# Patient Record
Sex: MALE | Race: BLACK OR AFRICAN AMERICAN | NOT HISPANIC OR LATINO | Employment: OTHER | URBAN - METROPOLITAN AREA
[De-identification: names, ages, dates, MRNs, and addresses within clinical notes are randomized per-mention and may not be internally consistent; named-entity substitution may affect disease eponyms.]

---

## 2017-01-06 ENCOUNTER — OFFICE VISIT (OUTPATIENT)
Dept: FAMILY MEDICINE | Facility: CLINIC | Age: 77
End: 2017-01-06
Payer: MEDICARE

## 2017-01-06 VITALS
OXYGEN SATURATION: 97 % | WEIGHT: 215.63 LBS | HEIGHT: 72 IN | BODY MASS INDEX: 29.21 KG/M2 | HEART RATE: 46 BPM | SYSTOLIC BLOOD PRESSURE: 112 MMHG | DIASTOLIC BLOOD PRESSURE: 64 MMHG

## 2017-01-06 DIAGNOSIS — I10 ESSENTIAL HYPERTENSION: Primary | ICD-10-CM

## 2017-01-06 DIAGNOSIS — E11.22 TYPE 2 DIABETES MELLITUS WITH STAGE 3 CHRONIC KIDNEY DISEASE, WITHOUT LONG-TERM CURRENT USE OF INSULIN: ICD-10-CM

## 2017-01-06 DIAGNOSIS — N18.30 TYPE 2 DIABETES MELLITUS WITH STAGE 3 CHRONIC KIDNEY DISEASE, WITHOUT LONG-TERM CURRENT USE OF INSULIN: ICD-10-CM

## 2017-01-06 DIAGNOSIS — J02.9 PHARYNGITIS, UNSPECIFIED ETIOLOGY: ICD-10-CM

## 2017-01-06 PROCEDURE — 1125F AMNT PAIN NOTED PAIN PRSNT: CPT | Mod: S$GLB,,, | Performed by: FAMILY MEDICINE

## 2017-01-06 PROCEDURE — 1160F RVW MEDS BY RX/DR IN RCRD: CPT | Mod: S$GLB,,, | Performed by: FAMILY MEDICINE

## 2017-01-06 PROCEDURE — 1159F MED LIST DOCD IN RCRD: CPT | Mod: S$GLB,,, | Performed by: FAMILY MEDICINE

## 2017-01-06 PROCEDURE — 3078F DIAST BP <80 MM HG: CPT | Mod: S$GLB,,, | Performed by: FAMILY MEDICINE

## 2017-01-06 PROCEDURE — 1157F ADVNC CARE PLAN IN RCRD: CPT | Mod: S$GLB,,, | Performed by: FAMILY MEDICINE

## 2017-01-06 PROCEDURE — 99214 OFFICE O/P EST MOD 30 MIN: CPT | Mod: 25,S$GLB,, | Performed by: FAMILY MEDICINE

## 2017-01-06 PROCEDURE — 3074F SYST BP LT 130 MM HG: CPT | Mod: S$GLB,,, | Performed by: FAMILY MEDICINE

## 2017-01-06 PROCEDURE — 99999 PR PBB SHADOW E&M-EST. PATIENT-LVL IV: CPT | Mod: PBBFAC,,, | Performed by: FAMILY MEDICINE

## 2017-01-06 PROCEDURE — 96372 THER/PROPH/DIAG INJ SC/IM: CPT | Mod: S$GLB,,, | Performed by: FAMILY MEDICINE

## 2017-01-06 RX ORDER — TRIAMCINOLONE ACETONIDE 40 MG/ML
40 INJECTION, SUSPENSION INTRA-ARTICULAR; INTRAMUSCULAR
Status: COMPLETED | OUTPATIENT
Start: 2017-01-06 | End: 2017-01-06

## 2017-01-06 RX ORDER — AZITHROMYCIN 250 MG/1
TABLET, FILM COATED ORAL
Qty: 6 TABLET | Refills: 0 | Status: SHIPPED | OUTPATIENT
Start: 2017-01-06 | End: 2017-01-11

## 2017-01-06 RX ADMIN — TRIAMCINOLONE ACETONIDE 40 MG: 40 INJECTION, SUSPENSION INTRA-ARTICULAR; INTRAMUSCULAR at 11:01

## 2017-01-06 NOTE — PATIENT INSTRUCTIONS
Bandaid applied, tolerated well, pt instructed to wait 15 minutes.  Chronic Kidney Disease (CKD)    The role of the kidneys is to remove waste products and excess water from the blood.  When the kidneys do not function normally and waste products begin to build up in the blood, this is called chronic kidney disease (CKD). CKD is defined as the presence of kidney damage or a decrease in kidney function lasting 3 months or more. CKD allows excess water, waste, and toxic substances to build up in the body. This can eventually become life-threatening, requiring dialysis or a kidney transplant to stay alive. This most severe form is called end stage renal disease or ESRD.  Diabetes is the leading causes of chronic renal failure. Other causes include high blood pressure, hardening of the arteries (atherosclerosis), lupus, inflammation of the blood vessels (vasculitis), prior viral and bacterial infections, and others. Certain over-the-counter pain medicines can cause renal failure when taken often over a long period of time. These include aspirin, ibuprofen, and related anti-inflammatory medicines called NSAIDs (nonsteroidal anti-inflammatory drugs).  Home care  The following guidelines will help you care for yourself at home:  1. If you have diabetes, talk to your doctor about the quality of your blood sugar control and any adjustments needed to your diet, lifestyle, or medicines.  2. If you have high blood pressure:  ¨ Take prescribed medicine to lower your blood pressure to the recommended goal of less than 130/80.  ¨ Take up a regular exercise program that you enjoy. Check with your doctor to be sure your planned exercise program is right for you.  ¨ Reduce your salt (sodium) intake. Your doctor can tell you how much salt per day is safe for you.  3. If you are overweight, talk to your doctor about a weight loss plan.  4. If you smoke, you must quit. Smoking worsens kidney disease. Talk to your doctor about ways to  help you quit.  For more information, visit the following links:  ¨ www.smokefree.gov/sites/default/files/pdf/clearing-the-air-accessible.pdf  ¨ www.smokefree.gov  ¨ www.cancer.org/healthy/stayawayfromtobacco/guidetoquittingsmoking/  5. Most patients with chronic renal failure need to follow a special diet.  Be sure you understand yours. In general, you will need to restrict protein, salt, potassium, and phosphorus. You also need to limit fluid intake. A calcium supplement may be prescribed to protect your bones from osteoporosis.  6. CKD is a risk factor for heart disease. Talk to your doctor about any other risk factors you might have and what you can do to lessen them.  7. Talk to your doctor about any medicines you are taking to determine if they need to be reduced or stopped.  8. Avoid the following over-the-counter medicines, or consult your doctor before using:  ¨ Aspirin and nonsteroidal anti-inflammatory drugs (NSAIDs) such as ibuprofen or naproxen (short-term use of acetaminophen for fever or pain is okay)  ¨ Laxatives and antacids containing magnesium or aluminum  ¨ Fleet or phospho soda enemas containing phosphorus  ¨ Certain stomach acid-blocking medicine such as cimetidine or ranitidine   ¨ Decongestants containing pseudoephedrine   ¨ Herbal supplements  Follow-up care  Follow up with your doctor or as advised by our staff. Contact one of the following for more information:  · American Association of Kidney Patients (180) 356-6395 www.aakp.org  · National Kidney Foundation (195) 189-3915 www.kidney.org  · American Kidney Fund (782) 486-9274 www.kidneyfund.org  · National Kidney Disease Education Program (191) 4SYCIZT www.nkdep.nih.gov  [NOTE: If an X-ray, EKG (cardiogram), or other diagnostic test was taken, another specialist will review it. You will be notified of any new findings that may affect your care.]  When to seek medical care  Get prompt medical attention or contact your doctor if any of the  following occur:  · Nausea or vomiting  · Fever greater than 100.4°F (38°C)  · Severe weakness, dizziness, fainting, drowsiness, or confusion  · Chest pain or shortness of breath  · Unexpected weight gain or swelling in the legs, ankles, or around the eyes  · Heart beating fast, slow, or irregularly  · Decrease or absent urine output  © 8793-7967 [employer]. [address] [place] [address], [place] [address]. All rights reserved. This information is not intended as a substitute for professional medical care. Always follow your healthcare professional's instructions.

## 2017-01-06 NOTE — PROGRESS NOTES
Subjective:       Patient ID: Pedro Ponce is a 76 y.o. male.    Chief Complaint: Sore Throat and Cough    HPI Comments: 76 years old male who came to the clinic with sore throat for the last week.  No fevers or chills.  Patient was using over-the-counter medicine with no significant improvement.  Blood pressure today is stable.  No chest pain palpitations orthopnea or PND.  Patient with no recent A1c.  No polyuria polydipsia or polyphagia.  Patient with good compliance with medical regimen.    Sore Throat    Associated symptoms include coughing.   Cough   Associated symptoms include a sore throat. Pertinent negatives include no chest pain, chills or fever.     Review of Systems   Constitutional: Negative.  Negative for chills and fever.   HENT: Positive for sore throat.    Eyes: Negative.    Respiratory: Positive for cough.    Cardiovascular: Negative.  Negative for chest pain, palpitations and leg swelling.   Gastrointestinal: Negative.    Endocrine: Negative for cold intolerance, heat intolerance, polydipsia, polyphagia and polyuria.   Genitourinary: Negative.    Musculoskeletal: Negative.    Skin: Negative.    Neurological: Negative.    Psychiatric/Behavioral: Negative.        Objective:      Physical Exam   Constitutional: He is oriented to person, place, and time. He appears well-developed and well-nourished. No distress.   HENT:   Head: Normocephalic and atraumatic.   Right Ear: External ear normal.   Left Ear: External ear normal.   Nose: Nose normal.   Mouth/Throat: Posterior oropharyngeal erythema present. No oropharyngeal exudate.   Eyes: Conjunctivae and EOM are normal. Pupils are equal, round, and reactive to light. Right eye exhibits no discharge. Left eye exhibits no discharge. No scleral icterus.   Neck: Normal range of motion. Neck supple. No JVD present. No tracheal deviation present. No thyromegaly present.   Cardiovascular: Normal rate, regular rhythm, normal heart sounds and intact distal  pulses.  Exam reveals no gallop and no friction rub.    No murmur heard.  Pulmonary/Chest: Effort normal and breath sounds normal. No stridor. No respiratory distress. He has no wheezes. He has no rales. He exhibits no tenderness.   Abdominal: Soft. Bowel sounds are normal. He exhibits no distension and no mass. There is no tenderness. There is no rebound and no guarding.   Musculoskeletal: Normal range of motion. He exhibits no edema or tenderness.   Lymphadenopathy:     He has no cervical adenopathy.   Neurological: He is alert and oriented to person, place, and time. He has normal reflexes. He displays normal reflexes. No cranial nerve deficit. He exhibits normal muscle tone. Coordination normal.   Skin: Skin is warm and dry. No rash noted. He is not diaphoretic. No erythema. No pallor.   Psychiatric: He has a normal mood and affect. His behavior is normal. Judgment and thought content normal.   Nursing note and vitals reviewed.      Assessment:       1. Essential hypertension    2. Type 2 diabetes mellitus with stage 3 chronic kidney disease, without long-term current use of insulin    3. Pharyngitis, unspecified etiology        Plan:         Pedro was seen today for sore throat and cough.    Diagnoses and all orders for this visit:    Essential hypertension  -     Comprehensive metabolic panel; Future    Type 2 diabetes mellitus with stage 3 chronic kidney disease, without long-term current use of insulin  -     Comprehensive metabolic panel; Future    Pharyngitis, unspecified etiology  -     azithromycin (Z-ERAN) 250 MG tablet; Take 2 tablets by mouth on day 1; Take 1 tablet by mouth on days 2-5  -     triamcinolone acetonide injection 40 mg; Inject 1 mL (40 mg total) into the muscle one time.    Continue monitoring blood pressure at home, low sodium diet.  Continue monitoring blood sugar at home,ADA diet.

## 2017-01-06 NOTE — MR AVS SNAPSHOT
[facility]   [place]  [place] [address]  Phone: [phone]  Fax: [phone]                  Pedro Ponce   2017 11:20 AM   Office Visit    Description:  Male : 1940   Provider:  John Ricci MD   Department:  [facility]           Reason for Visit     Sore Throat     Cough           Diagnoses this Visit        Comments    Essential hypertension    -  Primary     Type 2 diabetes mellitus with stage 3 chronic kidney disease, without long-term current use of insulin         Pharyngitis, unspecified etiology                To Do List           Future Appointments        Provider Department Dept Phone    1/10/2017 7:00 AM [facility], [facility] [phone]    2017 1:00 PM THOM Newell MD [facility] - Ophthalmology [phone]      Goals (5 Years of Data)     None       These Medications        Disp Refills Start End    azithromycin (Z-ERAN) 250 MG tablet 6 tablet 0 2017    Take 2 tablets by mouth on day 1; Take 1 tablet by mouth on days 2-5    Pharmacy: [facility] #[id number] - [place], [place] - [address] [facility] Ph #: [phone]         [facility]     [facility] -  Assistance  Registered nurses in the [facility] provide clinical advisement, health education, appointment booking, and other advisory services.  Call for this free service at [phone].             Medications           Message regarding Medications     Verify the changes and/or additions to your medication regime listed below are the same as discussed with your clinician today.  If any of these changes or additions are incorrect, please notify your healthcare provider.        START taking these NEW medications        Refills    azithromycin (Z-ERAN) 250 MG tablet 0    Sig: Take 2 tablets by mouth on day 1; Take 1 tablet by mouth on days 2-5    Class: Normal      These medications were  administered today        Dose Freq    triamcinolone acetonide injection 40 mg 40 mg Clinic/HOD 1 time    Sig: Inject 1 mL (40 mg total) into the muscle one time.    Class: Normal    Route: Intramuscular           Verify that the below list of medications is an accurate representation of the medications you are currently taking.  If none reported, the list may be blank. If incorrect, please contact your healthcare provider. Carry this list with you in case of emergency.           Current Medications     albuterol 90 mcg/actuation inhaler Inhale 2 puffs into the lungs every 6 (six) hours as needed for Wheezing.    amlodipine-atorvastatin (CADUET) 10-10 mg per tablet TAKE 1 TABLET BY MOUTH EVERY DAY    aspirin 81 mg Tab Take 81 mg by mouth. 1 Tablet Oral Every evening    b complex vitamins tablet Take 1 tablet by mouth once daily.      carbamide peroxide (DEBROX) 6.5 % otic solution Place 6.5 % in ear(s). 5-10 Drops Otic Twice a day .  Drops should remain in ear >15 minutesDo not use for more than 4 days.    docusate sodium 100 mg capsule Take 100 mg by mouth. 1 Tablet Oral Twice a day .   take as needed for hard stools    dutasteride (AVODART) 0.5 mg capsule TAKE 1 CAPSULE (0.5 MG TOTAL) BY MOUTH ONCE DAILY.    fish oil-omega-3 fatty acids 300-1,000 mg capsule Take 1 g by mouth once daily.      hydrochlorothiazide (HYDRODIURIL) 25 MG tablet TAKE 1 TABLET (25 MG TOTAL) BY MOUTH ONCE DAILY.    irbesartan (AVAPRO) 300 MG tablet TAKE 1 TABLET EVERY DAY    latanoprost 0.005 % ophthalmic solution Place 1 drop into both eyes once daily.    metoprolol tartrate (LOPRESSOR) 25 MG tablet TAKE 2 TABLETS BY MOUTH 2 TIMES DAILY    metoprolol tartrate (LOPRESSOR) 25 MG tablet TAKE 2 TABLETS BY MOUTH TWICE A DAY    multivitamin-minerals-lutein (CENTRUM SILVER) Tab Take by mouth. 1 Tablet Oral Every day    oxybutynin (DITROPAN) 5 MG Tab TAKE 1 TABLET TWICE A DAY    tamsulosin (FLOMAX) 0.4 mg Cp24     vitamin D 185 MG Tab Take 185 mg  by mouth once daily.      azithromycin (Z-ERAN) 250 MG tablet Take 2 tablets by mouth on day 1; Take 1 tablet by mouth on days 2-5           Clinical Reference Information           Vital Signs - Last Recorded  Most recent update: 1/6/2017 11:27 AM by Marlin Pelayo MA    BP Pulse Ht Wt SpO2 BMI    112/64 (BP Location: Right arm, Patient Position: Sitting, BP Method: Manual) (!) 46 6' (1.829 m) 97.8 kg (215 lb 9.8 oz) 97% 29.24 kg/m2      Blood Pressure          Most Recent Value    BP  112/64      Allergies as of 1/6/2017     Pcn [Penicillins]      Immunizations Administered on Date of Encounter - 1/6/2017     None      Orders Placed During Today's Visit     Future Labs/Procedures Expected by Expires    Comprehensive metabolic panel  1/6/2017 4/6/2017

## 2017-01-10 ENCOUNTER — LAB VISIT (OUTPATIENT)
Dept: LAB | Facility: HOSPITAL | Age: 77
End: 2017-01-10
Attending: FAMILY MEDICINE
Payer: MEDICARE

## 2017-01-10 DIAGNOSIS — N18.30 TYPE 2 DIABETES MELLITUS WITH STAGE 3 CHRONIC KIDNEY DISEASE, WITHOUT LONG-TERM CURRENT USE OF INSULIN: ICD-10-CM

## 2017-01-10 DIAGNOSIS — E11.9 TYPE 2 DIABETES MELLITUS WITHOUT COMPLICATION: ICD-10-CM

## 2017-01-10 DIAGNOSIS — I10 ESSENTIAL HYPERTENSION: ICD-10-CM

## 2017-01-10 DIAGNOSIS — E11.22 TYPE 2 DIABETES MELLITUS WITH STAGE 3 CHRONIC KIDNEY DISEASE, WITHOUT LONG-TERM CURRENT USE OF INSULIN: ICD-10-CM

## 2017-01-10 LAB
ALBUMIN SERPL BCP-MCNC: 4 G/DL
ALP SERPL-CCNC: 65 U/L
ALT SERPL W/O P-5'-P-CCNC: 35 U/L
ANION GAP SERPL CALC-SCNC: 6 MMOL/L
AST SERPL-CCNC: 27 U/L
BILIRUB SERPL-MCNC: 1.6 MG/DL
BUN SERPL-MCNC: 23 MG/DL
CALCIUM SERPL-MCNC: 10.6 MG/DL
CHLORIDE SERPL-SCNC: 102 MMOL/L
CHOLEST/HDLC SERPL: 3.4 {RATIO}
CO2 SERPL-SCNC: 29 MMOL/L
CREAT SERPL-MCNC: 1.3 MG/DL
EST. GFR  (AFRICAN AMERICAN): >60 ML/MIN/1.73 M^2
EST. GFR  (NON AFRICAN AMERICAN): 52.6 ML/MIN/1.73 M^2
GLUCOSE SERPL-MCNC: 97 MG/DL
HDL/CHOLESTEROL RATIO: 29.7 %
HDLC SERPL-MCNC: 148 MG/DL
HDLC SERPL-MCNC: 44 MG/DL
LDLC SERPL CALC-MCNC: 87.8 MG/DL
NONHDLC SERPL-MCNC: 104 MG/DL
POTASSIUM SERPL-SCNC: 3.7 MMOL/L
PROT SERPL-MCNC: 7.4 G/DL
SODIUM SERPL-SCNC: 137 MMOL/L
TRIGL SERPL-MCNC: 81 MG/DL

## 2017-01-10 PROCEDURE — 83036 HEMOGLOBIN GLYCOSYLATED A1C: CPT

## 2017-01-10 PROCEDURE — 80053 COMPREHEN METABOLIC PANEL: CPT

## 2017-01-10 PROCEDURE — 80061 LIPID PANEL: CPT

## 2017-01-10 PROCEDURE — 36415 COLL VENOUS BLD VENIPUNCTURE: CPT | Mod: PO

## 2017-01-11 LAB
ESTIMATED AVG GLUCOSE: 131 MG/DL
HBA1C MFR BLD HPLC: 6.2 %

## 2017-01-30 RX ORDER — AMLODIPINE BESYLATE AND ATORVASTATIN CALCIUM 10; 10 MG/1; MG/1
TABLET, FILM COATED ORAL
Qty: 90 TABLET | Refills: 0 | Status: SHIPPED | OUTPATIENT
Start: 2017-01-30 | End: 2017-07-25 | Stop reason: SDUPTHER

## 2017-01-31 RX ORDER — METOPROLOL TARTRATE 25 MG/1
TABLET, FILM COATED ORAL
Qty: 180 TABLET | Refills: 1 | Status: SHIPPED | OUTPATIENT
Start: 2017-01-31 | End: 2017-05-04

## 2017-02-07 ENCOUNTER — OFFICE VISIT (OUTPATIENT)
Dept: OPHTHALMOLOGY | Facility: CLINIC | Age: 77
End: 2017-02-07
Payer: MEDICARE

## 2017-02-07 DIAGNOSIS — H35.3232 EXUDATIVE AGE-RELATED MACULAR DEGENERATION OF BOTH EYES WITH INACTIVE CHOROIDAL NEOVASCULARIZATION: Primary | ICD-10-CM

## 2017-02-07 DIAGNOSIS — H40.1132 PRIMARY OPEN ANGLE GLAUCOMA OF BOTH EYES, MODERATE STAGE: ICD-10-CM

## 2017-02-07 PROCEDURE — 99999 PR PBB SHADOW E&M-EST. PATIENT-LVL II: CPT | Mod: PBBFAC,,, | Performed by: OPHTHALMOLOGY

## 2017-02-07 PROCEDURE — 92014 COMPRE OPH EXAM EST PT 1/>: CPT | Mod: S$GLB,,, | Performed by: OPHTHALMOLOGY

## 2017-02-07 PROCEDURE — 92134 CPTRZ OPH DX IMG PST SGM RTA: CPT | Mod: S$GLB,,, | Performed by: OPHTHALMOLOGY

## 2017-02-07 PROCEDURE — 92226 PR SPECIAL EYE EXAM, SUBSEQUENT: CPT | Mod: 50,S$GLB,, | Performed by: OPHTHALMOLOGY

## 2017-02-07 NOTE — MR AVS SNAPSHOT
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      [facility]     [facility] -  Assistance  Registered nurses in the [facility] provide clinical advisement, health education, appointment booking, and other advisory services.  Call for this free service at [phone].             Medications           Message regarding Medications     Verify the changes and/or additions to your medication regime listed below are the same as discussed with your clinician today.  If any of these changes or additions are incorrect, please notify your healthcare provider.             Verify that the below list of medications is an accurate representation of the medications you are currently taking.  If none reported, the list may be blank. If incorrect, please contact your healthcare provider. Carry this list with you in case of emergency.           Current Medications     albuterol 90 mcg/actuation inhaler Inhale 2 puffs into the lungs every 6 (six) hours as needed for Wheezing.    amlodipine-atorvastatin (CADUET) 10-10 mg per tablet TAKE 1 TABLET BY MOUTH EVERY DAY    aspirin 81 mg Tab Take 81 mg by mouth. 1 Tablet Oral Every evening    b complex vitamins tablet Take 1 tablet by mouth once daily.      carbamide peroxide (DEBROX) 6.5 %  otic solution Place 6.5 % in ear(s). 5-10 Drops Otic Twice a day .  Drops should remain in ear >15 minutesDo not use for more than 4 days.    docusate sodium 100 mg capsule Take 100 mg by mouth. 1 Tablet Oral Twice a day .   take as needed for hard stools    dutasteride (AVODART) 0.5 mg capsule TAKE 1 CAPSULE (0.5 MG TOTAL) BY MOUTH ONCE DAILY.    fish oil-omega-3 fatty acids 300-1,000 mg capsule Take 1 g by mouth once daily.      FLUZONE HIGH-DOSE 2016-17, PF, 180 mcg/0.5 mL Syrg     hydrochlorothiazide (HYDRODIURIL) 25 MG tablet TAKE 1 TABLET (25 MG TOTAL) BY MOUTH ONCE DAILY.    irbesartan (AVAPRO) 300 MG tablet TAKE 1 TABLET EVERY DAY    latanoprost 0.005 % ophthalmic solution Place 1 drop into both eyes once daily.    metoprolol tartrate (LOPRESSOR) 25 MG tablet TAKE 2 TABLETS BY MOUTH 2 TIMES DAILY    metoprolol tartrate (LOPRESSOR) 25 MG tablet TAKE 2 TABLETS BY MOUTH TWICE A DAY    multivitamin-minerals-lutein (CENTRUM SILVER) Tab Take by mouth. 1 Tablet Oral Every day    oxybutynin (DITROPAN) 5 MG Tab TAKE 1 TABLET TWICE A DAY    tamsulosin (FLOMAX) 0.4 mg Cp24     vitamin D 185 MG Tab Take 185 mg by mouth once daily.             Clinical Reference Information           Allergies as of 2/7/2017     Pcn [Penicillins]      Immunizations Administered on Date of Encounter - 2/7/2017     None      Orders Placed During Today's Visit      Normal Orders This Visit    Posterior Segment OCT Retina-Both eyes       Language Assistance Services     ATTENTION: Language assistance services are available, free of charge. Please call [phone].      ATENCIÓN: Si yuryla yanydanilo, tiene a holliday disposición servicios gratuitos de asistencia lingüística. Llame al [phone].     KENNY Ý: N?u b?n nói Ti?ng Vi?t, có các d?ch v? h? tr? ngôn ng? mi?n phí dành cho b?n. G?i s? [phone].         [facility] complies with applicable Federal civil rights laws and does not discriminate on the basis of race, color,  national origin, age, disability, or sex.

## 2017-02-07 NOTE — PROGRESS NOTES
OCT - Flat PED OD, NO SRF - stable   OS - stable small flat inferior PED, no SRF     Previous ICG - no significant Pooling or leakage OU  RPE defects OS>OD    A/P    1. IPCV OU vs wet amd   With subfoveal PED OD  Avastin resumed 1/14  S/p Avastin OD #8    OS - PED inferior to fovea stable    Stable will cont to Observe OU    2. Floaters OU    3. POAG - Good IOP, continue management as per Dr. Hidalgo  - Needs f/u appt    4. NS   - OK for CE when ready, pt would like to have it done with Dr. Hidalgo       12 months OCT

## 2017-02-25 ENCOUNTER — OFFICE VISIT (OUTPATIENT)
Dept: FAMILY MEDICINE | Facility: CLINIC | Age: 77
End: 2017-02-25
Payer: MEDICARE

## 2017-02-25 VITALS
SYSTOLIC BLOOD PRESSURE: 136 MMHG | DIASTOLIC BLOOD PRESSURE: 72 MMHG | BODY MASS INDEX: 26.76 KG/M2 | OXYGEN SATURATION: 98 % | WEIGHT: 197.56 LBS | HEIGHT: 72 IN | HEART RATE: 54 BPM

## 2017-02-25 DIAGNOSIS — R20.0 LEFT ARM NUMBNESS: Primary | ICD-10-CM

## 2017-02-25 PROCEDURE — 3075F SYST BP GE 130 - 139MM HG: CPT | Mod: S$GLB,,, | Performed by: FAMILY MEDICINE

## 2017-02-25 PROCEDURE — 1160F RVW MEDS BY RX/DR IN RCRD: CPT | Mod: S$GLB,,, | Performed by: FAMILY MEDICINE

## 2017-02-25 PROCEDURE — 1159F MED LIST DOCD IN RCRD: CPT | Mod: S$GLB,,, | Performed by: FAMILY MEDICINE

## 2017-02-25 PROCEDURE — 1157F ADVNC CARE PLAN IN RCRD: CPT | Mod: S$GLB,,, | Performed by: FAMILY MEDICINE

## 2017-02-25 PROCEDURE — 99999 PR PBB SHADOW E&M-EST. PATIENT-LVL IV: CPT | Mod: PBBFAC,,, | Performed by: FAMILY MEDICINE

## 2017-02-25 PROCEDURE — 3078F DIAST BP <80 MM HG: CPT | Mod: S$GLB,,, | Performed by: FAMILY MEDICINE

## 2017-02-25 PROCEDURE — 99214 OFFICE O/P EST MOD 30 MIN: CPT | Mod: S$GLB,,, | Performed by: FAMILY MEDICINE

## 2017-02-25 PROCEDURE — 1126F AMNT PAIN NOTED NONE PRSNT: CPT | Mod: S$GLB,,, | Performed by: FAMILY MEDICINE

## 2017-02-25 NOTE — PATIENT INSTRUCTIONS
Carpal Tunnel Syndrome    Carpal tunnel syndrome is a painful condition of the wrist and arm. It is caused by pressure on the median nerve.  The median nerve is one of the nerves that give feeling and movement to the hand. It passes through a tunnel in the wrist called the carpal tunnel. This tunnel is made up of bones and ligaments. Narrowing of this tunnel or swelling of the tissues inside the tunnel puts pressure on the median nerve. This causes numbness, pins and needles, or electric shooting pains in your hand and forearm. Often the pain is worse at night and may wake you when you are asleep.  Carpal tunnel syndrome may occur during pregnancy and with use of birth control pills. It is more common in workers who must often bend their wrists. It is also common in people who work with power tools that cause strong vibrations.  Home care  · Rest the painful wrist. Avoid repeated bending of the wrist back and forth. This puts pressure on the median nerve. Avoid using power tools with strong vibrations.  · If you were given a splint, wear it at night while you sleep. You may also wear it during the day for comfort.  · Move your fingers and wrists often to avoid stiffness.  · Elevate your arms on pillows when you lie down.  · Try using the unaffected hand more.  · Try not to hold your wrists in a bent, downward position.  · Sometimes changes in the work place may ease symptoms. If you type most of the day, it may help to change the position of your keyboard or add a wrist support. Your wrist should be in a neutral position and not bent back when typing.  · You may use over-the-counter pain medicine to treat pain and inflammation, unless another medicine was prescribed. Anti-inflammatory pain medicines, such as ibuprofen or naproxen may be more effective than acetaminophen, which treats pain, but not inflammation. If you have chronic liver or kidney disease or ever had a stomach ulcer or GI bleeding, talk with your  doctor before using these medicines.  · Opioid pain medicine will only give temporary relief and does not treat the problem. If pain continues, you may need a shot of a steroid drug into your wrist.  · If the above methods fail, you may need surgery. This will open the carpal tunnel and release the pressure on the trapped nerve.  Follow-up care  Follow up with your healthcare provider, or as advised, if the pain doesnt begin to improve within the next week.  If X-rays were taken, you will be notified of any new findings that may affect your care.  When to seek medical advice  Call your healthcare provider right away if any of these occur:  · Pain not improving with the above treatment  · Fingers or hand become cold, blue, numb, or tingly  · Your whole arm becomes swollen or weak  Date Last Reviewed: 11/23/2015  © 1884-9668 [employer]. [address] [place] [address], [place] [address]. All rights reserved. This information is not intended as a substitute for professional medical care. Always follow your healthcare professional's instructions.

## 2017-02-25 NOTE — MR AVS SNAPSHOT
[facility]   [place]  [place] [address]  Phone: [phone]  Fax: [phone]                  Pedro Ponce   2017 10:40 AM   Office Visit    Description:  Male : 1940   Provider:  Zay Phelps MD   Department:  [facility]           Reason for Visit     Numbness     Extremity Weakness                To Do List           Future Appointments        Provider Department Dept Phone    3/10/2017 11:00 AM John Ricci MD [facility] [phone]      Goals (5 Years of Data)     None      [facility]     [facility] -  Assistance  Registered nurses in the [facility] provide clinical advisement, health education, appointment booking, and other advisory services.  Call for this free service at [phone].             Medications           Message regarding Medications     Verify the changes and/or additions to your medication regime listed below are the same as discussed with your clinician today.  If any of these changes or additions are incorrect, please notify your healthcare provider.             Verify that the below list of medications is an accurate representation of the medications you are currently taking.  If none reported, the list may be blank. If incorrect, please contact your healthcare provider. Carry this list with you in case of emergency.           Current Medications     albuterol 90 mcg/actuation inhaler Inhale 2 puffs into the lungs every 6 (six) hours as needed for Wheezing.    amlodipine-atorvastatin (CADUET) 10-10 mg per tablet TAKE 1 TABLET BY MOUTH EVERY DAY    aspirin 81 mg Tab Take 81 mg by mouth. 1 Tablet Oral Every evening    b complex vitamins tablet Take 1 tablet by mouth once daily.      carbamide peroxide (DEBROX) 6.5 % otic solution Place 6.5 % in ear(s). 5-10 Drops Otic Twice a day .  Drops should remain in ear >15 minutesDo not use for more than  4 days.    docusate sodium 100 mg capsule Take 100 mg by mouth. 1 Tablet Oral Twice a day .   take as needed for hard stools    dutasteride (AVODART) 0.5 mg capsule TAKE 1 CAPSULE (0.5 MG TOTAL) BY MOUTH ONCE DAILY.    fish oil-omega-3 fatty acids 300-1,000 mg capsule Take 1 g by mouth once daily.      FLUZONE HIGH-DOSE 2016-17, PF, 180 mcg/0.5 mL Syrg     hydrochlorothiazide (HYDRODIURIL) 25 MG tablet TAKE 1 TABLET (25 MG TOTAL) BY MOUTH ONCE DAILY.    irbesartan (AVAPRO) 300 MG tablet TAKE 1 TABLET EVERY DAY    latanoprost 0.005 % ophthalmic solution Place 1 drop into both eyes once daily.    metoprolol tartrate (LOPRESSOR) 25 MG tablet TAKE 2 TABLETS BY MOUTH 2 TIMES DAILY    metoprolol tartrate (LOPRESSOR) 25 MG tablet TAKE 2 TABLETS BY MOUTH TWICE A DAY    multivitamin-minerals-lutein (CENTRUM SILVER) Tab Take by mouth. 1 Tablet Oral Every day    oxybutynin (DITROPAN) 5 MG Tab TAKE 1 TABLET TWICE A DAY    tamsulosin (FLOMAX) 0.4 mg Cp24     vitamin D 185 MG Tab Take 185 mg by mouth once daily.             Clinical Reference Information           Your Vitals Were     BP                   136/72 (BP Location: Right arm, Patient Position: Sitting, BP Method: Manual)           Blood Pressure          Most Recent Value    BP  136/72      Allergies as of 2/25/2017     Pcn [Penicillins]      Immunizations Administered on Date of Encounter - 2/25/2017     None      Instructions      Carpal Tunnel Syndrome    Carpal tunnel syndrome is a painful condition of the wrist and arm. It is caused by pressure on the median nerve.  The median nerve is one of the nerves that give feeling and movement to the hand. It passes through a tunnel in the wrist called the carpal tunnel. This tunnel is made up of bones and ligaments. Narrowing of this tunnel or swelling of the tissues inside the tunnel puts pressure on the median nerve. This causes numbness, pins and needles, or electric shooting pains in your hand and forearm. Often the  pain is worse at night and may wake you when you are asleep.  Carpal tunnel syndrome may occur during pregnancy and with use of birth control pills. It is more common in workers who must often bend their wrists. It is also common in people who work with power tools that cause strong vibrations.  Home care  · Rest the painful wrist. Avoid repeated bending of the wrist back and forth. This puts pressure on the median nerve. Avoid using power tools with strong vibrations.  · If you were given a splint, wear it at night while you sleep. You may also wear it during the day for comfort.  · Move your fingers and wrists often to avoid stiffness.  · Elevate your arms on pillows when you lie down.  · Try using the unaffected hand more.  · Try not to hold your wrists in a bent, downward position.  · Sometimes changes in the work place may ease symptoms. If you type most of the day, it may help to change the position of your keyboard or add a wrist support. Your wrist should be in a neutral position and not bent back when typing.  · You may use over-the-counter pain medicine to treat pain and inflammation, unless another medicine was prescribed. Anti-inflammatory pain medicines, such as ibuprofen or naproxen may be more effective than acetaminophen, which treats pain, but not inflammation. If you have chronic liver or kidney disease or ever had a stomach ulcer or GI bleeding, talk with your doctor before using these medicines.  · Opioid pain medicine will only give temporary relief and does not treat the problem. If pain continues, you may need a shot of a steroid drug into your wrist.  · If the above methods fail, you may need surgery. This will open the carpal tunnel and release the pressure on the trapped nerve.  Follow-up care  Follow up with your healthcare provider, or as advised, if the pain doesnt begin to improve within the next week.  If X-rays were taken, you will be notified of any new findings that may affect your  care.  When to seek medical advice  Call your healthcare provider right away if any of these occur:  · Pain not improving with the above treatment  · Fingers or hand become cold, blue, numb, or tingly  · Your whole arm becomes swollen or weak  Date Last Reviewed: 11/23/2015 © 2000-2016 [employer]. [address] [place] [address], [place] [address]. All rights reserved. This information is not intended as a substitute for professional medical care. Always follow your healthcare professional's instructions.             Language Assistance Services     ATTENTION: Language assistance services are available, free of charge. Please call [phone].      ATENCIÓN: Si habla español, tiene a holliday disposición servicios gratuitos de asistencia lingüística. Llame al [phone].     CHÚ Ý: N?u b?n nói Ti?ng Vi?t, có các d?ch v? h? tr? ngôn ng? mi?n phí dành cho b?n. G?i s? [phone].         [facility] complies with applicable Federal civil rights laws and does not discriminate on the basis of race, color, national origin, age, disability, or sex.

## 2017-02-25 NOTE — PROGRESS NOTES
(Portions of this note were dictated using voice recognition software and may contain dictation related errors in spelling/grammar/syntax not found on text review)    CC:   Chief Complaint   Patient presents with    Numbness     left arm and hand     Extremity Weakness       HPI: 77 y.o. male Pt of John Ricci MD [facility].    2-3 days ago he woke up with numbness in his left palm and fingers along with numbness and tingling along the course of his left arm.  No pain in the neck.  No history of this happening before.  No right-sided symptoms.  No slurred speech or other neurological problems.  No visual problems.  No chest pain or shortness of breath.  He does golf a lot.  Otherwise however denies any significant overuse of his hands.  No neck or head trauma recently.  Has not tried any therapies for this.  Medical history reviewed as below.  He is not sure what makes it better or worse.  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     Social History Main Topics    Smoking status: Never Smoker    Smokeless tobacco: Never Used    Alcohol use No    Drug use: Not on file    Sexual activity: Not on file     Other Topics Concern    Not on file     Social History Narrative     Lab Results   Component Value Date    WBC 4.67 04/14/2016    HGB 16.2 04/14/2016    HCT 47.7 04/14/2016     04/14/2016    CHOL 148 01/10/2017    TRIG 81 01/10/2017    HDL 44 01/10/2017    ALT 35 01/10/2017    AST 27 01/10/2017     01/10/2017    K 3.7 01/10/2017     01/10/2017    CREATININE 1.3 01/10/2017    BUN 23 01/10/2017    CO2 29 01/10/2017    TSH 3.783 04/14/2016    PSA 0.77 08/17/2015    INR 1.1 06/02/2014    GLUF 99 08/30/2010    HGBA1C 6.2 01/10/2017    LDLCALC 87.8 01/10/2017    GLU 97 01/10/2017         ROS:  GENERAL: No fever, chills, fatigability or weight loss.  SKIN: No rashes, no itching.  HEAD: No headaches.  EYES: No visual changes  EARS: No ear pain or changes in hearing.  NOSE: No congestion or rhinorrhea.  MOUTH & THROAT: No hoarseness, change in voice, or sore throat.  NODES: Denies swollen glands.  CHEST: Denies VALERA, cyanosis, wheezing, cough and sputum production.  CARDIOVASCULAR: Denies chest pain, PND, orthopnea.  ABDOMEN: No nausea, vomiting, or changes in bowel function.  URINARY: No flank pain, dysuria or hematuria.  PERIPHERAL VASCULAR: No claudication or cyanosis.  MUSCULOSKELETAL: No joint stiffness or swelling. Denies back pain.  NEUROLOGIC: Above    Vital signs reviewed  PE:   APPEARANCE: Well nourished, well developed, in no acute distress.    HEAD: Normocephalic, atraumatic.  EYES: .   Conjunctivae noninjected.  NECK: Supple with no cervical lymphadenopathy.  No axillary lymphadenopathy    CHEST: Good inspiratory effort. Lungs clear to auscultation with no wheezes or crackles.  CARDIOVASCULAR: Normal S1, S2. No rubs, murmurs, or gallops.  ABDOMEN: Bowel sounds normal. Not distended. Soft. No tenderness or  masses. No organomegaly.  EXTREMITIES: No edema, cyanosis, or clubbing.  MSK/NEURO: 2+ biceps reflex is bilaterally.  1+ brachioradialis reflexes bilaterally.  Normal  strength.  Normal interosseous strength.  Normal biceps and deltoid strength bilaterally.  Normal shoulder neck range of motion.  Negative Spurling sign.  Negative Tinel's and Phalen signs of the left hand.  Normal monofilament testing to median and ulnar nerve regions without significant discrepancy    IMPRESSION  1. Left arm numbness            PLAN  Discussed the 2 possibilities likely being relating to either cervical radiculopathy versus carpal tunnel syndrome with referred proximal arm symptoms.  There is no neck related symptoms.  Patient is otherwise neurologically intact.  No other cardiovascular effects.  I do not suspect symptomology of systemic disease such as heart disease or stroke at this time.  Upon further discussion patient states that he is concerned that he is having a little bit of chest discomfort although he did have answered to the negative as far as chest pain or shortness of breath symptoms throughout the appointment.  I evaluated his medical chart and realized that he has had for negative stress tests over the past 3-4 years.  On further discussion, patient states that he is very anxious to see symptoms could be part of heart disease.  His brother had a heart attack.  He was reassured that with his recent negative cardiac testing, risk of heart disease  causing his presentation causing this presentation is fairly low.  I suspect more of a neuritis etiology as discussed above.  He is advised on wrist bracing for the next 2 weeks in the event of carpal tunnel syndrome.  Can take Motrin or Aleve temporarily for 2-3 days to help with inflammation.  He does have some borderline depressed kidney function and such I would recommend against taking NSAIDs for prolonged period of time.  If his symptoms do get worse or do not seem  to improve with conservative measures above, I would recommend follow-up with his PCP in the next couple weeks as he may need MRI evaluation of the cervical spine and nerve conduction studies of the left upper extremity to further evaluate for his problem.

## 2017-03-10 ENCOUNTER — OFFICE VISIT (OUTPATIENT)
Dept: FAMILY MEDICINE | Facility: CLINIC | Age: 77
End: 2017-03-10
Payer: MEDICARE

## 2017-03-10 VITALS
BODY MASS INDEX: 28.09 KG/M2 | SYSTOLIC BLOOD PRESSURE: 120 MMHG | DIASTOLIC BLOOD PRESSURE: 68 MMHG | HEIGHT: 71 IN | HEART RATE: 64 BPM | WEIGHT: 200.63 LBS

## 2017-03-10 DIAGNOSIS — G56.02 CARPAL TUNNEL SYNDROME ON LEFT: ICD-10-CM

## 2017-03-10 DIAGNOSIS — Z23 NEED FOR VACCINATION AGAINST STREPTOCOCCUS PNEUMONIAE: ICD-10-CM

## 2017-03-10 DIAGNOSIS — I10 ESSENTIAL HYPERTENSION: Primary | ICD-10-CM

## 2017-03-10 PROCEDURE — 3078F DIAST BP <80 MM HG: CPT | Mod: S$GLB,,, | Performed by: FAMILY MEDICINE

## 2017-03-10 PROCEDURE — 1159F MED LIST DOCD IN RCRD: CPT | Mod: S$GLB,,, | Performed by: FAMILY MEDICINE

## 2017-03-10 PROCEDURE — 90732 PPSV23 VACC 2 YRS+ SUBQ/IM: CPT | Mod: S$GLB,,, | Performed by: FAMILY MEDICINE

## 2017-03-10 PROCEDURE — 1157F ADVNC CARE PLAN IN RCRD: CPT | Mod: S$GLB,,, | Performed by: FAMILY MEDICINE

## 2017-03-10 PROCEDURE — 1126F AMNT PAIN NOTED NONE PRSNT: CPT | Mod: S$GLB,,, | Performed by: FAMILY MEDICINE

## 2017-03-10 PROCEDURE — G0009 ADMIN PNEUMOCOCCAL VACCINE: HCPCS | Mod: S$GLB,,, | Performed by: FAMILY MEDICINE

## 2017-03-10 PROCEDURE — 99214 OFFICE O/P EST MOD 30 MIN: CPT | Mod: 25,S$GLB,, | Performed by: FAMILY MEDICINE

## 2017-03-10 PROCEDURE — 3074F SYST BP LT 130 MM HG: CPT | Mod: S$GLB,,, | Performed by: FAMILY MEDICINE

## 2017-03-10 PROCEDURE — 1160F RVW MEDS BY RX/DR IN RCRD: CPT | Mod: S$GLB,,, | Performed by: FAMILY MEDICINE

## 2017-03-10 PROCEDURE — 99999 PR PBB SHADOW E&M-EST. PATIENT-LVL III: CPT | Mod: PBBFAC,,, | Performed by: FAMILY MEDICINE

## 2017-03-10 NOTE — MR AVS SNAPSHOT
[facility]   [place]  [place] [address]  Phone: [phone]  Fax: [phone]                  Pedro Ponce   3/10/2017 11:00 AM   Office Visit    Description:  Male : 1940   Provider:  John Ricci MD   Department:  [facility]           Reason for Visit     Numbness           Diagnoses this Visit        Comments    Essential hypertension    -  Primary     Carpal tunnel syndrome on left         Need for vaccination against Streptococcus pneumoniae                To Do List           Future Appointments        Provider Department Dept Phone    2017 8:00 AM John Ricci MD [facility] [phone]      Goals (5 Years of Data)     None      [facility]     [facility] Care [facility] -  Assistance  Registered nurses in the [facility] provide clinical advisement, health education, appointment booking, and other advisory services.  Call for this free service at [phone].             Medications           Message regarding Medications     Verify the changes and/or additions to your medication regime listed below are the same as discussed with your clinician today.  If any of these changes or additions are incorrect, please notify your healthcare provider.        STOP taking these medications     FLUZONE HIGH-DOSE -, PF, 180 mcg/0.5 mL Syrg            Verify that the below list of medications is an accurate representation of the medications you are currently taking.  If none reported, the list may be blank. If incorrect, please contact your healthcare provider. Carry this list with you in case of emergency.           Current Medications     albuterol 90 mcg/actuation inhaler Inhale 2 puffs into the lungs every 6 (six) hours as needed for Wheezing.    amlodipine-atorvastatin (CADUET) 10-10 mg per tablet TAKE 1 TABLET BY MOUTH EVERY DAY    aspirin 81 mg Tab Take 81 mg by mouth. 1 Tablet  "Oral Every evening    b complex vitamins tablet Take 1 tablet by mouth once daily.      carbamide peroxide (DEBROX) 6.5 % otic solution Place 6.5 % in ear(s). 5-10 Drops Otic Twice a day .  Drops should remain in ear >15 minutesDo not use for more than 4 days.    docusate sodium 100 mg capsule Take 100 mg by mouth. 1 Tablet Oral Twice a day .   take as needed for hard stools    dutasteride (AVODART) 0.5 mg capsule TAKE 1 CAPSULE (0.5 MG TOTAL) BY MOUTH ONCE DAILY.    fish oil-omega-3 fatty acids 300-1,000 mg capsule Take 1 g by mouth once daily.      hydrochlorothiazide (HYDRODIURIL) 25 MG tablet TAKE 1 TABLET (25 MG TOTAL) BY MOUTH ONCE DAILY.    irbesartan (AVAPRO) 300 MG tablet TAKE 1 TABLET EVERY DAY    latanoprost 0.005 % ophthalmic solution Place 1 drop into both eyes once daily.    metoprolol tartrate (LOPRESSOR) 25 MG tablet TAKE 2 TABLETS BY MOUTH 2 TIMES DAILY    metoprolol tartrate (LOPRESSOR) 25 MG tablet TAKE 2 TABLETS BY MOUTH TWICE A DAY    multivitamin-minerals-lutein (CENTRUM SILVER) Tab Take by mouth. 1 Tablet Oral Every day    oxybutynin (DITROPAN) 5 MG Tab TAKE 1 TABLET TWICE A DAY    tamsulosin (FLOMAX) 0.4 mg Cp24     vitamin D 185 MG Tab Take 185 mg by mouth once daily.             Clinical Reference Information           Your Vitals Were     BP Pulse Height Weight BMI    120/68 (BP Location: Left arm, Patient Position: Sitting, BP Method: Manual) 64 5' 11" (1.803 m) 91 kg (200 lb 9.9 oz) 27.98 kg/m2      Blood Pressure          Most Recent Value    BP  120/68      Allergies as of 3/10/2017     Pcn [Penicillins]      Immunizations Administered on Date of Encounter - 3/10/2017     Name Date Dose VIS Date Route    Pneumococcal Polysaccharide - 23 Valent  Incomplete 0.5 mL 4/24/2015 Intramuscular      Orders Placed During Today's Visit      Normal Orders This Visit    Pneumococcal Polysaccharide Vaccine (23 Valent) (SQ/IM)       Language Assistance Services     ATTENTION: Language assistance " services are available, free of charge. Please call [phone].      ATENCIÓN: Si habla bree, tiene a holliday disposición servicios gratuitos de asistencia lingüística. Llame al [phone].     CHÚ Ý: N?u b?n nói Ti?ng Vi?t, có các d?ch v? h? tr? ngôn ng? mi?n phí dành cho b?n. G?i s? [phone].         [facility] complies with applicable Federal civil rights laws and does not discriminate on the basis of race, color, national origin, age, disability, or sex.

## 2017-03-10 NOTE — PROGRESS NOTES
Subjective:       Patient ID: Pedro Ponce is a 77 y.o. male.    Chief Complaint: Numbness    HPI Comments: 77 years old male who came to the clinic with left wrist numbness for the last couple of weeks.  Patient with significant improvement with wearing support.  Blood pressure today is stable.  No chest pain palpitations orthopnea or PND.    Review of Systems   Constitutional: Negative.    HENT: Negative.    Eyes: Negative.    Respiratory: Negative.    Cardiovascular: Negative.  Negative for chest pain, palpitations and leg swelling.   Gastrointestinal: Negative.    Genitourinary: Negative.    Musculoskeletal: Negative.    Skin: Negative.    Neurological: Positive for numbness.   Psychiatric/Behavioral: Negative.        Objective:      Physical Exam   Constitutional: He is oriented to person, place, and time. He appears well-developed and well-nourished. No distress.   HENT:   Head: Normocephalic and atraumatic.   Right Ear: External ear normal.   Left Ear: External ear normal.   Nose: Nose normal.   Mouth/Throat: Oropharynx is clear and moist. No oropharyngeal exudate.   Eyes: Conjunctivae and EOM are normal. Pupils are equal, round, and reactive to light. Right eye exhibits no discharge. Left eye exhibits no discharge. No scleral icterus.   Neck: Normal range of motion. Neck supple. No JVD present. No tracheal deviation present. No thyromegaly present.   Cardiovascular: Normal rate, regular rhythm, normal heart sounds and intact distal pulses.  Exam reveals no gallop and no friction rub.    No murmur heard.  Pulmonary/Chest: Effort normal and breath sounds normal. No stridor. No respiratory distress. He has no wheezes. He has no rales. He exhibits no tenderness.   Abdominal: Soft. Bowel sounds are normal. He exhibits no distension and no mass. There is no tenderness. There is no rebound and no guarding.   Musculoskeletal: Normal range of motion. He exhibits no edema or tenderness.   Lymphadenopathy:     He  has no cervical adenopathy.   Neurological: He is alert and oriented to person, place, and time. He has normal reflexes. He displays normal reflexes. No cranial nerve deficit. He exhibits normal muscle tone. Coordination normal.   Skin: Skin is warm and dry. No rash noted. He is not diaphoretic. No erythema. No pallor.   Psychiatric: He has a normal mood and affect. His behavior is normal. Judgment and thought content normal.   Nursing note and vitals reviewed.      Assessment:       1. Essential hypertension    2. Carpal tunnel syndrome on left    3. Need for vaccination against Streptococcus pneumoniae        Plan:         Pedro was seen today for numbness.    Diagnoses and all orders for this visit:    Essential hypertension    Carpal tunnel syndrome on left    Need for vaccination against Streptococcus pneumoniae  -     Pneumococcal Polysaccharide Vaccine (23 Valent) (SQ/IM)

## 2017-04-17 DIAGNOSIS — H40.1192 PRIMARY OPEN-ANGLE GLAUCOMA, MODERATE STAGE: ICD-10-CM

## 2017-04-17 RX ORDER — LATANOPROST 50 UG/ML
SOLUTION/ DROPS OPHTHALMIC
Qty: 7.5 ML | Refills: 3 | Status: ON HOLD | OUTPATIENT
Start: 2017-04-17 | End: 2018-04-09 | Stop reason: HOSPADM

## 2017-05-04 ENCOUNTER — LAB VISIT (OUTPATIENT)
Dept: LAB | Facility: HOSPITAL | Age: 77
End: 2017-05-04
Attending: FAMILY MEDICINE
Payer: MEDICARE

## 2017-05-04 ENCOUNTER — OFFICE VISIT (OUTPATIENT)
Dept: FAMILY MEDICINE | Facility: CLINIC | Age: 77
End: 2017-05-04
Payer: MEDICARE

## 2017-05-04 VITALS
OXYGEN SATURATION: 96 % | DIASTOLIC BLOOD PRESSURE: 72 MMHG | WEIGHT: 202.19 LBS | HEIGHT: 71 IN | BODY MASS INDEX: 28.31 KG/M2 | HEART RATE: 50 BPM | SYSTOLIC BLOOD PRESSURE: 118 MMHG

## 2017-05-04 DIAGNOSIS — N18.30 TYPE 2 DIABETES MELLITUS WITH STAGE 3 CHRONIC KIDNEY DISEASE, WITHOUT LONG-TERM CURRENT USE OF INSULIN: ICD-10-CM

## 2017-05-04 DIAGNOSIS — I10 ESSENTIAL HYPERTENSION: ICD-10-CM

## 2017-05-04 DIAGNOSIS — E11.22 TYPE 2 DIABETES MELLITUS WITH STAGE 3 CHRONIC KIDNEY DISEASE, WITHOUT LONG-TERM CURRENT USE OF INSULIN: ICD-10-CM

## 2017-05-04 DIAGNOSIS — Z00.00 ANNUAL PHYSICAL EXAM: Primary | ICD-10-CM

## 2017-05-04 DIAGNOSIS — R00.1 SINUS BRADYCARDIA: ICD-10-CM

## 2017-05-04 LAB
ALBUMIN SERPL BCP-MCNC: 4.1 G/DL
ALP SERPL-CCNC: 75 U/L
ALT SERPL W/O P-5'-P-CCNC: 26 U/L
ANION GAP SERPL CALC-SCNC: 9 MMOL/L
AST SERPL-CCNC: 27 U/L
BACTERIA #/AREA URNS AUTO: ABNORMAL /HPF
BASOPHILS # BLD AUTO: 0.02 K/UL
BASOPHILS NFR BLD: 0.4 %
BILIRUB SERPL-MCNC: 1.8 MG/DL
BILIRUB UR QL STRIP: NEGATIVE
BUN SERPL-MCNC: 19 MG/DL
CALCIUM SERPL-MCNC: 11.2 MG/DL
CHLORIDE SERPL-SCNC: 100 MMOL/L
CHOLEST/HDLC SERPL: 2.8 {RATIO}
CLARITY UR REFRACT.AUTO: ABNORMAL
CO2 SERPL-SCNC: 29 MMOL/L
COLOR UR AUTO: YELLOW
CREAT SERPL-MCNC: 1.3 MG/DL
DIFFERENTIAL METHOD: NORMAL
EOSINOPHIL # BLD AUTO: 0.2 K/UL
EOSINOPHIL NFR BLD: 3.2 %
ERYTHROCYTE [DISTWIDTH] IN BLOOD BY AUTOMATED COUNT: 14 %
EST. GFR  (AFRICAN AMERICAN): >60 ML/MIN/1.73 M^2
EST. GFR  (NON AFRICAN AMERICAN): 52.3 ML/MIN/1.73 M^2
GLUCOSE SERPL-MCNC: 89 MG/DL
GLUCOSE UR QL STRIP: NEGATIVE
HCT VFR BLD AUTO: 46.4 %
HDL/CHOLESTEROL RATIO: 35.7 %
HDLC SERPL-MCNC: 143 MG/DL
HDLC SERPL-MCNC: 51 MG/DL
HGB BLD-MCNC: 15.5 G/DL
HGB UR QL STRIP: ABNORMAL
HYALINE CASTS UR QL AUTO: 0 /LPF
KETONES UR QL STRIP: NEGATIVE
LDLC SERPL CALC-MCNC: 79.6 MG/DL
LEUKOCYTE ESTERASE UR QL STRIP: ABNORMAL
LYMPHOCYTES # BLD AUTO: 1.9 K/UL
LYMPHOCYTES NFR BLD: 34.9 %
MCH RBC QN AUTO: 30.3 PG
MCHC RBC AUTO-ENTMCNC: 33.4 %
MCV RBC AUTO: 91 FL
MICROSCOPIC COMMENT: ABNORMAL
MONOCYTES # BLD AUTO: 0.5 K/UL
MONOCYTES NFR BLD: 9.9 %
NEUTROPHILS # BLD AUTO: 2.8 K/UL
NEUTROPHILS NFR BLD: 51.4 %
NITRITE UR QL STRIP: NEGATIVE
NONHDLC SERPL-MCNC: 92 MG/DL
PH UR STRIP: 7 [PH] (ref 5–8)
PLATELET # BLD AUTO: 213 K/UL
PMV BLD AUTO: 10.8 FL
POTASSIUM SERPL-SCNC: 3.6 MMOL/L
PROT SERPL-MCNC: 7.5 G/DL
PROT UR QL STRIP: ABNORMAL
RBC # BLD AUTO: 5.11 M/UL
RBC #/AREA URNS AUTO: 8 /HPF (ref 0–4)
SODIUM SERPL-SCNC: 138 MMOL/L
SP GR UR STRIP: 1.01 (ref 1–1.03)
SQUAMOUS #/AREA URNS AUTO: 1 /HPF
TRIGL SERPL-MCNC: 62 MG/DL
TSH SERPL DL<=0.005 MIU/L-ACNC: 2.61 UIU/ML
URN SPEC COLLECT METH UR: ABNORMAL
UROBILINOGEN UR STRIP-ACNC: NEGATIVE EU/DL
WBC # BLD AUTO: 5.36 K/UL
WBC #/AREA URNS AUTO: 13 /HPF (ref 0–5)

## 2017-05-04 PROCEDURE — 99397 PER PM REEVAL EST PAT 65+ YR: CPT | Mod: S$GLB,,, | Performed by: FAMILY MEDICINE

## 2017-05-04 PROCEDURE — 36415 COLL VENOUS BLD VENIPUNCTURE: CPT | Mod: PO

## 2017-05-04 PROCEDURE — 99999 PR PBB SHADOW E&M-EST. PATIENT-LVL III: CPT | Mod: PBBFAC,,, | Performed by: FAMILY MEDICINE

## 2017-05-04 PROCEDURE — 3074F SYST BP LT 130 MM HG: CPT | Mod: S$GLB,,, | Performed by: FAMILY MEDICINE

## 2017-05-04 PROCEDURE — 93005 ELECTROCARDIOGRAM TRACING: CPT | Mod: S$GLB,,, | Performed by: FAMILY MEDICINE

## 2017-05-04 PROCEDURE — 83036 HEMOGLOBIN GLYCOSYLATED A1C: CPT

## 2017-05-04 PROCEDURE — 85025 COMPLETE CBC W/AUTO DIFF WBC: CPT

## 2017-05-04 PROCEDURE — 3078F DIAST BP <80 MM HG: CPT | Mod: S$GLB,,, | Performed by: FAMILY MEDICINE

## 2017-05-04 PROCEDURE — 93010 ELECTROCARDIOGRAM REPORT: CPT | Mod: S$GLB,,, | Performed by: INTERNAL MEDICINE

## 2017-05-04 PROCEDURE — 80053 COMPREHEN METABOLIC PANEL: CPT

## 2017-05-04 PROCEDURE — 84443 ASSAY THYROID STIM HORMONE: CPT

## 2017-05-04 PROCEDURE — 80061 LIPID PANEL: CPT

## 2017-05-04 NOTE — MR AVS SNAPSHOT
[facility]   [facility] [place] [address]  Phone: [phone]  Fax: [phone]                  Pedro Ponce   2017 8:00 AM   Office Visit    Description:  Male : 1940   Provider:  John Ricci MD   Department:  [facility]           Reason for Visit     Annual Exam           Diagnoses this Visit        Comments    Annual physical exam    -  Primary     Essential hypertension         Sinus bradycardia         Type 2 diabetes mellitus with stage 3 chronic kidney disease, without long-term current use of insulin                To Do List           Future Appointments        Provider Department Dept Phone    2017 7:15 AM LAB, [place] [facility] [phone]    2017 7:45 AM SPECIMEN, [place] [facility] [phone]    2017 8:00 AM Lawanda Walker DO [facility] Nephrology [phone]    2017 9:15 AM LANDA, VISUAL WHITMAN [facility] Ophthalmology [phone]    2017 10:00 AM Roberto Hidalgo MD [facility] [phone]      Goals (5 Years of Data)     None      Follow-Up and Disposition     Return in about 6 months (around 2017), or if symptoms worsen or fail to improve.    Follow-up and Disposition History      [facility]     [facility] Care Line -  Assistance  Unless otherwise directed by your provider, please contact [facility], our nurse care line that is available for  assistance.     Registered nurses in the [facility] Center provide: appointment scheduling, clinical advisement, health education, and other advisory services.  Call: [phone] (toll free)               Medications           Message regarding Medications     Verify the changes and/or additions to your medication regime listed below are the same as discussed with your clinician today.  If any of these changes or additions are incorrect, please notify your  healthcare provider.        STOP taking these medications     metoprolol tartrate (LOPRESSOR) 25 MG tablet TAKE 2 TABLETS BY MOUTH TWICE A DAY    metoprolol tartrate (LOPRESSOR) 25 MG tablet TAKE 2 TABLETS BY MOUTH 2 TIMES DAILY           Verify that the below list of medications is an accurate representation of the medications you are currently taking.  If none reported, the list may be blank. If incorrect, please contact your healthcare provider. Carry this list with you in case of emergency.           Current Medications     albuterol 90 mcg/actuation inhaler Inhale 2 puffs into the lungs every 6 (six) hours as needed for Wheezing.    amlodipine-atorvastatin (CADUET) 10-10 mg per tablet TAKE 1 TABLET BY MOUTH EVERY DAY    aspirin 81 mg Tab Take 81 mg by mouth. 1 Tablet Oral Every evening    b complex vitamins tablet Take 1 tablet by mouth once daily.      carbamide peroxide (DEBROX) 6.5 % otic solution Place 6.5 % in ear(s). 5-10 Drops Otic Twice a day .  Drops should remain in ear >15 minutesDo not use for more than 4 days.    docusate sodium 100 mg capsule Take 100 mg by mouth. 1 Tablet Oral Twice a day .   take as needed for hard stools    dutasteride (AVODART) 0.5 mg capsule TAKE 1 CAPSULE (0.5 MG TOTAL) BY MOUTH ONCE DAILY.    fish oil-omega-3 fatty acids 300-1,000 mg capsule Take 1 g by mouth once daily.      hydrochlorothiazide (HYDRODIURIL) 25 MG tablet TAKE 1 TABLET (25 MG TOTAL) BY MOUTH ONCE DAILY.    irbesartan (AVAPRO) 300 MG tablet TAKE 1 TABLET EVERY DAY    latanoprost 0.005 % ophthalmic solution PLACE 1 DROP INTO BOTH EYES ONCE DAILY.    multivitamin-minerals-lutein (CENTRUM SILVER) Tab Take by mouth. 1 Tablet Oral Every day    oxybutynin (DITROPAN) 5 MG Tab TAKE 1 TABLET TWICE A DAY    tamsulosin (FLOMAX) 0.4 mg Cp24     vitamin D 185 MG Tab Take 185 mg by mouth once daily.             Clinical Reference Information           Your Vitals Were     BP Pulse Height Weight SpO2 BMI    118/72 (BP  "Location: Right arm, Patient Position: Sitting, BP Method: Manual) 50 5' 11" (1.803 m) 91.7 kg (202 lb 2.6 oz) 96% 28.2 kg/m2      Blood Pressure          Most Recent Value    BP  118/72      Allergies as of 5/4/2017     Pcn [Penicillins]      Immunizations Administered on Date of Encounter - 5/4/2017     None      Orders Placed During Today's Visit      Normal Orders This Visit    Ambulatory referral to Cardiology     IN OFFICE EKG 12-LEAD (to [place])     Urinalysis     Future Labs/Procedures Expected by Expires    CBC auto differential  5/4/2017 5/4/2018    Comprehensive metabolic panel  5/4/2017 8/2/2017    Hemoglobin A1c  5/4/2017 8/2/2017    Lipid panel  5/4/2017 8/2/2017    TSH  5/4/2017 8/2/2017      Instructions      Diabetes: Meal Planning    You can help keep your blood sugar level in your target range by eating healthy foods. Your healthcare team can help you create a low-fat, nutritious meal plan. Take an active role in your diabetes management by following your meal plan and working with your healthcare team.  Make your meal plan  A meal plan gives guidelines for the types and amounts of food you should eat. The goal is to balance food and insulin (or other diabetes medications) so your blood sugars will be in your target range. Your dietitian will help you make a flexible meal plan that includes many foods that you like.  Watch serving sizes  Your meal plan will group foods by servings. To learn how much a serving is, start by measuring food portions at each meal. Soon youll know what a serving looks like on your plate. Ask your healthcare provider about how to balance servings of different foods.  Eat from all the food groups  The basis of a healthy meal plan is variety (eating lots of different foods). Choose lean meats, fresh fruits and vegetables, whole grains, and low-fat or nonfat dairy products. Eating a wide variety of foods provides the nutrients your body needs. It can also keep you from " getting bored with your meal plan.  Learn about carbohydrates, fats, and protein  · Carbohydrates are starches, sugars, and fiber. They are found in many foods, including fruit, bread, pasta, milk, and sweets. Of all the foods you eat, carbohydrates have the most effect on your blood sugar. Your dietitian may teach you about carb counting, a way to figure out the number of carbohydrates in a meal.  · Fats have the most calories. They also have the most effect on your weight and your risk of heart disease. When you have diabetes, its important to control your weight and protect your heart. Foods that are high in fat include whole milk, cheese, snack foods, and desserts.  · Protein is important for building and repairing muscles and bones. Choose low-fat protein sources, such as fish, egg whites, and skinless chicken.  Reduce liquid sugars  Extra calories from sodas, sports drinks, and fruit drinks make it hard to keep blood sugar in range. Cut as many liquid sugars from your meal plan as you can.  This includes most fruit juices, which are often high in natural or added sugar. Instead, drink plenty of water and other sugar-free beverages.  Eat less fat  If you need to lose weight, try to reduce the amount of fat in your diet. This can also help lower your cholesterol level to keep blood vessels healthier. Cut fat by using only small amounts of liquid oil for cooking. Read food labels carefully to avoid foods with unhealthy trans fats.  Timing your meals  When it comes to blood sugar control, when you eat is as important as what you eat. You may need to eat several small meals spaced evenly throughout the day to stay in your target range. So dont skip breakfast or wait until late in the day to get most of your calories. Doing so can cause your blood sugar to rise too high or fall too low.   Date Last Reviewed: 3/1/2016  © 4077-3436 [employer], [employer]. [address] [place] [address], [place] [address]. All rights  reserved. This information is not intended as a substitute for professional medical care. Always follow your healthcare professional's instructions.             Language Assistance Services     ATTENTION: Language assistance services are available, free of charge. Please call [phone].      ATENCIÓN: Si arleth giron, tiene a holliday disposición servicios gratuitos de asistencia lingüística. Llame al [phone].     KENNY Ý: N?u b?n nói Ti?ng Vi?t, có các d?ch v? h? tr? ngôn ng? mi?n phí dành cho b?n. G?i s? [phone].         [facility] complies with applicable Federal civil rights laws and does not discriminate on the basis of race, color, national origin, age, disability, or sex.

## 2017-05-04 NOTE — PROGRESS NOTES
Subjective:       Patient ID: Pedro Ponce is a 77 y.o. male.    Chief Complaint: Annual Exam    HPI Comments: 77 years old male who came to the clinic for his physical examination.  Blood pressure today is stable.  No chest pain palpitations orthopnea or PND.  Patient with slightly low heart rate secondary to the metoprolol.  Last A1c was stable.  Patient with decreased kidney function but stable in comparison with previous reports.  No polyuria polydipsia or polyphagia.    Review of Systems   Constitutional: Negative.    HENT: Negative.    Eyes: Negative.    Respiratory: Negative.    Cardiovascular: Negative.  Negative for chest pain, palpitations and leg swelling.   Gastrointestinal: Negative.    Genitourinary: Negative.    Musculoskeletal: Negative.    Skin: Negative.    Neurological: Negative.    Psychiatric/Behavioral: Negative.        Objective:      Physical Exam   Constitutional: He is oriented to person, place, and time. He appears well-developed and well-nourished. No distress.   HENT:   Head: Normocephalic and atraumatic.   Right Ear: External ear normal.   Left Ear: External ear normal.   Nose: Nose normal.   Mouth/Throat: Oropharynx is clear and moist. No oropharyngeal exudate.   Eyes: Conjunctivae and EOM are normal. Pupils are equal, round, and reactive to light. Right eye exhibits no discharge. Left eye exhibits no discharge. No scleral icterus.   Neck: Normal range of motion. Neck supple. No JVD present. No tracheal deviation present. No thyromegaly present.   Cardiovascular: Regular rhythm, normal heart sounds and intact distal pulses.  Bradycardia present.  Exam reveals no gallop and no friction rub.    No murmur heard.  Pulmonary/Chest: Effort normal and breath sounds normal. No stridor. No respiratory distress. He has no wheezes. He has no rales. He exhibits no tenderness.   Abdominal: Soft. Bowel sounds are normal. He exhibits no distension and no mass. There is no tenderness. There is  no rebound and no guarding.   Genitourinary: Rectal exam shows no external hemorrhoid, no internal hemorrhoid, no fissure, no mass, no tenderness, anal tone normal and guaiac negative stool. Prostate is enlarged. Prostate is not tender.   Musculoskeletal: Normal range of motion. He exhibits no edema or tenderness.   Lymphadenopathy:     He has no cervical adenopathy.   Neurological: He is alert and oriented to person, place, and time. He has normal reflexes. He displays normal reflexes. No cranial nerve deficit. He exhibits normal muscle tone. Coordination normal.   Skin: Skin is warm and dry. No rash noted. He is not diaphoretic. No erythema. No pallor.   Psychiatric: He has a normal mood and affect. His behavior is normal. Judgment and thought content normal.   Nursing note and vitals reviewed.      Assessment:       1. Annual physical exam    2. Essential hypertension    3. Sinus bradycardia    4. Type 2 diabetes mellitus with stage 3 chronic kidney disease, without long-term current use of insulin        Plan:         Pedro was seen today for annual exam.    Diagnoses and all orders for this visit:    Annual physical exam  -     Comprehensive metabolic panel; Future  -     Lipid panel; Future  -     Cancel: Urinalysis; Future  -     TSH; Future  -     CBC auto differential; Future  -     Urinalysis    Essential hypertension  -     Comprehensive metabolic panel; Future  -     Lipid panel; Future  -     Cancel: Urinalysis; Future  -     TSH; Future  -     CBC auto differential; Future  -     Urinalysis    Sinus bradycardia  -     TSH; Future  -     IN OFFICE EKG 12-LEAD (to [place])    Type 2 diabetes mellitus with stage 3 chronic kidney disease, without long-term current use of insulin  -     Comprehensive metabolic panel; Future  -     Hemoglobin A1c; Future    Continue monitoring blood pressure at home, low sodium diet.  Continue monitoring blood sugar at home,ADA diet.  Patient with heart rate of 40 on the  EKG.  Discontinue metoprolol.  Follow-up with cardiology.

## 2017-05-04 NOTE — PATIENT INSTRUCTIONS
Diabetes: Meal Planning    You can help keep your blood sugar level in your target range by eating healthy foods. Your healthcare team can help you create a low-fat, nutritious meal plan. Take an active role in your diabetes management by following your meal plan and working with your healthcare team.  Make your meal plan  A meal plan gives guidelines for the types and amounts of food you should eat. The goal is to balance food and insulin (or other diabetes medications) so your blood sugars will be in your target range. Your dietitian will help you make a flexible meal plan that includes many foods that you like.  Watch serving sizes  Your meal plan will group foods by servings. To learn how much a serving is, start by measuring food portions at each meal. Soon youll know what a serving looks like on your plate. Ask your healthcare provider about how to balance servings of different foods.  Eat from all the food groups  The basis of a healthy meal plan is variety (eating lots of different foods). Choose lean meats, fresh fruits and vegetables, whole grains, and low-fat or nonfat dairy products. Eating a wide variety of foods provides the nutrients your body needs. It can also keep you from getting bored with your meal plan.  Learn about carbohydrates, fats, and protein  · Carbohydrates are starches, sugars, and fiber. They are found in many foods, including fruit, bread, pasta, milk, and sweets. Of all the foods you eat, carbohydrates have the most effect on your blood sugar. Your dietitian may teach you about carb counting, a way to figure out the number of carbohydrates in a meal.  · Fats have the most calories. They also have the most effect on your weight and your risk of heart disease. When you have diabetes, its important to control your weight and protect your heart. Foods that are high in fat include whole milk, cheese, snack foods, and desserts.  · Protein is important for building and repairing  muscles and bones. Choose low-fat protein sources, such as fish, egg whites, and skinless chicken.  Reduce liquid sugars  Extra calories from sodas, sports drinks, and fruit drinks make it hard to keep blood sugar in range. Cut as many liquid sugars from your meal plan as you can.  This includes most fruit juices, which are often high in natural or added sugar. Instead, drink plenty of water and other sugar-free beverages.  Eat less fat  If you need to lose weight, try to reduce the amount of fat in your diet. This can also help lower your cholesterol level to keep blood vessels healthier. Cut fat by using only small amounts of liquid oil for cooking. Read food labels carefully to avoid foods with unhealthy trans fats.  Timing your meals  When it comes to blood sugar control, when you eat is as important as what you eat. You may need to eat several small meals spaced evenly throughout the day to stay in your target range. So dont skip breakfast or wait until late in the day to get most of your calories. Doing so can cause your blood sugar to rise too high or fall too low.   Date Last Reviewed: 3/1/2016  © 1269-3677 [employer], [employer]. [address] [place] [address], [place] [address]. All rights reserved. This information is not intended as a substitute for professional medical care. Always follow your healthcare professional's instructions.

## 2017-05-05 ENCOUNTER — TELEPHONE (OUTPATIENT)
Dept: FAMILY MEDICINE | Facility: CLINIC | Age: 77
End: 2017-05-05

## 2017-05-05 DIAGNOSIS — R31.29 MICROSCOPIC HEMATURIA: Primary | ICD-10-CM

## 2017-05-05 LAB
ESTIMATED AVG GLUCOSE: 123 MG/DL
HBA1C MFR BLD HPLC: 5.9 %

## 2017-05-08 ENCOUNTER — LAB VISIT (OUTPATIENT)
Dept: LAB | Facility: HOSPITAL | Age: 77
End: 2017-05-08
Attending: FAMILY MEDICINE
Payer: MEDICARE

## 2017-05-08 DIAGNOSIS — R31.29 MICROSCOPIC HEMATURIA: ICD-10-CM

## 2017-05-08 LAB
BACTERIA #/AREA URNS AUTO: ABNORMAL /HPF
BILIRUB UR QL STRIP: NEGATIVE
CLARITY UR REFRACT.AUTO: ABNORMAL
COLOR UR AUTO: ABNORMAL
GLUCOSE UR QL STRIP: NEGATIVE
HGB UR QL STRIP: ABNORMAL
HYALINE CASTS UR QL AUTO: 0 /LPF
KETONES UR QL STRIP: NEGATIVE
LEUKOCYTE ESTERASE UR QL STRIP: ABNORMAL
MICROSCOPIC COMMENT: ABNORMAL
NITRITE UR QL STRIP: NEGATIVE
NON-SQ EPI CELLS #/AREA URNS AUTO: 0 /HPF
PH UR STRIP: 6 [PH] (ref 5–8)
PROT UR QL STRIP: ABNORMAL
RBC #/AREA URNS AUTO: 14 /HPF (ref 0–4)
SP GR UR STRIP: 1.01 (ref 1–1.03)
SQUAMOUS #/AREA URNS AUTO: 0 /HPF
URN SPEC COLLECT METH UR: ABNORMAL
UROBILINOGEN UR STRIP-ACNC: NEGATIVE EU/DL
WBC #/AREA URNS AUTO: >100 /HPF (ref 0–5)
WBC CLUMPS UR QL AUTO: ABNORMAL

## 2017-05-08 PROCEDURE — 87088 URINE BACTERIA CULTURE: CPT

## 2017-05-08 PROCEDURE — 87186 SC STD MICRODIL/AGAR DIL: CPT

## 2017-05-08 PROCEDURE — 87077 CULTURE AEROBIC IDENTIFY: CPT

## 2017-05-08 PROCEDURE — 81001 URINALYSIS AUTO W/SCOPE: CPT

## 2017-05-08 PROCEDURE — 87086 URINE CULTURE/COLONY COUNT: CPT

## 2017-05-11 LAB — BACTERIA UR CULT: NORMAL

## 2017-05-12 ENCOUNTER — TELEPHONE (OUTPATIENT)
Dept: FAMILY MEDICINE | Facility: CLINIC | Age: 77
End: 2017-05-12

## 2017-05-12 DIAGNOSIS — N39.0 URINARY TRACT INFECTION WITHOUT HEMATURIA, SITE UNSPECIFIED: Primary | ICD-10-CM

## 2017-05-12 RX ORDER — CIPROFLOXACIN 500 MG/1
500 TABLET ORAL 2 TIMES DAILY
Qty: 20 TABLET | Refills: 0 | Status: SHIPPED | OUTPATIENT
Start: 2017-05-12 | End: 2017-05-22

## 2017-05-19 ENCOUNTER — OFFICE VISIT (OUTPATIENT)
Dept: CARDIOLOGY | Facility: CLINIC | Age: 77
End: 2017-05-19
Payer: MEDICARE

## 2017-05-19 VITALS
HEART RATE: 68 BPM | DIASTOLIC BLOOD PRESSURE: 78 MMHG | HEIGHT: 71 IN | SYSTOLIC BLOOD PRESSURE: 120 MMHG | BODY MASS INDEX: 28.7 KG/M2 | WEIGHT: 205 LBS

## 2017-05-19 DIAGNOSIS — I10 ESSENTIAL HYPERTENSION: ICD-10-CM

## 2017-05-19 DIAGNOSIS — E11.21 TYPE 2 DIABETES MELLITUS WITH DIABETIC NEPHROPATHY, WITHOUT LONG-TERM CURRENT USE OF INSULIN: ICD-10-CM

## 2017-05-19 DIAGNOSIS — E78.00 PURE HYPERCHOLESTEROLEMIA: ICD-10-CM

## 2017-05-19 DIAGNOSIS — R00.1 BRADYCARDIA, DRUG INDUCED: Primary | ICD-10-CM

## 2017-05-19 DIAGNOSIS — T50.905A BRADYCARDIA, DRUG INDUCED: Primary | ICD-10-CM

## 2017-05-19 PROCEDURE — 3078F DIAST BP <80 MM HG: CPT | Mod: S$GLB,,, | Performed by: INTERNAL MEDICINE

## 2017-05-19 PROCEDURE — 1126F AMNT PAIN NOTED NONE PRSNT: CPT | Mod: S$GLB,,, | Performed by: INTERNAL MEDICINE

## 2017-05-19 PROCEDURE — 99999 PR PBB SHADOW E&M-EST. PATIENT-LVL III: CPT | Mod: PBBFAC,,, | Performed by: INTERNAL MEDICINE

## 2017-05-19 PROCEDURE — 1159F MED LIST DOCD IN RCRD: CPT | Mod: S$GLB,,, | Performed by: INTERNAL MEDICINE

## 2017-05-19 PROCEDURE — 1160F RVW MEDS BY RX/DR IN RCRD: CPT | Mod: S$GLB,,, | Performed by: INTERNAL MEDICINE

## 2017-05-19 PROCEDURE — 3074F SYST BP LT 130 MM HG: CPT | Mod: S$GLB,,, | Performed by: INTERNAL MEDICINE

## 2017-05-19 PROCEDURE — 99204 OFFICE O/P NEW MOD 45 MIN: CPT | Mod: S$GLB,,, | Performed by: INTERNAL MEDICINE

## 2017-05-19 NOTE — LETTER
May 19, 2017      John Ricci MD  [address] [facility] [address]           [facility] Cardiology  [address]  [place] [address]  Phone: [phone]          Patient: Pedro Ponce   MR Number: [id number]   YOB: 1940   Date of Visit: 5/19/2017       Dear Dr. John Ricci:    Thank you for referring Pedro Ponce to me for evaluation. Attached you will find relevant portions of my assessment and plan of care.    If you have questions, please do not hesitate to call me. I look forward to following Pedro Ponce along with you.    Sincerely,    John Hall MD    Enclosure  CC:  No Recipients    If you would like to receive this communication electronically, please contact [email or web address] or [phone] to request more information on [employer] Link access.    For providers and/or their staff who would like to refer a patient to [facility], please contact us through our one-stop-shop provider referral line, [facility], at [phone].    If you feel you have received this communication in error or would no longer like to receive these types of communications, please e-mail [email or web address]

## 2017-05-19 NOTE — MR AVS SNAPSHOT
[facility] - Cardiology  [address]  [facility] [address]  Phone: [phone]                  Pedro Ponce   2017 8:00 AM   Office Visit    Description:  Male : 1940   Provider:  John Hall MD   Department:  [facility]           Reason for Visit     Bradycardia           Diagnoses this Visit        Comments    Bradycardia, drug induced    -  Primary     Essential hypertension         Pure hypercholesterolemia         Type 2 diabetes mellitus with diabetic nephropathy, without long-term current use of insulin                To Do List           Future Appointments        Provider Department Dept Phone    2017 7:15 AM LAB, [place] [facility] [phone]    2017 7:45 AM SPECIMEN, [place] [facility] [phone]    2017 8:00 AM Lawanda Walker DO [facility] Nephrology [phone]    2017 9:15 AM LANDA, VISUAL WHITMAN [facility] Ophthalmology [phone]    2017 10:00 AM Roberto Hidalgo MD [facility] Ophthalmology [phone]      Goals (5 Years of Data)     None      [facility]     [facility] Nurse Care Line -  Assistance  Unless otherwise directed by your provider, please contact [facility], our nurse care line that is available for  assistance.     Registered nurses in the [facility] provide: appointment scheduling, clinical advisement, health education, and other advisory services.  Call: [phone] (toll free)               Medications           Message regarding Medications     Verify the changes and/or additions to your medication regime listed below are the same as discussed with your clinician today.  If any of these changes or additions are incorrect, please notify your healthcare provider.             Verify that the below list of medications is an accurate representation of the medications you are currently taking.  If none reported, the list may  "be blank. If incorrect, please contact your healthcare provider. Carry this list with you in case of emergency.           Current Medications     albuterol 90 mcg/actuation inhaler Inhale 2 puffs into the lungs every 6 (six) hours as needed for Wheezing.    amlodipine-atorvastatin (CADUET) 10-10 mg per tablet TAKE 1 TABLET BY MOUTH EVERY DAY    aspirin 81 mg Tab Take 81 mg by mouth. 1 Tablet Oral Every evening    b complex vitamins tablet Take 1 tablet by mouth once daily.      carbamide peroxide (DEBROX) 6.5 % otic solution Place 6.5 % in ear(s). 5-10 Drops Otic Twice a day .  Drops should remain in ear >15 minutesDo not use for more than 4 days.    ciprofloxacin HCl (CIPRO) 500 MG tablet Take 1 tablet (500 mg total) by mouth 2 (two) times daily.    docusate sodium 100 mg capsule Take 100 mg by mouth. 1 Tablet Oral Twice a day .   take as needed for hard stools    dutasteride (AVODART) 0.5 mg capsule TAKE 1 CAPSULE (0.5 MG TOTAL) BY MOUTH ONCE DAILY.    fish oil-omega-3 fatty acids 300-1,000 mg capsule Take 1 g by mouth once daily.      hydrochlorothiazide (HYDRODIURIL) 25 MG tablet TAKE 1 TABLET (25 MG TOTAL) BY MOUTH ONCE DAILY.    irbesartan (AVAPRO) 300 MG tablet TAKE 1 TABLET EVERY DAY    latanoprost 0.005 % ophthalmic solution PLACE 1 DROP INTO BOTH EYES ONCE DAILY.    multivitamin-minerals-lutein (CENTRUM SILVER) Tab Take by mouth. 1 Tablet Oral Every day    oxybutynin (DITROPAN) 5 MG Tab TAKE 1 TABLET TWICE A DAY    tamsulosin (FLOMAX) 0.4 mg Cp24     vitamin D 185 MG Tab Take 185 mg by mouth once daily.             Clinical Reference Information           Your Vitals Were     BP Pulse Height Weight BMI    120/78 68 5' 11" (1.803 m) 93 kg (205 lb 0.4 oz) 28.6 kg/m2      Blood Pressure          Most Recent Value    Right Arm BP - Sitting  120/80    Left Arm BP - Sitting  120/78    BP  120/78      Allergies as of 5/19/2017     Pcn [Penicillins]      Immunizations Administered on Date of Encounter - 5/19/2017  "    None      Instructions    Avoid beta blocker drugs in future       Language Assistance Services     ATTENTION: Language assistance services are available, free of charge. Please call [phone].      ATENCIÓN: Si habla bree, tiene a holliday disposición servicios gratuitos de asistencia lingüística. Llame al [phone].     CHÚ Ý: N?u b?n nói Ti?ng Vi?t, có các d?ch v? h? tr? ngôn ng? mi?n phí dành cho b?n. G?i s? [phone].         [facility] complies with applicable Federal civil rights laws and does not discriminate on the basis of race, color, national origin, age, disability, or sex.

## 2017-05-19 NOTE — PROGRESS NOTES
Subjective:    Patient ID:  Pedro Ponce is a 77 y.o. male who presents for evaluation of Bradycardia      HPI  The patient is a 77 year old male referred by Dr Ricci to evaluate bradycardia 40/min on EKG. He is asymptomatic, does yard work and go to the gym 4-5 days a week. He has noted appropriate rate response with exercise and has heart rate to 100 on a negative exercise treadmill test last year. On is clinic visit 5/4 the 50 mg metoprolol he was taking was discontinued. Heart rate today is 68.  Lab Results   Component Value Date     05/04/2017    K 3.6 05/04/2017     05/04/2017    CO2 29 05/04/2017    BUN 19 05/04/2017    CREATININE 1.3 05/04/2017    GLU 89 05/04/2017    HGBA1C 5.9 05/04/2017    AST 27 05/04/2017    ALT 26 05/04/2017    ALBUMIN 4.1 05/04/2017    PROT 7.5 05/04/2017    BILITOT 1.8 (H) 05/04/2017    WBC 5.36 05/04/2017    HGB 15.5 05/04/2017    HCT 46.4 05/04/2017    MCV 91 05/04/2017     05/04/2017    INR 1.1 06/02/2014    PSA 0.77 08/17/2015    TSH 2.609 05/04/2017         Lab Results   Component Value Date    CHOL 143 05/04/2017    HDL 51 05/04/2017    TRIG 62 05/04/2017       Lab Results   Component Value Date    LDLCALC 79.6 05/04/2017     Past Medical History:   Diagnosis Date    BPH (benign prostatic hypertrophy)     Cataract     Diabetes mellitus     Diabetes mellitus type II     diet-controlled    Glaucoma (increased eye pressure)     Gout, joint     Hyperlipidemia     Hypertension     Macular degeneration     Obesity     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       Past Surgical History:   Procedure Laterality Date    CYST REMOVAL  2000    removed from left kidney    KIDNEY SURGERY      PROSTATE SURGERY         Family History   Problem Relation Age of Onset    Cancer Mother      unkn ca    Diabetes Mother     Glaucoma Mother     Heart disease Mother     Hypertension Mother     Blindness Neg Hx     Macular degeneration Neg Hx      Retinal detachment Neg Hx        Social History     Social History    Marital status:      Spouse name: N/A    Number of children: N/A    Years of education: N/A     Social History Main Topics    Smoking status: Never Smoker    Smokeless tobacco: Never Used    Alcohol use No    Drug use: None    Sexual activity: Not Asked     Other Topics Concern    None     Social History Narrative       Current Outpatient Prescriptions   Medication Sig Dispense Refill    albuterol 90 mcg/actuation inhaler Inhale 2 puffs into the lungs every 6 (six) hours as needed for Wheezing. 1 each 0    amlodipine-atorvastatin (CADUET) 10-10 mg per tablet TAKE 1 TABLET BY MOUTH EVERY DAY 90 tablet 0    aspirin 81 mg Tab Take 81 mg by mouth. 1 Tablet Oral Every evening      b complex vitamins tablet Take 1 tablet by mouth once daily.        carbamide peroxide (DEBROX) 6.5 % otic solution Place 6.5 % in ear(s). 5-10 Drops Otic Twice a day .  Drops should remain in ear >15 minutesDo not use for more than 4 days.      ciprofloxacin HCl (CIPRO) 500 MG tablet Take 1 tablet (500 mg total) by mouth 2 (two) times daily. 20 tablet 0    docusate sodium 100 mg capsule Take 100 mg by mouth. 1 Tablet Oral Twice a day .   take as needed for hard stools      dutasteride (AVODART) 0.5 mg capsule TAKE 1 CAPSULE (0.5 MG TOTAL) BY MOUTH ONCE DAILY. 90 capsule 2    fish oil-omega-3 fatty acids 300-1,000 mg capsule Take 1 g by mouth once daily.        hydrochlorothiazide (HYDRODIURIL) 25 MG tablet TAKE 1 TABLET (25 MG TOTAL) BY MOUTH ONCE DAILY. 90 tablet 4    irbesartan (AVAPRO) 300 MG tablet TAKE 1 TABLET EVERY DAY 90 tablet 2    latanoprost 0.005 % ophthalmic solution PLACE 1 DROP INTO BOTH EYES ONCE DAILY. 7.5 mL 3    multivitamin-minerals-lutein (CENTRUM SILVER) Tab Take by mouth. 1 Tablet Oral Every day      oxybutynin (DITROPAN) 5 MG Tab TAKE 1 TABLET TWICE A DAY 60 tablet 6    tamsulosin (FLOMAX) 0.4 mg Cp24       vitamin  "D 185 MG Tab Take 185 mg by mouth once daily.         No current facility-administered medications for this visit.        Review of patient's allergies indicates:   Allergen Reactions    Pcn [penicillins]      Other reaction(s): Rash       Review of Systems   Constitution: Negative for decreased appetite, diaphoresis, fever, weakness, malaise/fatigue, weight gain and weight loss.   HENT: Negative for congestion, ear discharge, ear pain, headaches and nosebleeds.    Eyes: Negative for blurred vision, double vision and visual disturbance.   Cardiovascular: Negative for chest pain, claudication, cyanosis, dyspnea on exertion, irregular heartbeat, leg swelling, near-syncope, orthopnea, palpitations, paroxysmal nocturnal dyspnea and syncope.   Respiratory: Negative for cough, hemoptysis, shortness of breath, sleep disturbances due to breathing, snoring, sputum production and wheezing.    Endocrine: Negative for polydipsia, polyphagia and polyuria.   Hematologic/Lymphatic: Negative for adenopathy and bleeding problem. Does not bruise/bleed easily.   Skin: Negative for color change, nail changes, poor wound healing and rash.   Musculoskeletal: Negative for muscle cramps and muscle weakness.   Gastrointestinal: Negative for abdominal pain, anorexia, change in bowel habit, hematochezia, nausea and vomiting.   Genitourinary: Negative for dysuria, frequency and hematuria.   Neurological: Negative for brief paralysis, difficulty with concentration, excessive daytime sleepiness, dizziness, focal weakness, light-headedness, seizures and vertigo.   Psychiatric/Behavioral: Negative for altered mental status and depression.   Allergic/Immunologic: Negative for persistent infections.        Objective:/78  Pulse 68  Ht 5' 11" (1.803 m)  Wt 93 kg (205 lb 0.4 oz)  BMI 28.6 kg/m2    Physical Exam   Constitutional: He is oriented to person, place, and time. He appears well-developed and well-nourished.   HENT:   Head: " Normocephalic.   Right Ear: External ear normal.   Left Ear: External ear normal.   Nose: Nose normal.   Inspection of lips, teeth and gums normal   Eyes: EOM are normal. Pupils are equal, round, and reactive to light. No scleral icterus.   Neck: Normal range of motion. Neck supple. No JVD present. No tracheal deviation present. No thyromegaly present.   Cardiovascular: Normal rate, regular rhythm and intact distal pulses.  Exam reveals no gallop and no friction rub.    No murmur heard.  Pulses:       Carotid pulses are 2+ on the right side, and 2+ on the left side.       Dorsalis pedis pulses are 2+ on the right side, and 2+ on the left side.        Posterior tibial pulses are 2+ on the right side, and 2+ on the left side.   Pulmonary/Chest: Effort normal and breath sounds normal.   Abdominal: Bowel sounds are normal. He exhibits no distension. There is no hepatosplenomegaly. There is no tenderness. There is no guarding.   Musculoskeletal: Normal range of motion. He exhibits no edema or tenderness.   Lymphadenopathy:   Palpation of neck and groin lymph nodes normal   Neurological: He is alert and oriented to person, place, and time. No cranial nerve deficit. He exhibits normal muscle tone. Coordination normal.   Skin: Skin is dry.   Palpation of skin normal   Psychiatric: His behavior is normal. Judgment and thought content normal.         Assessment:       1. Bradycardia, drug induced    2. Essential hypertension    3. Pure hypercholesterolemia    4. Type 2 diabetes mellitus with diabetic nephropathy, without long-term current use of insulin         Plan:       Pedro was seen today for bradycardia.    Diagnoses and all orders for this visit:    Bradycardia, drug induced metoprolol    Essential hypertension    Pure hypercholesterolemia    Type 2 diabetes mellitus with diabetic nephropathy, without long-term current use of insulin

## 2017-05-19 NOTE — PROGRESS NOTES
"Subjective:    Patient ID:  Pedro Ponce is a 77 y.o. male who presents for evaluation of Bradycardia      HPI    ROS     Objective:/78  Pulse 68  Ht 5' 11" (1.803 m)  Wt 93 kg (205 lb 0.4 oz)  BMI 28.6 kg/m2    Physical Exam      Assessment:       No diagnosis found.     Plan:                   "

## 2017-05-26 RX ORDER — OXYBUTYNIN CHLORIDE 5 MG/1
TABLET ORAL
Qty: 60 TABLET | Refills: 6 | Status: SHIPPED | OUTPATIENT
Start: 2017-05-26 | End: 2017-12-15 | Stop reason: SDUPTHER

## 2017-05-30 RX ORDER — METOPROLOL TARTRATE 25 MG/1
TABLET, FILM COATED ORAL
Qty: 180 TABLET | Refills: 1 | Status: SHIPPED | OUTPATIENT
Start: 2017-05-30 | End: 2020-03-18 | Stop reason: SDUPTHER

## 2017-06-01 ENCOUNTER — LAB VISIT (OUTPATIENT)
Dept: LAB | Facility: HOSPITAL | Age: 77
End: 2017-06-01
Attending: INTERNAL MEDICINE
Payer: MEDICARE

## 2017-06-01 ENCOUNTER — TELEPHONE (OUTPATIENT)
Dept: NEPHROLOGY | Facility: CLINIC | Age: 77
End: 2017-06-01

## 2017-06-01 DIAGNOSIS — I10 ESSENTIAL HYPERTENSION: ICD-10-CM

## 2017-06-01 DIAGNOSIS — N39.0 UTI (URINARY TRACT INFECTION), UNCOMPLICATED: Primary | ICD-10-CM

## 2017-06-01 DIAGNOSIS — R80.9 PROTEINURIA: ICD-10-CM

## 2017-06-01 DIAGNOSIS — N18.30 CKD (CHRONIC KIDNEY DISEASE) STAGE 3, GFR 30-59 ML/MIN: ICD-10-CM

## 2017-06-01 LAB
ALBUMIN SERPL BCP-MCNC: 3.7 G/DL
ANION GAP SERPL CALC-SCNC: 8 MMOL/L
BUN SERPL-MCNC: 24 MG/DL
CALCIUM SERPL-MCNC: 10.3 MG/DL
CHLORIDE SERPL-SCNC: 103 MMOL/L
CO2 SERPL-SCNC: 30 MMOL/L
CREAT SERPL-MCNC: 1.3 MG/DL
EST. GFR  (AFRICAN AMERICAN): >60 ML/MIN/1.73 M^2
EST. GFR  (NON AFRICAN AMERICAN): 52.3 ML/MIN/1.73 M^2
GLUCOSE SERPL-MCNC: 120 MG/DL
PHOSPHATE SERPL-MCNC: 2.5 MG/DL
POTASSIUM SERPL-SCNC: 3.6 MMOL/L
PTH-INTACT SERPL-MCNC: 79 PG/ML
SODIUM SERPL-SCNC: 141 MMOL/L

## 2017-06-01 PROCEDURE — 83970 ASSAY OF PARATHORMONE: CPT

## 2017-06-01 PROCEDURE — 80069 RENAL FUNCTION PANEL: CPT

## 2017-06-01 PROCEDURE — 84244 ASSAY OF RENIN: CPT

## 2017-06-01 PROCEDURE — 82088 ASSAY OF ALDOSTERONE: CPT

## 2017-06-01 NOTE — TELEPHONE ENCOUNTER
UA shows possible UTI.  Please have him submit a urine cltx.  Please see if he is having UTI symptoms.

## 2017-06-02 ENCOUNTER — LAB VISIT (OUTPATIENT)
Dept: LAB | Facility: HOSPITAL | Age: 77
End: 2017-06-02
Attending: FAMILY MEDICINE
Payer: MEDICARE

## 2017-06-02 DIAGNOSIS — N39.0 UTI (URINARY TRACT INFECTION), UNCOMPLICATED: ICD-10-CM

## 2017-06-02 PROCEDURE — 87088 URINE BACTERIA CULTURE: CPT

## 2017-06-02 PROCEDURE — 87086 URINE CULTURE/COLONY COUNT: CPT

## 2017-06-02 PROCEDURE — 87186 SC STD MICRODIL/AGAR DIL: CPT

## 2017-06-02 PROCEDURE — 87077 CULTURE AEROBIC IDENTIFY: CPT

## 2017-06-03 LAB — RENIN PLAS-CCNC: <0.6 NG/ML/H

## 2017-06-04 LAB — BACTERIA UR CULT: NORMAL

## 2017-06-05 ENCOUNTER — OFFICE VISIT (OUTPATIENT)
Dept: OPHTHALMOLOGY | Facility: CLINIC | Age: 77
End: 2017-06-05
Payer: MEDICARE

## 2017-06-05 ENCOUNTER — TELEPHONE (OUTPATIENT)
Dept: UROLOGY | Facility: CLINIC | Age: 77
End: 2017-06-05

## 2017-06-05 ENCOUNTER — CLINICAL SUPPORT (OUTPATIENT)
Dept: OPHTHALMOLOGY | Facility: CLINIC | Age: 77
End: 2017-06-05
Payer: MEDICARE

## 2017-06-05 ENCOUNTER — OFFICE VISIT (OUTPATIENT)
Dept: NEPHROLOGY | Facility: CLINIC | Age: 77
End: 2017-06-05
Payer: MEDICARE

## 2017-06-05 VITALS
OXYGEN SATURATION: 98 % | WEIGHT: 203.5 LBS | BODY MASS INDEX: 28.49 KG/M2 | HEART RATE: 60 BPM | HEIGHT: 71 IN | DIASTOLIC BLOOD PRESSURE: 68 MMHG | SYSTOLIC BLOOD PRESSURE: 122 MMHG

## 2017-06-05 DIAGNOSIS — H25.13 NS (NUCLEAR SCLEROSIS), BILATERAL: ICD-10-CM

## 2017-06-05 DIAGNOSIS — I10 ESSENTIAL HYPERTENSION: ICD-10-CM

## 2017-06-05 DIAGNOSIS — R80.9 PROTEINURIA, UNSPECIFIED TYPE: Primary | ICD-10-CM

## 2017-06-05 DIAGNOSIS — H43.393 VITREOUS FLOATERS OF BOTH EYES: ICD-10-CM

## 2017-06-05 DIAGNOSIS — N18.2 CKD (CHRONIC KIDNEY DISEASE) STAGE 2, GFR 60-89 ML/MIN: ICD-10-CM

## 2017-06-05 DIAGNOSIS — H35.3210 EXUDATIVE AGE-RELATED MACULAR DEGENERATION OF RIGHT EYE: ICD-10-CM

## 2017-06-05 DIAGNOSIS — H40.1132 PRIMARY OPEN ANGLE GLAUCOMA OF BOTH EYES, MODERATE STAGE: ICD-10-CM

## 2017-06-05 DIAGNOSIS — H40.1132 PRIMARY OPEN ANGLE GLAUCOMA OF BOTH EYES, MODERATE STAGE: Primary | ICD-10-CM

## 2017-06-05 DIAGNOSIS — H25.043 POSTERIOR SUBCAPSULAR POLAR AGE-RELATED CATARACT OF BOTH EYES: ICD-10-CM

## 2017-06-05 LAB — ALDOST SERPL-MCNC: 15.3 NG/DL

## 2017-06-05 PROCEDURE — 99999 PR PBB SHADOW E&M-EST. PATIENT-LVL III: CPT | Mod: PBBFAC,,, | Performed by: INTERNAL MEDICINE

## 2017-06-05 PROCEDURE — 92020 GONIOSCOPY: CPT | Mod: S$GLB,,, | Performed by: OPHTHALMOLOGY

## 2017-06-05 PROCEDURE — 99214 OFFICE O/P EST MOD 30 MIN: CPT | Mod: S$GLB,,, | Performed by: INTERNAL MEDICINE

## 2017-06-05 PROCEDURE — 1126F AMNT PAIN NOTED NONE PRSNT: CPT | Mod: S$GLB,,, | Performed by: INTERNAL MEDICINE

## 2017-06-05 PROCEDURE — 99999 PR PBB SHADOW E&M-EST. PATIENT-LVL III: CPT | Mod: PBBFAC,,, | Performed by: OPHTHALMOLOGY

## 2017-06-05 PROCEDURE — 92083 EXTENDED VISUAL FIELD XM: CPT | Mod: S$GLB,,, | Performed by: OPHTHALMOLOGY

## 2017-06-05 PROCEDURE — 1159F MED LIST DOCD IN RCRD: CPT | Mod: S$GLB,,, | Performed by: INTERNAL MEDICINE

## 2017-06-05 PROCEDURE — 92136 OPHTHALMIC BIOMETRY: CPT | Mod: RT,S$GLB,, | Performed by: OPHTHALMOLOGY

## 2017-06-05 PROCEDURE — 92133 CPTRZD OPH DX IMG PST SGM ON: CPT | Mod: S$GLB,,, | Performed by: OPHTHALMOLOGY

## 2017-06-05 PROCEDURE — 92014 COMPRE OPH EXAM EST PT 1/>: CPT | Mod: S$GLB,,, | Performed by: OPHTHALMOLOGY

## 2017-06-05 RX ORDER — CIPROFLOXACIN 500 MG/1
500 TABLET ORAL 2 TIMES DAILY
Qty: 10 TABLET | Refills: 0 | Status: SHIPPED | OUTPATIENT
Start: 2017-06-05 | End: 2017-06-05 | Stop reason: SDUPTHER

## 2017-06-05 RX ORDER — CIPROFLOXACIN 500 MG/1
500 TABLET ORAL 2 TIMES DAILY
Qty: 20 TABLET | Refills: 0 | Status: SHIPPED | OUTPATIENT
Start: 2017-06-05 | End: 2017-08-23

## 2017-06-05 NOTE — TELEPHONE ENCOUNTER
Pt notified that dr cuenca does not need to see him prior to dec, via message on cell phone.contact name and number left for pt concerns or questions

## 2017-06-05 NOTE — TELEPHONE ENCOUNTER
----- Message from Jazmín Landeros MA sent at 6/5/2017  8:55 AM CDT -----  Contact: [phone]  Dr Walker in nephrology wants to know if you need to see this pt back for eval of his renal cyst sooner than his dec 2017 recall.  If yes, they asked if you could contact the pt with an appt.     [phone]

## 2017-06-05 NOTE — PROGRESS NOTES
HPI     DLS: 11/07/2016-[place]  02/07/2017-[facility]    Glaucoma   NSC OU   Wet AMD  Avastin X 8 OD    Latanoprost Q HS OU           Last edited by Ivana Fraire on 6/5/2017 10:52 AM. (History)            Assessment /Plan     For exam results, see Encounter Report.    Primary open angle glaucoma of both eyes, moderate stage    Exudative age-related macular degeneration of right eye    Vitreous floaters of both eyes    Posterior subcapsular polar age-related cataract of both eyes  -     IOL Master - OD - Right Eye    NS (nuclear sclerosis), bilateral  -     IOL Master - OD - Right Eye          Stable Glaucoma & Patient near target IOP range.  Will continue with same medicines as patient is tolerating well with good adherence.    Thin CCT  453 // 481    ONH stable with 1/12/2016 & 2007 photos      HVF 1/5/2015 stable    Both eyes  Xal q HS      NSC OU  CE PRN    Serous RPED/ARMD - now dry  With symmetry on autoflourences  Amsler chart routine with good understanding  Cont fu with Dr. Newell      Flashes / Floaters  RD precautions:  Discussed with patient symptoms of RD with increased flashes, floaters, decreasing vision.  Patient/Family to call and return immediately to clinic should the symptoms of RD occur.  patient voice good understanding.            Cataract right and left Eyes: Discussed options including observation, glasses & surgery with patient with good understanding. Patient wishes to proceed with:  observation Glasses Surgery Right then left       Cataract Surgery Consent: Patient with a visually significant cataract with difficulties of ADLs, reading, driving, night vision, glare.  Discussed with patient options, risks and benefits, expectations of cataract surgery & possible goniotomy with questions and answers to facilitate discussion.  Discussed lens options and patient understands that glasses may be required for optimal vision for distance and/or near vision after cataract surgery.  The  patient  voice good understanding and patient wishes to proceed with surgery.  The patient will likely benefit from surgery and patient signed consent for Right Eye.    IOL choice:       AL   24.32 OD         24.44 OS     PCB00  119.3  23.0 -0.28  22.5 -0.18      Right eye CE IOL & possible goniotomy  Poor dilation  + Flomax      Plan  RTC for OD Complex CE IOL with Malyugin Ring  & possible goniotomy  RTC sooner PRN with good understanding

## 2017-06-06 ENCOUNTER — TELEPHONE (OUTPATIENT)
Dept: NEPHROLOGY | Facility: CLINIC | Age: 77
End: 2017-06-06

## 2017-06-06 DIAGNOSIS — N39.0 UTI (URINARY TRACT INFECTION), UNCOMPLICATED: Primary | ICD-10-CM

## 2017-06-06 NOTE — TELEPHONE ENCOUNTER
Please set him for ua and urine cltx at [facility] next week-pt aware.    He was unable to set up an appointment in urology as it appears per their tel notes, they didn't want to see him now as they just saw him.  Please arrange an appoint with Dr. Monaco for work up of UTI's as this is a new diagnosis and he was following him for a different reason.

## 2017-06-08 ENCOUNTER — TELEPHONE (OUTPATIENT)
Dept: OPHTHALMOLOGY | Facility: CLINIC | Age: 77
End: 2017-06-08

## 2017-06-08 DIAGNOSIS — H25.043 POSTERIOR SUBCAPSULAR POLAR AGE-RELATED CATARACT OF BOTH EYES: ICD-10-CM

## 2017-06-08 DIAGNOSIS — H40.1132 PRIMARY OPEN ANGLE GLAUCOMA OF BOTH EYES, MODERATE STAGE: Primary | ICD-10-CM

## 2017-06-16 ENCOUNTER — LAB VISIT (OUTPATIENT)
Dept: LAB | Facility: HOSPITAL | Age: 77
End: 2017-06-16
Attending: INTERNAL MEDICINE
Payer: MEDICARE

## 2017-06-16 DIAGNOSIS — N39.0 UTI (URINARY TRACT INFECTION), UNCOMPLICATED: ICD-10-CM

## 2017-06-16 LAB
BILIRUB UR QL STRIP: NEGATIVE
CLARITY UR REFRACT.AUTO: CLEAR
COLOR UR AUTO: YELLOW
GLUCOSE UR QL STRIP: NEGATIVE
HGB UR QL STRIP: NEGATIVE
KETONES UR QL STRIP: NEGATIVE
LEUKOCYTE ESTERASE UR QL STRIP: NEGATIVE
NITRITE UR QL STRIP: NEGATIVE
PH UR STRIP: 6 [PH] (ref 5–8)
PROT UR QL STRIP: NEGATIVE
SP GR UR STRIP: 1.02 (ref 1–1.03)
URN SPEC COLLECT METH UR: NORMAL
UROBILINOGEN UR STRIP-ACNC: NEGATIVE EU/DL

## 2017-06-16 PROCEDURE — 81003 URINALYSIS AUTO W/O SCOPE: CPT

## 2017-06-16 PROCEDURE — 87086 URINE CULTURE/COLONY COUNT: CPT

## 2017-06-17 LAB — BACTERIA UR CULT: NO GROWTH

## 2017-06-19 ENCOUNTER — TELEPHONE (OUTPATIENT)
Dept: NEPHROLOGY | Facility: CLINIC | Age: 77
End: 2017-06-19

## 2017-06-20 ENCOUNTER — OFFICE VISIT (OUTPATIENT)
Dept: FAMILY MEDICINE | Facility: CLINIC | Age: 77
End: 2017-06-20
Payer: MEDICARE

## 2017-06-20 ENCOUNTER — TELEPHONE (OUTPATIENT)
Dept: NEUROSURGERY | Facility: CLINIC | Age: 77
End: 2017-06-20

## 2017-06-20 VITALS
HEIGHT: 71 IN | WEIGHT: 203.5 LBS | OXYGEN SATURATION: 97 % | BODY MASS INDEX: 28.49 KG/M2 | HEART RATE: 69 BPM | DIASTOLIC BLOOD PRESSURE: 60 MMHG | SYSTOLIC BLOOD PRESSURE: 134 MMHG

## 2017-06-20 DIAGNOSIS — N18.30 TYPE 2 DIABETES MELLITUS WITH STAGE 3 CHRONIC KIDNEY DISEASE, WITHOUT LONG-TERM CURRENT USE OF INSULIN: ICD-10-CM

## 2017-06-20 DIAGNOSIS — E11.22 TYPE 2 DIABETES MELLITUS WITH STAGE 3 CHRONIC KIDNEY DISEASE, WITHOUT LONG-TERM CURRENT USE OF INSULIN: ICD-10-CM

## 2017-06-20 DIAGNOSIS — Z01.818 PRE-OP EXAM: Primary | ICD-10-CM

## 2017-06-20 DIAGNOSIS — I10 ESSENTIAL HYPERTENSION: ICD-10-CM

## 2017-06-20 PROCEDURE — 99999 PR PBB SHADOW E&M-EST. PATIENT-LVL III: CPT | Mod: PBBFAC,,, | Performed by: FAMILY MEDICINE

## 2017-06-20 PROCEDURE — 99214 OFFICE O/P EST MOD 30 MIN: CPT | Mod: S$GLB,,, | Performed by: FAMILY MEDICINE

## 2017-06-20 PROCEDURE — 1126F AMNT PAIN NOTED NONE PRSNT: CPT | Mod: S$GLB,,, | Performed by: FAMILY MEDICINE

## 2017-06-20 PROCEDURE — 1159F MED LIST DOCD IN RCRD: CPT | Mod: S$GLB,,, | Performed by: FAMILY MEDICINE

## 2017-06-20 NOTE — PATIENT INSTRUCTIONS
Chronic Kidney Disease (CKD)    The role of the kidneys is to remove waste products and extra water from the blood.  When the kidneys do not work as they should, waste products begin to build up in the blood. This is called chronic kidney disease (CKD). CKD means that you have kidney damage or a decrease in kidney function lasting at least 3 months. CKD allows extra water, waste, and toxins to build up in the body. This can eventually become life-threatening. You might need dialysis or a kidney transplant to stay alive. This most severe form is called end stage renal disease.  Diabetes is the leading causes of chronic renal failure. Other causes include high blood pressure, hardening of the arteries (atherosclerosis), lupus, inflammation of the blood vessels (vasculitis), and past viral or bacterial infections. Certain over-the-counter pain medicines can cause renal failure when taken often over a long period of time. These include aspirin, ibuprofen, and related anti-inflammatory medicines called NSAIDs (nonsteroidal anti-inflammatory drugs).  Home care  The following guidelines will help you care for yourself at home:  · If you have diabetes, talk with your healthcare provider about keeping your blood sugar under control. Ask if you need to make and changes to your diet, lifestyle, or medicines.  · If you have high blood pressure:  ¨ Take prescribed medicine to lower your blood pressure to the recommended goal of less than 130/80.  ¨ Start a regular exercise program that you enjoy. Check with your healthcare provider to be sure your planned exercise program is right for you.  ¨ Eat less salt (sodium). Your healthcare provider can tell you how much salt per day is safe for you.  · If you are overweight, talk with your healthcare provider about a weight loss plan.  · If you smoke, you must quit. Smoking makes kidney disease worse. Talk with your healthcare provider about ways to help you quit.  For more  information, visit the following links:  ¨ www.smokefree.gov/sites/default/files/pdf/clearing-the-air-accessible.pdf  ¨ www.smokefree.gov  ¨ www.cancer.org/healthy/stayawayfromtobacco/guidetoquittingsmoking/  · Most people with CKD need to follow a special diet.  Be sure you understand yours. In general, you will need to limit protein, salt, potassium, and phosphorus. You also need to limit how much fluid you drink.   · CKD is a risk factor for heart disease. Talk with your healthcare provider about any other risk factors you might have and what you can do to lessen them.  · Talk with your healthcare provider about any medicines you are taking to find out if they need to be reduced or stopped.  · Don't use the following over-the-counter medicines, or consult your healthcare provider before using:  ¨ Aspirin and NSAIDs such as ibuprofen or naproxen. Using acetaminophen for fever or pain is OK.  ¨ Laxatives and antacids containing magnesium or aluminum  ¨ Fleet or phospho soda enemas containing phosphorus  ¨ Certain stomach acid-blocking medicine such as cimetidine or ranitidine   ¨ Decongestants containing pseudoephedrine   ¨ Herbal supplements  Follow-up care  Follow up with your healthcare provider, or as advised. Contact one of the following for more information:  · American Association of Kidney Patients 593-634-9316 www.aakp.org  · National Kidney Foundation 834-710-6196 www.kidney.org  · American Kidney Fund 862-708-6910 www.kidneyfund.org  · National Kidney Disease Education Program 866-4KIDNEY www.nkdep.nih.gov  If an X-ray, ECG (cardiogram), or other diagnostic test was taken, you will be told of any new findings that may affect your care.  Call 911  Call 911 if you have any of the following:  · Severe weakness, dizziness, fainting, drowsiness, or confusion  · Chest pain or shortness of breath  · Heart beating fast, slow, or irregularly  When to seek medical advice  Call your healthcare provider right away  if any of these occur:  · Nausea or vomiting  · Fever of 100.4°F (38°C) or higher, or as directed by your healthcare provider  · Unexpected weight gain or swelling in the legs, ankles, or around the eyes  · Decrease or absent urine output  Date Last Reviewed: 9/1/2016  © 2490-3974 [employer]. [address] [place] [address], [place] [address]. All rights reserved. This information is not intended as a substitute for professional medical care. Always follow your healthcare professional's instructions.

## 2017-06-20 NOTE — PROGRESS NOTES
Subjective:       Patient ID: Pedro Ponce is a 77 y.o. male.    Chief Complaint: Pre-op Exam (07/06/2017 Dr Hidalgo [facility]) and Cataract (right eye)    77 years old male came to the clinic for preoperative evaluation for cataract surgery.  Last A1c was stable.  No polyuria polydipsia polyphagia.  Blood pressure today is normal.  No chest pain palpitations orthopnea or PND.      Review of Systems   Cardiovascular: Negative for chest pain, palpitations and leg swelling.   Endocrine: Negative for cold intolerance, heat intolerance, polydipsia, polyphagia and polyuria.       Objective:      Physical Exam   Constitutional: He is oriented to person, place, and time. He appears well-developed and well-nourished. No distress.   HENT:   Head: Normocephalic and atraumatic.   Right Ear: External ear normal.   Left Ear: External ear normal.   Nose: Nose normal.   Mouth/Throat: Oropharynx is clear and moist. No oropharyngeal exudate.   Eyes: Conjunctivae and EOM are normal. Pupils are equal, round, and reactive to light. Right eye exhibits no discharge. Left eye exhibits no discharge. No scleral icterus.   Neck: Normal range of motion. Neck supple. No JVD present. No tracheal deviation present. No thyromegaly present.   Cardiovascular: Normal rate, regular rhythm, normal heart sounds and intact distal pulses.  Exam reveals no gallop and no friction rub.    No murmur heard.  Pulmonary/Chest: Effort normal and breath sounds normal. No stridor. No respiratory distress. He has no wheezes. He has no rales. He exhibits no tenderness.   Abdominal: Soft. Bowel sounds are normal. He exhibits no distension and no mass. There is no tenderness. There is no rebound and no guarding.   Musculoskeletal: Normal range of motion. He exhibits no edema or tenderness.   Lymphadenopathy:     He has no cervical adenopathy.   Neurological: He is alert and oriented to person, place, and time. He has normal reflexes. He displays normal  reflexes. No cranial nerve deficit. He exhibits normal muscle tone. Coordination normal.   Skin: Skin is warm and dry. No rash noted. He is not diaphoretic. No erythema. No pallor.   Psychiatric: He has a normal mood and affect. His behavior is normal. Judgment and thought content normal.   Nursing note and vitals reviewed.      Assessment:       1. Pre-op exam    2. Essential hypertension    3. Type 2 diabetes mellitus with stage 3 chronic kidney disease, without long-term current use of insulin        Plan:         Pedro was seen today for pre-op exam and cataract.    Diagnoses and all orders for this visit:    Pre-op exam    Essential hypertension    Type 2 diabetes mellitus with stage 3 chronic kidney disease, without long-term current use of insulin    Continue monitoring blood sugar at home,ADA diet.  Continue monitoring blood pressure at home, low sodium diet.  Patient is hemodynamically stable for surgical procedure.

## 2017-07-05 ENCOUNTER — TELEPHONE (OUTPATIENT)
Dept: OPHTHALMOLOGY | Facility: CLINIC | Age: 77
End: 2017-07-05

## 2017-07-06 ENCOUNTER — ANESTHESIA EVENT (OUTPATIENT)
Dept: SURGERY | Facility: HOSPITAL | Age: 77
End: 2017-07-06
Payer: MEDICARE

## 2017-07-06 ENCOUNTER — HOSPITAL ENCOUNTER (OUTPATIENT)
Facility: HOSPITAL | Age: 77
Discharge: HOME | End: 2017-07-06
Attending: OPHTHALMOLOGY | Admitting: OPHTHALMOLOGY
Payer: MEDICARE

## 2017-07-06 ENCOUNTER — SURGERY (OUTPATIENT)
Age: 77
End: 2017-07-06

## 2017-07-06 ENCOUNTER — ANESTHESIA (OUTPATIENT)
Dept: SURGERY | Facility: HOSPITAL | Age: 77
End: 2017-07-06
Payer: MEDICARE

## 2017-07-06 VITALS
BODY MASS INDEX: 28.42 KG/M2 | WEIGHT: 203 LBS | SYSTOLIC BLOOD PRESSURE: 147 MMHG | RESPIRATION RATE: 16 BRPM | HEIGHT: 71 IN | TEMPERATURE: 98 F | OXYGEN SATURATION: 98 % | HEART RATE: 52 BPM | DIASTOLIC BLOOD PRESSURE: 77 MMHG

## 2017-07-06 DIAGNOSIS — H25.10 SENILE NUCLEAR SCLEROSIS: ICD-10-CM

## 2017-07-06 DIAGNOSIS — H25.13 NS (NUCLEAR SCLEROSIS), BILATERAL: Primary | ICD-10-CM

## 2017-07-06 DIAGNOSIS — H40.1132 PRIMARY OPEN ANGLE GLAUCOMA OF BOTH EYES, MODERATE STAGE: ICD-10-CM

## 2017-07-06 LAB
POCT GLUCOSE: 89 MG/DL (ref 70–110)
POCT GLUCOSE: 94 MG/DL (ref 70–110)

## 2017-07-06 PROCEDURE — 82962 GLUCOSE BLOOD TEST: CPT | Performed by: OPHTHALMOLOGY

## 2017-07-06 PROCEDURE — 71000015 HC POSTOP RECOV 1ST HR: Performed by: OPHTHALMOLOGY

## 2017-07-06 PROCEDURE — 36000709 HC OR TIME LEV III EA ADD 15 MIN: Performed by: OPHTHALMOLOGY

## 2017-07-06 PROCEDURE — 63600175 PHARM REV CODE 636 W HCPCS: Performed by: NURSE ANESTHETIST, CERTIFIED REGISTERED

## 2017-07-06 PROCEDURE — D9220A PRA ANESTHESIA: Mod: CRNA,,, | Performed by: NURSE ANESTHETIST, CERTIFIED REGISTERED

## 2017-07-06 PROCEDURE — 25000003 PHARM REV CODE 250: Performed by: NURSE ANESTHETIST, CERTIFIED REGISTERED

## 2017-07-06 PROCEDURE — 66982 XCAPSL CTRC RMVL CPLX WO ECP: CPT | Mod: RT,,, | Performed by: OPHTHALMOLOGY

## 2017-07-06 PROCEDURE — 27201423 OPTIME MED/SURG SUP & DEVICES STERILE SUPPLY: Performed by: OPHTHALMOLOGY

## 2017-07-06 PROCEDURE — 37000008 HC ANESTHESIA 1ST 15 MINUTES: Performed by: OPHTHALMOLOGY

## 2017-07-06 PROCEDURE — V2632 POST CHMBR INTRAOCULAR LENS: HCPCS | Performed by: OPHTHALMOLOGY

## 2017-07-06 PROCEDURE — C9447 INJ, PHENYLEPHRINE KETOROLAC: HCPCS | Performed by: OPHTHALMOLOGY

## 2017-07-06 PROCEDURE — 65820 GONIOTOMY: CPT | Mod: 51,RT,, | Performed by: OPHTHALMOLOGY

## 2017-07-06 PROCEDURE — 37000009 HC ANESTHESIA EA ADD 15 MINS: Performed by: OPHTHALMOLOGY

## 2017-07-06 PROCEDURE — 25000003 PHARM REV CODE 250: Performed by: OPHTHALMOLOGY

## 2017-07-06 PROCEDURE — D9220A PRA ANESTHESIA: Mod: ANES,,, | Performed by: ANESTHESIOLOGY

## 2017-07-06 PROCEDURE — 36000708 HC OR TIME LEV III 1ST 15 MIN: Performed by: OPHTHALMOLOGY

## 2017-07-06 PROCEDURE — 63600175 PHARM REV CODE 636 W HCPCS: Performed by: OPHTHALMOLOGY

## 2017-07-06 DEVICE — LENS IOL ITEC PRELOAD 23.0D: Type: IMPLANTABLE DEVICE | Site: EYE | Status: FUNCTIONAL

## 2017-07-06 RX ORDER — FENTANYL CITRATE 50 UG/ML
INJECTION, SOLUTION INTRAMUSCULAR; INTRAVENOUS
Status: DISCONTINUED | OUTPATIENT
Start: 2017-07-06 | End: 2017-07-06

## 2017-07-06 RX ORDER — PREDNISOLONE ACETATE 10 MG/ML
SUSPENSION/ DROPS OPHTHALMIC
Status: DISCONTINUED
Start: 2017-07-06 | End: 2017-07-06 | Stop reason: HOSPADM

## 2017-07-06 RX ORDER — ACETAMINOPHEN 325 MG/1
650 TABLET ORAL EVERY 4 HOURS PRN
Status: DISCONTINUED | OUTPATIENT
Start: 2017-07-06 | End: 2017-07-06 | Stop reason: HOSPADM

## 2017-07-06 RX ORDER — LIDOCAINE HYDROCHLORIDE 10 MG/ML
INJECTION, SOLUTION EPIDURAL; INFILTRATION; INTRACAUDAL; PERINEURAL
Status: DISCONTINUED | OUTPATIENT
Start: 2017-07-06 | End: 2017-07-06 | Stop reason: HOSPADM

## 2017-07-06 RX ORDER — SODIUM CHLORIDE 9 MG/ML
INJECTION, SOLUTION INTRAVENOUS CONTINUOUS PRN
Status: DISCONTINUED | OUTPATIENT
Start: 2017-07-06 | End: 2017-07-06

## 2017-07-06 RX ORDER — LIDOCAINE HYDROCHLORIDE 40 MG/ML
1 INJECTION, SOLUTION RETROBULBAR
Status: ACTIVE | OUTPATIENT
Start: 2017-07-06 | End: 2017-07-06

## 2017-07-06 RX ORDER — CYCLOPENTOLATE HYDROCHLORIDE 10 MG/ML
1 SOLUTION/ DROPS OPHTHALMIC
Status: DISCONTINUED | OUTPATIENT
Start: 2017-07-06 | End: 2017-07-06 | Stop reason: HOSPADM

## 2017-07-06 RX ORDER — LIDOCAINE HYDROCHLORIDE 10 MG/ML
INJECTION, SOLUTION EPIDURAL; INFILTRATION; INTRACAUDAL; PERINEURAL
Status: DISCONTINUED
Start: 2017-07-06 | End: 2017-07-06 | Stop reason: HOSPADM

## 2017-07-06 RX ORDER — LIDOCAINE HYDROCHLORIDE 40 MG/ML
INJECTION, SOLUTION RETROBULBAR
Status: DISCONTINUED
Start: 2017-07-06 | End: 2017-07-06 | Stop reason: HOSPADM

## 2017-07-06 RX ORDER — MIDAZOLAM HYDROCHLORIDE 1 MG/ML
INJECTION, SOLUTION INTRAMUSCULAR; INTRAVENOUS
Status: DISCONTINUED | OUTPATIENT
Start: 2017-07-06 | End: 2017-07-06

## 2017-07-06 RX ORDER — MOXIFLOXACIN 5 MG/ML
SOLUTION/ DROPS OPHTHALMIC
Status: DISCONTINUED
Start: 2017-07-06 | End: 2017-07-06 | Stop reason: HOSPADM

## 2017-07-06 RX ORDER — TROPICAMIDE 10 MG/ML
1 SOLUTION/ DROPS OPHTHALMIC
Status: DISCONTINUED | OUTPATIENT
Start: 2017-07-06 | End: 2017-07-06 | Stop reason: HOSPADM

## 2017-07-06 RX ORDER — LIDOCAINE HYDROCHLORIDE 40 MG/ML
INJECTION, SOLUTION RETROBULBAR
Status: DISCONTINUED | OUTPATIENT
Start: 2017-07-06 | End: 2017-07-06 | Stop reason: HOSPADM

## 2017-07-06 RX ORDER — MOXIFLOXACIN 5 MG/ML
SOLUTION/ DROPS OPHTHALMIC
Status: DISCONTINUED | OUTPATIENT
Start: 2017-07-06 | End: 2017-07-06 | Stop reason: HOSPADM

## 2017-07-06 RX ORDER — PHENYLEPHRINE HYDROCHLORIDE 25 MG/ML
1 SOLUTION/ DROPS OPHTHALMIC
Status: DISCONTINUED | OUTPATIENT
Start: 2017-07-06 | End: 2017-07-06 | Stop reason: HOSPADM

## 2017-07-06 RX ORDER — PREDNISOLONE ACETATE 10 MG/ML
SUSPENSION/ DROPS OPHTHALMIC
Status: DISCONTINUED | OUTPATIENT
Start: 2017-07-06 | End: 2017-07-06 | Stop reason: HOSPADM

## 2017-07-06 RX ADMIN — PHENYLEPHRINE HYDROCHLORIDE 1 DROP: 25 SOLUTION/ DROPS OPHTHALMIC at 07:07

## 2017-07-06 RX ADMIN — PREDNISOLONE ACETATE 2 DROP: 10 SUSPENSION/ DROPS OPHTHALMIC at 08:07

## 2017-07-06 RX ADMIN — CYCLOPENTOLATE HYDROCHLORIDE 1 DROP: 10 SOLUTION/ DROPS OPHTHALMIC at 07:07

## 2017-07-06 RX ADMIN — FENTANYL CITRATE 50 MCG: 50 INJECTION, SOLUTION INTRAMUSCULAR; INTRAVENOUS at 08:07

## 2017-07-06 RX ADMIN — PHENYLEPHRINE AND KETOROLAC 4 ML: 10.16; 2.88 INJECTION, SOLUTION, CONCENTRATE INTRAOCULAR at 08:07

## 2017-07-06 RX ADMIN — LIDOCAINE HYDROCHLORIDE 1 ML: 10 INJECTION, SOLUTION EPIDURAL; INFILTRATION; INTRACAUDAL; PERINEURAL at 08:07

## 2017-07-06 RX ADMIN — SODIUM CHLORIDE: 0.9 INJECTION, SOLUTION INTRAVENOUS at 08:07

## 2017-07-06 RX ADMIN — SODIUM CHONDROITIN SULFATE / SODIUM HYALURONATE 1.05 ML: 0.55-0.5 INJECTION INTRAOCULAR at 08:07

## 2017-07-06 RX ADMIN — MOXIFLOXACIN HYDROCHLORIDE 1 DROP: 5 SOLUTION/ DROPS OPHTHALMIC at 08:07

## 2017-07-06 RX ADMIN — MIDAZOLAM HYDROCHLORIDE 2 MG: 1 INJECTION, SOLUTION INTRAMUSCULAR; INTRAVENOUS at 08:07

## 2017-07-06 RX ADMIN — LIDOCAINE HYDROCHLORIDE 5 ML: 40 INJECTION, SOLUTION RETROBULBAR; TOPICAL at 08:07

## 2017-07-06 RX ADMIN — TROPICAMIDE 1 DROP: 10 SOLUTION/ DROPS OPHTHALMIC at 07:07

## 2017-07-06 RX ADMIN — FENTANYL CITRATE 25 MCG: 50 INJECTION, SOLUTION INTRAMUSCULAR; INTRAVENOUS at 09:07

## 2017-07-06 NOTE — OP NOTE
Operative Date:  07/06/2017    Discharge Date:  07/06/2017    Discharge Patient Home      Report Title: Operative Note      SURGEON: Roberto Hidalgo MD    ASSISTANT: RODRICK Ricardo MD    PREOPERATIVE DIAGNOSIS: Visually significant NSC / Cortical cataract & POAG, Right eye      POSTOPERATIVE DIAGNOSIS: Visually-significant NSC / Cortical cataract with poor dilation & POAG, Right eye      PROCEDURE PERFORMED: Complex Phacoemulsification of the cataract with posterior chamber intraocular lens with Malyugin Ring with Floppy Iris syndrome 2/2 Flomax use Right eye        With Goniotomy Kahook DB Right eye      ANESTHESIA: Topical with MAC    COMPLICATIONS: None.    ESTIMATED BLOOD LOSS: Minimal    INDICATIONS FOR PROCEDURE:   The patient is a pleasant 77 year old gentleman with increasing difficulties with activities of daily living secondary to a dense visually significant cataract in the Right eye and with POAG.  Discussions have been carried out with this patient concerning the options to surgery, risks, benefits and expectations.  The patient voiced good understanding and wished to proceed with the above procedure.    PROCEDURE IN DETAIL: The patient was brought to the operating room and received topical anesthetic to the eye and then was prepped and draped in the usual sterile fashion.  The Right eye was first entered at the 10:30 oclock paracentesis site, followed by intracameral lidocaine and Viscoat material.  The eye was then reentered at the primary surgical site followed by placement of a 6.25 Malyugin ring, continuous capsulotomy, hydrodissection and phacoemulsification of the cataract.  Residual cortical material was removed using automated irrigation-aspiration technique.  Healon was injected into the posterior chamber and an JT PCB00 23.0 diopter lens was placed in the bag without difficulty.  The Malyugin ring was removed and attention was then directed to goniotomy portion of the surgery.  The  patient and microscope were repositioned.  Under direct gonioscopy visualization, a Kahook DB was use excise 3-4 clock hours of a strip of TM. The patient and microscope was then re-positioned to complete the case.  Residual Healon was removed using automated irrigation-aspiration technique.  The eye was re-pressurized using BSS solution and both the paracentesis site and the primary surgical site were demonstrated to be watertight at the end of the case with Weck--Yenifer manipulation and a single buried 10-0 vicryl suture was placed at the primary wound site to ensure post-op wound stablibilty.  A collagen shield soaked in Vigamox and Pred Forte eye drops was placed on the cornea.  The eye was closed, patched and a Vasquez shield placed. The patient was taken to the recovery room in good and stable condition.  The patient tolerated the procedure well.  The patient was instructed to refrain from any heavy lifting, bending, stooping or straining activities, discharged home and to follow-up in the morning for routine postoperative care with Dr. Roberto Hidalgo.

## 2017-07-06 NOTE — ANESTHESIA POSTPROCEDURE EVALUATION
"Anesthesia Post Evaluation    Patient: Pedro Ponce    Procedure(s) Performed: Procedure(s) (LRB):  PHACOEMULSIFICATION-ASPIRATION-CATARACT (Right)  INSERTION-INTRAOCULAR LENS (IOL) (Right)  GONIOTOMY (Right)    Final Anesthesia Type: MAC  Patient location during evaluation: PACU  Patient participation: Yes- Able to Participate  Level of consciousness: awake and alert  Pain management: adequate  Airway patency: patent  PONV status at discharge: No PONV  Anesthetic complications: no      Cardiovascular status: blood pressure returned to baseline  Respiratory status: unassisted, spontaneous ventilation and room air  Hydration status: euvolemic  Follow-up not needed.        Visit Vitals  BP (!) 147/77 (BP Location: Left arm, Patient Position: Sitting, BP Method: Automatic)   Pulse (!) 52   Temp 36.4 °C (97.5 °F) (Axillary)   Resp 16   Ht 5' 11" (1.803 m)   Wt 92.1 kg (203 lb)   SpO2 98%   BMI 28.31 kg/m²       Pain/You Score: Pain Assessment Performed: Yes (7/6/2017 10:15 AM)  Presence of Pain: denies (7/6/2017 10:15 AM)  You Score: 10 (7/6/2017 10:15 AM)      "

## 2017-07-06 NOTE — H&P (VIEW-ONLY)
Subjective:       Patient ID: Pedro Ponce is a 77 y.o. male.    Chief Complaint: Pre-op Exam (07/06/2017 Dr Hidalgo [facility]) and Cataract (right eye)    77 years old male came to the clinic for preoperative evaluation for cataract surgery.  Last A1c was stable.  No polyuria polydipsia polyphagia.  Blood pressure today is normal.  No chest pain palpitations orthopnea or PND.      Review of Systems   Cardiovascular: Negative for chest pain, palpitations and leg swelling.   Endocrine: Negative for cold intolerance, heat intolerance, polydipsia, polyphagia and polyuria.       Objective:      Physical Exam   Constitutional: He is oriented to person, place, and time. He appears well-developed and well-nourished. No distress.   HENT:   Head: Normocephalic and atraumatic.   Right Ear: External ear normal.   Left Ear: External ear normal.   Nose: Nose normal.   Mouth/Throat: Oropharynx is clear and moist. No oropharyngeal exudate.   Eyes: Conjunctivae and EOM are normal. Pupils are equal, round, and reactive to light. Right eye exhibits no discharge. Left eye exhibits no discharge. No scleral icterus.   Neck: Normal range of motion. Neck supple. No JVD present. No tracheal deviation present. No thyromegaly present.   Cardiovascular: Normal rate, regular rhythm, normal heart sounds and intact distal pulses.  Exam reveals no gallop and no friction rub.    No murmur heard.  Pulmonary/Chest: Effort normal and breath sounds normal. No stridor. No respiratory distress. He has no wheezes. He has no rales. He exhibits no tenderness.   Abdominal: Soft. Bowel sounds are normal. He exhibits no distension and no mass. There is no tenderness. There is no rebound and no guarding.   Musculoskeletal: Normal range of motion. He exhibits no edema or tenderness.   Lymphadenopathy:     He has no cervical adenopathy.   Neurological: He is alert and oriented to person, place, and time. He has normal reflexes. He displays normal  reflexes. No cranial nerve deficit. He exhibits normal muscle tone. Coordination normal.   Skin: Skin is warm and dry. No rash noted. He is not diaphoretic. No erythema. No pallor.   Psychiatric: He has a normal mood and affect. His behavior is normal. Judgment and thought content normal.   Nursing note and vitals reviewed.      Assessment:       1. Pre-op exam    2. Essential hypertension    3. Type 2 diabetes mellitus with stage 3 chronic kidney disease, without long-term current use of insulin        Plan:         Pedro was seen today for pre-op exam and cataract.    Diagnoses and all orders for this visit:    Pre-op exam    Essential hypertension    Type 2 diabetes mellitus with stage 3 chronic kidney disease, without long-term current use of insulin    Continue monitoring blood sugar at home,ADA diet.  Continue monitoring blood pressure at home, low sodium diet.  Patient is hemodynamically stable for surgical procedure.

## 2017-07-06 NOTE — TRANSFER OF CARE
"Anesthesia Transfer of Care Note    Patient: Pedro Ponce    Procedure(s) Performed: Procedure(s) (LRB):  PHACOEMULSIFICATION-ASPIRATION-CATARACT (Right)  INSERTION-INTRAOCULAR LENS (IOL) (Right)  GONIOTOMY (Right)    Patient location: PACU    Anesthesia Type: MAC    Transport from OR: Transported from OR on room air with adequate spontaneous ventilation    Post pain: adequate analgesia    Post assessment: no apparent anesthetic complications and tolerated procedure well    Post vital signs: stable    Level of consciousness: awake, alert and oriented    Nausea/Vomiting: no nausea/vomiting    Complications: none    Transfer of care protocol was followed      Last vitals:   Visit Vitals  BP (!) 141/76 (BP Location: Left arm, Patient Position: Lying, BP Method: Automatic)   Pulse (!) 50   Temp 36.4 °C (97.5 °F) (Oral)   Resp 16   Ht 5' 11" (1.803 m)   Wt 92.1 kg (203 lb)   SpO2 97%   BMI 28.31 kg/m²     "

## 2017-07-06 NOTE — INTERVAL H&P NOTE
No Interval change  Planning complex CE IOL with Malyugin Ring 2/2 poor dilation with Flomax use and possible goniotomy Right eye

## 2017-07-06 NOTE — PLAN OF CARE
Discharge instructions reviewed with pt and spouse, handouts given verbalized understanding with no further questions at this time. Post op appointment scheduled for tomorrow, per AVS sheet with MD telephone number provided. VSS on RA, no pain or nausea noted, tolerating po fluids  without difficulty, no other complaints noted. Fall precautions reviewed, consents in chart, PIV removed.

## 2017-07-06 NOTE — DISCHARGE INSTRUCTIONS
Discharge Instructions for Cataract Surgery  A surgeon removed the cloudy lens in your eye and replaced it with a clear man-made lens. Be sure to have an adult family member or friend drive you home after surgery. Heres what you can expect following surgery and tips for a healthy recovery.  It is normal to have the following:  · Bruised or bloodshot eye for 7 days  · Itching and mild discomfort for several days  · Some fluid discharge  · Sensitivity to light  · Scratchy, sandlike feeling in the eye for 2 weeks  · Feeling tired, especially during the first 24 hours  Activity level  · Do not drive for 2 days or as instructed by your doctor.  · Do not drink alcohol for at least 24 hours.  · Avoid bending at the waist to  objects or lifting anything heavy for 2 days.  · Relax for the first 24 hours after surgery. Watching TV and reading are OK and wont harm your eye.  Eye protection  · Do not rub or press on your eye.  · Sleep on your back or on your unoperated side for 7 nights.  · If instructed, wear a bandage over your eye for 7 nights.  · If instructed, wear a shield to protect your eye for 7 nights.  Using eyedrops  You may be given special eyedrops or ointment. Here is one way to use eyedrops:  · Tilt your head back.  · Pull your bottom eyelid down.  · Squeeze one drop into your eye. Do not touch your eye with the bottle tip.  · Close your eyes for a few seconds.  · If you need more than one drop, wait a few minutes before adding the next one.   Call your doctor right away if you have any of the following:  · Bleeding or discharge from the eye  · Your vision suddenly becomes worse  · Pain medicine you are told to take does not ease your pain  · Nausea or vomiting  · Chills or fever over 100.4°F (39.1°C)   Date Last Reviewed: 5/30/2015  © 3159-0647 [employer]. [address] [place] [address], [place] [address]. All rights reserved. This information is not intended as a substitute for professional  medical care. Always follow your healthcare professional's instructions.

## 2017-07-06 NOTE — ANESTHESIA RELEASE NOTE
"Anesthesia Release from PACU Note    Patient: Pedro Ponce    Procedure(s) Performed: Procedure(s) (LRB):  PHACOEMULSIFICATION-ASPIRATION-CATARACT (Right)  INSERTION-INTRAOCULAR LENS (IOL) (Right)  GONIOTOMY (Right)    Anesthesia type: MAC    Post pain: Adequate analgesia    Post assessment: no apparent anesthetic complications    Last Vitals:   Visit Vitals  BP (!) 147/77 (BP Location: Left arm, Patient Position: Sitting, BP Method: Automatic)   Pulse (!) 52   Temp 36.4 °C (97.5 °F) (Axillary)   Resp 16   Ht 5' 11" (1.803 m)   Wt 92.1 kg (203 lb)   SpO2 98%   BMI 28.31 kg/m²       Post vital signs: stable    Level of consciousness: awake    Nausea/Vomiting: no nausea/no vomiting    Complications: none    Airway Patency: patent    Respiratory: unassisted    Cardiovascular: stable and blood pressure at baseline    Hydration: euvolemic  " Loni Crockett  (RN)  2017 12:52:21

## 2017-07-07 ENCOUNTER — OFFICE VISIT (OUTPATIENT)
Dept: OPHTHALMOLOGY | Facility: CLINIC | Age: 77
End: 2017-07-07
Payer: MEDICARE

## 2017-07-07 DIAGNOSIS — H25.12 NS (NUCLEAR SCLEROSIS), LEFT: ICD-10-CM

## 2017-07-07 DIAGNOSIS — H40.1132 PRIMARY OPEN ANGLE GLAUCOMA OF BOTH EYES, MODERATE STAGE: ICD-10-CM

## 2017-07-07 DIAGNOSIS — Z96.1 STATUS POST CATARACT EXTRACTION AND INSERTION OF INTRAOCULAR LENS, RIGHT: Primary | ICD-10-CM

## 2017-07-07 DIAGNOSIS — Z98.41 STATUS POST CATARACT EXTRACTION AND INSERTION OF INTRAOCULAR LENS, RIGHT: Primary | ICD-10-CM

## 2017-07-07 PROCEDURE — 99999 PR PBB SHADOW E&M-EST. PATIENT-LVL II: CPT | Mod: PBBFAC,,, | Performed by: OPHTHALMOLOGY

## 2017-07-07 PROCEDURE — 99024 POSTOP FOLLOW-UP VISIT: CPT | Mod: S$GLB,,, | Performed by: OPHTHALMOLOGY

## 2017-07-07 NOTE — PROGRESS NOTES
HPI     Post-op Evaluation    Additional comments: 1 day po chk           Comments   Patient states her right eye is feeling well today. No pain. No discharge.         Last edited by Alfred Camacho on 7/7/2017  8:14 AM. (History)            Assessment /Plan     For exam results, see Encounter Report.    Status post cataract extraction and insertion of intraocular lens, right    NS (nuclear sclerosis), left    Primary open angle glaucoma of both eyes, moderate stage          Stable Glaucoma & Patient near target IOP range.  Will continue with same medicines as patient is tolerating well with good adherence.    Thin CCT  453 // 481    ONH stable with 1/12/2016 & 2007 photos      HVF 1/5/2015 stable    Both eyes  Xal q HS --> Hold OD      NSC OS  CE PRN    Serous RPED/ARMD - now dry  With symmetry on autoflourences  Amsler chart routine with good understanding  Cont fu with Dr. Newell      Flashes / Floaters  RD precautions:  Discussed with patient symptoms of RD with increased flashes, floaters, decreasing vision.  Patient/Family to call and return immediately to clinic should the symptoms of RD occur.  patient voice good understanding.          Cataract right and left Eyes: Discussed options including observation, glasses & surgery with patient with good understanding. Patient wishes to proceed with:  observation Glasses Surgery Right then left         IOL choice:       AL   24.32 OD         24.44 OS     PCB00  119.3  23.0 -0.28  22.5 -0.18      Right eye CE IOL &  Goniotomy 7/6/2017  Malyugin Ring  + Flomax    Post-op cataract surgery right Eye, POD 1: Patient doing well, reviewed post-op instructions handout with limited activity & eye care instructions, eye drop therapy and endophthalmitis precautions. Will continue topical antibiotic eye drops 4x  a day and steroid eye drops 4x  a day in the surgery eye. The patient voice good understanding and will return as scheduled or sooner as needed.        Plan  RTC 1 week  for OD Complex CE IOL with Malyugin Ring  & goniotomy  RTC sooner PRN with good understanding

## 2017-07-07 NOTE — ANESTHESIA PREPROCEDURE EVALUATION
07/07/2017  Pedro Ponce is a 77 y.o., male.    Anesthesia Evaluation    I have reviewed the Patient Summary Reports.     I have reviewed the Medications.     Review of Systems  Anesthesia Hx:  History of prior surgery of interest to airway management or planning:  Denies Personal Hx of Anesthesia complications.   Social:  Non-Smoker, No Alcohol Use    Hematology/Oncology:  Hematology Normal   Oncology Normal     EENT/Dental:EENT/Dental Normal   Cardiovascular:   Hypertension, well controlled    Pulmonary:  Pulmonary Normal    Renal/:  Renal/ Normal     Hepatic/GI:  Hepatic/GI Normal    Musculoskeletal:  Musculoskeletal Normal    Neurological:  Neurology Normal    Endocrine:   Diabetes, well controlled, type 2    Dermatological:  Skin Normal    Psych:  Psychiatric Normal           Physical Exam  General:  Well nourished    Airway/Jaw/Neck:  Airway Findings: Mouth Opening: Normal Tongue: Normal  General Airway Assessment: Adult, Good  Mallampati: III  TM Distance: Normal, at least 6 cm     Eyes/Ears/Nose:  EYES/EARS/NOSE FINDINGS: Normal   Dental:  Dental Findings: In tact   Chest/Lungs:  Chest/Lungs Findings: Clear to auscultation, Normal Respiratory Rate     Heart/Vascular:  Heart Findings: Rate: Normal  Rhythm: Regular Rhythm  Sounds: Normal  Heart murmur: negative       Mental Status:  Mental Status Findings:  Cooperative, Alert and Oriented         Anesthesia Plan  Type of Anesthesia, risks & benefits discussed:  Anesthesia Type:  MAC  Patient's Preference:   Intra-op Monitoring Plan:   Intra-op Monitoring Plan Comments:   Post Op Pain Control Plan:   Post Op Pain Control Plan Comments:   Induction:   IV  Beta Blocker:  Patient is on a Beta-Blocker and has received one dose within the past 24 hours (No further documentation required).       Informed Consent: Patient understands risks and agrees  with Anesthesia plan.  Questions answered. Anesthesia consent signed with patient.  ASA Score: 2     Day of Surgery Review of History & Physical:    H&P update referred to the surgeon.     Anesthesia Plan Notes:   77M obese, HTN, DM2, cataract for phaco/IOL under local and mild sedation        Ready For Surgery From Anesthesia Perspective.

## 2017-07-14 ENCOUNTER — OFFICE VISIT (OUTPATIENT)
Dept: OPHTHALMOLOGY | Facility: CLINIC | Age: 77
End: 2017-07-14
Payer: MEDICARE

## 2017-07-14 DIAGNOSIS — H25.12 NUCLEAR SCLEROSIS OF LEFT EYE: ICD-10-CM

## 2017-07-14 DIAGNOSIS — H40.1132 PRIMARY OPEN ANGLE GLAUCOMA OF BOTH EYES, MODERATE STAGE: ICD-10-CM

## 2017-07-14 DIAGNOSIS — Z96.1 STATUS POST CATARACT EXTRACTION AND INSERTION OF INTRAOCULAR LENS OF RIGHT EYE: Primary | ICD-10-CM

## 2017-07-14 DIAGNOSIS — H35.723 RETINAL PIGMENT EPITHELIAL DETACHMENT, BILATERAL: ICD-10-CM

## 2017-07-14 DIAGNOSIS — Z98.41 STATUS POST CATARACT EXTRACTION AND INSERTION OF INTRAOCULAR LENS OF RIGHT EYE: Primary | ICD-10-CM

## 2017-07-14 DIAGNOSIS — H35.3210 EXUDATIVE AGE-RELATED MACULAR DEGENERATION OF RIGHT EYE: ICD-10-CM

## 2017-07-14 PROCEDURE — 99999 PR PBB SHADOW E&M-EST. PATIENT-LVL II: CPT | Mod: PBBFAC,,, | Performed by: OPHTHALMOLOGY

## 2017-07-14 PROCEDURE — 99024 POSTOP FOLLOW-UP VISIT: CPT | Mod: S$GLB,,, | Performed by: OPHTHALMOLOGY

## 2017-07-14 NOTE — PROGRESS NOTES
HPI     Post-op Evaluation    Additional comments: 1 wk po chk           Comments   Patient states he feels like his va is becoming better in OD since his   last visit.    Glaucoma   PCIOL OD  NSC OS   Wet AMD  Avastin X 8 OD    Latanoprost Q HS OS  PF QID OD  Vigamox QID OD             Last edited by Alfred Camacho on 7/14/2017  8:34 AM. (History)            Assessment /Plan     For exam results, see Encounter Report.    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  Will continue with same medicines as patient is tolerating well with good adherence.    Thin CCT  453 // 481    ONH stable with 1/12/2016 & 2007 photos      HVF 1/5/2015 stable    Both eyes  Xal q HS --> Hold OD      NSC OS  CE PRN  Glare issues      Serous RPED/ARMD - now dry  With symmetry on autoflourences  Amsler chart routine with good understanding  Cont fu with Dr. Newell      Flashes / Floaters  RD precautions:  Discussed with patient symptoms of RD with increased flashes, floaters, decreasing vision.  Patient/Family to call and return immediately to clinic should the symptoms of RD occur.  patient voice good understanding.          Cataract right and left Eyes: Discussed options including observation, glasses & surgery with patient with good understanding. Patient wishes to proceed with:  observation Glasses Surgery Right then left         IOL choice:       AL   24.32 OD         24.44 OS     PCB00  119.3  23.0 -0.28  22.5 -0.18      Right eye CE IOL &  Goniotomy 7/6/2017  Malyugin Ring  + Flomax    Post-op cataract surgery right Eye, POW 1: Patient doing well, reviewed post-op instructions handout with limited activity & eye care instructions, eye drop  therapy and endophthalmitis precautions.  The patient voice good understanding and will return as scheduled or sooner as needed.    Vig QID --> finish  PF 1% --> slow taper    Xal q day --> hold OD      Plan  RTC 1 month for post-OD Complex CE IOL with Malyugin Ring  & goniotomy --> plan similar OS  RTC sooner PRN with good understanding

## 2017-07-17 ENCOUNTER — TELEPHONE (OUTPATIENT)
Dept: OPHTHALMOLOGY | Facility: CLINIC | Age: 77
End: 2017-07-17

## 2017-07-25 RX ORDER — DUTASTERIDE 0.5 MG/1
CAPSULE, LIQUID FILLED ORAL
Qty: 90 CAPSULE | Refills: 2 | Status: SHIPPED | OUTPATIENT
Start: 2017-07-25 | End: 2018-04-16 | Stop reason: SDUPTHER

## 2017-07-25 RX ORDER — IRBESARTAN 300 MG/1
TABLET ORAL
Qty: 90 TABLET | Refills: 2 | Status: SHIPPED | OUTPATIENT
Start: 2017-07-25 | End: 2018-04-16 | Stop reason: SDUPTHER

## 2017-07-26 RX ORDER — AMLODIPINE BESYLATE AND ATORVASTATIN CALCIUM 10; 10 MG/1; MG/1
TABLET, FILM COATED ORAL
Qty: 90 TABLET | Refills: 0 | Status: SHIPPED | OUTPATIENT
Start: 2017-07-26 | End: 2017-10-22 | Stop reason: SDUPTHER

## 2017-08-04 ENCOUNTER — TELEPHONE (OUTPATIENT)
Dept: OPHTHALMOLOGY | Facility: CLINIC | Age: 77
End: 2017-08-04

## 2017-08-04 ENCOUNTER — OFFICE VISIT (OUTPATIENT)
Dept: OPHTHALMOLOGY | Facility: CLINIC | Age: 77
End: 2017-08-04
Payer: MEDICARE

## 2017-08-04 DIAGNOSIS — H25.12 NUCLEAR SCLEROTIC CATARACT OF LEFT EYE: Primary | ICD-10-CM

## 2017-08-04 DIAGNOSIS — Z96.1 STATUS POST CATARACT EXTRACTION AND INSERTION OF INTRAOCULAR LENS OF RIGHT EYE: ICD-10-CM

## 2017-08-04 DIAGNOSIS — Z98.41 STATUS POST CATARACT EXTRACTION AND INSERTION OF INTRAOCULAR LENS OF RIGHT EYE: ICD-10-CM

## 2017-08-04 DIAGNOSIS — H25.12 NUCLEAR SCLEROSIS OF LEFT EYE: Primary | ICD-10-CM

## 2017-08-04 DIAGNOSIS — H35.3210 EXUDATIVE AGE-RELATED MACULAR DEGENERATION OF RIGHT EYE: ICD-10-CM

## 2017-08-04 DIAGNOSIS — H40.1132 PRIMARY OPEN ANGLE GLAUCOMA OF BOTH EYES, MODERATE STAGE: ICD-10-CM

## 2017-08-04 PROCEDURE — 99024 POSTOP FOLLOW-UP VISIT: CPT | Mod: S$GLB,,, | Performed by: OPHTHALMOLOGY

## 2017-08-04 PROCEDURE — 92136 OPHTHALMIC BIOMETRY: CPT | Mod: 26,LT,S$GLB, | Performed by: OPHTHALMOLOGY

## 2017-08-04 PROCEDURE — 99999 PR PBB SHADOW E&M-EST. PATIENT-LVL II: CPT | Mod: PBBFAC,,, | Performed by: OPHTHALMOLOGY

## 2017-08-04 RX ORDER — PREDNISOLONE ACETATE 10 MG/ML
SUSPENSION/ DROPS OPHTHALMIC
Status: ON HOLD | COMMUNITY
Start: 2017-07-17 | End: 2018-04-09 | Stop reason: HOSPADM

## 2017-08-04 NOTE — PROGRESS NOTES
HPI     Post-op Evaluation    Additional comments: 1 mon mrx chk DFE OD           Comments   Patient states he feels like his va is becoming better in OD since his   last visit.    Glaucoma   PCIOL OD  NSC OS   Wet AMD  Avastin X 8 OD    Latanoprost Q HS OS  PF BID OD               Last edited by Alfred Camacho on 8/4/2017 10:10 AM. (History)            Assessment /Plan     For exam results, see Encounter Report.    Nuclear sclerosis of left eye  -     IOL Master - OS - Left Eye    Status post cataract extraction and insertion of intraocular lens of right eye    Primary open angle glaucoma of both eyes, moderate stage    Exudative age-related macular degeneration of right eye        Stable Glaucoma & Patient near target IOP range.  Will continue with same medicines as patient is tolerating well with good adherence.    Thin CCT  453 // 481    ONH stable with 1/12/2016 & 2007 photos      HVF 1/5/2015 stable    Both eyes  Xal q HS --> Hold OD      NSC OS  CE PRN  Glare issues      Serous RPED/ARMD - now dry  With symmetry on autoflourences  Amsler chart routine with good understanding  Cont fu with Dr. Newell      Flashes / Floaters  RD precautions:  Discussed with patient symptoms of RD with increased flashes, floaters, decreasing vision.  Patient/Family to call and return immediately to clinic should the symptoms of RD occur.  patient voice good understanding.          Cataract right and left Eyes: Discussed options including observation, glasses & surgery with patient with good understanding. Patient wishes to proceed with:  observation Glasses Surgery Right then left         IOL choice:       AL   24.32 OD         24.44 OS     PCB00  119.3  23.0 -0.28  22.5 -0.18      Right eye CE IOL &  Goniotomy 7/6/2017  Malyugin Ring  + Flomax    Post-op cataract surgery right Eye, POM 1: Patient doing well, reviewed post-op instructions handout with limited activity & eye care instructions, eye drop therapy and endophthalmitis  precautions.  The patient voice good understanding and will return as scheduled or sooner as needed.    PF 1% BID --> taper off    Xal q day --> Holding        Left eye  Cataract Surgery & Goniotomy Consent: Patient with a visually significant cataract with difficulties of ADLs, reading, driving, night vision, glare.  Discussed with patient options, risks and benefits, expectations of cataract surgery with questions and answers to facilitate discussion.  Discussed lens options and patient understands that glasses may be required for optimal vision for distance and/or near vision after cataract surgery.  The patient  voice good understanding and patient wishes to proceed with surgery.  The patient will likely benefit from surgery and patient signed consent for Left Eye.    Possible Goniotomy OS with CE IOL as discussed  + Flomax  Used Malyugin ring & goniotomy Right eye    Plan  RTC OS  Complex CE IOL with Malyugin Ring  & goniotomy  RTC sooner PRN with good understanding

## 2017-08-04 NOTE — Clinical Note
Did well with complex cataract sx & goniotomy Right eye Planning similar Left eye  Hussain --> may need to add goniotomy left eye ??

## 2017-08-23 ENCOUNTER — TELEPHONE (OUTPATIENT)
Dept: OPHTHALMOLOGY | Facility: CLINIC | Age: 77
End: 2017-08-23

## 2017-08-24 ENCOUNTER — HOSPITAL ENCOUNTER (OUTPATIENT)
Facility: HOSPITAL | Age: 77
Discharge: HOME | End: 2017-08-24
Attending: OPHTHALMOLOGY | Admitting: OPHTHALMOLOGY
Payer: MEDICARE

## 2017-08-24 ENCOUNTER — ANESTHESIA EVENT (OUTPATIENT)
Dept: SURGERY | Facility: HOSPITAL | Age: 77
End: 2017-08-24
Payer: MEDICARE

## 2017-08-24 ENCOUNTER — SURGERY (OUTPATIENT)
Age: 77
End: 2017-08-24

## 2017-08-24 ENCOUNTER — ANESTHESIA (OUTPATIENT)
Dept: SURGERY | Facility: HOSPITAL | Age: 77
End: 2017-08-24
Payer: MEDICARE

## 2017-08-24 VITALS
HEIGHT: 71 IN | SYSTOLIC BLOOD PRESSURE: 141 MMHG | WEIGHT: 207 LBS | TEMPERATURE: 98 F | BODY MASS INDEX: 28.98 KG/M2 | DIASTOLIC BLOOD PRESSURE: 71 MMHG | RESPIRATION RATE: 18 BRPM | OXYGEN SATURATION: 100 % | HEART RATE: 58 BPM

## 2017-08-24 DIAGNOSIS — H25.10 SENILE NUCLEAR SCLEROSIS: ICD-10-CM

## 2017-08-24 DIAGNOSIS — H25.12 NUCLEAR SCLEROSIS OF LEFT EYE: Primary | ICD-10-CM

## 2017-08-24 PROBLEM — Z98.49 STATUS POST CATARACT EXTRACTION AND INSERTION OF INTRAOCULAR LENS: Status: ACTIVE | Noted: 2017-07-07

## 2017-08-24 LAB
POCT GLUCOSE: 96 MG/DL (ref 70–110)
POCT GLUCOSE: 97 MG/DL (ref 70–110)

## 2017-08-24 PROCEDURE — 37000008 HC ANESTHESIA 1ST 15 MINUTES: Performed by: OPHTHALMOLOGY

## 2017-08-24 PROCEDURE — V2632 POST CHMBR INTRAOCULAR LENS: HCPCS | Performed by: OPHTHALMOLOGY

## 2017-08-24 PROCEDURE — D9220A PRA ANESTHESIA: Mod: CRNA,,, | Performed by: NURSE ANESTHETIST, CERTIFIED REGISTERED

## 2017-08-24 PROCEDURE — 71000015 HC POSTOP RECOV 1ST HR: Performed by: OPHTHALMOLOGY

## 2017-08-24 PROCEDURE — 36000706: Performed by: OPHTHALMOLOGY

## 2017-08-24 PROCEDURE — D9220A PRA ANESTHESIA: Mod: ANES,,, | Performed by: ANESTHESIOLOGY

## 2017-08-24 PROCEDURE — 63600175 PHARM REV CODE 636 W HCPCS: Performed by: NURSE ANESTHETIST, CERTIFIED REGISTERED

## 2017-08-24 PROCEDURE — 27201423 OPTIME MED/SURG SUP & DEVICES STERILE SUPPLY: Performed by: OPHTHALMOLOGY

## 2017-08-24 PROCEDURE — 37000009 HC ANESTHESIA EA ADD 15 MINS: Performed by: OPHTHALMOLOGY

## 2017-08-24 PROCEDURE — 66982 XCAPSL CTRC RMVL CPLX WO ECP: CPT | Mod: 79,LT,, | Performed by: OPHTHALMOLOGY

## 2017-08-24 PROCEDURE — 36000707: Performed by: OPHTHALMOLOGY

## 2017-08-24 PROCEDURE — 82962 GLUCOSE BLOOD TEST: CPT | Mod: 91 | Performed by: OPHTHALMOLOGY

## 2017-08-24 PROCEDURE — 82962 GLUCOSE BLOOD TEST: CPT | Performed by: OPHTHALMOLOGY

## 2017-08-24 PROCEDURE — 25000003 PHARM REV CODE 250: Performed by: OPHTHALMOLOGY

## 2017-08-24 PROCEDURE — 25000003 PHARM REV CODE 250

## 2017-08-24 PROCEDURE — 25000003 PHARM REV CODE 250: Performed by: ANESTHESIOLOGY

## 2017-08-24 DEVICE — LENS IOL ITEC PRELOAD 25.5D: Type: IMPLANTABLE DEVICE | Site: EYE | Status: FUNCTIONAL

## 2017-08-24 RX ORDER — TROPICAMIDE 10 MG/ML
SOLUTION/ DROPS OPHTHALMIC
Status: COMPLETED
Start: 2017-08-24 | End: 2017-08-24

## 2017-08-24 RX ORDER — MOXIFLOXACIN 5 MG/ML
SOLUTION/ DROPS OPHTHALMIC
Status: DISCONTINUED | OUTPATIENT
Start: 2017-08-24 | End: 2017-08-24 | Stop reason: HOSPADM

## 2017-08-24 RX ORDER — LIDOCAINE HYDROCHLORIDE 10 MG/ML
INJECTION INFILTRATION; PERINEURAL
Status: DISCONTINUED
Start: 2017-08-24 | End: 2017-08-24 | Stop reason: WASHOUT

## 2017-08-24 RX ORDER — PREDNISOLONE ACETATE 10 MG/ML
SUSPENSION/ DROPS OPHTHALMIC
Status: DISCONTINUED | OUTPATIENT
Start: 2017-08-24 | End: 2017-08-24 | Stop reason: HOSPADM

## 2017-08-24 RX ORDER — CYCLOPENTOLATE HYDROCHLORIDE 10 MG/ML
SOLUTION/ DROPS OPHTHALMIC
Status: COMPLETED
Start: 2017-08-24 | End: 2017-08-24

## 2017-08-24 RX ORDER — MOXIFLOXACIN 5 MG/ML
SOLUTION/ DROPS OPHTHALMIC
Status: DISCONTINUED
Start: 2017-08-24 | End: 2017-08-24 | Stop reason: HOSPADM

## 2017-08-24 RX ORDER — LIDOCAINE HYDROCHLORIDE 40 MG/ML
1 INJECTION, SOLUTION RETROBULBAR
Status: ACTIVE | OUTPATIENT
Start: 2017-08-24 | End: 2017-08-24

## 2017-08-24 RX ORDER — EPINEPHRINE 1 MG/ML
INJECTION, SOLUTION INTRACARDIAC; INTRAMUSCULAR; INTRAVENOUS; SUBCUTANEOUS
Status: DISCONTINUED
Start: 2017-08-24 | End: 2017-08-24 | Stop reason: HOSPADM

## 2017-08-24 RX ORDER — PREDNISOLONE ACETATE 10 MG/ML
SUSPENSION/ DROPS OPHTHALMIC
Status: DISCONTINUED
Start: 2017-08-24 | End: 2017-08-24 | Stop reason: HOSPADM

## 2017-08-24 RX ORDER — CYCLOPENTOLATE HYDROCHLORIDE 10 MG/ML
1 SOLUTION/ DROPS OPHTHALMIC
Status: COMPLETED | OUTPATIENT
Start: 2017-08-24 | End: 2017-08-24

## 2017-08-24 RX ORDER — SODIUM CHLORIDE 9 MG/ML
INJECTION, SOLUTION INTRAVENOUS CONTINUOUS
Status: DISCONTINUED | OUTPATIENT
Start: 2017-08-24 | End: 2017-08-24 | Stop reason: HOSPADM

## 2017-08-24 RX ORDER — LIDOCAINE HYDROCHLORIDE 10 MG/ML
INJECTION, SOLUTION EPIDURAL; INFILTRATION; INTRACAUDAL; PERINEURAL
Status: DISCONTINUED
Start: 2017-08-24 | End: 2017-08-24 | Stop reason: HOSPADM

## 2017-08-24 RX ORDER — LIDOCAINE HYDROCHLORIDE 40 MG/ML
INJECTION, SOLUTION RETROBULBAR
Status: DISCONTINUED | OUTPATIENT
Start: 2017-08-24 | End: 2017-08-24 | Stop reason: HOSPADM

## 2017-08-24 RX ORDER — LIDOCAINE HYDROCHLORIDE 40 MG/ML
1 INJECTION, SOLUTION RETROBULBAR
Status: DISCONTINUED | OUTPATIENT
Start: 2017-08-24 | End: 2017-08-24 | Stop reason: HOSPADM

## 2017-08-24 RX ORDER — FENTANYL CITRATE 50 UG/ML
INJECTION, SOLUTION INTRAMUSCULAR; INTRAVENOUS
Status: DISCONTINUED | OUTPATIENT
Start: 2017-08-24 | End: 2017-08-24

## 2017-08-24 RX ORDER — PHENYLEPHRINE HYDROCHLORIDE 25 MG/ML
SOLUTION/ DROPS OPHTHALMIC
Status: COMPLETED
Start: 2017-08-24 | End: 2017-08-24

## 2017-08-24 RX ORDER — ACETAMINOPHEN 325 MG/1
650 TABLET ORAL EVERY 4 HOURS PRN
Status: DISCONTINUED | OUTPATIENT
Start: 2017-08-24 | End: 2017-08-24 | Stop reason: HOSPADM

## 2017-08-24 RX ORDER — TROPICAMIDE 10 MG/ML
1 SOLUTION/ DROPS OPHTHALMIC
Status: COMPLETED | OUTPATIENT
Start: 2017-08-24 | End: 2017-08-24

## 2017-08-24 RX ORDER — LIDOCAINE HYDROCHLORIDE 40 MG/ML
INJECTION, SOLUTION RETROBULBAR
Status: DISCONTINUED
Start: 2017-08-24 | End: 2017-08-24 | Stop reason: HOSPADM

## 2017-08-24 RX ORDER — MIDAZOLAM HYDROCHLORIDE 1 MG/ML
INJECTION, SOLUTION INTRAMUSCULAR; INTRAVENOUS
Status: DISCONTINUED | OUTPATIENT
Start: 2017-08-24 | End: 2017-08-24

## 2017-08-24 RX ORDER — LIDOCAINE HYDROCHLORIDE 10 MG/ML
INJECTION, SOLUTION EPIDURAL; INFILTRATION; INTRACAUDAL; PERINEURAL
Status: DISCONTINUED | OUTPATIENT
Start: 2017-08-24 | End: 2017-08-24 | Stop reason: HOSPADM

## 2017-08-24 RX ORDER — PHENYLEPHRINE HYDROCHLORIDE 25 MG/ML
1 SOLUTION/ DROPS OPHTHALMIC
Status: COMPLETED | OUTPATIENT
Start: 2017-08-24 | End: 2017-08-24

## 2017-08-24 RX ADMIN — CYCLOPENTOLATE HYDROCHLORIDE 1 DROP: 10 SOLUTION/ DROPS OPHTHALMIC at 06:08

## 2017-08-24 RX ADMIN — MIDAZOLAM HYDROCHLORIDE 1 MG: 1 INJECTION, SOLUTION INTRAMUSCULAR; INTRAVENOUS at 08:08

## 2017-08-24 RX ADMIN — MOXIFLOXACIN HYDROCHLORIDE 2 DROP: 5 SOLUTION/ DROPS OPHTHALMIC at 08:08

## 2017-08-24 RX ADMIN — LIDOCAINE HYDROCHLORIDE 5 ML: 40 INJECTION, SOLUTION RETROBULBAR; TOPICAL at 08:08

## 2017-08-24 RX ADMIN — PHENYLEPHRINE HYDROCHLORIDE 1 DROP: 25 SOLUTION/ DROPS OPHTHALMIC at 07:08

## 2017-08-24 RX ADMIN — PREDNISOLONE ACETATE 2 DROP: 10 SUSPENSION/ DROPS OPHTHALMIC at 08:08

## 2017-08-24 RX ADMIN — FENTANYL CITRATE 50 MCG: 50 INJECTION, SOLUTION INTRAMUSCULAR; INTRAVENOUS at 09:08

## 2017-08-24 RX ADMIN — SODIUM CHLORIDE: 0.9 INJECTION, SOLUTION INTRAVENOUS at 07:08

## 2017-08-24 RX ADMIN — TROPICAMIDE 1 DROP: 10 SOLUTION/ DROPS OPHTHALMIC at 06:08

## 2017-08-24 RX ADMIN — TROPICAMIDE 1 DROP: 10 SOLUTION/ DROPS OPHTHALMIC at 07:08

## 2017-08-24 RX ADMIN — FENTANYL CITRATE 25 MCG: 50 INJECTION, SOLUTION INTRAMUSCULAR; INTRAVENOUS at 08:08

## 2017-08-24 RX ADMIN — SODIUM CHONDROITIN SULFATE / SODIUM HYALURONATE 1.05 ML: 0.55-0.5 INJECTION INTRAOCULAR at 08:08

## 2017-08-24 RX ADMIN — CYCLOPENTOLATE HYDROCHLORIDE 1 DROP: 10 SOLUTION/ DROPS OPHTHALMIC at 07:08

## 2017-08-24 RX ADMIN — PHENYLEPHRINE HYDROCHLORIDE 1 DROP: 25 SOLUTION/ DROPS OPHTHALMIC at 06:08

## 2017-08-24 RX ADMIN — LIDOCAINE HYDROCHLORIDE 5 ML: 10 INJECTION, SOLUTION EPIDURAL; INFILTRATION; INTRACAUDAL; PERINEURAL at 08:08

## 2017-08-24 NOTE — TRANSFER OF CARE
"Anesthesia Transfer of Care Note    Patient: Pedro Ponce    Procedure(s) Performed: Procedure(s) (LRB):  PHACOEMULSIFICATION-ASPIRATION-CATARACT (Left)  INSERTION-INTRAOCULAR LENS (IOL) (Left)    Patient location: [facility]    Anesthesia Type: MAC    Transport from OR: Transported from OR on room air with adequate spontaneous ventilation    Post pain: adequate analgesia    Post assessment: no apparent anesthetic complications and tolerated procedure well    Post vital signs: stable    Level of consciousness: awake, alert and oriented    Nausea/Vomiting: no nausea/vomiting    Complications: none    Transfer of care protocol was followed      Last vitals:   Visit Vitals  BP (!) 154/90 (BP Location: Left arm, Patient Position: Lying)   Pulse 72   Temp 36.4 °C (97.5 °F) (Oral)   Resp 18   Ht 5' 11" (1.803 m)   Wt 93.9 kg (207 lb)   SpO2 100%   BMI 28.87 kg/m²     "

## 2017-08-24 NOTE — OP NOTE
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    ESTIMATED BLOOD LOSS: Minimal    INDICATIONS FOR PROCEDURE:   The patient is a pleasant 77 year old gentleman with increasing difficulties with activities of daily living secondary to a dense visually significant cataract in the Left eye.  Discussions have been carried out with this patient concerning the options to surgery, risks, benefits and expectations.  The patient voiced good understanding and wished to proceed with the above procedure.    PROCEDURE IN DETAIL: The patient was brought to the operating room and received topical anesthetic to the eye and then was prepped and draped in the usual sterile fashion.  The Left eye was first entered at the 4:30 oclock paracentesis site, followed by intracameral lidocaine and Viscoat material.  The eye was then reentered at the primary surgical site followed by placement of a 6.25 Malyugin ring, continuous capsulotomy, hydrodissection and phacoemulsification of the cataract.  Residual cortical material was removed using automated irrigation-aspiration technique.  Healon was injected into the posterior chamber and an JT PCB00 22.5 diopter lens was placed in the bag without difficulty.  The Malyugin ring was removed and residual Healon was removed using automated irrigation-aspiration technique.  The eye was  re-pressurized using BSS solution and both the paracentesis site and the primary surgical site were demonstrated to be watertight at the end of the case with Weck--Yenifer manipulation.  A collagen shield soaked in Vigamox and Pred Forte eye drops was placed on the cornea.  The eye was closed, patched and a Vasquez shield placed. The patient was taken to the recovery room in good and stable condition.  The patient tolerated the procedure well.  The patient was instructed to refrain from any heavy lifting, bending, stooping or straining activities, discharged home and to follow-up in the morning for routine postoperative care with Dr. Roberto Hidalgo.

## 2017-08-24 NOTE — ANESTHESIA POSTPROCEDURE EVALUATION
"Anesthesia Post Evaluation    Patient: Pedro Ponce    Procedure(s) Performed: Procedure(s) (LRB):  PHACOEMULSIFICATION-ASPIRATION-CATARACT (Left)  INSERTION-INTRAOCULAR LENS (IOL) (Left)    Final Anesthesia Type: general  Patient location during evaluation: PACU  Patient participation: Yes- Able to Participate  Level of consciousness: awake  Post-procedure vital signs: reviewed and stable  Pain management: adequate  Airway patency: patent  PONV status at discharge: No PONV  Anesthetic complications: no      Cardiovascular status: stable  Respiratory status: unassisted  Hydration status: euvolemic  Follow-up not needed.        Visit Vitals  BP (!) 141/71   Pulse (!) 58   Temp 36.6 °C (97.9 °F) (Temporal)   Resp 18   Ht 5' 11" (1.803 m)   Wt 93.9 kg (207 lb)   SpO2 100%   BMI 28.87 kg/m²       Pain/You Score: Pain Assessment Performed: Yes (8/24/2017  9:52 AM)  Presence of Pain: denies (8/24/2017  9:52 AM)  You Score: 10 (8/24/2017  9:52 AM)      "

## 2017-08-24 NOTE — PROGRESS NOTES
Assessment /Plan     For exam results, see Encounter Report.    Status post cataract extraction and insertion of intraocular lens, unspecified laterality    Primary open angle glaucoma of both eyes, moderate stage          Stable Glaucoma & Patient near target IOP range.  Will continue with same medicines as patient is tolerating well with good adherence.    Thin CCT  453 // 481    ONH stable with 1/12/2016 & 2007 photos      HVF 1/5/2015 stable    Both eyes  Xal q HS --> Hold OD      NSC OS  CE PRN  Glare issues      Serous RPED/ARMD - now dry  With symmetry on autoflourences  Amsler chart routine with good understanding  Cont fu with Dr. Newell      Flashes / Floaters  RD precautions:  Discussed with patient symptoms of RD with increased flashes, floaters, decreasing vision.  Patient/Family to call and return immediately to clinic should the symptoms of RD occur.  patient voice good understanding.          Cataract right and left Eyes: Discussed options including observation, glasses & surgery with patient with good understanding. Patient wishes to proceed with:  observation Glasses Surgery Right then left         IOL choice:       AL   24.32 OD         24.44 OS     PCB00  119.3  23.0 -0.28  22.5 -0.18      Right eye CE IOL &  Goniotomy 7/6/2017  Malyugin Ring  + Flomax    Xal q day --> Holding      Left eye CE IOL without Goniotomy 8/23/2017 --> temporal k-edema  Malyugin Ring  + Flomax      Post-op cataract surgery left Eye, POD 1: Patient doing well, reviewed post-op instructions handout with limited activity & eye care instructions, eye drop therapy and endophthalmitis precautions. Will continue topical antibiotic eye drops 4x  a day and steroid eye drops 4x  a day in the surgery eye. The patient voice good understanding and will return as scheduled or sooner as needed.    Vig QID  PF 1% QID  Xal q day --> Holding      Plan  RTC 1 week post Complex CE IOL with Malyugin Ring  Without goniotomy  RTC  sooner PRN with good understanding

## 2017-08-24 NOTE — DISCHARGE SUMMARY
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    ESTIMATED BLOOD LOSS: Minimal    INDICATIONS FOR PROCEDURE:   The patient is a pleasant 77 year old gentleman with increasing difficulties with activities of daily living secondary to a dense visually significant cataract in the Left eye.  Discussions have been carried out with this patient concerning the options to surgery, risks, benefits and expectations.  The patient voiced good understanding and wished to proceed with the above procedure.    PROCEDURE IN DETAIL: The patient was brought to the operating room and received topical anesthetic to the eye and then was prepped and draped in the usual sterile fashion.  The Left eye was first entered at the 4:30 oclock paracentesis site, followed by intracameral lidocaine and Viscoat material.  The eye was then reentered at the primary surgical site followed by placement of a 6.25 Malyugin ring, continuous capsulotomy, hydrodissection and phacoemulsification of the cataract.  Residual cortical material was removed using automated irrigation-aspiration technique.  Healon was injected into the posterior chamber and an JT PCB00 22.5 diopter lens was placed in the bag without difficulty.  The Malyugin ring was removed and residual Healon was removed using automated irrigation-aspiration technique.  The eye was  re-pressurized using BSS solution and both the paracentesis site and the primary surgical site were demonstrated to be watertight at the end of the case with Weck--Yenifer manipulation.  A collagen shield soaked in Vigamox and Pred Forte eye drops was placed on the cornea.  The eye was closed, patched and a Vasquez shield placed. The patient was taken to the recovery room in good and stable condition.  The patient tolerated the procedure well.  The patient was instructed to refrain from any heavy lifting, bending, stooping or straining activities, discharged home and to follow-up in the morning for routine postoperative care with Dr. Roberto iHdalgo.

## 2017-08-24 NOTE — ANESTHESIA PREPROCEDURE EVALUATION
08/24/2017  Pedro Ponce is a 77 y.o., male here for PHACO.    Anesthesia Evaluation    I have reviewed the Patient Summary Reports.    I have reviewed the Nursing Notes.   I have reviewed the Medications.     Review of Systems  Anesthesia Hx:  No problems with previous Anesthesia    Cardiovascular:   Hypertension    Renal/:   Chronic Renal Disease    Endocrine:   Diabetes        Physical Exam  General:  Obesity, Well nourished    Airway/Jaw/Neck:  Airway Findings: Mouth Opening: Normal Tongue: Normal  General Airway Assessment: Adult  Mallampati: II  Improves to II with phonation.  TM Distance: Normal, at least 6 cm      Dental:  Dental Findings: In tact   Chest/Lungs:  Chest/Lungs Findings: Clear to auscultation     Heart/Vascular:  Heart Findings: Rate: Normal  Rhythm: Regular Rhythm  Sounds: Normal        Mental Status:  Mental Status Findings:  Cooperative, Alert and Oriented         Anesthesia Plan  Type of Anesthesia, risks & benefits discussed:  Anesthesia Type:  general, MAC  Patient's Preference:   Intra-op Monitoring Plan:   Intra-op Monitoring Plan Comments:   Post Op Pain Control Plan:   Post Op Pain Control Plan Comments:   Induction:   IV  Beta Blocker:  Patient is not currently on a Beta-Blocker (No further documentation required).       Informed Consent: Patient understands risks and agrees with Anesthesia plan.  Questions answered. Anesthesia consent signed with patient.  ASA Score: 2     Day of Surgery Review of History & Physical:    H&P update referred to the surgeon.         Ready For Surgery From Anesthesia Perspective.

## 2017-08-24 NOTE — ANESTHESIA RELEASE NOTE
Anesthesia Release from PACU Note    Patient name: Pedro Ponce    Procedure(s): Procedure(s) (LRB):  PHACOEMULSIFICATION-ASPIRATION-CATARACT (Left)  INSERTION-INTRAOCULAR LENS (IOL) (Left)    Anesthesia type: general    Post pain: adequate analgesia    Post assessment: no apparent complications    Last vitals:   Vitals:    08/24/17 0940   BP: (!) 141/71   Pulse: (!) 58   Resp: 18   Temp: 36.6 °C (97.9 °F)       Post vital signs: stable    Level of consciousness: alert     Nausea/Vomiting: no nausea/no vomiting    Complications: none    Airway Patency:  patent    Respiratory: unassisted    Cardiovascular: stable and blood pressure at baseline    Hydration: euvolemic

## 2017-08-24 NOTE — DISCHARGE INSTRUCTIONS
Discharge Instructions for Cataract Surgery  A surgeon removed the cloudy lens in your eye and replaced it with a clear man-made lens. Be sure to have an adult family member or friend drive you home after surgery. Heres what you can expect following surgery and tips for a healthy recovery.  It is normal to have the following:  · Bruised or bloodshot eye for 7 days  · Itching and mild discomfort for several days  · Some fluid discharge  · Sensitivity to light  · Scratchy, sandlike feeling in the eye for 2 weeks  · Feeling tired, especially during the first 24 hours  Activity level  · Do not drive for 2 days or as instructed by your doctor.  · Do not drink alcohol for at least 24 hours.  · Avoid bending at the waist to  objects or lifting anything heavy for 2 days.  · Relax for the first 24 hours after surgery. Watching TV and reading are OK and wont harm your eye.  Eye protection  · Do not rub or press on your eye.  · Sleep on your back or on your unoperated side for 2 nights.  · If instructed, wear a bandage over your eye for 2 days and 2 nights.  · If instructed, wear a shield to protect your eye for 2 days and 2 nights.  Using eyedrops  You may be given special eyedrops or ointment. Here is one way to use eyedrops:  · Tilt your head back.  · Pull your bottom eyelid down.  · Squeeze one drop into your eye. Do not touch your eye with the bottle tip.  · Close your eyes for a few seconds.  · If you need more than one drop, wait a few minutes before adding the next one.   Call your doctor right away if you have any of the following:  · Bleeding or discharge from the eye  · Your vision suddenly becomes worse  · Pain medicine you are told to take does not ease your pain  · Nausea or vomiting  · Chills or fever over 100.4°F (39.1°C)

## 2017-08-24 NOTE — H&P
History    Chief complaint:  Painless progressive vision loss right Eye    Present Ilness/Diagnosis: Nuclear sclerotic Cataract and OAG  right Eye    ROS: +Eyes, otherwise no significant changes    Past Medical History: refer to chart    Family History/Social History: refer to chart    Allergies:   Review of patient's allergies indicates:   Allergen Reactions    Pcn [penicillins] Rash       Current Medications: see medcard      Physical Exam    BP: Vital signs stable  General: No apparent distress  HEENT: cataract  Lungs: adequate respirations  Heart: + pulses  Abdomen: soft  Rectal/pelvic: deferred    Labs: Labs Reviewed    Lab Results   Component Value Date    WBC 5.36 05/04/2017    HGB 15.5 05/04/2017    HCT 46.4 05/04/2017    MCV 91 05/04/2017     05/04/2017       CMP  Sodium   Date Value Ref Range Status   06/01/2017 141 136 - 145 mmol/L Final     Potassium   Date Value Ref Range Status   06/01/2017 3.6 3.5 - 5.1 mmol/L Final     Chloride   Date Value Ref Range Status   06/01/2017 103 95 - 110 mmol/L Final     CO2   Date Value Ref Range Status   06/01/2017 30 (H) 23 - 29 mmol/L Final     Glucose   Date Value Ref Range Status   06/01/2017 120 (H) 70 - 110 mg/dL Final     BUN, Bld   Date Value Ref Range Status   06/01/2017 24 (H) 8 - 23 mg/dL Final     Creatinine   Date Value Ref Range Status   06/01/2017 1.3 0.5 - 1.4 mg/dL Final     Calcium   Date Value Ref Range Status   06/01/2017 10.3 8.7 - 10.5 mg/dL Final     Total Protein   Date Value Ref Range Status   05/04/2017 7.5 6.0 - 8.4 g/dL Final     Albumin   Date Value Ref Range Status   06/01/2017 3.7 3.5 - 5.2 g/dL Final     Total Bilirubin   Date Value Ref Range Status   05/04/2017 1.8 (H) 0.1 - 1.0 mg/dL Final     Comment:     For infants and newborns, interpretation of results should be based  on gestational age, weight and in agreement with clinical  observations.  Premature Infant recommended reference ranges:  Up to 24 hours.............<8.0  mg/dL  Up to 48 hours............<12.0 mg/dL  3-5 days..................<15.0 mg/dL  6-29 days.................<15.0 mg/dL       Alkaline Phosphatase   Date Value Ref Range Status   05/04/2017 75 55 - 135 U/L Final     AST   Date Value Ref Range Status   05/04/2017 27 10 - 40 U/L Final     ALT   Date Value Ref Range Status   05/04/2017 26 10 - 44 U/L Final     Anion Gap   Date Value Ref Range Status   06/01/2017 8 8 - 16 mmol/L Final     eGFR if    Date Value Ref Range Status   06/01/2017 >60.0 >60 mL/min/1.73 m^2 Final     eGFR if non    Date Value Ref Range Status   06/01/2017 52.3 (A) >60 mL/min/1.73 m^2 Final     Comment:     Calculation used to obtain the estimated glomerular filtration  rate (eGFR) is the CKD-EPI equation. Since race is unknown   in our information system, the eGFR values for   -American and Non--American patients are given   for each creatinine result.         Impression: Visually significant Cataract and OAG  right Eye    Plan: Complex Phacoemulsification with implantation of Intraocular lens with Malyugin Ring  and possible Goniotomy right Eye

## 2017-08-25 ENCOUNTER — OFFICE VISIT (OUTPATIENT)
Dept: OPHTHALMOLOGY | Facility: CLINIC | Age: 77
End: 2017-08-25
Payer: MEDICARE

## 2017-08-25 DIAGNOSIS — H40.1132 PRIMARY OPEN ANGLE GLAUCOMA OF BOTH EYES, MODERATE STAGE: ICD-10-CM

## 2017-08-25 DIAGNOSIS — Z98.49 STATUS POST CATARACT EXTRACTION AND INSERTION OF INTRAOCULAR LENS, UNSPECIFIED LATERALITY: Primary | ICD-10-CM

## 2017-08-25 DIAGNOSIS — Z96.1 STATUS POST CATARACT EXTRACTION AND INSERTION OF INTRAOCULAR LENS, UNSPECIFIED LATERALITY: Primary | ICD-10-CM

## 2017-08-25 PROCEDURE — 99999 PR PBB SHADOW E&M-EST. PATIENT-LVL III: CPT | Mod: PBBFAC,,, | Performed by: OPHTHALMOLOGY

## 2017-08-25 PROCEDURE — 99024 POSTOP FOLLOW-UP VISIT: CPT | Mod: S$GLB,,, | Performed by: OPHTHALMOLOGY

## 2017-08-30 ENCOUNTER — OFFICE VISIT (OUTPATIENT)
Dept: OPHTHALMOLOGY | Facility: CLINIC | Age: 77
End: 2017-08-30
Payer: MEDICARE

## 2017-08-30 DIAGNOSIS — Z98.49 STATUS POST CATARACT EXTRACTION AND INSERTION OF INTRAOCULAR LENS, UNSPECIFIED LATERALITY: Primary | ICD-10-CM

## 2017-08-30 DIAGNOSIS — H43.393 VITREOUS FLOATERS OF BOTH EYES: ICD-10-CM

## 2017-08-30 DIAGNOSIS — Z96.1 STATUS POST CATARACT EXTRACTION AND INSERTION OF INTRAOCULAR LENS, UNSPECIFIED LATERALITY: Primary | ICD-10-CM

## 2017-08-30 DIAGNOSIS — H40.1132 PRIMARY OPEN ANGLE GLAUCOMA OF BOTH EYES, MODERATE STAGE: ICD-10-CM

## 2017-08-30 PROCEDURE — 99999 PR PBB SHADOW E&M-EST. PATIENT-LVL II: CPT | Mod: PBBFAC,,, | Performed by: OPHTHALMOLOGY

## 2017-08-30 PROCEDURE — 99024 POSTOP FOLLOW-UP VISIT: CPT | Mod: S$GLB,,, | Performed by: OPHTHALMOLOGY

## 2017-08-30 NOTE — PROGRESS NOTES
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 (History)            Assessment /Plan     For exam results, see Encounter Report.    Status post cataract extraction and insertion of intraocular lens, unspecified laterality    Vitreous floaters of both eyes    Primary open angle glaucoma of both eyes, moderate stage          Stable Glaucoma & Patient near target IOP range.  Will continue with same medicines as patient is tolerating well with good adherence.    Thin CCT  453 // 481    ONH stable with 1/12/2016 & 2007 photos      HVF 1/5/2015 stable    Both eyes  Xal q HS --> Hold OU      Serous RPED/ARMD - now dry  With symmetry on autoflourences  [place] chart routine with good understanding  Cont fu with Dr. Newell      Flashes / Floaters  RD precautions:  Discussed with patient symptoms of RD with increased flashes, floaters, decreasing vision.  Patient/Family to call and return immediately to clinic should the symptoms of RD occur.  patient voice good understanding.        IOL choice:       AL   24.32 OD         24.44 OS     PCB00  119.3  23.0 -0.28  22.5 -0.18      Right eye CE IOL &  Goniotomy 7/6/2017  Malyugin Ring  + Flomax         Left eye CE IOL without Goniotomy 8/23/2017 --> temporal k-edema  Malyugin Ring  + Flomax      Post-op cataract surgery left Eye, POW 1: Patient doing well, reviewed post-op instructions handout with limited activity & eye care instructions, eye drop therapy and endophthalmitis precautions. The patient voice good understanding and will return as scheduled or sooner as needed.    Vig QID --> finish  PF 1% QID --> taper      Plan  RTC 1 month  post Complex CE IOL with Malyugin Ring  Without goniotomy  RTC sooner PRN with good understanding

## 2017-10-02 ENCOUNTER — OFFICE VISIT (OUTPATIENT)
Dept: OPHTHALMOLOGY | Facility: CLINIC | Age: 77
End: 2017-10-02
Payer: MEDICARE

## 2017-10-02 DIAGNOSIS — H35.3210 EXUDATIVE AGE-RELATED MACULAR DEGENERATION OF RIGHT EYE: ICD-10-CM

## 2017-10-02 DIAGNOSIS — Z98.49 STATUS POST CATARACT EXTRACTION AND INSERTION OF INTRAOCULAR LENS, UNSPECIFIED LATERALITY: Primary | ICD-10-CM

## 2017-10-02 DIAGNOSIS — H40.1132 PRIMARY OPEN ANGLE GLAUCOMA OF BOTH EYES, MODERATE STAGE: ICD-10-CM

## 2017-10-02 DIAGNOSIS — Z96.1 STATUS POST CATARACT EXTRACTION AND INSERTION OF INTRAOCULAR LENS, UNSPECIFIED LATERALITY: Primary | ICD-10-CM

## 2017-10-02 DIAGNOSIS — H35.723 RETINAL PIGMENT EPITHELIAL DETACHMENT OF BOTH EYES: ICD-10-CM

## 2017-10-02 PROCEDURE — 99999 PR PBB SHADOW E&M-EST. PATIENT-LVL II: CPT | Mod: PBBFAC,,, | Performed by: OPHTHALMOLOGY

## 2017-10-02 PROCEDURE — 99024 POSTOP FOLLOW-UP VISIT: CPT | Mod: S$GLB,,, | Performed by: OPHTHALMOLOGY

## 2017-10-02 NOTE — PROGRESS NOTES
HPI     1 month post op-  IOP/reftract/dfe OS      Complex Phacoemulsification of the cataract with posterior chamber   intraocular lens with Malyugin Ring with Floppy Iris syndrome 2/2 Flomax   use Left eye 08/24/2017    Complex Phacoemulsification of the cataract with posterior chamber   intraocular lens with Malyugin Ring with Floppy Iris syndrome 2/2 Flomax   use Right eye  With Goniotomy [place] Right eye 07/06/2017                                                                        Last edited by Narda De La Garza on 10/2/2017  8:45 AM. (History)            Assessment /Plan     For exam results, see Encounter Report.    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  Will continue with same medicines as patient is tolerating well with good adherence.    Thin CCT  453 // 481    ONH stable with 1/12/2016 & 2007 photos      HVF 1/5/2015 stable    Both eyes  Xal q HS --> Hold OU      Serous RPED/ARMD - now dry  With symmetry on autoflourences  [place]ler chart routine with good understanding  Cont fu with Dr. Newell      Flashes / Floaters  RD precautions:  Discussed with patient symptoms of RD with increased flashes, floaters, decreasing vision.  Patient/Family to call and return immediately to clinic should the symptoms of RD occur.  patient voice good understanding.        IOL choice:       AL   24.32 OD         24.44 OS     PCB00  119.3  23.0 -0.28  22.5 -0.18      Right eye CE IOL &  Goniotomy 7/6/2017  Malyugin Ring  + Flomax         Left eye CE IOL without Goniotomy 8/23/2017 --> temporal k-edema  Malyugin Ring  + Flomax      Post-op cataract surgery left Eye, POM 1: Patient doing well, reviewed post-op instructions handout with limited  activity & eye care instructions, eye drop therapy and endophthalmitis precautions. The patient voice good understanding and will return as scheduled or sooner as needed.    Happy with improved Va    Vig QID --> finish  PF 1% q day --> finish this week      Plan  RTC 4-5 month with retina service as scheduled --> add HVF and check with Rocky for fu after retina visit  RTC sooner PRN with good understanding

## 2017-10-02 NOTE — Clinical Note
RTC 4-5 month with retina service as scheduled --> add HVF and check with Rocky for fu after retina visit

## 2017-10-23 RX ORDER — AMLODIPINE BESYLATE AND ATORVASTATIN CALCIUM 10; 10 MG/1; MG/1
TABLET, FILM COATED ORAL
Qty: 90 TABLET | Refills: 0 | Status: SHIPPED | OUTPATIENT
Start: 2017-10-23 | End: 2018-01-19 | Stop reason: SDUPTHER

## 2017-11-17 DIAGNOSIS — E11.9 TYPE 2 DIABETES MELLITUS WITHOUT COMPLICATION: ICD-10-CM

## 2017-12-04 ENCOUNTER — OFFICE VISIT (OUTPATIENT)
Dept: UROLOGY | Facility: CLINIC | Age: 77
End: 2017-12-04
Payer: MEDICARE

## 2017-12-04 VITALS
WEIGHT: 219.13 LBS | DIASTOLIC BLOOD PRESSURE: 83 MMHG | HEART RATE: 65 BPM | SYSTOLIC BLOOD PRESSURE: 161 MMHG | BODY MASS INDEX: 30.68 KG/M2 | HEIGHT: 71 IN

## 2017-12-04 DIAGNOSIS — N13.8 BPH WITH URINARY OBSTRUCTION: Primary | ICD-10-CM

## 2017-12-04 DIAGNOSIS — N40.1 BPH WITH URINARY OBSTRUCTION: Primary | ICD-10-CM

## 2017-12-04 PROCEDURE — 99999 PR PBB SHADOW E&M-EST. PATIENT-LVL III: CPT | Mod: PBBFAC,,, | Performed by: UROLOGY

## 2017-12-04 PROCEDURE — 99213 OFFICE O/P EST LOW 20 MIN: CPT | Mod: S$GLB,,, | Performed by: UROLOGY

## 2017-12-04 NOTE — PROGRESS NOTES
Subjective:       Patient ID: Pedro Ponce is a 77 y.o. male.    Chief Complaint: renal cyst (annual check up c/o nocturia x4 .)    HPI patient is here for yearly TRAVIS.  He is voiding well and he takes to [place] Avodart and Flomax.  He's happy with his stream.  He gets up 4 times at night but he drinks plenty of water during the day and especially in the evening.  Told him if he once to decrease his nocturia that he could cut back on the evening fluids.  Overall he is very happy.  His postvoid residual is 1 cc and his urine is clear    Past Medical History:   Diagnosis Date    BPH (benign prostatic hypertrophy)     Cataract     Diabetes mellitus     Diabetes mellitus type II     diet-controlled    Glaucoma (increased eye pressure)     Gout, joint     Hyperlipidemia     Hypertension     Macular degeneration     Obesity     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       Past Surgical History:   Procedure Laterality Date    CYST REMOVAL  2000    removed from left kidney    KIDNEY SURGERY      PROSTATE SURGERY         Family History   Problem Relation Age of Onset    Cancer Mother      unkn ca    Diabetes Mother     Glaucoma Mother     Heart disease Mother     Hypertension Mother     Blindness Neg Hx     Macular degeneration Neg Hx     Retinal detachment Neg Hx        Social History     Social History    Marital status:      Spouse name: N/A    Number of children: N/A    Years of education: N/A     Occupational History    Not on file.     Social History Main Topics    Smoking status: Never Smoker    Smokeless tobacco: Never Used    Alcohol use No    Drug use: Unknown    Sexual activity: Not on file     Other Topics Concern    Not on file     Social History Narrative    No narrative on file       Allergies:  Pcn [penicillins]    Medications:    Current Outpatient Prescriptions:     amlodipine-atorvastatin (CADUET) 10-10 mg per tablet, TAKE 1 TABLET BY MOUTH EVERY DAY,  Disp: 90 tablet, Rfl: 0    aspirin 81 mg Tab, Take 81 mg by mouth. 1 Tablet Oral Every evening, Disp: , Rfl:     b complex vitamins tablet, Take 1 tablet by mouth once daily.  , Disp: , Rfl:     carbamide peroxide (DEBROX) 6.5 % otic solution, Place 6.5 % in ear(s). 5-10 Drops Otic Twice a day .  Drops should remain in ear >15 minutesDo not use for more than 4 days., Disp: , Rfl:     docusate sodium 100 mg capsule, Take 100 mg by mouth. 1 Tablet Oral Twice a day .   take as needed for hard stools, Disp: , Rfl:     dutasteride (AVODART) 0.5 mg capsule, TAKE 1 CAPSULE (0.5 MG TOTAL) BY MOUTH ONCE DAILY., Disp: 90 capsule, Rfl: 2    fish oil-omega-3 fatty acids 300-1,000 mg capsule, Take 1 g by mouth once daily.  , Disp: , Rfl:     hydrochlorothiazide (HYDRODIURIL) 25 MG tablet, TAKE 1 TABLET (25 MG TOTAL) BY MOUTH ONCE DAILY., Disp: 90 tablet, Rfl: 4    irbesartan (AVAPRO) 300 MG tablet, TAKE 1 TABLET EVERY DAY, Disp: 90 tablet, Rfl: 2    latanoprost 0.005 % ophthalmic solution, PLACE 1 DROP INTO BOTH EYES ONCE DAILY., Disp: 7.5 mL, Rfl: 3    metoprolol tartrate (LOPRESSOR) 25 MG tablet, TAKE 2 TABLETS BY MOUTH TWICE A DAY, Disp: 180 tablet, Rfl: 1    multivitamin-minerals-lutein (CENTRUM SILVER) Tab, Take by mouth. 1 Tablet Oral Every day, Disp: , Rfl:     oxybutynin (DITROPAN) 5 MG Tab, TAKE 1 TABLET TWICE A DAY, Disp: 60 tablet, Rfl: 6    prednisoLONE acetate (PRED FORTE) 1 % DrpS, , Disp: , Rfl:     tamsulosin (FLOMAX) 0.4 mg Cp24, , Disp: , Rfl:     vitamin D 185 MG Tab, Take 185 mg by mouth once daily.  , Disp: , Rfl:     Review of Systems   Constitutional: Negative for activity change, appetite change, chills, diaphoresis, fatigue, fever and unexpected weight change.   HENT: Negative for congestion, dental problem, hearing loss, mouth sores, postnasal drip, rhinorrhea, sinus pressure and trouble swallowing.    Eyes: Negative for pain, discharge and itching.   Respiratory: Negative for apnea,  cough, choking, chest tightness, shortness of breath and wheezing.    Cardiovascular: Negative for chest pain, palpitations and leg swelling.   Gastrointestinal: Negative for abdominal distention, abdominal pain, anal bleeding, blood in stool, constipation, diarrhea, nausea, rectal pain and vomiting.   Endocrine: Negative for polydipsia and polyuria.   Genitourinary: Positive for frequency. Negative for decreased urine volume, difficulty urinating, discharge, dysuria, enuresis, flank pain, genital sores, hematuria, penile pain, penile swelling, scrotal swelling, testicular pain and urgency.   Musculoskeletal: Negative for arthralgias, back pain and myalgias.   Skin: Negative for color change, rash and wound.   Neurological: Negative for dizziness, syncope, speech difficulty, light-headedness and headaches.   Hematological: Negative for adenopathy. Does not bruise/bleed easily.   Psychiatric/Behavioral: Negative for behavioral problems, confusion, hallucinations and sleep disturbance.       Objective:      Physical Exam   Constitutional: He appears well-developed.   HENT:   Head: Normocephalic.   Cardiovascular: Normal rate.    Pulmonary/Chest: Effort normal.   Abdominal: Soft.   Genitourinary: Prostate normal.   Genitourinary Comments: 30 g benign   Neurological: He is alert.   Skin: Skin is warm.     Psychiatric: He has a normal mood and affect.       Assessment:       1. BPH with urinary obstruction        Plan:       Pedro was seen today for renal cyst.    Diagnoses and all orders for this visit:    BPH with urinary obstruction        yearly TRAVIS and continue present meds

## 2017-12-15 RX ORDER — OXYBUTYNIN CHLORIDE 5 MG/1
TABLET ORAL
Qty: 60 TABLET | Refills: 0 | Status: SHIPPED | OUTPATIENT
Start: 2017-12-15 | End: 2018-01-11 | Stop reason: SDUPTHER

## 2018-01-12 RX ORDER — OXYBUTYNIN CHLORIDE 5 MG/1
TABLET ORAL
Qty: 60 TABLET | Refills: 0 | Status: SHIPPED | OUTPATIENT
Start: 2018-01-12 | End: 2018-02-09 | Stop reason: SDUPTHER

## 2018-01-18 RX ORDER — HYDROCHLOROTHIAZIDE 25 MG/1
TABLET ORAL
Qty: 90 TABLET | Refills: 4 | Status: SHIPPED | OUTPATIENT
Start: 2018-01-18 | End: 2019-04-02 | Stop reason: SDUPTHER

## 2018-01-19 RX ORDER — AMLODIPINE BESYLATE AND ATORVASTATIN CALCIUM 10; 10 MG/1; MG/1
TABLET, FILM COATED ORAL
Qty: 90 TABLET | Refills: 0 | Status: SHIPPED | OUTPATIENT
Start: 2018-01-19 | End: 2018-04-16 | Stop reason: SDUPTHER

## 2018-02-08 ENCOUNTER — OFFICE VISIT (OUTPATIENT)
Dept: FAMILY MEDICINE | Facility: CLINIC | Age: 78
End: 2018-02-08
Payer: MEDICARE

## 2018-02-08 ENCOUNTER — LAB VISIT (OUTPATIENT)
Dept: LAB | Facility: HOSPITAL | Age: 78
End: 2018-02-08
Attending: FAMILY MEDICINE
Payer: MEDICARE

## 2018-02-08 VITALS
HEIGHT: 71 IN | HEART RATE: 67 BPM | WEIGHT: 216 LBS | BODY MASS INDEX: 30.24 KG/M2 | OXYGEN SATURATION: 99 % | SYSTOLIC BLOOD PRESSURE: 138 MMHG | DIASTOLIC BLOOD PRESSURE: 82 MMHG

## 2018-02-08 DIAGNOSIS — N18.30 TYPE 2 DIABETES MELLITUS WITH STAGE 3 CHRONIC KIDNEY DISEASE, WITHOUT LONG-TERM CURRENT USE OF INSULIN: ICD-10-CM

## 2018-02-08 DIAGNOSIS — Z00.00 ANNUAL PHYSICAL EXAM: Primary | ICD-10-CM

## 2018-02-08 DIAGNOSIS — I10 ESSENTIAL HYPERTENSION: ICD-10-CM

## 2018-02-08 DIAGNOSIS — E11.22 TYPE 2 DIABETES MELLITUS WITH STAGE 3 CHRONIC KIDNEY DISEASE, WITHOUT LONG-TERM CURRENT USE OF INSULIN: ICD-10-CM

## 2018-02-08 DIAGNOSIS — Z00.00 ANNUAL PHYSICAL EXAM: ICD-10-CM

## 2018-02-08 LAB
ALBUMIN SERPL BCP-MCNC: 3.9 G/DL
ALP SERPL-CCNC: 74 U/L
ALT SERPL W/O P-5'-P-CCNC: 43 U/L
ANION GAP SERPL CALC-SCNC: 9 MMOL/L
AST SERPL-CCNC: 42 U/L
BASOPHILS # BLD AUTO: 0.03 K/UL
BASOPHILS NFR BLD: 0.5 %
BILIRUB SERPL-MCNC: 1.2 MG/DL
BUN SERPL-MCNC: 21 MG/DL
CALCIUM SERPL-MCNC: 10.5 MG/DL
CHLORIDE SERPL-SCNC: 102 MMOL/L
CHOLEST SERPL-MCNC: 154 MG/DL
CHOLEST/HDLC SERPL: 3.1 {RATIO}
CO2 SERPL-SCNC: 29 MMOL/L
CREAT SERPL-MCNC: 1.3 MG/DL
CREAT UR-MCNC: 119 MG/DL
DIFFERENTIAL METHOD: ABNORMAL
EOSINOPHIL # BLD AUTO: 0.2 K/UL
EOSINOPHIL NFR BLD: 2.9 %
ERYTHROCYTE [DISTWIDTH] IN BLOOD BY AUTOMATED COUNT: 14.6 %
EST. GFR  (AFRICAN AMERICAN): >60 ML/MIN/1.73 M^2
EST. GFR  (NON AFRICAN AMERICAN): 52.3 ML/MIN/1.73 M^2
ESTIMATED AVG GLUCOSE: 117 MG/DL
GLUCOSE SERPL-MCNC: 98 MG/DL
HBA1C MFR BLD HPLC: 5.7 %
HCT VFR BLD AUTO: 48 %
HDLC SERPL-MCNC: 49 MG/DL
HDLC SERPL: 31.8 %
HGB BLD-MCNC: 16.1 G/DL
IMM GRANULOCYTES # BLD AUTO: 0.02 K/UL
IMM GRANULOCYTES NFR BLD AUTO: 0.3 %
LDLC SERPL CALC-MCNC: 92.4 MG/DL
LYMPHOCYTES # BLD AUTO: 1.9 K/UL
LYMPHOCYTES NFR BLD: 32.1 %
MCH RBC QN AUTO: 30 PG
MCHC RBC AUTO-ENTMCNC: 33.5 G/DL
MCV RBC AUTO: 90 FL
MICROALBUMIN UR DL<=1MG/L-MCNC: 242 UG/ML
MICROALBUMIN/CREATININE RATIO: 203.4 UG/MG
MONOCYTES # BLD AUTO: 0.5 K/UL
MONOCYTES NFR BLD: 9.1 %
NEUTROPHILS # BLD AUTO: 3.3 K/UL
NEUTROPHILS NFR BLD: 55.1 %
NONHDLC SERPL-MCNC: 105 MG/DL
NRBC BLD-RTO: 0 /100 WBC
PLATELET # BLD AUTO: 230 K/UL
PMV BLD AUTO: 10.9 FL
POTASSIUM SERPL-SCNC: 3.6 MMOL/L
PROT SERPL-MCNC: 7.7 G/DL
RBC # BLD AUTO: 5.36 M/UL
SODIUM SERPL-SCNC: 140 MMOL/L
TRIGL SERPL-MCNC: 63 MG/DL
TSH SERPL DL<=0.005 MIU/L-ACNC: 3.17 UIU/ML
WBC # BLD AUTO: 5.95 K/UL

## 2018-02-08 PROCEDURE — 99397 PER PM REEVAL EST PAT 65+ YR: CPT | Mod: S$GLB,,, | Performed by: FAMILY MEDICINE

## 2018-02-08 PROCEDURE — 80061 LIPID PANEL: CPT

## 2018-02-08 PROCEDURE — 82043 UR ALBUMIN QUANTITATIVE: CPT

## 2018-02-08 PROCEDURE — 85025 COMPLETE CBC W/AUTO DIFF WBC: CPT

## 2018-02-08 PROCEDURE — 99999 PR PBB SHADOW E&M-EST. PATIENT-LVL III: CPT | Mod: PBBFAC,,, | Performed by: FAMILY MEDICINE

## 2018-02-08 PROCEDURE — 80053 COMPREHEN METABOLIC PANEL: CPT

## 2018-02-08 PROCEDURE — 1159F MED LIST DOCD IN RCRD: CPT | Mod: S$GLB,,, | Performed by: FAMILY MEDICINE

## 2018-02-08 PROCEDURE — 84443 ASSAY THYROID STIM HORMONE: CPT

## 2018-02-08 PROCEDURE — 36415 COLL VENOUS BLD VENIPUNCTURE: CPT | Mod: PO

## 2018-02-08 PROCEDURE — 83036 HEMOGLOBIN GLYCOSYLATED A1C: CPT

## 2018-02-08 PROCEDURE — 1126F AMNT PAIN NOTED NONE PRSNT: CPT | Mod: S$GLB,,, | Performed by: FAMILY MEDICINE

## 2018-02-08 PROCEDURE — 3008F BODY MASS INDEX DOCD: CPT | Mod: S$GLB,,, | Performed by: FAMILY MEDICINE

## 2018-02-08 NOTE — PROGRESS NOTES
Subjective:       Patient ID: Pedro Ponce is a 77 y.o. male.    Chief Complaint: No chief complaint on file.    77 years old male who came to the clinic for his physical examination.  Patient  With no recent A1c.  No polydipsia polydipsia polyphagia.  Blood pressure today stable.  No chest pain palpitations orthopnea or PND.      Review of Systems   Constitutional: Negative.    HENT: Negative.    Eyes: Negative.    Respiratory: Negative.    Cardiovascular: Negative.  Negative for chest pain, palpitations and leg swelling.   Gastrointestinal: Negative.    Endocrine: Negative for cold intolerance, heat intolerance, polydipsia, polyphagia and polyuria.   Genitourinary: Negative.    Musculoskeletal: Negative.    Skin: Negative.    Neurological: Negative.    Psychiatric/Behavioral: Negative.        Objective:      Physical Exam   Constitutional: He is oriented to person, place, and time. He appears well-developed and well-nourished. No distress.   HENT:   Head: Normocephalic and atraumatic.   Right Ear: External ear normal.   Left Ear: External ear normal.   Nose: Nose normal.   Mouth/Throat: Oropharynx is clear and moist. No oropharyngeal exudate.   Eyes: Conjunctivae and EOM are normal. Pupils are equal, round, and reactive to light. Right eye exhibits no discharge. Left eye exhibits no discharge. No scleral icterus.   Neck: Normal range of motion. Neck supple. No JVD present. No tracheal deviation present. No thyromegaly present.   Cardiovascular: Normal rate, regular rhythm, normal heart sounds and intact distal pulses.  Exam reveals no gallop and no friction rub.    No murmur heard.  Pulmonary/Chest: Effort normal and breath sounds normal. No stridor. No respiratory distress. He has no wheezes. He has no rales. He exhibits no tenderness.   Abdominal: Soft. Bowel sounds are normal. He exhibits no distension and no mass. There is no tenderness. There is no rebound and no guarding.   Musculoskeletal: Normal  range of motion. He exhibits no edema or tenderness.   Lymphadenopathy:     He has no cervical adenopathy.   Neurological: He is alert and oriented to person, place, and time. He has normal reflexes. He displays normal reflexes. No cranial nerve deficit. He exhibits normal muscle tone. Coordination normal.   Skin: Skin is warm and dry. No rash noted. He is not diaphoretic. No erythema. No pallor.   Psychiatric: He has a normal mood and affect. His behavior is normal. Judgment and thought content normal.   Nursing note and vitals reviewed.      Assessment:       1. Annual physical exam    2. Essential hypertension    3. Type 2 diabetes mellitus with stage 3 chronic kidney disease, without long-term current use of insulin        Plan:         Diagnoses and all orders for this visit:    Annual physical exam  -     Comprehensive metabolic panel; Future  -     Lipid panel; Future  -     Hemoglobin A1c; Future  -     Microalbumin/creatinine urine ratio  -     TSH; Future  -     CBC auto differential; Future    Essential hypertension  -     Comprehensive metabolic panel; Future  -     Lipid panel; Future  -     TSH; Future  -     CBC auto differential; Future    Type 2 diabetes mellitus with stage 3 chronic kidney disease, without long-term current use of insulin  -     Comprehensive metabolic panel; Future  -     Lipid panel; Future  -     Hemoglobin A1c; Future  -     Microalbumin/creatinine urine ratio    Continue monitoring blood pressure at home, low sodium diet.  Continue monitoring blood sugar at home,ADA diet.

## 2018-02-08 NOTE — PATIENT INSTRUCTIONS
Diabetes: Activity Tips    Being more active can help you manage your diabetes. The tips on this sheet can help you get the most from your exercise. They can also help you stay safe.  Staying Active  Its important for adults to spend less time sitting and being inactive. This is especially true if you have type 2 diabetes. When you are sitting for long periods of time, get up for short sessions of light activity every 30 minutes.  You should aim for at least 150 minutes a week of exercise or physical activity. Dont let more than 2 days go by without being active.  Benefit from briskness  Brisk activity gets your heart beating faster. This can help you increase your fitness, lose extra weight, and manage your blood sugar level. Try brisk walking. Or, if you have foot or leg problems, you can try swimming or bike riding. You can break up your exercise into chunks throughout the day. Work up to at least 30 minutes of steady, brisk exercise on most days.  Warm up and cool down  Warming up and cooling down reduce your risk of injury. They also help limit muscle soreness. Do a mild version of your activity for 5 minutes before and after your routine. You can also learn stretches that will help keep your muscles loose. Your healthcare provider may show you good ways to warm up and stretch.  Do the talk-sing test  The talk-sing test is a simple way to tell how hard youre exercising. If you can talk while exercising, youre in a safe range. If youre out of breath, slow down. If you can carry a tune, its time to  the pace. Walk up a hill. Increase the resistance on your stationary bike. Or swim faster.  What about eating?  You may be told to plan your exercise for 1 to 2 hours after a meal. In most cases, you dont need to eat while being active. If you take insulin or medicine that can cause low blood sugar, test your blood sugar before exercising. And carry a fast-acting sugar that will raise your blood sugar  level quickly. This includes glucose tablets or hard candy. Use it if you feel low blood sugar symptoms.  Safety tips  These tips can help you stay safe as you become fit:  · Exercise with a friend or carry a cell phone if you have one.  · Carry or wear identification, such as a necklace or bracelet, that says you have diabetes.  · Use the proper footwear and safety equipment for your activity.  · Drink water before, during, and after exercise.  · Dress properly for the weather.  · Dont exercise in very hot or very cold weather.  · Dont exercise if you are sick.  · If you are instructed to do so, test your blood sugar before and after you exercise. Have a small carbohydrate snack if your blood sugar is low before you start exercising.   When to stop exercising and call your healthcare provider  Stop exercising and call your healthcare provider right away if you notice any of the following:  · Pain, pressure, tightness, or heaviness in the chest  · Pain or heaviness in the neck, shoulders, back, arms, legs, or feet  · Unusual shortness of breath  · Dizziness or lightheadedness  · Unusually rapid or slow pulse  · Increased joint or muscle pain  · Nausea or vomiting  Date Last Reviewed: 5/1/2016  © 9695-8186 [employer]. [address] [place] [address], [place] [address]. All rights reserved. This information is not intended as a substitute for professional medical care. Always follow your healthcare professional's instructions.

## 2018-02-09 RX ORDER — OXYBUTYNIN CHLORIDE 5 MG/1
TABLET ORAL
Qty: 60 TABLET | Refills: 0 | Status: SHIPPED | OUTPATIENT
Start: 2018-02-09 | End: 2018-03-09 | Stop reason: SDUPTHER

## 2018-02-19 ENCOUNTER — OFFICE VISIT (OUTPATIENT)
Dept: PODIATRY | Facility: CLINIC | Age: 78
End: 2018-02-19
Payer: MEDICARE

## 2018-02-19 VITALS
WEIGHT: 216 LBS | DIASTOLIC BLOOD PRESSURE: 74 MMHG | SYSTOLIC BLOOD PRESSURE: 155 MMHG | HEIGHT: 71 IN | BODY MASS INDEX: 30.24 KG/M2 | HEART RATE: 53 BPM

## 2018-02-19 DIAGNOSIS — E11.9 TYPE 2 DIABETES MELLITUS WITHOUT COMPLICATION, WITHOUT LONG-TERM CURRENT USE OF INSULIN: ICD-10-CM

## 2018-02-19 DIAGNOSIS — M72.2 PLANTAR FASCIITIS, RIGHT: Primary | ICD-10-CM

## 2018-02-19 DIAGNOSIS — M79.671 PAIN OF RIGHT HEEL: ICD-10-CM

## 2018-02-19 PROCEDURE — 1159F MED LIST DOCD IN RCRD: CPT | Mod: S$GLB,,, | Performed by: PODIATRIST

## 2018-02-19 PROCEDURE — 1125F AMNT PAIN NOTED PAIN PRSNT: CPT | Mod: S$GLB,,, | Performed by: PODIATRIST

## 2018-02-19 PROCEDURE — 3008F BODY MASS INDEX DOCD: CPT | Mod: S$GLB,,, | Performed by: PODIATRIST

## 2018-02-19 PROCEDURE — 99213 OFFICE O/P EST LOW 20 MIN: CPT | Mod: S$GLB,,, | Performed by: PODIATRIST

## 2018-02-19 PROCEDURE — 99999 PR PBB SHADOW E&M-EST. PATIENT-LVL III: CPT | Mod: PBBFAC,,, | Performed by: PODIATRIST

## 2018-02-19 NOTE — PATIENT INSTRUCTIONS
Recommended Sof Sole Fit Series Arch Supports with low arch found on [employer]        Bent-Knee Calf Stretch    This exercise is designed to stretch and strengthen your feet and ankles. Before beginning the exercise, read through all the instructions. While exercising, breathe normally and dont bounce. If you feel any pain, stop the exercise. If pain persists, inform your healthcare provider:  · Stand an arms length away from a wall. Place the palms of your hands on the wall. Step forward about 12 inches with your ______ foot.  · Keeping toes pointed forward and both heels on the floor, bend both knees and lean forward. Hold for ___30___ seconds. Relax.  · Repeat ___3___ times. Do __2-3____ sets a day.  Date Last Reviewed: 8/16/2015  © 7852-0263 [employer]. [address] [place] [address], [place] [address]. All rights reserved. This information is not intended as a substitute for professional medical care. Always follow your healthcare professional's instructions.

## 2018-02-19 NOTE — PROGRESS NOTES
Subjective:      Patient ID: Pedro Ponce is a 78 y.o. male.    Chief Complaint: No chief complaint on file.    Pedro is a 78 y.o. male who presents to the clinic upon referral from Dr. Daphnie evangelista. provider found  for evaluation and treatment of diabetic feet. 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 Patient relates no major problem with feet. Only complaints today consist of pain on bottom of right heel x 2 weeks. Denies trauma. History of steroid injection per Dr. Camacho in past which helped resolve his pain for past year and then 4 year prior to that. Xray ordered per PCP revealed plantar heel spur.    7/15/16 Patient reports significantly improved pain to right heel. Pt. Purchased spenco orthotics and has them in shoes today.     2/19/18: Pt presents for flair up of plantar fasciitis to plantar R heel. Pt states that it started a few days ago and was very bad last night. Relates he had trouble walking to bathroom. States the pain is more controlled with ibuprofen this morning. No other pedal complaints at this time.     PCP: John Ricci MD    Date Last Seen by PCP:     Current shoe gear: Tennis shoes    Hemoglobin A1C   Date Value Ref Range Status   02/08/2018 5.7 (H) 4.0 - 5.6 % Final     Comment:     According to ADA guidelines, hemoglobin A1c <7.0% represents  optimal control in non-pregnant diabetic patients. Different  metrics may apply to specific patient populations.   Standards of Medical Care in Diabetes-2016.  For the purpose of screening for the presence of diabetes:  <5.7%     Consistent with the absence of diabetes  5.7-6.4%  Consistent with increasing risk for diabetes   (prediabetes)  >or=6.5%  Consistent with diabetes  Currently,  "no consensus exists for use of hemoglobin A1c  for diagnosis of diabetes for children.  This Hemoglobin A1c assay has significant interference with fetal   hemoglobin   (HbF). The results are invalid for patients with abnormal amounts of   HbF,   including those with known Hereditary Persistence   of Fetal Hemoglobin. Heterozygous hemoglobin variants (HbAS, HbAC,   HbAD, HbAE, HbA2) do not significantly interfere with this assay;   however, presence of multiple variants in a sample may impact the %   interference.     05/04/2017 5.9 4.5 - 6.2 % Final     Comment:     According to ADA guidelines, hemoglobin A1C <7.0% represents  optimal control in non-pregnant diabetic patients.  Different  metrics may apply to specific populations.   Standards of Medical Care in Diabetes - 2016.  For the purpose of screening for the presence of diabetes:  <5.7%     Consistent with the absence of diabetes  5.7-6.4%  Consistent with increasing risk for diabetes   (prediabetes)  >or=6.5%  Consistent with diabetes  Currently no consensus exists for use of hemoglobin A1C  for diagnosis of diabetes for children.     01/10/2017 6.2 4.5 - 6.2 % Final     Comment:     According to ADA guidelines, hemoglobin A1C <7.0% represents  optimal control in non-pregnant diabetic patients.  Different  metrics may apply to specific populations.   Standards of Medical Care in Diabetes - 2016.  For the purpose of screening for the presence of diabetes:  <5.7%     Consistent with the absence of diabetes  5.7-6.4%  Consistent with increasing risk for diabetes   (prediabetes)  >or=6.5%  Consistent with diabetes  Currently no consensus exists for use of hemoglobin A1C  for diagnosis of diabetes for children.       Vitals:    02/19/18 0812   BP: (!) 155/74   Pulse: (!) 53   Weight: 98 kg (216 lb)   Height: 5' 11" (1.803 m)   PainSc:   5   PainLoc: Foot      Past Medical History:   Diagnosis Date    BPH (benign prostatic hypertrophy)     Cataract     " Diabetes mellitus     Diabetes mellitus type II     diet-controlled    Glaucoma (increased eye pressure)     Gout, joint     Hyperlipidemia     Hypertension     Macular degeneration     Obesity     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       Past Surgical History:   Procedure Laterality Date    CYST REMOVAL  2000    removed from left kidney    KIDNEY SURGERY      PROSTATE SURGERY         Family History   Problem Relation Age of Onset    Cancer Mother      unkn ca    Diabetes Mother     Glaucoma Mother     Heart disease Mother     Hypertension Mother     Blindness Neg Hx     Macular degeneration Neg Hx     Retinal detachment Neg Hx        Social History     Social History    Marital status:      Spouse name: N/A    Number of children: N/A    Years of education: N/A     Social History Main Topics    Smoking status: Never Smoker    Smokeless tobacco: Never Used    Alcohol use No    Drug use: Unknown    Sexual activity: Not Asked     Other Topics Concern    None     Social History Narrative    None       Current Outpatient Prescriptions   Medication Sig Dispense Refill    amlodipine-atorvastatin (CADUET) 10-10 mg per tablet TAKE 1 TABLET BY MOUTH EVERY DAY 90 tablet 0    aspirin 81 mg Tab Take 81 mg by mouth. 1 Tablet Oral Every evening      docusate sodium 100 mg capsule Take 100 mg by mouth. 1 Tablet Oral Twice a day .   take as needed for hard stools      dutasteride (AVODART) 0.5 mg capsule TAKE 1 CAPSULE (0.5 MG TOTAL) BY MOUTH ONCE DAILY. 90 capsule 2    fish oil-omega-3 fatty acids 300-1,000 mg capsule Take 1 g by mouth once daily.        hydroCHLOROthiazide (HYDRODIURIL) 25 MG tablet TAKE 1 TABLET (25 MG TOTAL) BY MOUTH ONCE DAILY. 90 tablet 4    irbesartan (AVAPRO) 300 MG tablet TAKE 1 TABLET EVERY DAY 90 tablet 2    latanoprost 0.005 % ophthalmic solution PLACE 1 DROP INTO BOTH EYES ONCE DAILY. 7.5 mL 3    metoprolol tartrate (LOPRESSOR) 25 MG tablet TAKE  2 TABLETS BY MOUTH TWICE A  tablet 1    multivitamin-minerals-lutein (CENTRUM SILVER) Tab Take by mouth. 1 Tablet Oral Every day      oxybutynin (DITROPAN) 5 MG Tab TAKE 1 TABLET TWICE A DAY 60 tablet 0    prednisoLONE acetate (PRED FORTE) 1 % DrpS       tamsulosin (FLOMAX) 0.4 mg Cp24        No current facility-administered medications for this visit.        Review of patient's allergies indicates:   Allergen Reactions    Pcn [penicillins] Rash             Review of Systems   Constitution: Negative for chills, fever, weakness and malaise/fatigue.   Cardiovascular: Negative for chest pain, claudication and leg swelling.   Respiratory: Negative for cough and shortness of breath.    Skin: Negative for color change, itching and rash.   Musculoskeletal: Positive for joint pain. Negative for back pain, muscle cramps and muscle weakness.   Gastrointestinal: Negative for nausea and vomiting.   Neurological: Negative for numbness and paresthesias.   Psychiatric/Behavioral: Negative for altered mental status.           Objective:      Physical Exam   Constitutional: He is oriented to person, place, and time. No distress.   Cardiovascular:   Pulses:       Dorsalis pedis pulses are 2+ on the right side, and 2+ on the left side.        Posterior tibial pulses are 2+ on the right side, and 2+ on the left side.   Musculoskeletal:        Right foot: There is decreased range of motion.        Left foot: There is decreased range of motion.   Mild pain on palpation plantar medial and central right heel. Equinus B/L. Negative tinel's sign.    Feet:   Right Foot:   Protective Sensation: 10 sites tested. 10 sites sensed.   Skin Integrity: Negative for ulcer, blister, skin breakdown, erythema or warmth.   Left Foot:   Protective Sensation: 10 sites tested. 10 sites sensed.   Skin Integrity: Negative for ulcer, blister, skin breakdown, erythema or warmth.   Neurological: He is alert and oriented to person, place, and time. He  has normal strength. No sensory deficit.   Skin: Skin is warm, dry and intact. No ecchymosis and no rash noted. He is not diaphoretic. No cyanosis or erythema. Nails show no clubbing.   No open lesions or macerations bilateral lower extremity.               Assessment:       Encounter Diagnoses   Name Primary?    Plantar fasciitis, right Yes    Type 2 diabetes mellitus without complication, without long-term current use of insulin     Pain of right heel          Plan:       Diagnoses and all orders for this visit:    Plantar fasciitis, right    Type 2 diabetes mellitus without complication, without long-term current use of insulin    Pain of right heel      I counseled the patient on his conditions, their implications and medical management.    Shoe inspection. Diabetic Foot Education. Patient reminded of the importance of good nutrition and blood sugar control to help prevent podiatric complications of diabetes. Patient instructed on proper foot hygeine. We discussed wearing proper shoe gear, daily foot inspections, never walking without protective shoe gear, never putting sharp instruments to feet.      Recommend rigid shoes for arch support and rigid orthotics, RICE therapy, OTC NSAIDS PRN pain, stretching as demonstrated in clinic for equinus contracture    Consider corticosteriod injection, PT, medrol dose pack in future if symptoms fail to subside with previously mentioned conservative options    F/u in one month    Sandee Zhong PGY2    I have personally taken the history and examined this patient and agree with the resident's note as stated as above.   Chris Palencia DPM, FACFAS

## 2018-02-26 ENCOUNTER — OFFICE VISIT (OUTPATIENT)
Dept: OPHTHALMOLOGY | Facility: CLINIC | Age: 78
End: 2018-02-26
Payer: MEDICARE

## 2018-02-26 ENCOUNTER — CLINICAL SUPPORT (OUTPATIENT)
Dept: OPHTHALMOLOGY | Facility: CLINIC | Age: 78
End: 2018-02-26
Payer: MEDICARE

## 2018-02-26 DIAGNOSIS — H35.3211 EXUDATIVE AGE-RELATED MACULAR DEGENERATION OF RIGHT EYE WITH ACTIVE CHOROIDAL NEOVASCULARIZATION: Primary | ICD-10-CM

## 2018-02-26 DIAGNOSIS — H43.393 VITREOUS FLOATERS OF BOTH EYES: ICD-10-CM

## 2018-02-26 DIAGNOSIS — H35.723 RETINAL PIGMENT EPITHELIAL DETACHMENT OF BOTH EYES: ICD-10-CM

## 2018-02-26 DIAGNOSIS — H40.1132 PRIMARY OPEN ANGLE GLAUCOMA OF BOTH EYES, MODERATE STAGE: ICD-10-CM

## 2018-02-26 DIAGNOSIS — H35.033 HYPERTENSIVE RETINOPATHY, BILATERAL: ICD-10-CM

## 2018-02-26 PROCEDURE — 99999 PR PBB SHADOW E&M-EST. PATIENT-LVL II: CPT | Mod: PBBFAC,,, | Performed by: OPHTHALMOLOGY

## 2018-02-26 PROCEDURE — 67028 INJECTION EYE DRUG: CPT | Mod: RT,S$GLB,, | Performed by: OPHTHALMOLOGY

## 2018-02-26 PROCEDURE — 92014 COMPRE OPH EXAM EST PT 1/>: CPT | Mod: 25,S$GLB,, | Performed by: OPHTHALMOLOGY

## 2018-02-26 PROCEDURE — 92134 CPTRZ OPH DX IMG PST SGM RTA: CPT | Mod: S$GLB,,, | Performed by: OPHTHALMOLOGY

## 2018-02-26 RX ADMIN — Medication 1.25 MG: at 11:02

## 2018-02-26 NOTE — PATIENT INSTRUCTIONS

## 2018-02-26 NOTE — PROGRESS NOTES
HPI     Eye Problem    Additional comments: yearly check           Comments   DLS 2/7/17  Patient states vision is better since cataract sx with Dr. Hidalgo.   Stopped glaucoma drops after catract sx-will determine if needs to restart   and next visit with Dr. Hidalgo.         OCT - Flat PED OD, NO SRF - stable   OS - stable small flat inferior PED, no SRF     Previous ICG - no significant Pooling or leakage OU  RPE defects OS>OD    A/P    1. 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 Floaters OU    3. POAG - Good IOP, continue management as per Dr. Hidalgo  - Needs f/u appt    4. PCIOL OU      1 month OCT/FA/ICG OD    Risks, benefits, and alternatives to treatment discussed in detail with the patient.  The patient voiced understanding and wished to proceed with the procedure    Injection Procedure Note:  Diagnosis: Wet AMD OD    Topical Proparacaine and Betadine.  Inject Avastin OD at 6:00 @ 3.5-4mm posterior to limbus  Post Operative Dx: Same  Complications: None  Follow up as above.

## 2018-03-09 RX ORDER — OXYBUTYNIN CHLORIDE 5 MG/1
TABLET ORAL
Qty: 60 TABLET | Refills: 0 | Status: SHIPPED | OUTPATIENT
Start: 2018-03-09 | End: 2018-04-06 | Stop reason: SDUPTHER

## 2018-03-12 DIAGNOSIS — Z12.11 ENCOUNTER FOR COLONOSCOPY DUE TO HISTORY OF COLONIC POLYP: Primary | ICD-10-CM

## 2018-03-12 DIAGNOSIS — Z86.010 ENCOUNTER FOR COLONOSCOPY DUE TO HISTORY OF COLONIC POLYP: Primary | ICD-10-CM

## 2018-03-12 RX ORDER — POLYETHYLENE GLYCOL 3350, SODIUM SULFATE ANHYDROUS, SODIUM BICARBONATE, SODIUM CHLORIDE, POTASSIUM CHLORIDE 236; 22.74; 6.74; 5.86; 2.97 G/4L; G/4L; G/4L; G/4L; G/4L
4 POWDER, FOR SOLUTION ORAL ONCE
Qty: 4000 ML | Refills: 0 | Status: SHIPPED | OUTPATIENT
Start: 2018-03-12 | End: 2018-03-12

## 2018-03-19 ENCOUNTER — OFFICE VISIT (OUTPATIENT)
Dept: PODIATRY | Facility: CLINIC | Age: 78
End: 2018-03-19
Payer: MEDICARE

## 2018-03-19 VITALS — WEIGHT: 216 LBS | BODY MASS INDEX: 30.24 KG/M2 | HEIGHT: 71 IN

## 2018-03-19 DIAGNOSIS — E11.9 TYPE 2 DIABETES MELLITUS WITHOUT COMPLICATION, WITHOUT LONG-TERM CURRENT USE OF INSULIN: ICD-10-CM

## 2018-03-19 DIAGNOSIS — M72.2 PLANTAR FASCIITIS, RIGHT: Primary | ICD-10-CM

## 2018-03-19 PROCEDURE — 99212 OFFICE O/P EST SF 10 MIN: CPT | Mod: S$GLB,,, | Performed by: PODIATRIST

## 2018-03-19 PROCEDURE — 99999 PR PBB SHADOW E&M-EST. PATIENT-LVL II: CPT | Mod: PBBFAC,,, | Performed by: PODIATRIST

## 2018-03-19 NOTE — PROGRESS NOTES
Subjective:      Patient ID: Pedro Ponce is a 78 y.o. male.    Chief Complaint: Follow-up (plantar )    Pedro is a 78 y.o. male who presents to the clinic upon referral from Dr. Daphnie evangelista. provider found  for evaluation and treatment of diabetic feet. 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 Patient relates no major problem with feet. Only complaints today consist of pain on bottom of right heel x 2 weeks. Denies trauma. History of steroid injection per Dr. Camacho in past which helped resolve his pain for past year and then 4 year prior to that. Xray ordered per PCP revealed plantar heel spur.    7/15/16 Patient reports significantly improved pain to right heel. Pt. Purchased spenco orthotics and has them in shoes today.     2/19/18: Pt presents for flair up of plantar fasciitis to plantar R heel. Pt states that it started a few days ago and was very bad last night. Relates he had trouble walking to bathroom. States the pain is more controlled with ibuprofen this morning. No other pedal complaints at this time.     3/19/18: Pt presents to clinic for f/u of plantar faciitis to R plantar heel. Relates he has new shoes and orthotics and has been stretching and performing RICE therapy. States his pain is almost gone, 1/10. No pedal complaints at this time     PCP: John Ricci MD    Date Last Seen by PCP: 2/8/18    Current shoe gear: Tennis shoes    Hemoglobin A1C   Date Value Ref Range Status   02/08/2018 5.7 (H) 4.0 - 5.6 % Final     Comment:     According to ADA guidelines, hemoglobin A1c <7.0% represents  optimal control in non-pregnant diabetic patients. Different  metrics may apply to specific patient populations.   Standards of Medical Care in  Diabetes-2016.  For the purpose of screening for the presence of diabetes:  <5.7%     Consistent with the absence of diabetes  5.7-6.4%  Consistent with increasing risk for diabetes   (prediabetes)  >or=6.5%  Consistent with diabetes  Currently, no consensus exists for use of hemoglobin A1c  for diagnosis of diabetes for children.  This Hemoglobin A1c assay has significant interference with fetal   hemoglobin   (HbF). The results are invalid for patients with abnormal amounts of   HbF,   including those with known Hereditary Persistence   of Fetal Hemoglobin. Heterozygous hemoglobin variants (HbAS, HbAC,   HbAD, HbAE, HbA2) do not significantly interfere with this assay;   however, presence of multiple variants in a sample may impact the %   interference.     05/04/2017 5.9 4.5 - 6.2 % Final     Comment:     According to ADA guidelines, hemoglobin A1C <7.0% represents  optimal control in non-pregnant diabetic patients.  Different  metrics may apply to specific populations.   Standards of Medical Care in Diabetes - 2016.  For the purpose of screening for the presence of diabetes:  <5.7%     Consistent with the absence of diabetes  5.7-6.4%  Consistent with increasing risk for diabetes   (prediabetes)  >or=6.5%  Consistent with diabetes  Currently no consensus exists for use of hemoglobin A1C  for diagnosis of diabetes for children.     01/10/2017 6.2 4.5 - 6.2 % Final     Comment:     According to ADA guidelines, hemoglobin A1C <7.0% represents  optimal control in non-pregnant diabetic patients.  Different  metrics may apply to specific populations.   Standards of Medical Care in Diabetes - 2016.  For the purpose of screening for the presence of diabetes:  <5.7%     Consistent with the absence of diabetes  5.7-6.4%  Consistent with increasing risk for diabetes   (prediabetes)  >or=6.5%  Consistent with diabetes  Currently no consensus exists for use of hemoglobin A1C  for diagnosis of diabetes for children.    "    Vitals:    03/19/18 0836   Weight: 98 kg (216 lb)   Height: 5' 11" (1.803 m)   PainSc: 0-No pain      Past Medical History:   Diagnosis Date    BPH (benign prostatic hypertrophy)     Cataract     Diabetes mellitus     Diabetes mellitus type II     diet-controlled    Glaucoma (increased eye pressure)     Gout, joint     Hyperlipidemia     Hypertension     Macular degeneration     Obesity     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       Past Surgical History:   Procedure Laterality Date    CYST REMOVAL  2000    removed from left kidney    KIDNEY SURGERY      PROSTATE SURGERY         Family History   Problem Relation Age of Onset    Cancer Mother      unkn ca    Diabetes Mother     Glaucoma Mother     Heart disease Mother     Hypertension Mother     Blindness Neg Hx     Macular degeneration Neg Hx     Retinal detachment Neg Hx        Social History     Social History    Marital status:      Spouse name: N/A    Number of children: N/A    Years of education: N/A     Social History Main Topics    Smoking status: Never Smoker    Smokeless tobacco: Never Used    Alcohol use No    Drug use: Unknown    Sexual activity: Not on file     Other Topics Concern    Not on file     Social History Narrative    No narrative on file       Current Outpatient Prescriptions   Medication Sig Dispense Refill    amlodipine-atorvastatin (CADUET) 10-10 mg per tablet TAKE 1 TABLET BY MOUTH EVERY DAY 90 tablet 0    aspirin 81 mg Tab Take 81 mg by mouth. 1 Tablet Oral Every evening      docusate sodium 100 mg capsule Take 100 mg by mouth. 1 Tablet Oral Twice a day .   take as needed for hard stools      dutasteride (AVODART) 0.5 mg capsule TAKE 1 CAPSULE (0.5 MG TOTAL) BY MOUTH ONCE DAILY. 90 capsule 2    fish oil-omega-3 fatty acids 300-1,000 mg capsule Take 1 g by mouth once daily.        hydroCHLOROthiazide (HYDRODIURIL) 25 MG tablet TAKE 1 TABLET (25 MG TOTAL) BY MOUTH ONCE DAILY. 90 " tablet 4    irbesartan (AVAPRO) 300 MG tablet TAKE 1 TABLET EVERY DAY 90 tablet 2    latanoprost 0.005 % ophthalmic solution PLACE 1 DROP INTO BOTH EYES ONCE DAILY. 7.5 mL 3    metoprolol tartrate (LOPRESSOR) 25 MG tablet TAKE 2 TABLETS BY MOUTH TWICE A  tablet 1    multivitamin-minerals-lutein (CENTRUM SILVER) Tab Take by mouth. 1 Tablet Oral Every day      oxybutynin (DITROPAN) 5 MG Tab TAKE 1 TABLET TWICE A DAY 60 tablet 0    prednisoLONE acetate (PRED FORTE) 1 % DrpS       tamsulosin (FLOMAX) 0.4 mg Cp24        No current facility-administered medications for this visit.        Review of patient's allergies indicates:   Allergen Reactions    Pcn [penicillins] Rash             Review of Systems   Constitution: Negative for chills, fever, weakness and malaise/fatigue.   Cardiovascular: Negative for chest pain, claudication and leg swelling.   Respiratory: Negative for cough and shortness of breath.    Skin: Negative for color change, itching and rash.   Musculoskeletal: Positive for joint pain. Negative for back pain, muscle cramps and muscle weakness.   Gastrointestinal: Negative for nausea and vomiting.   Neurological: Negative for numbness and paresthesias.   Psychiatric/Behavioral: Negative for altered mental status.           Objective:      Physical Exam   Constitutional: He is oriented to person, place, and time. No distress.   Cardiovascular:   Pulses:       Dorsalis pedis pulses are 2+ on the right side, and 2+ on the left side.        Posterior tibial pulses are 2+ on the right side, and 2+ on the left side.   Musculoskeletal:        Right foot: There is decreased range of motion.        Left foot: There is decreased range of motion.   No pain on palpation plantar medial and central right heel. Ankle DF with 0 degrees with knee extended and 4 degrees with knee flexed, right. Negative tinel's sign.    Feet:   Right Foot:   Protective Sensation: 10 sites tested. 10 sites sensed.   Skin  Integrity: Negative for ulcer, blister, skin breakdown, erythema or warmth.   Left Foot:   Protective Sensation: 10 sites tested. 10 sites sensed.   Skin Integrity: Negative for ulcer, blister, skin breakdown, erythema or warmth.   Neurological: He is alert and oriented to person, place, and time. He has normal strength. No sensory deficit.   Skin: Skin is warm, dry and intact. No ecchymosis and no rash noted. He is not diaphoretic. No cyanosis or erythema. Nails show no clubbing.   No open lesions or macerations bilateral lower extremity.               Assessment:       Encounter Diagnoses   Name Primary?    Plantar fasciitis, right Yes    Type 2 diabetes mellitus without complication, without long-term current use of insulin          Plan:       Pedro was seen today for follow-up.    Diagnoses and all orders for this visit:    Plantar fasciitis, right    Type 2 diabetes mellitus without complication, without long-term current use of insulin      I counseled the patient on his conditions, their implications and medical management.    Shoe inspection. Diabetic Foot Education. Patient reminded of the importance of good nutrition and blood sugar control to help prevent podiatric complications of diabetes. Patient instructed on proper foot hygeine. We discussed wearing proper shoe gear, daily foot inspections, never walking without protective shoe gear, never putting sharp instruments to feet.      Continue rigid shoes for arch support and rigid orthotics, RICE therapy, OTC NSAIDS PRN pain, stretching as demonstrated in clinic for equinus contracture    Consider corticosteriod injection, PT, medrol dose pack in future if symptoms fail to subside with previously mentioned conservative options    F/u in one year or sooner, PRN    Sandee Zhong PGY2    I have personally taken the history and examined this patient and agree with the resident's note as stated as above.   Chris Palencia DPM, FACFAS

## 2018-03-27 ENCOUNTER — PROCEDURE VISIT (OUTPATIENT)
Dept: OPHTHALMOLOGY | Facility: CLINIC | Age: 78
End: 2018-03-27
Payer: MEDICARE

## 2018-03-27 VITALS — DIASTOLIC BLOOD PRESSURE: 84 MMHG | HEART RATE: 62 BPM | SYSTOLIC BLOOD PRESSURE: 163 MMHG

## 2018-03-27 DIAGNOSIS — H35.3211 EXUDATIVE AGE-RELATED MACULAR DEGENERATION OF RIGHT EYE WITH ACTIVE CHOROIDAL NEOVASCULARIZATION: Primary | ICD-10-CM

## 2018-03-27 DIAGNOSIS — H35.033 HYPERTENSIVE RETINOPATHY, BILATERAL: ICD-10-CM

## 2018-03-27 DIAGNOSIS — H35.723 RETINAL PIGMENT EPITHELIAL DETACHMENT OF BOTH EYES: ICD-10-CM

## 2018-03-27 PROCEDURE — 92235 FLUORESCEIN ANGRPH MLTIFRAME: CPT | Mod: S$GLB,,, | Performed by: OPHTHALMOLOGY

## 2018-03-27 PROCEDURE — 67028 INJECTION EYE DRUG: CPT | Mod: RT,S$GLB,, | Performed by: OPHTHALMOLOGY

## 2018-03-27 PROCEDURE — 92134 CPTRZ OPH DX IMG PST SGM RTA: CPT | Mod: S$GLB,,, | Performed by: OPHTHALMOLOGY

## 2018-03-27 PROCEDURE — 92014 COMPRE OPH EXAM EST PT 1/>: CPT | Mod: 25,S$GLB,, | Performed by: OPHTHALMOLOGY

## 2018-03-27 RX ADMIN — Medication 1.25 MG: at 11:03

## 2018-03-27 NOTE — PROGRESS NOTES
HPI     1 mo  / OCT / FA / ICG OD   DLS- 02/26/2018 Dr. Newell     Pt sts vision is the same since last visit, denies pain.   In am when waking up feels as if eyes does not open as wide does not   happen all the time.    (-)Flashes (-)Floaters  (-)Photophobia  (-)Glare      AT's prn OU    HPI     Eye Problem    Additional comments: yearly check           Comments   DLS 2/7/17  Patient states vision is better since cataract sx with Dr. Hidalgo.   Stopped glaucoma drops after catract sx-will determine if needs to restart   and next visit with Dr. Hidalgo.         OCT - Flat PED OD, NO SRF - stable   OS - stable small flat inferior PED, no SRF      ICG/FA - leakage IT to fovea OD      A/P    1. IPCV OU vs wet amd   With subfoveal PED OD  Avastin resumed 1/14  S/p Avastin OD #8 in past  OS - PED inferior to fovea stable    2/18 - recurrent swelling OD, IT to fovea  S/p Avastin OD resumed x 1    May benefit from PDT, but will stabilize with injections first    2. Floaters OU    3. POAG - Good IOP, continue management as per Dr. Hidalgo  - Needs f/u appt    4. PCIOL OU      1 month OCT    Risks, benefits, and alternatives to treatment discussed in detail with the patient.  The patient voiced understanding and wished to proceed with the procedure    Injection Procedure Note:  Diagnosis: Wet AMD OD    Topical Proparacaine and Betadine.  Inject Avastin OD at 6:00 @ 3.5-4mm posterior to limbus  Post Operative Dx: Same  Complications: None  Follow up as above.

## 2018-03-27 NOTE — PATIENT INSTRUCTIONS
Fluorescein & ICG Angiogram procedure explained to pt. FA handout attached to AVS below.     Fluorescein Angiography  Fluorescein angiography is an eye test. It is done to look at the back of your eye, including:  · The blood vessels in your eye  · The layer of tissue at the back of your eye (the retina)  · The center of your retina (the macula)  · The optic nerve  This test can diagnose diseases found in these areas. It can also diagnose other conditions that affect these areas. To do this test, a dye called fluorescein is shot (injected) into your arm. The dye goes into your bloodstream and up into the blood vessels in your eyes. A special camera is then used to take images (angiograms) of your eyes.     A fluorescein angiogram of the retina   Getting ready for your test  Tell your healthcare provider if you:  · Are pregnant or think you may be pregnant  · Are breastfeeding  · Have a history of severe allergic reactions, including to X-ray dye or other medicines  · Have kidney problems  Tell your provider about any medicines you are taking. You may need to stop taking all or some of these before the test. This includes:  · All prescription medicines  · Over-the-counter medicines such as aspirin or ibuprofen  · Street drugs  · Herbs, vitamins, and other supplements  You should arrange for an adult family member or friend to drive you home after your test. Your vision will be blurry for up to 12 hours.  Follow any other instructions from your healthcare provider.  During your test  · You are given eye drops to enlarge (dilate) your pupils.  · You then sit in front of a special camera. You place your chin on the chin rest and look into the camera.  · Images are taken of your eyes, one eye at a time.  · Fluorescein dye is then injected into your arm. The lights in the room are turned off. You may have mild nausea. You may have a warm feeling in your arm or upper body. Tell your provider if your skin feels itchy or  if you are having trouble breathing. If so, you could be having an allergic reaction to the dye.  · More pictures of your eyes are taken over 15 to 30 minutes. The camera shines a bright light into your eyes. Try to keep your head still and your eyes open.  · When enough images have been taken, the test is over.  After your test  Your vision will be blurry for up to 4 to 12 hours. This is because of your dilated pupils. Your eye will be more sensitive to light for up to 12 hours. You may want to wear sunglasses during this time. Do not drive if your vision is very blurry. You may also find it uncomfortable to read. Your skin may look yellow for a few hours. This is from the dye. Your urine will be bright yellow or orange for 24 to 48 hours after the test.     Risks and possible complications  All procedures have some risks. Possible risks of fluorescein angiography include:  · Upset stomach (nausea) and vomiting  · Leaking dye around the injection site that causes pain and swelling  · Metallic taste in your mouth  · Infection at injection site  · Allergic reaction to the dye  · Dry mouth or too much saliva  · Faster heart rate  · Sweating  · Lower back pain           Controlling High Blood Pressure  High blood pressure (hypertension) is often called the silent killer. This is because many people who have it dont know it. High blood pressure is defined as 140/90 mm Hg or higher. Know your blood pressure and remember to check it regularly. Doing so can save your life. Here are some things you can do to help control your blood pressure.    Choose heart-healthy foods  · Select low-salt, low-fat foods. Limit sodium intake to 2,400 mg per day or the amount suggested by your healthcare provider.  · Limit canned, dried, cured, packaged, and fast foods. These can contain a lot of salt.  · Eat 8 to 10 servings of fruits and vegetables every day.  · Choose lean meats, fish, or chicken.  · Eat whole-grain pasta, brown rice,  and beans.  · Eat 2 to 3 servings of low-fat or fat-free dairy products.  · Ask your doctor about the DASH eating plan. This plan helps reduce blood pressure.  · When you go to a restaurant, ask that your meal be prepared with no added salt.  Maintain a healthy weight  · Ask your healthcare provider how many calories to eat a day. Then stick to that number.  · Ask your healthcare provider what weight range is healthiest for you. If you are overweight, a weight loss of only 3% to 5% of your body weight can help lower blood pressure. Generally, a good weight loss goal is to lose 10% of your body weight in a year.  · Limit snacks and sweets.  · Get regular exercise.  Get up and get active  · Choose activities you enjoy. Find ones you can do with friends or family. This includes bicycling, dancing, walking, and jogging.  · Park farther away from building entrances.  · Use stairs instead of the elevator.  · When you can, walk or bike instead of driving.  · Ina leaves, garden, or do household repairs.  · Be active at a moderate to vigorous level of physical activity for at least 40 minutes for a minimum of 3 to 4 days a week.   Manage stress  · Make time to relax and enjoy life. Find time to laugh.  · Communicate your concerns with your loved ones and your healthcare provider.  · Visit with family and friends, and keep up with hobbies.  Limit alcohol and quit smoking  · Men should have no more than 2 drinks per day.  · Women should have no more than 1 drink per day.  · Talk with your healthcare provider about quitting smoking. Smoking significantly increases your risk for heart disease and stroke. Ask your healthcare provider about community smoking cessation programs and other options.  Medicines  If lifestyle changes arent enough, your healthcare provider may prescribe high blood pressure medicine. Take all medicines as prescribed. If you have any questions about your medicines, ask your healthcare provider before  stopping or changing them.         .POST INTRAVITREAL INJECTION PATIENT INSTRUCTIONS   Below are some guidelines for what to expect after your treatment and additional care instructions.   * you may experience mild discomfort in your eye after the treatment. Your eye usually feels better within 24 to 48 hours after the procedure.   * you have been given drops that numb the surface of your eye.   DO NOT rub or touch your eye, DO NOT wear contacts until numbness goes away.   * you may experience small spots that appear in your field of vision, these are usually caused by an air bubble or from the medication. It taked a few hours or days for these to go away.   * use of sunglasses will help reduce light sensitivity and glare.   * DO NOT swim or put sink water ( tap water ) or swin for at least 24 hours after the injection   * If you have a gritty, burning, irritating or stinging feeling in the injected eye. This may be a result of the antiseptic used. Use artifical tears or eye lubricant ( from over- the- counter from your local pharmacy ). If you have some at home already please check the expiration date, so not to use anything contaminated in your eye. A cool pack over your eye brow above the injected eye may also relieve discomfort.   Call us right away or go to the Emergency Department if you have a dramatic decrease in vision or extreme pain in the eye.   [facility] [phone]

## 2018-04-01 ENCOUNTER — OFFICE VISIT (OUTPATIENT)
Dept: URGENT CARE | Facility: CLINIC | Age: 78
End: 2018-04-01
Payer: MEDICARE

## 2018-04-01 VITALS
DIASTOLIC BLOOD PRESSURE: 78 MMHG | HEIGHT: 71 IN | BODY MASS INDEX: 30.1 KG/M2 | RESPIRATION RATE: 18 BRPM | HEART RATE: 72 BPM | WEIGHT: 215 LBS | OXYGEN SATURATION: 97 % | TEMPERATURE: 97 F | SYSTOLIC BLOOD PRESSURE: 164 MMHG

## 2018-04-01 DIAGNOSIS — G89.29 CHRONIC HEEL PAIN, RIGHT: Primary | ICD-10-CM

## 2018-04-01 DIAGNOSIS — M79.671 CHRONIC HEEL PAIN, RIGHT: Primary | ICD-10-CM

## 2018-04-01 PROCEDURE — 96372 THER/PROPH/DIAG INJ SC/IM: CPT | Mod: S$GLB,,, | Performed by: FAMILY MEDICINE

## 2018-04-01 PROCEDURE — 99214 OFFICE O/P EST MOD 30 MIN: CPT | Mod: 25,S$GLB,, | Performed by: FAMILY MEDICINE

## 2018-04-01 RX ORDER — BETAMETHASONE SODIUM PHOSPHATE AND BETAMETHASONE ACETATE 3; 3 MG/ML; MG/ML
6 INJECTION, SUSPENSION INTRA-ARTICULAR; INTRALESIONAL; INTRAMUSCULAR; SOFT TISSUE
Status: COMPLETED | OUTPATIENT
Start: 2018-04-01 | End: 2018-04-01

## 2018-04-01 RX ORDER — DICLOFENAC SODIUM 75 MG/1
75 TABLET, DELAYED RELEASE ORAL 2 TIMES DAILY
Qty: 30 TABLET | Refills: 1 | Status: ON HOLD | OUTPATIENT
Start: 2018-04-01 | End: 2018-04-09 | Stop reason: HOSPADM

## 2018-04-01 RX ADMIN — BETAMETHASONE SODIUM PHOSPHATE AND BETAMETHASONE ACETATE 6 MG: 3; 3 INJECTION, SUSPENSION INTRA-ARTICULAR; INTRALESIONAL; INTRAMUSCULAR; SOFT TISSUE at 02:04

## 2018-04-01 NOTE — PROGRESS NOTES
"Subjective:       Patient ID: Pedro Ponce is a 78 y.o. male.    Vitals:  height is 5' 11" (1.803 m) and weight is 97.5 kg (215 lb). His temperature is 97 °F (36.1 °C). His blood pressure is 164/78 (abnormal) and his pulse is 72. His respiration is 18 and oxygen saturation is 97%.     Chief Complaint: Foot Injury (Rt heel pain)    Foot Injury    The incident occurred more than 1 week ago. The injury mechanism is unknown. The pain is present in the right heel. The quality of the pain is described as aching and stabbing. The pain is at a severity of 5/10. The pain is mild. The pain has been constant since onset. The symptoms are aggravated by movement and weight bearing. He has tried ice and NSAIDs for the symptoms. The treatment provided mild relief.     Review of Systems   Constitution: Negative for chills and fever.   HENT: Negative for sore throat.    Eyes: Negative for blurred vision.   Cardiovascular: Negative for chest pain.   Respiratory: Negative for shortness of breath.    Skin: Negative for rash.   Musculoskeletal: Negative for back pain and joint pain.   Gastrointestinal: Negative for abdominal pain, diarrhea, nausea and vomiting.   Neurological: Negative for headaches.   Psychiatric/Behavioral: The patient is not nervous/anxious.    All other systems reviewed and are negative.      Objective:      Physical Exam   Constitutional: He is oriented to person, place, and time. He appears well-developed and well-nourished. He is cooperative.  Non-toxic appearance. He does not appear ill. No distress.   HENT:   Head: Normocephalic and atraumatic.   Right Ear: Hearing, tympanic membrane, external ear and ear canal normal.   Left Ear: Hearing, tympanic membrane, external ear and ear canal normal.   Nose: Nose normal. No mucosal edema, rhinorrhea or nasal deformity. No epistaxis. Right sinus exhibits no maxillary sinus tenderness and no frontal sinus tenderness. Left sinus exhibits no maxillary sinus " tenderness and no frontal sinus tenderness.   Mouth/Throat: Uvula is midline, oropharynx is clear and moist and mucous membranes are normal. No trismus in the jaw. Normal dentition. No uvula swelling. No posterior oropharyngeal erythema.   Eyes: Conjunctivae and lids are normal. Right eye exhibits no discharge. Left eye exhibits no discharge. No scleral icterus.   Sclera clear bilat   Neck: Trachea normal, normal range of motion, full passive range of motion without pain and phonation normal. Neck supple.   Cardiovascular: Normal rate, regular rhythm, normal heart sounds, intact distal pulses and normal pulses.    Pulmonary/Chest: Effort normal and breath sounds normal. No respiratory distress.   Abdominal: Soft. Normal appearance and bowel sounds are normal. He exhibits no distension, no pulsatile midline mass and no mass. There is no tenderness.   Musculoskeletal: Normal range of motion. He exhibits no edema or deformity.   Right heel, mild tenderness at mid heel area no gross deformity noted.    Neurological: He is alert and oriented to person, place, and time. He exhibits normal muscle tone. Coordination normal.   Skin: Skin is warm, dry and intact. He is not diaphoretic. No pallor.   Psychiatric: He has a normal mood and affect. His speech is normal and behavior is normal. Judgment and thought content normal. Cognition and memory are normal.   Nursing note and vitals reviewed.      Assessment:       1. Chronic heel pain, right        Plan:         Chronic heel pain, right  -     X-Ray Calcaneus 2 View Right; Future; Expected date: 04/01/2018  -     betamethasone acetate-betamethasone sodium phosphate injection 6 mg; Inject 1 mL (6 mg total) into the muscle one time.        Follow up with PMD

## 2018-04-01 NOTE — PATIENT INSTRUCTIONS
Understanding Heel Pain    Your heel is the back part of your foot. A band of tissue called the plantar fascia connects the heel bone to the bones in the ball of your foot. Nerves run from the heel up the inside of your ankle and into your leg. When you feel pain in the bottom of your heel, the plantar fascia may be inflamed. Overuse, Achilles tightness, or excess body weight can cause the tissue to tear or pull away from the bone. Sometimes the inflamed plantar fascia also irritates a nerve, causing more pain.  What causes heel pain?  Wearing shoes with poor cushioning can irritate the tissue in your heel (plantar fascia). Being overweight or standing for long periods can also irritate the tissue. Running, walking, tennis, and other sports that put stress on the heels can cause tiny tears in the tissue. If your lower leg muscles are tight, this is more likely to occur. A tight Achilles tendon will also contribute to heel pain.  Symptoms  You may feel pain on the bottom or on the inside edge of your heel. The pain may be sharp when you get out of bed or when you stand up after sitting for a while. You may feel a dull ache in your heel after youve been standing for a long time on a hard surface. Running can also cause a dull ache.  Preventing future problems  To prevent future heel pain, wear shoes with well-cushioned heels. And do exercises prescribed by your healthcare provider to stretch the plantar fascia and the muscles in the lower leg.   Date Last Reviewed: 9/10/2015  © 5164-6588 [employer]. [address] [place] [address], [place] [address]. All rights reserved. This information is not intended as a substitute for professional medical care. Always follow your healthcare professional's instructions.

## 2018-04-04 ENCOUNTER — TELEPHONE (OUTPATIENT)
Dept: URGENT CARE | Facility: CLINIC | Age: 78
End: 2018-04-04

## 2018-04-06 RX ORDER — OXYBUTYNIN CHLORIDE 5 MG/1
TABLET ORAL
Qty: 60 TABLET | Refills: 0 | Status: SHIPPED | OUTPATIENT
Start: 2018-04-06 | End: 2018-05-05 | Stop reason: SDUPTHER

## 2018-04-08 ENCOUNTER — NURSE TRIAGE (OUTPATIENT)
Dept: ADMINISTRATIVE | Facility: CLINIC | Age: 78
End: 2018-04-08

## 2018-04-09 ENCOUNTER — ANESTHESIA EVENT (OUTPATIENT)
Dept: ENDOSCOPY | Facility: HOSPITAL | Age: 78
End: 2018-04-09
Payer: MEDICARE

## 2018-04-09 ENCOUNTER — ANESTHESIA (OUTPATIENT)
Dept: ENDOSCOPY | Facility: HOSPITAL | Age: 78
End: 2018-04-09
Payer: MEDICARE

## 2018-04-09 ENCOUNTER — HOSPITAL ENCOUNTER (OUTPATIENT)
Facility: HOSPITAL | Age: 78
Discharge: HOME | End: 2018-04-09
Attending: COLON & RECTAL SURGERY | Admitting: COLON & RECTAL SURGERY
Payer: MEDICARE

## 2018-04-09 VITALS
WEIGHT: 220 LBS | OXYGEN SATURATION: 97 % | BODY MASS INDEX: 30.8 KG/M2 | RESPIRATION RATE: 18 BRPM | SYSTOLIC BLOOD PRESSURE: 136 MMHG | HEART RATE: 60 BPM | DIASTOLIC BLOOD PRESSURE: 72 MMHG | HEIGHT: 71 IN | TEMPERATURE: 98 F

## 2018-04-09 DIAGNOSIS — Z12.11 SCREENING FOR COLON CANCER: ICD-10-CM

## 2018-04-09 PROCEDURE — 45385 COLONOSCOPY W/LESION REMOVAL: CPT | Mod: PT,,, | Performed by: COLON & RECTAL SURGERY

## 2018-04-09 PROCEDURE — 45380 COLONOSCOPY AND BIOPSY: CPT | Performed by: COLON & RECTAL SURGERY

## 2018-04-09 PROCEDURE — D9220A PRA ANESTHESIA: Mod: PT,CRNA,, | Performed by: NURSE ANESTHETIST, CERTIFIED REGISTERED

## 2018-04-09 PROCEDURE — 27201089 HC SNARE, DISP (ANY): Performed by: COLON & RECTAL SURGERY

## 2018-04-09 PROCEDURE — 37000008 HC ANESTHESIA 1ST 15 MINUTES: Performed by: COLON & RECTAL SURGERY

## 2018-04-09 PROCEDURE — 25000003 PHARM REV CODE 250: Performed by: NURSE PRACTITIONER

## 2018-04-09 PROCEDURE — 45385 COLONOSCOPY W/LESION REMOVAL: CPT | Performed by: COLON & RECTAL SURGERY

## 2018-04-09 PROCEDURE — 45380 COLONOSCOPY AND BIOPSY: CPT | Mod: 59,,, | Performed by: COLON & RECTAL SURGERY

## 2018-04-09 PROCEDURE — 37000009 HC ANESTHESIA EA ADD 15 MINS: Performed by: COLON & RECTAL SURGERY

## 2018-04-09 PROCEDURE — 88305 TISSUE EXAM BY PATHOLOGIST: CPT | Mod: 26,,, | Performed by: PATHOLOGY

## 2018-04-09 PROCEDURE — 27201012 HC FORCEPS, HOT/COLD, DISP: Performed by: COLON & RECTAL SURGERY

## 2018-04-09 PROCEDURE — 63600175 PHARM REV CODE 636 W HCPCS: Performed by: NURSE ANESTHETIST, CERTIFIED REGISTERED

## 2018-04-09 PROCEDURE — D9220A PRA ANESTHESIA: Mod: PT,ANES,, | Performed by: ANESTHESIOLOGY

## 2018-04-09 PROCEDURE — 88305 TISSUE EXAM BY PATHOLOGIST: CPT | Performed by: PATHOLOGY

## 2018-04-09 RX ORDER — PROPOFOL 10 MG/ML
VIAL (ML) INTRAVENOUS
Status: DISCONTINUED | OUTPATIENT
Start: 2018-04-09 | End: 2018-04-09

## 2018-04-09 RX ORDER — PROPOFOL 10 MG/ML
VIAL (ML) INTRAVENOUS CONTINUOUS PRN
Status: DISCONTINUED | OUTPATIENT
Start: 2018-04-09 | End: 2018-04-09

## 2018-04-09 RX ORDER — SODIUM CHLORIDE 9 MG/ML
INJECTION, SOLUTION INTRAVENOUS CONTINUOUS
Status: DISCONTINUED | OUTPATIENT
Start: 2018-04-09 | End: 2018-04-09 | Stop reason: HOSPADM

## 2018-04-09 RX ORDER — POLYVINYL ALCOHOL 14 MG/ML
1 SOLUTION/ DROPS OPHTHALMIC
COMMUNITY
End: 2018-12-11

## 2018-04-09 RX ORDER — LIDOCAINE HCL/PF 100 MG/5ML
SYRINGE (ML) INTRAVENOUS
Status: DISCONTINUED | OUTPATIENT
Start: 2018-04-09 | End: 2018-04-09

## 2018-04-09 RX ADMIN — LIDOCAINE HYDROCHLORIDE 40 MG: 20 INJECTION, SOLUTION INTRAVENOUS at 10:04

## 2018-04-09 RX ADMIN — PROPOFOL 150 MCG/KG/MIN: 10 INJECTION, EMULSION INTRAVENOUS at 10:04

## 2018-04-09 RX ADMIN — SODIUM CHLORIDE: 9 INJECTION, SOLUTION INTRAVENOUS at 09:04

## 2018-04-09 RX ADMIN — PROPOFOL 100 MG: 10 INJECTION, EMULSION INTRAVENOUS at 10:04

## 2018-04-09 NOTE — H&P
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  CHEST: Denies VALERA, cyanosis, wheezing, cough and sputum production.  CARDIOVASCULAR: Denies chest pain, PND, orthopnea or reduced exercise tolerance.   Musculoskeletal ROS: negative for - gait disturbance or joint pain  Neurological ROS: negative for - confusion or memory loss        Physical Exam:  General: well developed, well nourished, no distress  Head: normocephalic  Neck: supple, symmetrical, trachea midline  Lungs:  clear to auscultation bilaterally and normal respiratory effort  Heart: regular rate and rhythm, S1, S2 normal, no murmur, rub or gallop and regular rate and rhythm  Abdomen: soft, non-tender non-distented; bowel sounds normal; no masses,  no organomegaly  Extremities: no cyanosis or edema, or clubbing       Moderate Sedation (choice): Mallampati Score 1    ASA : II    IMP: personal history of colon polyps    Plan: Colonoscopy with Moderate sedation.  I have explained the procedure including indications, alternatives, expected outcomes and potential complications. The patient appears to understand and gives informed consent. The patient is medically ready for surgery.

## 2018-04-09 NOTE — ANESTHESIA PREPROCEDURE EVALUATION
04/09/2018  Pedro Ponce is a 78 y.o., male.    Anesthesia Evaluation    I have reviewed the Patient Summary Reports.     I have reviewed the Medications.     Review of Systems  Anesthesia Hx:  No problems with previous Anesthesia  History of prior surgery of interest to airway management or planning: Denies Family Hx of Anesthesia complications.   Denies Personal Hx of Anesthesia complications.   Cardiovascular:   Exercise tolerance: good Hypertension    Pulmonary:  Pulmonary Normal    Renal/:   Chronic Renal Disease, CRI    Neurological:  Neurology Normal    Endocrine:   Diabetes        Physical Exam  General:  Well nourished    Airway/Jaw/Neck:  Airway Findings: Mouth Opening: Normal Tongue: Normal  General Airway Assessment: Adult  Mallampati: II  TM Distance: Normal, at least 6 cm      Dental:  Dental Findings: In tact   Chest/Lungs:  Chest/Lungs Findings: Normal Respiratory Rate     Heart/Vascular:  Heart Findings: Rate: Normal        Mental Status:  Mental Status Findings:  Alert and Oriented         Anesthesia Plan  Type of Anesthesia, risks & benefits discussed:  Anesthesia Type:  general  Patient's Preference:   Intra-op Monitoring Plan: standard ASA monitors  Intra-op Monitoring Plan Comments:   Post Op Pain Control Plan: multimodal analgesia, IV/PO Opioids PRN and per primary service following discharge from PACU  Post Op Pain Control Plan Comments:   Induction:   IV  Beta Blocker:  Patient is not currently on a Beta-Blocker (No further documentation required).       Informed Consent: Patient understands risks and agrees with Anesthesia plan.  Questions answered. Anesthesia consent signed with patient.  ASA Score: 2     Day of Surgery Review of History & Physical:    H&P update referred to the surgeon.         Ready For Surgery From Anesthesia Perspective.

## 2018-04-09 NOTE — TRANSFER OF CARE
"Anesthesia Transfer of Care Note    Patient: Pedro Ponce    Procedure(s) Performed: Procedure(s) (LRB):  COLONOSCOPY (N/A)    Patient location: PACU    Anesthesia Type: general    Transport from OR: Transported from OR on 2-3 L/min O2 by NC with adequate spontaneous ventilation    Post pain: adequate analgesia    Post assessment: no apparent anesthetic complications    Post vital signs: stable    Level of consciousness: responds to stimulation and sedated    Nausea/Vomiting: no nausea/vomiting    Complications: none    Transfer of care protocol was followed      Last vitals:   Visit Vitals  BP (!) 174/84 (BP Location: Left arm, Patient Position: Lying)   Pulse 79   Temp 36.9 °C (98.4 °F) (Temporal)   Resp 20   Ht 5' 11" (1.803 m)   Wt 99.8 kg (220 lb)   SpO2 99%   BMI 30.68 kg/m²     "

## 2018-04-09 NOTE — TELEPHONE ENCOUNTER
"  Reason for Disposition   General information question, no triage required and triager able to answer question    Answer Assessment - Initial Assessment Questions  1. REASON FOR CALL or QUESTION: "What is your reason for calling today?" or "How can I best help you?" or "What question do you have that I can help answer?"      Bowel prep-wants to know when he would go to the bathroom    Protocols used: ST INFORMATION ONLY CALL-A-AH    "

## 2018-04-09 NOTE — DISCHARGE INSTRUCTIONS
High-Fiber Diet  Fiber is in fruits, vegetables, cereals, and grains. Fiber passes through your body undigested. A high-fiber diet helps food move through your intestinal tract. The added bulk is helpful in preventing constipation. In people with diverticulosis, fiber helps clean out the pouches along the colon wall. It also prevents new pouches from forming. A high-fiber diet reduces the risk of colon cancer. It also lowers blood cholesterol and prevents high blood sugar in people with diabetes.    The fiber-rich foods listed below should be part of your diet. If you are not used to high-fiber foods, start with 1 or 2 foods from this list. Every 3 to 4 days add a new one to your diet. Do this until you are eating 4 high-fiber foods per day. This should give you 20 to 35 grams of fiber a day. It is also important to drink a lot of water when you are on this diet. You should have 6 to 8 glasses of water a day. Water makes the fiber swell and increases the benefit.  Foods high in dietary fiber  The following foods are high in dietary fiber:  · Breads. Breads made with 100% whole-wheat flour; stone, wheat, or rye crackers; whole-grain tortillas, bran muffins.  · Cereals. Whole-grain and bran cereals with bran (shredded wheat, wheat flakes, raisin bran, corn bran); oatmeal, rolled oats, granola, and brown rice.  · Fruits. Fresh fruits and their edible skins (pears, prunes, raisins, berries, apples, and apricots); bananas, citrus fruit, mangoes, pineapple; and prune juice.  · Nuts. Any nuts and seeds.  · Vegetables. Best served raw or lightly cooked. All types, especially: green peas, celery, eggplant, potatoes, spinach, broccoli, Groom sprouts, winter squash, carrots, cauliflower, soybeans, lentils, and fresh and dried beans of all kinds.  · Other. Popcorn, any spices.  Date Last Reviewed: 8/1/2016  © 8644-7383 [employer]. [address] [place] [address], [place] [address]. All rights reserved. This  information is not intended as a substitute for professional medical care. Always follow your healthcare professional's instructions.

## 2018-04-09 NOTE — PROVATION PATIENT INSTRUCTIONS
Discharge Summary/Instructions after an Endoscopic Procedure  Patient Name: Pedro Ponce  Patient MRN: [id number]  Patient YOB: 1940  Monday, April 09, 2018  Vini Mir MD  RESTRICTIONS:  During your procedure today, you received medications for sedation.  These   medications may affect your judgment, balance and coordination.  Therefore,   for 24 hours, you have the following restrictions:   - DO NOT drive a car, operate machinery, make legal/financial decisions,   sign important papers or drink alcohol.    ACTIVITY:  The following day: return to full activity including work, except no heavy   lifting, straining or running for 3 days if polyps were removed.  DIET:  Eat and drink normally unless instructed otherwise.     TREATMENT FOR COMMON SIDE EFFECTS:  - Mild abdominal pain, nausea, belching, bloating or excessive gas:  rest,   eat lightly and use a heating pad.  - Sore Throat: treat with throat lozenges and/or gargle with warm salt   water.  - Because air was used during the procedure, expelling large amounts of air   from your rectum or belching is normal.  - If a bowel prep was taken, you may not have a bowel movement for 1-3 days.    This is normal.  SYMPTOMS TO WATCH FOR AND REPORT TO YOUR PHYSICIAN:  1. Abdominal pain or bloating, other than gas cramps.  2. Chest pain.  3. Back pain.  4. Signs of infection such as: chills or fever occurring within 24 hours   after the procedure.  5. Rectal bleeding, which would show as bright red, maroon, or black stools.   (A tablespoon of blood from the rectum is not serious, especially if   hemorrhoids are present.)  6. Vomiting.  7. Weakness or dizziness.  GO DIRECTLY TO THE NEAREST EMERGENCY ROOM IF YOU HAVE ANY OF THE FOLLOWING:      Difficulty breathing              Chills and/or fever over 101 F   Persistent vomiting and/or vomiting blood   Severe abdominal pain   Severe chest pain   Black, tarry stools   Bleeding- more than one tablespoon   Any  other symptom or condition that you feel may need urgent attention  Your doctor recommends these additional instructions:  If any biopsies were taken, your doctors clinic will contact you in 1 to 2   weeks with any results.  - Discharge patient to home (ambulatory).   - Patient has a contact number available for emergencies.  The signs and   symptoms of potential delayed complications were discussed with the   patient.  Return to normal activities tomorrow.  Written discharge   instructions were provided to the patient.   - High fiber diet for the rest of the patient's life.   - Continue present medications.   - Await pathology results.   - Repeat colonoscopy in 3 months for surveillance based on pathology   results.  For questions, problems or results please call your physician - Vini Mir MD at Work:  [phone].  [facility], EMERGENCY ROOM PHONE NUMBER: [phone]  IF A COMPLICATION OR EMERGENCY SITUATION ARISES AND YOU ARE UNABLE TO REACH   YOUR PHYSICIAN - GO DIRECTLY TO THE EMERGENCY ROOM.  Vini Mir MD  4/9/2018 10:53:51 AM  This report has been verified and signed electronically.

## 2018-04-10 NOTE — ANESTHESIA POSTPROCEDURE EVALUATION
"Anesthesia Post Evaluation    Patient: Pedro Ponce    Procedure(s) Performed: Procedure(s) (LRB):  COLONOSCOPY (N/A)    Final Anesthesia Type: general  Patient location during evaluation: PACU  Patient participation: Yes- Able to Participate  Level of consciousness: awake and alert  Post-procedure vital signs: reviewed and stable  Pain management: adequate  Airway patency: patent  PONV status at discharge: No PONV  Anesthetic complications: no      Cardiovascular status: blood pressure returned to baseline  Respiratory status: unassisted, room air and spontaneous ventilation  Hydration status: euvolemic  Follow-up not needed.        Visit Vitals  /72   Pulse 60   Temp 36.9 °C (98.4 °F) (Temporal)   Resp 18   Ht 5' 11" (1.803 m)   Wt 99.8 kg (220 lb)   SpO2 97%   BMI 30.68 kg/m²       Pain/You Score: Pain Assessment Performed: Yes (4/9/2018 11:10 AM)  Presence of Pain: denies (4/9/2018 11:10 AM)  You Score: 9 (4/9/2018 11:25 AM)      "

## 2018-04-12 NOTE — PROGRESS NOTES
SPECIMEN  1) Ascending colon, 8 mm polyp x2.  2) Cecum mass, 3 cm.  3) Splenic flexure, 2 mm.  Supplemental Diagnosis  1. TUBULAR ADENOMA  (Electronically Signed: 2018-04-11 08:46:23 )  Diagnosed by: Wili Mata M.D.  FINAL PATHOLOGIC DIAGNOSIS  1. FINAL REPORT PENDING ADDITIONAL LEVELS  2. FRAGMENTED TUBULOVILLOUS ADENOMA, NO DEFINITIVE INVASION IDENTIFIED IN THIS MATERIAL  3. FRAGMENT OF NONNEOPLASTIC COLONIC MUCOSA  Diagnosed by: Wili Mata M.D.  (Electronically Signed: 2018-04-10 16:31:55)    Colonoscopy revealed a large polyp which required piecemeal resection.      I would recommend repeat colonoscopy in 3 months to ensure complete removal and destruction.      If you have any questions or if I can clarify any of the above please contact me:    [place] [phone]   Pager [phone]  email maria [email or web address]  Nurse Masha Randall [phone]   Catia Morton:  [phone]    Sincerely  HVini MD, FACS, FASCRS  Staff Surgeon  Dept of Colon and Rectal Surgery

## 2018-04-16 ENCOUNTER — TELEPHONE (OUTPATIENT)
Dept: ENDOSCOPY | Facility: HOSPITAL | Age: 78
End: 2018-04-16

## 2018-04-17 RX ORDER — DUTASTERIDE 0.5 MG/1
CAPSULE, LIQUID FILLED ORAL
Qty: 90 CAPSULE | Refills: 2 | Status: SHIPPED | OUTPATIENT
Start: 2018-04-17 | End: 2018-12-05 | Stop reason: SDUPTHER

## 2018-04-17 RX ORDER — AMLODIPINE BESYLATE AND ATORVASTATIN CALCIUM 10; 10 MG/1; MG/1
TABLET, FILM COATED ORAL
Qty: 90 TABLET | Refills: 0 | Status: SHIPPED | OUTPATIENT
Start: 2018-04-17 | End: 2018-06-07 | Stop reason: SDUPTHER

## 2018-04-17 RX ORDER — IRBESARTAN 300 MG/1
TABLET ORAL
Qty: 90 TABLET | Refills: 2 | Status: SHIPPED | OUTPATIENT
Start: 2018-04-17 | End: 2018-12-05 | Stop reason: SDUPTHER

## 2018-04-27 ENCOUNTER — HOSPITAL ENCOUNTER (OUTPATIENT)
Dept: RADIOLOGY | Facility: HOSPITAL | Age: 78
Discharge: HOME | End: 2018-04-27
Attending: INTERNAL MEDICINE
Payer: MEDICARE

## 2018-04-27 ENCOUNTER — OFFICE VISIT (OUTPATIENT)
Dept: INTERNAL MEDICINE | Facility: CLINIC | Age: 78
End: 2018-04-27
Payer: MEDICARE

## 2018-04-27 VITALS
WEIGHT: 219.13 LBS | HEART RATE: 99 BPM | DIASTOLIC BLOOD PRESSURE: 78 MMHG | SYSTOLIC BLOOD PRESSURE: 140 MMHG | HEIGHT: 71 IN | BODY MASS INDEX: 30.68 KG/M2 | OXYGEN SATURATION: 99 %

## 2018-04-27 DIAGNOSIS — M25.561 ACUTE PAIN OF RIGHT KNEE: Primary | ICD-10-CM

## 2018-04-27 DIAGNOSIS — M25.561 ACUTE PAIN OF RIGHT KNEE: ICD-10-CM

## 2018-04-27 PROCEDURE — 99213 OFFICE O/P EST LOW 20 MIN: CPT | Mod: S$GLB,,, | Performed by: INTERNAL MEDICINE

## 2018-04-27 PROCEDURE — 3077F SYST BP >= 140 MM HG: CPT | Mod: CPTII,S$GLB,, | Performed by: INTERNAL MEDICINE

## 2018-04-27 PROCEDURE — 73562 X-RAY EXAM OF KNEE 3: CPT | Mod: 26,RT,, | Performed by: RADIOLOGY

## 2018-04-27 PROCEDURE — 73562 X-RAY EXAM OF KNEE 3: CPT | Mod: TC,FY,RT

## 2018-04-27 PROCEDURE — 99999 PR PBB SHADOW E&M-EST. PATIENT-LVL III: CPT | Mod: PBBFAC,,, | Performed by: INTERNAL MEDICINE

## 2018-04-27 PROCEDURE — 3078F DIAST BP <80 MM HG: CPT | Mod: CPTII,S$GLB,, | Performed by: INTERNAL MEDICINE

## 2018-04-27 RX ORDER — METHYLPREDNISOLONE 4 MG/1
TABLET ORAL
Qty: 21 TABLET | Refills: 0 | Status: SHIPPED | OUTPATIENT
Start: 2018-04-27 | End: 2018-09-24

## 2018-04-27 RX ORDER — NAPROXEN 500 MG/1
500 TABLET ORAL 2 TIMES DAILY WITH MEALS
Qty: 4 TABLET | Refills: 0 | Status: SHIPPED | OUTPATIENT
Start: 2018-04-27 | End: 2018-04-29

## 2018-04-27 NOTE — PROGRESS NOTES
Subjective:       Patient ID: Pedro Ponce is a 78 y.o. male.    Chief Complaint: Knee Pain (r knee swollen and painful )    HPI Mr. Ponce is a 78-year-old male who presents with a chief complaint of right knee pain.  He states that he is very active.  He plays golf and he has also been riding his bicycle.  He has been riding the bicycle for 2-3 weeks.  However yesterday he decided to try the treadmill instead for exercise.  He spent 15 minutes on the treadmill yesterday.  However he then began to experience pain in the right knee yesterday.  He describes this pain as achy.  It is rated 6/10 in severity when he tries to put any weight on the knee.  However when he is sitting at rest, he has no pain.  He denies any acute trauma to the knee.  He has never been diagnosed with arthritis in the past.  No redness or warmth associated.    Review of Systems   Musculoskeletal: Positive for arthralgias and joint swelling.       Objective:      Physical Exam   Constitutional: He appears well-developed and well-nourished. No distress.   Musculoskeletal: He exhibits edema. He exhibits no deformity.   Visible edema of the right knee.  No erythema, warmth, or ecchymosis present.  Patient able to perform full range of motion exercises however he does have pain on flexion and extension.  Crepitus is present.  Joint is stable on exam.   Neurological: He is alert.   Skin: Skin is warm and dry. He is not diaphoretic.   Psychiatric: He has a normal mood and affect. His behavior is normal. Judgment and thought content normal.   Vitals reviewed.      Assessment:       1. Acute pain of right knee        Plan:     #1 acute pain of right knee  Likely due to arthritis  Obtaining x-rays for further evaluation  Prescribing naproxen, but only for a short course given his history of chronic kidney disease  Also prescribing a Medrol Dosepak for treatment of inflammation.  Advised patient on R.I.C.E. Therapy.    RTC PRN

## 2018-04-27 NOTE — PATIENT INSTRUCTIONS
Methylprednisolone tablets  What is this medicine?  METHYLPREDNISOLONE (meth ill pred NISS oh lone) is a corticosteroid. It is commonly used to treat inflammation of the skin, joints, lungs, and other organs. Common conditions treated include asthma, allergies, and arthritis. It is also used for other conditions, such as blood disorders and diseases of the adrenal glands.  How should I use this medicine?  Take this medicine by mouth with a drink of water. Follow the directions on the prescription label. Take it with food or milk to avoid stomach upset. If you are taking this medicine once a day, take it in the morning. Do not take more medicine than you are told to take. Do not suddenly stop taking your medicine because you may develop a severe reaction. Your doctor will tell you how much medicine to take. If your doctor wants you to stop the medicine, the dose may be slowly lowered over time to avoid any side effects.  Talk to your pediatrician regarding the use of this medicine in children. Special care may be needed.  What side effects may I notice from receiving this medicine?  Side effects that you should report to your doctor or health care professional as soon as possible:  · allergic reactions like skin rash, itching or hives, swelling of the face, lips, or tongue  · eye pain, decreased or blurred vision, or bulging eyes  · fever, sore throat, sneezing, cough, or other signs of infection, wounds that will not heal  · increased thirst  · mental depression, mood swings, mistaken feelings of self importance or of being mistreated  · pain in hips, back, ribs, arms, shoulders, or legs  · swelling of the ankles, feet, hands  · trouble passing urine or change in the amount of urine  Side effects that usually do not require medical attention (report to your doctor or health care professional if they continue or are bothersome):  · confusion, excitement, restlessness  · headache  · nausea, vomiting  · skin  problems, acne, thin and shiny skin  · weight gain  What may interact with this medicine?  Do not take this medicine with any of the following medications:  · mifepristone  This medicine may also interact with the following medications:  · tacrolimus  · vaccines  · warfarin  What if I miss a dose?  If you miss a dose, take it as soon as you can. If it is almost time for your next dose, talk to your doctor or health care professional. You may need to miss a dose or take an extra dose. Do not take double or extra doses without advice.  Where should I keep my medicine?  Keep out of the reach of children.  Store at room temperature between 20 and 25 degrees C (68 and 77 degrees F). Throw away any unused medicine after the expiration date.  What should I tell my health care provider before I take this medicine?  They need to know if you have any of these conditions:  · Cushing's syndrome  · diabetes  · glaucoma  · heart problems or disease  · high blood pressure  · infection such as herpes, measles, tuberculosis, or chickenpox  · kidney disease  · liver disease  · mental problems  · myasthenia gravis  · osteoporosis  · seizures  · stomach ulcer or intestine disease including colitis and diverticulitis  · thyroid problem  · an unusual or allergic reaction to lactose, methylprednisolone, other medicines, foods, dyes, or preservatives  · pregnant or trying to get pregnant  · breast-feeding  What should I watch for while using this medicine?  Visit your doctor or health care professional for regular checks on your progress. If you are taking this medicine for a long time, carry an identification card with your name and address, the type and dose of your medicine, and your doctor's name and address.  The medicine may increase your risk of getting an infection. Stay away from people who are sick. Tell your doctor or health care professional if you are around anyone with measles or chickenpox.  If you are going to have surgery,  tell your doctor or health care professional that you have taken this medicine within the last twelve months.  Ask your doctor or health care professional about your diet. You may need to lower the amount of salt you eat.  The medicine can increase your blood sugar. If you are a diabetic check with your doctor if you need help adjusting the dose of your diabetic medicine.  NOTE:This sheet is a summary. It may not cover all possible information. If you have questions about this medicine, talk to your doctor, pharmacist, or health care provider. Copyright© 2017 [employer]        Naproxen and naproxen sodium oral immediate-release tablets  What is this medicine?  NAPROXEN (na PROX en) is a non-steroidal anti-inflammatory drug (NSAID). It is used to reduce swelling and to treat pain. This medicine may be used for dental pain, headache, or painful monthly periods. It is also used for painful joint and muscular problems such as arthritis, tendinitis, bursitis, and gout.  How should I use this medicine?  Take this medicine by mouth with a glass of water. Follow the directions on the prescription label. Take it with food if your stomach gets upset. Try to not lie down for at least 10 minutes after you take it. Take your medicine at regular intervals. Do not take your medicine more often than directed. Long-term, continuous use may increase the risk of heart attack or stroke.  A special MedGuide will be given to you by the pharmacist with each prescription and refill. Be sure to read this information carefully each time.  Talk to your pediatrician regarding the use of this medicine in children. Special care may be needed.  What side effects may I notice from receiving this medicine?  Side effects that you should report to your doctor or health care professional as soon as possible:  · black or bloody stools, blood in the urine or vomit  · blurred vision  · chest pain  · difficulty breathing or wheezing  · nausea or  vomiting  · severe stomach pain  · skin rash, skin redness, blistering or peeling skin, hives, or itching  · slurred speech or weakness on one side of the body  · swelling of eyelids, throat, lips  · unexplained weight gain or swelling  · unusually weak or tired  · yellowing of eyes or skin  Side effects that usually do not require medical attention (report to your doctor or health care professional if they continue or are bothersome):  · constipation  · headache  · heartburn  What may interact with this medicine?  · alcohol  · aspirin  · cidofovir  · diuretics  · lithium  · methotrexate  · other drugs for inflammation like ketorolac or prednisone  · pemetrexed  · probenecid  · warfarin  What if I miss a dose?  If you miss a dose, take it as soon as you can. If it is almost time for your next dose, take only that dose. Do not take double or extra doses.  Where should I keep my medicine?  Keep out of the reach of children.  Store at room temperature between 15 and 30 degrees C (59 and 86 degrees F). Keep container tightly closed. Throw away any unused medicine after the expiration date.  What should I tell my health care provider before I take this medicine?  They need to know if you have any of these conditions:  · asthma  · cigarette smoker  · drink more than 3 alcohol containing drinks a day  · heart disease or circulation problems such as heart failure or leg edema (fluid retention)  · high blood pressure  · kidney disease  · liver disease  · stomach bleeding or ulcers  · an unusual or allergic reaction to naproxen, aspirin, other NSAIDs, other medicines, foods, dyes, or preservatives  · pregnant or trying to get pregnant  · breast-feeding  What should I watch for while using this medicine?  Tell your doctor or health care professional if your pain does not get better. Talk to your doctor before taking another medicine for pain. Do not treat yourself.  This medicine does not prevent heart attack or stroke. In  fact, this medicine may increase the chance of a heart attack or stroke. The chance may increase with longer use of this medicine and in people who have heart disease. If you take aspirin to prevent heart attack or stroke, talk with your doctor or health care professional.  Do not take other medicines that contain aspirin, ibuprofen, or naproxen with this medicine. Side effects such as stomach upset, nausea, or ulcers may be more likely to occur. Many medicines available without a prescription should not be taken with this medicine.  This medicine can cause ulcers and bleeding in the stomach and intestines at any time during treatment. Do not smoke cigarettes or drink alcohol. These increase irritation to your stomach and can make it more susceptible to damage from this medicine. Ulcers and bleeding can happen without warning symptoms and can cause death.  You may get drowsy or dizzy. Do not drive, use machinery, or do anything that needs mental alertness until you know how this medicine affects you. Do not stand or sit up quickly, especially if you are an older patient. This reduces the risk of dizzy or fainting spells.  This medicine can cause you to bleed more easily. Try to avoid damage to your teeth and gums when you brush or floss your teeth.  NOTE:This sheet is a summary. It may not cover all possible information. If you have questions about this medicine, talk to your doctor, pharmacist, or health care provider. Copyright© 2017 [employer]        R.I.C.E.    R.I.C.E. stands for Rest, Ice, Compression, and Elevation. Doing these things helps limit pain and swelling after an injury. R.I.C.E. also helps injuries heal faster. Use R.I.C.E. for sprains, strains, and severe bruises or bumps. Follow the tips on this handout and begin R.I.C.E. as soon as possible after an injury.  ? Rest  Pain is your bodys way of telling you to rest an injured area. Whether you have hurt an elbow, hand, foot, or knee, limiting  its use will prevent further injury and help you heal.  ? Ice  Applying ice right after an injury helps prevent swelling and reduce pain. Dont place ice directly on your skin.  · Wrap a cold pack or bag of ice in a thin cloth. Place it over the injured area.  · Ice for 10 minutes every 3 hours. Dont ice for more than 20 minutes at a time.  ? Compression  Putting pressure (compression) on an injury helps prevent swelling and provides support.  · Wrap the injured area firmly with an elastic bandage. If your hand or foot tingles, becomes discolored, or feels cold to the touch, the bandage may be too tight. Rewrap it more loosely.  · If your bandage becomes too loose, rewrap it.  · Do not wear an elastic bandage overnight.  ? Elevation  Keeping an injury elevated helps reduce swelling, pain, and throbbing. Elevation is most effective when the injury is kept elevated higher than the heart.     Call your healthcare provider if you notice any of the following:  · Fingers or toes feel numb, are cold to the touch, or change color  · Skin looks shiny or tight  · Pain, swelling, or bruising worsens and is not improved with elevation   Date Last Reviewed: 9/3/2015  © 9509-5406 [employer]. [address] [place] [address], [place] [address]. All rights reserved. This information is not intended as a substitute for professional medical care. Always follow your healthcare professional's instructions.

## 2018-05-01 ENCOUNTER — PROCEDURE VISIT (OUTPATIENT)
Dept: OPHTHALMOLOGY | Facility: CLINIC | Age: 78
End: 2018-05-01
Payer: MEDICARE

## 2018-05-01 VITALS — DIASTOLIC BLOOD PRESSURE: 84 MMHG | SYSTOLIC BLOOD PRESSURE: 168 MMHG | HEART RATE: 61 BPM

## 2018-05-01 DIAGNOSIS — H43.393 VITREOUS FLOATERS OF BOTH EYES: ICD-10-CM

## 2018-05-01 DIAGNOSIS — H35.033 HYPERTENSIVE RETINOPATHY, BILATERAL: ICD-10-CM

## 2018-05-01 DIAGNOSIS — H35.3211 EXUDATIVE AGE-RELATED MACULAR DEGENERATION OF RIGHT EYE WITH ACTIVE CHOROIDAL NEOVASCULARIZATION: Primary | ICD-10-CM

## 2018-05-01 PROCEDURE — 67028 INJECTION EYE DRUG: CPT | Mod: RT,S$GLB,, | Performed by: OPHTHALMOLOGY

## 2018-05-01 PROCEDURE — 92134 CPTRZ OPH DX IMG PST SGM RTA: CPT | Mod: S$GLB,,, | Performed by: OPHTHALMOLOGY

## 2018-05-01 PROCEDURE — 99499 UNLISTED E&M SERVICE: CPT | Mod: S$GLB,,, | Performed by: OPHTHALMOLOGY

## 2018-05-01 RX ADMIN — Medication 1.25 MG: at 10:05

## 2018-05-01 NOTE — PROGRESS NOTES
HPI     Macular Degeneration    Additional comments: 1 month check           Comments   DLS- 03/27/2018 - No changes x last visit    AT's prn OU  HPI     1 mo  / OCT / FA / ICG OD   DLS- 02/26/2018 Dr. Newell     Pt sts vision is the same since last visit, denies pain.   In am when waking up feels as if eyes does not open as wide does not   happen all the time.    (-)Flashes (-)Floaters  (-)Photophobia  (-)Glare      AT's prn OU    HPI     Eye Problem    Additional comments: yearly check           Comments   DLS 2/7/17  Patient states vision is better since cataract sx with Dr. Hidalgo.   Stopped glaucoma drops after catract sx-will determine if needs to restart   and next visit with Dr. Hidalgo.         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 Floaters OU    3. POAG - Good IOP, continue management as per Dr. Hidalgo  - Needs f/u appt    4. PCIOL OU      5 weeks OCT    Risks, benefits, and alternatives to treatment discussed in detail with the patient.  The patient voiced understanding and wished to proceed with the procedure    Injection Procedure Note:  Diagnosis: Wet AMD OD    Topical Proparacaine and Betadine.  Inject Avastin OD at 6:00 @ 3.5-4mm posterior to limbus  Post Operative Dx: Same  Complications: None  Follow up as above.

## 2018-05-01 NOTE — PATIENT INSTRUCTIONS

## 2018-05-07 RX ORDER — OXYBUTYNIN CHLORIDE 5 MG/1
TABLET ORAL
Qty: 60 TABLET | Refills: 0 | Status: SHIPPED | OUTPATIENT
Start: 2018-05-07 | End: 2018-06-07 | Stop reason: SDUPTHER

## 2018-06-07 RX ORDER — OXYBUTYNIN CHLORIDE 5 MG/1
TABLET ORAL
Qty: 60 TABLET | Refills: 0 | Status: SHIPPED | OUTPATIENT
Start: 2018-06-07 | End: 2018-07-05 | Stop reason: SDUPTHER

## 2018-06-07 RX ORDER — AMLODIPINE BESYLATE AND ATORVASTATIN CALCIUM 10; 10 MG/1; MG/1
TABLET, FILM COATED ORAL
Qty: 90 TABLET | Refills: 0 | Status: SHIPPED | OUTPATIENT
Start: 2018-06-07 | End: 2018-10-08 | Stop reason: SDUPTHER

## 2018-06-12 ENCOUNTER — PROCEDURE VISIT (OUTPATIENT)
Dept: OPHTHALMOLOGY | Facility: CLINIC | Age: 78
End: 2018-06-12
Attending: OPHTHALMOLOGY
Payer: MEDICARE

## 2018-06-12 DIAGNOSIS — H35.033 HYPERTENSIVE RETINOPATHY, BILATERAL: ICD-10-CM

## 2018-06-12 DIAGNOSIS — H35.3211 EXUDATIVE AGE-RELATED MACULAR DEGENERATION OF RIGHT EYE WITH ACTIVE CHOROIDAL NEOVASCULARIZATION: Primary | ICD-10-CM

## 2018-06-12 DIAGNOSIS — H43.393 VITREOUS FLOATERS OF BOTH EYES: ICD-10-CM

## 2018-06-12 PROCEDURE — 92134 CPTRZ OPH DX IMG PST SGM RTA: CPT | Mod: S$GLB,,, | Performed by: OPHTHALMOLOGY

## 2018-06-12 PROCEDURE — 67028 INJECTION EYE DRUG: CPT | Mod: RT,S$GLB,, | Performed by: OPHTHALMOLOGY

## 2018-06-12 PROCEDURE — 99499 UNLISTED E&M SERVICE: CPT | Mod: S$GLB,,, | Performed by: OPHTHALMOLOGY

## 2018-06-12 RX ADMIN — Medication 1.25 MG: at 09:06

## 2018-06-12 NOTE — PATIENT INSTRUCTIONS

## 2018-06-12 NOTE — PROGRESS NOTES
HPI     5 week check / OCT / Avastin   DLS- 05/01/2018 Dr. Newell     Pt sts vision about the same since last visit, denies pain.  (+)Flashes occasionally when waking up in am  (-)Floaters  (-)Photophobia  (-)Glare    AT's prn OU        OCT -IR and SRF improving  OS - stable small flat inferior PED, no SRF      ICG/FA - leakage IT to fovea OD      A/P    1. 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 Floaters OU    3. POAG - Good IOP, continue management as per Dr. Hidalgo  - Needs f/u appt    4. PCIOL OU      5-6 weeks OCT    Risks, benefits, and alternatives to treatment discussed in detail with the patient.  The patient voiced understanding and wished to proceed with the procedure    Injection Procedure Note:  Diagnosis: Wet AMD OD    Topical Proparacaine and Betadine.  Inject Avastin OD at 6:00 @ 3.5-4mm posterior to limbus  Post Operative Dx: Same  Complications: None  Follow up as above.

## 2018-07-05 RX ORDER — OXYBUTYNIN CHLORIDE 5 MG/1
TABLET ORAL
Qty: 60 TABLET | Refills: 0 | Status: SHIPPED | OUTPATIENT
Start: 2018-07-05 | End: 2018-08-02 | Stop reason: SDUPTHER

## 2018-07-24 ENCOUNTER — PROCEDURE VISIT (OUTPATIENT)
Dept: OPHTHALMOLOGY | Facility: CLINIC | Age: 78
End: 2018-07-24
Payer: MEDICARE

## 2018-07-24 VITALS — DIASTOLIC BLOOD PRESSURE: 84 MMHG | HEART RATE: 68 BPM | SYSTOLIC BLOOD PRESSURE: 145 MMHG

## 2018-07-24 DIAGNOSIS — H40.1132 PRIMARY OPEN ANGLE GLAUCOMA OF BOTH EYES, MODERATE STAGE: ICD-10-CM

## 2018-07-24 DIAGNOSIS — H43.393 VITREOUS FLOATERS OF BOTH EYES: ICD-10-CM

## 2018-07-24 DIAGNOSIS — H35.3211 EXUDATIVE AGE-RELATED MACULAR DEGENERATION OF RIGHT EYE WITH ACTIVE CHOROIDAL NEOVASCULARIZATION: Primary | ICD-10-CM

## 2018-07-24 PROCEDURE — 92134 CPTRZ OPH DX IMG PST SGM RTA: CPT | Mod: S$GLB,,, | Performed by: OPHTHALMOLOGY

## 2018-07-24 PROCEDURE — 67028 INJECTION EYE DRUG: CPT | Mod: RT,S$GLB,, | Performed by: OPHTHALMOLOGY

## 2018-07-24 PROCEDURE — 99499 UNLISTED E&M SERVICE: CPT | Mod: S$GLB,,, | Performed by: OPHTHALMOLOGY

## 2018-07-24 RX ADMIN — Medication 1.25 MG: at 09:07

## 2018-07-24 NOTE — PROGRESS NOTES
HPI     6 week check / OCT / Avastin   DLS- 06/12/2018 Dr. Newell     Pt sts vision about the same since last visit, denies pain.    (+)Flashes occasionally- when waking up either at night or in am sometimes   has a red tone (-)Floaters  (-)Photophobia  (-)Glare    AT's prn OU      OCT -IR and SRF improving  OS - stable small flat inferior PED, no SRF      Prior ICG/FA - leakage IT to fovea OD      A/P    1. 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 Floaters OU    3. POAG - Good IOP, continue management as per Dr. Hidalgo  - Needs f/u appt    4. PCIOL OU      6-7 weeks OCT full exam charge next visit    Risks, benefits, and alternatives to treatment discussed in detail with the patient.  The patient voiced understanding and wished to proceed with the procedure    Injection Procedure Note:  Diagnosis: Wet AMD OD    Topical Proparacaine and Betadine.  Inject Avastin OD at 6:00 @ 3.5-4mm posterior to limbus  Post Operative Dx: Same  Complications: None  Follow up as above.

## 2018-07-24 NOTE — PATIENT INSTRUCTIONS

## 2018-08-02 RX ORDER — OXYBUTYNIN CHLORIDE 5 MG/1
TABLET ORAL
Qty: 60 TABLET | Refills: 0 | Status: SHIPPED | OUTPATIENT
Start: 2018-08-02 | End: 2018-08-31 | Stop reason: SDUPTHER

## 2018-08-31 RX ORDER — OXYBUTYNIN CHLORIDE 5 MG/1
TABLET ORAL
Qty: 60 TABLET | Refills: 0 | Status: SHIPPED | OUTPATIENT
Start: 2018-08-31 | End: 2018-10-04 | Stop reason: SDUPTHER

## 2018-09-04 ENCOUNTER — PROCEDURE VISIT (OUTPATIENT)
Dept: OPHTHALMOLOGY | Facility: CLINIC | Age: 78
End: 2018-09-04
Payer: MEDICARE

## 2018-09-04 VITALS — HEART RATE: 62 BPM | SYSTOLIC BLOOD PRESSURE: 156 MMHG | DIASTOLIC BLOOD PRESSURE: 82 MMHG

## 2018-09-04 DIAGNOSIS — H35.033 HYPERTENSIVE RETINOPATHY, BILATERAL: ICD-10-CM

## 2018-09-04 DIAGNOSIS — H35.721 RETINAL PIGMENT EPITHELIAL DETACHMENT OF RIGHT EYE: ICD-10-CM

## 2018-09-04 DIAGNOSIS — H43.393 VITREOUS FLOATERS OF BOTH EYES: ICD-10-CM

## 2018-09-04 DIAGNOSIS — H35.3211 EXUDATIVE AGE-RELATED MACULAR DEGENERATION OF RIGHT EYE WITH ACTIVE CHOROIDAL NEOVASCULARIZATION: Primary | ICD-10-CM

## 2018-09-04 PROCEDURE — 92014 COMPRE OPH EXAM EST PT 1/>: CPT | Mod: 25,S$PBB,, | Performed by: OPHTHALMOLOGY

## 2018-09-04 PROCEDURE — 92134 CPTRZ OPH DX IMG PST SGM RTA: CPT | Mod: PBBFAC | Performed by: OPHTHALMOLOGY

## 2018-09-04 PROCEDURE — 67028 INJECTION EYE DRUG: CPT | Mod: S$PBB,RT,, | Performed by: OPHTHALMOLOGY

## 2018-09-04 PROCEDURE — C9257 BEVACIZUMAB INJECTION: HCPCS | Mod: PBBFAC,JG | Performed by: OPHTHALMOLOGY

## 2018-09-04 PROCEDURE — 67028 INJECTION EYE DRUG: CPT | Mod: PBBFAC,RT | Performed by: OPHTHALMOLOGY

## 2018-09-04 RX ADMIN — BEVACIZUMAB 1.25 MG: 100 INJECTION, SOLUTION INTRAVENOUS at 10:09

## 2018-09-04 NOTE — PATIENT INSTRUCTIONS

## 2018-09-04 NOTE — PROGRESS NOTES
HPI     6 week check / OCT / Avastin   DLS- 06/12/2018 Dr. Newell     Pt sts vision about the same since last visit, denies pain.  Occasionally will wake up and see different color flashing lights red/orange but not often thinks mainly in OD    (+)Flashes occasionally- when waking up either at night or in am sometimes has a red tone (-)Floaters  (-)Photophobia  (-)Glare    AT's prn OU      OCT -SRF resolved, persistent IR fluid  OS - stable small flat inferior PED, no SRF      Prior ICG/FA - leakage IT to fovea OD      A/P    1. 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 Floaters OU    3. POAG - Good IOP, continue management as per Dr. Hidalgo  - Needs f/u appt    4. PCIOL OU      2-3 weeks FA/ICG/PDT OD    Risks, benefits, and alternatives to treatment discussed in detail with the patient.  The patient voiced understanding and wished to proceed with the procedure    Injection Procedure Note:  Diagnosis: Wet AMD OD    Patient Identified and Time Out complete  Topical Proparacaine and Betadine.  Inject Avastin OD at 6:00 @ 3.5-4mm posterior to limbus  Post Operative Dx: Same  Complications: None  Follow up as above.

## 2018-09-17 ENCOUNTER — TELEPHONE (OUTPATIENT)
Dept: OPHTHALMOLOGY | Facility: CLINIC | Age: 78
End: 2018-09-17

## 2018-09-24 ENCOUNTER — OFFICE VISIT (OUTPATIENT)
Dept: OPHTHALMOLOGY | Facility: CLINIC | Age: 78
End: 2018-09-24
Attending: OPHTHALMOLOGY
Payer: MEDICARE

## 2018-09-24 VITALS
HEIGHT: 71 IN | SYSTOLIC BLOOD PRESSURE: 161 MMHG | DIASTOLIC BLOOD PRESSURE: 83 MMHG | WEIGHT: 220.25 LBS | BODY MASS INDEX: 30.83 KG/M2 | HEART RATE: 59 BPM

## 2018-09-24 DIAGNOSIS — H35.723 RETINAL PIGMENT EPITHELIAL DETACHMENT OF BOTH EYES: ICD-10-CM

## 2018-09-24 DIAGNOSIS — H43.393 VITREOUS FLOATERS OF BOTH EYES: ICD-10-CM

## 2018-09-24 DIAGNOSIS — H40.1132 PRIMARY OPEN ANGLE GLAUCOMA OF BOTH EYES, MODERATE STAGE: ICD-10-CM

## 2018-09-24 DIAGNOSIS — H35.033 HYPERTENSIVE RETINOPATHY, BILATERAL: ICD-10-CM

## 2018-09-24 DIAGNOSIS — H35.3211 EXUDATIVE AGE-RELATED MACULAR DEGENERATION OF RIGHT EYE WITH ACTIVE CHOROIDAL NEOVASCULARIZATION: Primary | ICD-10-CM

## 2018-09-24 PROCEDURE — 92235 FLUORESCEIN ANGRPH MLTIFRAME: CPT | Mod: 50,PBBFAC | Performed by: OPHTHALMOLOGY

## 2018-09-24 PROCEDURE — 99999 PR PBB SHADOW E&M-EST. PATIENT-LVL III: CPT | Mod: PBBFAC,,, | Performed by: OPHTHALMOLOGY

## 2018-09-24 PROCEDURE — 99213 OFFICE O/P EST LOW 20 MIN: CPT | Mod: PBBFAC,25 | Performed by: OPHTHALMOLOGY

## 2018-09-24 PROCEDURE — 67221 OCULAR PHOTODYNAMIC THER: CPT | Mod: S$PBB,RT,, | Performed by: OPHTHALMOLOGY

## 2018-09-24 PROCEDURE — 92240 ICG ANGIOGRAPHY I&R UNI/BI: CPT | Mod: PBBFAC | Performed by: OPHTHALMOLOGY

## 2018-09-24 PROCEDURE — 96372 THER/PROPH/DIAG INJ SC/IM: CPT | Mod: PBBFAC

## 2018-09-24 PROCEDURE — 92242 FLUORESCEIN&ICG ANGIOGRAPHY: CPT | Mod: 26,S$PBB,, | Performed by: OPHTHALMOLOGY

## 2018-09-24 PROCEDURE — 67221 OCULAR PHOTODYNAMIC THER: CPT | Mod: PBBFAC,RT | Performed by: OPHTHALMOLOGY

## 2018-09-24 PROCEDURE — 99499 UNLISTED E&M SERVICE: CPT | Mod: S$PBB,,, | Performed by: OPHTHALMOLOGY

## 2018-09-24 RX ADMIN — VERTEPORFIN FOR INJECTION 15 MG: 15 INJECTION, POWDER, LYOPHILIZED, FOR SOLUTION INTRAVENOUS at 12:09

## 2018-09-24 NOTE — PROGRESS NOTES
HPI     Eye Problem      Additional comments: laser              Comments     DLS 9/4/18- No changes x last visit    AT's prn OU     HPI     6 week check / OCT / Avastin   DLS- 06/12/2018 Dr. Newell     Pt sts vision about the same since last visit, denies pain.  Occasionally will wake up and see different color flashing lights red/orange but not often thinks mainly in OD    (+)Flashes occasionally- when waking up either at night or in am sometimes has a red tone (-)Floaters  (-)Photophobia  (-)Glare    AT's prn OU      OCT -SRF resolved, persistent IR fluid  OS - stable small flat inferior PED, no SRF      Prior ICG/FA - leakage IT to fovea OD      A/P    1. 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 Floaters OU    3. POAG - Good IOP, continue management as per Dr. Hidalgo  - Needs f/u appt    4. PCIOL OU      6 weeks OCT    Risks, benefits, and alternatives to treatment discussed in detail with the patient.  The patient voiced understanding and wished to proceed with the procedure    Laser Procedure Note  Dx: Wet AMD OD  Laser: PDT OD  Opal  Spot: smallest to cover lesion  Dur: 42s  #:  2  Complications: None  F/U as above

## 2018-09-24 NOTE — PATIENT INSTRUCTIONS
Fluorescein Angiography     A fluorescein angiogram of the retina   Fluorescein angiography is an eye test. It is done to look at the back of your eye, including:  · The blood vessels in your eye  · The layer of tissue at the back of your eye (the retina)  · The center of your retina (the macula)  · The optic nerve  This test can diagnose diseases found in these areas. It can also diagnose other conditions that affect these areas. To do this test, a dye called fluorescein is shot (injected) into your arm. The dye goes into your bloodstream and up into the blood vessels in your eyes. A special camera is then used to take images (angiograms) of your eyes.  Getting ready for your test  Tell your healthcare provider if you:  · Are pregnant or think you may be pregnant  · Are breastfeeding  · Have a history of severe allergic reactions, including to X-ray dye or other medicines  · Have kidney problems  Tell your provider about any medicines you are taking. You may need to stop taking all or some of these before the test. This includes:  · All prescription medicines  · Over-the-counter medicines such as aspirin or ibuprofen  · Street drugs  · Herbs, vitamins, and other supplements  You should arrange for an adult family member or friend to drive you home after your test. Your vision will be blurry for up to 12 hours.  Follow any other instructions from your healthcare provider.  During your test  · You are given eye drops to enlarge (dilate) your pupils.  · You then sit in front of a special camera. You place your chin on the chin rest and look into the camera.  · Images are taken of your eyes, one eye at a time.  · Fluorescein dye is then injected into your arm. The lights in the room are turned off. You may have mild nausea. You may have a warm feeling in your arm or upper body. Tell your provider if your skin feels itchy or if you are having trouble breathing. If so, you could be having an allergic reaction to the  dye.  · More pictures of your eyes are taken over 15 to 30 minutes. The camera shines a bright light into your eyes. Try to keep your head still and your eyes open.  · When enough images have been taken, the test is over.  After your test  Your vision will be blurry for up to 4 to 12 hours. This is because of your dilated pupils. Your eye will be more sensitive to light for up to 12 hours. You may want to wear sunglasses during this time. Do not drive if your vision is very blurry. You may also find it uncomfortable to read. Your skin may look yellow for a few hours. This is from the dye. Your urine will be bright yellow or orange for 24 to 48 hours after the test.     Risks and possible complications  All procedures have some risks. Possible risks of fluorescein angiography include:  · Upset stomach (nausea) and vomiting  · Leaking dye around the injection site that causes pain and swelling  · Metallic taste in your mouth  · Infection at injection site  · Allergic reaction to the dye  · Dry mouth or too much saliva  · Faster heart rate  · Sweating  · Lower back pain   Date Last Reviewed: 5/30/2015  © 2645-2164 [employer]. [address] [place] [address], [place] [address]. All rights reserved. This information is not intended as a substitute for professional medical care. Always follow your healthcare professional's instructions.

## 2018-10-04 RX ORDER — OXYBUTYNIN CHLORIDE 5 MG/1
TABLET ORAL
Qty: 60 TABLET | Refills: 0 | Status: SHIPPED | OUTPATIENT
Start: 2018-10-04 | End: 2018-11-01 | Stop reason: SDUPTHER

## 2018-10-08 RX ORDER — AMLODIPINE BESYLATE AND ATORVASTATIN CALCIUM 10; 10 MG/1; MG/1
TABLET, FILM COATED ORAL
Qty: 90 TABLET | Refills: 0 | Status: SHIPPED | OUTPATIENT
Start: 2018-10-08 | End: 2018-12-05 | Stop reason: SDUPTHER

## 2018-10-16 ENCOUNTER — IMMUNIZATION (OUTPATIENT)
Dept: FAMILY MEDICINE | Facility: CLINIC | Age: 78
End: 2018-10-16
Payer: MEDICARE

## 2018-10-16 DIAGNOSIS — Z23 NEED FOR INFLUENZA VACCINATION: Primary | ICD-10-CM

## 2018-10-16 PROCEDURE — 90662 IIV NO PRSV INCREASED AG IM: CPT | Mod: PBBFAC,PO

## 2018-10-25 ENCOUNTER — TELEPHONE (OUTPATIENT)
Dept: FAMILY MEDICINE | Facility: CLINIC | Age: 78
End: 2018-10-25

## 2018-10-25 DIAGNOSIS — E11.22 TYPE 2 DIABETES MELLITUS WITH STAGE 3 CHRONIC KIDNEY DISEASE, WITHOUT LONG-TERM CURRENT USE OF INSULIN: ICD-10-CM

## 2018-10-25 DIAGNOSIS — I10 ESSENTIAL HYPERTENSION: Primary | ICD-10-CM

## 2018-10-25 DIAGNOSIS — N18.30 TYPE 2 DIABETES MELLITUS WITH STAGE 3 CHRONIC KIDNEY DISEASE, WITHOUT LONG-TERM CURRENT USE OF INSULIN: ICD-10-CM

## 2018-10-25 NOTE — TELEPHONE ENCOUNTER
----- Message from Swapna Roblero LPN sent at 10/24/2018  6:14 PM CDT -----  Contact: [phone]/self      ----- Message -----  From: Kelly Gunderson  Sent: 10/24/2018   3:34 PM  To: Keiry Lopez A Staff    Patient is requesting orders for lab work. He is scheduled to see Dr. Barker on 12/1/18. Please advise.

## 2018-11-01 RX ORDER — OXYBUTYNIN CHLORIDE 5 MG/1
TABLET ORAL
Qty: 60 TABLET | Refills: 0 | Status: SHIPPED | OUTPATIENT
Start: 2018-11-01 | End: 2018-12-05 | Stop reason: SDUPTHER

## 2018-11-06 ENCOUNTER — PROCEDURE VISIT (OUTPATIENT)
Dept: OPHTHALMOLOGY | Facility: CLINIC | Age: 78
End: 2018-11-06
Payer: MEDICARE

## 2018-11-06 VITALS — SYSTOLIC BLOOD PRESSURE: 147 MMHG | DIASTOLIC BLOOD PRESSURE: 87 MMHG | HEART RATE: 57 BPM

## 2018-11-06 DIAGNOSIS — H35.3211 EXUDATIVE AGE-RELATED MACULAR DEGENERATION OF RIGHT EYE WITH ACTIVE CHOROIDAL NEOVASCULARIZATION: Primary | ICD-10-CM

## 2018-11-06 DIAGNOSIS — H35.033 HYPERTENSIVE RETINOPATHY, BILATERAL: ICD-10-CM

## 2018-11-06 DIAGNOSIS — H43.393 VITREOUS FLOATERS OF BOTH EYES: ICD-10-CM

## 2018-11-06 PROCEDURE — 92134 CPTRZ OPH DX IMG PST SGM RTA: CPT | Mod: S$GLB,,, | Performed by: OPHTHALMOLOGY

## 2018-11-06 PROCEDURE — 92014 COMPRE OPH EXAM EST PT 1/>: CPT | Mod: S$GLB,,, | Performed by: OPHTHALMOLOGY

## 2018-11-06 PROCEDURE — 92226 PR SPECIAL EYE EXAM, SUBSEQUENT: CPT | Mod: LT,S$GLB,, | Performed by: OPHTHALMOLOGY

## 2018-11-06 NOTE — PROGRESS NOTES
HPI     Eye Problem      Additional comments: 6 week check              Comments     DLS 9/24/18- No changes x last visit        OCT -SRF resolved, persistent IR fluid  OS - stable small flat inferior PED, no SRF      Prior ICG/FA - leakage IT to fovea OD      A/P    1. 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 Floaters OU    3. POAG - Good IOP, continue management as per Dr. Hidalgo  - Needs f/u appt    4. PCIOL OU      8 weeks OCT

## 2018-12-05 RX ORDER — IRBESARTAN 300 MG/1
TABLET ORAL
Qty: 90 TABLET | Refills: 2 | Status: SHIPPED | OUTPATIENT
Start: 2018-12-05 | End: 2019-08-14 | Stop reason: SDUPTHER

## 2018-12-05 RX ORDER — DUTASTERIDE 0.5 MG/1
CAPSULE, LIQUID FILLED ORAL
Qty: 90 CAPSULE | Refills: 2 | Status: SHIPPED | OUTPATIENT
Start: 2018-12-05 | End: 2018-12-10 | Stop reason: SDUPTHER

## 2018-12-05 RX ORDER — AMLODIPINE BESYLATE AND ATORVASTATIN CALCIUM 10; 10 MG/1; MG/1
TABLET, FILM COATED ORAL
Qty: 90 TABLET | Refills: 0 | Status: SHIPPED | OUTPATIENT
Start: 2018-12-05 | End: 2019-05-14 | Stop reason: SDUPTHER

## 2018-12-05 RX ORDER — OXYBUTYNIN CHLORIDE 5 MG/1
TABLET ORAL
Qty: 180 TABLET | Refills: 3 | Status: SHIPPED | OUTPATIENT
Start: 2018-12-05 | End: 2020-01-27 | Stop reason: ALTCHOICE

## 2018-12-06 ENCOUNTER — TELEPHONE (OUTPATIENT)
Dept: FAMILY MEDICINE | Facility: CLINIC | Age: 78
End: 2018-12-06

## 2018-12-06 ENCOUNTER — LAB VISIT (OUTPATIENT)
Dept: LAB | Facility: HOSPITAL | Age: 78
End: 2018-12-06
Attending: FAMILY MEDICINE
Payer: MEDICARE

## 2018-12-06 DIAGNOSIS — N18.30 CKD (CHRONIC KIDNEY DISEASE) STAGE 3, GFR 30-59 ML/MIN: Primary | ICD-10-CM

## 2018-12-06 DIAGNOSIS — I10 ESSENTIAL HYPERTENSION: ICD-10-CM

## 2018-12-06 DIAGNOSIS — R31.29 MICROSCOPIC HEMATURIA: ICD-10-CM

## 2018-12-06 DIAGNOSIS — E11.22 TYPE 2 DIABETES MELLITUS WITH STAGE 3 CHRONIC KIDNEY DISEASE, WITHOUT LONG-TERM CURRENT USE OF INSULIN: ICD-10-CM

## 2018-12-06 DIAGNOSIS — N18.30 TYPE 2 DIABETES MELLITUS WITH STAGE 3 CHRONIC KIDNEY DISEASE, WITHOUT LONG-TERM CURRENT USE OF INSULIN: ICD-10-CM

## 2018-12-06 LAB
ALBUMIN SERPL BCP-MCNC: 4 G/DL
ALP SERPL-CCNC: 70 U/L
ALT SERPL W/O P-5'-P-CCNC: 50 U/L
ANION GAP SERPL CALC-SCNC: 10 MMOL/L
AST SERPL-CCNC: 47 U/L
BASOPHILS # BLD AUTO: 0.04 K/UL
BASOPHILS NFR BLD: 0.8 %
BILIRUB SERPL-MCNC: 1.4 MG/DL
BUN SERPL-MCNC: 21 MG/DL
CALCIUM SERPL-MCNC: 10.5 MG/DL
CHLORIDE SERPL-SCNC: 102 MMOL/L
CHOLEST SERPL-MCNC: 147 MG/DL
CHOLEST/HDLC SERPL: 3.1 {RATIO}
CO2 SERPL-SCNC: 28 MMOL/L
CREAT SERPL-MCNC: 1.4 MG/DL
DIFFERENTIAL METHOD: ABNORMAL
EOSINOPHIL # BLD AUTO: 0.1 K/UL
EOSINOPHIL NFR BLD: 2.8 %
ERYTHROCYTE [DISTWIDTH] IN BLOOD BY AUTOMATED COUNT: 14.6 %
EST. GFR  (AFRICAN AMERICAN): 54.9 ML/MIN/1.73 M^2
EST. GFR  (NON AFRICAN AMERICAN): 47.4 ML/MIN/1.73 M^2
ESTIMATED AVG GLUCOSE: 120 MG/DL
GLUCOSE SERPL-MCNC: 101 MG/DL
HBA1C MFR BLD HPLC: 5.8 %
HCT VFR BLD AUTO: 50.2 %
HDLC SERPL-MCNC: 47 MG/DL
HDLC SERPL: 32 %
HGB BLD-MCNC: 16.4 G/DL
IMM GRANULOCYTES # BLD AUTO: 0.01 K/UL
IMM GRANULOCYTES NFR BLD AUTO: 0.2 %
LDLC SERPL CALC-MCNC: 85.8 MG/DL
LYMPHOCYTES # BLD AUTO: 2.1 K/UL
LYMPHOCYTES NFR BLD: 40.4 %
MCH RBC QN AUTO: 30 PG
MCHC RBC AUTO-ENTMCNC: 32.7 G/DL
MCV RBC AUTO: 92 FL
MONOCYTES # BLD AUTO: 0.5 K/UL
MONOCYTES NFR BLD: 10.7 %
NEUTROPHILS # BLD AUTO: 2.3 K/UL
NEUTROPHILS NFR BLD: 45.1 %
NONHDLC SERPL-MCNC: 100 MG/DL
NRBC BLD-RTO: 0 /100 WBC
PLATELET # BLD AUTO: 246 K/UL
PMV BLD AUTO: 10.6 FL
POTASSIUM SERPL-SCNC: 3.6 MMOL/L
PROT SERPL-MCNC: 7.6 G/DL
RBC # BLD AUTO: 5.47 M/UL
SODIUM SERPL-SCNC: 140 MMOL/L
TRIGL SERPL-MCNC: 71 MG/DL
WBC # BLD AUTO: 5.07 K/UL

## 2018-12-06 PROCEDURE — 80061 LIPID PANEL: CPT

## 2018-12-06 PROCEDURE — 36415 COLL VENOUS BLD VENIPUNCTURE: CPT | Mod: PO

## 2018-12-06 PROCEDURE — 80053 COMPREHEN METABOLIC PANEL: CPT

## 2018-12-06 PROCEDURE — 85025 COMPLETE CBC W/AUTO DIFF WBC: CPT

## 2018-12-06 PROCEDURE — 83036 HEMOGLOBIN GLYCOSYLATED A1C: CPT

## 2018-12-10 ENCOUNTER — OFFICE VISIT (OUTPATIENT)
Dept: UROLOGY | Facility: CLINIC | Age: 78
End: 2018-12-10
Payer: MEDICARE

## 2018-12-10 VITALS
SYSTOLIC BLOOD PRESSURE: 148 MMHG | WEIGHT: 223.56 LBS | HEART RATE: 66 BPM | HEIGHT: 72 IN | DIASTOLIC BLOOD PRESSURE: 85 MMHG | BODY MASS INDEX: 30.28 KG/M2

## 2018-12-10 DIAGNOSIS — R35.1 NOCTURIA: ICD-10-CM

## 2018-12-10 DIAGNOSIS — R35.0 URINARY FREQUENCY: ICD-10-CM

## 2018-12-10 DIAGNOSIS — N13.8 BPH WITH URINARY OBSTRUCTION: Primary | ICD-10-CM

## 2018-12-10 DIAGNOSIS — N40.1 BPH WITH URINARY OBSTRUCTION: Primary | ICD-10-CM

## 2018-12-10 PROCEDURE — 99214 OFFICE O/P EST MOD 30 MIN: CPT | Mod: S$GLB,,, | Performed by: NURSE PRACTITIONER

## 2018-12-10 PROCEDURE — 3079F DIAST BP 80-89 MM HG: CPT | Mod: CPTII,S$GLB,, | Performed by: NURSE PRACTITIONER

## 2018-12-10 PROCEDURE — 99999 PR PBB SHADOW E&M-EST. PATIENT-LVL IV: CPT | Mod: PBBFAC,,, | Performed by: NURSE PRACTITIONER

## 2018-12-10 PROCEDURE — 1101F PT FALLS ASSESS-DOCD LE1/YR: CPT | Mod: CPTII,S$GLB,, | Performed by: NURSE PRACTITIONER

## 2018-12-10 PROCEDURE — 3077F SYST BP >= 140 MM HG: CPT | Mod: CPTII,S$GLB,, | Performed by: NURSE PRACTITIONER

## 2018-12-10 RX ORDER — TAMSULOSIN HYDROCHLORIDE 0.4 MG/1
0.4 CAPSULE ORAL NIGHTLY
Qty: 90 CAPSULE | Refills: 3 | Status: SHIPPED | OUTPATIENT
Start: 2018-12-10 | End: 2019-11-05 | Stop reason: SDUPTHER

## 2018-12-10 RX ORDER — DUTASTERIDE 0.5 MG/1
CAPSULE, LIQUID FILLED ORAL
Qty: 90 CAPSULE | Refills: 3 | Status: SHIPPED | OUTPATIENT
Start: 2018-12-10 | End: 2020-01-27 | Stop reason: SDUPTHER

## 2018-12-10 NOTE — PROGRESS NOTES
Subjective:       Patient ID: Pedro Ponce is a 78 y.o. male.    Chief Complaint: Follow-up (Yrly dae c cari )    Pedro Ponce is a 78 y.o. Male with history of BPH.  He manages his LUTS with Flomax and Avodart.  He was last seen in clinic with Dr. Bahena 12/04/2017.    Here today for annual check and medication refill.                            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    Past Surgical History:  4/9/2018: COLONOSCOPY; N/A      Comment:  Procedure: COLONOSCOPY;  Surgeon: HAIM Mir MD;                 Location: [facility] ([address] [place] [address]);  Service: Endoscopy;                 Laterality: N/A;  4/9/2018: COLONOSCOPY; N/A      Comment:  Performed by HAIM Mir MD at [facility]  6/23/2014: COLONOSCOPY; N/A      Comment:  Performed by Marcelo Chamberlain MD at [facility] ([address] [place] [address])  2000: CYST REMOVAL      Comment:  removed from left kidney  7/6/2017: GONIOTOMY; Right      Comment:  Performed by Roberto Hidalgo MD at [facility] OR [address] [place] [address]  8/24/2017: INSERTION-INTRAOCULAR LENS (IOL); Left      Comment:  [facility]  7/6/2017: INSERTION-INTRAOCULAR LENS (IOL); Right      Comment:  Performed by Roberto Hidalgo MD at [facility] OR [address] [place] [address]  No date: KIDNEY SURGERY  8/24/2017: PHACOEMULSIFICATION-ASPIRATION-CATARACT; Left      Comment:  Performed by Roberto Hidalgo MD at [facility] OR [address] [place] [address]  7/6/2017: PHACOEMULSIFICATION-ASPIRATION-CATARACT; Right      Comment:  Performed by Roberto Hidalgo MD at [facility] OR [address] [place] [address]  No  date: PROSTATE SURGERY    Review of patient's family history indicates:  Problem: Cancer      Relation: Mother          Age of Onset: (Not Specified)          Comment: unkn ca  Problem: Diabetes      Relation: Mother          Age of Onset: (Not Specified)  Problem: Glaucoma      Relation: Mother          Age of Onset: (Not Specified)  Problem: Heart disease      Relation: Mother          Age of Onset: (Not Specified)  Problem: Hypertension      Relation: Mother          Age of Onset: (Not Specified)  Problem: Blindness      Relation: Neg Hx          Age of Onset: (Not Specified)  Problem: Macular degeneration      Relation: Neg Hx          Age of Onset: (Not Specified)  Problem: Retinal detachment      Relation: Neg Hx          Age of Onset: (Not Specified)      Social History    Socioeconomic History      Marital status:       Spouse name: Not on file      Number of children: Not on file      Years of education: Not on file      Highest education level: Not on file    Social Needs      Financial resource strain: Not on file      Food insecurity - worry: Not on file      Food insecurity - inability: Not on file      Transportation needs - medical: Not on file      Transportation needs - non-medical: Not on file    Occupational History      Not on file    Tobacco Use      Smoking status: Never Smoker      Smokeless tobacco: Never Used    Substance and Sexual Activity      Alcohol use: No      Drug use: No      Sexual activity: Not on file    Other Topics      Concerns:        Not on file    Social History Narrative      Not on file      Allergies:  Pcn (penicillins)    Medications:  Current Outpatient Medications:   amlodipine-atorvastatin (CADUET) 10-10 mg per tablet, TAKE 1 TABLET BY MOUTH EVERY DAY, Disp: 90 tablet, Rfl: 0  aspirin 81 mg Tab, Take 81 mg by mouth. 1 Tablet Oral Every evening, Disp: , Rfl:   dutasteride (AVODART) 0.5 mg capsule, TAKE 1 CAPSULE (0.5 MG TOTAL) BY MOUTH ONCE DAILY., Disp: 90  capsule, Rfl: 2  fish oil-omega-3 fatty acids 300-1,000 mg capsule, Take 1 g by mouth once daily.  , Disp: , Rfl:   hydroCHLOROthiazide (HYDRODIURIL) 25 MG tablet, TAKE 1 TABLET (25 MG TOTAL) BY MOUTH ONCE DAILY., Disp: 90 tablet, Rfl: 4  irbesartan (AVAPRO) 300 MG tablet, TAKE 1 TABLET EVERY DAY, Disp: 90 tablet, Rfl: 2  metoprolol tartrate (LOPRESSOR) 25 MG tablet, TAKE 2 TABLETS BY MOUTH TWICE A DAY, Disp: 180 tablet, Rfl: 1  multivitamin-minerals-lutein (CENTRUM SILVER) Tab, Take by mouth. 1 Tablet Oral Every day, Disp: , Rfl:   oxybutynin (DITROPAN) 5 MG Tab, TAKE 1 TABLET BY MOUTH TWICE A DAY, Disp: 180 tablet, Rfl: 3  polyvinyl alcohol, artificial tears, (LIQUIFILM TEARS) 1.4 % ophthalmic solution, 1 drop as needed., Disp: , Rfl:   tamsulosin (FLOMAX) 0.4 mg Cp24, , Disp: , Rfl:         Review of Systems   Constitutional: Negative for activity change, appetite change, chills and fever.   HENT: Negative for facial swelling and trouble swallowing.    Eyes: Negative for visual disturbance.   Respiratory: Negative for chest tightness and shortness of breath.    Cardiovascular: Negative for chest pain and palpitations.   Gastrointestinal: Negative.  Negative for abdominal pain, blood in stool, constipation, diarrhea, nausea, rectal pain and vomiting.   Genitourinary: Positive for frequency and nocturia. Negative for difficulty urinating, dysuria, flank pain, hematuria, penile pain, penile swelling, scrotal swelling and testicular pain.        Nocturia 3-4x   Musculoskeletal: Negative for back pain, gait problem, myalgias and neck stiffness.   Skin: Negative for rash.   Neurological: Negative for dizziness and speech difficulty.   Hematological: Does not bruise/bleed easily.   Psychiatric/Behavioral: Negative for behavioral problems.       Objective:      Physical Exam   Nursing note and vitals reviewed.  Constitutional: He is oriented to person, place, and time. Vital signs are normal. He appears  well-developed and well-nourished.  Non-toxic appearance. He does not have a sickly appearance.   Urine dipped clear of infection   HENT:   Head: Normocephalic and atraumatic.   Right Ear: External ear normal.   Left Ear: External ear normal.   Nose: Nose normal.   Mouth/Throat: Mucous membranes are normal.   Eyes: Conjunctivae and lids are normal. No scleral icterus.   Neck: Trachea normal, normal range of motion and full passive range of motion without pain. Neck supple. No JVD present. No tracheal deviation present.   Cardiovascular: Normal rate, S1 normal and S2 normal.    Pulmonary/Chest: Effort normal. No respiratory distress. He exhibits no tenderness.   Abdominal: Soft. Normal appearance and bowel sounds are normal. There is no hepatosplenomegaly. There is no tenderness. There is no CVA tenderness.   Genitourinary: Penis normal. Rectal exam shows external hemorrhoid. Prostate is enlarged. Prostate is not tender. Right testis shows swelling. Left testis shows swelling. Uncircumcised. No penile tenderness.       Genitourinary Comments: Mild hydrocele noted.     Musculoskeletal: Normal range of motion.   Neurological: He is alert and oriented to person, place, and time. He has normal strength.   Skin: Skin is warm, dry and intact.     Psychiatric: He has a normal mood and affect. His behavior is normal. Judgment and thought content normal.       Assessment:       1. BPH with urinary obstruction    2. Urinary frequency    3. Nocturia        Plan:         I spent 25 minutes with the patient of which more than half was spent in direct consultation with the patient in regards to our treatment and plan.    Education and recommendations of today's plan of care including home remedies.  We discussed his LUTS; reviewed the contributory factors.  Diet modifications; avoiding bladder irritants; cold drinks especially at night.  Continue flomax and Avodart.   Ok with take oxybutynin; he ok with how he  urinates/nocturia.  Discussed last colonoscopy; sent message to Dr. Valenzuela that he did not have repeat colonoscopy.  He seeing his PCP tomorrow; can reschedule.  RTC one year for TRAVIS

## 2018-12-11 ENCOUNTER — OFFICE VISIT (OUTPATIENT)
Dept: FAMILY MEDICINE | Facility: CLINIC | Age: 78
End: 2018-12-11
Payer: MEDICARE

## 2018-12-11 ENCOUNTER — LAB VISIT (OUTPATIENT)
Dept: LAB | Facility: HOSPITAL | Age: 78
End: 2018-12-11
Attending: FAMILY MEDICINE
Payer: MEDICARE

## 2018-12-11 VITALS
SYSTOLIC BLOOD PRESSURE: 138 MMHG | OXYGEN SATURATION: 97 % | HEIGHT: 72 IN | WEIGHT: 224.63 LBS | DIASTOLIC BLOOD PRESSURE: 78 MMHG | BODY MASS INDEX: 30.42 KG/M2 | HEART RATE: 75 BPM

## 2018-12-11 DIAGNOSIS — R74.8 ABNORMAL LIVER ENZYMES: ICD-10-CM

## 2018-12-11 DIAGNOSIS — N18.30 CKD (CHRONIC KIDNEY DISEASE) STAGE 3, GFR 30-59 ML/MIN: ICD-10-CM

## 2018-12-11 DIAGNOSIS — K63.5 POLYP OF COLON, UNSPECIFIED PART OF COLON, UNSPECIFIED TYPE: ICD-10-CM

## 2018-12-11 DIAGNOSIS — R82.90 ABNORMAL URINE: ICD-10-CM

## 2018-12-11 DIAGNOSIS — E11.22 TYPE 2 DIABETES MELLITUS WITH STAGE 3 CHRONIC KIDNEY DISEASE, WITHOUT LONG-TERM CURRENT USE OF INSULIN: ICD-10-CM

## 2018-12-11 DIAGNOSIS — Z12.11 SCREENING FOR COLON CANCER: ICD-10-CM

## 2018-12-11 DIAGNOSIS — I10 ESSENTIAL HYPERTENSION: Primary | ICD-10-CM

## 2018-12-11 DIAGNOSIS — Z12.5 SCREENING FOR PROSTATE CANCER: ICD-10-CM

## 2018-12-11 DIAGNOSIS — M17.11 PRIMARY OSTEOARTHRITIS OF RIGHT KNEE: ICD-10-CM

## 2018-12-11 DIAGNOSIS — N18.30 TYPE 2 DIABETES MELLITUS WITH STAGE 3 CHRONIC KIDNEY DISEASE, WITHOUT LONG-TERM CURRENT USE OF INSULIN: ICD-10-CM

## 2018-12-11 LAB
AMORPH CRY UR QL COMP ASSIST: ABNORMAL
BACTERIA #/AREA URNS AUTO: ABNORMAL /HPF
BILIRUB UR QL STRIP: NEGATIVE
CLARITY UR REFRACT.AUTO: ABNORMAL
COLOR UR AUTO: YELLOW
COMPLEXED PSA SERPL-MCNC: 0.62 NG/ML
GLUCOSE UR QL STRIP: NEGATIVE
HGB UR QL STRIP: ABNORMAL
HYALINE CASTS UR QL AUTO: 1 /LPF
KETONES UR QL STRIP: NEGATIVE
LEUKOCYTE ESTERASE UR QL STRIP: ABNORMAL
MICROSCOPIC COMMENT: ABNORMAL
NITRITE UR QL STRIP: NEGATIVE
PH UR STRIP: 7 [PH] (ref 5–8)
PROT UR QL STRIP: ABNORMAL
RBC #/AREA URNS AUTO: 13 /HPF (ref 0–4)
SP GR UR STRIP: 1.01 (ref 1–1.03)
URN SPEC COLLECT METH UR: ABNORMAL
WBC #/AREA URNS AUTO: 40 /HPF (ref 0–5)
WBC CLUMPS UR QL AUTO: ABNORMAL

## 2018-12-11 PROCEDURE — 3075F SYST BP GE 130 - 139MM HG: CPT | Mod: CPTII,S$GLB,, | Performed by: FAMILY MEDICINE

## 2018-12-11 PROCEDURE — 87077 CULTURE AEROBIC IDENTIFY: CPT

## 2018-12-11 PROCEDURE — 81001 URINALYSIS AUTO W/SCOPE: CPT

## 2018-12-11 PROCEDURE — 87186 SC STD MICRODIL/AGAR DIL: CPT

## 2018-12-11 PROCEDURE — 1101F PT FALLS ASSESS-DOCD LE1/YR: CPT | Mod: CPTII,S$GLB,, | Performed by: FAMILY MEDICINE

## 2018-12-11 PROCEDURE — 87086 URINE CULTURE/COLONY COUNT: CPT

## 2018-12-11 PROCEDURE — 36415 COLL VENOUS BLD VENIPUNCTURE: CPT | Mod: PO

## 2018-12-11 PROCEDURE — 3078F DIAST BP <80 MM HG: CPT | Mod: CPTII,S$GLB,, | Performed by: FAMILY MEDICINE

## 2018-12-11 PROCEDURE — 99999 PR PBB SHADOW E&M-EST. PATIENT-LVL IV: CPT | Mod: PBBFAC,,, | Performed by: FAMILY MEDICINE

## 2018-12-11 PROCEDURE — 87088 URINE BACTERIA CULTURE: CPT

## 2018-12-11 PROCEDURE — 99214 OFFICE O/P EST MOD 30 MIN: CPT | Mod: S$GLB,,, | Performed by: FAMILY MEDICINE

## 2018-12-11 PROCEDURE — 84153 ASSAY OF PSA TOTAL: CPT

## 2018-12-11 NOTE — PROGRESS NOTES
Subjective:       Patient ID: Pedro Ponce is a 78 y.o. male.    Chief Complaint: Follow-up (x 6 months for HTN) and Joint Swelling (x 6 weeks)    78 years old came to the clinic for blood pressure check.  Blood pressure today stable.  No chest pain, palpitation, orthopnea or PND.  Patient with decreased kidney function but stable in comparison with previous reports .  Patient with episodic with right knee pain over the medial side.  Patient with pain for the last year.  The pain is 3/10 of intensity on and off aggravated with activity and better with rest.  Last x-ray with evidence of suprapatellar effusion.  Patient with abnormal liver function and decreased kidney function but stable in comparison with previous reports .      Review of Systems   Constitutional: Negative.    HENT: Negative.    Eyes: Negative.    Respiratory: Negative.    Cardiovascular: Negative.  Negative for chest pain, palpitations and leg swelling.   Gastrointestinal: Negative.    Endocrine: Negative for polydipsia, polyphagia and polyuria.   Genitourinary: Negative.    Musculoskeletal: Positive for arthralgias and joint swelling.   Skin: Negative.    Neurological: Negative.    Psychiatric/Behavioral: Negative.        Objective:      Physical Exam   Constitutional: He is oriented to person, place, and time. He appears well-developed and well-nourished. No distress.   HENT:   Head: Normocephalic and atraumatic.   Right Ear: External ear normal.   Left Ear: External ear normal.   Nose: Nose normal.   Mouth/Throat: Oropharynx is clear and moist. No oropharyngeal exudate.   Eyes: Conjunctivae and EOM are normal. Pupils are equal, round, and reactive to light. Right eye exhibits no discharge. Left eye exhibits no discharge. No scleral icterus.   Neck: Normal range of motion. Neck supple. No JVD present. No tracheal deviation present. No thyromegaly present.   Cardiovascular: Normal rate, regular rhythm, normal heart sounds and intact distal  pulses. Exam reveals no gallop and no friction rub.   No murmur heard.  Pulmonary/Chest: Effort normal and breath sounds normal. No stridor. No respiratory distress. He has no wheezes. He has no rales. He exhibits no tenderness.   Abdominal: Soft. Bowel sounds are normal. He exhibits no distension and no mass. There is no tenderness. There is no rebound and no guarding.   Musculoskeletal: Normal range of motion. He exhibits no edema.        Right knee: He exhibits no swelling. Tenderness found. Medial joint line tenderness noted. No lateral joint line tenderness noted.   Lymphadenopathy:     He has no cervical adenopathy.   Neurological: He is alert and oriented to person, place, and time. He has normal reflexes. He displays normal reflexes. No cranial nerve deficit. He exhibits normal muscle tone. Coordination normal.   Skin: Skin is warm and dry. No rash noted. He is not diaphoretic. No erythema. No pallor.   Psychiatric: He has a normal mood and affect. His behavior is normal. Judgment and thought content normal.   Nursing note and vitals reviewed.      Assessment:       1. Essential hypertension    2. Type 2 diabetes mellitus with stage 3 chronic kidney disease, without long-term current use of insulin    3. CKD (chronic kidney disease) stage 3, GFR 30-59 ml/min    4. Abnormal liver enzymes    5. Screening for colon cancer    6. Polyp of colon, unspecified part of colon, unspecified type    7. Primary osteoarthritis of right knee    8. Abnormal urine    9. Screening for prostate cancer        Plan:         Pedro was seen today for follow-up and joint swelling.    Diagnoses and all orders for this visit:    Essential hypertension    Type 2 diabetes mellitus with stage 3 chronic kidney disease, without long-term current use of insulin    CKD (chronic kidney disease) stage 3, GFR 30-59 ml/min    Abnormal liver enzymes    Screening for colon cancer  -     Case request GI: COLONOSCOPY    Polyp of colon,  unspecified part of colon, unspecified type  -     Case request GI: COLONOSCOPY    Primary osteoarthritis of right knee  -     Ambulatory referral to Orthopedics    Abnormal urine  -     Urinalysis  -     Urine culture    Screening for prostate cancer  -     PSA, Screening; Future    Continue monitoring blood pressure at home, low sodium diet.  Continue monitoring blood sugar at home,ADA diet.   Diet and physical activity to promote weight loss.

## 2018-12-11 NOTE — PATIENT INSTRUCTIONS
Arthritis: Exercise     Look for exercise classes for arthritis in your community.     Exercise is important to your overall health. It is especially important in people with arthritis. Regular exercise can:  · Keep your heart and blood vessels healthy  · Help with weight management, or weight loss  · Improve your mood  · Help prevent and manage health problems such as:  ¨ Diabetes  ¨ High blood pressure  ¨ High cholesterol  ¨ Depression  In people with arthritis, it offers all of those benefits and it can:  · Lessen pain and stiffness  · Strengthen muscles that support your joints  · Help you to be able to do the things you enjoy  Exercise and arthritis  Exercise is an important part of any arthritis treatment plan. A complete program consists of the following three types of exercises:  · Aerobic exercises for cardiovascular health and overall fitness.   · Strengthening exercises to build up muscles to help prevent injury and keep joints stable.  · Range-of-motion exercises to keep muscles and joints flexible.  Getting started  Talk with your healthcare provider about what is safe for you. Make sure you:  · Learn how to do exercises properly and safely. Consider talking with a physical therapist or  used to working with people with arthritis.  · Start gradually and build. If you haven't been exercising, start slowly. Don't exercise too hard or too long.  · Create a routine. Set aside specific times for exercise every day.  · Warm up carefully. Take 5 to 10 minutes at the beginning and end of exercising to warm up and cool down. Just do the same exercises at a slower pace for 5 to 10 minutes.  · Work at a comfortable, smooth pace. Move your joints gently to prevent injury.  · Pay attention to your body. Don't exercise a painful or swollen joint; switch to another activity. Follow the 2-hour pain rule: You did too much if your joint or muscle pain lasts 2 hours or more after exercising, or is worse the next  day. This doesn't mean you should stop exercising. Just do less.  Aerobic exercise  Aerobic exercise improves overall health and helps control weight. Choose those that don't add extra stress to your joints. For example, walking, swimming, or bicycling.  Most people should exercise for at least 30 minutes. most days of the week. You don't have to exercise all at once. Try exercising for 10 minutes, 3 times a day, for example.  Strengthening exercises  Strengthening your muscles help to protect your joints and prevent injuries. Try to do strengthening exercises 2 to 3 times a week:  · These exercises can be done with exercise or resistance bands (inexpensive exercise aids that add resistance), or with light weights. Some people use soup cans as weights.  · Isometric exercises are done by tightening the muscles without moving the joint. This may be a good way to strengthen the muscles around a stiff joint.  A physical therapist or  can teach you how to do these exercises.  Range-of-motion (ROM) exercises  Range-of-motion (ROM) exercises allow you to move each of your joints in every way they are intended to move. You should do ROM exercises for each joint 2 to 3 times a day. This will help you maintain full use of all of your joints.  Sample ROM exercises  The following are just a sample of ROM exercises--one for your neck, shoulders, elbows, hips, knees, and ankles. To completely move each joint through its full range of motion, you will have to do a few exercises for each joint. A physical therapist or  can teach you how to do full ROM exercises for each joint.   Repeat each for these exercises 5 to 10 times. Make sure you move slowly:  1. Neck turns. Sit in a straight-backed chair. Look straight ahead. Slowly turn your head to the right, then return it to center. Repeat. Do the same thing, turning your head to the left. Repeat.  2. Shoulder raise. Lie on your back or sit in a chair. Raise one arm over  your head, keeping your elbow straight. Keep the arm close to your ear. Return it slowly to your side. Repeat with your other arm.  3. Elbow stretch. Sit in a chair. If you are able, put both arms out to your sides to form a T. Slowly touch your shoulders with the tips of your fingers. Then return to the T-position. Repeat.  4. Hip stretch. Lie on your back with your legs straight and about 6 inches apart. With your foot flexed, slide your leg out to the side, then slide it back to the starting position. Repeat with your other leg.  5. Knee bend. Sit in a chair with your legs bent at the knees in front of you. Straighten one leg as much as you can, then bring it back to the floor. Repeat this 5 to 10 times. Then do the same thing with the other leg.   6. Ankle stretch. Sit with your feet flat on the floor. Lift your toes off of the floor while your heels stay down. Repeat. Then lift your heels off the floor while your toes stay down. Repeat.  Other exercise  Many other exercise and activities benefit people with arthritis. It is most important to find exercise and activities that you enjoy. You might try:  · Yoga, including chair yoga, helps to keep your joints strong and flexible.   · Tyler Chi, an ancient type of exercise with slow, gentle movements  · Water exercise, including water walking  For more information on exercise for arthritis go to the Arth[employer] website: www.arthritis.org.  Date Last Reviewed: 2/14/2016  © 6397-5836 [employer]. [address] [place] [address], [place] [address]. All rights reserved. This information is not intended as a substitute for professional medical care. Always follow your healthcare professional's instructions.        What is Arthritis?  Arthritis is a disease that affects the joints (the parts where bones meet and move). It can affect any joint in your body. There are many types of arthritis, including osteoarthritis and rheumatoid arthrtitis. If your symptoms  are mild, medications may be enough to reduce pain and swelling. For more severe arthritis, surgery may be needed to improve the condition of the joint or replace the joint entirely.                  What causes arthritis?  Cartilage is a smooth substance that protects the ends of your bones and provides cushioning. When you have arthritis, this cartilage breaks down and can no longer protect your bones. The bones rub against each other, causing pain and swelling. Over time, bone spurs (small pieces of rough or splintered bone) may develop, and the joint's range of motion can become limited.  Symptoms  Some of the more common symptoms of arthritis include:  · Joint pain and stiffness. Pain and stiffness get worse with long periods of rest or using a joint too long or too hard.  · Joints that have lost normal shape and motion.  · Tender, inflamed joints. They may look red and feel warm.  · Grinding or popping noise with joint movement.   · Feeling tired all the time.  Reducing symptoms  Following a healthy lifestyle by losing weight and exercising can help reduce symptoms of osteoarthritis. Medicines can be very helpful for arthritis.     Date Last Reviewed: 9/10/2015  © 7214-5760 [employer]. [address] [place] [address], [place] [address]. All rights reserved. This information is not intended as a substitute for professional medical care. Always follow your healthcare professional's instructions.

## 2018-12-13 ENCOUNTER — OFFICE VISIT (OUTPATIENT)
Dept: ORTHOPEDICS | Facility: CLINIC | Age: 78
End: 2018-12-13
Payer: MEDICARE

## 2018-12-13 VITALS — HEIGHT: 72 IN | BODY MASS INDEX: 30.34 KG/M2 | WEIGHT: 224 LBS

## 2018-12-13 DIAGNOSIS — M65.9 SYNOVITIS OF KNEE: Primary | ICD-10-CM

## 2018-12-13 DIAGNOSIS — Z12.11 SPECIAL SCREENING FOR MALIGNANT NEOPLASMS, COLON: Primary | ICD-10-CM

## 2018-12-13 PROCEDURE — 99999 PR PBB SHADOW E&M-EST. PATIENT-LVL II: CPT | Mod: PBBFAC,,, | Performed by: ORTHOPAEDIC SURGERY

## 2018-12-13 PROCEDURE — 20610 DRAIN/INJ JOINT/BURSA W/O US: CPT | Mod: RT,S$GLB,, | Performed by: ORTHOPAEDIC SURGERY

## 2018-12-13 PROCEDURE — 1101F PT FALLS ASSESS-DOCD LE1/YR: CPT | Mod: CPTII,S$GLB,, | Performed by: ORTHOPAEDIC SURGERY

## 2018-12-13 PROCEDURE — 99202 OFFICE O/P NEW SF 15 MIN: CPT | Mod: 25,S$GLB,, | Performed by: ORTHOPAEDIC SURGERY

## 2018-12-13 RX ORDER — TRIAMCINOLONE ACETONIDE 40 MG/ML
40 INJECTION, SUSPENSION INTRA-ARTICULAR; INTRAMUSCULAR
Status: COMPLETED | OUTPATIENT
Start: 2018-12-13 | End: 2018-12-13

## 2018-12-13 RX ORDER — SODIUM, POTASSIUM,MAG SULFATES 17.5-3.13G
SOLUTION, RECONSTITUTED, ORAL ORAL
Qty: 354 ML | Refills: 0 | Status: ON HOLD | OUTPATIENT
Start: 2018-12-13 | End: 2019-01-14 | Stop reason: ALTCHOICE

## 2018-12-13 RX ADMIN — TRIAMCINOLONE ACETONIDE 40 MG: 40 INJECTION, SUSPENSION INTRA-ARTICULAR; INTRAMUSCULAR at 09:12

## 2018-12-13 NOTE — LETTER
December 13, 2018      John Ricci MD  [address] [facility]  [place] [address]           [facility]  [address] [place] [address] [address]  [place] [address]  Phone: [phone]          Patient: Pedro Ponce   MR Number: [id number]   YOB: 1940   Date of Visit: 12/13/2018       Dear Dr. John Ricci:    Thank you for referring Pedro Ponce to me for evaluation. Attached you will find relevant portions of my assessment and plan of care.    If you have questions, please do not hesitate to call me. I look forward to following Pedro Ponce along with you.    Sincerely,    Abbe Barnhart MD    Enclosure  CC:  No Recipients    If you would like to receive this communication electronically, please contact [email or web address] or [phone] to request more information on [employer] access.    For providers and/or their staff who would like to refer a patient to [facility], please contact us through our one-stop-shop provider referral line, [facility], at [phone].    If you feel you have received this communication in error or would no longer like to receive these types of communications, please e-mail [email or web address]

## 2018-12-13 NOTE — PROCEDURES
Procedures     After explaining the procedure, the patient gave verbal consent for right knee aspiration. The sight was identified and the skin was aseptically prepped.  50 cc's of clear fluid was obtained.  After injection the joint was injected with 40 mg of triamcinolone and 1 cc of 1% plain Xylocaine.  A sterile dressing was applied.  The patient was instructed to call if there were any problems at the aspiration sight.

## 2018-12-13 NOTE — PROGRESS NOTES
Subjective:      Patient ID: Pedro Ponce is a 78 y.o. male.    Chief Complaint: Pain and Swelling of the Right Knee    HPI     They have experienced problems with their right knee over the past 1 month. The patient denies relevant history of injury/aggravation.  He does have a history of gout in the past.  Pain is located diffusely Associated symptoms include swelling.  Symptoms are aggravated by exercise. They have been treated with NA.   Symptoms have recently stayed the same. Ambulation reportedly has not been impaired. Self care ADLs are not painful.     Review of Systems   Constitution: Negative for fever and weight loss.   HENT: Negative for congestion.    Eyes: Negative for visual disturbance.   Cardiovascular: Negative for chest pain.   Respiratory: Negative for shortness of breath.    Hematologic/Lymphatic: Negative for bleeding problem. Does not bruise/bleed easily.   Skin: Negative for poor wound healing.   Musculoskeletal: Positive for joint pain and joint swelling.   Gastrointestinal: Negative for abdominal pain.   Genitourinary: Negative for dysuria.   Neurological: Negative for seizures.   Psychiatric/Behavioral: Negative for altered mental status.   Allergic/Immunologic: Negative for persistent infections.         Objective:            Ortho/SPM Exam    Right knee    The patient is not in acute distress.   Body habitus is normal.   Sclera appear normal  No respiratory distress  The patient walks without a limp.  Resisted SLR negative.   The skin over the knee is intact.  Knee effusion 3+  Tendernes is located absent.  Range of motion- Flexion full, Extension full.   Ligament exam:   MCL intact   Lachman intact              Post sag intact    LCL intact  Patellar apprehension negative.  Popliteal cyst negative  Patellar crepitation absent.  Flexion/pinch negative.  Pulses DP present, PT present.  Motor normal 5/5 strength in all tested muscle groups.   Sensory normal.    I reviewed the relevant  radiographic images for the patient's condition:  Recent right knee films show minimal degenerative change.      Assessment:       Encounter Diagnosis   Name Primary?    Synovitis of knee Yes        this could be related to the patient's history of gout  Plan:       Pedro was seen today for pain and swelling.    Diagnoses and all orders for this visit:    Synovitis of knee        I explained my diagnostic impression and the reasoning behind it in detail, using layman's terms.  Models and/or pictures were used to help in the explanation.    Aspiration recommended and consent given    I explained the possibility of recurrence

## 2018-12-14 LAB — BACTERIA UR CULT: NORMAL

## 2018-12-21 ENCOUNTER — TELEPHONE (OUTPATIENT)
Dept: FAMILY MEDICINE | Facility: CLINIC | Age: 78
End: 2018-12-21

## 2018-12-21 DIAGNOSIS — N39.0 URINARY TRACT INFECTION WITHOUT HEMATURIA, SITE UNSPECIFIED: Primary | ICD-10-CM

## 2018-12-21 RX ORDER — NITROFURANTOIN (MACROCRYSTALS) 100 MG/1
100 CAPSULE ORAL EVERY 12 HOURS
Qty: 20 CAPSULE | Refills: 0 | Status: SHIPPED | OUTPATIENT
Start: 2018-12-21 | End: 2018-12-31

## 2018-12-31 ENCOUNTER — TELEPHONE (OUTPATIENT)
Dept: FAMILY MEDICINE | Facility: CLINIC | Age: 78
End: 2018-12-31

## 2018-12-31 DIAGNOSIS — N39.0 URINARY TRACT INFECTION WITHOUT HEMATURIA, SITE UNSPECIFIED: Primary | ICD-10-CM

## 2018-12-31 NOTE — TELEPHONE ENCOUNTER
----- Message from Radha Montaño sent at 12/31/2018  3:44 PM CST -----  Contact: self / [phone]  Patient is requesting a call back regarding, calling about  His urine test. Please advise

## 2018-12-31 NOTE — TELEPHONE ENCOUNTER
Patient requesting to repeat his urine since he just finishing up with antibiotic given by Dr Barker.  Please advise.

## 2019-01-02 ENCOUNTER — LAB VISIT (OUTPATIENT)
Dept: LAB | Facility: HOSPITAL | Age: 79
End: 2019-01-02
Attending: FAMILY MEDICINE
Payer: MEDICARE

## 2019-01-02 DIAGNOSIS — N39.0 URINARY TRACT INFECTION WITHOUT HEMATURIA, SITE UNSPECIFIED: ICD-10-CM

## 2019-01-02 LAB
BACTERIA #/AREA URNS AUTO: ABNORMAL /HPF
BILIRUB UR QL STRIP: NEGATIVE
CLARITY UR REFRACT.AUTO: ABNORMAL
COLOR UR AUTO: YELLOW
GLUCOSE UR QL STRIP: NEGATIVE
HGB UR QL STRIP: NEGATIVE
HYALINE CASTS UR QL AUTO: 1 /LPF
KETONES UR QL STRIP: NEGATIVE
LEUKOCYTE ESTERASE UR QL STRIP: ABNORMAL
MICROSCOPIC COMMENT: ABNORMAL
NITRITE UR QL STRIP: NEGATIVE
PH UR STRIP: 7 [PH] (ref 5–8)
PROT UR QL STRIP: NEGATIVE
RBC #/AREA URNS AUTO: 1 /HPF (ref 0–4)
SP GR UR STRIP: 1.01 (ref 1–1.03)
URN SPEC COLLECT METH UR: ABNORMAL
WBC #/AREA URNS AUTO: 45 /HPF (ref 0–5)

## 2019-01-02 PROCEDURE — 87186 SC STD MICRODIL/AGAR DIL: CPT

## 2019-01-02 PROCEDURE — 87086 URINE CULTURE/COLONY COUNT: CPT

## 2019-01-02 PROCEDURE — 81001 URINALYSIS AUTO W/SCOPE: CPT

## 2019-01-02 PROCEDURE — 87077 CULTURE AEROBIC IDENTIFY: CPT

## 2019-01-02 PROCEDURE — 87088 URINE BACTERIA CULTURE: CPT

## 2019-01-02 NOTE — TELEPHONE ENCOUNTER
Spoke to patient and inform urine was ordered and he said he will coming today to do it, appointment was schedule for urine.

## 2019-01-06 ENCOUNTER — TELEPHONE (OUTPATIENT)
Dept: FAMILY MEDICINE | Facility: CLINIC | Age: 79
End: 2019-01-06

## 2019-01-06 DIAGNOSIS — N39.0 URINARY TRACT INFECTION WITHOUT HEMATURIA, SITE UNSPECIFIED: Primary | ICD-10-CM

## 2019-01-06 LAB — BACTERIA UR CULT: NORMAL

## 2019-01-06 RX ORDER — NITROFURANTOIN 25; 75 MG/1; MG/1
100 CAPSULE ORAL 2 TIMES DAILY
Qty: 20 CAPSULE | Refills: 0 | Status: SHIPPED | OUTPATIENT
Start: 2019-01-06 | End: 2019-01-16

## 2019-01-07 NOTE — TELEPHONE ENCOUNTER
Patient with infection in the urine culture.    Antibiotic was sent to the pharmacy.    Please notify the patient .

## 2019-01-08 ENCOUNTER — OFFICE VISIT (OUTPATIENT)
Dept: OPHTHALMOLOGY | Facility: CLINIC | Age: 79
End: 2019-01-08
Attending: OPHTHALMOLOGY
Payer: MEDICARE

## 2019-01-08 DIAGNOSIS — H35.721 RETINAL PIGMENT EPITHELIAL DETACHMENT OF RIGHT EYE: ICD-10-CM

## 2019-01-08 DIAGNOSIS — H35.3211 EXUDATIVE AGE-RELATED MACULAR DEGENERATION OF RIGHT EYE WITH ACTIVE CHOROIDAL NEOVASCULARIZATION: Primary | ICD-10-CM

## 2019-01-08 DIAGNOSIS — H43.393 VITREOUS FLOATERS OF BOTH EYES: ICD-10-CM

## 2019-01-08 PROCEDURE — 92014 PR EYE EXAM, EST PATIENT,COMPREHESV: ICD-10-PCS | Mod: S$GLB,,, | Performed by: OPHTHALMOLOGY

## 2019-01-08 PROCEDURE — 92134 POSTERIOR SEGMENT OCT RETINA (OCULAR COHERENCE TOMOGRAPHY)-BOTH EYES: ICD-10-PCS | Mod: S$GLB,,, | Performed by: OPHTHALMOLOGY

## 2019-01-08 PROCEDURE — 99999 PR PBB SHADOW E&M-EST. PATIENT-LVL III: CPT | Mod: PBBFAC,,, | Performed by: OPHTHALMOLOGY

## 2019-01-08 PROCEDURE — 92226 PR SPECIAL EYE EXAM, SUBSEQUENT: CPT | Mod: 50,S$GLB,, | Performed by: OPHTHALMOLOGY

## 2019-01-08 PROCEDURE — 92014 COMPRE OPH EXAM EST PT 1/>: CPT | Mod: S$GLB,,, | Performed by: OPHTHALMOLOGY

## 2019-01-08 PROCEDURE — 92226 PR SPECIAL EYE EXAM, SUBSEQUENT: ICD-10-PCS | Mod: 50,S$GLB,, | Performed by: OPHTHALMOLOGY

## 2019-01-08 PROCEDURE — 92134 CPTRZ OPH DX IMG PST SGM RTA: CPT | Mod: S$GLB,,, | Performed by: OPHTHALMOLOGY

## 2019-01-08 PROCEDURE — 99999 PR PBB SHADOW E&M-EST. PATIENT-LVL III: ICD-10-PCS | Mod: PBBFAC,,, | Performed by: OPHTHALMOLOGY

## 2019-01-08 NOTE — PROGRESS NOTES
HPI     Eye Problem      Additional comments: 8 week check              Comments     DLS 11/6/18- Eyes seem to be doing well. No changes x last visit        OCT -SRF resolved, persistent IR fluid  OS - stable small flat inferior PED, no SRF      Prior ICG/FA - leakage IT to fovea OD      A/P    1. 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 Floaters OU    3. POAG - Good IOP, continue management as per Dr. Hidalgo  - Needs f/u appt    4. PCIOL OU      12 weeks OCT

## 2019-01-14 ENCOUNTER — ANESTHESIA (OUTPATIENT)
Dept: ENDOSCOPY | Facility: HOSPITAL | Age: 79
End: 2019-01-14
Payer: MEDICARE

## 2019-01-14 ENCOUNTER — ANESTHESIA EVENT (OUTPATIENT)
Dept: ENDOSCOPY | Facility: HOSPITAL | Age: 79
End: 2019-01-14
Payer: MEDICARE

## 2019-01-14 ENCOUNTER — HOSPITAL ENCOUNTER (OUTPATIENT)
Facility: HOSPITAL | Age: 79
Discharge: HOME | End: 2019-01-14
Attending: COLON & RECTAL SURGERY | Admitting: COLON & RECTAL SURGERY
Payer: MEDICARE

## 2019-01-14 VITALS
RESPIRATION RATE: 18 BRPM | TEMPERATURE: 98 F | BODY MASS INDEX: 32.2 KG/M2 | WEIGHT: 230 LBS | HEART RATE: 60 BPM | HEIGHT: 71 IN | SYSTOLIC BLOOD PRESSURE: 159 MMHG | OXYGEN SATURATION: 99 % | DIASTOLIC BLOOD PRESSURE: 74 MMHG

## 2019-01-14 DIAGNOSIS — Z12.11 SCREENING FOR COLON CANCER: Primary | ICD-10-CM

## 2019-01-14 PROCEDURE — 45385 COLONOSCOPY W/LESION REMOVAL: CPT | Mod: PT,,, | Performed by: COLON & RECTAL SURGERY

## 2019-01-14 PROCEDURE — 45385 COLONOSCOPY W/LESION REMOVAL: CPT | Performed by: COLON & RECTAL SURGERY

## 2019-01-14 PROCEDURE — E9220 PRA ENDO ANESTHESIA: ICD-10-PCS | Mod: PT,,, | Performed by: NURSE ANESTHETIST, CERTIFIED REGISTERED

## 2019-01-14 PROCEDURE — 88305 TISSUE EXAM BY PATHOLOGIST: CPT | Performed by: PATHOLOGY

## 2019-01-14 PROCEDURE — E9220 PRA ENDO ANESTHESIA: HCPCS | Mod: PT,,, | Performed by: NURSE ANESTHETIST, CERTIFIED REGISTERED

## 2019-01-14 PROCEDURE — 63600175 PHARM REV CODE 636 W HCPCS: Performed by: NURSE ANESTHETIST, CERTIFIED REGISTERED

## 2019-01-14 PROCEDURE — 88305 TISSUE EXAM BY PATHOLOGIST: CPT | Mod: 26,,, | Performed by: PATHOLOGY

## 2019-01-14 PROCEDURE — 25000003 PHARM REV CODE 250: Performed by: NURSE PRACTITIONER

## 2019-01-14 PROCEDURE — 45385 PR COLONOSCOPY,REMV LESN,SNARE: ICD-10-PCS | Mod: PT,,, | Performed by: COLON & RECTAL SURGERY

## 2019-01-14 PROCEDURE — 37000009 HC ANESTHESIA EA ADD 15 MINS: Performed by: COLON & RECTAL SURGERY

## 2019-01-14 PROCEDURE — 37000008 HC ANESTHESIA 1ST 15 MINUTES: Performed by: COLON & RECTAL SURGERY

## 2019-01-14 PROCEDURE — 27201089 HC SNARE, DISP (ANY): Performed by: COLON & RECTAL SURGERY

## 2019-01-14 PROCEDURE — 88305 TISSUE SPECIMEN TO PATHOLOGY - SURGERY: ICD-10-PCS | Mod: 26,,, | Performed by: PATHOLOGY

## 2019-01-14 RX ORDER — PHENYLEPHRINE HYDROCHLORIDE 10 MG/ML
INJECTION INTRAVENOUS
Status: DISCONTINUED | OUTPATIENT
Start: 2019-01-14 | End: 2019-01-14

## 2019-01-14 RX ORDER — LIDOCAINE HCL/PF 100 MG/5ML
SYRINGE (ML) INTRAVENOUS
Status: DISCONTINUED | OUTPATIENT
Start: 2019-01-14 | End: 2019-01-14

## 2019-01-14 RX ORDER — PROPOFOL 10 MG/ML
VIAL (ML) INTRAVENOUS CONTINUOUS PRN
Status: DISCONTINUED | OUTPATIENT
Start: 2019-01-14 | End: 2019-01-14

## 2019-01-14 RX ORDER — SODIUM CHLORIDE 9 MG/ML
INJECTION, SOLUTION INTRAVENOUS CONTINUOUS
Status: DISCONTINUED | OUTPATIENT
Start: 2019-01-14 | End: 2019-01-14 | Stop reason: HOSPADM

## 2019-01-14 RX ORDER — SODIUM CHLORIDE 0.9 % (FLUSH) 0.9 %
3 SYRINGE (ML) INJECTION
Status: DISCONTINUED | OUTPATIENT
Start: 2019-01-14 | End: 2019-01-14 | Stop reason: HOSPADM

## 2019-01-14 RX ORDER — PROPOFOL 10 MG/ML
VIAL (ML) INTRAVENOUS
Status: DISCONTINUED | OUTPATIENT
Start: 2019-01-14 | End: 2019-01-14

## 2019-01-14 RX ADMIN — PROPOFOL 200 MCG/KG/MIN: 10 INJECTION, EMULSION INTRAVENOUS at 02:01

## 2019-01-14 RX ADMIN — LIDOCAINE HYDROCHLORIDE 40 MG: 20 INJECTION, SOLUTION INTRAVENOUS at 02:01

## 2019-01-14 RX ADMIN — SODIUM CHLORIDE: 0.9 INJECTION, SOLUTION INTRAVENOUS at 02:01

## 2019-01-14 RX ADMIN — PHENYLEPHRINE HYDROCHLORIDE 200 MCG: 10 INJECTION INTRAVENOUS at 03:01

## 2019-01-14 RX ADMIN — SODIUM CHLORIDE: 0.9 INJECTION, SOLUTION INTRAVENOUS at 03:01

## 2019-01-14 RX ADMIN — PROPOFOL 100 MG: 10 INJECTION, EMULSION INTRAVENOUS at 02:01

## 2019-01-14 NOTE — ANESTHESIA PREPROCEDURE EVALUATION
01/14/2019  Pedro Ponce is a 78 y.o., male.  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     Past Surgical History:   Procedure Laterality Date    COLONOSCOPY N/A 4/9/2018    Performed by HAIM Mir MD at [facility] ENDO (4TH FLR)    COLONOSCOPY N/A 6/23/2014    Performed by Marcelo Chamberlain MD at [facility] ENDO (4TH FLR)    CYST REMOVAL  2000    removed from left kidney    GONIOTOMY Right 7/6/2017    Performed by Roberto Hidalgo MD at [facility] OR 1ST FLR    INSERTION-INTRAOCULAR LENS (IOL) Left 8/24/2017    Performed by [facility] MD at [facility] OR 1ST FLR    INSERTION-INTRAOCULAR LENS (IOL) Right 7/6/2017    Performed by Roberto [facility] MD at [facility] OR [facility] FLR    KIDNEY SURGERY      PHACOEMULSIFICATION-ASPIRATION-CATARACT Left 8/24/2017    Performed by Roberto [facility]go MD at [facility] OR 1ST FLR    PHACOEMULSIFICATION-ASPIRATION-CATARACT Right 7/6/2017    [facility]    PROSTATE SURGERY           Anesthesia Evaluation    I have reviewed the Patient Summary Reports.     I have reviewed the Medications.     Review of Systems  Anesthesia Hx:  Denies Hx of Anesthetic complications  Neg history of prior surgery. Denies Family Hx of Anesthesia complications.   Denies Personal Hx of Anesthesia complications.   Cardiovascular:   Exercise tolerance: good        Physical Exam  General:  Well nourished, Obesity    Airway/Jaw/Neck:  Airway Findings: Mouth Opening: Normal Tongue: Normal  General Airway Assessment: Adult  Mallampati:  I  TM Distance: Normal, at least 6 cm         Dental:  DENTAL FINDINGS: Normal   Chest/Lungs:  Chest/Lungs Clear    Heart/Vascular:  Heart Findings: Normal       Mental Status:  Mental Status Findings:  Alert and Oriented         Anesthesia Plan  Type of Anesthesia, risks & benefits discussed:  Anesthesia Type:  general  Patient's Preference:   Intra-op Monitoring Plan: standard ASA monitors  Intra-op Monitoring Plan Comments:   Post Op Pain Control Plan:   Post Op Pain Control Plan Comments:   Induction:   IV  Beta Blocker:  Patient is on a Beta-Blocker and has received one dose within the past 24 hours (No further documentation required).       Informed Consent: Patient understands risks and agrees with Anesthesia plan.  Questions answered. Anesthesia consent signed with patient.  ASA Score: 2     Day of Surgery Review of History & Physical:    H&P update referred to the provider.         Ready For Surgery From Anesthesia Perspective.

## 2019-01-14 NOTE — H&P
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     Past Surgical History:   Procedure Laterality Date    COLONOSCOPY N/A 4/9/2018    Performed by HAIM Mir [facility] at [facility] ENDO (4TH FLR)    COLONOSCOPY N/A 6/23/2014    Performed by Marcelo Chamberlain MD at [facility] ENDO (4TH FLR)    CYST REMOVAL  2000    removed from left kidney    GONIOTOMY Right 7/6/2017    Performed by Roberto Hidalgo MD at [facility] OR 1ST FLR    INSERTION-INTRAOCULAR LENS (IOL) Left 8/24/2017    Performed by [facility]go MD at [facility] OR 1ST FLR    INSERTION-INTRAOCULAR LENS (IOL) Right 7/6/2017    Performed by [facility] MD at [facility] OR 1ST FLR    KIDNEY SURGERY      PHACOEMULSIFICATION-ASPIRATION-CATARACT Left 8/24/2017    Performed by [facility] MD at [facility] OR 1ST FLR    PHACOEMULSIFICATION-ASPIRATION-CATARACT Right 7/6/2017    Performed by Roberto [facility]go MD at [facility] OR 1ST FLR    PROSTATE SURGERY       Family History   Problem Relation Age of Onset    Cancer Mother         unkn ca    Diabetes Mother     Glaucoma Mother     Heart disease Mother     Hypertension Mother     Blindness Neg Hx     Macular degeneration Neg Hx     Retinal detachment Neg Hx      Social History     Socioeconomic History    Marital status:      Spouse name: Not on file    Number of children: Not on file    Years of education: Not on file    Highest education level: Not on file   Social Needs    Financial resource strain: Not on file    Food insecurity -  worry: Not on file    Food insecurity - inability: Not on file    Transportation needs - medical: Not on file    Transportation needs - non-medical: Not on file   Occupational History    Not on file   Tobacco Use    Smoking status: Never Smoker    Smokeless tobacco: Never Used   Substance and Sexual Activity    Alcohol use: No    Drug use: No    Sexual activity: Not on file   Other Topics Concern    Not on file   Social History Narrative    Not on file     No current facility-administered medications on file prior to encounter.      Current Outpatient Medications on File Prior to Encounter   Medication Sig    amlodipine-atorvastatin (CADUET) 10-10 mg per tablet TAKE 1 TABLET BY MOUTH EVERY DAY    aspirin 81 mg Tab Take 81 mg by mouth. 1 Tablet Oral Every evening    dutasteride (AVODART) 0.5 mg capsule TAKE 1 CAPSULE (0.5 MG TOTAL) BY MOUTH ONCE DAILY.    fish oil-omega-3 fatty acids 300-1,000 mg capsule Take 1 g by mouth once daily.      hydroCHLOROthiazide (HYDRODIURIL) 25 MG tablet TAKE 1 TABLET (25 MG TOTAL) BY MOUTH ONCE DAILY.    irbesartan (AVAPRO) 300 MG tablet TAKE 1 TABLET EVERY DAY    metoprolol tartrate (LOPRESSOR) 25 MG tablet TAKE 2 TABLETS BY MOUTH TWICE A DAY    multivitamin-minerals-lutein (CENTRUM SILVER) Tab Take by mouth. 1 Tablet Oral Every day    oxybutynin (DITROPAN) 5 MG Tab TAKE 1 TABLET BY MOUTH TWICE A DAY    tamsulosin (FLOMAX) 0.4 mg Cap Take 1 capsule (0.4 mg total) by mouth every evening.     Review of patient's allergies indicates:   Allergen Reactions    Pcn [penicillins] Rash           Review of Systems -ROS:  GENERAL: No fever, chills, fatigability or weight loss.  CHEST: Denies VALERA, cyanosis, wheezing, cough and sputum production.  CARDIOVASCULAR: Denies chest pain, PND, orthopnea or reduced exercise tolerance.  Musculoskeletal ROS: negative for - gait disturbance or joint pain  Neurological ROS: negative for - confusion or memory loss        Physical  Exam:  General: well developed, well nourished, no distress  Head: normocephalic  Neck: supple, symmetrical, trachea midline  Lungs:  clear to auscultation bilaterally and normal respiratory effort  Heart: regular rate and rhythm, S1, S2 normal, no murmur, rub or gallop and regular rate and rhythm  Abdomen: soft, non-tender non-distented; bowel sounds normal; no masses,  no organomegaly  Extremities: no cyanosis or edema, or clubbing      Moderate Sedation (choice): Mallampati Score 1    ASA : II    IMP: personal history of colon polyps and most recent endoscopic exam less than one years ago    Plan: Colonoscopy with Moderate sedation.  I have explained the procedure including indications, alternatives, expected outcomes and potential complications. The patient appears to understand and gives informed consent. The patient is medically ready for surgery.

## 2019-01-14 NOTE — [employer] PATIENT INSTRUCTIONS
Discharge Summary/Instructions after an Endoscopic Procedure  Patient Name: Pedro Ponce  Patient MRN: [id number]  Patient YOB: 1940  Monday, January 14, 2019  Vini Mir MD  RESTRICTIONS:  During your procedure today, you received medications for sedation.  These   medications may affect your judgment, balance and coordination.  Therefore,   for 24 hours, you have the following restrictions:   - DO NOT drive a car, operate machinery, make legal/financial decisions,   sign important papers or drink alcohol.    ACTIVITY:  Today: no heavy lifting, straining or running due to procedural   sedation/anesthesia.  The following day: return to full activity including work.  DIET:  Eat and drink normally unless instructed otherwise.     TREATMENT FOR COMMON SIDE EFFECTS:  - Mild abdominal pain, nausea, belching, bloating or excessive gas:  rest,   eat lightly and use a heating pad.  - Sore Throat: treat with throat lozenges and/or gargle with warm salt   water.  - Because air was used during the procedure, expelling large amounts of air   from your rectum or belching is normal.  - If a bowel prep was taken, you may not have a bowel movement for 1-3 days.    This is normal.  SYMPTOMS TO WATCH FOR AND REPORT TO YOUR PHYSICIAN:  1. Abdominal pain or bloating, other than gas cramps.  2. Chest pain.  3. Back pain.  4. Signs of infection such as: chills or fever occurring within 24 hours   after the procedure.  5. Rectal bleeding, which would show as bright red, maroon, or black stools.   (A tablespoon of blood from the rectum is not serious, especially if   hemorrhoids are present.)  6. Vomiting.  7. Weakness or dizziness.  GO DIRECTLY TO THE NEAREST EMERGENCY ROOM IF YOU HAVE ANY OF THE FOLLOWING:      Difficulty breathing              Chills and/or fever over 101 F   Persistent vomiting and/or vomiting blood   Severe abdominal pain   Severe chest pain   Black, tarry stools   Bleeding- more than one  tablespoon   Any other symptom or condition that you feel may need urgent attention  Your doctor recommends these additional instructions:  If any biopsies were taken, your doctors clinic will contact you in 1 to 2   weeks with any results.  - Discharge patient to home (ambulatory).   - Patient has a contact number available for emergencies.  The signs and   symptoms of potential delayed complications were discussed with the   patient.  Return to normal activities tomorrow.  Written discharge   instructions were provided to the patient.   - Resume previous diet.   - Continue present medications.   - Repeat colonoscopy in 3 years for surveillance.  For questions, problems or results please call your physician - Vini Mir MD at Work:  [phone].  [facility], EMERGENCY ROOM PHONE NUMBER: [phone]  IF A COMPLICATION OR EMERGENCY SITUATION ARISES AND YOU ARE UNABLE TO REACH   YOUR PHYSICIAN - GO DIRECTLY TO THE EMERGENCY ROOM.  Vini Mir MD  1/14/2019 3:23:49 PM  This report has been verified and signed electronically.  [employer]

## 2019-01-14 NOTE — ANESTHESIA POSTPROCEDURE EVALUATION
"Anesthesia Post Evaluation    Patient: Pedro Ponce    Procedure(s) Performed: Procedure(s) (LRB):  COLONOSCOPY (N/A)    Final Anesthesia Type: general  Patient location during evaluation: PACU  Patient participation: Yes- Able to Participate  Level of consciousness: awake and alert  Post-procedure vital signs: reviewed and stable  Pain management: adequate  Airway patency: patent  PONV status at discharge: No PONV  Anesthetic complications: no      Cardiovascular status: blood pressure returned to baseline  Respiratory status: unassisted  Hydration status: euvolemic  Follow-up not needed.        Visit Vitals  BP (!) 159/74 (BP Location: Left arm, Patient Position: Lying)   Pulse 60   Temp 36.6 °C (97.9 °F)   Resp 18   Ht 5' 11" (1.803 m)   Wt 104.3 kg (230 lb)   SpO2 99%   BMI 32.08 kg/m²       Pain/You Score: You Score: 10 (1/14/2019  4:06 PM)        "

## 2019-01-21 ENCOUNTER — TELEPHONE (OUTPATIENT)
Dept: ENDOSCOPY | Facility: HOSPITAL | Age: 79
End: 2019-01-21

## 2019-02-08 ENCOUNTER — OFFICE VISIT (OUTPATIENT)
Dept: ORTHOPEDICS | Facility: CLINIC | Age: 79
End: 2019-02-08
Payer: MEDICARE

## 2019-02-08 VITALS — BODY MASS INDEX: 32.2 KG/M2 | WEIGHT: 230 LBS | HEIGHT: 71 IN

## 2019-02-08 DIAGNOSIS — M65.9 SYNOVITIS OF KNEE: Primary | ICD-10-CM

## 2019-02-08 PROCEDURE — 99213 PR OFFICE/OUTPT VISIT, EST, LEVL III, 20-29 MIN: ICD-10-PCS | Mod: S$GLB,,, | Performed by: ORTHOPAEDIC SURGERY

## 2019-02-08 PROCEDURE — 99213 OFFICE O/P EST LOW 20 MIN: CPT | Mod: S$GLB,,, | Performed by: ORTHOPAEDIC SURGERY

## 2019-02-08 PROCEDURE — 1101F PT FALLS ASSESS-DOCD LE1/YR: CPT | Mod: CPTII,S$GLB,, | Performed by: ORTHOPAEDIC SURGERY

## 2019-02-08 PROCEDURE — 1101F PR PT FALLS ASSESS DOC 0-1 FALLS W/OUT INJ PAST YR: ICD-10-PCS | Mod: CPTII,S$GLB,, | Performed by: ORTHOPAEDIC SURGERY

## 2019-02-08 PROCEDURE — 99999 PR PBB SHADOW E&M-EST. PATIENT-LVL II: ICD-10-PCS | Mod: PBBFAC,,, | Performed by: ORTHOPAEDIC SURGERY

## 2019-02-08 PROCEDURE — 99999 PR PBB SHADOW E&M-EST. PATIENT-LVL II: CPT | Mod: PBBFAC,,, | Performed by: ORTHOPAEDIC SURGERY

## 2019-02-08 RX ORDER — METHYLPREDNISOLONE 4 MG/1
TABLET ORAL
Qty: 1 PACKAGE | Refills: 1 | Status: SHIPPED | OUTPATIENT
Start: 2019-02-08 | End: 2019-04-09

## 2019-02-08 NOTE — PROGRESS NOTES
Subjective:      Patient ID: Pedro Ponce is a 78 y.o. male.    Chief Complaint: Pain and Swelling of the Right Knee    HPI follow-up for synovitis.  Patient was aspirated and reports good relief.  He feels some recurrence of pain and swelling, but no severe symptoms. He denies taking any gout medicine this time.  The patient states that he hopes to avoid any new long-term medications.    ROS      Objective:            Ortho/SPM Exam  Right knee    The patient is not in acute distress.   Body habitus is normal.   Sclera appear normal  No respiratory distress  The patient walks without a limp.  Resisted SLR negative.   The skin over the knee is intact.  Knee effusion 1+  Tendernes is located absent.  Range of motion- Flexion full, Extension full.   Ligament exam:   MCL intact   Lachman intact              Post sag intact    LCL intact  Patellar apprehension negative.  Popliteal cyst negative  Patellar crepitation absent.  Flexion/pinch negative.  Pulses DP present, PT present.  Motor normal 5/5 strength in all tested muscle groups.   Sensory normal.            Assessment:       Encounter Diagnosis   Name Primary?    Synovitis of knee Yes        the condition is improved significantly.  I am not certain if this represents partially treated gout or mild osteoarthritis with persistent synovitis  Plan:       Pedro was seen today for pain and swelling.    Diagnoses and all orders for this visit:    Synovitis of knee        I explained my diagnostic impression and the reasoning behind it in detail, using layman's terms.  Medrol Dosepak

## 2019-02-14 ENCOUNTER — TELEPHONE (OUTPATIENT)
Dept: ORTHOPEDICS | Facility: CLINIC | Age: 79
End: 2019-02-14

## 2019-02-14 NOTE — TELEPHONE ENCOUNTER
----- Message from Yandy Gasca sent at 2/14/2019  8:58 AM CST -----  Contact: [phone]  Patient requested to speak with the nurse about the knee medication and what activities he can do. Please call.

## 2019-02-14 NOTE — TELEPHONE ENCOUNTER
He can play golf as much as he wants.    He may gradually resume conditioning exercises with his lower body at the gym

## 2019-02-15 NOTE — TELEPHONE ENCOUNTER
Spoke with patient to inform him that he can play golf as well as gradually resuming exercises with lower body. Verbalized understanding.

## 2019-03-22 ENCOUNTER — OFFICE VISIT (OUTPATIENT)
Dept: ORTHOPEDICS | Facility: CLINIC | Age: 79
End: 2019-03-22
Payer: MEDICARE

## 2019-03-22 VITALS — HEIGHT: 71 IN | BODY MASS INDEX: 32.2 KG/M2 | WEIGHT: 230 LBS

## 2019-03-22 DIAGNOSIS — M17.11 PRIMARY OSTEOARTHRITIS OF RIGHT KNEE: Primary | ICD-10-CM

## 2019-03-22 PROCEDURE — 99999 PR PBB SHADOW E&M-EST. PATIENT-LVL II: CPT | Mod: PBBFAC,,, | Performed by: ORTHOPAEDIC SURGERY

## 2019-03-22 PROCEDURE — 99999 PR PBB SHADOW E&M-EST. PATIENT-LVL II: ICD-10-PCS | Mod: PBBFAC,,, | Performed by: ORTHOPAEDIC SURGERY

## 2019-03-22 PROCEDURE — 99213 PR OFFICE/OUTPT VISIT, EST, LEVL III, 20-29 MIN: ICD-10-PCS | Mod: S$GLB,,, | Performed by: ORTHOPAEDIC SURGERY

## 2019-03-22 PROCEDURE — 99213 OFFICE O/P EST LOW 20 MIN: CPT | Mod: S$GLB,,, | Performed by: ORTHOPAEDIC SURGERY

## 2019-03-22 PROCEDURE — 1101F PR PT FALLS ASSESS DOC 0-1 FALLS W/OUT INJ PAST YR: ICD-10-PCS | Mod: CPTII,S$GLB,, | Performed by: ORTHOPAEDIC SURGERY

## 2019-03-22 PROCEDURE — 1101F PT FALLS ASSESS-DOCD LE1/YR: CPT | Mod: CPTII,S$GLB,, | Performed by: ORTHOPAEDIC SURGERY

## 2019-03-22 NOTE — PROGRESS NOTES
Subjective:      Patient ID: Pedro Ponce is a 79 y.o. male.    Chief Complaint: Pain of the Right Knee    HPI the patient reports that his right knee swelling is somewhat better.  He still has medial discomfort at the end of the day, he finds that golf aggravates his knee.    Review of Systems   Constitution: Negative for fever and weight loss.   HENT: Negative for congestion.    Eyes: Negative for visual disturbance.   Cardiovascular: Negative for chest pain.   Respiratory: Negative for shortness of breath.    Hematologic/Lymphatic: Negative for bleeding problem. Does not bruise/bleed easily.   Skin: Negative for poor wound healing.   Musculoskeletal: Positive for joint pain.   Gastrointestinal: Negative for abdominal pain.   Genitourinary: Negative for dysuria.   Neurological: Negative for seizures.   Psychiatric/Behavioral: Negative for altered mental status.   Allergic/Immunologic: Negative for persistent infections.         Objective:            Ortho/SPM Exam    Right knee    [unfilled]    The patient is not in acute distress.   Sclerae normal  Body habitus is normal.  Respiratory distress:  none   The patient walks without a limp.  Hip irritability  negative.   The skin over the knee is intact.  Knee effusion 0  Tendernes is located none  Range of motion- Flexion 140 deg, Extension 0 deg,   Ligament laxity exam:   MCL 1+   Lachman 0   Post sag  0    LCL 0  Patellar apprehension negative.  Popliteal cyst negative  Patellar crepitation absent.  Flexion/pinch negative  Pulses DP present, PT present.  Motor normal 5/5 strength in all tested muscle groups.   Sensory normal.        Assessment:       Encounter Diagnosis   Name Primary?    Primary osteoarthritis of right knee Yes          The acute synovitis is resolved.  Physical examination suggests to be that there is some progression of medial osteoarthritis.  Plan:       Pedro was seen today for pain.    Diagnoses and all orders for this  visit:    Primary osteoarthritis of right knee        I explained my diagnostic impression and the reasoning behind it in detail, using layman's terms.  Models and/or pictures were used to help in the explanation.    Patient is using over-the-counter analgesics on his own, which is reasonable    Icing regimen explained      Hinged brace applied with usage instructions    If symptoms are not controlled by next visit knee radiographs would be suggested

## 2019-03-27 ENCOUNTER — TELEPHONE (OUTPATIENT)
Dept: ORTHOPEDICS | Facility: CLINIC | Age: 79
End: 2019-03-27

## 2019-03-27 NOTE — TELEPHONE ENCOUNTER
Spoke with patient in regards to putting on brace for knee . Verbalized understanding   ----- Message from Karley Mcdonough sent at 3/27/2019  2:06 PM CDT -----  Contact: [phone]/self  Patient requesting to speak with you regarding his knee brace. Please advise.

## 2019-04-02 RX ORDER — HYDROCHLOROTHIAZIDE 25 MG/1
TABLET ORAL
Qty: 90 TABLET | Refills: 4 | Status: SHIPPED | OUTPATIENT
Start: 2019-04-02 | End: 2019-06-28 | Stop reason: DRUGHIGH

## 2019-04-03 ENCOUNTER — TELEPHONE (OUTPATIENT)
Dept: NEPHROLOGY | Facility: CLINIC | Age: 79
End: 2019-04-03

## 2019-04-03 NOTE — TELEPHONE ENCOUNTER
----- Message from Rita Machado sent at 4/3/2019 12:55 PM CDT -----  Contact:   Patient    [phone]  Needs Advice    Reason for call:  Schedule follow up appt  With the          Communication Preference:  Phone     Additional Information:

## 2019-04-04 ENCOUNTER — TELEPHONE (OUTPATIENT)
Dept: NEPHROLOGY | Facility: CLINIC | Age: 79
End: 2019-04-04

## 2019-04-09 ENCOUNTER — OFFICE VISIT (OUTPATIENT)
Dept: OPHTHALMOLOGY | Facility: CLINIC | Age: 79
End: 2019-04-09
Payer: MEDICARE

## 2019-04-09 VITALS — HEART RATE: 61 BPM | DIASTOLIC BLOOD PRESSURE: 75 MMHG | SYSTOLIC BLOOD PRESSURE: 153 MMHG

## 2019-04-09 DIAGNOSIS — H35.3211 EXUDATIVE AGE-RELATED MACULAR DEGENERATION OF RIGHT EYE WITH ACTIVE CHOROIDAL NEOVASCULARIZATION: ICD-10-CM

## 2019-04-09 DIAGNOSIS — H35.723 RETINAL PIGMENT EPITHELIAL DETACHMENT OF BOTH EYES: Primary | ICD-10-CM

## 2019-04-09 DIAGNOSIS — H35.033 HYPERTENSIVE RETINOPATHY, BILATERAL: ICD-10-CM

## 2019-04-09 PROCEDURE — 92134 CPTRZ OPH DX IMG PST SGM RTA: CPT | Mod: S$GLB,,, | Performed by: OPHTHALMOLOGY

## 2019-04-09 PROCEDURE — 99999 PR PBB SHADOW E&M-EST. PATIENT-LVL III: ICD-10-PCS | Mod: PBBFAC,,, | Performed by: OPHTHALMOLOGY

## 2019-04-09 PROCEDURE — 92014 COMPRE OPH EXAM EST PT 1/>: CPT | Mod: S$GLB,,, | Performed by: OPHTHALMOLOGY

## 2019-04-09 PROCEDURE — 92014 PR EYE EXAM, EST PATIENT,COMPREHESV: ICD-10-PCS | Mod: S$GLB,,, | Performed by: OPHTHALMOLOGY

## 2019-04-09 PROCEDURE — 92226 PR SPECIAL EYE EXAM, SUBSEQUENT: CPT | Mod: 50,S$GLB,, | Performed by: OPHTHALMOLOGY

## 2019-04-09 PROCEDURE — 99999 PR PBB SHADOW E&M-EST. PATIENT-LVL III: CPT | Mod: PBBFAC,,, | Performed by: OPHTHALMOLOGY

## 2019-04-09 PROCEDURE — 92226 PR SPECIAL EYE EXAM, SUBSEQUENT: ICD-10-PCS | Mod: 50,S$GLB,, | Performed by: OPHTHALMOLOGY

## 2019-04-09 PROCEDURE — 92134 POSTERIOR SEGMENT OCT RETINA (OCULAR COHERENCE TOMOGRAPHY)-BOTH EYES: ICD-10-PCS | Mod: S$GLB,,, | Performed by: OPHTHALMOLOGY

## 2019-04-09 NOTE — PROGRESS NOTES
HPI     Eye Problem      Additional comments: 3 mos check              Comments     DLS 1/8/19- No changes x last visit      OCT -SRF resolved, slight increase in IRF  OS - stable small flat inferior PED, no SRF      Prior ICG/FA - leakage IT to fovea OD      A/P    1. IPCV OU vs wet amd   With subfoveal PED OD  Avastin resumed 1/14  S/p Avastin OD #8 in past, resumed OD x 6   S/p PDT OD 9/18  OS - PED inferior to fovea stable    2/18 - recurrent swelling OD, IT to fovea  5/18 - increased CME and superior SRF  4/19 - slight increase in IRF, ASx - will follow     2. Floaters OU    3. POAG - Good IOP, continue management as per Dr. Hidalgo  - Needs f/u appt    4. PCIOL OU      12 weeks OCT

## 2019-05-09 ENCOUNTER — OFFICE VISIT (OUTPATIENT)
Dept: ORTHOPEDICS | Facility: CLINIC | Age: 79
End: 2019-05-09
Payer: MEDICARE

## 2019-05-09 ENCOUNTER — TELEPHONE (OUTPATIENT)
Dept: NEPHROLOGY | Facility: CLINIC | Age: 79
End: 2019-05-09

## 2019-05-09 VITALS — WEIGHT: 230 LBS | HEIGHT: 71 IN | BODY MASS INDEX: 32.2 KG/M2

## 2019-05-09 DIAGNOSIS — M17.11 PRIMARY OSTEOARTHRITIS OF RIGHT KNEE: Primary | ICD-10-CM

## 2019-05-09 PROCEDURE — 99212 OFFICE O/P EST SF 10 MIN: CPT | Mod: S$GLB,,, | Performed by: ORTHOPAEDIC SURGERY

## 2019-05-09 PROCEDURE — 1101F PR PT FALLS ASSESS DOC 0-1 FALLS W/OUT INJ PAST YR: ICD-10-PCS | Mod: CPTII,S$GLB,, | Performed by: ORTHOPAEDIC SURGERY

## 2019-05-09 PROCEDURE — 99999 PR PBB SHADOW E&M-EST. PATIENT-LVL III: CPT | Mod: PBBFAC,,, | Performed by: ORTHOPAEDIC SURGERY

## 2019-05-09 PROCEDURE — 99212 PR OFFICE/OUTPT VISIT, EST, LEVL II, 10-19 MIN: ICD-10-PCS | Mod: S$GLB,,, | Performed by: ORTHOPAEDIC SURGERY

## 2019-05-09 PROCEDURE — 1101F PT FALLS ASSESS-DOCD LE1/YR: CPT | Mod: CPTII,S$GLB,, | Performed by: ORTHOPAEDIC SURGERY

## 2019-05-09 PROCEDURE — 99999 PR PBB SHADOW E&M-EST. PATIENT-LVL III: ICD-10-PCS | Mod: PBBFAC,,, | Performed by: ORTHOPAEDIC SURGERY

## 2019-05-09 NOTE — PROGRESS NOTES
Subjective:      Patient ID: Pedro Ponce is a 79 y.o. male.    Chief Complaint: Follow-up of the Right Knee    HPI follow-up for osteoarthritis.  Patient is treated with a brace.  He reports intermittent symptoms. He is still able to go to the gym and play golf without severe pain.    Review of Systems   Constitution: Negative for fever and weight loss.   HENT: Negative for congestion.    Eyes: Negative for visual disturbance.   Cardiovascular: Negative for chest pain.   Respiratory: Negative for shortness of breath.    Hematologic/Lymphatic: Negative for bleeding problem. Does not bruise/bleed easily.   Skin: Negative for poor wound healing.   Gastrointestinal: Negative for abdominal pain.   Genitourinary: Negative for dysuria.   Neurological: Negative for seizures.   Psychiatric/Behavioral: Negative for altered mental status.   Allergic/Immunologic: Negative for persistent infections.         Objective:            Ortho/SPM Exam       Right knee      The patient is not in acute distress.   Sclerae normal  Body habitus is normal.  Respiratory distress:  none   The patient walks without a limp.  Hip irritability  negative.   The skin over the knee is intact.  Knee effusion 0  Tendernes is located none  Range of motion- Flexion 140 deg, Extension 0 deg,   Ligament laxity exam:   MCL 1+   Lachman 0   Post sag  0    LCL 0  Patellar apprehension negative.  Popliteal cyst negative  Patellar crepitation absent.  Flexion/pinch negative  Pulses DP present, PT present.  Motor normal 5/5 strength in all tested muscle groups.   Sensory normal.      Assessment:       Encounter Diagnosis   Name Primary?    Primary osteoarthritis of right knee Yes          Symptomatic control is satisfactory for now  Plan:       Pedro was seen today for follow-up.    Diagnoses and all orders for this visit:    Primary osteoarthritis of right knee  -     X-ray Knee Ortho Right; Future      I explained my diagnostic impression and the  reasoning behind it in detail, using layman's terms.  Models and/or pictures were used to help in the explanation.    Continue present management    Icing regimen next    Use of Tylenol recommended    I explained the potential role of surgery in the treatment of this condition to the patient.  They understand that if nonsurgical measures do not adequately control symptoms, surgery will be considered in the future.    X-ray follow-up

## 2019-05-13 ENCOUNTER — TELEPHONE (OUTPATIENT)
Dept: FAMILY MEDICINE | Facility: CLINIC | Age: 79
End: 2019-05-13

## 2019-05-13 DIAGNOSIS — E11.22 TYPE 2 DIABETES MELLITUS WITH STAGE 3 CHRONIC KIDNEY DISEASE, WITHOUT LONG-TERM CURRENT USE OF INSULIN: ICD-10-CM

## 2019-05-13 DIAGNOSIS — N18.30 TYPE 2 DIABETES MELLITUS WITH STAGE 3 CHRONIC KIDNEY DISEASE, WITHOUT LONG-TERM CURRENT USE OF INSULIN: ICD-10-CM

## 2019-05-13 DIAGNOSIS — I10 ESSENTIAL HYPERTENSION: Primary | ICD-10-CM

## 2019-05-13 NOTE — TELEPHONE ENCOUNTER
----- Message from Van Chang sent at 5/13/2019  2:10 PM CDT -----  Contact: self/[phone]  He needs to schedule blood work prior to his appt on June 11.

## 2019-05-15 RX ORDER — AMLODIPINE BESYLATE AND ATORVASTATIN CALCIUM 10; 10 MG/1; MG/1
TABLET, FILM COATED ORAL
Qty: 90 TABLET | Refills: 3 | Status: SHIPPED | OUTPATIENT
Start: 2019-05-15 | End: 2020-03-18

## 2019-05-17 DIAGNOSIS — N18.30 STAGE 3 CHRONIC KIDNEY DISEASE: Primary | ICD-10-CM

## 2019-06-08 ENCOUNTER — LAB VISIT (OUTPATIENT)
Dept: LAB | Facility: HOSPITAL | Age: 79
End: 2019-06-08
Attending: FAMILY MEDICINE
Payer: MEDICARE

## 2019-06-08 DIAGNOSIS — E11.22 TYPE 2 DIABETES MELLITUS WITH STAGE 3 CHRONIC KIDNEY DISEASE, WITHOUT LONG-TERM CURRENT USE OF INSULIN: ICD-10-CM

## 2019-06-08 DIAGNOSIS — I10 ESSENTIAL HYPERTENSION: ICD-10-CM

## 2019-06-08 DIAGNOSIS — N18.30 TYPE 2 DIABETES MELLITUS WITH STAGE 3 CHRONIC KIDNEY DISEASE, WITHOUT LONG-TERM CURRENT USE OF INSULIN: ICD-10-CM

## 2019-06-08 LAB
ALBUMIN SERPL BCP-MCNC: 4 G/DL (ref 3.5–5.2)
ALP SERPL-CCNC: 67 U/L (ref 55–135)
ALT SERPL W/O P-5'-P-CCNC: 38 U/L (ref 10–44)
ANION GAP SERPL CALC-SCNC: 10 MMOL/L (ref 8–16)
AST SERPL-CCNC: 37 U/L (ref 10–40)
BASOPHILS # BLD AUTO: 0.05 K/UL (ref 0–0.2)
BASOPHILS NFR BLD: 1 % (ref 0–1.9)
BILIRUB SERPL-MCNC: 1.3 MG/DL (ref 0.1–1)
BUN SERPL-MCNC: 21 MG/DL (ref 8–23)
CALCIUM SERPL-MCNC: 10.8 MG/DL (ref 8.7–10.5)
CHLORIDE SERPL-SCNC: 102 MMOL/L (ref 95–110)
CHOLEST SERPL-MCNC: 132 MG/DL (ref 120–199)
CHOLEST/HDLC SERPL: 3 {RATIO} (ref 2–5)
CO2 SERPL-SCNC: 27 MMOL/L (ref 23–29)
CREAT SERPL-MCNC: 1.5 MG/DL (ref 0.5–1.4)
DIFFERENTIAL METHOD: ABNORMAL
EOSINOPHIL # BLD AUTO: 0.2 K/UL (ref 0–0.5)
EOSINOPHIL NFR BLD: 3.1 % (ref 0–8)
ERYTHROCYTE [DISTWIDTH] IN BLOOD BY AUTOMATED COUNT: 14.5 % (ref 11.5–14.5)
EST. GFR  (AFRICAN AMERICAN): 50.1 ML/MIN/1.73 M^2
EST. GFR  (NON AFRICAN AMERICAN): 43.3 ML/MIN/1.73 M^2
ESTIMATED AVG GLUCOSE: 126 MG/DL (ref 68–131)
GLUCOSE SERPL-MCNC: 89 MG/DL (ref 70–110)
HBA1C MFR BLD HPLC: 6 % (ref 4–5.6)
HCT VFR BLD AUTO: 46.2 % (ref 40–54)
HDLC SERPL-MCNC: 44 MG/DL (ref 40–75)
HDLC SERPL: 33.3 % (ref 20–50)
HGB BLD-MCNC: 15 G/DL (ref 14–18)
IMM GRANULOCYTES # BLD AUTO: 0.03 K/UL (ref 0–0.04)
IMM GRANULOCYTES NFR BLD AUTO: 0.6 % (ref 0–0.5)
LDLC SERPL CALC-MCNC: 71.6 MG/DL (ref 63–159)
LYMPHOCYTES # BLD AUTO: 2 K/UL (ref 1–4.8)
LYMPHOCYTES NFR BLD: 40.9 % (ref 18–48)
MCH RBC QN AUTO: 30.1 PG (ref 27–31)
MCHC RBC AUTO-ENTMCNC: 32.5 G/DL (ref 32–36)
MCV RBC AUTO: 93 FL (ref 82–98)
MONOCYTES # BLD AUTO: 0.5 K/UL (ref 0.3–1)
MONOCYTES NFR BLD: 10.1 % (ref 4–15)
NEUTROPHILS # BLD AUTO: 2.1 K/UL (ref 1.8–7.7)
NEUTROPHILS NFR BLD: 44.3 % (ref 38–73)
NONHDLC SERPL-MCNC: 88 MG/DL
NRBC BLD-RTO: 0 /100 WBC
PLATELET # BLD AUTO: 243 K/UL (ref 150–350)
PMV BLD AUTO: 10.1 FL (ref 9.2–12.9)
POTASSIUM SERPL-SCNC: 3.7 MMOL/L (ref 3.5–5.1)
PROT SERPL-MCNC: 7.2 G/DL (ref 6–8.4)
RBC # BLD AUTO: 4.99 M/UL (ref 4.6–6.2)
SODIUM SERPL-SCNC: 139 MMOL/L (ref 136–145)
TRIGL SERPL-MCNC: 82 MG/DL (ref 30–150)
TSH SERPL DL<=0.005 MIU/L-ACNC: 2.6 UIU/ML (ref 0.4–4)
WBC # BLD AUTO: 4.84 K/UL (ref 3.9–12.7)

## 2019-06-08 PROCEDURE — 84443 ASSAY THYROID STIM HORMONE: CPT

## 2019-06-08 PROCEDURE — 80061 LIPID PANEL: CPT

## 2019-06-08 PROCEDURE — 36415 COLL VENOUS BLD VENIPUNCTURE: CPT | Mod: PO

## 2019-06-08 PROCEDURE — 83036 HEMOGLOBIN GLYCOSYLATED A1C: CPT

## 2019-06-08 PROCEDURE — 80053 COMPREHEN METABOLIC PANEL: CPT

## 2019-06-08 PROCEDURE — 85025 COMPLETE CBC W/AUTO DIFF WBC: CPT

## 2019-06-11 ENCOUNTER — LAB VISIT (OUTPATIENT)
Dept: LAB | Facility: HOSPITAL | Age: 79
End: 2019-06-11
Attending: FAMILY MEDICINE
Payer: MEDICARE

## 2019-06-11 ENCOUNTER — OFFICE VISIT (OUTPATIENT)
Dept: FAMILY MEDICINE | Facility: CLINIC | Age: 79
End: 2019-06-11
Payer: MEDICARE

## 2019-06-11 VITALS
SYSTOLIC BLOOD PRESSURE: 138 MMHG | HEART RATE: 75 BPM | DIASTOLIC BLOOD PRESSURE: 70 MMHG | WEIGHT: 221.13 LBS | HEIGHT: 71 IN | OXYGEN SATURATION: 97 % | BODY MASS INDEX: 30.96 KG/M2

## 2019-06-11 DIAGNOSIS — N18.30 CKD (CHRONIC KIDNEY DISEASE) STAGE 3, GFR 30-59 ML/MIN: ICD-10-CM

## 2019-06-11 DIAGNOSIS — Z01.84 IMMUNITY STATUS TESTING: ICD-10-CM

## 2019-06-11 DIAGNOSIS — L98.9 SKIN LESION: ICD-10-CM

## 2019-06-11 DIAGNOSIS — R82.90 URINE ABNORMALITY: ICD-10-CM

## 2019-06-11 DIAGNOSIS — E11.22 TYPE 2 DIABETES MELLITUS WITH STAGE 3 CHRONIC KIDNEY DISEASE, WITHOUT LONG-TERM CURRENT USE OF INSULIN: ICD-10-CM

## 2019-06-11 DIAGNOSIS — I10 ESSENTIAL HYPERTENSION: Primary | ICD-10-CM

## 2019-06-11 DIAGNOSIS — N18.30 TYPE 2 DIABETES MELLITUS WITH STAGE 3 CHRONIC KIDNEY DISEASE, WITHOUT LONG-TERM CURRENT USE OF INSULIN: ICD-10-CM

## 2019-06-11 PROCEDURE — 3078F DIAST BP <80 MM HG: CPT | Mod: CPTII,S$GLB,, | Performed by: FAMILY MEDICINE

## 2019-06-11 PROCEDURE — 99214 PR OFFICE/OUTPT VISIT, EST, LEVL IV, 30-39 MIN: ICD-10-PCS | Mod: S$GLB,,, | Performed by: FAMILY MEDICINE

## 2019-06-11 PROCEDURE — 87077 CULTURE AEROBIC IDENTIFY: CPT

## 2019-06-11 PROCEDURE — 86765 RUBEOLA ANTIBODY: CPT

## 2019-06-11 PROCEDURE — 99999 PR PBB SHADOW E&M-EST. PATIENT-LVL IV: CPT | Mod: PBBFAC,,, | Performed by: FAMILY MEDICINE

## 2019-06-11 PROCEDURE — 1101F PT FALLS ASSESS-DOCD LE1/YR: CPT | Mod: CPTII,S$GLB,, | Performed by: FAMILY MEDICINE

## 2019-06-11 PROCEDURE — 36415 COLL VENOUS BLD VENIPUNCTURE: CPT | Mod: PO

## 2019-06-11 PROCEDURE — 3078F PR MOST RECENT DIASTOLIC BLOOD PRESSURE < 80 MM HG: ICD-10-PCS | Mod: CPTII,S$GLB,, | Performed by: FAMILY MEDICINE

## 2019-06-11 PROCEDURE — 1101F PR PT FALLS ASSESS DOC 0-1 FALLS W/OUT INJ PAST YR: ICD-10-PCS | Mod: CPTII,S$GLB,, | Performed by: FAMILY MEDICINE

## 2019-06-11 PROCEDURE — 99999 PR PBB SHADOW E&M-EST. PATIENT-LVL IV: ICD-10-PCS | Mod: PBBFAC,,, | Performed by: FAMILY MEDICINE

## 2019-06-11 PROCEDURE — 87088 URINE BACTERIA CULTURE: CPT

## 2019-06-11 PROCEDURE — 87086 URINE CULTURE/COLONY COUNT: CPT

## 2019-06-11 PROCEDURE — 3075F SYST BP GE 130 - 139MM HG: CPT | Mod: CPTII,S$GLB,, | Performed by: FAMILY MEDICINE

## 2019-06-11 PROCEDURE — 99214 OFFICE O/P EST MOD 30 MIN: CPT | Mod: S$GLB,,, | Performed by: FAMILY MEDICINE

## 2019-06-11 PROCEDURE — 3075F PR MOST RECENT SYSTOLIC BLOOD PRESS GE 130-139MM HG: ICD-10-PCS | Mod: CPTII,S$GLB,, | Performed by: FAMILY MEDICINE

## 2019-06-11 PROCEDURE — 87186 SC STD MICRODIL/AGAR DIL: CPT

## 2019-06-11 NOTE — PROGRESS NOTES
Elian file closed   Subjective:       Patient ID: Pedro Ponce is a 79 y.o. male.    Chief Complaint: Follow-up; Hypertension; and Knee Pain (x 3 months)    79 years old male came to the clinic for blood pressure check.  Blood pressure today stable.  No chest pain, palpitation orthopnea or PND.  Last A1c was stable.  No Polyuria, polydipsia or polyphagia.  Patient with facial lesions requesting evaluation with dermatology .    Review of Systems   Constitutional: Negative.    HENT: Negative.    Eyes: Negative.    Respiratory: Negative.    Cardiovascular: Negative.  Negative for chest pain, palpitations and leg swelling.   Gastrointestinal: Negative.    Endocrine: Negative for polydipsia, polyphagia and polyuria.   Genitourinary: Negative.    Musculoskeletal: Negative.    Skin: Negative.    Neurological: Negative.    Psychiatric/Behavioral: Negative.        Objective:      Physical Exam   Constitutional: He is oriented to person, place, and time. He appears well-developed and well-nourished. No distress.   HENT:   Head: Normocephalic and atraumatic.   Right Ear: External ear normal.   Left Ear: External ear normal.   Nose: Nose normal.   Mouth/Throat: Oropharynx is clear and moist. No oropharyngeal exudate.   Eyes: Pupils are equal, round, and reactive to light. Conjunctivae and EOM are normal. Right eye exhibits no discharge. Left eye exhibits no discharge. No scleral icterus.   Neck: Normal range of motion. Neck supple. No JVD present. No tracheal deviation present. No thyromegaly present.   Cardiovascular: Normal rate, regular rhythm, normal heart sounds and intact distal pulses. Exam reveals no gallop and no friction rub.   No murmur heard.  Pulmonary/Chest: Effort normal and breath sounds normal. No stridor. No respiratory distress. He has no wheezes. He has no rales. He exhibits no tenderness.   Abdominal: Soft. Bowel sounds are normal. He exhibits no distension and no mass. There is no tenderness. There is no rebound and no  guarding.   Musculoskeletal: Normal range of motion. He exhibits no edema or tenderness.   Feet:   Right Foot:   Protective Sensation: 10 sites tested. 10 sites sensed.   Skin Integrity: Negative for ulcer, blister, skin breakdown, erythema, warmth, callus or dry skin.   Left Foot:   Protective Sensation: 10 sites tested. 10 sites sensed.   Skin Integrity: Negative for ulcer, blister, skin breakdown, erythema, warmth, callus or dry skin.   Lymphadenopathy:     He has no cervical adenopathy.   Neurological: He is alert and oriented to person, place, and time. He has normal reflexes. He displays normal reflexes. No cranial nerve deficit. He exhibits normal muscle tone. Coordination normal.   Skin: Skin is warm and dry. No rash noted. He is not diaphoretic. No erythema. No pallor.   Psychiatric: He has a normal mood and affect. His behavior is normal. Judgment and thought content normal.   Nursing note and vitals reviewed.      Assessment:       1. Essential hypertension    2. Type 2 diabetes mellitus with stage 3 chronic kidney disease, without long-term current use of insulin    3. CKD (chronic kidney disease) stage 3, GFR 30-59 ml/min    4. Urine abnormality    5. Skin lesion    6. Immunity status testing        Plan:         Pedro was seen today for follow-up, hypertension and knee pain.    Diagnoses and all orders for this visit:    Essential hypertension    Type 2 diabetes mellitus with stage 3 chronic kidney disease, without long-term current use of insulin    CKD (chronic kidney disease) stage 3, GFR 30-59 ml/min    Urine abnormality  -     Urine culture    Skin lesion  -     Ambulatory referral to Dermatology    Immunity status testing  -     RUBEOLA ANTIBODY IGG; Future    Continue monitoring blood pressure at home, low sodium diet.  Continue monitoring blood sugar at home,ADA diet.

## 2019-06-12 ENCOUNTER — IMMUNIZATION (OUTPATIENT)
Dept: PHARMACY | Facility: CLINIC | Age: 79
End: 2019-06-12
Payer: MEDICARE

## 2019-06-12 ENCOUNTER — LAB VISIT (OUTPATIENT)
Dept: LAB | Facility: HOSPITAL | Age: 79
End: 2019-06-12
Payer: MEDICARE

## 2019-06-12 ENCOUNTER — OFFICE VISIT (OUTPATIENT)
Dept: NEPHROLOGY | Facility: CLINIC | Age: 79
End: 2019-06-12
Payer: MEDICARE

## 2019-06-12 VITALS
OXYGEN SATURATION: 97 % | HEIGHT: 71 IN | DIASTOLIC BLOOD PRESSURE: 80 MMHG | HEART RATE: 64 BPM | SYSTOLIC BLOOD PRESSURE: 150 MMHG | WEIGHT: 219.56 LBS | BODY MASS INDEX: 30.74 KG/M2

## 2019-06-12 DIAGNOSIS — N18.30 CKD (CHRONIC KIDNEY DISEASE) STAGE 3, GFR 30-59 ML/MIN: ICD-10-CM

## 2019-06-12 DIAGNOSIS — I10 HYPERTENSION, UNSPECIFIED TYPE: ICD-10-CM

## 2019-06-12 DIAGNOSIS — R80.9 PROTEINURIA, UNSPECIFIED TYPE: ICD-10-CM

## 2019-06-12 DIAGNOSIS — R80.9 PROTEINURIA, UNSPECIFIED TYPE: Primary | ICD-10-CM

## 2019-06-12 LAB
ALBUMIN SERPL BCP-MCNC: 4.2 G/DL (ref 3.5–5.2)
ANION GAP SERPL CALC-SCNC: 10 MMOL/L (ref 8–16)
BUN SERPL-MCNC: 18 MG/DL (ref 8–23)
CA-I BLDV-SCNC: 1.36 MMOL/L (ref 1.06–1.42)
CALCIUM SERPL-MCNC: 11.6 MG/DL (ref 8.7–10.5)
CHLORIDE SERPL-SCNC: 100 MMOL/L (ref 95–110)
CO2 SERPL-SCNC: 31 MMOL/L (ref 23–29)
CREAT SERPL-MCNC: 1.4 MG/DL (ref 0.5–1.4)
EST. GFR  (AFRICAN AMERICAN): 54.5 ML/MIN/1.73 M^2
EST. GFR  (NON AFRICAN AMERICAN): 47.1 ML/MIN/1.73 M^2
GLUCOSE SERPL-MCNC: 89 MG/DL (ref 70–110)
PHOSPHATE SERPL-MCNC: 2.6 MG/DL (ref 2.7–4.5)
POTASSIUM SERPL-SCNC: 3.7 MMOL/L (ref 3.5–5.1)
PTH-INTACT SERPL-MCNC: 95 PG/ML (ref 9–77)
SODIUM SERPL-SCNC: 141 MMOL/L (ref 136–145)

## 2019-06-12 PROCEDURE — 84165 PATHOLOGIST INTERPRETATION SPE: ICD-10-PCS | Mod: 26,,, | Performed by: PATHOLOGY

## 2019-06-12 PROCEDURE — 3079F DIAST BP 80-89 MM HG: CPT | Mod: CPTII,S$GLB,, | Performed by: INTERNAL MEDICINE

## 2019-06-12 PROCEDURE — 3077F SYST BP >= 140 MM HG: CPT | Mod: CPTII,S$GLB,, | Performed by: INTERNAL MEDICINE

## 2019-06-12 PROCEDURE — 99999 PR PBB SHADOW E&M-EST. PATIENT-LVL II: ICD-10-PCS | Mod: PBBFAC,,, | Performed by: INTERNAL MEDICINE

## 2019-06-12 PROCEDURE — 84165 PROTEIN E-PHORESIS SERUM: CPT

## 2019-06-12 PROCEDURE — 1101F PR PT FALLS ASSESS DOC 0-1 FALLS W/OUT INJ PAST YR: ICD-10-PCS | Mod: CPTII,S$GLB,, | Performed by: INTERNAL MEDICINE

## 2019-06-12 PROCEDURE — 84165 PROTEIN E-PHORESIS SERUM: CPT | Mod: 26,,, | Performed by: PATHOLOGY

## 2019-06-12 PROCEDURE — 3077F PR MOST RECENT SYSTOLIC BLOOD PRESSURE >= 140 MM HG: ICD-10-PCS | Mod: CPTII,S$GLB,, | Performed by: INTERNAL MEDICINE

## 2019-06-12 PROCEDURE — 99214 OFFICE O/P EST MOD 30 MIN: CPT | Mod: S$GLB,,, | Performed by: INTERNAL MEDICINE

## 2019-06-12 PROCEDURE — 99999 PR PBB SHADOW E&M-EST. PATIENT-LVL II: CPT | Mod: PBBFAC,,, | Performed by: INTERNAL MEDICINE

## 2019-06-12 PROCEDURE — 99214 PR OFFICE/OUTPT VISIT, EST, LEVL IV, 30-39 MIN: ICD-10-PCS | Mod: S$GLB,,, | Performed by: INTERNAL MEDICINE

## 2019-06-12 PROCEDURE — 86334 IMMUNOFIX E-PHORESIS SERUM: CPT

## 2019-06-12 PROCEDURE — 86334 IMMUNOFIX E-PHORESIS SERUM: CPT | Mod: 26,,, | Performed by: PATHOLOGY

## 2019-06-12 PROCEDURE — 3079F PR MOST RECENT DIASTOLIC BLOOD PRESSURE 80-89 MM HG: ICD-10-PCS | Mod: CPTII,S$GLB,, | Performed by: INTERNAL MEDICINE

## 2019-06-12 PROCEDURE — 86334 PATHOLOGIST INTERPRETATION IFE: ICD-10-PCS | Mod: 26,,, | Performed by: PATHOLOGY

## 2019-06-12 PROCEDURE — 80069 RENAL FUNCTION PANEL: CPT

## 2019-06-12 PROCEDURE — 1101F PT FALLS ASSESS-DOCD LE1/YR: CPT | Mod: CPTII,S$GLB,, | Performed by: INTERNAL MEDICINE

## 2019-06-12 PROCEDURE — 36415 COLL VENOUS BLD VENIPUNCTURE: CPT

## 2019-06-12 PROCEDURE — 83970 ASSAY OF PARATHORMONE: CPT

## 2019-06-12 PROCEDURE — 82330 ASSAY OF CALCIUM: CPT

## 2019-06-12 NOTE — PROGRESS NOTES
Subjective:       Patient ID: Pedro Ponce is a 79 y.o. Black or  male who presents for follow-up evaluation of No chief complaint on file.    HPI This is a 79 -year-old -American male with longstanding hypertension, diet-controlled diabetes, proteinuria, bilateral renal cysts is here for f/u of these. Bp's stable in the 130's/60's -70's. Diet controlled DM stable with A1c is 6.0. Creatinine stable. Slight proteinuria.  Excercises regularly.  No previous UTI's per pt.  Denies NSAID's. Pt lost f/u and was seen 2 years ago.      PAST MEDICAL HISTORY: Type 2 diabetes diet controlled, hypertension for   approximately 20 years, hyperlipidemia, gout, glaucoma, BPH and TURP in   2006 and followed in Urology. He also had questionable cyst versus tumor   removed by Dr. Drew about 17 years ago and this was not malignant per   patient.     Review of Systems   Constitutional: Negative for fatigue and unexpected weight change.        Lost 9 lbs in 6 months.  Apetite is good.   Eyes: Negative for discharge.   Respiratory: Negative for cough, shortness of breath and wheezing.    Cardiovascular: Negative for chest pain and palpitations.   Gastrointestinal: Negative for abdominal pain, diarrhea, nausea and vomiting.        Nocturia   Genitourinary: Negative for dysuria, frequency, hematuria and urgency.   Skin: Negative for color change and rash.   Psychiatric/Behavioral: Negative for confusion.       Objective:      Physical Exam   Constitutional: He is oriented to person, place, and time. He appears well-developed and well-nourished.   Cardiovascular: Normal rate and regular rhythm. Exam reveals no friction rub.   No murmur heard.  Pulmonary/Chest: Effort normal and breath sounds normal. No respiratory distress. He has no rales.   Abdominal: Soft. Bowel sounds are normal. There is no tenderness. There is no rebound.   Musculoskeletal: He exhibits no edema.   Neurological: He is alert and oriented to  person, place, and time.   Skin: No rash noted. No erythema.   Psychiatric: He has a normal mood and affect. Judgment normal.       Assessment:       No diagnosis found.    Plan:         This is a 79 -year-old -American male with   longstanding hypertension, diet-controlled diabetes comes in for f/u of bilateral renal cysts.   1. Renal cysts. He does have multiple cysts on bilateral kidneys. 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 He denies any hematuria. Repeat ultrasound.  Followed in urology.  Had cysto/SUDS.  Unclear if it's acquired cystic disease-no family  h/o.  2. Hypertension. Blood pressures are stable. Encouraged excersise and low salt diet.   3. Proteinuria. 160  mg of proteinuria sec to HTN. On avapro. Repeat.   4. Anemia. H&H is stable.   5. Renal/CKD stg 2- 3: creatinine is 1.5 and baseline around 1.2-1.4.  Encouraged him to hydrate well.   Alkalosis resolved -hydrate well. Checked  floyd/renin levels.   6. UTI: No recent UTI's.  Followed in urology.  Has high wbc's in urine chronically.   Calcium borderline elvated chronically but i ca stable. spep nml. Asked him to hold off on MVI.  PTH stable for his ckd.  If the ca worsen, will need parathyroid scan to evaluate possible primary hyperpara.     Addendum:  Ca elevated at 11+ but I ca stable.  Stop MVI-recheck in a week.  Hydrate well.  If persistently high, will need parathyroid scan.    US without any  Tumors-stable form last time.  Had nonmalignant tumor removed many years ago in urology.     F/u in 5-6 months with rfp, i pth, urine protein and urine creatinine, ionized calcium.

## 2019-06-13 ENCOUNTER — HOSPITAL ENCOUNTER (OUTPATIENT)
Dept: RADIOLOGY | Facility: HOSPITAL | Age: 79
Discharge: HOME | End: 2019-06-13
Attending: INTERNAL MEDICINE
Payer: MEDICARE

## 2019-06-13 ENCOUNTER — TELEPHONE (OUTPATIENT)
Dept: NEPHROLOGY | Facility: CLINIC | Age: 79
End: 2019-06-13

## 2019-06-13 DIAGNOSIS — I10 HYPERTENSION, UNSPECIFIED TYPE: ICD-10-CM

## 2019-06-13 DIAGNOSIS — N18.30 CKD (CHRONIC KIDNEY DISEASE) STAGE 3, GFR 30-59 ML/MIN: Primary | ICD-10-CM

## 2019-06-13 DIAGNOSIS — R80.9 PROTEINURIA, UNSPECIFIED TYPE: ICD-10-CM

## 2019-06-13 DIAGNOSIS — N18.30 CKD (CHRONIC KIDNEY DISEASE) STAGE 3, GFR 30-59 ML/MIN: ICD-10-CM

## 2019-06-13 LAB
ALBUMIN SERPL ELPH-MCNC: 4.28 G/DL (ref 3.35–5.55)
ALPHA1 GLOB SERPL ELPH-MCNC: 0.29 G/DL (ref 0.17–0.41)
ALPHA2 GLOB SERPL ELPH-MCNC: 0.7 G/DL (ref 0.43–0.99)
B-GLOBULIN SERPL ELPH-MCNC: 1.02 G/DL (ref 0.5–1.1)
GAMMA GLOB SERPL ELPH-MCNC: 1.11 G/DL (ref 0.67–1.58)
PATHOLOGIST INTERPRETATION SPE: NORMAL
PROT SERPL-MCNC: 7.4 G/DL (ref 6–8.4)
RUBEOLA IGG ANTIBODY: 2.34 ISR (ref 0–0.9)
RUBEOLA INTERPRETATION: POSITIVE

## 2019-06-13 PROCEDURE — 76770 US EXAM ABDO BACK WALL COMP: CPT | Mod: 26,,, | Performed by: RADIOLOGY

## 2019-06-13 PROCEDURE — 76770 US KIDNEY: ICD-10-PCS | Mod: 26,,, | Performed by: RADIOLOGY

## 2019-06-13 PROCEDURE — 76770 US EXAM ABDO BACK WALL COMP: CPT | Mod: TC

## 2019-06-13 NOTE — TELEPHONE ENCOUNTER
Please ask the patient to get calcium and ionized calcium next week after hydration and stopping the multi vitamin.  Calcium was high and hence repeating it.  The active calcium was normal though.

## 2019-06-13 NOTE — TELEPHONE ENCOUNTER
Called pt on cell per dr lu on his results no answer left message.  Also I called pt house number left message with his wife    Please ask the patient to get calcium and ionized calcium next week after hydration and stopping the multi vitamin.  Calcium was high and hence repeating it.  The active calcium was normal though.

## 2019-06-14 ENCOUNTER — TELEPHONE (OUTPATIENT)
Dept: NEPHROLOGY | Facility: CLINIC | Age: 79
End: 2019-06-14

## 2019-06-14 LAB
BACTERIA UR CULT: NORMAL
INTERPRETATION SERPL IFE-IMP: NORMAL
PATHOLOGIST INTERPRETATION IFE: NORMAL

## 2019-06-14 NOTE — TELEPHONE ENCOUNTER
Called pt to inform him of his results , he had questions for dr lu that I forward to her.  ----- Message from Kiersten Bauer sent at 6/14/2019 11:41 AM CDT -----  Contact: kimberly   tel:    [phone]  Patient Returning Call from [facility]    Who Left Message for Patient:   The nurse for Dr. Lu   Communication Preference:  Phone   Additional Information:   Pt.says he was told by Dr. Lu that if he had questions he could call her office any time,   Pt. Says he has some questions he wants to speak to the drTayler Or nurse.    Asking for a call back today and will be close to  His phone to catch it.

## 2019-06-15 ENCOUNTER — TELEPHONE (OUTPATIENT)
Dept: FAMILY MEDICINE | Facility: CLINIC | Age: 79
End: 2019-06-15

## 2019-06-15 DIAGNOSIS — N39.0 URINARY TRACT INFECTION WITHOUT HEMATURIA, SITE UNSPECIFIED: Primary | ICD-10-CM

## 2019-06-15 RX ORDER — NITROFURANTOIN (MACROCRYSTALS) 100 MG/1
100 CAPSULE ORAL EVERY 12 HOURS
Qty: 20 CAPSULE | Refills: 0 | Status: SHIPPED | OUTPATIENT
Start: 2019-06-15 | End: 2019-06-25

## 2019-06-15 NOTE — TELEPHONE ENCOUNTER
Urine culture with evidence of infection.    Antibiotic was sent to the pharmacy.    Please notify the patient.

## 2019-06-17 ENCOUNTER — TELEPHONE (OUTPATIENT)
Dept: NEPHROLOGY | Facility: CLINIC | Age: 79
End: 2019-06-17

## 2019-06-17 NOTE — TELEPHONE ENCOUNTER
His potassium is normal.  He should stop his multi vitamin.  If he drinks excessive milk or eat a lot of dairy, he can cut back.   He is suppose to get repeat calcium.

## 2019-06-17 NOTE — TELEPHONE ENCOUNTER
Called pt back with the question he had for dr lu, pt was please and didn't have any more ques or concern

## 2019-06-27 ENCOUNTER — LAB VISIT (OUTPATIENT)
Dept: LAB | Facility: HOSPITAL | Age: 79
End: 2019-06-27
Attending: FAMILY MEDICINE
Payer: MEDICARE

## 2019-06-27 DIAGNOSIS — N18.30 CKD (CHRONIC KIDNEY DISEASE) STAGE 3, GFR 30-59 ML/MIN: ICD-10-CM

## 2019-06-27 LAB
CA-I BLDV-SCNC: 1.42 MMOL/L (ref 1.06–1.42)
CALCIUM SERPL-MCNC: 11 MG/DL (ref 8.7–10.5)

## 2019-06-27 PROCEDURE — 36415 COLL VENOUS BLD VENIPUNCTURE: CPT | Mod: PO

## 2019-06-27 PROCEDURE — 82330 ASSAY OF CALCIUM: CPT

## 2019-06-27 PROCEDURE — 82310 ASSAY OF CALCIUM: CPT

## 2019-06-28 ENCOUNTER — TELEPHONE (OUTPATIENT)
Dept: NEPHROLOGY | Facility: CLINIC | Age: 79
End: 2019-06-28

## 2019-06-28 DIAGNOSIS — N18.30 CKD (CHRONIC KIDNEY DISEASE) STAGE 3, GFR 30-59 ML/MIN: Primary | ICD-10-CM

## 2019-06-28 NOTE — TELEPHONE ENCOUNTER
Pt called back and I inform him to stop his HCTZ meds per dr lu and to monitor his BP for a week and called back with the reading also per dr lu    Please let him know that his calcium is better but still high end of normal.  HCTZ may be making his calcium slightly high.  Please ask him to stop the HCTZ and  have him monitor BP's and call us back with readings in about a week.

## 2019-06-28 NOTE — TELEPHONE ENCOUNTER
Called pt to inform him per dr lu results no answer left message .    Please let him know that his calcium is better but still high end of normal.  HCTZ may be making his calcium slightly high.  Please ask him to stop the HCTZ and  have him monitor BP's and call us back with readings in about a week.

## 2019-06-28 NOTE — TELEPHONE ENCOUNTER
Please let him know that his calcium is better but still high end of normal.  HCTZ may be making his calcium slightly high.  Please ask him to stop the HCTZ and  have him monitor BP's and call us back with readings in about a week.

## 2019-07-09 ENCOUNTER — OFFICE VISIT (OUTPATIENT)
Dept: OPHTHALMOLOGY | Facility: CLINIC | Age: 79
End: 2019-07-09
Payer: MEDICARE

## 2019-07-09 VITALS — SYSTOLIC BLOOD PRESSURE: 150 MMHG | HEART RATE: 60 BPM | DIASTOLIC BLOOD PRESSURE: 71 MMHG

## 2019-07-09 DIAGNOSIS — H43.393 VITREOUS FLOATERS OF BOTH EYES: ICD-10-CM

## 2019-07-09 DIAGNOSIS — H35.033 HYPERTENSIVE RETINOPATHY, BILATERAL: ICD-10-CM

## 2019-07-09 DIAGNOSIS — H35.3211 EXUDATIVE AGE-RELATED MACULAR DEGENERATION OF RIGHT EYE WITH ACTIVE CHOROIDAL NEOVASCULARIZATION: Primary | ICD-10-CM

## 2019-07-09 PROCEDURE — 92226 PR SPECIAL EYE EXAM, SUBSEQUENT: CPT | Mod: 50,S$GLB,, | Performed by: OPHTHALMOLOGY

## 2019-07-09 PROCEDURE — 99999 PR PBB SHADOW E&M-EST. PATIENT-LVL III: CPT | Mod: PBBFAC,,, | Performed by: OPHTHALMOLOGY

## 2019-07-09 PROCEDURE — 92014 COMPRE OPH EXAM EST PT 1/>: CPT | Mod: S$GLB,,, | Performed by: OPHTHALMOLOGY

## 2019-07-09 PROCEDURE — 92134 CPTRZ OPH DX IMG PST SGM RTA: CPT | Mod: S$GLB,,, | Performed by: OPHTHALMOLOGY

## 2019-07-09 PROCEDURE — 92014 PR EYE EXAM, EST PATIENT,COMPREHESV: ICD-10-PCS | Mod: S$GLB,,, | Performed by: OPHTHALMOLOGY

## 2019-07-09 PROCEDURE — 99999 PR PBB SHADOW E&M-EST. PATIENT-LVL III: ICD-10-PCS | Mod: PBBFAC,,, | Performed by: OPHTHALMOLOGY

## 2019-07-09 PROCEDURE — 92134 POSTERIOR SEGMENT OCT RETINA (OCULAR COHERENCE TOMOGRAPHY)-BOTH EYES: ICD-10-PCS | Mod: S$GLB,,, | Performed by: OPHTHALMOLOGY

## 2019-07-09 PROCEDURE — 92226 PR SPECIAL EYE EXAM, SUBSEQUENT: ICD-10-PCS | Mod: 50,S$GLB,, | Performed by: OPHTHALMOLOGY

## 2019-07-09 NOTE — PROGRESS NOTES
HPI     DLS 04/09/19      Pt states that there are no changes in vision or eye since the last visit.       OCT -SRF resolved, decrease IRF  OS - stable small flat inferior PED, no SRF      Prior ICG/FA - leakage IT to fovea OD      A/P    1. IPCV OU vs wet amd   With subfoveal PED OD  Avastin resumed 1/14  S/p Avastin OD #8 in past, resumed OD x 6   S/p PDT OD 9/18  OS - PED inferior to fovea stable    2/18 - recurrent swelling OD, IT to fovea  5/18 - increased CME and superior SRF  4/19 - slight increase in IRF, ASx - will follow     2. Floaters OU    3. POAG - Good IOP, continue management as per Dr. Hidalgo  - Needs f/u appt    4. PCIOL OU      6 months OCT

## 2019-08-06 ENCOUNTER — PATIENT OUTREACH (OUTPATIENT)
Dept: ADMINISTRATIVE | Facility: OTHER | Age: 79
End: 2019-08-06

## 2019-08-08 ENCOUNTER — HOSPITAL ENCOUNTER (OUTPATIENT)
Dept: RADIOLOGY | Facility: HOSPITAL | Age: 79
Discharge: HOME | End: 2019-08-08
Attending: ORTHOPAEDIC SURGERY
Payer: MEDICARE

## 2019-08-08 ENCOUNTER — OFFICE VISIT (OUTPATIENT)
Dept: ORTHOPEDICS | Facility: CLINIC | Age: 79
End: 2019-08-08
Payer: MEDICARE

## 2019-08-08 VITALS — HEIGHT: 71 IN | BODY MASS INDEX: 30.66 KG/M2 | WEIGHT: 219 LBS

## 2019-08-08 DIAGNOSIS — M17.11 PRIMARY OSTEOARTHRITIS OF RIGHT KNEE: ICD-10-CM

## 2019-08-08 DIAGNOSIS — M65.9 SYNOVITIS OF KNEE: ICD-10-CM

## 2019-08-08 DIAGNOSIS — M17.11 PRIMARY OSTEOARTHRITIS OF RIGHT KNEE: Primary | ICD-10-CM

## 2019-08-08 PROCEDURE — 1101F PR PT FALLS ASSESS DOC 0-1 FALLS W/OUT INJ PAST YR: ICD-10-PCS | Mod: CPTII,S$GLB,, | Performed by: ORTHOPAEDIC SURGERY

## 2019-08-08 PROCEDURE — 73560 X-RAY EXAM OF KNEE 1 OR 2: CPT | Mod: 26,59,LT, | Performed by: RADIOLOGY

## 2019-08-08 PROCEDURE — 73560 XR KNEE ORTHO RIGHT: ICD-10-PCS | Mod: 26,59,LT, | Performed by: RADIOLOGY

## 2019-08-08 PROCEDURE — 20610 PR DRAIN/INJECT LARGE JOINT/BURSA: ICD-10-PCS | Mod: RT,S$GLB,, | Performed by: ORTHOPAEDIC SURGERY

## 2019-08-08 PROCEDURE — 99213 PR OFFICE/OUTPT VISIT, EST, LEVL III, 20-29 MIN: ICD-10-PCS | Mod: 25,S$GLB,, | Performed by: ORTHOPAEDIC SURGERY

## 2019-08-08 PROCEDURE — 99999 PR PBB SHADOW E&M-EST. PATIENT-LVL II: ICD-10-PCS | Mod: PBBFAC,,, | Performed by: ORTHOPAEDIC SURGERY

## 2019-08-08 PROCEDURE — 1101F PT FALLS ASSESS-DOCD LE1/YR: CPT | Mod: CPTII,S$GLB,, | Performed by: ORTHOPAEDIC SURGERY

## 2019-08-08 PROCEDURE — 20610 DRAIN/INJ JOINT/BURSA W/O US: CPT | Mod: RT,S$GLB,, | Performed by: ORTHOPAEDIC SURGERY

## 2019-08-08 PROCEDURE — 99999 PR PBB SHADOW E&M-EST. PATIENT-LVL II: CPT | Mod: PBBFAC,,, | Performed by: ORTHOPAEDIC SURGERY

## 2019-08-08 PROCEDURE — 73560 X-RAY EXAM OF KNEE 1 OR 2: CPT | Mod: TC,PN,LT

## 2019-08-08 PROCEDURE — 73562 X-RAY EXAM OF KNEE 3: CPT | Mod: 26,RT,, | Performed by: RADIOLOGY

## 2019-08-08 PROCEDURE — 99213 OFFICE O/P EST LOW 20 MIN: CPT | Mod: 25,S$GLB,, | Performed by: ORTHOPAEDIC SURGERY

## 2019-08-08 PROCEDURE — 73562 XR KNEE ORTHO RIGHT: ICD-10-PCS | Mod: 26,RT,, | Performed by: RADIOLOGY

## 2019-08-08 PROCEDURE — 73562 X-RAY EXAM OF KNEE 3: CPT | Mod: TC,PN,RT

## 2019-08-08 RX ORDER — TRIAMCINOLONE ACETONIDE 40 MG/ML
40 INJECTION, SUSPENSION INTRA-ARTICULAR; INTRAMUSCULAR
Status: COMPLETED | OUTPATIENT
Start: 2019-08-08 | End: 2019-08-08

## 2019-08-08 RX ADMIN — TRIAMCINOLONE ACETONIDE 40 MG: 40 INJECTION, SUSPENSION INTRA-ARTICULAR; INTRAMUSCULAR at 03:08

## 2019-08-08 NOTE — PROGRESS NOTES
Subjective:      Patient ID: Pedro Ponce is a 79 y.o. male.    Chief Complaint: Follow-up of the Right Knee    HPI follow-up for right knee osteoarthritis.  Patient reports minimal pain. Is golfing frequently with only mild discomfort.  He uses ice which he finds helpful.  He does note some increased swelling lately and request aspiration.    Review of Systems   Constitution: Negative for fever and weight loss.   HENT: Negative for congestion.    Eyes: Negative for visual disturbance.   Cardiovascular: Negative for chest pain.   Respiratory: Negative for shortness of breath.    Hematologic/Lymphatic: Negative for bleeding problem. Does not bruise/bleed easily.   Skin: Negative for poor wound healing.   Musculoskeletal: Positive for joint swelling.   Gastrointestinal: Negative for abdominal pain.   Genitourinary: Negative for dysuria.   Neurological: Negative for seizures.   Psychiatric/Behavioral: Negative for altered mental status.   Allergic/Immunologic: Negative for persistent infections.         Objective:            Ortho/SPM Exam    Right knee    The patient is not in acute distress.   Body habitus is normal.   Sclera appear normal  No respiratory distress  The patient walks without a limp.  Resisted SLR negative.   The skin over the knee is intact.  Knee effusion 2+  Tendernes is located absent.  Range of motion- Flexion full, Extension full.   Ligament exam:   MCL trace laxity laxity   Lachman intact              Post sag intact    LCL intact  Patellar apprehension negative.  Popliteal cyst negative  Patellar crepitation absent.  Flexion/pinch negative.  Pulses DP present, PT present.  Motor normal 5/5 strength in all tested muscle groups.   Sensory normal.    I reviewed the relevant radiographic images for the patient's condition:  Right knee films show mild medial narrowing        Assessment:       Encounter Diagnoses   Name Primary?    Primary osteoarthritis of right knee Yes    Synovitis of knee            Degeneration is structurally mild.  Most symptoms are from synovitis.  Plan:       Pedro was seen today for follow-up.    Diagnoses and all orders for this visit:    Primary osteoarthritis of right knee    Synovitis of knee      I explained my diagnostic impression and the reasoning behind it in detail, using layman's terms.  Models and/or pictures were used to help in the explanation.    Aspiration recommended and consent given    Continue icing regimen

## 2019-08-08 NOTE — PROCEDURES
Procedures     After explaining the procedure, the patient gave verbal consent for right knee aspiration. The sight was identified and the skin was aseptically prepped.  30 cc's of clear fluid was obtained.     After injection the joint was injected with 40 mg of triamcinolone and 1 cc of 1% plain Xylocaine.  A sterile dressing was applied.  The patient was instructed to call if there were any problems at the aspiration sight.

## 2019-08-14 RX ORDER — IRBESARTAN 300 MG/1
TABLET ORAL
Qty: 90 TABLET | Refills: 1 | Status: SHIPPED | OUTPATIENT
Start: 2019-08-14 | End: 2020-01-09 | Stop reason: SDUPTHER

## 2019-08-20 ENCOUNTER — IMMUNIZATION (OUTPATIENT)
Dept: PHARMACY | Facility: CLINIC | Age: 79
End: 2019-08-20
Payer: MEDICARE

## 2019-09-27 ENCOUNTER — PATIENT OUTREACH (OUTPATIENT)
Dept: ADMINISTRATIVE | Facility: OTHER | Age: 79
End: 2019-09-27

## 2019-09-30 ENCOUNTER — INITIAL CONSULT (OUTPATIENT)
Dept: DERMATOLOGY | Facility: CLINIC | Age: 79
End: 2019-09-30
Payer: MEDICARE

## 2019-09-30 DIAGNOSIS — L91.8 SKIN TAG: Primary | ICD-10-CM

## 2019-09-30 DIAGNOSIS — D18.00 ANGIOMA: ICD-10-CM

## 2019-09-30 DIAGNOSIS — D22.9 NEVUS: ICD-10-CM

## 2019-09-30 DIAGNOSIS — L82.1 SK (SEBORRHEIC KERATOSIS): ICD-10-CM

## 2019-09-30 PROCEDURE — 99202 PR OFFICE/OUTPT VISIT, NEW, LEVL II, 15-29 MIN: ICD-10-PCS | Mod: S$GLB,,, | Performed by: DERMATOLOGY

## 2019-09-30 PROCEDURE — 1101F PR PT FALLS ASSESS DOC 0-1 FALLS W/OUT INJ PAST YR: ICD-10-PCS | Mod: CPTII,S$GLB,, | Performed by: DERMATOLOGY

## 2019-09-30 PROCEDURE — 1101F PT FALLS ASSESS-DOCD LE1/YR: CPT | Mod: CPTII,S$GLB,, | Performed by: DERMATOLOGY

## 2019-09-30 PROCEDURE — 99999 PR PBB SHADOW E&M-EST. PATIENT-LVL II: ICD-10-PCS | Mod: PBBFAC,,, | Performed by: DERMATOLOGY

## 2019-09-30 PROCEDURE — 99999 PR PBB SHADOW E&M-EST. PATIENT-LVL II: CPT | Mod: PBBFAC,,, | Performed by: DERMATOLOGY

## 2019-09-30 PROCEDURE — 99202 OFFICE O/P NEW SF 15 MIN: CPT | Mod: S$GLB,,, | Performed by: DERMATOLOGY

## 2019-09-30 NOTE — PROGRESS NOTES
Subjective:       Patient ID:  Pedro Ponce is a 79 y.o. male who presents for   Chief Complaint   Patient presents with    Lesion     Pt c/o brown colored lesion on left cheek. Present for a few years. No bleeding, pain or prev tx.  Pt also request for his back to be checked.   Pt also has red lesions on abdomen. Present for years. None are bleeding. No tx.         Review of Systems   Skin: Negative for tendency to form keloidal scars.   Hematologic/Lymphatic: Does not bruise/bleed easily.        Objective:    Physical Exam   Skin:   Areas Examined (abnormalities noted in diagram):   Scalp / Hair Palpated and Inspected  Head / Face Inspection Performed  Abdomen Inspection Performed  Back Inspection Performed                   Diagram Legend     Erythematous scaling macule/papule c/w actinic keratosis       Vascular papule c/w angioma      Pigmented verrucoid papule/plaque c/w seborrheic keratosis      Yellow umbilicated papule c/w sebaceous hyperplasia      Irregularly shaped tan macule c/w lentigo     1-2 mm smooth white papules consistent with Milia      Movable subcutaneous cyst with punctum c/w epidermal inclusion cyst      Subcutaneous movable cyst c/w pilar cyst      Firm pink to brown papule c/w dermatofibroma      Pedunculated fleshy papule(s) c/w skin tag(s)      Evenly pigmented macule c/w junctional nevus     Mildly variegated pigmented, slightly irregular-bordered macule c/w mildly atypical nevus      Flesh colored to evenly pigmented papule c/w intradermal nevus       Pink pearly papule/plaque c/w basal cell carcinoma      Erythematous hyperkeratotic cursted plaque c/w SCC      Surgical scar with no sign of skin cancer recurrence      Open and closed comedones      Inflammatory papules and pustules      Verrucoid papule consistent consistent with wart     Erythematous eczematous patches and plaques     Dystrophic onycholytic nail with subungual debris c/w onychomycosis     Umbilicated papule     Erythematous-base heme-crusted tan verrucoid plaque consistent with inflamed seborrheic keratosis     Erythematous Silvery Scaling Plaque c/w Psoriasis     See annotation      Assessment / Plan:        Skin tag  Reassurance given to patient. No treatment is necessary.   Treatment of benign, asymptomatic lesions may be considered cosmetic.    SK (seborrheic keratosis)  These are benign inherited growths without a malignant potential. Reassurance given to patient. No treatment is necessary.     Angioma  These are benign vascular lesions that are inherited.  Treatment is not necessary.    Nevus  Discussed ABCDE's of nevi.  Monitor for new mole or moles that are becoming bigger, darker, irritated, or developing irregular borders. Brochure provided.               Follow up if symptoms worsen or fail to improve.

## 2019-10-01 ENCOUNTER — IMMUNIZATION (OUTPATIENT)
Dept: PHARMACY | Facility: CLINIC | Age: 79
End: 2019-10-01
Payer: MEDICARE

## 2019-10-23 ENCOUNTER — TELEPHONE (OUTPATIENT)
Dept: NEPHROLOGY | Facility: CLINIC | Age: 79
End: 2019-10-23

## 2019-11-04 ENCOUNTER — TELEPHONE (OUTPATIENT)
Dept: NEPHROLOGY | Facility: CLINIC | Age: 79
End: 2019-11-04

## 2019-11-05 DIAGNOSIS — N13.8 BPH WITH URINARY OBSTRUCTION: ICD-10-CM

## 2019-11-05 DIAGNOSIS — N40.1 BPH WITH URINARY OBSTRUCTION: ICD-10-CM

## 2019-11-05 RX ORDER — TAMSULOSIN HYDROCHLORIDE 0.4 MG/1
0.4 CAPSULE ORAL NIGHTLY
Qty: 90 CAPSULE | Refills: 3 | Status: SHIPPED | OUTPATIENT
Start: 2019-11-05 | End: 2020-01-27 | Stop reason: SDUPTHER

## 2019-12-05 ENCOUNTER — PATIENT OUTREACH (OUTPATIENT)
Dept: ADMINISTRATIVE | Facility: OTHER | Age: 79
End: 2019-12-05

## 2019-12-06 ENCOUNTER — OFFICE VISIT (OUTPATIENT)
Dept: ORTHOPEDICS | Facility: CLINIC | Age: 79
End: 2019-12-06
Payer: MEDICARE

## 2019-12-06 VITALS — BODY MASS INDEX: 30.66 KG/M2 | HEIGHT: 71 IN | WEIGHT: 219 LBS

## 2019-12-06 DIAGNOSIS — M65.9 SYNOVITIS OF KNEE: ICD-10-CM

## 2019-12-06 DIAGNOSIS — M17.11 PRIMARY OSTEOARTHRITIS OF RIGHT KNEE: Primary | ICD-10-CM

## 2019-12-06 PROCEDURE — 1125F PR PAIN SEVERITY QUANTIFIED, PAIN PRESENT: ICD-10-PCS | Mod: S$GLB,,, | Performed by: ORTHOPAEDIC SURGERY

## 2019-12-06 PROCEDURE — 1125F AMNT PAIN NOTED PAIN PRSNT: CPT | Mod: S$GLB,,, | Performed by: ORTHOPAEDIC SURGERY

## 2019-12-06 PROCEDURE — 1159F PR MEDICATION LIST DOCUMENTED IN MEDICAL RECORD: ICD-10-PCS | Mod: S$GLB,,, | Performed by: ORTHOPAEDIC SURGERY

## 2019-12-06 PROCEDURE — 1159F MED LIST DOCD IN RCRD: CPT | Mod: S$GLB,,, | Performed by: ORTHOPAEDIC SURGERY

## 2019-12-06 PROCEDURE — 99212 PR OFFICE/OUTPT VISIT, EST, LEVL II, 10-19 MIN: ICD-10-PCS | Mod: S$GLB,,, | Performed by: ORTHOPAEDIC SURGERY

## 2019-12-06 PROCEDURE — 1101F PT FALLS ASSESS-DOCD LE1/YR: CPT | Mod: CPTII,S$GLB,, | Performed by: ORTHOPAEDIC SURGERY

## 2019-12-06 PROCEDURE — 99999 PR PBB SHADOW E&M-EST. PATIENT-LVL II: CPT | Mod: PBBFAC,,, | Performed by: ORTHOPAEDIC SURGERY

## 2019-12-06 PROCEDURE — 1101F PR PT FALLS ASSESS DOC 0-1 FALLS W/OUT INJ PAST YR: ICD-10-PCS | Mod: CPTII,S$GLB,, | Performed by: ORTHOPAEDIC SURGERY

## 2019-12-06 PROCEDURE — 99999 PR PBB SHADOW E&M-EST. PATIENT-LVL II: ICD-10-PCS | Mod: PBBFAC,,, | Performed by: ORTHOPAEDIC SURGERY

## 2019-12-06 PROCEDURE — 99212 OFFICE O/P EST SF 10 MIN: CPT | Mod: S$GLB,,, | Performed by: ORTHOPAEDIC SURGERY

## 2019-12-06 NOTE — PROGRESS NOTES
Subjective:      Patient ID: Pedro Ponce is a 79 y.o. male.    Chief Complaint: Follow-up of the Right Knee    HPI    Follow-up for right knee synovitis.  The patient reports that he has been largely asymptomatic.  He ices his knee after exercises and golf, otherwise he does not require any particular treatment.          Review of Systems   Constitution: Negative for fever and weight loss.   HENT: Negative for congestion.    Eyes: Negative for visual disturbance.   Cardiovascular: Negative for chest pain.   Respiratory: Negative for shortness of breath.    Hematologic/Lymphatic: Negative for bleeding problem. Does not bruise/bleed easily.   Skin: Negative for poor wound healing.   Gastrointestinal: Negative for abdominal pain.   Genitourinary: Negative for dysuria.   Neurological: Negative for seizures.   Psychiatric/Behavioral: Negative for altered mental status.   Allergic/Immunologic: Negative for persistent infections.         Objective:      Ortho/SPM Exam      Right knee      The patient is not in acute distress.   Body habitus is normal.   Sclera appear normal  No respiratory distress  The patient walks without a limp.  Resisted SLR negative.   The skin over the knee is intact.  Knee effusion 0  Tendernes is located absent.  Range of motion- Flexion full, Extension full.   Ligament exam:   MCL trace laxity   Lachman intact              Post sag intact    LCL intact  Patellar apprehension negative.  Popliteal cyst negative  Patellar crepitation absent.  Flexion/pinch negative.  Pulses DP present, PT present.  Motor normal 5/5 strength in all tested muscle groups.   Sensory normal.          Assessment:       Encounter Diagnoses   Name Primary?    Primary osteoarthritis of right knee Yes    Synovitis of knee         The condition is controlled with present treatment. Because of the underlying osteoarthritis, relapses and recurrence symptoms are quite likely.  It is not possible to predict timing of  this.          Plan:       Pedro was seen today for follow-up.    Diagnoses and all orders for this visit:    Primary osteoarthritis of right knee    Synovitis of knee          I explained my diagnostic impression and the reasoning behind it in detail, using layman's terms.  Models and/or pictures were used to help in the explanation.    Ice after exercise    Avoidance of hyperflexion explained

## 2019-12-17 ENCOUNTER — TELEPHONE (OUTPATIENT)
Dept: NEPHROLOGY | Facility: CLINIC | Age: 79
End: 2019-12-17

## 2020-01-03 ENCOUNTER — LAB VISIT (OUTPATIENT)
Dept: LAB | Facility: HOSPITAL | Age: 80
End: 2020-01-03
Attending: FAMILY MEDICINE
Payer: MEDICARE

## 2020-01-03 DIAGNOSIS — N18.30 STAGE 3 CHRONIC KIDNEY DISEASE: ICD-10-CM

## 2020-01-03 LAB
ALBUMIN SERPL BCP-MCNC: 4 G/DL (ref 3.5–5.2)
ANION GAP SERPL CALC-SCNC: 9 MMOL/L (ref 8–16)
BUN SERPL-MCNC: 14 MG/DL (ref 8–23)
CALCIUM SERPL-MCNC: 10.7 MG/DL (ref 8.7–10.5)
CHLORIDE SERPL-SCNC: 103 MMOL/L (ref 95–110)
CO2 SERPL-SCNC: 30 MMOL/L (ref 23–29)
CREAT SERPL-MCNC: 1.4 MG/DL (ref 0.5–1.4)
EST. GFR  (AFRICAN AMERICAN): 54.5 ML/MIN/1.73 M^2
EST. GFR  (NON AFRICAN AMERICAN): 47.1 ML/MIN/1.73 M^2
GLUCOSE SERPL-MCNC: 95 MG/DL (ref 70–110)
PHOSPHATE SERPL-MCNC: 3.4 MG/DL (ref 2.7–4.5)
POTASSIUM SERPL-SCNC: 3.7 MMOL/L (ref 3.5–5.1)
PTH-INTACT SERPL-MCNC: 99 PG/ML (ref 9–77)
SODIUM SERPL-SCNC: 142 MMOL/L (ref 136–145)

## 2020-01-03 PROCEDURE — 83970 ASSAY OF PARATHORMONE: CPT

## 2020-01-03 PROCEDURE — 36415 COLL VENOUS BLD VENIPUNCTURE: CPT | Mod: PO

## 2020-01-03 PROCEDURE — 80069 RENAL FUNCTION PANEL: CPT

## 2020-01-06 ENCOUNTER — TELEPHONE (OUTPATIENT)
Dept: NEPHROLOGY | Facility: CLINIC | Age: 80
End: 2020-01-06

## 2020-01-06 ENCOUNTER — LAB VISIT (OUTPATIENT)
Dept: LAB | Facility: HOSPITAL | Age: 80
End: 2020-01-06
Attending: INTERNAL MEDICINE
Payer: MEDICARE

## 2020-01-06 DIAGNOSIS — R82.90 CLOUDY URINE: ICD-10-CM

## 2020-01-06 DIAGNOSIS — R82.90 CLOUDY URINE: Primary | ICD-10-CM

## 2020-01-06 PROCEDURE — 87086 URINE CULTURE/COLONY COUNT: CPT

## 2020-01-07 ENCOUNTER — PATIENT OUTREACH (OUTPATIENT)
Dept: ADMINISTRATIVE | Facility: OTHER | Age: 80
End: 2020-01-07

## 2020-01-07 ENCOUNTER — TELEPHONE (OUTPATIENT)
Dept: NEPHROLOGY | Facility: CLINIC | Age: 80
End: 2020-01-07

## 2020-01-07 ENCOUNTER — LAB VISIT (OUTPATIENT)
Dept: LAB | Facility: HOSPITAL | Age: 80
End: 2020-01-07
Attending: INTERNAL MEDICINE
Payer: MEDICARE

## 2020-01-07 DIAGNOSIS — N18.30 STAGE 3 CHRONIC KIDNEY DISEASE: Primary | ICD-10-CM

## 2020-01-07 DIAGNOSIS — E11.21 TYPE 2 DIABETES MELLITUS WITH DIABETIC NEPHROPATHY, WITHOUT LONG-TERM CURRENT USE OF INSULIN: Primary | ICD-10-CM

## 2020-01-07 DIAGNOSIS — N18.30 STAGE 3 CHRONIC KIDNEY DISEASE: ICD-10-CM

## 2020-01-07 LAB
BACTERIA UR CULT: NORMAL
BACTERIA UR CULT: NORMAL
BILIRUB UR QL STRIP: NEGATIVE
CLARITY UR REFRACT.AUTO: CLEAR
COLOR UR AUTO: YELLOW
CREAT UR-MCNC: 170 MG/DL (ref 23–375)
GLUCOSE UR QL STRIP: NEGATIVE
HGB UR QL STRIP: ABNORMAL
KETONES UR QL STRIP: NEGATIVE
LEUKOCYTE ESTERASE UR QL STRIP: NEGATIVE
MICROSCOPIC COMMENT: NORMAL
NITRITE UR QL STRIP: NEGATIVE
PH UR STRIP: 6 [PH] (ref 5–8)
PROT UR QL STRIP: NEGATIVE
PROT UR-MCNC: 102 MG/DL (ref 0–15)
PROT/CREAT UR: 0.6 MG/G{CREAT} (ref 0–0.2)
RBC #/AREA URNS AUTO: 2 /HPF (ref 0–4)
SP GR UR STRIP: 1.02 (ref 1–1.03)
SQUAMOUS #/AREA URNS AUTO: 0 /HPF
URN SPEC COLLECT METH UR: ABNORMAL
WBC #/AREA URNS AUTO: 0 /HPF (ref 0–5)

## 2020-01-07 PROCEDURE — 84156 ASSAY OF PROTEIN URINE: CPT

## 2020-01-07 PROCEDURE — 81001 URINALYSIS AUTO W/SCOPE: CPT

## 2020-01-07 NOTE — TELEPHONE ENCOUNTER
Preferred Name:   Bryan Ponce  Male, 79 y.o., 1940  Phone:   [phone] (M)  PCP:   John Ricci MD  Language:   English  Need Interp:   No  Allergies Last Reviewed:   12/06/19  Allergies:   Pcn [Penicillins]  Health Maintenance:   Due  Primary Ins.:   [employer] MA[employer]ED [employer]ICARE  MRN:   [id number]  Pt Comm Pref:   Patient Portal, Mail  Next Appt:   With Nephrology (Lawanda Walker DO)  01/09/2020 at 9:30 AM  My Sticky Note:     Specialty Comments:     Patient Calls     Lawanda Walker DO routed conversation to Aaron Webber Staff 1 minute ago (10:32 AM)      Lawanda Walker DO 1 minute ago (10:32 AM)         Please have him repeat UA and urine protein as he has a lot of red cells in urine.          Documentation

## 2020-01-09 ENCOUNTER — OFFICE VISIT (OUTPATIENT)
Dept: NEPHROLOGY | Facility: CLINIC | Age: 80
End: 2020-01-09
Payer: MEDICARE

## 2020-01-09 VITALS
DIASTOLIC BLOOD PRESSURE: 82 MMHG | BODY MASS INDEX: 30.62 KG/M2 | HEIGHT: 71 IN | WEIGHT: 218.69 LBS | OXYGEN SATURATION: 98 % | HEART RATE: 77 BPM | SYSTOLIC BLOOD PRESSURE: 142 MMHG

## 2020-01-09 DIAGNOSIS — I10 HYPERTENSION, UNSPECIFIED TYPE: ICD-10-CM

## 2020-01-09 DIAGNOSIS — R80.9 PROTEINURIA, UNSPECIFIED TYPE: ICD-10-CM

## 2020-01-09 DIAGNOSIS — N18.30 CKD (CHRONIC KIDNEY DISEASE) STAGE 3, GFR 30-59 ML/MIN: Primary | ICD-10-CM

## 2020-01-09 PROCEDURE — 1101F PR PT FALLS ASSESS DOC 0-1 FALLS W/OUT INJ PAST YR: ICD-10-PCS | Mod: CPTII,S$GLB,, | Performed by: INTERNAL MEDICINE

## 2020-01-09 PROCEDURE — 3079F PR MOST RECENT DIASTOLIC BLOOD PRESSURE 80-89 MM HG: ICD-10-PCS | Mod: CPTII,S$GLB,, | Performed by: INTERNAL MEDICINE

## 2020-01-09 PROCEDURE — 3079F DIAST BP 80-89 MM HG: CPT | Mod: CPTII,S$GLB,, | Performed by: INTERNAL MEDICINE

## 2020-01-09 PROCEDURE — 1126F PR PAIN SEVERITY QUANTIFIED, NO PAIN PRESENT: ICD-10-PCS | Mod: S$GLB,,, | Performed by: INTERNAL MEDICINE

## 2020-01-09 PROCEDURE — 99215 OFFICE O/P EST HI 40 MIN: CPT | Mod: S$GLB,,, | Performed by: INTERNAL MEDICINE

## 2020-01-09 PROCEDURE — 3077F PR MOST RECENT SYSTOLIC BLOOD PRESSURE >= 140 MM HG: ICD-10-PCS | Mod: CPTII,S$GLB,, | Performed by: INTERNAL MEDICINE

## 2020-01-09 PROCEDURE — 1159F PR MEDICATION LIST DOCUMENTED IN MEDICAL RECORD: ICD-10-PCS | Mod: S$GLB,,, | Performed by: INTERNAL MEDICINE

## 2020-01-09 PROCEDURE — 1126F AMNT PAIN NOTED NONE PRSNT: CPT | Mod: S$GLB,,, | Performed by: INTERNAL MEDICINE

## 2020-01-09 PROCEDURE — 99215 PR OFFICE/OUTPT VISIT, EST, LEVL V, 40-54 MIN: ICD-10-PCS | Mod: S$GLB,,, | Performed by: INTERNAL MEDICINE

## 2020-01-09 PROCEDURE — 3077F SYST BP >= 140 MM HG: CPT | Mod: CPTII,S$GLB,, | Performed by: INTERNAL MEDICINE

## 2020-01-09 PROCEDURE — 1101F PT FALLS ASSESS-DOCD LE1/YR: CPT | Mod: CPTII,S$GLB,, | Performed by: INTERNAL MEDICINE

## 2020-01-09 PROCEDURE — 99999 PR PBB SHADOW E&M-EST. PATIENT-LVL III: ICD-10-PCS | Mod: PBBFAC,,, | Performed by: INTERNAL MEDICINE

## 2020-01-09 PROCEDURE — 1159F MED LIST DOCD IN RCRD: CPT | Mod: S$GLB,,, | Performed by: INTERNAL MEDICINE

## 2020-01-09 PROCEDURE — 99999 PR PBB SHADOW E&M-EST. PATIENT-LVL III: CPT | Mod: PBBFAC,,, | Performed by: INTERNAL MEDICINE

## 2020-01-09 RX ORDER — IRBESARTAN 300 MG/1
300 TABLET ORAL DAILY
Qty: 90 TABLET | Refills: 3 | Status: SHIPPED | OUTPATIENT
Start: 2020-01-09 | End: 2020-02-18

## 2020-01-09 NOTE — PROGRESS NOTES
Subjective:       Patient ID: Pedro Ponce is a 79 y.o. Black or  male who presents for follow-up evaluation of No chief complaint on file.    HPI This is a 79 -year-old -American male with longstanding hypertension, diet-controlled diabetes, proteinuria, bilateral renal cysts is here for f/u of these. Bp's stable in the 130's-140's/60's -70's. Diet controlled DM stable with A1c is 6.0. Creatinine stable at 1.4. Slight proteinuria.  Excercises regularly.  No previous UTI's per pt.  Denies NSAID's. No recent hopsitalizations.       PAST MEDICAL HISTORY: Type 2 diabetes diet controlled, hypertension for   approximately 20 years, hyperlipidemia, gout, glaucoma, BPH and TURP in   2006 and followed in Urology. He also had questionable cyst versus tumor   removed by Dr. Drew about 17 years ago and this was not malignant per   patient.     Review of Systems   Constitutional: Negative for fatigue and unexpected weight change.          Apetite is good. No weight loss.   Eyes: Negative for discharge.   Respiratory: Negative for cough, shortness of breath and wheezing.    Cardiovascular: Negative for chest pain and palpitations.   Gastrointestinal: Negative for abdominal pain, diarrhea, nausea and vomiting.        Nocturia   Genitourinary: Negative for dysuria, frequency, hematuria and urgency.   Musculoskeletal:        R knee pain.   Skin: Negative for color change and rash.   Psychiatric/Behavioral: Negative for confusion.       Objective:      Physical Exam   Constitutional: He is oriented to person, place, and time. He appears well-developed and well-nourished.   Cardiovascular: Normal rate and regular rhythm. Exam reveals no friction rub.   No murmur heard.  Pulmonary/Chest: Effort normal and breath sounds normal. No respiratory distress. He has no rales.   Abdominal: Soft. Bowel sounds are normal. There is no tenderness. There is no rebound.   Musculoskeletal: He exhibits no edema.    Neurological: He is alert and oriented to person, place, and time.   Skin: No rash noted. No erythema.   Psychiatric: He has a normal mood and affect. Judgment normal.       Assessment:       No diagnosis found.    Plan:         This is a 79 -year-old -American male with   longstanding hypertension, diet-controlled diabetes comes in for f/u of bilateral renal cysts.   1. Renal cysts. He does have multiple cysts on bilateral kidneys. 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 He denies any hematuria. Repeat ultrasound.  Followed in urology.  Had cysto/SUDS.  Unclear if it's acquired cystic disease-no family  H/o. US without any  Tumors-stable form last time.  Had nonmalignant tumor removed many years ago in urology. Repeat US.  2. Hypertension. Blood pressures are stable and recommended even better control . Encouraged excersise and low salt diet.   3. Proteinuria. Slight increase in proteinuria to 500 mg from 300 mg  of proteinuria sec to HTN. On avapro. Rec tighter bp control.  Off HCTZ b/c of previous higher calciums.   4. Anemia. H&H is stable.   5. Renal/CKD stg 2- 3: creatinine is 1.4 and baseline around 1.2-1.4 with GFR of 54 ml/min .  Encouraged him to hydrate well.   Alkalosis resolved -hydrate well. Checked  floyd/renin levels.   6. UTI: No recent UTI's.  Followed in urology.  Has high wbc's in urine chronically.   Calcium borderline elvated chronically but i ca stable and recent ca stable. spep nml.  off MVI.  PTH stable for his ckd.  If the ca worsen, will need parathyroid scan to evaluate possible primary hyperpara but stable now.         F/u in 5-6 months with rfp, i pth, urine protein and urine creatinine, ionized calcium.

## 2020-01-20 ENCOUNTER — HOSPITAL ENCOUNTER (OUTPATIENT)
Dept: RADIOLOGY | Facility: HOSPITAL | Age: 80
Discharge: HOME | End: 2020-01-20
Attending: INTERNAL MEDICINE
Payer: MEDICARE

## 2020-01-20 DIAGNOSIS — I10 HYPERTENSION, UNSPECIFIED TYPE: ICD-10-CM

## 2020-01-20 DIAGNOSIS — R80.9 PROTEINURIA, UNSPECIFIED TYPE: ICD-10-CM

## 2020-01-20 DIAGNOSIS — N18.30 CKD (CHRONIC KIDNEY DISEASE) STAGE 3, GFR 30-59 ML/MIN: ICD-10-CM

## 2020-01-20 PROCEDURE — 76770 US KIDNEY: ICD-10-PCS | Mod: 26,,, | Performed by: RADIOLOGY

## 2020-01-20 PROCEDURE — 76770 US EXAM ABDO BACK WALL COMP: CPT | Mod: TC

## 2020-01-20 PROCEDURE — 76770 US EXAM ABDO BACK WALL COMP: CPT | Mod: 26,,, | Performed by: RADIOLOGY

## 2020-01-23 ENCOUNTER — TELEPHONE (OUTPATIENT)
Dept: NEPHROLOGY | Facility: CLINIC | Age: 80
End: 2020-01-23

## 2020-01-23 NOTE — TELEPHONE ENCOUNTER
Preferred Name:   Bryan Ponce  Male, 79 y.o., 1940  Phone:   [phone] (M)  PCP:   John Ricci MD  Language:   English  Need Interp:   No  Allergies Last Reviewed:   01/09/20  Allergies:   Pcn [Penicillins]  Health Maintenance:   Due  Primary Ins.:   [employer] MANAGED MEDICARE  MRN:   [id number]  Pt Comm Pref:   Patient Portal, Mail  Next Appt:   With Urology  01/27/2020 at 10:20 AM  My Sticky Note:     Specialty Comments:     Patient Calls     Lawanda Walker DO routed conversation to Aaron Webber Staff 35 minutes ago (2:41 PM)      Lawanda Walker DO 35 minutes ago (2:41 PM)         Kidney US similar to before-no major changes.          Documentation

## 2020-01-24 ENCOUNTER — PATIENT OUTREACH (OUTPATIENT)
Dept: ADMINISTRATIVE | Facility: OTHER | Age: 80
End: 2020-01-24

## 2020-01-24 DIAGNOSIS — E11.21 TYPE 2 DIABETES MELLITUS WITH DIABETIC NEPHROPATHY, WITHOUT LONG-TERM CURRENT USE OF INSULIN: Primary | ICD-10-CM

## 2020-01-24 NOTE — PROGRESS NOTES
Care Everywhere: n/a  Immunization: updated  Health Maintenance: updated  Media Review: n/a  Legacy Review: n/a  Order placed: a1c  Upcoming appts:n/a

## 2020-01-27 ENCOUNTER — OFFICE VISIT (OUTPATIENT)
Dept: UROLOGY | Facility: CLINIC | Age: 80
End: 2020-01-27
Payer: MEDICARE

## 2020-01-27 VITALS
WEIGHT: 220 LBS | HEART RATE: 64 BPM | HEIGHT: 71 IN | SYSTOLIC BLOOD PRESSURE: 169 MMHG | DIASTOLIC BLOOD PRESSURE: 87 MMHG | BODY MASS INDEX: 30.8 KG/M2

## 2020-01-27 DIAGNOSIS — N13.8 BPH WITH URINARY OBSTRUCTION: Primary | ICD-10-CM

## 2020-01-27 DIAGNOSIS — N40.1 BPH WITH URINARY OBSTRUCTION: Primary | ICD-10-CM

## 2020-01-27 DIAGNOSIS — R35.0 URINARY FREQUENCY: ICD-10-CM

## 2020-01-27 DIAGNOSIS — R35.1 NOCTURIA: ICD-10-CM

## 2020-01-27 LAB — POC RESIDUAL URINE VOLUME: 8 ML (ref 0–100)

## 2020-01-27 PROCEDURE — 1101F PR PT FALLS ASSESS DOC 0-1 FALLS W/OUT INJ PAST YR: ICD-10-PCS | Mod: CPTII,S$GLB,, | Performed by: NURSE PRACTITIONER

## 2020-01-27 PROCEDURE — 1126F PR PAIN SEVERITY QUANTIFIED, NO PAIN PRESENT: ICD-10-PCS | Mod: S$GLB,,, | Performed by: NURSE PRACTITIONER

## 2020-01-27 PROCEDURE — 1159F MED LIST DOCD IN RCRD: CPT | Mod: S$GLB,,, | Performed by: NURSE PRACTITIONER

## 2020-01-27 PROCEDURE — 1101F PT FALLS ASSESS-DOCD LE1/YR: CPT | Mod: CPTII,S$GLB,, | Performed by: NURSE PRACTITIONER

## 2020-01-27 PROCEDURE — 3077F SYST BP >= 140 MM HG: CPT | Mod: CPTII,S$GLB,, | Performed by: NURSE PRACTITIONER

## 2020-01-27 PROCEDURE — 3079F DIAST BP 80-89 MM HG: CPT | Mod: CPTII,S$GLB,, | Performed by: NURSE PRACTITIONER

## 2020-01-27 PROCEDURE — 99214 OFFICE O/P EST MOD 30 MIN: CPT | Mod: 25,S$GLB,, | Performed by: NURSE PRACTITIONER

## 2020-01-27 PROCEDURE — 99999 PR PBB SHADOW E&M-EST. PATIENT-LVL IV: ICD-10-PCS | Mod: PBBFAC,,, | Performed by: NURSE PRACTITIONER

## 2020-01-27 PROCEDURE — 1126F AMNT PAIN NOTED NONE PRSNT: CPT | Mod: S$GLB,,, | Performed by: NURSE PRACTITIONER

## 2020-01-27 PROCEDURE — 51798 US URINE CAPACITY MEASURE: CPT | Mod: S$GLB,,, | Performed by: NURSE PRACTITIONER

## 2020-01-27 PROCEDURE — 99214 PR OFFICE/OUTPT VISIT, EST, LEVL IV, 30-39 MIN: ICD-10-PCS | Mod: 25,S$GLB,, | Performed by: NURSE PRACTITIONER

## 2020-01-27 PROCEDURE — 1159F PR MEDICATION LIST DOCUMENTED IN MEDICAL RECORD: ICD-10-PCS | Mod: S$GLB,,, | Performed by: NURSE PRACTITIONER

## 2020-01-27 PROCEDURE — 3077F PR MOST RECENT SYSTOLIC BLOOD PRESSURE >= 140 MM HG: ICD-10-PCS | Mod: CPTII,S$GLB,, | Performed by: NURSE PRACTITIONER

## 2020-01-27 PROCEDURE — 51798 POCT BLADDER SCAN: ICD-10-PCS | Mod: S$GLB,,, | Performed by: NURSE PRACTITIONER

## 2020-01-27 PROCEDURE — 3079F PR MOST RECENT DIASTOLIC BLOOD PRESSURE 80-89 MM HG: ICD-10-PCS | Mod: CPTII,S$GLB,, | Performed by: NURSE PRACTITIONER

## 2020-01-27 PROCEDURE — 99999 PR PBB SHADOW E&M-EST. PATIENT-LVL IV: CPT | Mod: PBBFAC,,, | Performed by: NURSE PRACTITIONER

## 2020-01-27 RX ORDER — DUTASTERIDE 0.5 MG/1
CAPSULE, LIQUID FILLED ORAL
Qty: 90 CAPSULE | Refills: 3 | Status: SHIPPED | OUTPATIENT
Start: 2020-01-27 | End: 2020-03-18

## 2020-01-27 RX ORDER — OXYBUTYNIN CHLORIDE 10 MG/1
10 TABLET, EXTENDED RELEASE ORAL DAILY
Qty: 90 TABLET | Refills: 3 | Status: SHIPPED | OUTPATIENT
Start: 2020-01-27 | End: 2020-02-02

## 2020-01-27 RX ORDER — TAMSULOSIN HYDROCHLORIDE 0.4 MG/1
0.4 CAPSULE ORAL NIGHTLY
Qty: 90 CAPSULE | Refills: 3 | Status: SHIPPED | OUTPATIENT
Start: 2020-01-27 | End: 2020-09-28 | Stop reason: SDUPTHER

## 2020-01-27 NOTE — LETTER
January 27, 2020      John Ricci MD  [address] [facility] [address]           [facility] - Urology 4th Floor  [address]  [place] [address]  Phone: [phone]          Patient: Pedro Ponce   MR Number: [id number]   YOB: 1940   Date of Visit: 1/27/2020       Dear Dr. John Ricci:    Thank you for referring Pedro Ponce to me for evaluation. Attached you will find relevant portions of my assessment and plan of care.    If you have questions, please do not hesitate to call me. I look forward to following Pedro Ponce along with you.    Sincerely,    Marychuy Rothman, FARZANA    Enclosure  CC:  No Recipients    If you would like to receive this communication electronically, please contact [email or web address] or [phone] to request more information on [employer] Link access.    For providers and/or their staff who would like to refer a patient to [facility], please contact us through our one-stop-shop provider referral line, [facility] , at [phone].    If you feel you have received this communication in error or would no longer like to receive these types of communications, please e-mail [email or web address]

## 2020-01-27 NOTE — PROGRESS NOTES
Subjective:       Patient ID: Pedro Ponce is a 79 y.o. male.    Chief Complaint: Benign Prostatic Hypertrophy    Pedro Ponce is a 78 y.o. Male with history of BPH.  He manages his LUTS with Flomax and Avodart.  He had seen in clinic with Dr. Bahena 12/04/2017.  12/10/2018 was his last office visit.   He taking Oxybutynin 5mg BID    01/14/2019 had colonoscopy; 1 polyp found; repeat in 3 years.     Here today for annual check and medication refill.  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    Past Surgical History:  4/9/2018: COLONOSCOPY; N/A      Comment:  Procedure: COLONOSCOPY;  Surgeon: HAIM Mir MD;                 Location: [facility] ([address] [place] [address]);  Service: Endoscopy;                 Laterality: N/A;  4/9/2018: COLONOSCOPY; N/A      Comment:  Performed by HAIM Mir MD at [facility] (4TH FLR)  6/23/2014: COLONOSCOPY; N/A      Comment:  Performed by Marcelo Chamberlain MD at [facility] (4TH FLR)  2000: CYST REMOVAL      Comment:  removed from left kidney  7/6/2017: GONIOTOMY; Right      Comment:  Performed by Roberto Hidalgo MD at [facility] OR 1ST FLR  8/24/2017: INSERTION-INTRAOCULAR LENS (IOL); Left      Comment:  Performed by Roberto Hidalgo MD at [facility] OR 1ST FLR  7/6/2017: INSERTION-INTRAOCULAR LENS (IOL); Right      Comment:  Performed by Roberto Hidalgo MD at [facility] OR [facility] FLR  No date: KIDNEY SURGERY  8/24/2017:  PHACOEMULSIFICATION-ASPIRATION-CATARACT; Left      Comment:  Performed by Roberto Hidalgo MD at [facility] OR 1ST FLR  7/6/2017: PHACOEMULSIFICATION-ASPIRATION-CATARACT; Right      Comment:  Performed by Roberto Hidalgo MD at [facility] OR 1ST FLR  No date: PROSTATE SURGERY    Review of patient's family history indicates:  Problem: Cancer      Relation: Mother          Age of Onset: (Not Specified)          Comment: unkn ca  Problem: Diabetes      Relation: Mother          Age of Onset: (Not Specified)  Problem: Glaucoma      Relation: Mother          Age of Onset: (Not Specified)  Problem: Heart disease      Relation: Mother          Age of Onset: (Not Specified)  Problem: Hypertension      Relation: Mother          Age of Onset: (Not Specified)  Problem: Blindness      Relation: Neg Hx          Age of Onset: (Not Specified)  Problem: Macular degeneration      Relation: Neg Hx          Age of Onset: (Not Specified)  Problem: Retinal detachment      Relation: Neg Hx          Age of Onset: (Not Specified)      Social History    Socioeconomic History      Marital status:       Spouse name: Not on file      Number of children: Not on file      Years of education: Not on file      Highest education level: Not on file    Social Needs      Financial resource strain: Not on file      Food insecurity - worry: Not on file      Food insecurity - inability: Not on file      Transportation needs - medical: Not on file      Transportation needs - non-medical: Not on file    Occupational History      Not on file    Tobacco Use      Smoking status: Never Smoker      Smokeless tobacco: Never Used    Substance and Sexual Activity      Alcohol use: No      Drug use: No      Sexual activity: Not on file    Other Topics      Concerns:        Not on file    Social History Narrative      Not on file      Allergies:  Pcn (penicillins)    Medications:  Current Outpatient Medications:   amlodipine-atorvastatin (CADUET) 10-10 mg per  tablet, TAKE 1 TABLET BY MOUTH EVERY DAY, Disp: 90 tablet, Rfl: 0  aspirin 81 mg Tab, Take 81 mg by mouth. 1 Tablet Oral Every evening, Disp: , Rfl:   dutasteride (AVODART) 0.5 mg capsule, TAKE 1 CAPSULE (0.5 MG TOTAL) BY MOUTH ONCE DAILY., Disp: 90 capsule, Rfl: 2  fish oil-omega-3 fatty acids 300-1,000 mg capsule, Take 1 g by mouth once daily.  , Disp: , Rfl:   hydroCHLOROthiazide (HYDRODIURIL) 25 MG tablet, TAKE 1 TABLET (25 MG TOTAL) BY MOUTH ONCE DAILY., Disp: 90 tablet, Rfl: 4  irbesartan (AVAPRO) 300 MG tablet, TAKE 1 TABLET EVERY DAY, Disp: 90 tablet, Rfl: 2  metoprolol tartrate (LOPRESSOR) 25 MG tablet, TAKE 2 TABLETS BY MOUTH TWICE A DAY, Disp: 180 tablet, Rfl: 1  multivitamin-minerals-lutein (CENTRUM SILVER) Tab, Take by mouth. 1 Tablet Oral Every day, Disp: , Rfl:   oxybutynin (DITROPAN) 5 MG Tab, TAKE 1 TABLET BY MOUTH TWICE A DAY, Disp: 180 tablet, Rfl: 3  polyvinyl alcohol, artificial tears, (LIQUIFILM TEARS) 1.4 % ophthalmic solution, 1 drop as needed., Disp: , Rfl:   tamsulosin (FLOMAX) 0.4 mg Cp24, , Disp: , Rfl:       Review of Systems   Constitutional: Negative for activity change, appetite change, chills and fever.   HENT: Negative for facial swelling and trouble swallowing.    Eyes: Negative for visual disturbance.   Respiratory: Negative for chest tightness and shortness of breath.    Cardiovascular: Negative for chest pain and palpitations.   Gastrointestinal: Negative.  Negative for abdominal pain, constipation, diarrhea, nausea and vomiting.   Genitourinary: Positive for frequency, nocturia and urgency. Negative for difficulty urinating, dysuria, flank pain, hematuria, penile pain, penile swelling, scrotal swelling and testicular pain.        FOS is good.  Just going a lot     Musculoskeletal: Negative for back pain, gait problem, myalgias and neck stiffness.   Skin: Negative for rash.   Neurological: Negative for dizziness and speech difficulty.   Hematological: Does not  bruise/bleed easily.   Psychiatric/Behavioral: Negative for behavioral problems.       Objective:      Physical Exam   Nursing note and vitals reviewed.  Constitutional: He is oriented to person, place, and time. He appears well-developed and well-nourished.  Non-toxic appearance. He does not have a sickly appearance.   Unable to urinate in the office  Bladder Scan in the office today by the nurse was 8.     HENT:   Head: Normocephalic and atraumatic.   Right Ear: External ear normal.   Left Ear: External ear normal.   Nose: Nose normal.   Mouth/Throat: Mucous membranes are normal.   Eyes: Conjunctivae and lids are normal. No scleral icterus.   Neck: Trachea normal, normal range of motion and full passive range of motion without pain. Neck supple. No JVD present. No tracheal deviation present.   Cardiovascular: Normal rate, S1 normal and S2 normal.    Pulmonary/Chest: Effort normal. No respiratory distress. He exhibits no tenderness.   Abdominal: Soft. Normal appearance. There is no hepatosplenomegaly. There is no tenderness. There is no CVA tenderness.   Genitourinary: Rectum normal and testes normal. Rectal exam shows no mass and no tenderness. Prostate is enlarged. Prostate is not tender. Right testis shows no mass, no swelling and no tenderness. Left testis shows no mass, no swelling and no tenderness. Uncircumcised. No phimosis, paraphimosis, hypospadias, penile erythema or penile tenderness. No discharge found.       Musculoskeletal: Normal range of motion.   Neurological: He is alert and oriented to person, place, and time. He has normal strength.   Skin: Skin is warm, dry and intact.     Psychiatric: He has a normal mood and affect. His behavior is normal. Judgment and thought content normal.       Assessment:       1. BPH with urinary obstruction    2. Urinary frequency    3. Nocturia        Plan:         I spent 30 minutes with the patient of which more than half was spent in direct consultation with the  patient in regards to our treatment and plan.    Education and recommendations of today's plan of care including home remedies.  We discussed his LUTS and the contributory factors.  Diet modifications. Reducing PM fluids.  Continue the Avodart and Flomax.  Start taking the Oxybutynin 5mg 3 x a day until gone; if not improved then start the Oxybutynin 10mg ER daily.  Let us know if not helping

## 2020-02-02 RX ORDER — OXYBUTYNIN CHLORIDE 5 MG/1
TABLET ORAL
Qty: 180 TABLET | Refills: 3 | Status: SHIPPED | OUTPATIENT
Start: 2020-02-02 | End: 2020-04-30

## 2020-02-18 RX ORDER — IRBESARTAN 300 MG/1
TABLET ORAL
Qty: 90 TABLET | Refills: 3 | Status: SHIPPED | OUTPATIENT
Start: 2020-02-18 | End: 2020-11-16

## 2020-02-21 ENCOUNTER — TELEPHONE (OUTPATIENT)
Dept: FAMILY MEDICINE | Facility: CLINIC | Age: 80
End: 2020-02-21

## 2020-02-21 NOTE — TELEPHONE ENCOUNTER
----- Message from Ed Mcmillan sent at 2/21/2020 10:23 AM CST -----  Contact: patient  Type:  Needs Medical Advice    Who Called: Pedro  Would the patient rather a call back or a response via [facility]? Call back  Best Call Back Number:  [phone]  Additional Information:  He needs to speak with someone in regard to a medication that has caused gingivitis so it needs to be discontinued

## 2020-03-03 ENCOUNTER — TELEPHONE (OUTPATIENT)
Dept: UROLOGY | Facility: CLINIC | Age: 80
End: 2020-03-03

## 2020-03-03 NOTE — TELEPHONE ENCOUNTER
Left message; checking on his LUTS (nocturia) since taking oxybutynin 5mg TID.  Can call me back.

## 2020-03-03 NOTE — TELEPHONE ENCOUNTER
----- Message from Marychuy Rothman NP sent at 1/27/2020 10:34 AM CST -----  Check his LUTS with med changes.

## 2020-03-04 ENCOUNTER — PATIENT OUTREACH (OUTPATIENT)
Dept: ADMINISTRATIVE | Facility: HOSPITAL | Age: 80
End: 2020-03-04

## 2020-03-16 ENCOUNTER — LAB VISIT (OUTPATIENT)
Dept: LAB | Facility: HOSPITAL | Age: 80
End: 2020-03-16
Attending: FAMILY MEDICINE
Payer: MEDICARE

## 2020-03-16 DIAGNOSIS — E11.21 TYPE 2 DIABETES MELLITUS WITH DIABETIC NEPHROPATHY, WITHOUT LONG-TERM CURRENT USE OF INSULIN: ICD-10-CM

## 2020-03-16 LAB
ESTIMATED AVG GLUCOSE: 126 MG/DL (ref 68–131)
HBA1C MFR BLD HPLC: 6 % (ref 4–5.6)

## 2020-03-16 PROCEDURE — 36415 COLL VENOUS BLD VENIPUNCTURE: CPT | Mod: PO

## 2020-03-16 PROCEDURE — 83036 HEMOGLOBIN GLYCOSYLATED A1C: CPT

## 2020-03-18 ENCOUNTER — OFFICE VISIT (OUTPATIENT)
Dept: FAMILY MEDICINE | Facility: CLINIC | Age: 80
End: 2020-03-18
Payer: MEDICARE

## 2020-03-18 VITALS
HEART RATE: 80 BPM | SYSTOLIC BLOOD PRESSURE: 140 MMHG | DIASTOLIC BLOOD PRESSURE: 80 MMHG | TEMPERATURE: 98 F | BODY MASS INDEX: 29.64 KG/M2 | WEIGHT: 212.5 LBS | OXYGEN SATURATION: 98 %

## 2020-03-18 DIAGNOSIS — E78.5 DYSLIPIDEMIA: ICD-10-CM

## 2020-03-18 DIAGNOSIS — N40.1 BPH WITH URINARY OBSTRUCTION: ICD-10-CM

## 2020-03-18 DIAGNOSIS — N13.8 BPH WITH URINARY OBSTRUCTION: ICD-10-CM

## 2020-03-18 DIAGNOSIS — N18.30 TYPE 2 DIABETES MELLITUS WITH STAGE 3 CHRONIC KIDNEY DISEASE, WITHOUT LONG-TERM CURRENT USE OF INSULIN: ICD-10-CM

## 2020-03-18 DIAGNOSIS — I10 ESSENTIAL HYPERTENSION: Primary | ICD-10-CM

## 2020-03-18 DIAGNOSIS — E11.22 TYPE 2 DIABETES MELLITUS WITH STAGE 3 CHRONIC KIDNEY DISEASE, WITHOUT LONG-TERM CURRENT USE OF INSULIN: ICD-10-CM

## 2020-03-18 PROCEDURE — 3079F PR MOST RECENT DIASTOLIC BLOOD PRESSURE 80-89 MM HG: ICD-10-PCS | Mod: CPTII,S$GLB,, | Performed by: FAMILY MEDICINE

## 2020-03-18 PROCEDURE — 99214 OFFICE O/P EST MOD 30 MIN: CPT | Mod: S$GLB,,, | Performed by: FAMILY MEDICINE

## 2020-03-18 PROCEDURE — 3077F PR MOST RECENT SYSTOLIC BLOOD PRESSURE >= 140 MM HG: ICD-10-PCS | Mod: CPTII,S$GLB,, | Performed by: FAMILY MEDICINE

## 2020-03-18 PROCEDURE — 99999 PR PBB SHADOW E&M-EST. PATIENT-LVL III: CPT | Mod: PBBFAC,,, | Performed by: FAMILY MEDICINE

## 2020-03-18 PROCEDURE — 1159F MED LIST DOCD IN RCRD: CPT | Mod: S$GLB,,, | Performed by: FAMILY MEDICINE

## 2020-03-18 PROCEDURE — 1126F PR PAIN SEVERITY QUANTIFIED, NO PAIN PRESENT: ICD-10-PCS | Mod: S$GLB,,, | Performed by: FAMILY MEDICINE

## 2020-03-18 PROCEDURE — 3077F SYST BP >= 140 MM HG: CPT | Mod: CPTII,S$GLB,, | Performed by: FAMILY MEDICINE

## 2020-03-18 PROCEDURE — 99214 PR OFFICE/OUTPT VISIT, EST, LEVL IV, 30-39 MIN: ICD-10-PCS | Mod: S$GLB,,, | Performed by: FAMILY MEDICINE

## 2020-03-18 PROCEDURE — 99999 PR PBB SHADOW E&M-EST. PATIENT-LVL III: ICD-10-PCS | Mod: PBBFAC,,, | Performed by: FAMILY MEDICINE

## 2020-03-18 PROCEDURE — 1101F PT FALLS ASSESS-DOCD LE1/YR: CPT | Mod: CPTII,S$GLB,, | Performed by: FAMILY MEDICINE

## 2020-03-18 PROCEDURE — 1159F PR MEDICATION LIST DOCUMENTED IN MEDICAL RECORD: ICD-10-PCS | Mod: S$GLB,,, | Performed by: FAMILY MEDICINE

## 2020-03-18 PROCEDURE — 3079F DIAST BP 80-89 MM HG: CPT | Mod: CPTII,S$GLB,, | Performed by: FAMILY MEDICINE

## 2020-03-18 PROCEDURE — 1101F PR PT FALLS ASSESS DOC 0-1 FALLS W/OUT INJ PAST YR: ICD-10-PCS | Mod: CPTII,S$GLB,, | Performed by: FAMILY MEDICINE

## 2020-03-18 PROCEDURE — 1126F AMNT PAIN NOTED NONE PRSNT: CPT | Mod: S$GLB,,, | Performed by: FAMILY MEDICINE

## 2020-03-18 RX ORDER — METOPROLOL TARTRATE 50 MG/1
50 TABLET ORAL 2 TIMES DAILY
Qty: 180 TABLET | Refills: 3 | Status: SHIPPED | OUTPATIENT
Start: 2020-03-18 | End: 2020-11-01 | Stop reason: SDUPTHER

## 2020-03-18 RX ORDER — DUTASTERIDE 0.5 MG/1
CAPSULE, LIQUID FILLED ORAL
Qty: 90 CAPSULE | Refills: 3 | Status: SHIPPED | OUTPATIENT
Start: 2020-03-18 | End: 2020-09-28 | Stop reason: SDUPTHER

## 2020-03-18 RX ORDER — ATORVASTATIN CALCIUM 10 MG/1
10 TABLET, FILM COATED ORAL NIGHTLY
Qty: 90 TABLET | Refills: 3 | Status: SHIPPED | OUTPATIENT
Start: 2020-03-18 | End: 2020-04-29

## 2020-03-18 NOTE — PROGRESS NOTES
Subjective:       Patient ID: Pedro Ponce is a 80 y.o. male.    Chief Complaint: Follow-up    80 years old male came to the clinic for blood pressure check.  Blood pressure today stable.  No chest pain, palpitation, orthopnea or PND.  Raysa cholesterol.  Patient with decreased kidney function but stable in comparison with previous reports .  Patient with good compliance with the medicine for BPH.  Last A1c was stable.  No polyuria, polydipsia or polyphagia.    Review of Systems   Constitutional: Negative.    HENT: Negative.    Eyes: Negative.    Respiratory: Negative.    Cardiovascular: Negative.  Negative for chest pain, palpitations and leg swelling.   Gastrointestinal: Negative.    Endocrine: Negative for polydipsia, polyphagia and polyuria.   Genitourinary: Negative.    Musculoskeletal: Negative.    Skin: Negative.    Neurological: Negative.    Psychiatric/Behavioral: Negative.        Objective:      Physical Exam   Constitutional: He is oriented to person, place, and time. He appears well-developed and well-nourished. No distress.   HENT:   Head: Normocephalic and atraumatic.   Right Ear: External ear normal.   Left Ear: External ear normal.   Nose: Nose normal.   Mouth/Throat: Oropharynx is clear and moist. No oropharyngeal exudate.   Eyes: Pupils are equal, round, and reactive to light. Conjunctivae and EOM are normal. Right eye exhibits no discharge. Left eye exhibits no discharge. No scleral icterus.   Neck: Normal range of motion. Neck supple. No JVD present. No tracheal deviation present. No thyromegaly present.   Cardiovascular: Normal rate, regular rhythm, normal heart sounds and intact distal pulses. Exam reveals no gallop and no friction rub.   No murmur heard.  Pulmonary/Chest: Effort normal and breath sounds normal. No stridor. No respiratory distress. He has no wheezes. He has no rales. He exhibits no tenderness.   Abdominal: Soft. Bowel sounds are normal. He exhibits no distension and no  mass. There is no tenderness. There is no rebound and no guarding.   Musculoskeletal: Normal range of motion. He exhibits no edema or tenderness.   Lymphadenopathy:     He has no cervical adenopathy.   Neurological: He is alert and oriented to person, place, and time. He has normal reflexes. He displays normal reflexes. No cranial nerve deficit. He exhibits normal muscle tone. Coordination normal.   Skin: Skin is warm and dry. No rash noted. He is not diaphoretic. No erythema. No pallor.   Psychiatric: He has a normal mood and affect. His behavior is normal. Judgment and thought content normal.   Nursing note and vitals reviewed.      Assessment:       1. Essential hypertension    2. Type 2 diabetes mellitus with stage 3 chronic kidney disease, without long-term current use of insulin    3. Dyslipidemia    4. BPH with urinary obstruction        Plan:         Pedro was seen today for follow-up.    Diagnoses and all orders for this visit:    Essential hypertension  -     metoprolol tartrate (LOPRESSOR) 50 MG tablet; Take 1 tablet (50 mg total) by mouth 2 (two) times daily.    Type 2 diabetes mellitus with stage 3 chronic kidney disease, without long-term current use of insulin    Dyslipidemia  -     atorvastatin (LIPITOR) 10 MG tablet; Take 1 tablet (10 mg total) by mouth every evening.    BPH with urinary obstruction  -     dutasteride (AVODART) 0.5 mg capsule; TAKE 1 CAPSULE (0.5 MG TOTAL) BY MOUTH ONCE DAILY.    Continue monitoring blood pressure at home, low sodium diet.  Continue monitoring blood sugar at home,ADA diet.

## 2020-04-29 RX ORDER — AMLODIPINE BESYLATE AND ATORVASTATIN CALCIUM 10; 10 MG/1; MG/1
TABLET, FILM COATED ORAL
Qty: 90 TABLET | Refills: 3 | Status: SHIPPED | OUTPATIENT
Start: 2020-04-29 | End: 2020-11-11

## 2020-04-30 ENCOUNTER — LAB VISIT (OUTPATIENT)
Dept: LAB | Facility: HOSPITAL | Age: 80
End: 2020-04-30
Attending: PHYSICIAN ASSISTANT
Payer: MEDICARE

## 2020-04-30 ENCOUNTER — TELEPHONE (OUTPATIENT)
Dept: UROLOGY | Facility: CLINIC | Age: 80
End: 2020-04-30

## 2020-04-30 DIAGNOSIS — R39.15 URGENCY OF URINATION: ICD-10-CM

## 2020-04-30 DIAGNOSIS — R35.0 FREQUENCY OF URINATION: Primary | ICD-10-CM

## 2020-04-30 DIAGNOSIS — R35.0 FREQUENCY OF URINATION: ICD-10-CM

## 2020-04-30 PROCEDURE — 87086 URINE CULTURE/COLONY COUNT: CPT

## 2020-04-30 PROCEDURE — 87186 SC STD MICRODIL/AGAR DIL: CPT

## 2020-04-30 PROCEDURE — 87088 URINE BACTERIA CULTURE: CPT

## 2020-04-30 PROCEDURE — 87077 CULTURE AEROBIC IDENTIFY: CPT

## 2020-04-30 NOTE — TELEPHONE ENCOUNTER
----- Message from Muriel Adair LPN sent at 4/30/2020  2:23 PM CDT -----  Contact: Pt  This is another message from the marguerite  ----- Message -----  From: Berhane Alfaro  Sent: 4/30/2020   2:09 PM CDT  To: Stephan JOLLY Staff      The Pt states that he has been waiting all day to speak with Marychuy and would like a call back please.  The Pt states that he was told to f/u if he kept urinating a lot (6 or 7 times at night) and another Rx would be called in.    Phone # [phone] or [phone]

## 2020-05-03 LAB — BACTERIA UR CULT: ABNORMAL

## 2020-05-04 ENCOUNTER — TELEPHONE (OUTPATIENT)
Dept: UROLOGY | Facility: CLINIC | Age: 80
End: 2020-05-04

## 2020-05-04 DIAGNOSIS — A49.9 BACTERIAL UTI: Primary | ICD-10-CM

## 2020-05-04 DIAGNOSIS — N39.0 BACTERIAL UTI: Primary | ICD-10-CM

## 2020-05-04 RX ORDER — DOXYCYCLINE HYCLATE 100 MG
100 TABLET ORAL ONCE
Status: CANCELLED | OUTPATIENT
Start: 2020-05-04 | End: 2020-05-04

## 2020-05-04 RX ORDER — LIDOCAINE HYDROCHLORIDE 20 MG/ML
JELLY TOPICAL ONCE
Status: CANCELLED | OUTPATIENT
Start: 2020-05-04 | End: 2020-05-04

## 2020-05-04 RX ORDER — NITROFURANTOIN 25; 75 MG/1; MG/1
100 CAPSULE ORAL 2 TIMES DAILY
Qty: 14 CAPSULE | Refills: 0 | Status: SHIPPED | OUTPATIENT
Start: 2020-05-04 | End: 2020-05-11

## 2020-05-04 NOTE — TELEPHONE ENCOUNTER
Pt notified of + urine culture results.  Instructions given on how to take medication. Pt states that his insurance may not pay for dutasteride and oxybutynin.  He was given alternatives but cant remember.  He will get the name of the alternative medications today when he picks up his abx.  Recommend cysto given recent uti.  Discussed procedure in detail.  Will schedule when procedures resume.  Renal ultrasound done 1/2020.

## 2020-05-04 NOTE — PROGRESS NOTES
Pt with + urine culture.  Nitrofurantoin given due to pcn allergy.  Left voicemail on mobile for patient.   Crcl 56.4 (per micromedex)

## 2020-05-15 ENCOUNTER — TELEPHONE (OUTPATIENT)
Dept: NEPHROLOGY | Facility: CLINIC | Age: 80
End: 2020-05-15

## 2020-05-15 DIAGNOSIS — N18.30 STAGE 3 CHRONIC KIDNEY DISEASE: Primary | ICD-10-CM

## 2020-06-04 ENCOUNTER — PATIENT OUTREACH (OUTPATIENT)
Dept: ADMINISTRATIVE | Facility: OTHER | Age: 80
End: 2020-06-04

## 2020-06-08 ENCOUNTER — LAB VISIT (OUTPATIENT)
Dept: LAB | Facility: HOSPITAL | Age: 80
End: 2020-06-08
Attending: FAMILY MEDICINE
Payer: MEDICARE

## 2020-06-08 DIAGNOSIS — N18.30 STAGE 3 CHRONIC KIDNEY DISEASE: ICD-10-CM

## 2020-06-08 LAB
25(OH)D3+25(OH)D2 SERPL-MCNC: 34 NG/ML (ref 30–96)
ALBUMIN SERPL BCP-MCNC: 3.9 G/DL (ref 3.5–5.2)
ANION GAP SERPL CALC-SCNC: 7 MMOL/L (ref 8–16)
BUN SERPL-MCNC: 18 MG/DL (ref 8–23)
CALCIUM SERPL-MCNC: 10.6 MG/DL (ref 8.7–10.5)
CHLORIDE SERPL-SCNC: 106 MMOL/L (ref 95–110)
CO2 SERPL-SCNC: 31 MMOL/L (ref 23–29)
CREAT SERPL-MCNC: 1.3 MG/DL (ref 0.5–1.4)
EST. GFR  (AFRICAN AMERICAN): 59.2 ML/MIN/1.73 M^2
EST. GFR  (NON AFRICAN AMERICAN): 51.2 ML/MIN/1.73 M^2
GLUCOSE SERPL-MCNC: 89 MG/DL (ref 70–110)
PHOSPHATE SERPL-MCNC: 2.4 MG/DL (ref 2.7–4.5)
POTASSIUM SERPL-SCNC: 3.2 MMOL/L (ref 3.5–5.1)
PTH-INTACT SERPL-MCNC: 121 PG/ML (ref 9–77)
SODIUM SERPL-SCNC: 144 MMOL/L (ref 136–145)

## 2020-06-08 PROCEDURE — 36415 COLL VENOUS BLD VENIPUNCTURE: CPT | Mod: PO

## 2020-06-08 PROCEDURE — 80069 RENAL FUNCTION PANEL: CPT

## 2020-06-08 PROCEDURE — 83970 ASSAY OF PARATHORMONE: CPT

## 2020-06-08 PROCEDURE — 82306 VITAMIN D 25 HYDROXY: CPT

## 2020-06-09 ENCOUNTER — OFFICE VISIT (OUTPATIENT)
Dept: NEPHROLOGY | Facility: CLINIC | Age: 80
End: 2020-06-09
Payer: MEDICARE

## 2020-06-09 ENCOUNTER — OFFICE VISIT (OUTPATIENT)
Dept: OPHTHALMOLOGY | Facility: CLINIC | Age: 80
End: 2020-06-09
Payer: MEDICARE

## 2020-06-09 VITALS
OXYGEN SATURATION: 98 % | BODY MASS INDEX: 27.67 KG/M2 | HEART RATE: 56 BPM | WEIGHT: 198.44 LBS | DIASTOLIC BLOOD PRESSURE: 90 MMHG | SYSTOLIC BLOOD PRESSURE: 144 MMHG

## 2020-06-09 DIAGNOSIS — H35.033 HYPERTENSIVE RETINOPATHY, BILATERAL: ICD-10-CM

## 2020-06-09 DIAGNOSIS — R80.9 PROTEINURIA, UNSPECIFIED TYPE: ICD-10-CM

## 2020-06-09 DIAGNOSIS — H35.723 RETINAL PIGMENT EPITHELIAL DETACHMENT OF BOTH EYES: ICD-10-CM

## 2020-06-09 DIAGNOSIS — H35.3211 EXUDATIVE AGE-RELATED MACULAR DEGENERATION OF RIGHT EYE WITH ACTIVE CHOROIDAL NEOVASCULARIZATION: Primary | ICD-10-CM

## 2020-06-09 DIAGNOSIS — H43.393 VITREOUS FLOATERS OF BOTH EYES: ICD-10-CM

## 2020-06-09 DIAGNOSIS — N18.30 CKD (CHRONIC KIDNEY DISEASE) STAGE 3, GFR 30-59 ML/MIN: Primary | ICD-10-CM

## 2020-06-09 DIAGNOSIS — I10 HYPERTENSION, UNSPECIFIED TYPE: ICD-10-CM

## 2020-06-09 PROCEDURE — 1101F PR PT FALLS ASSESS DOC 0-1 FALLS W/OUT INJ PAST YR: ICD-10-PCS | Mod: CPTII,S$GLB,, | Performed by: INTERNAL MEDICINE

## 2020-06-09 PROCEDURE — 1101F PT FALLS ASSESS-DOCD LE1/YR: CPT | Mod: CPTII,S$GLB,, | Performed by: INTERNAL MEDICINE

## 2020-06-09 PROCEDURE — 3077F SYST BP >= 140 MM HG: CPT | Mod: CPTII,S$GLB,, | Performed by: INTERNAL MEDICINE

## 2020-06-09 PROCEDURE — 1159F PR MEDICATION LIST DOCUMENTED IN MEDICAL RECORD: ICD-10-PCS | Mod: S$GLB,,, | Performed by: INTERNAL MEDICINE

## 2020-06-09 PROCEDURE — 1159F MED LIST DOCD IN RCRD: CPT | Mod: S$GLB,,, | Performed by: INTERNAL MEDICINE

## 2020-06-09 PROCEDURE — 3080F DIAST BP >= 90 MM HG: CPT | Mod: CPTII,S$GLB,, | Performed by: INTERNAL MEDICINE

## 2020-06-09 PROCEDURE — 92014 PR EYE EXAM, EST PATIENT,COMPREHESV: ICD-10-PCS | Mod: S$GLB,,, | Performed by: OPHTHALMOLOGY

## 2020-06-09 PROCEDURE — 92014 COMPRE OPH EXAM EST PT 1/>: CPT | Mod: S$GLB,,, | Performed by: OPHTHALMOLOGY

## 2020-06-09 PROCEDURE — 99999 PR PBB SHADOW E&M-EST. PATIENT-LVL III: CPT | Mod: PBBFAC,,, | Performed by: INTERNAL MEDICINE

## 2020-06-09 PROCEDURE — 99215 PR OFFICE/OUTPT VISIT, EST, LEVL V, 40-54 MIN: ICD-10-PCS | Mod: S$GLB,,, | Performed by: INTERNAL MEDICINE

## 2020-06-09 PROCEDURE — 99999 PR PBB SHADOW E&M-EST. PATIENT-LVL III: CPT | Mod: PBBFAC,,, | Performed by: OPHTHALMOLOGY

## 2020-06-09 PROCEDURE — 92202 OPSCPY EXTND ON/MAC DRAW: CPT | Mod: S$GLB,,, | Performed by: OPHTHALMOLOGY

## 2020-06-09 PROCEDURE — 92134 POSTERIOR SEGMENT OCT RETINA (OCULAR COHERENCE TOMOGRAPHY)-BOTH EYES: ICD-10-PCS | Mod: S$GLB,,, | Performed by: OPHTHALMOLOGY

## 2020-06-09 PROCEDURE — 99999 PR PBB SHADOW E&M-EST. PATIENT-LVL III: ICD-10-PCS | Mod: PBBFAC,,, | Performed by: INTERNAL MEDICINE

## 2020-06-09 PROCEDURE — 99999 PR PBB SHADOW E&M-EST. PATIENT-LVL III: ICD-10-PCS | Mod: PBBFAC,,, | Performed by: OPHTHALMOLOGY

## 2020-06-09 PROCEDURE — 3077F PR MOST RECENT SYSTOLIC BLOOD PRESSURE >= 140 MM HG: ICD-10-PCS | Mod: CPTII,S$GLB,, | Performed by: INTERNAL MEDICINE

## 2020-06-09 PROCEDURE — 92202 PR OPHTHALMOSCOPY, EXT, W/DRAW OPTIC NERVE/MACULA, I&R, UNI/BI: ICD-10-PCS | Mod: S$GLB,,, | Performed by: OPHTHALMOLOGY

## 2020-06-09 PROCEDURE — 92134 CPTRZ OPH DX IMG PST SGM RTA: CPT | Mod: S$GLB,,, | Performed by: OPHTHALMOLOGY

## 2020-06-09 PROCEDURE — 99215 OFFICE O/P EST HI 40 MIN: CPT | Mod: S$GLB,,, | Performed by: INTERNAL MEDICINE

## 2020-06-09 PROCEDURE — 1126F PR PAIN SEVERITY QUANTIFIED, NO PAIN PRESENT: ICD-10-PCS | Mod: S$GLB,,, | Performed by: INTERNAL MEDICINE

## 2020-06-09 PROCEDURE — 3080F PR MOST RECENT DIASTOLIC BLOOD PRESSURE >= 90 MM HG: ICD-10-PCS | Mod: CPTII,S$GLB,, | Performed by: INTERNAL MEDICINE

## 2020-06-09 PROCEDURE — 1126F AMNT PAIN NOTED NONE PRSNT: CPT | Mod: S$GLB,,, | Performed by: INTERNAL MEDICINE

## 2020-06-09 RX ORDER — OXYBUTYNIN CHLORIDE 10 MG/1
TABLET, EXTENDED RELEASE ORAL
COMMUNITY
Start: 2020-04-23 | End: 2020-09-28 | Stop reason: SDUPTHER

## 2020-06-09 RX ORDER — SPIRONOLACTONE 25 MG/1
25 TABLET ORAL DAILY
Qty: 90 TABLET | Refills: 3 | Status: SHIPPED | OUTPATIENT
Start: 2020-06-09 | End: 2020-11-05

## 2020-06-09 NOTE — PROGRESS NOTES
Subjective:       Patient ID: Pedro Ponce Jr. is a 80 y.o. Black or  male who presents for follow-up evaluation of Chronic Kidney Disease    HPI This is a 80 -year-old -American male with longstanding hypertension, diet-controlled diabetes, proteinuria, bilateral renal cysts is here for f/u of these. Bp's stable in the 130's and bvb016's/80's. Diet controlled DM stable with A1c is 6.0. Creatinine stable at 1.3. Slight proteinuria.  walking regularly.   Denies NSAID's. No recent hopsitalizations.       PAST MEDICAL HISTORY: Type 2 diabetes diet controlled, hypertension for   approximately 20 years, hyperlipidemia, gout, glaucoma, BPH and TURP in   2006 and followed in Urology. He also had questionable cyst versus tumor   removed by Dr. Drew about 17 years ago and this was not malignant per   patient.     Review of Systems   Constitutional: Positive for fatigue. Negative for unexpected weight change.          Lost a few lbs.   Eyes: Negative for discharge.   Respiratory: Negative for cough, shortness of breath and wheezing.    Cardiovascular: Negative for chest pain and palpitations.   Gastrointestinal: Negative for abdominal pain, diarrhea, nausea and vomiting.        Nocturia   Genitourinary: Negative for dysuria, frequency, hematuria and urgency.   Musculoskeletal:        R knee pain.   Skin: Negative for color change and rash.   Psychiatric/Behavioral: Negative for confusion.       Objective:    Full PE not doen>  No edema  Physical Exam   Constitutional: He is oriented to person, place, and time. He appears well-developed and well-nourished.   Cardiovascular: Normal rate and regular rhythm. Exam reveals no friction rub.   No murmur heard.  Pulmonary/Chest: Effort normal and breath sounds normal. No respiratory distress. He has no rales.   Abdominal: Soft. Bowel sounds are normal. There is no tenderness. There is no rebound.   Musculoskeletal: He exhibits no edema.   Neurological:  He is alert and oriented to person, place, and time.   Skin: No rash noted. No erythema.   Psychiatric: He has a normal mood and affect. Judgment normal.       Assessment:       No diagnosis found.    Plan:         This is a 80 -year-old -American male with   longstanding hypertension, diet-controlled diabetes comes in for f/u of bilateral renal cysts.   1. Renal cysts. He does have multiple cysts on bilateral kidneys. 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 He denies any hematuria. Repeat ultrasound.  Followed in urology.  Had cysto/SUDS.  Unclear if it's acquired cystic disease-no family  H/o. US without any  Tumors-stable form last time.  Had nonmalignant tumor removed many years ago in urology.  Last  US in jan 20 stable.cystoscopy a few weeks ago.   2. Hypertension. Blood pressures are slightly higher and recommended even better control . Encouraged excersise and low salt diet.   3. Proteinuria. Slight increase in proteinuria to 800 mg from 300 mg  of proteinuria sec to HTN. On avapro. Rec tighter bp control.  Off HCTZ b/c of previous higher calciums. Start spironolactone as  he is not on a diuretic and potassiums on lower side and to help proteinuria.. RFp in 2 weeks.  4. Anemia. H&H is stable.   5. Renal/CKD stg 2- 3: creatinine is 1.3 and baseline around 1.2-1.4 with GFR of 59 ml/min .  Encouraged him to hydrate well.   Alkalosis stable -hydrate well. Checked  floyd/renin levels.   6. UTI: No recent UTI's.  Followed in urology-seen a few days ago.  Has high wbc's in urine chronically.   Calcium borderline elvated chronically but i ca stable and recent ca stable. spep nml.  off MVI.  PTH stable for his ckd. Vit D stable. If the ca worsen, will need parathyroid scan to evaluate possible primary hyperpara but stable now.   K low-starting spironolactone and asked him to increase K intake for next few days.  F/u in 4-5 months.

## 2020-06-09 NOTE — PROGRESS NOTES
HPI     DLS: 07/09/2019 Dr. Newell    Pt states his vision has been stable since the last visit. Denies flashes,   floaters, diplopia, photophobia, glare, HA's, or pain.    Eye meds:  AT's PRN OU      OCT -SRF resolved, slight increase in temporal IR fluid  OS - stable small flat inferior PED, no SRF      Prior ICG/FA - leakage IT to fovea OD      A/P    1. IPCV OU vs wet amd   With subfoveal PED OD  Avastin resumed 1/14  S/p Avastin OD #8 in past, resumed OD x 6   S/p PDT OD 9/18  OS - PED inferior to fovea stable    2/18 - recurrent swelling OD, IT to fovea  5/18 - increased CME and superior SRF  4/19 - slight increase in IRF, ASx - will follow     2. Floaters OU    3. POAG - Good IOP, continue management as per Dr. Hidalgo  - Needs f/u appt    4. PCIOL OU      4 months OCT No Dilate

## 2020-06-16 ENCOUNTER — OFFICE VISIT (OUTPATIENT)
Dept: FAMILY MEDICINE | Facility: CLINIC | Age: 80
End: 2020-06-16
Payer: MEDICARE

## 2020-06-16 VITALS
DIASTOLIC BLOOD PRESSURE: 78 MMHG | TEMPERATURE: 98 F | WEIGHT: 202.81 LBS | BODY MASS INDEX: 28.29 KG/M2 | HEART RATE: 68 BPM | OXYGEN SATURATION: 96 % | SYSTOLIC BLOOD PRESSURE: 152 MMHG

## 2020-06-16 DIAGNOSIS — I10 ESSENTIAL HYPERTENSION: Primary | ICD-10-CM

## 2020-06-16 DIAGNOSIS — N18.30 TYPE 2 DIABETES MELLITUS WITH STAGE 3 CHRONIC KIDNEY DISEASE, WITHOUT LONG-TERM CURRENT USE OF INSULIN: ICD-10-CM

## 2020-06-16 DIAGNOSIS — E78.5 DYSLIPIDEMIA: ICD-10-CM

## 2020-06-16 DIAGNOSIS — E11.22 TYPE 2 DIABETES MELLITUS WITH STAGE 3 CHRONIC KIDNEY DISEASE, WITHOUT LONG-TERM CURRENT USE OF INSULIN: ICD-10-CM

## 2020-06-16 PROCEDURE — 99999 PR PBB SHADOW E&M-EST. PATIENT-LVL IV: ICD-10-PCS | Mod: PBBFAC,,, | Performed by: FAMILY MEDICINE

## 2020-06-16 PROCEDURE — 3077F PR MOST RECENT SYSTOLIC BLOOD PRESSURE >= 140 MM HG: ICD-10-PCS | Mod: CPTII,S$GLB,, | Performed by: FAMILY MEDICINE

## 2020-06-16 PROCEDURE — 1101F PR PT FALLS ASSESS DOC 0-1 FALLS W/OUT INJ PAST YR: ICD-10-PCS | Mod: CPTII,S$GLB,, | Performed by: FAMILY MEDICINE

## 2020-06-16 PROCEDURE — 99214 OFFICE O/P EST MOD 30 MIN: CPT | Mod: S$GLB,,, | Performed by: FAMILY MEDICINE

## 2020-06-16 PROCEDURE — 3078F PR MOST RECENT DIASTOLIC BLOOD PRESSURE < 80 MM HG: ICD-10-PCS | Mod: CPTII,S$GLB,, | Performed by: FAMILY MEDICINE

## 2020-06-16 PROCEDURE — 99999 PR PBB SHADOW E&M-EST. PATIENT-LVL IV: CPT | Mod: PBBFAC,,, | Performed by: FAMILY MEDICINE

## 2020-06-16 PROCEDURE — 3078F DIAST BP <80 MM HG: CPT | Mod: CPTII,S$GLB,, | Performed by: FAMILY MEDICINE

## 2020-06-16 PROCEDURE — 1126F PR PAIN SEVERITY QUANTIFIED, NO PAIN PRESENT: ICD-10-PCS | Mod: S$GLB,,, | Performed by: FAMILY MEDICINE

## 2020-06-16 PROCEDURE — 1101F PT FALLS ASSESS-DOCD LE1/YR: CPT | Mod: CPTII,S$GLB,, | Performed by: FAMILY MEDICINE

## 2020-06-16 PROCEDURE — 1126F AMNT PAIN NOTED NONE PRSNT: CPT | Mod: S$GLB,,, | Performed by: FAMILY MEDICINE

## 2020-06-16 PROCEDURE — 99214 PR OFFICE/OUTPT VISIT, EST, LEVL IV, 30-39 MIN: ICD-10-PCS | Mod: S$GLB,,, | Performed by: FAMILY MEDICINE

## 2020-06-16 PROCEDURE — 1159F MED LIST DOCD IN RCRD: CPT | Mod: S$GLB,,, | Performed by: FAMILY MEDICINE

## 2020-06-16 PROCEDURE — 1159F PR MEDICATION LIST DOCUMENTED IN MEDICAL RECORD: ICD-10-PCS | Mod: S$GLB,,, | Performed by: FAMILY MEDICINE

## 2020-06-16 PROCEDURE — 3077F SYST BP >= 140 MM HG: CPT | Mod: CPTII,S$GLB,, | Performed by: FAMILY MEDICINE

## 2020-06-16 NOTE — PROGRESS NOTES
Subjective:       Patient ID: Pedro Ponce Jr. is a 80 y.o. male.    Chief Complaint: Hypertension    80 years old male came to the clinic for diabetes follow-up.  Last A1c was stable .  No polyuria, polydipsia or polyphagia.  Blood pressure today was slightly elevated.  No chest pain, palpitation, orthopnea or PND.    Review of Systems   Constitutional: Negative.    HENT: Negative.    Eyes: Negative.    Respiratory: Negative.    Cardiovascular: Negative.  Negative for chest pain, palpitations, leg swelling and claudication.   Gastrointestinal: Negative.    Endocrine: Negative for polydipsia, polyphagia and polyuria.   Genitourinary: Negative.    Musculoskeletal: Negative.    Integumentary:  Negative.   Neurological: Negative.    Psychiatric/Behavioral: Negative.          Objective:      Physical Exam  Vitals signs and nursing note reviewed.   Constitutional:       General: He is not in acute distress.     Appearance: He is well-developed. He is not diaphoretic.   HENT:      Head: Normocephalic and atraumatic.      Right Ear: External ear normal.      Left Ear: External ear normal.      Nose: Nose normal.      Mouth/Throat:      Pharynx: No oropharyngeal exudate.   Eyes:      General: No scleral icterus.        Right eye: No discharge.         Left eye: No discharge.      Conjunctiva/sclera: Conjunctivae normal.      Pupils: Pupils are equal, round, and reactive to light.   Neck:      Musculoskeletal: Normal range of motion and neck supple.      Thyroid: No thyromegaly.      Vascular: No JVD.      Trachea: No tracheal deviation.   Cardiovascular:      Rate and Rhythm: Normal rate and regular rhythm.      Heart sounds: Normal heart sounds. No murmur. No friction rub. No gallop.    Pulmonary:      Effort: Pulmonary effort is normal. No respiratory distress.      Breath sounds: Normal breath sounds. No stridor. No wheezing or rales.   Chest:      Chest wall: No tenderness.   Abdominal:      General: Bowel sounds  are normal. There is no distension.      Palpations: Abdomen is soft. There is no mass.      Tenderness: There is no abdominal tenderness. There is no guarding or rebound.   Musculoskeletal: Normal range of motion.         General: No tenderness.   Lymphadenopathy:      Cervical: No cervical adenopathy.   Skin:     General: Skin is warm and dry.      Coloration: Skin is not pale.      Findings: No erythema or rash.   Neurological:      Mental Status: He is alert and oriented to person, place, and time.      Cranial Nerves: No cranial nerve deficit.      Motor: No abnormal muscle tone.      Coordination: Coordination normal.      Deep Tendon Reflexes: Reflexes are normal and symmetric. Reflexes normal.   Psychiatric:         Behavior: Behavior normal.         Thought Content: Thought content normal.         Judgment: Judgment normal.         Assessment:       1. Essential hypertension    2. Type 2 diabetes mellitus with stage 3 chronic kidney disease, without long-term current use of insulin    3. Dyslipidemia        Plan:         Pedro was seen today for hypertension.    Diagnoses and all orders for this visit:    Essential hypertension  -     Lipid Panel; Future    Type 2 diabetes mellitus with stage 3 chronic kidney disease, without long-term current use of insulin  -     Hemoglobin A1C; Future  -     Lipid Panel; Future    Dyslipidemia  -     Lipid Panel; Future    Continue monitoring blood pressure at home, low sodium diet.  Continue monitoring blood sugar at home,ADA diet.    Monitor blood pressure for 1 week and call us with the blood pressure log.  Continue with same regimen right now.

## 2020-06-16 NOTE — LETTER
June 16, 2020      John Barker MD  [address]  [address]  [facility] [address]           [address] [address]  Phone: [phone]  Fax: [phone]          Patient: Pedro Ponce Jr.   MR Number: [id number]   YOB: 1940   Date of Visit: 6/16/2020       Dear Dr. John Barker:    Thank you for referring Pedro Ponce to me for evaluation. Attached you will find relevant portions of my assessment and plan of care.    If you have questions, please do not hesitate to call me. I look forward to following Pedro Ponce along with you.    Sincerely,    John Ricci MD    Enclosure  CC:  No Recipients    If you would like to receive this communication electronically, please contact [email or web address] or [phone] to request more information on [facility] Link access.    For providers and/or their staff who would like to refer a patient to [facility], please contact us through our one-stop-shop provider referral line, [facility], at [phone].    If you feel you have received this communication in error or would no longer like to receive these types of communications, please e-mail [email or web address]

## 2020-06-23 ENCOUNTER — LAB VISIT (OUTPATIENT)
Dept: LAB | Facility: HOSPITAL | Age: 80
End: 2020-06-23
Attending: FAMILY MEDICINE
Payer: MEDICARE

## 2020-06-23 DIAGNOSIS — R80.9 PROTEINURIA, UNSPECIFIED TYPE: ICD-10-CM

## 2020-06-23 DIAGNOSIS — N18.30 TYPE 2 DIABETES MELLITUS WITH STAGE 3 CHRONIC KIDNEY DISEASE, WITHOUT LONG-TERM CURRENT USE OF INSULIN: ICD-10-CM

## 2020-06-23 DIAGNOSIS — E78.5 DYSLIPIDEMIA: ICD-10-CM

## 2020-06-23 DIAGNOSIS — N18.30 CKD (CHRONIC KIDNEY DISEASE) STAGE 3, GFR 30-59 ML/MIN: ICD-10-CM

## 2020-06-23 DIAGNOSIS — I10 ESSENTIAL HYPERTENSION: ICD-10-CM

## 2020-06-23 DIAGNOSIS — I10 HYPERTENSION, UNSPECIFIED TYPE: ICD-10-CM

## 2020-06-23 DIAGNOSIS — E11.22 TYPE 2 DIABETES MELLITUS WITH STAGE 3 CHRONIC KIDNEY DISEASE, WITHOUT LONG-TERM CURRENT USE OF INSULIN: ICD-10-CM

## 2020-06-23 LAB
ALBUMIN SERPL BCP-MCNC: 3.9 G/DL (ref 3.5–5.2)
ANION GAP SERPL CALC-SCNC: 8 MMOL/L (ref 8–16)
BUN SERPL-MCNC: 21 MG/DL (ref 8–23)
CALCIUM SERPL-MCNC: 10.5 MG/DL (ref 8.7–10.5)
CHLORIDE SERPL-SCNC: 103 MMOL/L (ref 95–110)
CHOLEST SERPL-MCNC: 136 MG/DL (ref 120–199)
CHOLEST/HDLC SERPL: 2.7 {RATIO} (ref 2–5)
CO2 SERPL-SCNC: 28 MMOL/L (ref 23–29)
CREAT SERPL-MCNC: 1.5 MG/DL (ref 0.5–1.4)
EST. GFR  (AFRICAN AMERICAN): 49.8 ML/MIN/1.73 M^2
EST. GFR  (NON AFRICAN AMERICAN): 43 ML/MIN/1.73 M^2
ESTIMATED AVG GLUCOSE: 128 MG/DL (ref 68–131)
GLUCOSE SERPL-MCNC: 108 MG/DL (ref 70–110)
HBA1C MFR BLD HPLC: 6.1 % (ref 4–5.6)
HDLC SERPL-MCNC: 51 MG/DL (ref 40–75)
HDLC SERPL: 37.5 % (ref 20–50)
LDLC SERPL CALC-MCNC: 72.8 MG/DL (ref 63–159)
NONHDLC SERPL-MCNC: 85 MG/DL
PHOSPHATE SERPL-MCNC: 2.8 MG/DL (ref 2.7–4.5)
POTASSIUM SERPL-SCNC: 3.7 MMOL/L (ref 3.5–5.1)
SODIUM SERPL-SCNC: 139 MMOL/L (ref 136–145)
TRIGL SERPL-MCNC: 61 MG/DL (ref 30–150)

## 2020-06-23 PROCEDURE — 36415 COLL VENOUS BLD VENIPUNCTURE: CPT | Mod: PO

## 2020-06-23 PROCEDURE — 80069 RENAL FUNCTION PANEL: CPT

## 2020-06-23 PROCEDURE — 83036 HEMOGLOBIN GLYCOSYLATED A1C: CPT

## 2020-06-23 PROCEDURE — 80061 LIPID PANEL: CPT

## 2020-06-24 ENCOUNTER — TELEPHONE (OUTPATIENT)
Dept: NEPHROLOGY | Facility: CLINIC | Age: 80
End: 2020-06-24

## 2020-06-24 NOTE — TELEPHONE ENCOUNTER
Preferred Name:   Bryan Ponce Jr.  Male, 80 y.o., 1940  Phone:   [phone] (M)  PCP:   John Ricci MD  Language:   English  Need Interp:   No  Allergies Last Reviewed:   06/16/20  Allergies:   Pcn [Penicillins]  Health Maintenance:   Due  Primary Ins.:   [employer] MANAGED MEDICARE  MRN:   [id number]  Pt Comm Pref:   Patient Portal, Mail  Next Appt:   With Family Medicine  09/23/2020 at 9:00 AM  My Sticky Note:     Specialty Comments:     Patient Calls    Lawanda Walker DO routed conversation to Aaron Webber Staff 4 hours ago (9:02 AM)      Lawanda Walker DO 4 hours ago (9:02 AM)        Please ask him to hydrate well.  Labs stable overall. creatinine slightly higher at 1.5 from 1.4 last time.         Documentation           Recent Patient Communication     Last Update Reason Specialty     Today -- Nephrology     [facility] Lawanda Walker Closed     2 weeks ago -- Nephrology     [facility] Lawanda Walker Closed     1 month ago -- Nephrology     [facility] Natalie June Closed

## 2020-06-24 NOTE — TELEPHONE ENCOUNTER
Please ask him to hydrate well.  Labs stable overall. creatinine slightly higher at 1.5 from 1.4 last time.

## 2020-09-09 ENCOUNTER — PATIENT OUTREACH (OUTPATIENT)
Dept: ADMINISTRATIVE | Facility: HOSPITAL | Age: 80
End: 2020-09-09

## 2020-09-09 NOTE — PROGRESS NOTES
Chart audit performed - Outreach regarding  states has not seen podiatrist in a while. Patient wants to see Dr. Barker before making an appointment

## 2020-09-24 ENCOUNTER — TELEPHONE (OUTPATIENT)
Dept: NEPHROLOGY | Facility: CLINIC | Age: 80
End: 2020-09-24

## 2020-09-27 ENCOUNTER — PATIENT OUTREACH (OUTPATIENT)
Dept: ADMINISTRATIVE | Facility: OTHER | Age: 80
End: 2020-09-27

## 2020-09-27 NOTE — PROGRESS NOTES
LINKS immunization registry updated  Care Everywhere updated  Health Maintenance updated  Chart reviewed for overdue Proactive [facility] Encounters ([facility]) health maintenance testing (CRS, Breast Ca, Diabetic Eye Exam)   Orders entered:N/A

## 2020-09-28 ENCOUNTER — OFFICE VISIT (OUTPATIENT)
Dept: UROLOGY | Facility: CLINIC | Age: 80
End: 2020-09-28
Payer: MEDICARE

## 2020-09-28 VITALS
HEIGHT: 71 IN | HEART RATE: 45 BPM | BODY MASS INDEX: 29.12 KG/M2 | SYSTOLIC BLOOD PRESSURE: 174 MMHG | DIASTOLIC BLOOD PRESSURE: 88 MMHG | WEIGHT: 208 LBS

## 2020-09-28 DIAGNOSIS — N13.8 BPH WITH URINARY OBSTRUCTION: Primary | ICD-10-CM

## 2020-09-28 DIAGNOSIS — R10.32 INTERMITTENT LEFT LOWER QUADRANT ABDOMINAL PAIN: ICD-10-CM

## 2020-09-28 DIAGNOSIS — R35.0 URINARY FREQUENCY: ICD-10-CM

## 2020-09-28 DIAGNOSIS — N39.0 BACTERIAL UTI: ICD-10-CM

## 2020-09-28 DIAGNOSIS — N40.1 BPH WITH URINARY OBSTRUCTION: Primary | ICD-10-CM

## 2020-09-28 DIAGNOSIS — A49.9 BACTERIAL UTI: ICD-10-CM

## 2020-09-28 LAB — POC RESIDUAL URINE VOLUME: 0 ML (ref 0–100)

## 2020-09-28 PROCEDURE — 99214 OFFICE O/P EST MOD 30 MIN: CPT | Mod: S$GLB,,, | Performed by: NURSE PRACTITIONER

## 2020-09-28 PROCEDURE — 51798 US URINE CAPACITY MEASURE: CPT | Mod: S$GLB,,, | Performed by: NURSE PRACTITIONER

## 2020-09-28 PROCEDURE — 99999 PR PBB SHADOW E&M-EST. PATIENT-LVL IV: ICD-10-PCS | Mod: PBBFAC,,, | Performed by: NURSE PRACTITIONER

## 2020-09-28 PROCEDURE — 1159F PR MEDICATION LIST DOCUMENTED IN MEDICAL RECORD: ICD-10-PCS | Mod: S$GLB,,, | Performed by: NURSE PRACTITIONER

## 2020-09-28 PROCEDURE — 99999 PR PBB SHADOW E&M-EST. PATIENT-LVL IV: CPT | Mod: PBBFAC,,, | Performed by: NURSE PRACTITIONER

## 2020-09-28 PROCEDURE — 1101F PR PT FALLS ASSESS DOC 0-1 FALLS W/OUT INJ PAST YR: ICD-10-PCS | Mod: CPTII,S$GLB,, | Performed by: NURSE PRACTITIONER

## 2020-09-28 PROCEDURE — 3079F PR MOST RECENT DIASTOLIC BLOOD PRESSURE 80-89 MM HG: ICD-10-PCS | Mod: CPTII,S$GLB,, | Performed by: NURSE PRACTITIONER

## 2020-09-28 PROCEDURE — 3079F DIAST BP 80-89 MM HG: CPT | Mod: CPTII,S$GLB,, | Performed by: NURSE PRACTITIONER

## 2020-09-28 PROCEDURE — 1101F PT FALLS ASSESS-DOCD LE1/YR: CPT | Mod: CPTII,S$GLB,, | Performed by: NURSE PRACTITIONER

## 2020-09-28 PROCEDURE — 1159F MED LIST DOCD IN RCRD: CPT | Mod: S$GLB,,, | Performed by: NURSE PRACTITIONER

## 2020-09-28 PROCEDURE — 99214 PR OFFICE/OUTPT VISIT, EST, LEVL IV, 30-39 MIN: ICD-10-PCS | Mod: S$GLB,,, | Performed by: NURSE PRACTITIONER

## 2020-09-28 PROCEDURE — 1126F PR PAIN SEVERITY QUANTIFIED, NO PAIN PRESENT: ICD-10-PCS | Mod: S$GLB,,, | Performed by: NURSE PRACTITIONER

## 2020-09-28 PROCEDURE — 3077F SYST BP >= 140 MM HG: CPT | Mod: CPTII,S$GLB,, | Performed by: NURSE PRACTITIONER

## 2020-09-28 PROCEDURE — 51798 POCT BLADDER SCAN: ICD-10-PCS | Mod: S$GLB,,, | Performed by: NURSE PRACTITIONER

## 2020-09-28 PROCEDURE — 3077F PR MOST RECENT SYSTOLIC BLOOD PRESSURE >= 140 MM HG: ICD-10-PCS | Mod: CPTII,S$GLB,, | Performed by: NURSE PRACTITIONER

## 2020-09-28 PROCEDURE — 1126F AMNT PAIN NOTED NONE PRSNT: CPT | Mod: S$GLB,,, | Performed by: NURSE PRACTITIONER

## 2020-09-28 RX ORDER — DUTASTERIDE 0.5 MG/1
CAPSULE, LIQUID FILLED ORAL
Qty: 90 CAPSULE | Refills: 3 | Status: SHIPPED | OUTPATIENT
Start: 2020-09-28 | End: 2021-11-03

## 2020-09-28 RX ORDER — TAMSULOSIN HYDROCHLORIDE 0.4 MG/1
0.4 CAPSULE ORAL NIGHTLY
Qty: 90 CAPSULE | Refills: 3 | Status: SHIPPED | OUTPATIENT
Start: 2020-09-28 | End: 2021-11-29

## 2020-09-28 RX ORDER — OXYBUTYNIN CHLORIDE 10 MG/1
10 TABLET, EXTENDED RELEASE ORAL DAILY
Qty: 90 TABLET | Refills: 3 | Status: SHIPPED | OUTPATIENT
Start: 2020-09-28 | End: 2021-11-03

## 2020-09-28 NOTE — PROGRESS NOTES
CHIEF COMPLAINT:    Pedro Ponce Jr. is a 80 y.o. male presents for BPH and 6 month f/u visit.     HISTORY OF PRESENTING ILLINESS:    Pedro Ponce Jr. is a 80 y.o. male with a history of BPH and UTI.  S/p cystoscopy with Dr. Bahena 05/25/2020 2/2 UTI.  He taking Avodart and Flomax for BPH.  Started Oxybutynin 10mg xl last year.    He is here today for 6 month f/u visit.  He states has been doing well since Cysto.  FOS is good; nocturia varies but ok with how things are.    He occassionally feels swollen area to left lower abdomin.             REVIEW OF SYSTEMS:  Review of Systems   Constitutional: Negative.  Negative for chills, diaphoresis and fever.   HENT: Negative for congestion and sore throat.    Eyes: Negative.  Negative for double vision.   Respiratory: Negative.  Negative for cough, shortness of breath and wheezing.    Cardiovascular: Negative.  Negative for chest pain, palpitations and leg swelling.   Gastrointestinal: Positive for abdominal pain and constipation. Negative for blood in stool, diarrhea, nausea and vomiting.        Some tenderness at time to lower left abdomen; feels swollen.  Takes meds PRN for constipation.     Genitourinary: Positive for frequency. Negative for dysuria, flank pain and hematuria.   Musculoskeletal: Negative.  Negative for back pain, falls and neck pain.   Skin: Negative.  Negative for rash.   Neurological: Negative.  Negative for dizziness and seizures.   Endo/Heme/Allergies: Does not bruise/bleed easily.   Psychiatric/Behavioral: Negative.  Negative for depression. The patient is not nervous/anxious.          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       Past Surgical History:   Procedure Laterality Date    COLONOSCOPY N/A 4/9/2018    Procedure: COLONOSCOPY;  Surgeon: HAIM Mir MD;  Location: [facility] ([address] [place] [address]);  Service: Endoscopy;  Laterality: N/A;    COLONOSCOPY N/A 1/14/2019    Procedure: COLONOSCOPY;  Surgeon: HAMI Mir MD;  Location: [facility] ([address] [place] [address]);  Service: Endoscopy;  Laterality: N/A;  PM prep    CYST REMOVAL  2000    removed from left kidney    KIDNEY SURGERY      PROSTATE SURGERY         Family History   Problem Relation Age of Onset    Cancer Mother         unkn ca    Diabetes Mother     Glaucoma Mother     Heart disease Mother     Hypertension Mother     Blindness Neg Hx     Macular degeneration Neg Hx     Retinal detachment Neg Hx        Social History     Socioeconomic History    Marital status:      Spouse name: Not on file    Number of children: Not on file    Years of education: Not on file    Highest education level: Not on file   Occupational History    Not on file   Social Needs    Financial resource strain: Not on file    Food insecurity     Worry: Not on file     Inability: Not on file    Transportation needs     Medical: Not on file     Non-medical: Not on file   Tobacco Use    Smoking status: Never Smoker    Smokeless tobacco: Never Used   Substance and Sexual Activity    Alcohol use: No    Drug use: No    Sexual activity: Not on file   Lifestyle    Physical activity     Days per week: Not on file     Minutes per session: Not on file    Stress: Not on file   Relationships    Social connections     Talks on phone: Not on file     Gets together: Not on file     Attends [employer] service: Not on file     Active member of club or organization: Not on file     Attends meetings of clubs or organizations: Not on file     Relationship status: Not on file   Other Topics Concern    Not on file   Social History Narrative    Not on file       Allergies:  Pcn  [penicillins]    Medications:    Current Outpatient Medications:     amlodipine-atorvastatin (CADUET) 10-10 mg per tablet, TAKE 1 TABLET BY MOUTH EVERY DAY, Disp: 90 tablet, Rfl: 3    aspirin 81 mg Tab, Take 81 mg by mouth. 1 Tablet Oral Every evening, Disp: , Rfl:     dutasteride (AVODART) 0.5 mg capsule, TAKE 1 CAPSULE (0.5 MG TOTAL) BY MOUTH ONCE DAILY., Disp: 90 capsule, Rfl: 3    fish oil-omega-3 fatty acids 300-1,000 mg capsule, Take 1 g by mouth once daily.  , Disp: , Rfl:     flu vacc [id number],65yr up,PF (FLUZONE HIGH-DOSE 2019-20, PF,) 180 mcg/0.5 mL Syrg, uud (Patient not taking: Reported on 6/9/2020), Disp: 0.5 mL, Rfl: 0    flu vacc [id number],65yr up,PF (FLUZONE HIGH-DOSE 2019-20, PF,) 180 mcg/0.5 mL Syrg, admin as directed (Patient not taking: Reported on 6/9/2020), Disp: 0.5 mL, Rfl: 0    irbesartan (AVAPRO) 300 MG tablet, TAKE 1 TABLET BY MOUTH EVERY DAY, Disp: 90 tablet, Rfl: 3    metoprolol tartrate (LOPRESSOR) 50 MG tablet, Take 1 tablet (50 mg total) by mouth 2 (two) times daily., Disp: 180 tablet, Rfl: 3    multivitamin-minerals-lutein (CENTRUM SILVER) Tab, Take by mouth. 1 Tablet Oral Every day, Disp: , Rfl:     oxybutynin (DITROPAN-XL) 10 MG 24 hr tablet, , Disp: , Rfl:     spironolactone (ALDACTONE) 25 MG tablet, Take 1 tablet (25 mg total) by mouth once daily., Disp: 90 tablet, Rfl: 3    tamsulosin (FLOMAX) 0.4 mg Cap, Take 1 capsule (0.4 mg total) by mouth every evening., Disp: 90 capsule, Rfl: 3    varicella-zoster [id number], PF, (SHINGRIX, PF,) 50 mcg/0.5 mL injection, Inject into the muscle. (Patient not taking: Reported on 6/9/2020), Disp: 0.5 mL, Rfl: 1    PHYSICAL EXAMINATION:  Physical Exam  Vitals signs and nursing note reviewed.   Constitutional:       General: He is awake.      Appearance: Normal appearance. He is normal weight.   HENT:      Head: Normocephalic.      Right Ear: External ear normal.      Left Ear: External ear normal.      Nose: Nose normal.   Eyes:       Conjunctiva/sclera: Conjunctivae normal.   Neck:      Musculoskeletal: Normal range of motion.   Cardiovascular:      Rate and Rhythm: Normal rate.   Pulmonary:      Effort: Pulmonary effort is normal. No respiratory distress.   Abdominal:      General: There is no distension.      Tenderness: There is no abdominal tenderness. There is no right CVA tenderness, left CVA tenderness or rebound.       Genitourinary:     Penis: Normal.       Scrotum/Testes: Normal.      Prostate: Enlarged. Not tender.      Rectum: Normal.   Musculoskeletal: Normal range of motion.   Skin:     General: Skin is warm and dry.   Neurological:      General: No focal deficit present.      Mental Status: He is alert and oriented to person, place, and time.   Psychiatric:         Mood and Affect: Mood normal.         Behavior: Behavior is cooperative.           LABS:    He urinated in the lobby;    Bladder Scan in the office today by the nurse was 0.      Lab Results   Component Value Date    PSA 0.62 12/11/2018    PSA 0.77 08/17/2015    PSA 0.77 07/15/2013             IMPRESSION:    Encounter Diagnoses   Name Primary?    BPH with urinary obstruction Yes    Bacterial UTI     Intermittent left lower quadrant abdominal pain     Urinary frequency          Assessment:       1. BPH with urinary obstruction    2. Bacterial UTI    3. Intermittent left lower quadrant abdominal pain    4. Urinary frequency        Plan:         I spent 25 minutes with the patient of which more than half was spent in direct consultation with the patient in regards to our treatment and plan.    Education and recommendations of today's plan of care including home remedies.  We discussed his LUTS and contributory factors.  Continue flomax, Avodart, Oxybutynin.  Reduce the PM fluids.  Will get lower ab US; r/o hernia.  Stool softener daily/PRN for constipation.

## 2020-10-07 ENCOUNTER — OFFICE VISIT (OUTPATIENT)
Dept: FAMILY MEDICINE | Facility: CLINIC | Age: 80
End: 2020-10-07
Payer: MEDICARE

## 2020-10-07 VITALS
HEIGHT: 71 IN | DIASTOLIC BLOOD PRESSURE: 90 MMHG | TEMPERATURE: 97 F | SYSTOLIC BLOOD PRESSURE: 170 MMHG | BODY MASS INDEX: 28.73 KG/M2 | WEIGHT: 205.25 LBS | HEART RATE: 55 BPM | OXYGEN SATURATION: 96 %

## 2020-10-07 DIAGNOSIS — I10 ESSENTIAL HYPERTENSION: Primary | ICD-10-CM

## 2020-10-07 DIAGNOSIS — Z23 NEEDS FLU SHOT: ICD-10-CM

## 2020-10-07 DIAGNOSIS — J30.1 SEASONAL ALLERGIC RHINITIS DUE TO POLLEN: ICD-10-CM

## 2020-10-07 DIAGNOSIS — E11.65 TYPE 2 DIABETES MELLITUS WITH HYPERGLYCEMIA, WITHOUT LONG-TERM CURRENT USE OF INSULIN: ICD-10-CM

## 2020-10-07 DIAGNOSIS — N18.31 STAGE 3A CHRONIC KIDNEY DISEASE: ICD-10-CM

## 2020-10-07 PROCEDURE — 1101F PT FALLS ASSESS-DOCD LE1/YR: CPT | Mod: CPTII,S$GLB,, | Performed by: FAMILY MEDICINE

## 2020-10-07 PROCEDURE — 99214 OFFICE O/P EST MOD 30 MIN: CPT | Mod: S$GLB,,, | Performed by: FAMILY MEDICINE

## 2020-10-07 PROCEDURE — 1159F PR MEDICATION LIST DOCUMENTED IN MEDICAL RECORD: ICD-10-PCS | Mod: S$GLB,,, | Performed by: FAMILY MEDICINE

## 2020-10-07 PROCEDURE — 3077F SYST BP >= 140 MM HG: CPT | Mod: CPTII,S$GLB,, | Performed by: FAMILY MEDICINE

## 2020-10-07 PROCEDURE — 1101F PR PT FALLS ASSESS DOC 0-1 FALLS W/OUT INJ PAST YR: ICD-10-PCS | Mod: CPTII,S$GLB,, | Performed by: FAMILY MEDICINE

## 2020-10-07 PROCEDURE — 3078F DIAST BP <80 MM HG: CPT | Mod: CPTII,S$GLB,, | Performed by: FAMILY MEDICINE

## 2020-10-07 PROCEDURE — 99999 PR PBB SHADOW E&M-EST. PATIENT-LVL V: CPT | Mod: PBBFAC,,, | Performed by: FAMILY MEDICINE

## 2020-10-07 PROCEDURE — 3078F PR MOST RECENT DIASTOLIC BLOOD PRESSURE < 80 MM HG: ICD-10-PCS | Mod: CPTII,S$GLB,, | Performed by: FAMILY MEDICINE

## 2020-10-07 PROCEDURE — 99214 PR OFFICE/OUTPT VISIT, EST, LEVL IV, 30-39 MIN: ICD-10-PCS | Mod: S$GLB,,, | Performed by: FAMILY MEDICINE

## 2020-10-07 PROCEDURE — 1159F MED LIST DOCD IN RCRD: CPT | Mod: S$GLB,,, | Performed by: FAMILY MEDICINE

## 2020-10-07 PROCEDURE — 99999 PR PBB SHADOW E&M-EST. PATIENT-LVL V: ICD-10-PCS | Mod: PBBFAC,,, | Performed by: FAMILY MEDICINE

## 2020-10-07 PROCEDURE — 3077F PR MOST RECENT SYSTOLIC BLOOD PRESSURE >= 140 MM HG: ICD-10-PCS | Mod: CPTII,S$GLB,, | Performed by: FAMILY MEDICINE

## 2020-10-07 RX ORDER — ATORVASTATIN CALCIUM 10 MG/1
10 TABLET, FILM COATED ORAL NIGHTLY
COMMUNITY
Start: 2020-09-05 | End: 2020-12-22

## 2020-10-07 RX ORDER — CHLORHEXIDINE GLUCONATE ORAL RINSE 1.2 MG/ML
SOLUTION DENTAL
COMMUNITY
Start: 2020-09-16 | End: 2021-11-01

## 2020-10-07 RX ORDER — INFLUENZA A VIRUS A/MICHIGAN/45/2015 X-275 (H1N1) ANTIGEN (FORMALDEHYDE INACTIVATED), INFLUENZA A VIRUS A/SINGAPORE/INFIMH-16-0019/2016 IVR-186 (H3N2) ANTIGEN (FORMALDEHYDE INACTIVATED), INFLUENZA B VIRUS B/PHUKET/3073/2013 ANTIGEN (FORMALDEHYDE INACTIVATED), AND INFLUENZA B VIRUS B/MARYLAND/15/2016 BX-69A ANTIGEN (FORMALDEHYDE INACTIVATED) 60; 60; 60; 60 UG/.7ML; UG/.7ML; UG/.7ML; UG/.7ML
INJECTION, SUSPENSION INTRAMUSCULAR
Status: ON HOLD | COMMUNITY
Start: 2020-08-22 | End: 2020-12-06

## 2020-10-07 NOTE — PROGRESS NOTES
Subjective:       Patient ID: Pedro Ponce Jr. is a 80 y.o. male.    Chief Complaint: Follow-up, Hypertension, and Cough (x 3 days)    [age] years old male came to the clinic for blood pressure check.  Blood pressure today is elevated.  No chest pain, palpitation, orthopnea or PND.  Patient sometimes with a associated with seasonal allergies.  Patient is taking a allergy medicine with significant improvement.  Patient due for his flu shot.  Patient with decreased kidney function but stable in comparison with previous reports .    Review of Systems   Constitutional: Negative.    HENT: Positive for rhinorrhea.    Eyes: Negative.    Respiratory: Positive for cough.    Cardiovascular: Negative.  Negative for chest pain, palpitations, leg swelling and claudication.   Gastrointestinal: Negative.    Genitourinary: Negative.    Musculoskeletal: Negative.    Integumentary:  Negative.   Neurological: Negative.    Psychiatric/Behavioral: Negative.          Objective:      Physical Exam  Vitals signs and nursing note reviewed.   Constitutional:       General: He is not in acute distress.     Appearance: He is well-developed. He is not diaphoretic.   HENT:      Head: Normocephalic and atraumatic.      Right Ear: External ear normal.      Left Ear: External ear normal.      Nose: Nose normal.      Mouth/Throat:      Pharynx: No oropharyngeal exudate.   Eyes:      General: No scleral icterus.        Right eye: No discharge.         Left eye: No discharge.      Conjunctiva/sclera: Conjunctivae normal.      Pupils: Pupils are equal, round, and reactive to light.   Neck:      Musculoskeletal: Normal range of motion and neck supple.      Thyroid: No thyromegaly.      Vascular: No JVD.      Trachea: No tracheal deviation.   Cardiovascular:      Rate and Rhythm: Normal rate and regular rhythm.      Heart sounds: Normal heart sounds. No murmur. No friction rub. No gallop.    Pulmonary:      Effort: Pulmonary effort is normal.  No respiratory distress.      Breath sounds: Normal breath sounds. No stridor. No wheezing or rales.   Chest:      Chest wall: No tenderness.   Abdominal:      General: Bowel sounds are normal. There is no distension.      Palpations: Abdomen is soft. There is no mass.      Tenderness: There is no abdominal tenderness. There is no guarding or rebound.   Musculoskeletal: Normal range of motion.         General: No tenderness.   Feet:      Right foot:      Protective Sensation: 10 sites tested. 10 sites sensed.      Skin integrity: Dry skin present. No ulcer, blister, skin breakdown, erythema, warmth, callus or fissure.      Left foot:      Protective Sensation: 10 sites tested. 10 sites sensed.      Skin integrity: Dry skin present. No ulcer, blister, skin breakdown, erythema, warmth, callus or fissure.   Lymphadenopathy:      Cervical: No cervical adenopathy.   Skin:     General: Skin is warm and dry.      Coloration: Skin is not pale.      Findings: No erythema or rash.   Neurological:      Mental Status: He is alert and oriented to person, place, and time.      Cranial Nerves: No cranial nerve deficit.      Motor: No abnormal muscle tone.      Coordination: Coordination normal.      Deep Tendon Reflexes: Reflexes are normal and symmetric. Reflexes normal.   Psychiatric:         Behavior: Behavior normal.         Thought Content: Thought content normal.         Judgment: Judgment normal.         Assessment:       1. Essential hypertension    2. Seasonal allergic rhinitis due to pollen    3. Needs flu shot    4. Type 2 diabetes mellitus with hyperglycemia, without long-term current use of insulin    5. Stage 3a chronic kidney disease        Plan:           Pedro was seen today for follow-up, hypertension and cough.    Diagnoses and all orders for this visit:    Essential hypertension  -     Comprehensive Metabolic Panel; Future  -     Lipid Panel; Future    Seasonal allergic rhinitis due to pollen    Needs flu  shot    Type 2 diabetes mellitus with hyperglycemia, without long-term current use of insulin  -     Hemoglobin A1C; Future  -     Comprehensive Metabolic Panel; Future    Stage 3a chronic kidney disease    Continue monitoring blood pressure at home, low sodium diet.    Continue monitoring blood sugar at home,ADA diet.    Monitor blood pressure daily.  If blood pressure is more than 150/90 persistently call back to the clinic for adjustment of the blood pressure regimen.    Come back to the clinic in 1 week for blood pressure check.

## 2020-10-10 ENCOUNTER — HOSPITAL ENCOUNTER (EMERGENCY)
Facility: HOSPITAL | Age: 80
Discharge: HOME | End: 2020-10-10
Attending: EMERGENCY MEDICINE
Payer: MEDICARE

## 2020-10-10 VITALS
OXYGEN SATURATION: 96 % | WEIGHT: 208 LBS | SYSTOLIC BLOOD PRESSURE: 203 MMHG | RESPIRATION RATE: 20 BRPM | DIASTOLIC BLOOD PRESSURE: 95 MMHG | HEART RATE: 68 BPM | TEMPERATURE: 98 F | BODY MASS INDEX: 29.12 KG/M2 | HEIGHT: 71 IN

## 2020-10-10 DIAGNOSIS — I10 ASYMPTOMATIC HYPERTENSION: Primary | ICD-10-CM

## 2020-10-10 DIAGNOSIS — I10 BENIGN ESSENTIAL HTN: ICD-10-CM

## 2020-10-10 LAB
ANION GAP SERPL CALC-SCNC: 10 MMOL/L (ref 8–16)
BUN SERPL-MCNC: 20 MG/DL (ref 8–23)
CALCIUM SERPL-MCNC: 10.4 MG/DL (ref 8.7–10.5)
CHLORIDE SERPL-SCNC: 102 MMOL/L (ref 95–110)
CO2 SERPL-SCNC: 27 MMOL/L (ref 23–29)
CREAT SERPL-MCNC: 1.4 MG/DL (ref 0.5–1.4)
ERYTHROCYTE [DISTWIDTH] IN BLOOD BY AUTOMATED COUNT: 13.6 % (ref 11.5–14.5)
EST. GFR  (AFRICAN AMERICAN): 54 ML/MIN/1.73 M^2
EST. GFR  (NON AFRICAN AMERICAN): 47 ML/MIN/1.73 M^2
GLUCOSE SERPL-MCNC: 91 MG/DL (ref 70–110)
HCT VFR BLD AUTO: 45.4 % (ref 40–54)
HGB BLD-MCNC: 15.2 G/DL (ref 14–18)
MCH RBC QN AUTO: 31.6 PG (ref 27–31)
MCHC RBC AUTO-ENTMCNC: 33.5 G/DL (ref 32–36)
MCV RBC AUTO: 94 FL (ref 82–98)
PLATELET # BLD AUTO: 231 K/UL (ref 150–350)
PMV BLD AUTO: 10.7 FL (ref 9.2–12.9)
POTASSIUM SERPL-SCNC: 4 MMOL/L (ref 3.5–5.1)
RBC # BLD AUTO: 4.81 M/UL (ref 4.6–6.2)
SODIUM SERPL-SCNC: 139 MMOL/L (ref 136–145)
WBC # BLD AUTO: 4.98 K/UL (ref 3.9–12.7)

## 2020-10-10 PROCEDURE — 63600175 PHARM REV CODE 636 W HCPCS: Performed by: EMERGENCY MEDICINE

## 2020-10-10 PROCEDURE — 80048 BASIC METABOLIC PNL TOTAL CA: CPT

## 2020-10-10 PROCEDURE — 96374 THER/PROPH/DIAG INJ IV PUSH: CPT

## 2020-10-10 PROCEDURE — 99284 EMERGENCY DEPT VISIT MOD MDM: CPT | Mod: 25

## 2020-10-10 PROCEDURE — 85027 COMPLETE CBC AUTOMATED: CPT

## 2020-10-10 RX ORDER — HYDRALAZINE HYDROCHLORIDE 20 MG/ML
10 INJECTION INTRAMUSCULAR; INTRAVENOUS
Status: COMPLETED | OUTPATIENT
Start: 2020-10-10 | End: 2020-10-10

## 2020-10-10 RX ADMIN — HYDRALAZINE HYDROCHLORIDE 10 MG: 20 INJECTION INTRAMUSCULAR; INTRAVENOUS at 06:10

## 2020-10-10 NOTE — ED PROVIDER NOTES
Encounter Date: 10/10/2020       History     Chief Complaint   Patient presents with    Hypertension     Pt saw Dr. Potter on Wednesday and his BP was elevated. He was told to monitor it. Today he reports his BP was elevated and he panicked. He is not having any signs or symptoms.      Pedro Ponce Jr. is a 80 y.o. 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    The patient presents to the ED due to elevated blood pressure.  Patient states he has had high blood pressure his entire life.  He reports compliance with all of his medications and has been on the same dose of medication for quite some time.  He was last seen by his PCP earlier this week.  His blood pressure was found to be elevated.  He was told to monitor his blood pressure daily, and he was scheduled for a follow-up visit next week.  Today, he took his blood pressure and noted systolic pressure to be over 200.  He became concerned and came to the ER for evaluation.    Other than concerns about his blood pressure, he denies any other complaints.  On review of systems he endorses a slight headache earlier today, but denies any associated vision changes, slurred speech, facial droop, focal weakness/numbness, chest pain, shortness of breath, dizziness, presyncope, abdominal pain, neck/back pain, or urinary complaints.  He does note he has a history of kidney disease, and is scheduled for a follow-up visit within a nephrologist next month.  He does had he noticed his blood pressure has been more elevated after he started taking multiple over-the-counter medications for sinus congestion and allergies.  He is not sure what the names of the medications are.        Review of patient's allergies  indicates:   Allergen Reactions    Pcn [penicillins] Rash     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     Past Surgical History:   Procedure Laterality Date    COLONOSCOPY N/A 4/9/2018    Procedure: COLONOSCOPY;  Surgeon: HAIM Mir MD;  Location: [facility] ENDO ([facility]);  Service: Endoscopy;  Laterality: N/A;    COLONOSCOPY N/A 1/14/2019    Procedure: COLONOSCOPY;  Surgeon: HAIM Mir MD;  Location: [facility] ENDO ([facility]);  Service: Endoscopy;  Laterality: N/A;  PM prep    CYST REMOVAL  2000    removed from left kidney    KIDNEY SURGERY      PROSTATE SURGERY       Family History   Problem Relation Age of Onset    Cancer Mother         unkn ca    Diabetes Mother     Glaucoma Mother     Heart disease Mother     Hypertension Mother     Blindness Neg Hx     Macular degeneration Neg Hx     Retinal detachment Neg Hx      Social History     Tobacco Use    Smoking status: Never Smoker    Smokeless tobacco: Never Used   Substance Use Topics    Alcohol use: No    Drug use: No     Review of Systems   Constitutional: Negative for chills and fever.   HENT: Negative for sore throat.    Respiratory: Negative for shortness of breath.    Cardiovascular: Negative for chest pain.   Gastrointestinal: Negative for constipation, diarrhea, nausea and vomiting.   Genitourinary: Negative for dysuria, frequency and urgency.   Musculoskeletal: Negative for back pain.   Skin: Negative for rash and wound.   Neurological: Negative for weakness.   Hematological: Does not bruise/bleed easily.   Psychiatric/Behavioral: Negative for agitation, behavioral problems and confusion.       Physical Exam      Initial Vitals [10/10/20 1727]   BP Pulse Resp Temp SpO2   (!) 217/105 (!) 51 18 98.5 °F (36.9 °C) 99 %      MAP       --         Physical Exam    Nursing note and vitals reviewed.  Constitutional: He appears well-developed and well-nourished. He is not diaphoretic. No distress.   HENT:   Head: Normocephalic and atraumatic.   Mouth/Throat: Oropharynx is clear and moist.   Eyes: EOM are normal. Pupils are equal, round, and reactive to light.   Neck: No tracheal deviation present.   Cardiovascular: Regular rhythm, normal heart sounds and intact distal pulses. Bradycardia present.    Pulmonary/Chest: Breath sounds normal. No stridor. No respiratory distress.   Abdominal: Soft. He exhibits no distension and no mass. There is no abdominal tenderness.   Musculoskeletal: Normal range of motion. No edema.   Neurological: He is alert and oriented to person, place, and time. He has normal strength. He displays no tremor. No cranial nerve deficit or sensory deficit. He exhibits normal muscle tone. Gait normal. GCS eye subscore is 4. GCS verbal subscore is 5. GCS motor subscore is 6.   Skin: Skin is warm and dry. Capillary refill takes less than 2 seconds. No rash noted.   Psychiatric: He has a normal mood and affect. His behavior is normal. Thought content normal.         ED Course   Procedures  Labs Reviewed   CBC WITHOUT DIFFERENTIAL - Abnormal; Notable for the following components:       Result Value    Mean Corpuscular Hemoglobin 31.6 (*)     All other components within normal limits   BASIC METABOLIC PANEL - Abnormal; Notable for the following components:    eGFR if  54 (*)     eGFR if non  47 (*)     All other components within normal limits          Imaging Results    None          Medical Decision Making:   History:   Old Medical Records: I decided to obtain old medical records.  Old Records Summarized: other records.  Initial Assessment:   81 yo M with history of HTN presents to ED  with elevated BP at home. Asymptomatic.  Exam without neuro deficits.  Will obtain labs, EKG, and reassess.  Differential Diagnosis:   Differential Diagnosis includes, but is not limited to:  Hypertensive encephalopathy, CVA/TIA, intracranial hemorrhage, MI/ACS, aortic/carotid artery dissection, AAA, hypertensive nephropathy, medication non-compliance, anxiety, drug abuse/intoxication, essential hypertension    Independently Interpreted Test(s):   I have ordered and independently interpreted EKG Reading(s) - see summary below       <> Summary of EKG Reading(s): No ischemia or arrhythmia.  Clinical Tests:   Lab Tests: Reviewed and Ordered  Medical Tests: Ordered and Reviewed  ED Management:  Labs unremarkable.  No evidence of end-organ damage.  Elevated blood pressure likely multifactorial including chronic hypertension and decongestant medication.  Patient stable for discharge.    After complete evaluation, including thorough history and physical exam, the patient was found to have asymptomatic elevated blood pressure, likely secondary to chronic uncontrolled HTN. The patient's symptoms are not consistent with SAH/intracranial bleed. Physical exam is benign without focal weakness, sensory deficit, or cerebellar dysfunction to suggest acute stroke or intracranial mass. There is no meningismus, fever, or other evidence of infection to suggest meningitis/encephalitis. There is no other evidence of end-organ damage consistent with hypertensive urgency/emergency at time of this evaluation. I do not feel further ED evaluation or intervention is warranted. The patient was counseled extensively on medication compliance and was instructed to follow-up with a PCP for further management of elevated BP.      On re-evaluation, the patient's status has improved.  After complete ED evaluation, clinical impression is most consistent with asymptomatic hypertension.  PCP follow-up within 2-3 days was recommended.    After taking into  careful account the patient's history, physical exam findings, as well as empirical and objective data obtained throughout ED workup, I feel no emergent medical condition has been identified. No further evaluation or admission was felt to be required, and the patient is stable for discharge from the ED. The patient and any additional family present were updated with test results, overall clinical impression, and recommended further plan of care, including discharge instructions as provided and outpatient follow-up for continued evaluation and management as needed. All questions were answered. The patient expressed understanding and agreed with current plan for discharge and follow-up plan of care. Strict ED return precautions were provided, including return/worsening of current symptoms, new symptoms, or any other concerns.                     ED Course as of Oct 10 1950   Sat Oct 10, 2020   1853 EKG:  Sinus bradycardia, rate 51, T-wave inversions inferior and lateral leads, LVH, no ST elevation or other signs of ischemia, otherwise normal intervals.  Stable from prior May 2017.    [SS]      ED Course User Index  [SS] Ray Laureano MD            Clinical Impression:     ICD-10-CM ICD-9-CM   1. Asymptomatic hypertension  I10 401.9   2. Benign essential HTN  I10 401.1                          ED Disposition Condition    Discharge Stable        ED Prescriptions     None        Follow-up Information     Follow up With Specialties Details Why Contact Info    John Ricci MD Family Medicine Schedule an appointment as soon as possible for a visit   [address] [facility] [place] [address] [phone]                                     Ray Laureano MD  11/05/20 09

## 2020-10-11 NOTE — ED NOTES
Review of patient's allergies indicates:   Allergen Reactions    Pcn [penicillins] Rash        Patient has verified the spelling of their name and  on armband.   APPEARANCE: Patient is alert, calm, oriented x 4, and does not appear distressed.  SKIN: Skin is normal for race, warm, and dry. Normal skin turgor and mucous membranes moist.  CARDIAC: Normal rate and rhythm, no murmur heard. Patient does have kerri blood pressure at present with MD made aware that it has gone up again.   RESPIRATORY:Normal rate and effort. Breath sounds clear bilaterally throughout chest. Respirations are equal and unlabored.    GASTRO: Bowel sounds normal, abdomen is soft, no tenderness, and no abdominal distention.  MUSCLE: Full ROM. No bony tenderness or soft tissue tenderness. No obvious deformity.  PERIPHERAL VASCULAR: peripheral pulses present. Normal cap refill. No edema. Warm to touch.  NEURO: 5/5 strength major flexors/extensors bilaterally. Sensory intact to light touch bilaterally. [place] coma scale: eyes open spontaneously-4, oriented & converses-5, obeys commands-6. No neurological abnormalities. C/o headache.   MENTAL STATUS: awake, alert and aware of environment.  EYE: No overt deficits noted. No drainage. Sclera WNL  ENT: EARS: no obvious drainage. NOSE: no active bleeding. THROAT: no redness or swelling.  BREAST: symmetrical. No masses. No tenderness.  GENITALIA: Normal external genitalia.  : Voids without complication

## 2020-10-11 NOTE — ED NOTES
Present to ED with elevated BP. Reports elevated x 3 days seen by PCP was told to monitor BP at home. BP elevated at home so pt panicked and came in. Denies chest pain n/v, visual changes or headache. No acute distress.

## 2020-10-14 ENCOUNTER — CLINICAL SUPPORT (OUTPATIENT)
Dept: FAMILY MEDICINE | Facility: CLINIC | Age: 80
End: 2020-10-14
Payer: MEDICARE

## 2020-10-14 ENCOUNTER — TELEPHONE (OUTPATIENT)
Dept: FAMILY MEDICINE | Facility: CLINIC | Age: 80
End: 2020-10-14

## 2020-10-14 VITALS — DIASTOLIC BLOOD PRESSURE: 76 MMHG | TEMPERATURE: 97 F | SYSTOLIC BLOOD PRESSURE: 152 MMHG

## 2020-10-14 DIAGNOSIS — I10 ESSENTIAL HYPERTENSION: Primary | ICD-10-CM

## 2020-10-14 PROCEDURE — 99999 PR PBB SHADOW E&M-EST. PATIENT-LVL I: ICD-10-PCS | Mod: PBBFAC,,,

## 2020-10-14 PROCEDURE — 99999 PR PBB SHADOW E&M-EST. PATIENT-LVL I: CPT | Mod: PBBFAC,,,

## 2020-10-14 RX ORDER — HYDRALAZINE HYDROCHLORIDE 10 MG/1
10 TABLET, FILM COATED ORAL EVERY 12 HOURS
Qty: 60 TABLET | Refills: 0 | Status: SHIPPED | OUTPATIENT
Start: 2020-10-14 | End: 2020-10-21

## 2020-10-14 NOTE — TELEPHONE ENCOUNTER
Patient with persistent hypertension.    Hydralazine was ordered in addition to his current regimen.    Cardiology follow-up was ordered for additional recommendations.

## 2020-10-21 ENCOUNTER — OFFICE VISIT (OUTPATIENT)
Dept: CARDIOLOGY | Facility: CLINIC | Age: 80
End: 2020-10-21
Payer: MEDICARE

## 2020-10-21 VITALS
SYSTOLIC BLOOD PRESSURE: 182 MMHG | BODY MASS INDEX: 28.33 KG/M2 | WEIGHT: 203.13 LBS | OXYGEN SATURATION: 98 % | HEART RATE: 49 BPM | DIASTOLIC BLOOD PRESSURE: 88 MMHG

## 2020-10-21 DIAGNOSIS — R00.1 BRADYCARDIA, DRUG INDUCED: ICD-10-CM

## 2020-10-21 DIAGNOSIS — T50.905A BRADYCARDIA, DRUG INDUCED: ICD-10-CM

## 2020-10-21 DIAGNOSIS — R94.31 NONSPECIFIC ABNORMAL ELECTROCARDIOGRAM (ECG) (EKG): ICD-10-CM

## 2020-10-21 DIAGNOSIS — E78.00 PURE HYPERCHOLESTEROLEMIA: Primary | ICD-10-CM

## 2020-10-21 DIAGNOSIS — I10 HTN (HYPERTENSION): ICD-10-CM

## 2020-10-21 DIAGNOSIS — I10 ESSENTIAL HYPERTENSION: ICD-10-CM

## 2020-10-21 PROCEDURE — 3079F PR MOST RECENT DIASTOLIC BLOOD PRESSURE 80-89 MM HG: ICD-10-PCS | Mod: CPTII,S$GLB,, | Performed by: INTERNAL MEDICINE

## 2020-10-21 PROCEDURE — 93005 ELECTROCARDIOGRAM TRACING: CPT | Mod: S$GLB,,, | Performed by: INTERNAL MEDICINE

## 2020-10-21 PROCEDURE — 1159F PR MEDICATION LIST DOCUMENTED IN MEDICAL RECORD: ICD-10-PCS | Mod: S$GLB,,, | Performed by: INTERNAL MEDICINE

## 2020-10-21 PROCEDURE — 99204 OFFICE O/P NEW MOD 45 MIN: CPT | Mod: S$GLB,,, | Performed by: INTERNAL MEDICINE

## 2020-10-21 PROCEDURE — 3077F SYST BP >= 140 MM HG: CPT | Mod: CPTII,S$GLB,, | Performed by: INTERNAL MEDICINE

## 2020-10-21 PROCEDURE — 99999 PR PBB SHADOW E&M-EST. PATIENT-LVL IV: ICD-10-PCS | Mod: PBBFAC,,, | Performed by: INTERNAL MEDICINE

## 2020-10-21 PROCEDURE — 1101F PR PT FALLS ASSESS DOC 0-1 FALLS W/OUT INJ PAST YR: ICD-10-PCS | Mod: CPTII,S$GLB,, | Performed by: INTERNAL MEDICINE

## 2020-10-21 PROCEDURE — 99999 PR PBB SHADOW E&M-EST. PATIENT-LVL IV: CPT | Mod: PBBFAC,,, | Performed by: INTERNAL MEDICINE

## 2020-10-21 PROCEDURE — 93005 EKG 12-LEAD: ICD-10-PCS | Mod: S$GLB,,, | Performed by: INTERNAL MEDICINE

## 2020-10-21 PROCEDURE — 1101F PT FALLS ASSESS-DOCD LE1/YR: CPT | Mod: CPTII,S$GLB,, | Performed by: INTERNAL MEDICINE

## 2020-10-21 PROCEDURE — 93010 EKG 12-LEAD: ICD-10-PCS | Mod: S$GLB,,, | Performed by: INTERNAL MEDICINE

## 2020-10-21 PROCEDURE — 1159F MED LIST DOCD IN RCRD: CPT | Mod: S$GLB,,, | Performed by: INTERNAL MEDICINE

## 2020-10-21 PROCEDURE — 1126F AMNT PAIN NOTED NONE PRSNT: CPT | Mod: S$GLB,,, | Performed by: INTERNAL MEDICINE

## 2020-10-21 PROCEDURE — 3079F DIAST BP 80-89 MM HG: CPT | Mod: CPTII,S$GLB,, | Performed by: INTERNAL MEDICINE

## 2020-10-21 PROCEDURE — 93010 ELECTROCARDIOGRAM REPORT: CPT | Mod: S$GLB,,, | Performed by: INTERNAL MEDICINE

## 2020-10-21 PROCEDURE — 1126F PR PAIN SEVERITY QUANTIFIED, NO PAIN PRESENT: ICD-10-PCS | Mod: S$GLB,,, | Performed by: INTERNAL MEDICINE

## 2020-10-21 PROCEDURE — 3077F PR MOST RECENT SYSTOLIC BLOOD PRESSURE >= 140 MM HG: ICD-10-PCS | Mod: CPTII,S$GLB,, | Performed by: INTERNAL MEDICINE

## 2020-10-21 PROCEDURE — 99204 PR OFFICE/OUTPT VISIT, NEW, LEVL IV, 45-59 MIN: ICD-10-PCS | Mod: S$GLB,,, | Performed by: INTERNAL MEDICINE

## 2020-10-21 RX ORDER — HYDRALAZINE HYDROCHLORIDE 50 MG/1
50 TABLET, FILM COATED ORAL EVERY 8 HOURS
Qty: 135 TABLET | Refills: 4 | Status: SHIPPED | OUTPATIENT
Start: 2020-10-21 | End: 2020-11-11

## 2020-10-21 NOTE — LETTER
October 21, 2020      John Ricci MD  [address] [facility]  [place] [address]           [facility]  [address], [facility] [address]  [facility] [address]  Phone: [phone]          Patient: Pedro Ponce Jr.   MR Number: [id number]   YOB: 1940   Date of Visit: 10/21/2020       Dear Dr. John Ricci:    Thank you for referring Pedro Ponce to me for evaluation. Attached you will find relevant portions of my assessment and plan of care.    If you have questions, please do not hesitate to call me. I look forward to following Pedro Ponce along with you.    Sincerely,    Anthony Mcdonald MD    Enclosure  CC:  No Recipients    If you would like to receive this communication electronically, please contact [email or web address] or [phone] to request more information on [facility] access.    For providers and/or their staff who would like to refer a patient to [facility], please contact us through our one-stop-shop provider referral line, [facility], at [phone].    If you feel you have received this communication in error or would no longer like to receive these types of communications, please e-mail [email or web address]

## 2020-10-21 NOTE — ASSESSMENT & PLAN NOTE
He is on 20 mg of atorvastatin.  Perhaps in the future his Caduet dosing can simplify his regimen at a dose of 10/20 mgs.  His LDL is in the 71 range.  I do not see an indication for fish oil therapy.  He will stop it.

## 2020-10-21 NOTE — PROGRESS NOTES
[facility] Cardiology     Subjective:    Patient ID:  Pedro Ponce Jr. is a 80 y.o. male who presents for evaluation of Hypertension, Bradycardia, and Abnormal ECG    Review of patient's allergies indicates:   Allergen Reactions    Pcn [penicillins] Rash      The patient is here for uncontrolled high blood pressure.  He has had longstanding hypertension management.  For the past month or so he has had high readings.  He checks on a monitor at home.  He saw his physician and subsequently went to the emergency room where his diastolic blood pressure was in the 117 range.  He did not feel bad.  He has chronic bradycardia related to exercise and metoprolol use.  He is asymptomatic in regards to bradycardia.  He has had 3-stress tests in the past including imaging studies.  His ejection fraction is normal on echo about 5 years.  He rides a bicycle at home stationary for exercise.    He has renal insufficiency.  He takes low-dose Aldactone his potassium is in the 3.7 range, creatinine 1.5.  He is on 20 mg of atorvastatin.  His blood pressure medications are well tolerated.  He never gets palpitations orthopnea or shortness of breath.  He went to the emergency room because his blood pressures were quite high although he did not feel bad.  He does not use excessive salt.  His legs do not swell.    His electrocardiogram today shows sinus bradycardia heart rate 46 right bundle branch block with LVH changes.  This is unchanged from previous.    Hypertension  This is a chronic problem. The current episode started more than 1 year ago. The problem has been gradually worsening since onset. The problem is uncontrolled. Pertinent negatives include no blurred vision, chest pain, headaches, malaise/fatigue, neck pain, orthopnea, palpitations, PND or shortness of breath. There are no associated agents to hypertension. Risk factors for coronary artery disease  include dyslipidemia. Past treatments include beta blockers, diuretics, calcium channel blockers and angiotensin blockers. The current treatment provides moderate improvement. There are no compliance problems.  Identifiable causes of hypertension include chronic renal disease.       Review of Systems   Constitution: Negative for chills, decreased appetite, diaphoresis, fever, malaise/fatigue, night sweats, weight gain and weight loss.   HENT: Negative for congestion, ear discharge, ear pain, hearing loss, hoarse voice, nosebleeds, odynophagia, sore throat, stridor and tinnitus.    Eyes: Negative for blurred vision, discharge, double vision, pain, photophobia, redness, vision loss in left eye, vision loss in right eye, visual disturbance and visual halos.   Cardiovascular: Negative for chest pain, claudication, cyanosis, dyspnea on exertion, irregular heartbeat, leg swelling, near-syncope, orthopnea, palpitations, paroxysmal nocturnal dyspnea and syncope.   Respiratory: Negative for cough, hemoptysis, shortness of breath, sleep disturbances due to breathing, snoring, sputum production and wheezing.    Endocrine: Negative for cold intolerance, heat intolerance, polydipsia, polyphagia and polyuria.   Hematologic/Lymphatic: Negative for adenopathy and bleeding problem. Does not bruise/bleed easily.   Skin: Negative for color change, dry skin, flushing, itching, nail changes, poor wound healing, rash, skin cancer, suspicious lesions and unusual hair distribution.   Musculoskeletal: Negative for arthritis, back pain, falls, gout, joint pain, joint swelling, muscle cramps, muscle weakness, myalgias, neck pain and stiffness.   Gastrointestinal: Negative for bloating, abdominal pain, anorexia, change in bowel habit, bowel incontinence, constipation, diarrhea, dysphagia, excessive appetite, flatus, heartburn, hematemesis, hematochezia, hemorrhoids, jaundice, melena, nausea and vomiting.   Genitourinary: Negative for bladder  incontinence, decreased libido, dysuria, flank pain, frequency, genital sores, hematuria, hesitancy, incomplete emptying, nocturia and urgency.   Neurological: Negative for aphonia, brief paralysis, difficulty with concentration, disturbances in coordination, excessive daytime sleepiness, dizziness, focal weakness, headaches, light-headedness, loss of balance, numbness, paresthesias, seizures, sensory change, tremors, vertigo and weakness.   Psychiatric/Behavioral: Negative for altered mental status, depression, hallucinations, memory loss, substance abuse, suicidal ideas and thoughts of violence. The patient does not have insomnia and is not nervous/anxious.    Allergic/Immunologic: Negative for hives and persistent infections.        Objective:       Vitals:    10/21/20 0941   BP: (!) 182/88   BP Location: Right arm   Patient Position: Sitting   BP Method: Medium (Automatic)   Pulse: (!) 49   SpO2: 98%   Weight: 92.1 kg (203 lb 2.5 oz)    Physical Exam   Constitutional: He is oriented to person, place, and time. He appears well-developed and well-nourished. No distress.   HENT:   Head: Normocephalic and atraumatic.   Nose: Nose normal.   Mouth/Throat: Oropharynx is clear and moist.   Eyes: Pupils are equal, round, and reactive to light. Conjunctivae and EOM are normal. Right eye exhibits no discharge. No scleral icterus.   Neck: Normal range of motion. Neck supple. No JVD present. No tracheal deviation present. No thyromegaly present.   Cardiovascular: Normal rate, regular rhythm, normal heart sounds and intact distal pulses. Exam reveals no gallop and no friction rub.   No murmur heard.  Pulmonary/Chest: Effort normal and breath sounds normal. No stridor. No respiratory distress. He has no wheezes. He has no rales. He exhibits no tenderness.   Abdominal: Soft. Bowel sounds are normal. He exhibits no distension and no mass. There is no abdominal tenderness. There is no rebound and no guarding.   Musculoskeletal:  Normal range of motion.         General: No tenderness or edema.   Lymphadenopathy:     He has no cervical adenopathy.   Neurological: He is alert and oriented to person, place, and time. No cranial nerve deficit. Coordination normal.   Skin: Skin is warm and dry. No rash noted. He is not diaphoretic. No erythema. No pallor.   Psychiatric: He has a normal mood and affect. His behavior is normal. Judgment and thought content normal.         Assessment:       1. Pure hypercholesterolemia    2. Essential hypertension    3. Bradycardia, drug induced    4. HTN (hypertension)    5. Nonspecific abnormal electrocardiogram (ECG) (EKG)      Results for orders placed or performed during the hospital encounter of 10/10/20   CBC Without Differential   Result Value Ref Range    WBC 4.98 3.90 - 12.70 K/uL    RBC 4.81 4.60 - 6.20 M/uL    Hemoglobin 15.2 14.0 - 18.0 g/dL    Hematocrit 45.4 40.0 - 54.0 %    Mean Corpuscular Volume 94 82 - 98 fL    Mean Corpuscular Hemoglobin 31.6 (H) 27.0 - 31.0 pg    Mean Corpuscular Hemoglobin Conc 33.5 32.0 - 36.0 g/dL    RDW 13.6 11.5 - 14.5 %    Platelets 231 150 - 350 K/uL    MPV 10.7 9.2 - 12.9 fL   Basic metabolic panel   Result Value Ref Range    Sodium 139 136 - 145 mmol/L    Potassium 4.0 3.5 - 5.1 mmol/L    Chloride 102 95 - 110 mmol/L    CO2 27 23 - 29 mmol/L    Glucose 91 70 - 110 mg/dL    BUN, Bld 20 8 - 23 mg/dL    Creatinine 1.4 0.5 - 1.4 mg/dL    Calcium 10.4 8.7 - 10.5 mg/dL    Anion Gap 10 8 - 16 mmol/L    eGFR if African American 54 (A) >60 mL/min/1.73 m^2    eGFR if non African American 47 (A) >60 mL/min/1.73 m^2     Lab Results   Component Value Date    WBC 4.98 10/10/2020    RBC 4.81 10/10/2020    HGB 15.2 10/10/2020    HCT 45.4 10/10/2020    MCV 94 10/10/2020    MCH 31.6 (H) 10/10/2020    MCHC 33.5 10/10/2020    RDW 13.6 10/10/2020     10/10/2020    MPV 10.7 10/10/2020    GRAN 2.1 06/08/2019    GRAN 44.3 06/08/2019    LYMPH 2.0 06/08/2019    LYMPH 40.9 06/08/2019     MONO 0.5 06/08/2019    MONO 10.1 06/08/2019    EOS 0.2 06/08/2019    BASO 0.05 06/08/2019    EOSINOPHIL 3.1 06/08/2019    BASOPHIL 1.0 06/08/2019        CMP  Lab Results   Component Value Date     10/10/2020    K 4.0 10/10/2020     10/10/2020    CO2 27 10/10/2020    GLU 91 10/10/2020    BUN 20 10/10/2020    CREATININE 1.4 10/10/2020    CALCIUM 10.4 10/10/2020    PROT 7.2 06/08/2019    ALBUMIN 3.9 06/23/2020    BILITOT 1.3 (H) 06/08/2019    ALKPHOS 67 06/08/2019    AST 37 06/08/2019    ALT 38 06/08/2019    ANIONGAP 10 10/10/2020    ESTGFRAFRICA 54 (A) 10/10/2020    EGFRNONAA 47 (A) 10/10/2020        Lab Results   Component Value Date    LABURIN ENTEROCOCCUS FAECALIS  > 100,000 cfu/ml   (A) 04/30/2020            Results for orders placed or performed in visit on 05/04/17   IN OFFICE EKG 12-LEAD (to [place])    Collection Time: 05/04/17  8:04 AM    Narrative    Test Reason : R00.1  Blood Pressure : 182/88 mmHG  Vent. Rate : 040 BPM     Atrial Rate : 040 BPM     P-R Int : 222 ms          QRS Dur : 096 ms      QT Int : 448 ms       P-R-T Axes : 046 -30 -86 degrees     QTc Int : 365 ms    Marked sinus bradycardia with 1st degree A-V block  Left axis deviation  Right ventricular conduction delay  LVH with repolarization abnormality  Abnormal ECG  When compared with ECG of 12-MAY-2016 10:58,  QRS duration has decreased  ST more depressed in Inferior leads  Confirmed by Marvel Marie MD ([id number]) on 5/4/2017 12:07:46 PM    Referred By: HARESH SHAY           Confirmed By:Marvel Marie MD        Posterior Segment OCT Retina-Both eyes 06/09/2020  Final   US Kidney Only 01/20/2020 [id number] Final   X-ray Knee Ortho Right 08/08/2019 [id number] Final            Plan:       Problem List Items Addressed This Visit        Cardiac/Vascular    Hypertension     Hydralazine dosing increased.  My goal is to reduce metoprolol dosing in the future.  Increased doses of Aldactone may be beneficial.         Relevant Medications     hydrALAZINE (APRESOLINE) 50 MG tablet    Hyperlipidemia - Primary     He is on 20 mg of atorvastatin.  Perhaps in the future his Caduet dosing can simplify his regimen at a dose of 10/20 mgs.  His LDL is in the 71 range.  I do not see an indication for fish oil therapy.  He will stop it.         Bradycardia, drug induced     He is asymptomatic currently, however I am concerned about the degree of bradycardia given his age.         Relevant Orders    IN OFFICE EKG 12-LEAD (to [place])    Nonspecific abnormal electrocardiogram (ECG) (EKG)     He has had 3-stress tests in the past.  His ST segments are abnormal but most likely are related to LVH.  He has normal exercise tolerance.           Other Visit Diagnoses     HTN (hypertension)        Relevant Orders    IN OFFICE EKG 12-LEAD (to [place])                  Thank you for allowing me to participate in your patient's care.  Close follow-up arranged.         Anthony Mcdonald MD  10/21/2020   10:12 AM

## 2020-10-21 NOTE — ASSESSMENT & PLAN NOTE
Hydralazine dosing increased.  My goal is to reduce metoprolol dosing in the future.  Increased doses of Aldactone may be beneficial.

## 2020-10-21 NOTE — ASSESSMENT & PLAN NOTE
He has had 3-stress tests in the past.  His ST segments are abnormal but most likely are related to LVH.  He has normal exercise tolerance.

## 2020-10-28 ENCOUNTER — HOSPITAL ENCOUNTER (OUTPATIENT)
Dept: RADIOLOGY | Facility: HOSPITAL | Age: 80
Discharge: HOME | End: 2020-10-28
Attending: NURSE PRACTITIONER
Payer: MEDICARE

## 2020-10-28 ENCOUNTER — TELEPHONE (OUTPATIENT)
Dept: UROLOGY | Facility: CLINIC | Age: 80
End: 2020-10-28

## 2020-10-28 DIAGNOSIS — A49.9 BACTERIAL UTI: ICD-10-CM

## 2020-10-28 DIAGNOSIS — N40.1 BPH WITH URINARY OBSTRUCTION: ICD-10-CM

## 2020-10-28 DIAGNOSIS — K40.90 LEFT INGUINAL HERNIA: Primary | ICD-10-CM

## 2020-10-28 DIAGNOSIS — N39.0 BACTERIAL UTI: ICD-10-CM

## 2020-10-28 DIAGNOSIS — N13.8 BPH WITH URINARY OBSTRUCTION: ICD-10-CM

## 2020-10-28 DIAGNOSIS — R10.32 INTERMITTENT LEFT LOWER QUADRANT ABDOMINAL PAIN: ICD-10-CM

## 2020-10-28 PROCEDURE — 76705 US ABDOMEN LIMITED_HERNIA: ICD-10-PCS | Mod: 26,,, | Performed by: RADIOLOGY

## 2020-10-28 PROCEDURE — 76705 ECHO EXAM OF ABDOMEN: CPT | Mod: TC

## 2020-10-28 PROCEDURE — 76705 ECHO EXAM OF ABDOMEN: CPT | Mod: 26,,, | Performed by: RADIOLOGY

## 2020-10-28 NOTE — TELEPHONE ENCOUNTER
Patient notified that his ultrasound did show large inguinal hernia.   He should see general surgery regarding repair.   Consult placed.  Scheduled appointment with general surgery.

## 2020-11-01 ENCOUNTER — HOSPITAL ENCOUNTER (EMERGENCY)
Facility: HOSPITAL | Age: 80
Discharge: HOME | End: 2020-11-01
Attending: EMERGENCY MEDICINE
Payer: MEDICARE

## 2020-11-01 VITALS
RESPIRATION RATE: 13 BRPM | OXYGEN SATURATION: 99 % | SYSTOLIC BLOOD PRESSURE: 173 MMHG | DIASTOLIC BLOOD PRESSURE: 86 MMHG | TEMPERATURE: 98 F | HEART RATE: 63 BPM

## 2020-11-01 DIAGNOSIS — I10 ESSENTIAL HYPERTENSION: ICD-10-CM

## 2020-11-01 DIAGNOSIS — R42 DIZZINESS: Primary | ICD-10-CM

## 2020-11-01 LAB
ALBUMIN SERPL BCP-MCNC: 4.1 G/DL (ref 3.5–5.2)
ALP SERPL-CCNC: 59 U/L (ref 55–135)
ALT SERPL W/O P-5'-P-CCNC: 19 U/L (ref 10–44)
ANION GAP SERPL CALC-SCNC: 9 MMOL/L (ref 8–16)
AST SERPL-CCNC: 21 U/L (ref 10–40)
BASOPHILS # BLD AUTO: 0.03 K/UL (ref 0–0.2)
BASOPHILS NFR BLD: 0.7 % (ref 0–1.9)
BILIRUB SERPL-MCNC: 1.4 MG/DL (ref 0.1–1)
BUN SERPL-MCNC: 22 MG/DL (ref 8–23)
CALCIUM SERPL-MCNC: 10.4 MG/DL (ref 8.7–10.5)
CHLORIDE SERPL-SCNC: 104 MMOL/L (ref 95–110)
CO2 SERPL-SCNC: 26 MMOL/L (ref 23–29)
CREAT SERPL-MCNC: 1.5 MG/DL (ref 0.5–1.4)
DIFFERENTIAL METHOD: ABNORMAL
EOSINOPHIL # BLD AUTO: 0 K/UL (ref 0–0.5)
EOSINOPHIL NFR BLD: 0.9 % (ref 0–8)
ERYTHROCYTE [DISTWIDTH] IN BLOOD BY AUTOMATED COUNT: 13.4 % (ref 11.5–14.5)
EST. GFR  (AFRICAN AMERICAN): 50 ML/MIN/1.73 M^2
EST. GFR  (NON AFRICAN AMERICAN): 43 ML/MIN/1.73 M^2
GLUCOSE SERPL-MCNC: 141 MG/DL (ref 70–110)
HCT VFR BLD AUTO: 43.5 % (ref 40–54)
HGB BLD-MCNC: 14.5 G/DL (ref 14–18)
IMM GRANULOCYTES # BLD AUTO: 0.01 K/UL (ref 0–0.04)
IMM GRANULOCYTES NFR BLD AUTO: 0.2 % (ref 0–0.5)
LYMPHOCYTES # BLD AUTO: 1.1 K/UL (ref 1–4.8)
LYMPHOCYTES NFR BLD: 24.6 % (ref 18–48)
MAGNESIUM SERPL-MCNC: 1.9 MG/DL (ref 1.6–2.6)
MCH RBC QN AUTO: 31.4 PG (ref 27–31)
MCHC RBC AUTO-ENTMCNC: 33.3 G/DL (ref 32–36)
MCV RBC AUTO: 94 FL (ref 82–98)
MONOCYTES # BLD AUTO: 0.5 K/UL (ref 0.3–1)
MONOCYTES NFR BLD: 10.5 % (ref 4–15)
NEUTROPHILS # BLD AUTO: 2.7 K/UL (ref 1.8–7.7)
NEUTROPHILS NFR BLD: 63.1 % (ref 38–73)
NRBC BLD-RTO: 0 /100 WBC
PLATELET # BLD AUTO: 204 K/UL (ref 150–350)
PMV BLD AUTO: 10.1 FL (ref 9.2–12.9)
POTASSIUM SERPL-SCNC: 4 MMOL/L (ref 3.5–5.1)
PROT SERPL-MCNC: 6.7 G/DL (ref 6–8.4)
RBC # BLD AUTO: 4.62 M/UL (ref 4.6–6.2)
SODIUM SERPL-SCNC: 139 MMOL/L (ref 136–145)
TROPONIN I SERPL DL<=0.01 NG/ML-MCNC: 0.02 NG/ML (ref 0–0.03)
WBC # BLD AUTO: 4.27 K/UL (ref 3.9–12.7)

## 2020-11-01 PROCEDURE — 83735 ASSAY OF MAGNESIUM: CPT

## 2020-11-01 PROCEDURE — 93005 ELECTROCARDIOGRAM TRACING: CPT

## 2020-11-01 PROCEDURE — 99284 EMERGENCY DEPT VISIT MOD MDM: CPT | Mod: 25

## 2020-11-01 PROCEDURE — 80053 COMPREHEN METABOLIC PANEL: CPT

## 2020-11-01 PROCEDURE — 84484 ASSAY OF TROPONIN QUANT: CPT

## 2020-11-01 PROCEDURE — 85025 COMPLETE CBC W/AUTO DIFF WBC: CPT

## 2020-11-01 RX ORDER — METOPROLOL TARTRATE 50 MG/1
25 TABLET ORAL 2 TIMES DAILY
Qty: 90 TABLET | Refills: 3 | Status: ON HOLD | OUTPATIENT
Start: 2020-11-01 | End: 2020-12-26 | Stop reason: HOSPADM

## 2020-11-01 NOTE — ED TRIAGE NOTES
"Pt presents to ED with complaints of feeling light-headed and weak while cleaning the yard this morning. Pt states he felt a  "little short of breath" while he was light-headed but states that has resolved and denied CP during episode. Pt states that he recently had his blood pressure medication increased. Wife at bedside states that pt has complained if being dizzy since the medication changes. Pt denies N/V/D, CP, SOB, fever, recent illness. Pt denies pain.   "

## 2020-11-01 NOTE — ED NOTES
Pt resting comfortably in bed, wife at bedside. Pt provided blanket. Pt denies other needs at this time, will continue to monitor.

## 2020-11-01 NOTE — ED PROVIDER NOTES
Encounter Date: 11/1/2020    SCRIBE #1 NOTE: I, Julia Camacho, am scribing for, and in the presence of,  Dr. Harding . I have scribed the entire note.       History     Chief Complaint   Patient presents with    Dizziness     pt states just PTA he was a friends house and began to feel dizzy, with generalized weakness, denies n/v      Pedro Ponce Jr. is a 80 y.o. 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    The patient presents to the ED due to sudden onset of dizziness prior to arrival.  Patient mentions having lightheadedness sensation for close to 1 month.  Today becoming worse.  States episodes lasting sorted 30 minutes at a time with weakness in bilateral lower extremities.  Today after working on his yd patient mentions having worsening lightheadedness with this sensation of almost passing out.  Episode lasted for 45 minutes.  On arrival to ED patient mentions feeling back to baseline.  Denies any new onset numbness, tingling, focal weakness, headache, change in vision.  No chest pain, shortness of breath, change in bowel habits.Of note patient recent saw cardiologist Dr. Mcdonald, placed on higher doses of hydralazine.    The history is provided by the patient.     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     Past Surgical  History:   Procedure Laterality Date    COLONOSCOPY N/A 4/9/2018    Procedure: COLONOSCOPY;  Surgeon: HAIM Mir MD;  [facility]  Service: Endoscopy;  Laterality: N/A;    COLONOSCOPY N/A 1/14/2019    Procedure: COLONOSCOPY;  Surgeon: HAIM Mir MD;  Location: [facility] ENDO (4TH FLR);  Service: Endoscopy;  Laterality: N/A;  PM prep    CYST REMOVAL  2000    removed from left kidney    KIDNEY SURGERY      PROSTATE SURGERY       Family History   Problem Relation Age of Onset    Cancer Mother         unkn ca    Diabetes Mother     Glaucoma Mother     Heart disease Mother     Hypertension Mother     Blindness Neg Hx     Macular degeneration Neg Hx     Retinal detachment Neg Hx      Social History     Tobacco Use    Smoking status: Never Smoker    Smokeless tobacco: Never Used   Substance Use Topics    Alcohol use: No    Drug use: No     Review of Systems   Constitutional: Negative for chills and fever.   HENT: Negative for congestion, ear pain, rhinorrhea and sore throat.    Respiratory: Negative for cough, shortness of breath and wheezing.    Cardiovascular: Negative for chest pain and palpitations.   Gastrointestinal: Negative for abdominal pain, diarrhea, nausea and vomiting.   Genitourinary: Negative for dysuria and hematuria.   Musculoskeletal: Negative for back pain, myalgias and neck pain.   Skin: Negative for rash.   Neurological: Positive for dizziness, weakness and light-headedness. Negative for headaches.   Psychiatric/Behavioral: Negative for confusion.       Physical Exam     Initial Vitals [11/01/20 1134]   BP Pulse Resp Temp SpO2   (!) 142/68 (!) 53 17 98.5 °F (36.9 °C) 97 %      MAP       --         Physical Exam    Nursing note and vitals reviewed.  Constitutional: He appears well-developed and well-nourished. He is not diaphoretic. No distress.   HENT:   Head: Normocephalic and atraumatic.   Mouth/Throat: Oropharynx is clear and moist.   Eyes: EOM are normal.  Pupils are equal, round, and reactive to light.   Neck: No tracheal deviation present.   Cardiovascular: Normal rate, regular rhythm, normal heart sounds and intact distal pulses.   Pulmonary/Chest: Breath sounds normal. No stridor. No respiratory distress.   Abdominal: Soft. He exhibits no distension and no mass. There is no abdominal tenderness.   Musculoskeletal: Normal range of motion. No edema.   Neurological: He is alert and oriented to person, place, and time. He has normal strength. No cranial nerve deficit or sensory deficit.   Skin: Skin is warm and dry. Capillary refill takes less than 2 seconds. No rash noted.   Psychiatric: He has a normal mood and affect. His behavior is normal. Thought content normal.         ED Course   Procedures  Labs Reviewed   CBC W/ AUTO DIFFERENTIAL - Abnormal; Notable for the following components:       Result Value    MCH 31.4 (*)     All other components within normal limits   COMPREHENSIVE METABOLIC PANEL - Abnormal; Notable for the following components:    Glucose 141 (*)     Creatinine 1.5 (*)     Total Bilirubin 1.4 (*)     eGFR if  50 (*)     eGFR if non  43 (*)     All other components within normal limits   MAGNESIUM   TROPONIN I          Imaging Results    None          Medical Decision Making:   History:   Old Medical Records: I decided to obtain old medical records.  Initial Assessment:   80 year-old male with a PMHx of diabetes, hypertension and HLD presents to the ED with sudden onset of dizziness PTA. Vital signs significant for elevated pulse of 53 bpm and BP of 142/68 mmHg. Physical exam is unremarkable.   Differential Diagnosis:   Ddx includes, but is not limited to: Ddx includes but is not limited to:   Arrhythmia, electrolyte derangement( hyponatremia, hypokalemia), dehydration, acs, hypogylcemia. Considered stroke and TIA however considering pt's presentation much lower likelihood at this time.       Independently  Interpreted Test(s):   I have ordered and independently interpreted EKG Reading(s) - see prior notes  Clinical Tests:   Lab Tests: Ordered and Reviewed  Medical Tests: Ordered and Reviewed  ED Management:  Plan: Obtain basic labs, EKG and reassess.                    ED Course as of Nov 01 1757   Sun Nov 01, 2020   1422 Spoke with Dr. Weiner plan is for patient to decrease his metoprolol dose from 50 b.i.d. to 25 b.i.d..  Patient well-appearing.  Labs unremarkable patient safer plan discharge at this time.    [DC]      ED Course User Index  [DC] Diana Harding Jr., MD            Clinical Impression:       ICD-10-CM ICD-9-CM   1. Dizziness  R42 780.4   2. Essential hypertension  I10 401.9                          ED Disposition Condition    Discharge Stable        ED Prescriptions     Medication Sig Dispense Start Date End Date Auth. Provider    metoprolol tartrate (LOPRESSOR) 50 MG tablet Take 0.5 tablets (25 mg total) by mouth 2 (two) times daily. 90 tablet 11/1/2020 11/1/2021 Diana Harding Jr., MD        Follow-up Information     Follow up With Specialties Details Why Contact Info    John Ricci MD Family Medicine In 1 week  [address] [facility] [address]  [phone]      [facility]  If symptoms worsen [address] [facility] [id number] [phone]                            I, Diana Harding,  personally performed the services described in this documentation. All medical record entries made by the scribe were at my direction and in my presence.  I have reviewed the chart and agree that the record reflects my personal performance and is accurate and complete. Diana Harding Jr., MD  11/02/20 8149

## 2020-11-02 ENCOUNTER — TELEPHONE (OUTPATIENT)
Dept: CARDIOLOGY | Facility: CLINIC | Age: 80
End: 2020-11-02

## 2020-11-02 ENCOUNTER — PATIENT OUTREACH (OUTPATIENT)
Dept: ADMINISTRATIVE | Facility: OTHER | Age: 80
End: 2020-11-02

## 2020-11-02 ENCOUNTER — OFFICE VISIT (OUTPATIENT)
Dept: SURGERY | Facility: CLINIC | Age: 80
End: 2020-11-02
Payer: MEDICARE

## 2020-11-02 VITALS
WEIGHT: 204.25 LBS | DIASTOLIC BLOOD PRESSURE: 93 MMHG | HEART RATE: 62 BPM | HEIGHT: 71 IN | BODY MASS INDEX: 28.6 KG/M2 | SYSTOLIC BLOOD PRESSURE: 195 MMHG

## 2020-11-02 DIAGNOSIS — N18.30 STAGE 3 CHRONIC KIDNEY DISEASE, UNSPECIFIED WHETHER STAGE 3A OR 3B CKD: Primary | ICD-10-CM

## 2020-11-02 DIAGNOSIS — K40.91 RECURRENT LEFT INGUINAL HERNIA: Primary | ICD-10-CM

## 2020-11-02 DIAGNOSIS — K40.90 LEFT INGUINAL HERNIA: ICD-10-CM

## 2020-11-02 PROCEDURE — 99999 PR PBB SHADOW E&M-EST. PATIENT-LVL III: CPT | Mod: PBBFAC,,, | Performed by: SURGERY

## 2020-11-02 PROCEDURE — 99999 PR PBB SHADOW E&M-EST. PATIENT-LVL III: ICD-10-PCS | Mod: PBBFAC,,, | Performed by: SURGERY

## 2020-11-02 PROCEDURE — 1101F PR PT FALLS ASSESS DOC 0-1 FALLS W/OUT INJ PAST YR: ICD-10-PCS | Mod: CPTII,S$GLB,, | Performed by: SURGERY

## 2020-11-02 PROCEDURE — 3077F PR MOST RECENT SYSTOLIC BLOOD PRESSURE >= 140 MM HG: ICD-10-PCS | Mod: CPTII,S$GLB,, | Performed by: SURGERY

## 2020-11-02 PROCEDURE — 3077F SYST BP >= 140 MM HG: CPT | Mod: CPTII,S$GLB,, | Performed by: SURGERY

## 2020-11-02 PROCEDURE — 3080F DIAST BP >= 90 MM HG: CPT | Mod: CPTII,S$GLB,, | Performed by: SURGERY

## 2020-11-02 PROCEDURE — 3080F PR MOST RECENT DIASTOLIC BLOOD PRESSURE >= 90 MM HG: ICD-10-PCS | Mod: CPTII,S$GLB,, | Performed by: SURGERY

## 2020-11-02 PROCEDURE — 1125F AMNT PAIN NOTED PAIN PRSNT: CPT | Mod: S$GLB,,, | Performed by: SURGERY

## 2020-11-02 PROCEDURE — 1125F PR PAIN SEVERITY QUANTIFIED, PAIN PRESENT: ICD-10-PCS | Mod: S$GLB,,, | Performed by: SURGERY

## 2020-11-02 PROCEDURE — 1101F PT FALLS ASSESS-DOCD LE1/YR: CPT | Mod: CPTII,S$GLB,, | Performed by: SURGERY

## 2020-11-02 PROCEDURE — 99204 PR OFFICE/OUTPT VISIT, NEW, LEVL IV, 45-59 MIN: ICD-10-PCS | Mod: S$GLB,,, | Performed by: SURGERY

## 2020-11-02 PROCEDURE — 1159F PR MEDICATION LIST DOCUMENTED IN MEDICAL RECORD: ICD-10-PCS | Mod: S$GLB,,, | Performed by: SURGERY

## 2020-11-02 PROCEDURE — 1159F MED LIST DOCD IN RCRD: CPT | Mod: S$GLB,,, | Performed by: SURGERY

## 2020-11-02 PROCEDURE — 99204 OFFICE O/P NEW MOD 45 MIN: CPT | Mod: S$GLB,,, | Performed by: SURGERY

## 2020-11-02 NOTE — PROGRESS NOTES
Requested updates within Care Everywhere.  Patient's chart was reviewed for overdue LAVONNE topics.  Immunizations reconciled.

## 2020-11-02 NOTE — TELEPHONE ENCOUNTER
----- Message from Anthony Mcdonald MD sent at 11/1/2020  2:30 PM CST -----  Please schedule an office visit for him this Wednesday at 1 oclock range. Call him tomorrow to see how he is doing. I dont have clinic but I will come over.    He was seen in the ER Lisa Sunday for dizziness, bradycardia, & HTN.

## 2020-11-02 NOTE — MEDICAL/APP STUDENT
GENERAL SURGERY CLINIC NOTE    Pedro Ponce Jr. is a 80 y.o. male with history of BPH, HTN, HLD, DM who presents to clinic today for surgical consultation for left inguinal hernia recurrence. Patient has a history of laparoscopic bilateral inguinal repair and open repair of umbilical hernia (11/15/2011). He denies any groin or abdominal pain.   Of note, patient was seen in the ED yesterday (11/1/20) due to dizziness. His blood pressure was elevated and his medications were adjusted.     ROS:   Review of Systems   Constitutional: Negative for chills, fever, malaise/fatigue and weight loss.   HENT: Negative for congestion, sinus pain and sore throat.    Eyes: Negative for blurred vision and pain.   Respiratory: Negative for cough and shortness of breath.    Cardiovascular: Negative for chest pain, palpitations, claudication and leg swelling.   Gastrointestinal: Negative for abdominal pain, constipation, diarrhea, heartburn, nausea and vomiting.   Genitourinary: Positive for frequency and urgency. Negative for dysuria and hematuria.   Musculoskeletal: Negative for joint pain and myalgias.   Skin: Negative for rash.   Neurological: Positive for dizziness. Negative for sensory change, weakness and headaches.   Endo/Heme/Allergies: Does not bruise/bleed easily.   Psychiatric/Behavioral: Negative for depression, substance abuse and suicidal ideas.       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     Past Surgical History:   Procedure Laterality Date    COLONOSCOPY N/A 4/9/2018    Procedure: COLONOSCOPY;  Surgeon: HAIM Mir MD;  Location: [facility] ([address] [place] [address]);  Service: Endoscopy;   Laterality: N/A;    COLONOSCOPY N/A 1/14/2019    Procedure: COLONOSCOPY;  Surgeon: HAIM Mir MD;  Location: [facility] ([address] [place] [address]);  Service: Endoscopy;  Laterality: N/A;  PM prep    CYST REMOVAL  2000    removed from left kidney    KIDNEY SURGERY      PROSTATE SURGERY       Social History     Socioeconomic History    Marital status:      Spouse name: Not on file    Number of children: Not on file    Years of education: Not on file    Highest education level: Not on file   Occupational History    Not on file   Social Needs    Financial resource strain: Not on file    Food insecurity     Worry: Not on file     Inability: Not on file    Transportation needs     Medical: Not on file     Non-medical: Not on file   Tobacco Use    Smoking status: Never Smoker    Smokeless tobacco: Never Used   Substance and Sexual Activity    Alcohol use: No    Drug use: No    Sexual activity: Not on file   Lifestyle    Physical activity     Days per week: Not on file     Minutes per session: Not on file    Stress: Not on file   Relationships    Social connections     Talks on phone: Not on file     Gets together: Not on file     Attends Tenriism service: Not on file     Active member of club or organization: Not on file     Attends meetings of clubs or organizations: Not on file     Relationship status: Not on file   Other Topics Concern    Not on file   Social History Narrative    Not on file     Review of patient's allergies indicates:   Allergen Reactions    Pcn [penicillins] Rash         PHYSICAL EXAM:  Vitals:    11/02/20 1523   BP: (!) 195/93   Pulse: 62     Physical Exam  Constitutional:       Appearance: Normal appearance. He is normal weight.   HENT:      Head: Normocephalic.      Right Ear: Tympanic membrane, ear canal and external ear normal.      Left Ear: Tympanic membrane, ear canal and external ear normal.      Nose: Nose normal.      Mouth/Throat:      Mouth: Mucous membranes are moist.       Pharynx: Oropharynx is clear.   Eyes:      Extraocular Movements: Extraocular movements intact.      Conjunctiva/sclera: Conjunctivae normal.      Pupils: Pupils are equal, round, and reactive to light.   Neck:      Musculoskeletal: Normal range of motion.   Cardiovascular:      Rate and Rhythm: Normal rate and regular rhythm.      Pulses: Normal pulses.      Heart sounds: Normal heart sounds.   Pulmonary:      Effort: Pulmonary effort is normal.      Breath sounds: Normal breath sounds.   Abdominal:      General: Abdomen is flat. Bowel sounds are normal. There is no distension.      Palpations: Abdomen is soft.      Tenderness: There is no abdominal tenderness.      Hernia: A hernia (left inguinal) is present.   Genitourinary:     Penis: Normal.       Scrotum/Testes: Normal.   Musculoskeletal: Normal range of motion.   Skin:     General: Skin is warm and dry.      Capillary Refill: Capillary refill takes less than 2 seconds.   Neurological:      General: No focal deficit present.      Mental Status: He is alert and oriented to person, place, and time.   Psychiatric:         Mood and Affect: Mood normal.         Behavior: Behavior normal.         Thought Content: Thought content normal.         Judgment: Judgment normal.           PERTINENT LABS:  Reviewed. None.      PERTINENT IMAGING:  US abdomen (10/28/20):  Impression:  Bowel and fluid containing left inguinal hernia, not well characterized due to size.    ASSESSMENT/PLAN:  80 y.o. male with left inguinal hernia recurrence    - Schedule inguinal hernia repair for early December   - Return to clinic after surgery       Ángel Alba, MS3

## 2020-11-03 ENCOUNTER — LAB VISIT (OUTPATIENT)
Dept: LAB | Facility: HOSPITAL | Age: 80
End: 2020-11-03
Payer: MEDICARE

## 2020-11-03 ENCOUNTER — OFFICE VISIT (OUTPATIENT)
Dept: INTERNAL MEDICINE | Facility: CLINIC | Age: 80
End: 2020-11-03
Payer: MEDICARE

## 2020-11-03 ENCOUNTER — TELEPHONE (OUTPATIENT)
Dept: FAMILY MEDICINE | Facility: CLINIC | Age: 80
End: 2020-11-03

## 2020-11-03 ENCOUNTER — OFFICE VISIT (OUTPATIENT)
Dept: OPHTHALMOLOGY | Facility: CLINIC | Age: 80
End: 2020-11-03
Payer: MEDICARE

## 2020-11-03 VITALS
DIASTOLIC BLOOD PRESSURE: 90 MMHG | WEIGHT: 203.69 LBS | HEIGHT: 71 IN | OXYGEN SATURATION: 99 % | SYSTOLIC BLOOD PRESSURE: 142 MMHG | TEMPERATURE: 97 F | HEART RATE: 60 BPM | BODY MASS INDEX: 28.52 KG/M2

## 2020-11-03 DIAGNOSIS — N18.30 STAGE 3 CHRONIC KIDNEY DISEASE, UNSPECIFIED WHETHER STAGE 3A OR 3B CKD: ICD-10-CM

## 2020-11-03 DIAGNOSIS — I10 ESSENTIAL HYPERTENSION: Primary | ICD-10-CM

## 2020-11-03 DIAGNOSIS — H43.393 VITREOUS FLOATERS OF BOTH EYES: ICD-10-CM

## 2020-11-03 DIAGNOSIS — H35.723 RETINAL PIGMENT EPITHELIAL DETACHMENT OF BOTH EYES: ICD-10-CM

## 2020-11-03 DIAGNOSIS — H35.033 HYPERTENSIVE RETINOPATHY, BILATERAL: ICD-10-CM

## 2020-11-03 DIAGNOSIS — H35.3211 EXUDATIVE AGE-RELATED MACULAR DEGENERATION OF RIGHT EYE WITH ACTIVE CHOROIDAL NEOVASCULARIZATION: Primary | ICD-10-CM

## 2020-11-03 LAB
ALBUMIN SERPL BCP-MCNC: 4 G/DL (ref 3.5–5.2)
ANION GAP SERPL CALC-SCNC: 7 MMOL/L (ref 8–16)
BASOPHILS # BLD AUTO: 0.02 K/UL (ref 0–0.2)
BASOPHILS NFR BLD: 0.5 % (ref 0–1.9)
BUN SERPL-MCNC: 19 MG/DL (ref 8–23)
CALCIUM SERPL-MCNC: 10 MG/DL (ref 8.7–10.5)
CHLORIDE SERPL-SCNC: 103 MMOL/L (ref 95–110)
CO2 SERPL-SCNC: 28 MMOL/L (ref 23–29)
CREAT SERPL-MCNC: 1.3 MG/DL (ref 0.5–1.4)
DIFFERENTIAL METHOD: NORMAL
EOSINOPHIL # BLD AUTO: 0.1 K/UL (ref 0–0.5)
EOSINOPHIL NFR BLD: 1.4 % (ref 0–8)
ERYTHROCYTE [DISTWIDTH] IN BLOOD BY AUTOMATED COUNT: 14 % (ref 11.5–14.5)
EST. GFR  (AFRICAN AMERICAN): 59.2 ML/MIN/1.73 M^2
EST. GFR  (NON AFRICAN AMERICAN): 51.2 ML/MIN/1.73 M^2
GLUCOSE SERPL-MCNC: 99 MG/DL (ref 70–110)
HCT VFR BLD AUTO: 44.2 % (ref 40–54)
HGB BLD-MCNC: 14.2 G/DL (ref 14–18)
IMM GRANULOCYTES # BLD AUTO: 0.01 K/UL (ref 0–0.04)
IMM GRANULOCYTES NFR BLD AUTO: 0.2 % (ref 0–0.5)
LYMPHOCYTES # BLD AUTO: 1.5 K/UL (ref 1–4.8)
LYMPHOCYTES NFR BLD: 34.4 % (ref 18–48)
MCH RBC QN AUTO: 30.8 PG (ref 27–31)
MCHC RBC AUTO-ENTMCNC: 32.1 G/DL (ref 32–36)
MCV RBC AUTO: 96 FL (ref 82–98)
MONOCYTES # BLD AUTO: 0.5 K/UL (ref 0.3–1)
MONOCYTES NFR BLD: 12.3 % (ref 4–15)
NEUTROPHILS # BLD AUTO: 2.2 K/UL (ref 1.8–7.7)
NEUTROPHILS NFR BLD: 51.2 % (ref 38–73)
NRBC BLD-RTO: 0 /100 WBC
PHOSPHATE SERPL-MCNC: 2.5 MG/DL (ref 2.7–4.5)
PLATELET # BLD AUTO: 206 K/UL (ref 150–350)
PMV BLD AUTO: 9.8 FL (ref 9.2–12.9)
POTASSIUM SERPL-SCNC: 4.2 MMOL/L (ref 3.5–5.1)
PTH-INTACT SERPL-MCNC: 145 PG/ML (ref 9–77)
RBC # BLD AUTO: 4.61 M/UL (ref 4.6–6.2)
SODIUM SERPL-SCNC: 138 MMOL/L (ref 136–145)
WBC # BLD AUTO: 4.24 K/UL (ref 3.9–12.7)

## 2020-11-03 PROCEDURE — 1126F AMNT PAIN NOTED NONE PRSNT: CPT | Mod: S$GLB,,, | Performed by: INTERNAL MEDICINE

## 2020-11-03 PROCEDURE — 1159F PR MEDICATION LIST DOCUMENTED IN MEDICAL RECORD: ICD-10-PCS | Mod: S$GLB,,, | Performed by: INTERNAL MEDICINE

## 2020-11-03 PROCEDURE — 99213 OFFICE O/P EST LOW 20 MIN: CPT | Mod: S$GLB,,, | Performed by: INTERNAL MEDICINE

## 2020-11-03 PROCEDURE — 1159F MED LIST DOCD IN RCRD: CPT | Mod: S$GLB,,, | Performed by: INTERNAL MEDICINE

## 2020-11-03 PROCEDURE — 36415 COLL VENOUS BLD VENIPUNCTURE: CPT

## 2020-11-03 PROCEDURE — 3077F SYST BP >= 140 MM HG: CPT | Mod: CPTII,S$GLB,, | Performed by: INTERNAL MEDICINE

## 2020-11-03 PROCEDURE — 92014 PR EYE EXAM, EST PATIENT,COMPREHESV: ICD-10-PCS | Mod: S$GLB,,, | Performed by: OPHTHALMOLOGY

## 2020-11-03 PROCEDURE — 1126F PR PAIN SEVERITY QUANTIFIED, NO PAIN PRESENT: ICD-10-PCS | Mod: S$GLB,,, | Performed by: INTERNAL MEDICINE

## 2020-11-03 PROCEDURE — 1101F PT FALLS ASSESS-DOCD LE1/YR: CPT | Mod: CPTII,S$GLB,, | Performed by: INTERNAL MEDICINE

## 2020-11-03 PROCEDURE — 3080F PR MOST RECENT DIASTOLIC BLOOD PRESSURE >= 90 MM HG: ICD-10-PCS | Mod: CPTII,S$GLB,, | Performed by: INTERNAL MEDICINE

## 2020-11-03 PROCEDURE — 99213 PR OFFICE/OUTPT VISIT, EST, LEVL III, 20-29 MIN: ICD-10-PCS | Mod: S$GLB,,, | Performed by: INTERNAL MEDICINE

## 2020-11-03 PROCEDURE — 92134 POSTERIOR SEGMENT OCT RETINA (OCULAR COHERENCE TOMOGRAPHY)-BOTH EYES: ICD-10-PCS | Mod: S$GLB,,, | Performed by: OPHTHALMOLOGY

## 2020-11-03 PROCEDURE — 85025 COMPLETE CBC W/AUTO DIFF WBC: CPT

## 2020-11-03 PROCEDURE — 92014 COMPRE OPH EXAM EST PT 1/>: CPT | Mod: S$GLB,,, | Performed by: OPHTHALMOLOGY

## 2020-11-03 PROCEDURE — 99999 PR PBB SHADOW E&M-EST. PATIENT-LVL III: ICD-10-PCS | Mod: PBBFAC,,, | Performed by: OPHTHALMOLOGY

## 2020-11-03 PROCEDURE — 99999 PR PBB SHADOW E&M-EST. PATIENT-LVL IV: ICD-10-PCS | Mod: PBBFAC,,, | Performed by: INTERNAL MEDICINE

## 2020-11-03 PROCEDURE — 3077F PR MOST RECENT SYSTOLIC BLOOD PRESSURE >= 140 MM HG: ICD-10-PCS | Mod: CPTII,S$GLB,, | Performed by: INTERNAL MEDICINE

## 2020-11-03 PROCEDURE — 92134 CPTRZ OPH DX IMG PST SGM RTA: CPT | Mod: S$GLB,,, | Performed by: OPHTHALMOLOGY

## 2020-11-03 PROCEDURE — 92202 OPSCPY EXTND ON/MAC DRAW: CPT | Mod: S$GLB,,, | Performed by: OPHTHALMOLOGY

## 2020-11-03 PROCEDURE — 92202 PR OPHTHALMOSCOPY, EXT, W/DRAW OPTIC NERVE/MACULA, I&R, UNI/BI: ICD-10-PCS | Mod: S$GLB,,, | Performed by: OPHTHALMOLOGY

## 2020-11-03 PROCEDURE — 99999 PR PBB SHADOW E&M-EST. PATIENT-LVL IV: CPT | Mod: PBBFAC,,, | Performed by: INTERNAL MEDICINE

## 2020-11-03 PROCEDURE — 83970 ASSAY OF PARATHORMONE: CPT

## 2020-11-03 PROCEDURE — 99999 PR PBB SHADOW E&M-EST. PATIENT-LVL III: CPT | Mod: PBBFAC,,, | Performed by: OPHTHALMOLOGY

## 2020-11-03 PROCEDURE — 3080F DIAST BP >= 90 MM HG: CPT | Mod: CPTII,S$GLB,, | Performed by: INTERNAL MEDICINE

## 2020-11-03 PROCEDURE — 1101F PR PT FALLS ASSESS DOC 0-1 FALLS W/OUT INJ PAST YR: ICD-10-PCS | Mod: CPTII,S$GLB,, | Performed by: INTERNAL MEDICINE

## 2020-11-03 PROCEDURE — 80069 RENAL FUNCTION PANEL: CPT

## 2020-11-03 NOTE — PROGRESS NOTES
"Subjective:       Patient ID: Pedro Ponce Jr. is a 80 y.o. male.    Chief Complaint: Hypertension    This patient is new to me     HTN  Complication: no CVA/CKD/CAD  Last labs : 11/3/2020 GFR 59   Medication: adherent to   - amlodipine 10mg daily  - Hydralazine 50mg daiyl  - Irbesartan 300mg daiyl   - spironlactone 25mg daily   - metoprolol 25mg daily   Home Bps; has home bp cuff  Symptoms: denies CP, SOB, changes in urination, sudden weakness, numbness. Has gone to the ED for dizzinesin the last 3 months    HPI  Review of Systems   All other systems reviewed and are negative.        Objective:       BP (!) 142/90 (BP Location: Right arm, Patient Position: Sitting, BP Method: Medium (Manual))   Pulse 60   Temp 97.3 °F (36.3 °C) (Temporal)   Ht 5' 11" (1.803 m)   Wt 92.4 kg (203 lb 11.3 oz)   SpO2 99%   BMI 28.41 kg/m²     Physical Exam  Vitals signs and nursing note reviewed.   Constitutional:       General: He is not in acute distress.     Appearance: He is well-developed. He is not diaphoretic.   HENT:      Head: Normocephalic.      Mouth/Throat:      Pharynx: No oropharyngeal exudate.   Eyes:      General:         Right eye: No discharge.         Left eye: No discharge.      Conjunctiva/sclera: Conjunctivae normal.      Pupils: Pupils are equal, round, and reactive to light.   Cardiovascular:      Rate and Rhythm: Normal rate and regular rhythm.      Heart sounds: Normal heart sounds. No murmur. No friction rub. No gallop.    Pulmonary:      Effort: Pulmonary effort is normal. No respiratory distress.   Abdominal:      General: There is no distension.      Palpations: Abdomen is soft.   Skin:     General: Skin is warm.   Neurological:      Mental Status: He is alert.         Assessment:       1. Essential hypertension        Plan:       Pedro was seen today for hypertension.    Diagnoses and all orders for this visit:    Essential hypertension  New;uncontrolled  -- Patient is NOT at goal today  -- " Labs are reviewed and mild decr GFR  -- given the recent episodes of dizzines would hold off on incraase and defer to Nephro, cards and PCP  -- antihypertensive regimen cont as below  --  return for BP follow up as scheudled       Future Appointments   Date Time Provider Department Center   11/5/2020  3:00 PM Lawanda Walker DO [facility] NEPHRO [facility]   11/11/2020  9:20 AM Anthony Mcdonald MD [facility] CARDIO [facility]   2/2/2021  8:25 AM [facility] [place]   2/9/2021 11:00 AM John Ricci MD [facility]         Medication List with Changes/Refills   Current Medications    AMLODIPINE-ATORVASTATIN (CADUET) 10-10 MG PER TABLET    TAKE 1 TABLET BY MOUTH EVERY DAY    ASPIRIN 81 MG TAB    Take 81 mg by mouth. 1 Tablet Oral Every evening    ATORVASTATIN (LIPITOR) 10 MG TABLET    Take 10 mg by mouth every evening.    CHLORHEXIDINE (PERIDEX) 0.12 % SOLUTION    RINSE 1/2 OZ TWICE DAILY AFTER BREAKFAST AND BEFORE BEDTIME    DUTASTERIDE (AVODART) 0.5 MG CAPSULE    TAKE 1 CAPSULE (0.5 MG TOTAL) BY MOUTH ONCE DAILY.    FLU VACC [id number],65YR UP,PF (FLUZONE HIGH-DOSE 2019-20, PF,) 180 MCG/0.5 ML SYRG    uud    FLU VACC [id number],65YR UP,PF (FLUZONE HIGH-DOSE 2019-20, PF,) 180 MCG/0.5 ML SYRG    admin as directed    FLUZONE HIGHDOSE QUAD 20-21  MCG/0.7 ML SYRG    TO BE ADMINISTERED BY PHARMACIST FOR IMMUNIZATION    HYDRALAZINE (APRESOLINE) 50 MG TABLET    Take 1 tablet (50 mg total) by mouth every 8 (eight) hours.    IRBESARTAN (AVAPRO) 300 MG TABLET    TAKE 1 TABLET BY MOUTH EVERY DAY    METOPROLOL TARTRATE (LOPRESSOR) 50 MG TABLET    Take 0.5 tablets (25 mg total) by mouth 2 (two) times daily.    OXYBUTYNIN (DITROPAN-XL) 10 MG 24 HR TABLET    Take 1 tablet (10 mg total) by mouth once daily.    SPIRONOLACTONE (ALDACTONE) 25 MG TABLET    Take 1 tablet (25 mg total) by mouth once daily.    TAMSULOSIN (FLOMAX) 0.4 MG CAP    Take 1 capsule (0.4 mg total) by mouth every evening.    VARICELLA-ZOSTER  [id number], PF, (SHINGRIX, PF,) 50 MCG/0.5 ML INJECTION    Inject into the muscle.         Disclaimer:  This note has been generated using voice-recognition software. There may be grammatical or spelling errors that have been missed during proof-reading

## 2020-11-03 NOTE — PROGRESS NOTES
HPI     DLS: 06/09/2020 Dr. Newell    Pt states his vision has been stable since the last visit. Denies flashes,   floaters, diplopia, photophobia, glare, HA's, or pain.    Eye meds:  AT's PRN OU        OCT -SRF resolved, slight increase in temporal IR fluid  OS - stable small flat inferior PED, no SRF      Prior ICG/FA - leakage IT to fovea OD      A/P    1. IPCV OU vs wet amd   With subfoveal PED OD  Avastin resumed 1/14  S/p Avastin OD #8 in past, resumed OD x 6   S/p PDT OD 9/18  OS - PED inferior to fovea stable    2/18 - recurrent swelling OD, IT to fovea  5/18 - increased CME and superior SRF  4/19 - slight increase in IRF, ASx - will follow     2. Floaters OU    3. POAG - Good IOP, continue management as per Dr. Hidalgo  - Needs f/u appt    4. PCIOL OU      6 months OCT No Dilate

## 2020-11-03 NOTE — TELEPHONE ENCOUNTER
Spoke to patient and made an appointment for his elevated blood pressure today. Patient voices understanding.

## 2020-11-03 NOTE — TELEPHONE ENCOUNTER
----- Message from Gunjan Isabel sent at 11/3/2020  9:47 AM CST -----  Contact: pt, [phone]  Patient requests to be seen for high blood pressure sooner than the next available appointment. States he had to cancel last week due to not having power at home. States he can come in after 3 pm today if needed. Please advise.

## 2020-11-03 NOTE — TELEPHONE ENCOUNTER
----- Message from Suzy Addison sent at 11/3/2020 11:27 AM CST -----  Type:  Needs Medical Advice    Who Called: self  Reason:returning call  Would the patient rather a call back or a response via [facility]? call  Best Call Back Number: [phone]  Additional Information: none

## 2020-11-03 NOTE — H&P (VIEW-ONLY)
GENERAL SURGERY CLINIC NOTE   Pedro Ponce Jr. is a 80 y.o. male with history of BPH, HTN, HLD, DM who presents to clinic today for surgical consultation for left inguinal hernia recurrence. Patient has a history of laparoscopic bilateral inguinal repair and open repair of umbilical hernia (11/15/2011). He denies any groin or abdominal pain.   Of note, patient was seen in the ED yesterday (11/1/20) due to dizziness. His blood pressure was elevated and his medications were adjusted.   ROS:   Review of Systems   Constitutional: Negative for chills, fever, malaise/fatigue and weight loss.   HENT: Negative for congestion, sinus pain and sore throat.   Eyes: Negative for blurred vision and pain.   Respiratory: Negative for cough and shortness of breath.   Cardiovascular: Negative for chest pain, palpitations, claudication and leg swelling.   Gastrointestinal: Negative for abdominal pain, constipation, diarrhea, heartburn, nausea and vomiting.   Genitourinary: Positive for frequency and urgency. Negative for dysuria and hematuria.   Musculoskeletal: Negative for joint pain and myalgias.   Skin: Negative for rash.   Neurological: Positive for dizziness. Negative for sensory change, weakness and headaches.   Endo/Heme/Allergies: Does not bruise/bleed easily.   Psychiatric/Behavioral: Negative for depression, substance abuse and suicidal ideas.          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           Past Surgical History:   Procedure Laterality Date    COLONOSCOPY N/A 4/9/2018    Procedure: COLONOSCOPY; Surgeon: HAIM Mir MD; Location: [facility] ([address] [place] [address]); Service: Endoscopy;  Laterality: N/A;    COLONOSCOPY N/A 1/14/2019    Procedure: COLONOSCOPY; Surgeon: HAIM Mir MD; Location: [facility] ([address] [place] [address]); Service: Endoscopy; Laterality: N/A; PM prep    CYST REMOVAL  2000    removed from left kidney    KIDNEY SURGERY      PROSTATE SURGERY       Social History     Socioeconomic History    Marital status:      Spouse name: Not on file    Number of children: Not on file    Years of education: Not on file    Highest education level: Not on file   Occupational History    Not on file   Social Needs    Financial resource strain: Not on file    Food insecurity     Worry: Not on file     Inability: Not on file    Transportation needs     Medical: Not on file     Non-medical: Not on file   Tobacco Use    Smoking status: Never Smoker    Smokeless tobacco: Never Used   Substance and Sexual Activity    Alcohol use: No    Drug use: No    Sexual activity: Not on file   Lifestyle    Physical activity     Days per week: Not on file     Minutes per session: Not on file    Stress: Not on file   Relationships    Social connections     Talks on phone: Not on file     Gets together: Not on file     Attends Yarsanism service: Not on file     Active member of club or organization: Not on file     Attends meetings of clubs or organizations: Not on file     Relationship status: Not on file   Other Topics Concern    Not on file   Social History Narrative    Not on file            Review of patient's allergies indicates:   Allergen Reactions    Pcn [penicillins] Rash   PHYSICAL EXAM:       Vitals:    11/02/20 1523   BP: (!) 195/93   Pulse: 62   Physical Exam   Constitutional:   Appearance: Normal appearance. He is normal weight.   HENT:   Head: Normocephalic.   Right Ear: Tympanic membrane, ear canal and external ear normal.   Left Ear: Tympanic membrane, ear canal and external ear normal.   Nose: Nose normal.   Mouth/Throat:   Mouth: Mucous membranes are moist.   Pharynx: Oropharynx  is clear.   Eyes:   Extraocular Movements: Extraocular movements intact.   Conjunctiva/sclera: Conjunctivae normal.   Pupils: Pupils are equal, round, and reactive to light.   Neck:   Musculoskeletal: Normal range of motion.   Cardiovascular:   Rate and Rhythm: Normal rate and regular rhythm.   Pulses: Normal pulses.   Heart sounds: Normal heart sounds.   Pulmonary:   Effort: Pulmonary effort is normal.   Breath sounds: Normal breath sounds.   Abdominal:   General: Abdomen is flat. Bowel sounds are normal. There is no distension.   Palpations: Abdomen is soft.   Tenderness: There is no abdominal tenderness.   Hernia: A hernia (left inguinal) is present.   Genitourinary:   Penis: Normal.   Scrotum/Testes: Normal.   Musculoskeletal: Normal range of motion.   Skin:   General: Skin is warm and dry.   Capillary Refill: Capillary refill takes less than 2 seconds.   Neurological:   General: No focal deficit present.   Mental Status: He is alert and oriented to person, place, and time.   Psychiatric:   Mood and Affect: Mood normal.   Behavior: Behavior normal.   Thought Content: Thought content normal.   Judgment: Judgment normal.     PERTINENT LABS:   Reviewed. None.   PERTINENT IMAGING:   US abdomen (10/28/20):   Impression:   Bowel and fluid containing left inguinal hernia, not well characterized due to size.     ASSESSMENT/PLAN:   80 y.o. male with left inguinal hernia recurrence   - Schedule inguinal hernia repair for early December   - Will obtain consent am of surgery

## 2020-11-05 ENCOUNTER — OFFICE VISIT (OUTPATIENT)
Dept: NEPHROLOGY | Facility: CLINIC | Age: 80
End: 2020-11-05
Payer: MEDICARE

## 2020-11-05 VITALS
OXYGEN SATURATION: 98 % | DIASTOLIC BLOOD PRESSURE: 90 MMHG | WEIGHT: 200.63 LBS | BODY MASS INDEX: 27.98 KG/M2 | HEART RATE: 58 BPM | SYSTOLIC BLOOD PRESSURE: 190 MMHG

## 2020-11-05 DIAGNOSIS — R80.9 PROTEINURIA, UNSPECIFIED TYPE: ICD-10-CM

## 2020-11-05 DIAGNOSIS — N18.30 STAGE 3 CHRONIC KIDNEY DISEASE, UNSPECIFIED WHETHER STAGE 3A OR 3B CKD: Primary | ICD-10-CM

## 2020-11-05 DIAGNOSIS — I10 HYPERTENSION, UNSPECIFIED TYPE: ICD-10-CM

## 2020-11-05 PROCEDURE — 1126F AMNT PAIN NOTED NONE PRSNT: CPT | Mod: S$GLB,,, | Performed by: INTERNAL MEDICINE

## 2020-11-05 PROCEDURE — 1126F PR PAIN SEVERITY QUANTIFIED, NO PAIN PRESENT: ICD-10-PCS | Mod: S$GLB,,, | Performed by: INTERNAL MEDICINE

## 2020-11-05 PROCEDURE — 1101F PT FALLS ASSESS-DOCD LE1/YR: CPT | Mod: CPTII,S$GLB,, | Performed by: INTERNAL MEDICINE

## 2020-11-05 PROCEDURE — 3077F SYST BP >= 140 MM HG: CPT | Mod: CPTII,S$GLB,, | Performed by: INTERNAL MEDICINE

## 2020-11-05 PROCEDURE — 1159F MED LIST DOCD IN RCRD: CPT | Mod: S$GLB,,, | Performed by: INTERNAL MEDICINE

## 2020-11-05 PROCEDURE — 99214 OFFICE O/P EST MOD 30 MIN: CPT | Mod: S$GLB,,, | Performed by: INTERNAL MEDICINE

## 2020-11-05 PROCEDURE — 99999 PR PBB SHADOW E&M-EST. PATIENT-LVL III: CPT | Mod: PBBFAC,,, | Performed by: INTERNAL MEDICINE

## 2020-11-05 PROCEDURE — 3077F PR MOST RECENT SYSTOLIC BLOOD PRESSURE >= 140 MM HG: ICD-10-PCS | Mod: CPTII,S$GLB,, | Performed by: INTERNAL MEDICINE

## 2020-11-05 PROCEDURE — 3080F DIAST BP >= 90 MM HG: CPT | Mod: CPTII,S$GLB,, | Performed by: INTERNAL MEDICINE

## 2020-11-05 PROCEDURE — 1101F PR PT FALLS ASSESS DOC 0-1 FALLS W/OUT INJ PAST YR: ICD-10-PCS | Mod: CPTII,S$GLB,, | Performed by: INTERNAL MEDICINE

## 2020-11-05 PROCEDURE — 3080F PR MOST RECENT DIASTOLIC BLOOD PRESSURE >= 90 MM HG: ICD-10-PCS | Mod: CPTII,S$GLB,, | Performed by: INTERNAL MEDICINE

## 2020-11-05 PROCEDURE — 99999 PR PBB SHADOW E&M-EST. PATIENT-LVL III: ICD-10-PCS | Mod: PBBFAC,,, | Performed by: INTERNAL MEDICINE

## 2020-11-05 PROCEDURE — 99214 PR OFFICE/OUTPT VISIT, EST, LEVL IV, 30-39 MIN: ICD-10-PCS | Mod: S$GLB,,, | Performed by: INTERNAL MEDICINE

## 2020-11-05 PROCEDURE — 1159F PR MEDICATION LIST DOCUMENTED IN MEDICAL RECORD: ICD-10-PCS | Mod: S$GLB,,, | Performed by: INTERNAL MEDICINE

## 2020-11-05 RX ORDER — SPIRONOLACTONE 50 MG/1
50 TABLET, FILM COATED ORAL DAILY
Qty: 90 TABLET | Refills: 3 | Status: ON HOLD | OUTPATIENT
Start: 2020-11-05 | End: 2020-12-26 | Stop reason: HOSPADM

## 2020-11-05 NOTE — PROGRESS NOTES
Subjective:       Patient ID: Pedro Ponce Jr. is a 80 y.o. Black or  male who presents for follow-up evaluation of Chronic Kidney Disease (Pt. is scheduled for abd.hernia on bladder surgery on 12/01/2020 )    HPI This is a 80 -year-old -American male with longstanding hypertension, diet-controlled diabetes, proteinuria, bilateral renal cysts is here for f/u of these. Bp's high in the 180's at home and is under stress.  Diet controlled DM stable with A1c is 6.0. Creatinine stable at 1.3. Slight proteinuria.  walking 2-3 times a week.   Denies NSAID's. No recent hopspitalizations but went to ED for BP's.    PAST MEDICAL HISTORY: Type 2 diabetes diet controlled, hypertension for   approximately 20 years, hyperlipidemia, gout, glaucoma, BPH and TURP in   2006 and followed in Urology. He also had questionable cyst versus tumor   removed by Dr. Drew about 17 years ago and this was not malignant per   patient.     Review of Systems   Constitutional: Positive for appetite change and fatigue. Negative for unexpected weight change.          Lost a few lbs.   Eyes: Negative for discharge.   Respiratory: Negative for cough, shortness of breath and wheezing.    Cardiovascular: Negative for chest pain and palpitations.   Gastrointestinal: Negative for abdominal pain, diarrhea, nausea and vomiting.        Nocturia   Genitourinary: Negative.  Negative for dysuria, frequency, hematuria and urgency.   Skin: Negative for color change and rash.   Psychiatric/Behavioral: Negative for confusion.       Objective:    Full PE not done.  No edema  Physical Exam   Constitutional: He is oriented to person, place, and time. He appears well-developed and well-nourished.   Cardiovascular: Normal rate and regular rhythm. Exam reveals no friction rub.   No murmur heard.  Pulmonary/Chest: Effort normal and breath sounds normal. No respiratory distress. He has no rales.   Abdominal: Soft. Bowel sounds are normal. There  is no abdominal tenderness. There is no rebound.   Musculoskeletal:         General: No edema.   Neurological: He is alert and oriented to person, place, and time.   Skin: No rash noted. No erythema.   Psychiatric: He has a normal mood and affect. Judgment normal.       Assessment:       No diagnosis found.    Plan:         This is a 80 -year-old -American male with   longstanding hypertension, diet-controlled diabetes comes in for f/u of bilateral renal cysts.   1. Renal cysts. He does have multiple cysts on bilateral kidneys. 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 He denies any hematuria. Repeat ultrasound.  Followed in urology.  Had cysto/SUDS.  Unclear if it's acquired cystic disease-no family H/o. US without any Tumors-stable form last time.  Had nonmalignant tumor removed many years ago in urology.  Last  US in jan 20 stable. Had cystoscopy in 2020.   2. Hypertension: Blood pressures are higher and recommended better control . Encouraged excersise and low salt diet. Increase spironolactone to 50 mg for better bp's and unclear if he is on amlodipine 10 mg-he will call us and we can restart it.  3. Proteinuria. Slight increase in proteinuria to 800 mg from 300 mg  of proteinuria sec to HTN. On avapro. Rec tighter bp control.  Off HCTZ b/c of previous higher calciums. On  spironolactone as  he is not on a diuretic and potassiums on lower side and to help proteinuria..    4. Anemia. H&H is stable.   5. Renal/CKD stg 2- 3: creatinine is 1.3 and baseline around 1.2-1.4 with GFR of 59 ml/min .  Encouraged him to hydrate well.   Alkalosis stable -hydrate well. Checked  floyd/renin levels.   6. UTI: No recent UTI's.    Has high wbc's in urine chronically.   Calcium borderline elvated chronically but i ca stable and recent ca stable. spep nml.  off MVI.  PTH stable for his ckd. Vit D stable. If the ca worsen, will need parathyroid scan to evaluate  possible primary hyperpara but stable now.     F/u in 4 months.    Addendum:  Start amlodipine 10 mg as he is not taking it now.

## 2020-11-06 RX ORDER — AMLODIPINE BESYLATE 10 MG/1
10 TABLET ORAL DAILY
Qty: 30 TABLET | Refills: 11 | Status: SHIPPED | OUTPATIENT
Start: 2020-11-06 | End: 2020-11-11

## 2020-11-11 ENCOUNTER — OFFICE VISIT (OUTPATIENT)
Dept: CARDIOLOGY | Facility: CLINIC | Age: 80
End: 2020-11-11
Payer: MEDICARE

## 2020-11-11 VITALS
BODY MASS INDEX: 28.2 KG/M2 | HEART RATE: 76 BPM | DIASTOLIC BLOOD PRESSURE: 74 MMHG | SYSTOLIC BLOOD PRESSURE: 155 MMHG | WEIGHT: 202.19 LBS | OXYGEN SATURATION: 98 %

## 2020-11-11 DIAGNOSIS — E11.22 TYPE 2 DIABETES MELLITUS WITH STAGE 3A CHRONIC KIDNEY DISEASE, WITHOUT LONG-TERM CURRENT USE OF INSULIN: ICD-10-CM

## 2020-11-11 DIAGNOSIS — T50.905A BRADYCARDIA, DRUG INDUCED: ICD-10-CM

## 2020-11-11 DIAGNOSIS — E78.00 PURE HYPERCHOLESTEROLEMIA: ICD-10-CM

## 2020-11-11 DIAGNOSIS — R94.31 NONSPECIFIC ABNORMAL ELECTROCARDIOGRAM (ECG) (EKG): Primary | ICD-10-CM

## 2020-11-11 DIAGNOSIS — R00.1 BRADYCARDIA, DRUG INDUCED: ICD-10-CM

## 2020-11-11 DIAGNOSIS — I10 ESSENTIAL HYPERTENSION: ICD-10-CM

## 2020-11-11 DIAGNOSIS — N18.31 TYPE 2 DIABETES MELLITUS WITH STAGE 3A CHRONIC KIDNEY DISEASE, WITHOUT LONG-TERM CURRENT USE OF INSULIN: ICD-10-CM

## 2020-11-11 PROCEDURE — 3078F PR MOST RECENT DIASTOLIC BLOOD PRESSURE < 80 MM HG: ICD-10-PCS | Mod: CPTII,S$GLB,, | Performed by: INTERNAL MEDICINE

## 2020-11-11 PROCEDURE — 99214 OFFICE O/P EST MOD 30 MIN: CPT | Mod: S$GLB,,, | Performed by: INTERNAL MEDICINE

## 2020-11-11 PROCEDURE — 1159F MED LIST DOCD IN RCRD: CPT | Mod: S$GLB,,, | Performed by: INTERNAL MEDICINE

## 2020-11-11 PROCEDURE — 99214 PR OFFICE/OUTPT VISIT, EST, LEVL IV, 30-39 MIN: ICD-10-PCS | Mod: S$GLB,,, | Performed by: INTERNAL MEDICINE

## 2020-11-11 PROCEDURE — 3077F PR MOST RECENT SYSTOLIC BLOOD PRESSURE >= 140 MM HG: ICD-10-PCS | Mod: CPTII,S$GLB,, | Performed by: INTERNAL MEDICINE

## 2020-11-11 PROCEDURE — 1101F PR PT FALLS ASSESS DOC 0-1 FALLS W/OUT INJ PAST YR: ICD-10-PCS | Mod: CPTII,S$GLB,, | Performed by: INTERNAL MEDICINE

## 2020-11-11 PROCEDURE — 1126F AMNT PAIN NOTED NONE PRSNT: CPT | Mod: S$GLB,,, | Performed by: INTERNAL MEDICINE

## 2020-11-11 PROCEDURE — 99999 PR PBB SHADOW E&M-EST. PATIENT-LVL IV: CPT | Mod: PBBFAC,,, | Performed by: INTERNAL MEDICINE

## 2020-11-11 PROCEDURE — 99999 PR PBB SHADOW E&M-EST. PATIENT-LVL IV: ICD-10-PCS | Mod: PBBFAC,,, | Performed by: INTERNAL MEDICINE

## 2020-11-11 PROCEDURE — 3077F SYST BP >= 140 MM HG: CPT | Mod: CPTII,S$GLB,, | Performed by: INTERNAL MEDICINE

## 2020-11-11 PROCEDURE — 1101F PT FALLS ASSESS-DOCD LE1/YR: CPT | Mod: CPTII,S$GLB,, | Performed by: INTERNAL MEDICINE

## 2020-11-11 PROCEDURE — 3078F DIAST BP <80 MM HG: CPT | Mod: CPTII,S$GLB,, | Performed by: INTERNAL MEDICINE

## 2020-11-11 PROCEDURE — 1126F PR PAIN SEVERITY QUANTIFIED, NO PAIN PRESENT: ICD-10-PCS | Mod: S$GLB,,, | Performed by: INTERNAL MEDICINE

## 2020-11-11 PROCEDURE — 1159F PR MEDICATION LIST DOCUMENTED IN MEDICAL RECORD: ICD-10-PCS | Mod: S$GLB,,, | Performed by: INTERNAL MEDICINE

## 2020-11-11 RX ORDER — NIFEDIPINE 60 MG/1
60 TABLET, EXTENDED RELEASE ORAL 2 TIMES DAILY
Qty: 180 TABLET | Refills: 4 | Status: ON HOLD | OUTPATIENT
Start: 2020-11-11 | End: 2020-12-26 | Stop reason: HOSPADM

## 2020-11-11 RX ORDER — HYDRALAZINE HYDROCHLORIDE 100 MG/1
100 TABLET, FILM COATED ORAL EVERY 8 HOURS
Qty: 270 TABLET | Refills: 4 | Status: SHIPPED | OUTPATIENT
Start: 2020-11-11 | End: 2020-11-16

## 2020-11-11 NOTE — ASSESSMENT & PLAN NOTE
Medication changes have been made today.  He is having surgery 12/01/2020.  He is advised to call if the changes made today do not improve his blood pressure readings.  He is very concerned about his blood pressure given upcoming surgery.

## 2020-11-11 NOTE — PROGRESS NOTES
[facility] Cardiology     Subjective:    Patient ID:  Pedro Ponce Jr. is a 80 y.o. male who presents for follow-up of Hypertension and Bradycardia    Review of patient's allergies indicates:   Allergen Reactions    Pcn [penicillins] Rash      The patient has multidrug hypertension.  There is underlying CKD stage 3.  I recently visited with him in the emergency room.  He was there for dizziness and had mild bradycardia.  He has been on metoprolol.  Tapering doses were initiated recently.  He states his dizziness has improved.  Today he is not bradycardia.    He has upcoming hernia surgery 12/01/2020.  When he was in his doctor's office his blood pressure was 190 systolic.  Medication changes have been recently made by his nephrologist.  He monitors his blood pressures at home.  He has seen 170-180 systolic range readings.  He does not check his heart rates at home.    Hypertension  Pertinent negatives include no blurred vision, chest pain, headaches, malaise/fatigue, neck pain, orthopnea, palpitations, PND or shortness of breath.       Review of Systems   Constitution: Negative for chills, decreased appetite, diaphoresis, fever, malaise/fatigue, night sweats, weight gain and weight loss.   HENT: Negative for congestion, ear discharge, ear pain, hearing loss, hoarse voice, nosebleeds, odynophagia, sore throat, stridor and tinnitus.    Eyes: Negative for blurred vision, discharge, double vision, pain, photophobia, redness, vision loss in left eye, vision loss in right eye, visual disturbance and visual halos.   Cardiovascular: Negative for chest pain, claudication, cyanosis, dyspnea on exertion, irregular heartbeat, leg swelling, near-syncope, orthopnea, palpitations, paroxysmal nocturnal dyspnea and syncope.   Respiratory: Negative for cough, hemoptysis, shortness of breath, sleep disturbances due to breathing, snoring, sputum production and  wheezing.    Endocrine: Negative for cold intolerance, heat intolerance, polydipsia, polyphagia and polyuria.   Hematologic/Lymphatic: Negative for adenopathy and bleeding problem. Does not bruise/bleed easily.   Skin: Negative for color change, dry skin, flushing, itching, nail changes, poor wound healing, rash, skin cancer, suspicious lesions and unusual hair distribution.   Musculoskeletal: Negative for arthritis, back pain, falls, gout, joint pain, joint swelling, muscle cramps, muscle weakness, myalgias, neck pain and stiffness.   Gastrointestinal: Negative for bloating, abdominal pain, anorexia, change in bowel habit, bowel incontinence, constipation, diarrhea, dysphagia, excessive appetite, flatus, heartburn, hematemesis, hematochezia, hemorrhoids, jaundice, melena, nausea and vomiting.   Genitourinary: Positive for bladder incontinence and hesitancy. Negative for decreased libido, dysuria, flank pain, frequency, genital sores, hematuria, incomplete emptying, nocturia and urgency.   Neurological: Positive for dizziness and light-headedness. Negative for aphonia, brief paralysis, difficulty with concentration, disturbances in coordination, excessive daytime sleepiness, focal weakness, headaches, loss of balance, numbness, paresthesias, seizures, sensory change, tremors, vertigo and weakness.   Psychiatric/Behavioral: Negative for altered mental status, depression, hallucinations, memory loss, substance abuse, suicidal ideas and thoughts of violence. The patient does not have insomnia and is not nervous/anxious.    Allergic/Immunologic: Negative for hives and persistent infections.        Objective:       Vitals:    11/11/20 0925   BP: (!) 155/74   BP Location: Left arm   Patient Position: Sitting   BP Method: Medium (Automatic)   Pulse: 76   SpO2: 98%   Weight: 91.7 kg (202 lb 2.6 oz)    Physical Exam   Constitutional: He is oriented to person, place, and time. He appears well-developed and well-nourished. No  distress.   HENT:   Head: Normocephalic and atraumatic.   Nose: Nose normal.   Mouth/Throat: Oropharynx is clear and moist.   Eyes: Pupils are equal, round, and reactive to light. Conjunctivae and EOM are normal. Right eye exhibits no discharge. No scleral icterus.   Neck: Normal range of motion. Neck supple. No JVD present. No tracheal deviation present. No thyromegaly present.   Cardiovascular: Normal rate, regular rhythm and intact distal pulses. Exam reveals no gallop and no friction rub.   Murmur heard.  Right sternal border 2/6 systolic   Pulmonary/Chest: Effort normal and breath sounds normal. No stridor. No respiratory distress. He has no wheezes. He has no rales. He exhibits no tenderness.   Abdominal: Soft. Bowel sounds are normal. He exhibits no distension and no mass. There is no abdominal tenderness. There is no rebound and no guarding.   Musculoskeletal: Normal range of motion.         General: No tenderness or edema.   Lymphadenopathy:     He has no cervical adenopathy.   Neurological: He is alert and oriented to person, place, and time. No cranial nerve deficit. Coordination normal.   Skin: Skin is warm and dry. No rash noted. He is not diaphoretic. No erythema. No pallor.   Psychiatric: He has a normal mood and affect. His behavior is normal. Judgment and thought content normal.         Assessment:       1. Nonspecific abnormal electrocardiogram (ECG) (EKG)    2. Essential hypertension    3. Pure hypercholesterolemia    4. Type 2 diabetes mellitus with stage 3a chronic kidney disease, without long-term current use of insulin    5. Bradycardia, drug induced      Results for orders placed or performed in visit on 11/03/20   Protein / creatinine ratio, urine   Result Value Ref Range    Protein, Urine Random 55 (H) 0 - 15 mg/dL    Creatinine, Urine 169.0 23.0 - 375.0 mg/dL    Prot/Creat Ratio, Urine 0.33 (H) 0.00 - 0.20   Urinalysis   Result Value Ref Range    Specimen UA Urine, Unspecified     Color,  UA Yellow Yellow, Straw, Kiana    Appearance, UA Clear Clear    pH, UA 6.0 5.0 - 8.0    Specific Gravity, UA 1.020 1.005 - 1.030    Protein, UA 2+ (A) Negative    Glucose, UA Negative Negative    Ketones, UA Negative Negative    Bilirubin (UA) Negative Negative    Occult Blood UA Negative Negative    Nitrite, UA Negative Negative    Leukocytes, UA Trace (A) Negative   Urinalysis Microscopic   Result Value Ref Range    RBC, UA 3 0 - 4 /hpf    WBC, UA 18 (H) 0 - 5 /hpf    Bacteria Rare None-Occ /hpf    Squam Epithel, UA 0 /hpf    Hyaline Casts, UA 0 0-1/lpf /lpf    Microscopic Comment SEE COMMENT      Lab Results   Component Value Date    WBC 4.24 11/03/2020    RBC 4.61 11/03/2020    HGB 14.2 11/03/2020    HCT 44.2 11/03/2020    MCV 96 11/03/2020    MCH 30.8 11/03/2020    MCHC 32.1 11/03/2020    RDW 14.0 11/03/2020     11/03/2020    MPV 9.8 11/03/2020    GRAN 2.2 11/03/2020    GRAN 51.2 11/03/2020    LYMPH 1.5 11/03/2020    LYMPH 34.4 11/03/2020    MONO 0.5 11/03/2020    MONO 12.3 11/03/2020    EOS 0.1 11/03/2020    BASO 0.02 11/03/2020    EOSINOPHIL 1.4 11/03/2020    BASOPHIL 0.5 11/03/2020    MG 1.9 11/01/2020        CMP  Lab Results   Component Value Date     11/03/2020    K 4.2 11/03/2020     11/03/2020    CO2 28 11/03/2020    GLU 99 11/03/2020    BUN 19 11/03/2020    CREATININE 1.3 11/03/2020    CALCIUM 10.0 11/03/2020    PROT 6.7 11/01/2020    ALBUMIN 4.0 11/03/2020    BILITOT 1.4 (H) 11/01/2020    ALKPHOS 59 11/01/2020    AST 21 11/01/2020    ALT 19 11/01/2020    ANIONGAP 7 (L) 11/03/2020    ESTGFRAFRICA 59.2 (A) 11/03/2020    EGFRNONAA 51.2 (A) 11/03/2020        Lab Results   Component Value Date    LABURIN (A) 10/28/2020     COAGULASE-NEGATIVE STAPHYLOCOCCUS SPECIES  > 100,000 cfu/ml  Susceptibility testing not routinely performed.              Results for orders placed or performed during the hospital encounter of 11/01/20   EKG 12-lead    Collection Time: 11/01/20 11:58 AM    Narrative     Test Reason : R42,    Vent. Rate : 059 BPM     Atrial Rate : 059 BPM     P-R Int : 192 ms          QRS Dur : 104 ms      QT Int : 420 ms       P-R-T Axes : 062 -29 -77 degrees     QTc Int : 415 ms    Sinus bradycardia  Voltage criteria for left ventricular hypertrophy  ST and T wave abnormality, consider inferolateral ischemia  incorrect V2 placement   Abnormal ECG  When compared with ECG of 21-OCT-2020 09:44,  No significant change was found  Confirmed by Ruma Oviedo MD ([id number]) on 11/9/2020 8:09:11 AM    Referred By: EVELINA   SELF           Confirmed By:Ruma Oviedo MD                  Plan:       Problem List Items Addressed This Visit        Cardiac/Vascular    Hypertension     Medication changes have been made today.  He is having surgery 12/01/2020.  He is advised to call if the changes made today do not improve his blood pressure readings.  He is very concerned about his blood pressure given upcoming surgery.         Relevant Medications    hydrALAZINE (APRESOLINE) 100 MG tablet    NIFEdipine (PROCARDIA-XL) 60 MG (OSM) 24 hr tablet    Hyperlipidemia     Stable.  Atorvastatin will be continued..         Bradycardia, drug induced     Low-dose metoprolol will be continued.  His dizziness and bradycardia seemed to have improved.         Nonspecific abnormal electrocardiogram (ECG) (EKG) - Primary     There is no history of coronary disease.  He is cleared cardiac wise for upcoming hernia surgery.  His electrocardiographic changes are likely due to long-term hypertension.            Endocrine    DM type 2 causing CKD stage 3     Nephrology is following.  Stable renal status.                               Anthony Mcdonald MD  11/11/2020   10:47 AM

## 2020-11-11 NOTE — ASSESSMENT & PLAN NOTE
There is no history of coronary disease.  He is cleared cardiac wise for upcoming hernia surgery.  His electrocardiographic changes are likely due to long-term hypertension.

## 2020-11-16 ENCOUNTER — TELEPHONE (OUTPATIENT)
Dept: FAMILY MEDICINE | Facility: CLINIC | Age: 80
End: 2020-11-16

## 2020-11-16 NOTE — TELEPHONE ENCOUNTER
Outreached for remote b/p cell reading 120/69. Patient states having surgery and needs a pre-op surg clearance. MD staff notified

## 2020-11-19 ENCOUNTER — TELEPHONE (OUTPATIENT)
Dept: FAMILY MEDICINE | Facility: CLINIC | Age: 80
End: 2020-11-19

## 2020-11-19 ENCOUNTER — HOSPITAL ENCOUNTER (OUTPATIENT)
Dept: RADIOLOGY | Facility: HOSPITAL | Age: 80
Discharge: HOME | End: 2020-11-19
Attending: FAMILY MEDICINE
Payer: MEDICARE

## 2020-11-19 ENCOUNTER — OFFICE VISIT (OUTPATIENT)
Dept: FAMILY MEDICINE | Facility: CLINIC | Age: 80
End: 2020-11-19
Payer: MEDICARE

## 2020-11-19 VITALS
WEIGHT: 197.75 LBS | OXYGEN SATURATION: 99 % | SYSTOLIC BLOOD PRESSURE: 138 MMHG | BODY MASS INDEX: 27.58 KG/M2 | TEMPERATURE: 97 F | DIASTOLIC BLOOD PRESSURE: 60 MMHG | HEART RATE: 81 BPM

## 2020-11-19 DIAGNOSIS — N18.31 STAGE 3A CHRONIC KIDNEY DISEASE: ICD-10-CM

## 2020-11-19 DIAGNOSIS — Z01.818 PREOP EXAMINATION: ICD-10-CM

## 2020-11-19 DIAGNOSIS — Z01.818 PREOP EXAMINATION: Primary | ICD-10-CM

## 2020-11-19 DIAGNOSIS — I10 ESSENTIAL HYPERTENSION: ICD-10-CM

## 2020-11-19 PROCEDURE — 1101F PT FALLS ASSESS-DOCD LE1/YR: CPT | Mod: CPTII,S$GLB,, | Performed by: FAMILY MEDICINE

## 2020-11-19 PROCEDURE — 1101F PR PT FALLS ASSESS DOC 0-1 FALLS W/OUT INJ PAST YR: ICD-10-PCS | Mod: CPTII,S$GLB,, | Performed by: FAMILY MEDICINE

## 2020-11-19 PROCEDURE — 3288F FALL RISK ASSESSMENT DOCD: CPT | Mod: CPTII,S$GLB,, | Performed by: FAMILY MEDICINE

## 2020-11-19 PROCEDURE — 1159F MED LIST DOCD IN RCRD: CPT | Mod: S$GLB,,, | Performed by: FAMILY MEDICINE

## 2020-11-19 PROCEDURE — 99999 PR PBB SHADOW E&M-EST. PATIENT-LVL IV: ICD-10-PCS | Mod: PBBFAC,,, | Performed by: FAMILY MEDICINE

## 2020-11-19 PROCEDURE — 3075F PR MOST RECENT SYSTOLIC BLOOD PRESS GE 130-139MM HG: ICD-10-PCS | Mod: CPTII,S$GLB,, | Performed by: FAMILY MEDICINE

## 2020-11-19 PROCEDURE — 99214 OFFICE O/P EST MOD 30 MIN: CPT | Mod: S$GLB,,, | Performed by: FAMILY MEDICINE

## 2020-11-19 PROCEDURE — 71046 XR CHEST PA AND LATERAL: ICD-10-PCS | Mod: 26,,, | Performed by: RADIOLOGY

## 2020-11-19 PROCEDURE — 3078F DIAST BP <80 MM HG: CPT | Mod: CPTII,S$GLB,, | Performed by: FAMILY MEDICINE

## 2020-11-19 PROCEDURE — 71046 X-RAY EXAM CHEST 2 VIEWS: CPT | Mod: TC,FY,PO

## 2020-11-19 PROCEDURE — 1126F PR PAIN SEVERITY QUANTIFIED, NO PAIN PRESENT: ICD-10-PCS | Mod: S$GLB,,, | Performed by: FAMILY MEDICINE

## 2020-11-19 PROCEDURE — 3078F PR MOST RECENT DIASTOLIC BLOOD PRESSURE < 80 MM HG: ICD-10-PCS | Mod: CPTII,S$GLB,, | Performed by: FAMILY MEDICINE

## 2020-11-19 PROCEDURE — 3288F PR FALLS RISK ASSESSMENT DOCUMENTED: ICD-10-PCS | Mod: CPTII,S$GLB,, | Performed by: FAMILY MEDICINE

## 2020-11-19 PROCEDURE — 1159F PR MEDICATION LIST DOCUMENTED IN MEDICAL RECORD: ICD-10-PCS | Mod: S$GLB,,, | Performed by: FAMILY MEDICINE

## 2020-11-19 PROCEDURE — 99214 PR OFFICE/OUTPT VISIT, EST, LEVL IV, 30-39 MIN: ICD-10-PCS | Mod: S$GLB,,, | Performed by: FAMILY MEDICINE

## 2020-11-19 PROCEDURE — 1126F AMNT PAIN NOTED NONE PRSNT: CPT | Mod: S$GLB,,, | Performed by: FAMILY MEDICINE

## 2020-11-19 PROCEDURE — 99999 PR PBB SHADOW E&M-EST. PATIENT-LVL IV: CPT | Mod: PBBFAC,,, | Performed by: FAMILY MEDICINE

## 2020-11-19 PROCEDURE — 3075F SYST BP GE 130 - 139MM HG: CPT | Mod: CPTII,S$GLB,, | Performed by: FAMILY MEDICINE

## 2020-11-19 PROCEDURE — 71046 X-RAY EXAM CHEST 2 VIEWS: CPT | Mod: 26,,, | Performed by: RADIOLOGY

## 2020-11-19 NOTE — PROGRESS NOTES
Subjective:       Patient ID: Pedro Ponce Jr. is a 80 y.o. male.    Chief Complaint: Pre-op Exam    80 years old male came to the clinic for preoperative evaluation .  Patient with decreased kidney function but stable in comparison with previous reports .  Blood work and EKG were stable.  No chest x-ray was available .  Blood pressure today is borderline.  No chest pain, palpitation, orthopnea or PND.    Review of Systems   Constitutional: Negative.  Negative for fever.   HENT: Negative.    Eyes: Negative.    Respiratory: Negative.  Negative for cough.    Cardiovascular: Negative.  Negative for chest pain, palpitations, leg swelling and claudication.   Gastrointestinal: Negative.    Genitourinary: Negative.    Musculoskeletal: Negative.    Integumentary:  Negative.   Neurological: Negative.    Psychiatric/Behavioral: Negative.          Objective:      Physical Exam  Vitals signs and nursing note reviewed.   Constitutional:       General: He is not in acute distress.     Appearance: He is well-developed. He is not diaphoretic.   HENT:      Head: Normocephalic and atraumatic.      Right Ear: External ear normal.      Left Ear: External ear normal.      Nose: Nose normal.      Mouth/Throat:      Pharynx: No oropharyngeal exudate.   Eyes:      General: No scleral icterus.        Right eye: No discharge.         Left eye: No discharge.      Conjunctiva/sclera: Conjunctivae normal.      Pupils: Pupils are equal, round, and reactive to light.   Neck:      Musculoskeletal: Normal range of motion and neck supple.      Thyroid: No thyromegaly.      Vascular: No JVD.      Trachea: No tracheal deviation.   Cardiovascular:      Rate and Rhythm: Normal rate and regular rhythm.      Heart sounds: Normal heart sounds. No murmur. No friction rub. No gallop.    Pulmonary:      Effort: Pulmonary effort is normal. No respiratory distress.      Breath sounds: Normal breath sounds. No stridor. No wheezing or rales.   Chest:       Chest wall: No tenderness.   Abdominal:      General: Bowel sounds are normal. There is no distension.      Palpations: Abdomen is soft. There is no mass.      Tenderness: There is no abdominal tenderness. There is no guarding or rebound.   Musculoskeletal: Normal range of motion.         General: No tenderness.   Lymphadenopathy:      Cervical: No cervical adenopathy.   Skin:     General: Skin is warm and dry.      Coloration: Skin is not pale.      Findings: No erythema or rash.   Neurological:      Mental Status: He is alert and oriented to person, place, and time.      Cranial Nerves: No cranial nerve deficit.      Motor: No abnormal muscle tone.      Coordination: Coordination normal.      Deep Tendon Reflexes: Reflexes are normal and symmetric. Reflexes normal.   Psychiatric:         Behavior: Behavior normal.         Thought Content: Thought content normal.         Judgment: Judgment normal.         Assessment:       1. Preop examination    2. Essential hypertension    3. Stage 3a chronic kidney disease        Plan:         Continue monitoring blood pressure at home, low sodium diet.    If chest x-ray is normal.  Patient hemodynamically stable for surgical procedure..

## 2020-11-19 NOTE — TELEPHONE ENCOUNTER
Please notify to the patient.      Chest x-ray was normal.    Patient hemodynamically stable for surgical procedure.

## 2020-11-20 ENCOUNTER — TELEPHONE (OUTPATIENT)
Dept: CARDIOLOGY | Facility: CLINIC | Age: 80
End: 2020-11-20

## 2020-11-20 RX ORDER — HYDRALAZINE HYDROCHLORIDE 50 MG/1
100 TABLET, FILM COATED ORAL 3 TIMES DAILY
Status: ON HOLD | COMMUNITY
End: 2020-12-26 | Stop reason: HOSPADM

## 2020-11-20 NOTE — TELEPHONE ENCOUNTER
----- Message from Ayesha Le sent at 11/20/2020  8:50 AM CST -----  Contact: [phone] or [phone]  Natalie norman/Nephrology  Natalie norman/[facility] calling to speak with you regarding the patient medication hydrALAZINE (APRESOLINE) 50 MG tablet she states another Dr Barker  is writing the same medication for a different strength.   She states the pharmacy has 3 different strengths of the same medication   PHARMACY: [facility]ACY #[id number] - [place], [place] - [id number] [facility]

## 2020-11-20 NOTE — TELEPHONE ENCOUNTER
Reached out to Natalie in regards to message.      She is seeking clarification for Hydralazine.    It is noted by Dr Mcdonald on 11/11 that this medication was increased from 50 mg to 100 mg three times a day.     On pts AVS from visit with Dr Barker on 11/19 has Hydralazine 10 mg twice a day.     Please advise

## 2020-11-20 NOTE — TELEPHONE ENCOUNTER
----- Message from Ayesha Le sent at 11/20/2020  8:50 AM CST -----  Contact: [phone] or [phone]  Natalie norman/Nephrology  Natalie norman/[facility] calling to speak with you regarding the patient medication hydrALAZINE (APRESOLINE) 50 MG tablet she states another Dr Barker  is writing the same medication for a different strength.   She states the pharmacy has 3 different strengths of the same medication   PHARMACY: [facility]ACY #[id number] - [place], [place] - [id number] [facility]

## 2020-11-28 ENCOUNTER — LAB VISIT (OUTPATIENT)
Dept: URGENT CARE | Facility: CLINIC | Age: 80
End: 2020-11-28
Payer: MEDICARE

## 2020-11-28 DIAGNOSIS — K40.91 RECURRENT LEFT INGUINAL HERNIA: ICD-10-CM

## 2020-11-28 PROCEDURE — U0003 INFECTIOUS AGENT DETECTION BY NUCLEIC ACID (DNA OR RNA); SEVERE ACUTE RESPIRATORY SYNDROME CORONAVIRUS 2 (SARS-COV-2) (CORONAVIRUS DISEASE [COVID-19]), AMPLIFIED PROBE TECHNIQUE, MAKING USE OF HIGH THROUGHPUT TECHNOLOGIES AS DESCRIBED BY CMS-2020-01-R: HCPCS

## 2020-11-29 LAB — SARS-COV-2 RNA RESP QL NAA+PROBE: NOT DETECTED

## 2020-11-30 ENCOUNTER — TELEPHONE (OUTPATIENT)
Dept: SURGERY | Facility: CLINIC | Age: 80
End: 2020-11-30

## 2020-11-30 ENCOUNTER — ANESTHESIA EVENT (OUTPATIENT)
Dept: SURGERY | Facility: HOSPITAL | Age: 80
End: 2020-11-30
Payer: MEDICARE

## 2020-11-30 NOTE — ANESTHESIA PREPROCEDURE EVALUATION
[facility]  Anesthesia Pre-Operative Evaluation         Patient Name: Pedro Ponce Jr.  YOB: 1940  MRN: [id number]    SUBJECTIVE:     Pre-operative evaluation for Procedure(s) (LRB):  REPAIR, HERNIA, INGUINAL,OPEN recurrent LEFT w/ mesh (Left)     11/30/2020    Pedro Ponce Jr. is a 80 y.o. male w/ a significant PMHx of HTN, HLD, DM II, BPH, CKD [GFR 59] and an inguinal hernia.    Patient now presents for the above procedure(s).    Prev airway: None documented.    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    Type 2 diabetes mellitus with renal manifestations    Vitreous floaters of both eyes    Sinus congestion    Tachycardia    Primary open angle glaucoma of both eyes, moderate stage    Bradycardia, drug induced    Status post cataract extraction and insertion of intraocular lens    Hypertensive retinopathy, bilateral    Screening for colon cancer    Nonspecific abnormal electrocardiogram (ECG) (EKG)       Review of patient's allergies indicates:   Allergen Reactions    Pcn [penicillins] Rash       Current Inpatient Medications:    No current facility-administered medications on file prior to encounter.      Current Outpatient Medications on File Prior to Encounter   Medication Sig Dispense Refill    aspirin 81 mg Tab Take 81 mg by mouth. 1 Tablet Oral Every evening      atorvastatin (LIPITOR) 10 MG tablet Take 10 mg by mouth every evening.      dutasteride (AVODART) 0.5 mg capsule TAKE 1 CAPSULE (0.5 MG TOTAL) BY MOUTH ONCE DAILY. 90 capsule 3    hydrALAZINE (APRESOLINE) 50 MG tablet Take 100 mg by mouth 3 (three) times daily. Per Dr. Mcdonald        metoprolol tartrate (LOPRESSOR) 50 MG tablet Take 0.5 tablets (25 mg total) by mouth 2 (two) times daily. 90 tablet 3    NIFEdipine (PROCARDIA-XL) 60 MG (OSM) 24 hr tablet Take 1 tablet (60 mg total) by mouth 2 (two) times daily. 180 tablet 4    oxybutynin (DITROPAN-XL) 10 MG 24 hr tablet Take 1 tablet (10 mg total) by mouth once daily. 90 tablet 3    spironolactone (ALDACTONE) 50 MG tablet Take 1 tablet (50 mg total) by mouth once daily. 90 tablet 3    tamsulosin (FLOMAX) 0.4 mg Cap Take 1 capsule (0.4 mg total) by mouth every evening. 90 capsule 3       Past Surgical History:   Procedure Laterality Date    COLONOSCOPY N/A 4/9/2018    Procedure: COLONOSCOPY;  Surgeon: HAIM Mir MD;  Location: [facility] ([address] [place] [address]);  Service: Endoscopy;  Laterality: N/A;    COLONOSCOPY N/A 1/14/2019    Procedure: COLONOSCOPY;  Surgeon: HAIM Mir MD;  Location: [facility] ([address] [place] [address]);  Service: Endoscopy;  Laterality: N/A;  PM prep    CYST REMOVAL  2000    removed from left kidney    KIDNEY SURGERY      PROSTATE SURGERY         Social History     Socioeconomic History    Marital status:    Tobacco Use    Smoking status: Never Smoker    Smokeless tobacco: Never Used       OBJECTIVE:     Vital Signs Range (Last 24H):         Significant Labs:  Lab Results   Component Value Date    WBC 4.24 11/03/2020    HGB 14.2 11/03/2020    HCT 44.2 11/03/2020     11/03/2020    CHOL 136 06/23/2020    TRIG 61 06/23/2020    HDL 51 06/23/2020    ALT 19 11/01/2020    AST 21 11/01/2020     11/03/2020    K 4.2 11/03/2020     11/03/2020    CREATININE 1.3 11/03/2020    BUN 19 11/03/2020    CO2 28 11/03/2020    TSH 2.603 06/08/2019    PSA 0.62 12/11/2018    INR 1.1 06/02/2014    GLUF 99 08/30/2010    HGBA1C 6.1 (H) 06/23/2020       Diagnostic Studies: No relevant studies.    EKG:   Results for orders placed or performed during the hospital encounter of 11/01/20   EKG 12-lead    Collection Time: 11/01/20 11:58 AM     Narrative    Test Reason : R42,    Vent. Rate : 059 BPM     Atrial Rate : 059 BPM     P-R Int : 192 ms          QRS Dur : 104 ms      QT Int : 420 ms       P-R-T Axes : 062 -29 -77 degrees     QTc Int : 415 ms    Sinus bradycardia  Voltage criteria for left ventricular hypertrophy  ST and T wave abnormality, consider inferolateral ischemia  incorrect V2 placement   Abnormal ECG  When compared with ECG of 21-OCT-2020 09:44,  No significant change was found  Confirmed by Ruma Oviedo MD ([id number]) on 11/9/2020 8:09:11 AM    Referred By: EVELINA   SELF           Confirmed By:Ruma Oviedo MD       ASSESSMENT/PLAN:       Anesthesia Evaluation    I have reviewed the Patient Summary Reports.      I have reviewed the Medications.     Review of Systems  Anesthesia Hx:  No problems with previous Anesthesia Denies Hx of Anesthetic complications  Neg history of prior surgery. Denies Family Hx of Anesthesia complications.   Denies Personal Hx of Anesthesia complications.   Cardiovascular:   Exercise tolerance: good Hypertension    Renal/:   Chronic Renal Disease, CRI    Endocrine:   Diabetes, type 2        Physical Exam  General:  Well nourished, Obesity    Airway/Jaw/Neck:  Airway Findings: Mouth Opening: Normal Tongue: Normal  General Airway Assessment: Adult  Mallampati: II  Improves to I with phonation.  TM Distance: Normal, at least 6 cm  Jaw/Neck Findings:  Neck ROM: Normal ROM      Dental:  Dental Findings: Upper Dentures    Chest/Lungs:  Chest/Lungs Findings: Clear to auscultation, Normal Respiratory Rate     Heart/Vascular:  Heart Findings: Rate: Normal  Rhythm: Regular Rhythm        Mental Status:  Mental Status Findings:  Alert and Oriented         Anesthesia Plan  Type of Anesthesia, risks & benefits discussed:  Anesthesia Type:  general  Patient's Preference:   Intra-op Monitoring Plan: standard ASA monitors  Intra-op Monitoring Plan Comments:   Post Op Pain Control Plan: multimodal analgesia,  IV/PO Opioids PRN and per primary service following discharge from PACU  Post Op Pain Control Plan Comments:   Induction:   IV  Beta Blocker:  Patient is not currently on a Beta-Blocker (No further documentation required).       Informed Consent: Patient understands risks and agrees with Anesthesia plan.  Questions answered. Anesthesia consent signed with patient.  ASA Score: 2     Day of Surgery Review of History & Physical:    H&P update referred to the surgeon.         Ready For Surgery From Anesthesia Perspective.

## 2020-11-30 NOTE — PRE-PROCEDURE INSTRUCTIONS
PREOP INSTRUCTIONS:No solid food ,milk or milk products for 8 hours prior to procedure.Clear liquids are allowed up to 2 hours before procedure.Clear liquids are:water,apple juice,gatorade & powerade.Instructed to follow the surgeon's instructions if they differ from these.Shower instructions as well as directions to the Surgery Center were given.Encouraged to wear loose fitting,comfortable clothing.Medication instructions for pm prior to and am of procedure reviewed.Instructed to avoid taking vitamins,supplements,aspirin and ibuprofen the morning of surgery. Patient's questions and concerns addressed .Patient informed of the current visitor policy and advised patient that one visitor may accompany each patient into the hospital and wait (socially distanced) until a member of the medical team provides an update at the conclusion of the procedure.When they enter the hospital both patient and visitor will have their temperature checked.All visitors are asked to arrive with a mask and to keep their mask on throughout the visit.     Patient denies any side effects or issues with anesthesia or sedation.

## 2020-12-01 ENCOUNTER — ANESTHESIA (OUTPATIENT)
Dept: SURGERY | Facility: HOSPITAL | Age: 80
End: 2020-12-01
Payer: MEDICARE

## 2020-12-01 ENCOUNTER — HOSPITAL ENCOUNTER (OUTPATIENT)
Facility: HOSPITAL | Age: 80
Discharge: HOME | End: 2020-12-01
Attending: SURGERY | Admitting: SURGERY
Payer: MEDICARE

## 2020-12-01 VITALS
SYSTOLIC BLOOD PRESSURE: 131 MMHG | HEART RATE: 71 BPM | BODY MASS INDEX: 27.58 KG/M2 | WEIGHT: 197 LBS | TEMPERATURE: 97 F | OXYGEN SATURATION: 99 % | DIASTOLIC BLOOD PRESSURE: 65 MMHG | HEIGHT: 71 IN | RESPIRATION RATE: 18 BRPM

## 2020-12-01 DIAGNOSIS — K40.90 LEFT INGUINAL HERNIA: Primary | ICD-10-CM

## 2020-12-01 DIAGNOSIS — K40.91 RECURRENT LEFT INGUINAL HERNIA: ICD-10-CM

## 2020-12-01 LAB — POCT GLUCOSE: 128 MG/DL (ref 70–110)

## 2020-12-01 PROCEDURE — 25000003 PHARM REV CODE 250: Performed by: STUDENT IN AN ORGANIZED HEALTH CARE EDUCATION/TRAINING PROGRAM

## 2020-12-01 PROCEDURE — 71000015 HC POSTOP RECOV 1ST HR: Performed by: SURGERY

## 2020-12-01 PROCEDURE — C1781 MESH (IMPLANTABLE): HCPCS | Performed by: SURGERY

## 2020-12-01 PROCEDURE — 82962 GLUCOSE BLOOD TEST: CPT | Performed by: SURGERY

## 2020-12-01 PROCEDURE — D9220A PRA ANESTHESIA: ICD-10-PCS | Mod: ,,, | Performed by: ANESTHESIOLOGY

## 2020-12-01 PROCEDURE — 49520 REREPAIR ING HERNIA REDUCE: CPT | Mod: LT,,, | Performed by: SURGERY

## 2020-12-01 PROCEDURE — 94761 N-INVAS EAR/PLS OXIMETRY MLT: CPT

## 2020-12-01 PROCEDURE — 88302 TISSUE EXAM BY PATHOLOGIST: CPT | Mod: 59 | Performed by: PATHOLOGY

## 2020-12-01 PROCEDURE — 37000009 HC ANESTHESIA EA ADD 15 MINS: Performed by: SURGERY

## 2020-12-01 PROCEDURE — 88302 TISSUE EXAM BY PATHOLOGIST: CPT | Mod: 26,,, | Performed by: PATHOLOGY

## 2020-12-01 PROCEDURE — 88302 PR  SURG PATH,LEVEL II: ICD-10-PCS | Mod: 26,,, | Performed by: PATHOLOGY

## 2020-12-01 PROCEDURE — 49520 PR REPAIR RECURR INGUIN HERN,REDUCIBL: ICD-10-PCS | Mod: LT,,, | Performed by: SURGERY

## 2020-12-01 PROCEDURE — 63600175 PHARM REV CODE 636 W HCPCS: Performed by: STUDENT IN AN ORGANIZED HEALTH CARE EDUCATION/TRAINING PROGRAM

## 2020-12-01 PROCEDURE — 63600175 PHARM REV CODE 636 W HCPCS: Performed by: SURGERY

## 2020-12-01 PROCEDURE — 71000033 HC RECOVERY, INTIAL HOUR: Performed by: SURGERY

## 2020-12-01 PROCEDURE — 25000003 PHARM REV CODE 250: Performed by: SURGERY

## 2020-12-01 PROCEDURE — 71000016 HC POSTOP RECOV ADDL HR: Performed by: SURGERY

## 2020-12-01 PROCEDURE — D9220A PRA ANESTHESIA: Mod: ,,, | Performed by: ANESTHESIOLOGY

## 2020-12-01 PROCEDURE — 37000008 HC ANESTHESIA 1ST 15 MINUTES: Performed by: SURGERY

## 2020-12-01 PROCEDURE — 36000707: Performed by: SURGERY

## 2020-12-01 PROCEDURE — 36000706: Performed by: SURGERY

## 2020-12-01 DEVICE — MESH HERN 2.4X5.4IN 6X3.7CM: Type: IMPLANTABLE DEVICE | Site: ABDOMEN | Status: FUNCTIONAL

## 2020-12-01 RX ORDER — LIDOCAINE HCL/PF 100 MG/5ML
SYRINGE (ML) INTRAVENOUS
Status: DISCONTINUED | OUTPATIENT
Start: 2020-12-01 | End: 2020-12-01

## 2020-12-01 RX ORDER — FENTANYL CITRATE 50 UG/ML
25 INJECTION, SOLUTION INTRAMUSCULAR; INTRAVENOUS EVERY 5 MIN PRN
Status: DISCONTINUED | OUTPATIENT
Start: 2020-12-01 | End: 2020-12-01 | Stop reason: HOSPADM

## 2020-12-01 RX ORDER — SODIUM CHLORIDE 9 MG/ML
INJECTION, SOLUTION INTRAVENOUS ONCE
Status: COMPLETED | OUTPATIENT
Start: 2020-12-01 | End: 2020-12-01

## 2020-12-01 RX ORDER — CEFAZOLIN SODIUM 1 G/3ML
2 INJECTION, POWDER, FOR SOLUTION INTRAMUSCULAR; INTRAVENOUS
Status: COMPLETED | OUTPATIENT
Start: 2020-12-01 | End: 2020-12-01

## 2020-12-01 RX ORDER — LIDOCAINE HYDROCHLORIDE 10 MG/ML
1 INJECTION, SOLUTION EPIDURAL; INFILTRATION; INTRACAUDAL; PERINEURAL ONCE
Status: COMPLETED | OUTPATIENT
Start: 2020-12-01 | End: 2020-12-01

## 2020-12-01 RX ORDER — OXYCODONE AND ACETAMINOPHEN 5; 325 MG/1; MG/1
1 TABLET ORAL EVERY 4 HOURS PRN
Status: DISCONTINUED | OUTPATIENT
Start: 2020-12-01 | End: 2020-12-01 | Stop reason: HOSPADM

## 2020-12-01 RX ORDER — SODIUM CHLORIDE 9 MG/ML
INJECTION, SOLUTION INTRAVENOUS CONTINUOUS PRN
Status: DISCONTINUED | OUTPATIENT
Start: 2020-12-01 | End: 2020-12-01

## 2020-12-01 RX ORDER — SODIUM CHLORIDE 0.9 % (FLUSH) 0.9 %
10 SYRINGE (ML) INJECTION
Status: DISCONTINUED | OUTPATIENT
Start: 2020-12-01 | End: 2020-12-01 | Stop reason: HOSPADM

## 2020-12-01 RX ORDER — AMOXICILLIN 250 MG
1 CAPSULE ORAL DAILY
Qty: 7 TABLET | Refills: 0 | Status: SHIPPED | OUTPATIENT
Start: 2020-12-01 | End: 2023-02-10

## 2020-12-01 RX ORDER — HYDROMORPHONE HYDROCHLORIDE 1 MG/ML
0.2 INJECTION, SOLUTION INTRAMUSCULAR; INTRAVENOUS; SUBCUTANEOUS EVERY 5 MIN PRN
Status: DISCONTINUED | OUTPATIENT
Start: 2020-12-01 | End: 2020-12-01 | Stop reason: HOSPADM

## 2020-12-01 RX ORDER — EPHEDRINE SULFATE 50 MG/ML
INJECTION, SOLUTION INTRAVENOUS
Status: DISCONTINUED | OUTPATIENT
Start: 2020-12-01 | End: 2020-12-01

## 2020-12-01 RX ORDER — ONDANSETRON 2 MG/ML
4 INJECTION INTRAMUSCULAR; INTRAVENOUS DAILY PRN
Status: DISCONTINUED | OUTPATIENT
Start: 2020-12-01 | End: 2020-12-01 | Stop reason: HOSPADM

## 2020-12-01 RX ORDER — FENTANYL CITRATE 50 UG/ML
INJECTION, SOLUTION INTRAMUSCULAR; INTRAVENOUS
Status: DISCONTINUED | OUTPATIENT
Start: 2020-12-01 | End: 2020-12-01

## 2020-12-01 RX ORDER — OXYCODONE AND ACETAMINOPHEN 5; 325 MG/1; MG/1
1 TABLET ORAL
Qty: 20 TABLET | Refills: 0 | Status: ON HOLD | OUTPATIENT
Start: 2020-12-01 | End: 2021-01-11

## 2020-12-01 RX ORDER — PROPOFOL 10 MG/ML
VIAL (ML) INTRAVENOUS
Status: DISCONTINUED | OUTPATIENT
Start: 2020-12-01 | End: 2020-12-01

## 2020-12-01 RX ORDER — LIDOCAINE HYDROCHLORIDE AND EPINEPHRINE 10; 10 MG/ML; UG/ML
INJECTION, SOLUTION INFILTRATION; PERINEURAL
Status: DISCONTINUED | OUTPATIENT
Start: 2020-12-01 | End: 2020-12-01 | Stop reason: HOSPADM

## 2020-12-01 RX ORDER — ROCURONIUM BROMIDE 10 MG/ML
INJECTION, SOLUTION INTRAVENOUS
Status: DISCONTINUED | OUTPATIENT
Start: 2020-12-01 | End: 2020-12-01

## 2020-12-01 RX ORDER — BUPIVACAINE HYDROCHLORIDE 2.5 MG/ML
INJECTION, SOLUTION EPIDURAL; INFILTRATION; INTRACAUDAL
Status: DISCONTINUED | OUTPATIENT
Start: 2020-12-01 | End: 2020-12-01 | Stop reason: HOSPADM

## 2020-12-01 RX ORDER — VASOPRESSIN 20 [USP'U]/ML
INJECTION, SOLUTION INTRAMUSCULAR; SUBCUTANEOUS
Status: DISCONTINUED | OUTPATIENT
Start: 2020-12-01 | End: 2020-12-01

## 2020-12-01 RX ORDER — ACETAMINOPHEN 500 MG
1000 TABLET ORAL
Status: COMPLETED | OUTPATIENT
Start: 2020-12-01 | End: 2020-12-01

## 2020-12-01 RX ADMIN — VASOPRESSIN 1 UNITS: 20 INJECTION INTRAVENOUS at 07:12

## 2020-12-01 RX ADMIN — EPHEDRINE SULFATE 5 MG: 50 INJECTION INTRAVENOUS at 07:12

## 2020-12-01 RX ADMIN — LIDOCAINE HYDROCHLORIDE 100 MG: 20 INJECTION, SOLUTION INTRAVENOUS at 07:12

## 2020-12-01 RX ADMIN — ROCURONIUM BROMIDE 10 MG: 10 INJECTION, SOLUTION INTRAVENOUS at 08:12

## 2020-12-01 RX ADMIN — EPHEDRINE SULFATE 10 MG: 50 INJECTION INTRAVENOUS at 07:12

## 2020-12-01 RX ADMIN — ACETAMINOPHEN 1000 MG: 500 TABLET ORAL at 06:12

## 2020-12-01 RX ADMIN — EPHEDRINE SULFATE 5 MG: 50 INJECTION INTRAVENOUS at 08:12

## 2020-12-01 RX ADMIN — SODIUM CHLORIDE, SODIUM GLUCONATE, SODIUM ACETATE, POTASSIUM CHLORIDE, MAGNESIUM CHLORIDE, SODIUM PHOSPHATE, DIBASIC, AND POTASSIUM PHOSPHATE: .53; .5; .37; .037; .03; .012; .00082 INJECTION, SOLUTION INTRAVENOUS at 07:12

## 2020-12-01 RX ADMIN — SODIUM CHLORIDE 75 ML/HR: 0.9 INJECTION, SOLUTION INTRAVENOUS at 06:12

## 2020-12-01 RX ADMIN — ROCURONIUM BROMIDE 30 MG: 10 INJECTION, SOLUTION INTRAVENOUS at 07:12

## 2020-12-01 RX ADMIN — PROPOFOL 200 MG: 10 INJECTION, EMULSION INTRAVENOUS at 07:12

## 2020-12-01 RX ADMIN — CEFAZOLIN 2 G: 330 INJECTION, POWDER, FOR SOLUTION INTRAMUSCULAR; INTRAVENOUS at 07:12

## 2020-12-01 RX ADMIN — FENTANYL CITRATE 25 MCG: 50 INJECTION, SOLUTION INTRAMUSCULAR; INTRAVENOUS at 08:12

## 2020-12-01 RX ADMIN — LIDOCAINE HYDROCHLORIDE 2 MG: 10 INJECTION, SOLUTION EPIDURAL; INFILTRATION; INTRACAUDAL at 06:12

## 2020-12-01 RX ADMIN — OXYCODONE HYDROCHLORIDE AND ACETAMINOPHEN 1 TABLET: 5; 325 TABLET ORAL at 10:12

## 2020-12-01 RX ADMIN — VASOPRESSIN 2 UNITS: 20 INJECTION INTRAVENOUS at 08:12

## 2020-12-01 RX ADMIN — FENTANYL CITRATE 50 MCG: 50 INJECTION, SOLUTION INTRAMUSCULAR; INTRAVENOUS at 07:12

## 2020-12-01 RX ADMIN — SODIUM CHLORIDE: 0.9 INJECTION, SOLUTION INTRAVENOUS at 06:12

## 2020-12-01 NOTE — BRIEF OP NOTE
[facility]  Brief Operative Note    Surgery Date: 12/1/2020     Surgeon(s) and Role:     * Fercho Roy Jr., MD - Primary     * Mikael Sales MD - Resident - Assisting        Pre-op Diagnosis:  Recurrent left inguinal hernia [K40.91]    Post-op Diagnosis:  Post-Op Diagnosis Codes:     * Recurrent left inguinal hernia [K40.91]    Procedure(s) (LRB):  REPAIR, HERNIA, INGUINAL,OPEN recurrent LEFT w/ mesh (Left)    Anesthesia: General    Description of the findings of the procedure(s): Open left inguinal hernia repair with mesh and cord lipoma excision.     Estimated Blood Loss: 5mL         Specimens:   Specimen (12h ago, onward)    None            Discharge Note    OUTCOME: Patient tolerated treatment/procedure well without complication and is now ready for discharge.    DISPOSITION: Home or Self Care    FINAL DIAGNOSIS:  Left inguinal hernia    FOLLOWUP: In clinic    DISCHARGE INSTRUCTIONS:    Discharge Procedure Orders   No driving until:   Order Comments: Off narcotics.     Notify your health care provider if you experience any of the following:  temperature >100.4     Notify your health care provider if you experience any of the following:  persistent nausea and vomiting or diarrhea     Notify your health care provider if you experience any of the following:  severe uncontrolled pain     Notify your health care provider if you experience any of the following:  redness, tenderness, or signs of infection (pain, swelling, redness, odor or green/yellow discharge around incision site)     Remove dressing in 48 hours   Order Comments: If present     Shower on day dressing removed (No bath)   Order Comments: In 48 hours-- or, if no dressing-- okay to shower in 48 hours     Weight bearing restrictions (specify):   Order Comments: Nothing > 10 pounds for 6 weeks.

## 2020-12-01 NOTE — DISCHARGE INSTRUCTIONS
Post-op Instructions  Please take pain medication as needed, if taking narcotics please avoid driving.   We recommend a high fiber diet and use of stool softeners while on narcotics as they might cause constipation.    You may leave the dressing in place for 48 hours.   Okay to shower in 24 hours   Avoid swimming pools, baths or hot tubs for at least 2 weeks.   Do not lift anything heavier than 10 lb for at least 2 weeks.   Please call if you have fevers, chills, shortness of breath, increased drainage from your wound or bleeding.  Please call to make an appointment in 2 weeks for wound recheck.         Anesthesia: General Anesthesia     You are watched continuously during your procedure by your anesthesia provider.     Youre due to have surgery. During surgery, youll be given medicine called anesthesia or anesthetic. This will keep you comfortable and pain-free. Your anesthesia provider will use general anesthesia.  What is general anesthesia?  General anesthesia puts you into a state like deep sleep. It goes into the bloodstream (IV anesthetics), into the lungs (gas anesthetics), or both. You feel nothing during the procedure. You will not remember it. During the procedure, the anesthesia provider monitors you continuously. He or she checks your heart rate and rhythm, blood pressure, breathing, and blood oxygen.  · IV anesthetics. IV anesthetics are given through an IV line in your arm. Theyre often given first. This is so you are asleep before a gas anesthetic is started. Some kinds of IV anesthetics relieve pain. Others relax you. Your doctor will decide which kind is best in your case.  · Gas anesthetics. Gas anesthetics are breathed into the lungs. They are often used to keep you asleep. They can be given through a facemask or a tube placed in your larynx or trachea (breathing tube).  ¨ If you have a facemask, your anesthesia provider will most likely place it over your nose and mouth while youre still  awake. Youll breathe oxygen through the mask as your IV anesthetic is started. Gas anesthetic may be added through the mask.  ¨ If you have a tube in the larynx or trachea, it will be inserted into your throat after youre asleep.  Anesthesia tools and medicines  You will likely have:  · IV anesthetics. These are put into an IV line into your bloodstream.  · Gas anesthetics. You breathe these anesthetics into your lungs, where they pass into your bloodstream.  · Pulse oximeter. This is a small clip that is attached to the end of your finger. This measures your blood oxygen level.  · Electrocardiography leads (electrodes). These are small sticky pads that are placed on your chest. They record your heart rate and rhythm.  · Blood pressure cuff. This reads your blood pressure.  Risks and possible complications  General anesthesia has some risks. These include:  · Breathing problems  · Nausea and vomiting  · Sore throat or hoarseness (usually temporary)  · Allergic reaction to the anesthetic  · Irregular heartbeat (rare)  · Cardiac arrest (rare)   Anesthesia safety  · Follow all instructions you are given for how long not to eat or drink before your procedure.  · Be sure your doctor knows what medicines and drugs you take. This includes over-the-counter medicines, herbs, supplements, alcohol or other drugs. You will be asked when those were last taken.  · Have an adult family member or friend drive you home after the procedure.  · For the first 24 hours after your surgery:  ¨ Do not drive or use heavy equipment.  ¨ Do not make important decisions or sign legal documents. If important decisions or signing legal documents is necessary during the first 24 hours after surgery, have a trusted family member or spouse act on your behalf.  ¨ Avoid alcohol.  ¨ Have a responsible adult stay with you. He or she can watch for problems and help keep you safe.  Date Last Reviewed: 12/1/2016  © 2416-0885 [employer]  [address] [place] [address]. All rights reserved. This information is not intended as a substitute for professional medical care. Always follow your healthcare professional's instructions.

## 2020-12-01 NOTE — TRANSFER OF CARE
"Anesthesia Transfer of Care Note    Patient: Pedro Ponce Jr.    Procedure(s) Performed: Procedure(s) (LRB):  REPAIR, HERNIA, INGUINAL,OPEN recurrent LEFT w/ mesh (Left)    Patient location: PACU    Anesthesia Type: general    Transport from OR: Transported from OR on 6-10 L/min O2 by face mask with adequate spontaneous ventilation    Post pain: adequate analgesia    Post assessment: no apparent anesthetic complications    Post vital signs: stable    Level of consciousness: awake    Nausea/Vomiting: no nausea/vomiting    Complications: none    Transfer of care protocol was followed      Last vitals:   Visit Vitals  BP (!) 131/56 (BP Location: Left arm, Patient Position: Lying)   Pulse 74   Temp 36.4 °C (97.5 °F) (Temporal)   Resp 16   Ht 5' 11" (1.803 m)   Wt 89.4 kg (197 lb)   SpO2 100%   BMI 27.48 kg/m²     "

## 2020-12-01 NOTE — INTERVAL H&P NOTE
The patient has been examined and the H&P has been reviewed:    I concur with the findings and no changes have occurred since H&P was written.    Surgery risks, benefits and alternative options discussed and understood by patient/family.          Active Hospital Problems    Diagnosis  POA    Left inguinal hernia [K40.90]  Yes      Resolved Hospital Problems   No resolved problems to display.

## 2020-12-01 NOTE — OP NOTE
[facility]  Operative Note    DATE OF PROCEDURE: 12/1/2020    PREOPERATIVE DIAGNOSIS: Recurrent left inguinal hernia [K40.91]    POSTOPERATIVE DIAGNOSIS: same    PROCEDURES PERFORMED:  1. Open repair of left inguinal hernia, with mesh placement  2. Cord lipoma excision    ATTENDING SURGEON: Fercho Roy MD    RESIDENT: Mikael Sales MD    ANESTHESIA: General    KEY FINDINGS: Fat-containing indirect left inguinal hernia repaired with mesh. Cord lipoma exicison    ESTIMATED BLOOD LOSS: 5 ml    DRAINS: none    COMPLICATIONS: none    INDICATIONS FOR PROCEDURE: The patient is a 80 y.o. male   who presented with a recurrent, symptomatic left inguinal hernia.  The history and exam were consistent with inguinal hernia. We recommended an open inguinal hernia repair with mesh and the patient agreed to proceed. The patient signed informed consent and expressed understanding of the risks and benefits of surgery.     DESCRIPTION OF OPERATIVE PROCEDURE:  The patient was identified in preoperative holding and brought back to the Operating Room. The patient was placed supine on the operating table and padded appropriately. Monitors were applied and monitored anesthesia care was initiated. His left groin was shaved with electric clippers and prepped and draped in the standard sterile surgical fashion. A timeout was   performed and all team members present agreed this was the correct procedure on the correct side of the correct patient. We also confirmed administration of appropriate preoperative antibiotics.     We marked out an appropriate 6cm transverse inguinal incision and administered local anesthetic to the plane. After assuring adequate local anesthesia, a 6 cm oblique skin incision was made. The subcutaneous tissue was divided with Bovie electrocautery. This dissection was carried down through Mabel fascia down to the aponeurosis of the external oblique. The aponeurosis of the external  oblique was incised in line with its fibers and extended to the external spermatic ring. The inguinal canal contents were bluntly encircled, first digitally and then with a Penrose drain. Montanez retractors were placed to facilitate the dissection. We proceeded to identify an indirect inguinal hernia sac, which was carefully dissected away from the inguinal canal contents. The sac was then opened with Metzenbaum scissors and suture ligated at its base using a 3-0 vicryl stitch. The hernia sac was sent off to pathology, and the remaining portion reduced back into the defect. We performed skeletonization of the spermatic cord. A small cord lipoma was identified and excised.  We then had appropriate borders of the inguinal canal identified and decided to repair the defect with a piece of precut, polypropylene mesh. The mesh fit the defect appropriately and sewn in place with two, interrupted 2-0 Prolene suture. Medially, the mesh was anchored to the fascia overlying the pubic tubercle. Inferiorly, the mesh was anchored to the shelving edge of the inguinal ligament. Superiorly, the mesh was anchored to the conjoined tendon. The tails of the mesh were brought together around the cord structures creating a new internal ring that just admitted the tip of the finger. The mesh was inspected and noted to lie in appropriate position without any redundancy or tension. The testicle was pulled back down to the scrotum and there was noted to be no tension on the cord structures. Copious irrigation and local anesthetic was administered to the area. The aponeurosis of the external oblique was closed with a running 3-0 Vicryl suture. Again, copious irrigation and local anesthesia were administered to the subcutaneous tissue. Mabel fascia was closed with running 3-0 Vicryl sutures and the skin was closed with a running subcuticular monocryl suture. A sterile dressing was applied.     The patient was then transported to the  Recovery Room in stable condition. All sponge, instrument and needle counts were correct at the end of the case. Dr. Roy was present and scrubbed for the entire procedure.      Mikael Sales MD   General Surgery, PGY-1  [phone]

## 2020-12-02 NOTE — ANESTHESIA POSTPROCEDURE EVALUATION
Anesthesia Post Evaluation    Patient: Pedro Ponce JrTayler    Procedure(s) Performed: Procedure(s) (LRB):  REPAIR, HERNIA, INGUINAL,OPEN recurrent LEFT w/ mesh (Left)    Final Anesthesia Type: general    Patient location during evaluation: PACU  Patient participation: Yes- Able to Participate  Level of consciousness: awake  Post-procedure vital signs: reviewed and stable  Pain management: adequate  Airway patency: patent    PONV status at discharge: No PONV  Anesthetic complications: no      Cardiovascular status: blood pressure returned to baseline  Respiratory status: unassisted  Hydration status: euvolemic  Follow-up not needed.          Vitals Value Taken Time   /65 12/01/20 1100   Temp 36.2 °C (97.2 °F) 12/01/20 1011   Pulse 71 12/01/20 1100   Resp 18 12/01/20 1100   SpO2 99 % 12/01/20 1100         Event Time   Out of Recovery 09:45:00         Pain/You Score: Pain Rating Prior to Med Admin: 4 (12/1/2020 10:29 AM)  Pain Rating Post Med Admin: 2 (12/1/2020 11:00 AM)  You Score: 10 (12/1/2020 10:00 AM)

## 2020-12-06 ENCOUNTER — HOSPITAL ENCOUNTER (OUTPATIENT)
Facility: HOSPITAL | Age: 80
Discharge: HOME | End: 2020-12-07
Attending: EMERGENCY MEDICINE | Admitting: INTERNAL MEDICINE
Payer: MEDICARE

## 2020-12-06 DIAGNOSIS — G45.9 TIA (TRANSIENT ISCHEMIC ATTACK): Primary | ICD-10-CM

## 2020-12-06 DIAGNOSIS — I63.9 STROKE: ICD-10-CM

## 2020-12-06 PROBLEM — I63.512 ACUTE ISCHEMIC LEFT MCA STROKE: Status: ACTIVE | Noted: 2020-12-06

## 2020-12-06 LAB
ALBUMIN SERPL BCP-MCNC: 3.9 G/DL (ref 3.5–5.2)
ALP SERPL-CCNC: 59 U/L (ref 55–135)
ALT SERPL W/O P-5'-P-CCNC: 11 U/L (ref 10–44)
ANION GAP SERPL CALC-SCNC: 14 MMOL/L (ref 8–16)
APTT BLDCRRT: 29.7 SEC (ref 21–32)
AST SERPL-CCNC: 16 U/L (ref 10–40)
BASOPHILS # BLD AUTO: 0.04 K/UL (ref 0–0.2)
BASOPHILS NFR BLD: 0.9 % (ref 0–1.9)
BILIRUB SERPL-MCNC: 1 MG/DL (ref 0.1–1)
BILIRUB UR QL STRIP: NEGATIVE
BILIRUB UR QL STRIP: NEGATIVE
BNP SERPL-MCNC: 60 PG/ML (ref 0–99)
BUN SERPL-MCNC: 20 MG/DL (ref 8–23)
CALCIUM SERPL-MCNC: 10.5 MG/DL (ref 8.7–10.5)
CHLORIDE SERPL-SCNC: 106 MMOL/L (ref 95–110)
CHOLEST SERPL-MCNC: 139 MG/DL (ref 120–199)
CHOLEST/HDLC SERPL: 2.6 {RATIO} (ref 2–5)
CLARITY UR: CLEAR
CLARITY UR: CLEAR
CO2 SERPL-SCNC: 18 MMOL/L (ref 23–29)
COLOR UR: YELLOW
COLOR UR: YELLOW
CREAT SERPL-MCNC: 1.2 MG/DL (ref 0.5–1.4)
CREAT SERPL-MCNC: 1.3 MG/DL (ref 0.5–1.4)
DELSYS: NORMAL
DELSYS: NORMAL
DIFFERENTIAL METHOD: ABNORMAL
EOSINOPHIL # BLD AUTO: 0.1 K/UL (ref 0–0.5)
EOSINOPHIL NFR BLD: 2.5 % (ref 0–8)
ERYTHROCYTE [DISTWIDTH] IN BLOOD BY AUTOMATED COUNT: 13.4 % (ref 11.5–14.5)
EST. GFR  (AFRICAN AMERICAN): >60 ML/MIN/1.73 M^2
EST. GFR  (NON AFRICAN AMERICAN): 56 ML/MIN/1.73 M^2
ESTIMATED AVG GLUCOSE: 123 MG/DL (ref 68–131)
GLUCOSE SERPL-MCNC: 108 MG/DL (ref 70–110)
GLUCOSE UR QL STRIP: NEGATIVE
GLUCOSE UR QL STRIP: NEGATIVE
HBA1C MFR BLD HPLC: 5.9 % (ref 4–5.6)
HCT VFR BLD AUTO: 41.5 % (ref 40–54)
HDLC SERPL-MCNC: 53 MG/DL (ref 40–75)
HDLC SERPL: 38.1 % (ref 20–50)
HGB BLD-MCNC: 14.1 G/DL (ref 14–18)
HGB UR QL STRIP: NEGATIVE
HGB UR QL STRIP: NEGATIVE
IMM GRANULOCYTES # BLD AUTO: 0.01 K/UL (ref 0–0.04)
IMM GRANULOCYTES NFR BLD AUTO: 0.2 % (ref 0–0.5)
INR PPP: 1 (ref 0.8–1.2)
KETONES UR QL STRIP: NEGATIVE
KETONES UR QL STRIP: NEGATIVE
LDLC SERPL CALC-MCNC: 73.4 MG/DL (ref 63–159)
LEUKOCYTE ESTERASE UR QL STRIP: ABNORMAL
LEUKOCYTE ESTERASE UR QL STRIP: NEGATIVE
LYMPHOCYTES # BLD AUTO: 1.8 K/UL (ref 1–4.8)
LYMPHOCYTES NFR BLD: 39.8 % (ref 18–48)
MCH RBC QN AUTO: 31.2 PG (ref 27–31)
MCHC RBC AUTO-ENTMCNC: 34 G/DL (ref 32–36)
MCV RBC AUTO: 92 FL (ref 82–98)
MICROSCOPIC COMMENT: ABNORMAL
MONOCYTES # BLD AUTO: 0.5 K/UL (ref 0.3–1)
MONOCYTES NFR BLD: 10.2 % (ref 4–15)
NEUTROPHILS # BLD AUTO: 2.1 K/UL (ref 1.8–7.7)
NEUTROPHILS NFR BLD: 46.4 % (ref 38–73)
NITRITE UR QL STRIP: NEGATIVE
NITRITE UR QL STRIP: NEGATIVE
NONHDLC SERPL-MCNC: 86 MG/DL
NRBC BLD-RTO: 0 /100 WBC
PH UR STRIP: 5 [PH] (ref 5–8)
PH UR STRIP: 7 [PH] (ref 5–8)
PLATELET # BLD AUTO: 259 K/UL (ref 150–350)
PMV BLD AUTO: 9.7 FL (ref 9.2–12.9)
POC PTINR: 1 (ref 0.9–1.2)
POC PTWBT: 11.7 SEC (ref 9.7–14.3)
POCT GLUCOSE: 111 MG/DL (ref 70–110)
POTASSIUM SERPL-SCNC: 3.9 MMOL/L (ref 3.5–5.1)
PROT SERPL-MCNC: 7.3 G/DL (ref 6–8.4)
PROT UR QL STRIP: ABNORMAL
PROT UR QL STRIP: NEGATIVE
PROTHROMBIN TIME: 10.4 SEC (ref 9–12.5)
RBC # BLD AUTO: 4.52 M/UL (ref 4.6–6.2)
SAMPLE: NORMAL
SAMPLE: NORMAL
SARS-COV-2 RDRP RESP QL NAA+PROBE: NEGATIVE
SODIUM SERPL-SCNC: 138 MMOL/L (ref 136–145)
SP GR UR STRIP: 1.01 (ref 1–1.03)
SP GR UR STRIP: 1.01 (ref 1–1.03)
TRIGL SERPL-MCNC: 63 MG/DL (ref 30–150)
TROPONIN I SERPL DL<=0.01 NG/ML-MCNC: <0.006 NG/ML (ref 0–0.03)
TSH SERPL DL<=0.005 MIU/L-ACNC: 2.27 UIU/ML (ref 0.4–4)
URN SPEC COLLECT METH UR: ABNORMAL
URN SPEC COLLECT METH UR: NORMAL
UROBILINOGEN UR STRIP-ACNC: NEGATIVE EU/DL
UROBILINOGEN UR STRIP-ACNC: NEGATIVE EU/DL
WBC # BLD AUTO: 4.42 K/UL (ref 3.9–12.7)
WBC #/AREA URNS HPF: 12 /HPF (ref 0–5)
WBC CLUMPS URNS QL MICRO: ABNORMAL

## 2020-12-06 PROCEDURE — 97535 SELF CARE MNGMENT TRAINING: CPT

## 2020-12-06 PROCEDURE — 84484 ASSAY OF TROPONIN QUANT: CPT | Mod: 59

## 2020-12-06 PROCEDURE — 94761 N-INVAS EAR/PLS OXIMETRY MLT: CPT

## 2020-12-06 PROCEDURE — 93005 ELECTROCARDIOGRAM TRACING: CPT

## 2020-12-06 PROCEDURE — 83036 HEMOGLOBIN GLYCOSYLATED A1C: CPT

## 2020-12-06 PROCEDURE — 93010 EKG 12-LEAD: ICD-10-PCS | Mod: ,,, | Performed by: INTERNAL MEDICINE

## 2020-12-06 PROCEDURE — 97116 GAIT TRAINING THERAPY: CPT | Performed by: PHYSICAL THERAPIST

## 2020-12-06 PROCEDURE — 82962 GLUCOSE BLOOD TEST: CPT

## 2020-12-06 PROCEDURE — 63600175 PHARM REV CODE 636 W HCPCS: Performed by: NURSE PRACTITIONER

## 2020-12-06 PROCEDURE — G0378 HOSPITAL OBSERVATION PER HR: HCPCS

## 2020-12-06 PROCEDURE — U0002 COVID-19 LAB TEST NON-CDC: HCPCS

## 2020-12-06 PROCEDURE — 97161 PT EVAL LOW COMPLEX 20 MIN: CPT | Mod: 59 | Performed by: PHYSICAL THERAPIST

## 2020-12-06 PROCEDURE — 84443 ASSAY THYROID STIM HORMONE: CPT

## 2020-12-06 PROCEDURE — 25000003 PHARM REV CODE 250: Performed by: NURSE PRACTITIONER

## 2020-12-06 PROCEDURE — 83880 ASSAY OF NATRIURETIC PEPTIDE: CPT

## 2020-12-06 PROCEDURE — G0426 PR INPT TELEHEALTH CONSULT 50M: ICD-10-PCS | Mod: 95,,, | Performed by: PSYCHIATRY & NEUROLOGY

## 2020-12-06 PROCEDURE — 93010 ELECTROCARDIOGRAM REPORT: CPT | Mod: ,,, | Performed by: INTERNAL MEDICINE

## 2020-12-06 PROCEDURE — 85610 PROTHROMBIN TIME: CPT

## 2020-12-06 PROCEDURE — G0426 INPT/ED TELECONSULT50: HCPCS | Mod: 95,,, | Performed by: PSYCHIATRY & NEUROLOGY

## 2020-12-06 PROCEDURE — 85730 THROMBOPLASTIN TIME PARTIAL: CPT

## 2020-12-06 PROCEDURE — 85025 COMPLETE CBC W/AUTO DIFF WBC: CPT

## 2020-12-06 PROCEDURE — 99900035 HC TECH TIME PER 15 MIN (STAT)

## 2020-12-06 PROCEDURE — 81000 URINALYSIS NONAUTO W/SCOPE: CPT | Mod: 59

## 2020-12-06 PROCEDURE — 92610 EVALUATE SWALLOWING FUNCTION: CPT

## 2020-12-06 PROCEDURE — 80053 COMPREHEN METABOLIC PANEL: CPT

## 2020-12-06 PROCEDURE — 81003 URINALYSIS AUTO W/O SCOPE: CPT

## 2020-12-06 PROCEDURE — 99291 CRITICAL CARE FIRST HOUR: CPT | Mod: 25

## 2020-12-06 PROCEDURE — 96372 THER/PROPH/DIAG INJ SC/IM: CPT

## 2020-12-06 PROCEDURE — 82565 ASSAY OF CREATININE: CPT

## 2020-12-06 PROCEDURE — 87086 URINE CULTURE/COLONY COUNT: CPT

## 2020-12-06 PROCEDURE — 80061 LIPID PANEL: CPT

## 2020-12-06 RX ORDER — ATORVASTATIN CALCIUM 40 MG/1
40 TABLET, FILM COATED ORAL DAILY
Status: DISCONTINUED | OUTPATIENT
Start: 2020-12-06 | End: 2020-12-07 | Stop reason: HOSPADM

## 2020-12-06 RX ORDER — ENOXAPARIN SODIUM 100 MG/ML
40 INJECTION SUBCUTANEOUS EVERY 24 HOURS
Status: DISCONTINUED | OUTPATIENT
Start: 2020-12-06 | End: 2020-12-07 | Stop reason: HOSPADM

## 2020-12-06 RX ORDER — SODIUM CHLORIDE 0.9 % (FLUSH) 0.9 %
10 SYRINGE (ML) INJECTION
Status: DISCONTINUED | OUTPATIENT
Start: 2020-12-06 | End: 2020-12-07 | Stop reason: HOSPADM

## 2020-12-06 RX ORDER — LABETALOL HYDROCHLORIDE 5 MG/ML
10 INJECTION, SOLUTION INTRAVENOUS EVERY 6 HOURS PRN
Status: DISCONTINUED | OUTPATIENT
Start: 2020-12-06 | End: 2020-12-07 | Stop reason: HOSPADM

## 2020-12-06 RX ORDER — ONDANSETRON 2 MG/ML
4 INJECTION INTRAMUSCULAR; INTRAVENOUS EVERY 8 HOURS PRN
Status: DISCONTINUED | OUTPATIENT
Start: 2020-12-06 | End: 2020-12-07 | Stop reason: HOSPADM

## 2020-12-06 RX ORDER — ASPIRIN 81 MG/1
81 TABLET ORAL DAILY
Status: DISCONTINUED | OUTPATIENT
Start: 2020-12-06 | End: 2020-12-07 | Stop reason: HOSPADM

## 2020-12-06 RX ORDER — ACETAMINOPHEN 325 MG/1
650 TABLET ORAL EVERY 6 HOURS PRN
Status: DISCONTINUED | OUTPATIENT
Start: 2020-12-06 | End: 2020-12-07 | Stop reason: HOSPADM

## 2020-12-06 RX ORDER — ONDANSETRON 8 MG/1
8 TABLET, ORALLY DISINTEGRATING ORAL EVERY 8 HOURS PRN
Status: DISCONTINUED | OUTPATIENT
Start: 2020-12-06 | End: 2020-12-07 | Stop reason: HOSPADM

## 2020-12-06 RX ADMIN — ENOXAPARIN SODIUM 40 MG: 40 INJECTION SUBCUTANEOUS at 05:12

## 2020-12-06 RX ADMIN — ATORVASTATIN CALCIUM 40 MG: 40 TABLET, FILM COATED ORAL at 12:12

## 2020-12-06 RX ADMIN — ASPIRIN 81 MG: 81 TABLET, COATED ORAL at 12:12

## 2020-12-06 NOTE — PT/OT/SLP EVAL
Physical Therapy Evaluation    Patient Name:  Pedro Ponce Jr.   MRN:  [id number]    Recommendations:     Discharge Recommendations:  home   Discharge Equipment Recommendations: (recommended grab bars in tub area and shower chair to pt and wife)   Barriers to discharge: None    Assessment:     Pedro Ponce Jr. is a 80 y.o. male admitted with a medical diagnosis of The encounter diagnosis was Stroke.   He presents with the following impairments/functional limitations:  weakness, gait instability, impaired cardiopulmonary response to activity, impaired endurance, impaired balance, decreased lower extremity function, impaired self care skills, impaired functional mobilty. Pt supine to/from sit with mod Ind.  Pt ambulated 15' x 4 with CG with short steps, reporting he feels weak. Pt marched in place x 20 without SOB but reported fatigue.  Pt sat at EOB 11 min, requesting to lie down after 11 min.  Pt BP decreased from 174/84 in supine to 147/74 in sitting.   Rehab Prognosis: Good; patient would benefit from acute skilled PT services to address these deficits and reach maximum level of function.    Recent Surgery: * No surgery found *      Plan:     During this hospitalization, patient to be seen 5 x/week to address the identified rehab impairments via gait training, therapeutic activities, therapeutic exercises, neuromuscular re-education and progress toward the following goals:    · Plan of Care Expires:  01/01/21    Subjective     Chief Complaint: right hand numbness, leg tingling  Patient/Family Comments/goals: get warm  Pain/Comfort:  · Pain Rating 1: 0/10    Patients cultural, spiritual, Mu-ism conflicts given the current situation: no    Living Environment:  Pt lives with his wife in a [facility] with a threshold step to enter.    Prior to admission, patients level of function was Ind community ambulation prior to 12/1/20 hernia surgery.  Equipment used at home: none.  DME owned (not currently used): none.   Upon discharge, patient will have assistance from his wife.    Objective:     Communicated with nurse prior to session.  Patient found HOB elevated with telemetry, pulse ox (continuous), peripheral IV  upon PT entry to room.    General Precautions: Standard, fall   Orthopedic Precautions:    Braces:       Exams:  · Pt is oriented x 4.  Pt has BLE AROM WFL.  Pt has 3+ to 5/5 BLE strength in BLE's with hip weakness noted primarily.     Functional Mobility:   Pt supine to/from sit with mod Ind.  Pt ambulated 15' x 4 with CG with short steps, reporting he feels weak. Pt marched in place x 20 without SOB but reported fatigue.  Pt sat at EOB 11 min, requesting to lie down after 11 min.  Pt BP decreased from 174/84 in supine to 147/74 in sitting.  Therapeutic Activities and Exercises:   Pt performed seated LAQ, AP and hip flexion x 10.    AM-PAC 6 CLICK MOBILITY  Total Score:20     Patient left HOB elevated with all lines intact, call button in reach, nurse notified and wife present.    GOALS:   Multidisciplinary Problems     Physical Therapy Goals        Problem: Physical Therapy Goal    Goal Priority Disciplines Outcome Goal Variances Interventions   Physical Therapy Goal     PT, PT/OT Ongoing, Progressing     Description: Goals to be met by: 1/3/21     Patient will increase functional independence with mobility by performin.  Sit in chair 2 hours  2.  Bed to chair with Mod Ind  3.  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       Past Surgical History:   Procedure Laterality Date    COLONOSCOPY N/A 2018    Procedure: COLONOSCOPY;   Surgeon: HAIM Mir MD;  Location: [facility] ([address] [place] [address]);  Service: Endoscopy;  Laterality: N/A;    COLONOSCOPY N/A 1/14/2019    Procedure: COLONOSCOPY;  Surgeon: HAIM Mir MD;  Location: [facility] ([address] [place] [address]);  Service: Endoscopy;  Laterality: N/A;  PM prep    CYST REMOVAL  2000    removed from left kidney    KIDNEY SURGERY      PROSTATE SURGERY         Time Tracking:     PT Received On: 12/06/20  PT Start Time: 1139     PT Stop Time: 1205  PT Total Time (min): 26 min     Billable Minutes: Evaluation 14 and Gait Training 12      Tatiana Cardenas, PT  12/06/2020

## 2020-12-06 NOTE — PT/OT/SLP EVAL
Speech Language Pathology Evaluation  Bedside Swallow/Discharge     Patient Name:  Pedro Ponce Jr.   MRN:  [id number]  Admitting Diagnosis: <principal problem not specified> r/o TIA vs CVA    Recommendations:                 General Recommendations:  Follow-up not indicated  Diet recommendations:  Regular, Thin   Aspiration Precautions: upright for meals, can feed self, whole meds, slow rate and alternate sips and bites  General Precautions: Standard, fall  Communication strategies:  none    History:   History of Present Illness:  81yo AAM with HTN, HLD, pre-diabetes presents to ED complaining of right arm numbness and minimal weakness that has been progressing over the last 48-72 hours. This morning he felt that it was worse so decided to come in. Wife at bedside reports she did not know of his numbness as he mentioned nothing. Patient reports that he has been to ED 2x in past few months for elevated SBP. No other associated symptoms. Reports hernia repair last Monday.    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     Prior Intubation  "HX:  n/a    Modified Barium Swallow: last MBS as OP in 2016, deemed WFL no aspiration, none recent.     CT: 1. No convincing evidence of acute transcortical infarct by noncontrast CT.  No acute intracranial hemorrhage.   Chest X-Rays: No convincing evidence of acute cardiopulmonary disease.     Prior diet: unrestricted diet, regular and thin at home.     Occupation/hobbies/homemaking: n/a    Subjective     Consult received for clinical swallow eval/speech/lang eval this date, SLP did communicate with RN prior to eval/treat.    Patient goals: "I really need something to eat."    Pain/Comfort:  · Pain Rating 1: 0/10    Objective:   Pt seen in ED, he is alert, awake, upright in stretcher, asking if SLP has more food for him to eat.  Pt able to follow commands, interact with SLP, responds to questions with no aphasia.  Pt oriented x4.   No word finding or dysarthria present.  Wife reports his main symptom is weakness in arm.     Oral Musculature Evaluation  · Oral Musculature: WFL  · Dentition: present and adequate  · Mucosal Quality: good, adequate  · Mandibular Strength and Mobility: WFL  · Oral Labial Strength and Mobility: WFL  · Lingual Strength and Mobility: WFL  · Buccal Strength and Mobility: WFL  · Volitional Cough: elicited  · Volitional Swallow: timely swallow  · Voice Prior to PO Intake: clear voice    Bedside Swallow Eval:   Consistencies Assessed:  · Thin liquids water self fed  · Puree pudding self fed  · Soft solids diced peaches self fed  · Solids cracker whole cracker consumed     Oral Phase:   · WFL   · Timely swallow and good labial seal and closure noted    Pharyngeal Phase:   · no overt clinical signs/symptoms of aspiration  · no overt clinical signs/symptoms of pharyngeal dysphagia   · Timely swallow    Treatment: Provided skilled education on SLP recs and role of speech during this admit.    Pt tolerated all PO trials with no outward dysphagia signs, discussed current diet recs and impressions " with wife and pt. Wife asked questions within SLP scope.   Both pt and wife able to verbalize understanding of all info provided.   SLP did secure chat MD with diet recs.     Assessment:     Pedro Ponce Jr. is a 80 y.o. male admitted for weakness with an SLP diagnosis of  WFL swallowing and communication skills. No further acute speech pathology services warranted at this time. Please re-consult should patient experience a change in status.      Goals:   Multidisciplinary Problems     SLP Goals     Not on file                Plan:     · Patient to be seen:      · Plan of Care expires:     · Plan of Care reviewed with:  patient, spouse   · SLP Follow-Up:  No       Discharge recommendations:  home   Barriers to Discharge:  none    Time Tracking:     SLP Treatment Date:   12/06/20  Speech Start Time:  1052  Speech Stop Time:  1111     Speech Total Time (min):  19 min    Billable Minutes: Eval Swallow and Oral Function 8 and Seld Care/Home Management Training 11    MAXIMO Jimenez, CCC-SLP  12/06/2020

## 2020-12-06 NOTE — ASSESSMENT & PLAN NOTE
Continue ASA  MRI, Carotid, TTE with bubble study  Telemetry  Permissive Hypertension  Appreciate neurology reccs      Antithrombotics for secondary stroke prevention: Antiplatelets: Aspirin: 81 mg daily    Statins for secondary stroke prevention and hyperlipidemia, if present:   Statins: Atorvastatin- 40 mg daily    Aggressive risk factor modification: HTN, DM, HLD, Diet, Exercise, Obesity     Rehab efforts: The patient has been evaluated by a stroke team provider and the therapy needs have been fully considered based off the presenting complaints and exam findings. The following therapy evaluations are needed: PT evaluate and treat, OT evaluate and treat, SLP evaluate and treat    Diagnostics ordered/pending: Carotid ultrasound to assess vasculature, HgbA1C to assess blood glucose levels, Lipid Profile to assess cholesterol levels, MRA head to assess vasculature, MRA neck/arch to assess vasculature, MRI head without contrast to assess brain parenchyma, TTE to assess cardiac function/status     VTE prophylaxis: Enoxaparin 40 mg SQ every 24 hours    BP parameters: Infarct: No intervention, SBP <220

## 2020-12-06 NOTE — PLAN OF CARE
VN cued into pt's room for introduction. VN informed pt and wife that VN would be working along side bedside nurse and PCT throughout shift. Level of present pain assessed. At present no distress noted. Throughly discussed with patient and wife the plan of care. Admission assessment data collection completed with the assistance from patient and wife. Discussed with patient High fall risk protocol and interventions that have been initiated and cont be in place for safety. Patient verbalized clear understanding and cooperation using teach back method. Bed alarm presently activated and in use. Will cont to be available to patient and intervene prn.

## 2020-12-06 NOTE — SUBJECTIVE & OBJECTIVE
"  Woke up with symptoms?: no    Recent bleeding noted: no  Does the patient take any Blood Thinners? yes  Medications: Antiplatelets:  aspirin      Past Medical History: hypertension, hyperlipidemia and stroke    Past Surgical History: hernia repair <7 days ago    Family History: hypertension, diabetes and stroke    Social History: no smoking, no drinking, no drugs    Allergies: Pcn [Penicillins] see above    Review of Systems   Review of Symptoms:   Constitutional: Denies fevers or chills.  ENT: no hearing difficulty, no visual changes  Pulmonary: Denies shortness of breath or cough.  Cardiology: Denies chest pain or palpitations.  GI: Denies abdominal pain or constipation.  Neurologic: progressive numbness and weakness of right arm  : no dysuria  Musk: no muscle pain, no joint pain  Psych: no hallucinations      Objective:   Vitals: Blood pressure (!) 144/68, pulse 98, temperature 98.4 °F (36.9 °C), temperature source Oral, resp. rate 17, height 5' 11" (1.803 m), weight 90.7 kg (200 lb), SpO2 99 %. Height: see above    CT READ: Yes  Abnormal CT no acute process though areas indicative of longstanding HTN as well as left MCA small remote infarction.     Physical Exam  Physical Exam:  GA: Alert, comfortable, no acute distress.   Pulmonary: normal chest rise  Abdominal: no distension appreciated  Skin: no visible lesions on exposed skin    Neuro:  --GCS: 15  --Mental Status:  AOx4 pleasant and conversant  --CN II-XII grossly intact.   --EOMI   --brainstem: intact  --Motor: mild left upper arm drift  --sensory: reported equal and normal throughout  --Reflexes: not tested  --Gait: deferred  --F-N-F wnl      "

## 2020-12-06 NOTE — HPI
81yo AAM with HTN, HLD, pre-diabetes presents to ED complaining of right arm numbness and minimal weakness that has been progressing over the last 48-72 hours. This morning he felt that it was worse so decided to come in. Wife at bedside reports she did not know of his numbness as he mentioned nothing. Patient reports that he has been to ED 2x in past few months for elevated SBP. No other associated symptoms. Reports hernia repair last Monday.

## 2020-12-06 NOTE — H&P
[facility] Medicine  History & Physical    Patient Name: Pedro Ponce Jr.  MRN: [id number]  Admission Date: 12/6/2020  Attending Physician: Doretha Lane MD   Primary Care Provider: John Ricci MD     ADDENDUM: As clarification, on 12-6-2020, patient should be admitted for hospital observation services under my care.    Patient information was obtained from patient and ER records.     Subjective:     Principal Problem:Acute ischemic left MCA stroke    Chief Complaint:   Chief Complaint   Patient presents with    Numbness     woke up this am at 0700 with numbeness in right shoulder numbness and arm, equal  noted no weakness noted         HPI: This is an 80 year old male who presented with right sided arm weakness and numbness. He has a history of HTN, DM, hyperlipidemia and BPH. He states that he has had symptoms for 2 days.  He usually does hand exercises with a stress ball and noticed it when he was doing the exercises. He noted that it had gotten worse and also that he was having some numbness on the right lower extremity as well  He denies Headache, Visual or speech symptoms  He states that he does take ASA 81 mg daily  He has been treated for uncontrolled hypertension    A CODE STROKE was called in the ED and he was evaluated by Vascular Neurology. He is out of the window for TPA. He is Tier 2 VAN negative  He was evaluated by speech therapy and does not exhibit aspiration.  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       Past Surgical History:   Procedure Laterality Date    COLONOSCOPY N/A  4/9/2018    Procedure: COLONOSCOPY;  Surgeon: HAIM Mir MD;  Location: [facility] ENDO (4TH FLR);  Service: Endoscopy;  Laterality: N/A;    COLONOSCOPY N/A 1/14/2019    Procedure: COLONOSCOPY;  Surgeon: HAIM Mir MD;  Location: [facility] ENDO (4TH FLR);  Service: Endoscopy;  Laterality: N/A;  PM prep    CYST REMOVAL  2000    removed from left kidney    KIDNEY SURGERY      PROSTATE SURGERY         Review of patient's allergies indicates:   Allergen Reactions    Pcn [penicillins] Rash       No current facility-administered medications on file prior to encounter.      Current Outpatient Medications on File Prior to Encounter   Medication Sig    aspirin 81 mg Tab Take 81 mg by mouth. 1 Tablet Oral Every evening    atorvastatin (LIPITOR) 10 MG tablet Take 10 mg by mouth every evening.    chlorhexidine (PERIDEX) 0.12 % solution RINSE 1/2 OZ TWICE DAILY AFTER BREAKFAST AND BEFORE BEDTIME    dutasteride (AVODART) 0.5 mg capsule TAKE 1 CAPSULE (0.5 MG TOTAL) BY MOUTH ONCE DAILY.    hydrALAZINE (APRESOLINE) 50 MG tablet Take 100 mg by mouth 3 (three) times daily. Per Dr. Mcdonald     irbesartan (AVAPRO) 300 MG tablet TAKE 1 TABLET EVERY DAY    metoprolol tartrate (LOPRESSOR) 50 MG tablet Take 0.5 tablets (25 mg total) by mouth 2 (two) times daily.    NIFEdipine (PROCARDIA-XL) 60 MG (OSM) 24 hr tablet Take 1 tablet (60 mg total) by mouth 2 (two) times daily.    oxybutynin (DITROPAN-XL) 10 MG 24 hr tablet Take 1 tablet (10 mg total) by mouth once daily.    senna-docusate 8.6-50 mg (SENNA WITH DOCUSATE SODIUM) 8.6-50 mg per tablet Take 1 tablet by mouth once daily.    spironolactone (ALDACTONE) 50 MG tablet Take 1 tablet (50 mg total) by mouth once daily.    tamsulosin (FLOMAX) 0.4 mg Cap Take 1 capsule (0.4 mg total) by mouth every evening.    oxyCODONE-acetaminophen (PERCOCET) 5-325 mg per tablet Take 1 tablet by mouth every 4 to 6 hours as needed for Pain.    [DISCONTINUED] flu vacc [id number],65yr up,PF  (FLUZONE HIGH-DOSE 2019-20, PF,) 180 mcg/0.5 mL Syrg uud    [DISCONTINUED] flu vacc [id number],65yr up,PF (FLUZONE HIGH-DOSE 2019-20, PF,) 180 mcg/0.5 mL Syrg admin as directed    [DISCONTINUED] FLUZONE HIGHDOSE QUAD 20-21  mcg/0.7 mL Syrg TO BE ADMINISTERED BY PHARMACIST FOR IMMUNIZATION    [DISCONTINUED] varicella-zoster [id number], PF, (SHINGRIX, PF,) 50 mcg/0.5 mL injection Inject into the muscle.     Family History     Problem Relation (Age of Onset)    Cancer Mother    Diabetes Mother    Glaucoma Mother    Heart disease Mother    Hypertension Mother        Tobacco Use    Smoking status: Never Smoker    Smokeless tobacco: Never Used   Substance and Sexual Activity    Alcohol use: No    Drug use: No    Sexual activity: Not Currently     Partners: Female     Review of Systems   Constitutional: Negative for fever.   HENT: Negative for drooling.    Eyes: Negative for visual disturbance.   Respiratory: Negative for shortness of breath.    Cardiovascular: Negative for chest pain.   Gastrointestinal: Positive for constipation. Negative for abdominal pain and nausea.   Genitourinary: Negative for dysuria.        Recent right inguinal hernia surgery 12-1-2020   Musculoskeletal: Negative for back pain.   Skin: Negative for rash.   Neurological: Positive for weakness and numbness. Negative for tremors, seizures, syncope, facial asymmetry, speech difficulty, light-headedness and headaches.   Psychiatric/Behavioral: Negative for confusion.     Objective:     Vital Signs (Most Recent):  Temp: 98.2 °F (36.8 °C) (12/06/20 1321)  Pulse: 90 (12/06/20 1552)  Resp: 18 (12/06/20 1321)  BP: (!) 169/82 (12/06/20 1321)  SpO2: 98 % (12/06/20 1546) Vital Signs (24h Range):  Temp:  [98.2 °F (36.8 °C)-98.4 °F (36.9 °C)] 98.2 °F (36.8 °C)  Pulse:  [] 90  Resp:  [11-20] 18  SpO2:  [97 %-100 %] 98 %  BP: (144-169)/(68-82) 169/82     Weight: 88.9 kg (196 lb)  Body mass index is 27.34 kg/m².    Physical Exam  Vitals signs and  nursing note reviewed.   Constitutional:       General: He is not in acute distress.     Appearance: He is well-developed.   HENT:      Head: Normocephalic and atraumatic.      Mouth/Throat:      Mouth: Mucous membranes are moist.      Pharynx: Oropharynx is clear.   Eyes:      General: No scleral icterus.     Conjunctiva/sclera: Conjunctivae normal.      Pupils: Pupils are equal, round, and reactive to light.   Neck:      Musculoskeletal: Normal range of motion and neck supple.      Thyroid: No thyromegaly.      Vascular: No JVD.      Comments: No carotid bruits  Cardiovascular:      Rate and Rhythm: Normal rate and regular rhythm.      Heart sounds: No murmur. No friction rub. No gallop.    Pulmonary:      Effort: Pulmonary effort is normal. No respiratory distress.      Breath sounds: Normal breath sounds. No wheezing or rales.   Chest:      Chest wall: No tenderness.   Abdominal:      General: Bowel sounds are normal. There is no distension.      Palpations: Abdomen is soft. There is no mass.      Tenderness: There is no abdominal tenderness.      Hernia: No hernia is present.   Musculoskeletal: Normal range of motion.         General: No tenderness or deformity.   Lymphadenopathy:      Cervical: No cervical adenopathy.   Skin:     General: Skin is warm and dry.      Capillary Refill: Capillary refill takes less than 2 seconds.      Findings: No rash.   Neurological:      Mental Status: He is alert and oriented to person, place, and time.      Cranial Nerves: No cranial nerve deficit.   Psychiatric:         Behavior: Behavior normal.         Thought Content: Thought content normal.           CRANIAL NERVES     CN III, IV, VI   Pupils are equal, round, and reactive to light.       Significant Labs:   A1C:   Recent Labs   Lab 06/23/20  0842 12/06/20  0917   HGBA1C 6.1* 5.9*     BMP:   Recent Labs   Lab 12/06/20  0915         K 3.9      CO2 18*   BUN 20   CREATININE 1.2   CALCIUM 10.5     CBC:    Recent Labs   Lab 12/06/20 0915   WBC 4.42   HGB 14.1   HCT 41.5        CMP:   Recent Labs   Lab 12/06/20 0915      K 3.9      CO2 18*      BUN 20   CREATININE 1.2   CALCIUM 10.5   PROT 7.3   ALBUMIN 3.9   BILITOT 1.0   ALKPHOS 59   AST 16   ALT 11   ANIONGAP 14   EGFRNONAA 56*     Lipid Panel:   Recent Labs   Lab 12/06/20 0915   CHOL 139   HDL 53   LDLCALC 73.4   TRIG 63   CHOLHDL 38.1     Magnesium: No results for input(s): MG in the last 48 hours.  TSH:   Recent Labs   Lab 12/06/20  0915   TSH 2.268       Significant Imaging: I have reviewed all pertinent imaging results/findings within the past 24 hours.     X-Ray Chest AP Portable  Narrative: EXAMINATION:  XR CHEST AP PORTABLE    CLINICAL HISTORY:  Stroke;    TECHNIQUE:  Single frontal view of the chest was performed.    COMPARISON:  Chest radiograph performed 11/19/2020    FINDINGS:  EKG leads overlie the chest.  Unchanged appearance of the cardiomediastinal contour.  Atherosclerotic calcification of the aorta are again appreciated.  Chronic appearing interstitial increased attenuation is noted.  No new pulmonary opacities are suggested.  No evidence of pneumothorax or pleural effusion.  No acute findings are suggested in the visualized upper abdomen.  Osseous and soft tissue structures are without definite acute change.  Impression: No convincing evidence of acute cardiopulmonary disease.    Electronically signed by: Yakov Salazar  Date:    12/06/2020  Time:    10:01  CT Head Without Contrast  Narrative: EXAMINATION:  CT HEAD WITHOUT CONTRAST    CLINICAL HISTORY:  code stroke;    TECHNIQUE:  Low dose axial images were obtained through the head.  Coronal and sagittal reformations were also performed. Contrast was not administered.    COMPARISON:  None.    FINDINGS:  Blood: No acute intracranial hemorrhage.    Parenchyma: No definite loss of gray-white differentiation to suggest acute or subacute transcortical infarct. Extensive  white matter hypoattenuation is noted, which may be on the basis of chronic small vessel ischemic disease.  Note that acute or subacute white matter infarcts would be difficult to exclude given this background change.  Generalized age-related parenchymal volume loss is noted.    Ventricles/Extra-axial spaces: No abnormal extra-axial fluid collection. Basal cisterns are patent.    Vessels: Calcific atherosclerosis    Orbits: Status post bilateral lens replacements    Scalp: Unremarkable.    Skull: There are no depressed skull fractures or destructive bone lesions.  Left frontal intraosseous hemangioma is suggested.    Sinuses and mastoids: Relatively modest paranasal sinus mucosal thickening without fluid levels.    Other findings: Bilateral TMJ DJD.  Impression: 1. No convincing evidence of acute transcortical infarct by noncontrast CT.  No acute intracranial hemorrhage.  2. Extensive white matter hypoattenuation may be on the basis chronic small vessel ischemic disease, however acute or subacute white matter infarct may be obscured by these findings.  As such, MRI would be recommended for a more sensitive and specific evaluation for subtle signs of ischemia, as clinically warranted.    Electronically signed by: Yakov Salazar  Date:    12/06/2020  Time:    09:19         Assessment/Plan:     * Acute ischemic left MCA stroke  Continue ASA  MRI, Carotid, TTE with bubble study  Telemetry  Permissive Hypertension  Appreciate neurology reccs      Antithrombotics for secondary stroke prevention: Antiplatelets: Aspirin: 81 mg daily    Statins for secondary stroke prevention and hyperlipidemia, if present:   Statins: Atorvastatin- 40 mg daily    Aggressive risk factor modification: HTN, DM, HLD, Diet, Exercise, Obesity     Rehab efforts: The patient has been evaluated by a stroke team provider and the therapy needs have been fully considered based off the presenting complaints and exam findings. The following therapy  evaluations are needed: PT evaluate and treat, OT evaluate and treat, SLP evaluate and treat    Diagnostics ordered/pending: Carotid ultrasound to assess vasculature, HgbA1C to assess blood glucose levels, Lipid Profile to assess cholesterol levels, MRA head to assess vasculature, MRA neck/arch to assess vasculature, MRI head without contrast to assess brain parenchyma, TTE to assess cardiac function/status     VTE prophylaxis: Enoxaparin 40 mg SQ every 24 hours    BP parameters: Infarct: No intervention, SBP <220        Type 2 diabetes mellitus with renal manifestations    Not on meds  A1C 5.9  Glucose 109  Will defer on accuchecks for now    BPH with urinary obstruction  Avodart, Flomax      Hyperlipidemia  On atorvastatin 10 mg   This will need to be increased    Hypertension  Permissive hypertension  Home meds: Hydralazine, Irbesartan, metoprolol, nifedipine, spironolactone  Will hold all meds        VTE Risk Mitigation (From admission, onward)         Ordered     enoxaparin injection 40 mg  Every 24 hours      12/06/20 1039     IP VTE HIGH RISK PATIENT  Once      12/06/20 1039     Place sequential compression device  Until discontinued      12/06/20 1039                   Doretha Lane MD  Department of Hospital Medicine   [facility]

## 2020-12-06 NOTE — CONSULTS
[facility]  Vascular Neurology  Comprehensive Stroke Center  Tele-Consultation Note      Consults    Consulting Provider: NELLIE CHAMBERS  Current Providers  No providers found    Patient Location:  [facility] EMERGENCY DEPARTMENT Emergency Department  Spoke hospital nurse at bedside with patient assisting consultant.     Patient information was obtained from patient and spouse/SO.         Assessment/Plan:     STROKE DOCUMENTATION     Acute Stroke Times:   Acute Stroke Times   Last Known Normal Date: 12/04/20  Last Known Normal Time: 1200  Symptom Onset Date: 12/04/20  Symptom Onset Time: 1200  Stroke Team Called Date: 12/06/20  Stroke Team Called Time: 0900  Stroke Team Arrival Date: 12/06/20  Stroke Team Arrival Time: 0904  CT Interpretation Time: 0915    NIH Scale:  Interval: baseline  1a. Level of Consciousness: 0-->Alert, keenly responsive  1b. LOC Questions: 0-->Answers both questions correctly  1c. LOC Commands: 0-->Performs both tasks correctly  2. Best Gaze: 0-->Normal  3. Visual: 0-->No visual loss  4. Facial Palsy: 0-->Normal symmetrical movements  5a. Motor Arm, Left: 1-->Drift, limb holds 90 (or 45) degrees, but drifts down before full 10 seconds, does not hit bed or other support  5b. Motor Arm, Right: 0-->No drift, limb holds 90 (or 45) degrees for full 10 secs  6a. Motor Leg, Left: 0-->No drift, leg holds 30 degree position for full 5 secs  6b. Motor Leg, Right: 0-->No drift, leg holds 30 degree position for full 5 secs  7. Limb Ataxia: 0-->Absent  8. Sensory: 0-->Normal, no sensory loss  9. Best Language: 0-->No aphasia, normal  10. Dysarthria: 0-->Normal  11. Extinction and Inattention (formerly Neglect): 0-->No abnormality  Total (NIH Stroke Scale): 1     Modified [place] Score: 0  [place] Coma Scale:15   ABCD2 Score:    [id number] Score:   HAS -BLED Score:   ICH Score:   Hunt & Archer Classification:       Diagnoses:   Acute ischemic left MCA stroke  79yo  "AAM with HTN, HLD, pre-diabetes presents to ED complaining of right arm numbness and minimal weakness that has been progressing over the last 48-72 hours. This morning he felt that it was worse so decided to come in. Wife at bedside reports she did not know of his numbness as he mentioned nothing. Patient reports that he has been to ED 2x in past few months for elevated SBP. No other associated symptoms. Reports hernia repair last Monday.    Antithrombotics for secondary stroke prevention: Antiplatelets: Aspirin: 81 mg daily    Statins for secondary stroke prevention and hyperlipidemia, if present:   Statins: Atorvastatin- 40 mg daily    Aggressive risk factor modification: HTN, DM, HLD, Diet, Exercise, Obesity     Rehab efforts: The patient has been evaluated by a stroke team provider and the therapy needs have been fully considered based off the presenting complaints and exam findings. The following therapy evaluations are needed: OT evaluate and treat    Diagnostics ordered/pending: CTA Head to assess vasculature , CTA Neck/Arch to assess vasculature, HgbA1C to assess blood glucose levels, Lipid Profile to assess cholesterol levels, MRI head without contrast to assess brain parenchyma, TTE to assess cardiac function/status , TSH to assess thyroid function    VTE prophylaxis: Heparin 5000 units SQ every 8 hours    BP parameters: Infarct: No intervention, SBP <220            Blood pressure (!) 144/68, pulse 98, temperature 98.4 °F (36.9 °C), temperature source Oral, resp. rate 17, height 5' 11" (1.803 m), weight 90.7 kg (200 lb), SpO2 99 %.  Alteplase Eligible?: Yes  Alteplase Recommendation: Alteplase not recommended due to Outside of treatment window   Possible Interventional Revascularization Candidate? No; No large vessel occlusion and No; No significant neurological deficit    Disposition Recommendation: admit to inpatient    Subjective:     History of Present Illness:  79yo AAM with HTN, HLD, pre-diabetes " "presents to ED complaining of right arm numbness and minimal weakness that has been progressing over the last 48-72 hours. This morning he felt that it was worse so decided to come in. Wife at bedside reports she did not know of his numbness as he mentioned nothing. Patient reports that he has been to ED 2x in past few months for elevated SBP. No other associated symptoms. Reports hernia repair last Monday.      Woke up with symptoms?: no    Recent bleeding noted: no  Does the patient take any Blood Thinners? yes  Medications: Antiplatelets:  aspirin      Past Medical History: hypertension, hyperlipidemia and stroke    Past Surgical History: hernia repair <7 days ago    Family History: hypertension, diabetes and stroke    Social History: no smoking, no drinking, no drugs    Allergies: Pcn [Penicillins] see above    Review of Systems   Review of Symptoms:   Constitutional: Denies fevers or chills.  ENT: no hearing difficulty, no visual changes  Pulmonary: Denies shortness of breath or cough.  Cardiology: Denies chest pain or palpitations.  GI: Denies abdominal pain or constipation.  Neurologic: progressive numbness and weakness of right arm  : no dysuria  Musk: no muscle pain, no joint pain  Psych: no hallucinations      Objective:   Vitals: Blood pressure (!) 144/68, pulse 98, temperature 98.4 °F (36.9 °C), temperature source Oral, resp. rate 17, height 5' 11" (1.803 m), weight 90.7 kg (200 lb), SpO2 99 %. Height: see above    CT READ: Yes  Abnormal CT no acute process though areas indicative of longstanding HTN as well as left MCA small remote infarction.     Physical Exam  Physical Exam:  GA: Alert, comfortable, no acute distress.   Pulmonary: normal chest rise  Abdominal: no distension appreciated  Skin: no visible lesions on exposed skin    Neuro:  --GCS: 15  --Mental Status:  AOx4 pleasant and conversant  --CN II-XII grossly intact.   --EOMI   --brainstem: intact  --Motor: mild left upper arm " drift  --sensory: reported equal and normal throughout  --Reflexes: not tested  --Gait: deferred  --F-N-F wnl          Recommended the emergency room physician to have a brief discussion with the patient and/or family if available regarding the risks and benefits of treatment, and to briefly document the occurrence of that discussion in his clinical encounter note.     The attending portion of this evaluation, treatment, and documentation was performed per Balwinder Lopez MD via audiovisual.    Billing code:  (non-intervention mild to moderate stroke, TIA, some mimics)    · This patient has a critical neurological condition/illness, with some potential for high morbidity and mortality.  · There is a moderate probability for acute neurological change leading to clinical and possibly life-threatening deterioration requiring highest level of physician preparedness for urgent intervention.  · Care was coordinated with other physicians involved in the patient's care.  · Radiologic studies and laboratory data were reviewed and interpreted, and plan of care was re-assessed based on the results.  · Diagnosis, treatment options and prognosis may have been discussed with the patient and/or family members or caregiver.      In your opinion, this was a: Tier 2 Van Negative    Consult End Time: 9:30 AM     Balwinder Lopez MD  Comprehensive Stroke Center  Vascular Neurology   [facility]

## 2020-12-06 NOTE — ED NOTES
Pt assisted to side of bed for urine specimen collection. Pt in no apparent distress. Minimal assistance needed.

## 2020-12-06 NOTE — PLAN OF CARE
ST orders received this am, pt seen in ED, no s/s of dysphagia and no communication deficits present. Continue regular diet and thin liquids, SLP did notify MD and RN.

## 2020-12-06 NOTE — ED NOTES
Pt being transported to floor via transport tech. Cardiac monitoring in place. Pt in no apparent distress

## 2020-12-06 NOTE — ED PROVIDER NOTES
Encounter Date: 12/6/2020    SCRIBE #1 NOTE: I, Michelle Ahumada, am scribing for, and in the presence of,  Dr. Kimble. I have scribed the entire note.       History     Chief Complaint   Patient presents with    Numbness     woke up this am at 0700 with numbeness in right shoulder numbness and arm, equal  noted no weakness noted      Time seen by provider: 9:10 AM    This is a 80 y.o. male with a history of DM, HTN, HLD, and BPH who presents to the ED with wife with complaint of worsening right arm numbness and weakness since yesterday during the day. Patient reports this occurred suddenly. He reports some decreased strength to his right hand and tingling to right leg. Wife denies noticing any facial droop or speech abnormalities. Patient normally exercises at the gym but hasn't in the last 3 weeks as he doesn't have the energy to. He takes a daily ASA but denies taking any other blood thinners. Patient has no significant cardiac history and no stents in place.    Patient had an open repair of left inguinal hernia with mesh placement done at [facility] by Dr. Roy on 12/01.    The history is provided by the patient and the spouse.     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 ([address] [place] [address]);  Service: Endoscopy;  Laterality: N/A;    COLONOSCOPY N/A 1/14/2019    Procedure: COLONOSCOPY;  Surgeon: HAIM Mir MD;  Location: [facility]  [facility] (4TH FLR);  Service: Endoscopy;  Laterality: N/A;  PM prep    CYST REMOVAL  2000    removed from left kidney    KIDNEY SURGERY      PROSTATE SURGERY       Family History   Problem Relation Age of Onset    Cancer Mother         unkn ca    Diabetes Mother     Glaucoma Mother     Heart disease Mother     Hypertension Mother     Blindness Neg Hx     Macular degeneration Neg Hx     Retinal detachment Neg Hx      Social History     Tobacco Use    Smoking status: Never Smoker    Smokeless tobacco: Never Used   Substance Use Topics    Alcohol use: No    Drug use: No     Review of Systems   Constitutional: Negative for chills and fever.   HENT: Negative for congestion, ear pain, rhinorrhea and sore throat.    Respiratory: Negative for cough, shortness of breath and wheezing.    Cardiovascular: Negative for chest pain and palpitations.   Gastrointestinal: Negative for abdominal pain, diarrhea, nausea and vomiting.   Genitourinary: Negative for dysuria and hematuria.   Musculoskeletal: Negative for back pain, myalgias and neck pain.   Skin: Negative for rash.   Neurological: Positive for weakness and numbness. Negative for dizziness, facial asymmetry, speech difficulty, light-headedness and headaches.   Psychiatric/Behavioral: Negative for confusion.   All other systems reviewed and are negative.      Physical Exam     Initial Vitals [12/06/20 0854]   BP Pulse Resp Temp SpO2   (!) 144/68 99 17 98.4 °F (36.9 °C) 97 %      MAP       --         Physical Exam    Nursing note and vitals reviewed.  Constitutional: He appears well-developed and well-nourished. No distress.   HENT:   Head: Normocephalic and atraumatic.   Mouth/Throat: Oropharynx is clear and moist.   Eyes: Conjunctivae and EOM are normal. Pupils are equal, round, and reactive to light.   Neck: Normal range of motion. Neck supple. No stridor present. No tracheal deviation present.   Cardiovascular: Normal rate, regular rhythm and normal heart  sounds.   No murmur heard.  Pulmonary/Chest: Breath sounds normal. No respiratory distress.   Abdominal: Soft. Bowel sounds are normal. There is no abdominal tenderness. There is no rebound and no guarding.   Left inguinal hernia repair. Site is clean, dry, and intact with steri strips applied. Also well healed left abdominal scare from previous kidney surgery.   Musculoskeletal: Normal range of motion. No tenderness or edema.   Neurological: He is alert and oriented to person, place, and time. He has normal strength. No sensory deficit. GCS score is 15. GCS eye subscore is 4. GCS verbal subscore is 5. GCS motor subscore is 6.   No slurred speech, facial droop, aphasia, or pronator drift. Finger to nose touch WNL.    Skin: Skin is warm and dry. Capillary refill takes less than 2 seconds.   Psychiatric: He has a normal mood and affect. His behavior is normal.         ED Course   Critical Care    Date/Time: 12/6/2020 10:15 AM  Performed by: John Kimble MD  Authorized by: John Kimble MD   Direct patient critical care time: 10 minutes  Additional history critical care time: 5 minutes  Ordering / reviewing critical care time: 5 minutes  Documentation critical care time: 10 minutes  Consulting other physicians critical care time: 5 minutes  Consult with family critical care time: 5 minutes  Other critical care time: 5 minutes  Total critical care time (exclusive of procedural time) : 45 minutes        Labs Reviewed   CBC W/ AUTO DIFFERENTIAL - Abnormal; Notable for the following components:       Result Value    RBC 4.52 (*)     MCH 31.2 (*)     All other components within normal limits   COMPREHENSIVE METABOLIC PANEL - Abnormal; Notable for the following components:    CO2 18 (*)     eGFR if non  56 (*)     All other components within normal limits   POCT GLUCOSE - Abnormal; Notable for the following components:    POCT Glucose 111 (*)     All other components within normal limits   TSH    LIPID PANEL   TROPONIN I   B-TYPE NATRIURETIC PEPTIDE   URINALYSIS, REFLEX TO URINE CULTURE    Narrative:     Specimen Source->Urine   SARS-COV-2 RNA AMPLIFICATION, QUAL   HEMOGLOBIN A1C   PROTIME-INR   APTT   URINALYSIS, REFLEX TO URINE CULTURE   HEMOGLOBIN A1C   POCT GLUCOSE, HAND-HELD DEVICE   ISTAT PROCEDURE   ISTAT CREATININE     EKG Readings: (Independently Interpreted)   Initial Reading: No STEMI. Rhythm: Normal Sinus Rhythm. Heart Rate: 98. Ectopy: PACs No Ectopy.   QT prolongation        X-Rays:   Independently Interpreted Readings:   Other Readings:  Reviewed by myself, read by radiology.    Imaging Results          X-Ray Chest AP Portable (Final result)  Result time 12/06/20 10:01:54    Final result by Yakov Salazar MD (12/06/20 10:01:54)                 Impression:      No convincing evidence of acute cardiopulmonary disease.      Electronically signed by: Yakov Salazar  Date:    12/06/2020  Time:    10:01             Narrative:    EXAMINATION:  XR CHEST AP PORTABLE    CLINICAL HISTORY:  Stroke;    TECHNIQUE:  Single frontal view of the chest was performed.    COMPARISON:  Chest radiograph performed 11/19/2020    FINDINGS:  EKG leads overlie the chest.  Unchanged appearance of the cardiomediastinal contour.  Atherosclerotic calcification of the aorta are again appreciated.  Chronic appearing interstitial increased attenuation is noted.  No new pulmonary opacities are suggested.  No evidence of pneumothorax or pleural effusion.  No acute findings are suggested in the visualized upper abdomen.  Osseous and soft tissue structures are without definite acute change.                               CT Head Without Contrast (Final result)  Result time 12/06/20 09:19:42    Final result by Yakov Salazar MD (12/06/20 09:19:42)                 Impression:      1. No convincing evidence of acute transcortical infarct by noncontrast CT.  No acute intracranial hemorrhage.  2. Extensive white matter  hypoattenuation may be on the basis chronic small vessel ischemic disease, however acute or subacute white matter infarct may be obscured by these findings.  As such, MRI would be recommended for a more sensitive and specific evaluation for subtle signs of ischemia, as clinically warranted.      Electronically signed by: Yakov Salazar  Date:    12/06/2020  Time:    09:19             Narrative:    EXAMINATION:  CT HEAD WITHOUT CONTRAST    CLINICAL HISTORY:  code stroke;    TECHNIQUE:  Low dose axial images were obtained through the head.  Coronal and sagittal reformations were also performed. Contrast was not administered.    COMPARISON:  None.    FINDINGS:  Blood: No acute intracranial hemorrhage.    Parenchyma: No definite loss of gray-white differentiation to suggest acute or subacute transcortical infarct. Extensive white matter hypoattenuation is noted, which may be on the basis of chronic small vessel ischemic disease.  Note that acute or subacute white matter infarcts would be difficult to exclude given this background change.  Generalized age-related parenchymal volume loss is noted.    Ventricles/Extra-axial spaces: No abnormal extra-axial fluid collection. Basal cisterns are patent.    Vessels: Calcific atherosclerosis    Orbits: Status post bilateral lens replacements    Scalp: Unremarkable.    Skull: There are no depressed skull fractures or destructive bone lesions.  Left frontal intraosseous hemangioma is suggested.    Sinuses and mastoids: Relatively modest paranasal sinus mucosal thickening without fluid levels.    Other findings: Bilateral TMJ DJD.                              Medical Decision Making:   Clinical Tests:   Lab Tests: Ordered and Reviewed  Radiological Study: Ordered and Reviewed  Medical Tests: Ordered and Reviewed  ED Management:    - CT head WO negative for acute intracranial abnormality; concern for previous stroke  - will admit to [facility] medicine for stroke workup, MRI,  neurology consult   - pt in agreement with plan for admission                              Clinical Impression:     1. Stroke             ED Disposition Condition    Observation              I, John Kimble,  personally performed the services described in this documentation. All medical record entries made by the scribe were at my direction and in my presence.  I have reviewed the chart and agree that the record reflects my personal performance and is accurate and complete. John Kimble M.D. 11:19 AM12/06/2020               John Kimble MD  12/06/20 1119

## 2020-12-06 NOTE — SUBJECTIVE & OBJECTIVE
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  Service: Endoscopy;  Laterality: N/A;    COLONOSCOPY N/A 1/14/2019    Procedure: COLONOSCOPY;  Surgeon: HAIM Mir MD;  Location: [facility] ([address] [place] [address]);  Service: Endoscopy;  Laterality: N/A;  PM prep    CYST REMOVAL  2000    removed from left kidney    KIDNEY SURGERY      PROSTATE SURGERY         Review of patient's allergies indicates:   Allergen Reactions    Pcn [penicillins] Rash       No current facility-administered medications on file prior to encounter.      Current Outpatient Medications on File Prior to Encounter   Medication Sig    aspirin 81 mg Tab Take 81 mg by mouth. 1 Tablet Oral Every evening    atorvastatin (LIPITOR) 10 MG tablet Take 10 mg by mouth every evening.    chlorhexidine (PERIDEX) 0.12 % solution RINSE 1/2 OZ TWICE DAILY AFTER BREAKFAST AND BEFORE BEDTIME    dutasteride (AVODART) 0.5 mg capsule TAKE 1 CAPSULE (0.5 MG TOTAL) BY MOUTH ONCE DAILY.    hydrALAZINE (APRESOLINE) 50 MG tablet Take 100 mg by mouth 3 (three) times daily. Per Dr. Mcdonald     irbesartan (AVAPRO) 300 MG tablet TAKE 1 TABLET EVERY DAY    metoprolol tartrate (LOPRESSOR) 50 MG tablet Take 0.5 tablets (25 mg total) by mouth 2 (two) times daily.    NIFEdipine (PROCARDIA-XL) 60 MG (OSM) 24 hr tablet Take 1 tablet (60 mg total) by mouth 2 (two) times daily.    oxybutynin (DITROPAN-XL) 10 MG 24 hr tablet Take 1 tablet (10 mg total) by mouth once daily.    senna-docusate  8.6-50 mg (SENNA WITH DOCUSATE SODIUM) 8.6-50 mg per tablet Take 1 tablet by mouth once daily.    spironolactone (ALDACTONE) 50 MG tablet Take 1 tablet (50 mg total) by mouth once daily.    tamsulosin (FLOMAX) 0.4 mg Cap Take 1 capsule (0.4 mg total) by mouth every evening.    oxyCODONE-acetaminophen (PERCOCET) 5-325 mg per tablet Take 1 tablet by mouth every 4 to 6 hours as needed for Pain.    [DISCONTINUED] flu vacc [id number],65yr up,PF (FLUZONE HIGH-DOSE 2019-20, PF,) 180 mcg/0.5 mL Syrg uud    [DISCONTINUED] flu vacc [id number],65yr up,PF (FLUZONE HIGH-DOSE 2019-20, PF,) 180 mcg/0.5 mL Syrg admin as directed    [DISCONTINUED] FLUZONE HIGHDOSE QUAD 20-21  mcg/0.7 mL Syrg TO BE ADMINISTERED BY PHARMACIST FOR IMMUNIZATION    [DISCONTINUED] varicella-zoster [id number], PF, (SHINGRIX, PF,) 50 mcg/0.5 mL injection Inject into the muscle.     Family History     Problem Relation (Age of Onset)    Cancer Mother    Diabetes Mother    Glaucoma Mother    Heart disease Mother    Hypertension Mother        Tobacco Use    Smoking status: Never Smoker    Smokeless tobacco: Never Used   Substance and Sexual Activity    Alcohol use: No    Drug use: No    Sexual activity: Not Currently     Partners: Female     Review of Systems   Constitutional: Negative for fever.   HENT: Negative for drooling.    Eyes: Negative for visual disturbance.   Respiratory: Negative for shortness of breath.    Cardiovascular: Negative for chest pain.   Gastrointestinal: Positive for constipation. Negative for abdominal pain and nausea.   Genitourinary: Negative for dysuria.        Recent right inguinal hernia surgery 12-1-2020   Musculoskeletal: Negative for back pain.   Skin: Negative for rash.   Neurological: Positive for weakness and numbness. Negative for tremors, seizures, syncope, facial asymmetry, speech difficulty, light-headedness and headaches.   Psychiatric/Behavioral: Negative for confusion.     Objective:     Vital Signs  (Most Recent):  Temp: 98.2 °F (36.8 °C) (12/06/20 1321)  Pulse: 90 (12/06/20 1552)  Resp: 18 (12/06/20 1321)  BP: (!) 169/82 (12/06/20 1321)  SpO2: 98 % (12/06/20 1546) Vital Signs (24h Range):  Temp:  [98.2 °F (36.8 °C)-98.4 °F (36.9 °C)] 98.2 °F (36.8 °C)  Pulse:  [] 90  Resp:  [11-20] 18  SpO2:  [97 %-100 %] 98 %  BP: (144-169)/(68-82) 169/82     Weight: 88.9 kg (196 lb)  Body mass index is 27.34 kg/m².    Physical Exam  Vitals signs and nursing note reviewed.   Constitutional:       General: He is not in acute distress.     Appearance: He is well-developed.   HENT:      Head: Normocephalic and atraumatic.      Mouth/Throat:      Mouth: Mucous membranes are moist.      Pharynx: Oropharynx is clear.   Eyes:      General: No scleral icterus.     Conjunctiva/sclera: Conjunctivae normal.      Pupils: Pupils are equal, round, and reactive to light.   Neck:      Musculoskeletal: Normal range of motion and neck supple.      Thyroid: No thyromegaly.      Vascular: No JVD.      Comments: No carotid bruits  Cardiovascular:      Rate and Rhythm: Normal rate and regular rhythm.      Heart sounds: No murmur. No friction rub. No gallop.    Pulmonary:      Effort: Pulmonary effort is normal. No respiratory distress.      Breath sounds: Normal breath sounds. No wheezing or rales.   Chest:      Chest wall: No tenderness.   Abdominal:      General: Bowel sounds are normal. There is no distension.      Palpations: Abdomen is soft. There is no mass.      Tenderness: There is no abdominal tenderness.      Hernia: No hernia is present.   Musculoskeletal: Normal range of motion.         General: No tenderness or deformity.   Lymphadenopathy:      Cervical: No cervical adenopathy.   Skin:     General: Skin is warm and dry.      Capillary Refill: Capillary refill takes less than 2 seconds.      Findings: No rash.   Neurological:      Mental Status: He is alert and oriented to person, place, and time.      Cranial Nerves: No cranial  nerve deficit.   Psychiatric:         Behavior: Behavior normal.         Thought Content: Thought content normal.           CRANIAL NERVES     CN III, IV, VI   Pupils are equal, round, and reactive to light.       Significant Labs:   A1C:   Recent Labs   Lab 06/23/20  0842 12/06/20 0917   HGBA1C 6.1* 5.9*     BMP:   Recent Labs   Lab 12/06/20 0915         K 3.9      CO2 18*   BUN 20   CREATININE 1.2   CALCIUM 10.5     CBC:   Recent Labs   Lab 12/06/20 0915   WBC 4.42   HGB 14.1   HCT 41.5        CMP:   Recent Labs   Lab 12/06/20 0915      K 3.9      CO2 18*      BUN 20   CREATININE 1.2   CALCIUM 10.5   PROT 7.3   ALBUMIN 3.9   BILITOT 1.0   ALKPHOS 59   AST 16   ALT 11   ANIONGAP 14   EGFRNONAA 56*     Lipid Panel:   Recent Labs   Lab 12/06/20 0915   CHOL 139   HDL 53   LDLCALC 73.4   TRIG 63   CHOLHDL 38.1     Magnesium: No results for input(s): MG in the last 48 hours.  TSH:   Recent Labs   Lab 12/06/20 0915   TSH 2.268       Significant Imaging: I have reviewed all pertinent imaging results/findings within the past 24 hours.     X-Ray Chest AP Portable  Narrative: EXAMINATION:  XR CHEST AP PORTABLE    CLINICAL HISTORY:  Stroke;    TECHNIQUE:  Single frontal view of the chest was performed.    COMPARISON:  Chest radiograph performed 11/19/2020    FINDINGS:  EKG leads overlie the chest.  Unchanged appearance of the cardiomediastinal contour.  Atherosclerotic calcification of the aorta are again appreciated.  Chronic appearing interstitial increased attenuation is noted.  No new pulmonary opacities are suggested.  No evidence of pneumothorax or pleural effusion.  No acute findings are suggested in the visualized upper abdomen.  Osseous and soft tissue structures are without definite acute change.  Impression: No convincing evidence of acute cardiopulmonary disease.    Electronically signed by: Yakov Salazar  Date:    12/06/2020  Time:    10:01  CT Head Without  Contrast  Narrative: EXAMINATION:  CT HEAD WITHOUT CONTRAST    CLINICAL HISTORY:  code stroke;    TECHNIQUE:  Low dose axial images were obtained through the head.  Coronal and sagittal reformations were also performed. Contrast was not administered.    COMPARISON:  None.    FINDINGS:  Blood: No acute intracranial hemorrhage.    Parenchyma: No definite loss of gray-white differentiation to suggest acute or subacute transcortical infarct. Extensive white matter hypoattenuation is noted, which may be on the basis of chronic small vessel ischemic disease.  Note that acute or subacute white matter infarcts would be difficult to exclude given this background change.  Generalized age-related parenchymal volume loss is noted.    Ventricles/Extra-axial spaces: No abnormal extra-axial fluid collection. Basal cisterns are patent.    Vessels: Calcific atherosclerosis    Orbits: Status post bilateral lens replacements    Scalp: Unremarkable.    Skull: There are no depressed skull fractures or destructive bone lesions.  Left frontal intraosseous hemangioma is suggested.    Sinuses and mastoids: Relatively modest paranasal sinus mucosal thickening without fluid levels.    Other findings: Bilateral TMJ DJD.  Impression: 1. No convincing evidence of acute transcortical infarct by noncontrast CT.  No acute intracranial hemorrhage.  2. Extensive white matter hypoattenuation may be on the basis chronic small vessel ischemic disease, however acute or subacute white matter infarct may be obscured by these findings.  As such, MRI would be recommended for a more sensitive and specific evaluation for subtle signs of ischemia, as clinically warranted.    Electronically signed by: Yakov Salazar  Date:    12/06/2020  Time:    09:19

## 2020-12-06 NOTE — ASSESSMENT & PLAN NOTE
Permissive hypertension  Home meds: Hydralazine, Irbesartan, metoprolol, nifedipine, spironolactone  Will hold all meds

## 2020-12-06 NOTE — HPI
This is an 80 year old male who presented with right sided arm weakness and numbness. He has a history of HTN, DM, hyperlipidemia and BPH. He states that he has had symptoms for 2 days.  He usually does hand exercises with a stress ball and noticed it when he was doing the exercises. He noted that it had gotten worse and also that he was having some numbness on the right lower extremity as well  He denies Headache, Visual or speech symptoms  He states that he does take ASA 81 mg daily  He has been treated for uncontrolled hypertension    A CODE STROKE was called in the ED and he was evaluated by Vascular Neurology. He is out of the window for TPA. He is Tier 2 VAN negative  He was evaluated by speech therapy and does not exhibit aspiration.  He was evaluated by Neurology on the floor

## 2020-12-06 NOTE — ED NOTES
Pt requesting home medications. Home medications updated in chart via patient and prescriptions on hand. md notified.

## 2020-12-06 NOTE — PLAN OF CARE
Problem: Physical Therapy Goal  Goal: Physical Therapy Goal  Description: Goals to be met by: 1/3/21     Patient will increase functional independence with mobility by performin.  Sit in chair 2 hours  2.  Bed to chair with Mod Ind  3.  Ambulate 200' with Mod Ind    Outcome: Ongoing, Progressing

## 2020-12-06 NOTE — ASSESSMENT & PLAN NOTE
79yo AAM with HTN, HLD, pre-diabetes presents to ED complaining of right arm numbness and minimal weakness that has been progressing over the last 48-72 hours. This morning he felt that it was worse so decided to come in. Wife at bedside reports she did not know of his numbness as he mentioned nothing. Patient reports that he has been to ED 2x in past few months for elevated SBP. No other associated symptoms. Reports hernia repair last Monday.    Antithrombotics for secondary stroke prevention: Antiplatelets: Aspirin: 81 mg daily    Statins for secondary stroke prevention and hyperlipidemia, if present:   Statins: Atorvastatin- 40 mg daily    Aggressive risk factor modification: HTN, DM, HLD, Diet, Exercise, Obesity     Rehab efforts: The patient has been evaluated by a stroke team provider and the therapy needs have been fully considered based off the presenting complaints and exam findings. The following therapy evaluations are needed: OT evaluate and treat    Diagnostics ordered/pending: CTA Head to assess vasculature , CTA Neck/Arch to assess vasculature, HgbA1C to assess blood glucose levels, Lipid Profile to assess cholesterol levels, MRI head without contrast to assess brain parenchyma, TTE to assess cardiac function/status , TSH to assess thyroid function    VTE prophylaxis: Heparin 5000 units SQ every 8 hours    BP parameters: Infarct: No intervention, SBP <220

## 2020-12-07 ENCOUNTER — TELEPHONE (OUTPATIENT)
Dept: NEUROLOGY | Facility: CLINIC | Age: 80
End: 2020-12-07

## 2020-12-07 VITALS
WEIGHT: 196 LBS | RESPIRATION RATE: 17 BRPM | SYSTOLIC BLOOD PRESSURE: 153 MMHG | DIASTOLIC BLOOD PRESSURE: 85 MMHG | HEART RATE: 67 BPM | HEIGHT: 71 IN | BODY MASS INDEX: 27.44 KG/M2 | TEMPERATURE: 97 F | OXYGEN SATURATION: 98 %

## 2020-12-07 PROBLEM — G45.9 TIA (TRANSIENT ISCHEMIC ATTACK): Status: ACTIVE | Noted: 2020-12-07

## 2020-12-07 PROBLEM — I67.9 CEREBROVASCULAR DISEASE: Status: ACTIVE | Noted: 2020-12-06

## 2020-12-07 PROBLEM — I63.512 ACUTE ISCHEMIC LEFT MCA STROKE: Status: RESOLVED | Noted: 2020-12-06 | Resolved: 2020-12-07

## 2020-12-07 LAB
ALBUMIN SERPL BCP-MCNC: 3.5 G/DL (ref 3.5–5.2)
ALP SERPL-CCNC: 54 U/L (ref 55–135)
ALT SERPL W/O P-5'-P-CCNC: 10 U/L (ref 10–44)
ANION GAP SERPL CALC-SCNC: 10 MMOL/L (ref 8–16)
AORTIC ROOT ANNULUS: 3.89 CM
APTT BLDCRRT: 30.9 SEC (ref 21–32)
APTT BLDCRRT: 30.9 SEC (ref 21–32)
ASCENDING AORTA: 3.8 CM
AST SERPL-CCNC: 13 U/L (ref 10–40)
AV INDEX (PROSTH): 0.91
AV MEAN GRADIENT: 4 MMHG
AV PEAK GRADIENT: 6 MMHG
AV VALVE AREA: 3.38 CM2
AV VELOCITY RATIO: 0.89
BASOPHILS # BLD AUTO: 0.03 K/UL (ref 0–0.2)
BASOPHILS NFR BLD: 0.8 % (ref 0–1.9)
BILIRUB SERPL-MCNC: 1 MG/DL (ref 0.1–1)
BSA FOR ECHO PROCEDURE: 2.11 M2
BUN SERPL-MCNC: 19 MG/DL (ref 8–23)
CALCIUM SERPL-MCNC: 10 MG/DL (ref 8.7–10.5)
CHLORIDE SERPL-SCNC: 107 MMOL/L (ref 95–110)
CK MB SERPL-MCNC: 1 NG/ML (ref 0.1–6.5)
CK MB SERPL-MCNC: 1 NG/ML (ref 0.1–6.5)
CK MB SERPL-RTO: 2.6 % (ref 0–5)
CK MB SERPL-RTO: 2.6 % (ref 0–5)
CK SERPL-CCNC: 39 U/L (ref 20–200)
CK SERPL-CCNC: 39 U/L (ref 20–200)
CO2 SERPL-SCNC: 21 MMOL/L (ref 23–29)
CREAT SERPL-MCNC: 1.3 MG/DL (ref 0.5–1.4)
CV ECHO LV RWT: 0.38 CM
DIFFERENTIAL METHOD: ABNORMAL
DOP CALC AO PEAK VEL: 1.24 M/S
DOP CALC AO VTI: 25.81 CM
DOP CALC LVOT AREA: 3.7 CM2
DOP CALC LVOT DIAMETER: 2.18 CM
DOP CALC LVOT PEAK VEL: 1.1 M/S
DOP CALC LVOT STROKE VOLUME: 87.26 CM3
DOP CALCLVOT PEAK VEL VTI: 23.39 CM
E WAVE DECELERATION TIME: 230.12 MSEC
E/A RATIO: 0.59
E/E' RATIO: 12 M/S
ECHO LV POSTERIOR WALL: 0.89 CM (ref 0.6–1.1)
EOSINOPHIL # BLD AUTO: 0.1 K/UL (ref 0–0.5)
EOSINOPHIL NFR BLD: 3.2 % (ref 0–8)
ERYTHROCYTE [DISTWIDTH] IN BLOOD BY AUTOMATED COUNT: 13.5 % (ref 11.5–14.5)
EST. GFR  (AFRICAN AMERICAN): 59 ML/MIN/1.73 M^2
EST. GFR  (NON AFRICAN AMERICAN): 51 ML/MIN/1.73 M^2
FRACTIONAL SHORTENING: 28 % (ref 28–44)
GLUCOSE SERPL-MCNC: 81 MG/DL (ref 70–110)
HCT VFR BLD AUTO: 40.2 % (ref 40–54)
HGB BLD-MCNC: 13.3 G/DL (ref 14–18)
IMM GRANULOCYTES # BLD AUTO: 0.02 K/UL (ref 0–0.04)
IMM GRANULOCYTES NFR BLD AUTO: 0.5 % (ref 0–0.5)
INR PPP: 1 (ref 0.8–1.2)
INR PPP: 1 (ref 0.8–1.2)
INTERVENTRICULAR SEPTUM: 1.03 CM (ref 0.6–1.1)
IVRT: 122.74 MSEC
LA MAJOR: 5.4 CM
LA MINOR: 6.28 CM
LA WIDTH: 4.33 CM
LEFT ATRIUM SIZE: 4.17 CM
LEFT ATRIUM VOLUME INDEX: 42.6 ML/M2
LEFT ATRIUM VOLUME: 89.12 CM3
LEFT INTERNAL DIMENSION IN SYSTOLE: 3.34 CM (ref 2.1–4)
LEFT VENTRICLE DIASTOLIC VOLUME INDEX: 47.17 ML/M2
LEFT VENTRICLE DIASTOLIC VOLUME: 98.63 ML
LEFT VENTRICLE MASS INDEX: 73 G/M2
LEFT VENTRICLE SYSTOLIC VOLUME INDEX: 21.7 ML/M2
LEFT VENTRICLE SYSTOLIC VOLUME: 45.35 ML
LEFT VENTRICULAR INTERNAL DIMENSION IN DIASTOLE: 4.63 CM (ref 3.5–6)
LEFT VENTRICULAR MASS: 151.82 G
LV LATERAL E/E' RATIO: 12 M/S
LV SEPTAL E/E' RATIO: 12 M/S
LYMPHOCYTES # BLD AUTO: 1.6 K/UL (ref 1–4.8)
LYMPHOCYTES NFR BLD: 42.5 % (ref 18–48)
MAGNESIUM SERPL-MCNC: 1.8 MG/DL (ref 1.6–2.6)
MAGNESIUM SERPL-MCNC: 1.8 MG/DL (ref 1.6–2.6)
MCH RBC QN AUTO: 31.2 PG (ref 27–31)
MCHC RBC AUTO-ENTMCNC: 33.1 G/DL (ref 32–36)
MCV RBC AUTO: 94 FL (ref 82–98)
MONOCYTES # BLD AUTO: 0.4 K/UL (ref 0.3–1)
MONOCYTES NFR BLD: 11.3 % (ref 4–15)
MV PEAK A VEL: 0.82 M/S
MV PEAK E VEL: 0.48 M/S
MV STENOSIS PRESSURE HALF TIME: 66.73 MS
MV VALVE AREA P 1/2 METHOD: 3.3 CM2
NEUTROPHILS # BLD AUTO: 1.6 K/UL (ref 1.8–7.7)
NEUTROPHILS NFR BLD: 41.7 % (ref 38–73)
NRBC BLD-RTO: 0 /100 WBC
PHOSPHATE SERPL-MCNC: 2.7 MG/DL (ref 2.7–4.5)
PHOSPHATE SERPL-MCNC: 2.7 MG/DL (ref 2.7–4.5)
PISA TR MAX VEL: 2.51 M/S
PLATELET # BLD AUTO: 227 K/UL (ref 150–350)
PMV BLD AUTO: 9.5 FL (ref 9.2–12.9)
POTASSIUM SERPL-SCNC: 4.2 MMOL/L (ref 3.5–5.1)
PROT SERPL-MCNC: 6.6 G/DL (ref 6–8.4)
PROTHROMBIN TIME: 10.9 SEC (ref 9–12.5)
PROTHROMBIN TIME: 10.9 SEC (ref 9–12.5)
PULM VEIN S/D RATIO: 1.67
PV PEAK D VEL: 0.24 M/S
PV PEAK S VEL: 0.4 M/S
RA MAJOR: 5.86 CM
RA PRESSURE: 3 MMHG
RA WIDTH: 3.73 CM
RBC # BLD AUTO: 4.26 M/UL (ref 4.6–6.2)
RIGHT VENTRICULAR END-DIASTOLIC DIMENSION: 3.36 CM
SODIUM SERPL-SCNC: 138 MMOL/L (ref 136–145)
STJ: 3.71 CM
TDI LATERAL: 0.04 M/S
TDI SEPTAL: 0.04 M/S
TDI: 0.04 M/S
TR MAX PG: 25 MMHG
TRICUSPID ANNULAR PLANE SYSTOLIC EXCURSION: 2.29 CM
TROPONIN I SERPL DL<=0.01 NG/ML-MCNC: 0.01 NG/ML (ref 0–0.03)
TROPONIN I SERPL DL<=0.01 NG/ML-MCNC: 0.01 NG/ML (ref 0–0.03)
TV REST PULMONARY ARTERY PRESSURE: 28 MMHG
WBC # BLD AUTO: 3.79 K/UL (ref 3.9–12.7)

## 2020-12-07 PROCEDURE — 84100 ASSAY OF PHOSPHORUS: CPT

## 2020-12-07 PROCEDURE — G0378 HOSPITAL OBSERVATION PER HR: HCPCS

## 2020-12-07 PROCEDURE — 25000003 PHARM REV CODE 250: Performed by: INTERNAL MEDICINE

## 2020-12-07 PROCEDURE — 83735 ASSAY OF MAGNESIUM: CPT

## 2020-12-07 PROCEDURE — 85610 PROTHROMBIN TIME: CPT

## 2020-12-07 PROCEDURE — 25000003 PHARM REV CODE 250: Performed by: NURSE PRACTITIONER

## 2020-12-07 PROCEDURE — 97116 GAIT TRAINING THERAPY: CPT

## 2020-12-07 PROCEDURE — 97165 OT EVAL LOW COMPLEX 30 MIN: CPT

## 2020-12-07 PROCEDURE — 97110 THERAPEUTIC EXERCISES: CPT

## 2020-12-07 PROCEDURE — 82553 CREATINE MB FRACTION: CPT

## 2020-12-07 PROCEDURE — 94761 N-INVAS EAR/PLS OXIMETRY MLT: CPT

## 2020-12-07 PROCEDURE — 84484 ASSAY OF TROPONIN QUANT: CPT | Mod: 59

## 2020-12-07 PROCEDURE — 80053 COMPREHEN METABOLIC PANEL: CPT

## 2020-12-07 PROCEDURE — 82550 ASSAY OF CK (CPK): CPT

## 2020-12-07 PROCEDURE — 85730 THROMBOPLASTIN TIME PARTIAL: CPT | Mod: 59

## 2020-12-07 PROCEDURE — 85025 COMPLETE CBC W/AUTO DIFF WBC: CPT

## 2020-12-07 PROCEDURE — 36415 COLL VENOUS BLD VENIPUNCTURE: CPT

## 2020-12-07 PROCEDURE — 97535 SELF CARE MNGMENT TRAINING: CPT

## 2020-12-07 RX ORDER — ATORVASTATIN CALCIUM 40 MG/1
40 TABLET, FILM COATED ORAL DAILY
Qty: 30 TABLET | Refills: 0 | Status: SHIPPED | OUTPATIENT
Start: 2020-12-08 | End: 2020-12-22 | Stop reason: SDUPTHER

## 2020-12-07 RX ORDER — METOPROLOL TARTRATE 25 MG/1
25 TABLET, FILM COATED ORAL 2 TIMES DAILY
Status: DISCONTINUED | OUTPATIENT
Start: 2020-12-07 | End: 2020-12-07 | Stop reason: HOSPADM

## 2020-12-07 RX ORDER — TAMSULOSIN HYDROCHLORIDE 0.4 MG/1
0.4 CAPSULE ORAL NIGHTLY
Status: DISCONTINUED | OUTPATIENT
Start: 2020-12-07 | End: 2020-12-07 | Stop reason: HOSPADM

## 2020-12-07 RX ORDER — SPIRONOLACTONE 25 MG/1
50 TABLET ORAL DAILY
Status: DISCONTINUED | OUTPATIENT
Start: 2020-12-07 | End: 2020-12-07 | Stop reason: HOSPADM

## 2020-12-07 RX ORDER — DUTASTERIDE 0.5 MG/1
0.5 CAPSULE, LIQUID FILLED ORAL DAILY
Status: DISCONTINUED | OUTPATIENT
Start: 2020-12-07 | End: 2020-12-07 | Stop reason: HOSPADM

## 2020-12-07 RX ORDER — HYDRALAZINE HYDROCHLORIDE 25 MG/1
100 TABLET, FILM COATED ORAL 3 TIMES DAILY
Status: DISCONTINUED | OUTPATIENT
Start: 2020-12-07 | End: 2020-12-07 | Stop reason: HOSPADM

## 2020-12-07 RX ORDER — NIFEDIPINE 30 MG/1
60 TABLET, EXTENDED RELEASE ORAL 2 TIMES DAILY
Status: DISCONTINUED | OUTPATIENT
Start: 2020-12-07 | End: 2020-12-07 | Stop reason: HOSPADM

## 2020-12-07 RX ORDER — OXYBUTYNIN CHLORIDE 5 MG/1
10 TABLET, EXTENDED RELEASE ORAL DAILY
Status: DISCONTINUED | OUTPATIENT
Start: 2020-12-07 | End: 2020-12-07 | Stop reason: HOSPADM

## 2020-12-07 RX ADMIN — NIFEDIPINE 60 MG: 30 TABLET, FILM COATED, EXTENDED RELEASE ORAL at 10:12

## 2020-12-07 RX ADMIN — SPIRONOLACTONE 50 MG: 25 TABLET ORAL at 10:12

## 2020-12-07 RX ADMIN — ATORVASTATIN CALCIUM 40 MG: 40 TABLET, FILM COATED ORAL at 08:12

## 2020-12-07 RX ADMIN — DUTASTERIDE 0.5 MG: 0.5 CAPSULE, LIQUID FILLED ORAL at 10:12

## 2020-12-07 RX ADMIN — HYDRALAZINE HYDROCHLORIDE 100 MG: 25 TABLET, FILM COATED ORAL at 02:12

## 2020-12-07 RX ADMIN — OXYBUTYNIN CHLORIDE 10 MG: 5 TABLET, EXTENDED RELEASE ORAL at 10:12

## 2020-12-07 RX ADMIN — ASPIRIN 81 MG: 81 TABLET, COATED ORAL at 08:12

## 2020-12-07 RX ADMIN — LOSARTAN POTASSIUM 12.5 MG: 25 TABLET, FILM COATED ORAL at 10:12

## 2020-12-07 RX ADMIN — METOPROLOL TARTRATE 25 MG: 25 TABLET, FILM COATED ORAL at 10:12

## 2020-12-07 NOTE — PT/OT/SLP PROGRESS
Physical Therapy Treatment and Discharge    Patient Name:  Pedro Ponce Jr.   MRN:  [id number]    Recommendations:     Discharge Recommendations:  home   Discharge Equipment Recommendations: none   Barriers to discharge: None    Assessment:     Pedro Ponce Jr. is a 80 y.o. male admitted with a medical diagnosis of Acute ischemic left MCA stroke.  Pt functioning independently with no further PT needs, d/c PT.      Rehab Prognosis: Good; patient would benefit from acute skilled PT services to address these deficits and reach maximum level of function.    Recent Surgery: * No surgery found *      Plan:     During this hospitalization, patient to be seen (d/c PT) to address the identified rehab impairments via gait training, therapeutic activities, therapeutic exercises, neuromuscular re-education and progress toward the following goals:    · Plan of Care Expires:  12/07/20    Subjective     Chief Complaint: none  Patient/Family Comments/goals: pt is very pleasant reporting his mobility feels back to his baseline - endorsing only slight tingling in R hand but otherwise sensation back to baseline  Pain/Comfort:  · Pain Rating 1: 0/10  · Pain Rating Post-Intervention 1: 0/10      Objective:     Communicated with nurse Banks prior to session.  Patient found up in chair with telemetry(chair alarm) upon PT entry to room.     General Precautions: Standard, fall   Orthopedic Precautions:N/A   Braces: N/A     Functional Mobility:  · Transfers:     · Sit to Stand:  independence with no AD  · Gait: ~280 ft with no AD independently - no instability or LOB and pt with no concerns for his mobility, reporting he feels he is at his baseline      AM-PAC 6 CLICK MOBILITY  Turning over in bed (including adjusting bedclothes, sheets and blankets)?: 4  Sitting down on and standing up from a chair with arms (e.g., wheelchair, bedside commode, etc.): 4  Moving from lying on back to sitting on the side of the bed?: 4  Moving to and  from a bed to a chair (including a wheelchair)?: 4  Need to walk in hospital room?: 4  Climbing 3-5 steps with a railing?: 4  Basic Mobility Total Score: 24       Therapeutic Activities and Exercises:  Pt sitting up in chair.  Educated on seated LE exercises: APs, LAQs, and hip flexion.  Pt ambulated as reported above then left up in chair.    Patient left up in chair with all lines intact, call button in reach, chair alarm on and nurse notified..    GOALS:   Multidisciplinary Problems     Physical Therapy Goals     Not on file          Multidisciplinary Problems (Resolved)        Problem: Physical Therapy Goal    Goal Priority Disciplines Outcome Goal Variances Interventions   Physical Therapy Goal   (Resolved)     PT, PT/OT Met     Description: Goals to be met by: 1/3/21     Patient will increase functional independence with mobility by performin.  Sit in chair 2 hours  2.  Bed to chair with Mod Ind  3.  Ambulate 200' with Mod Ind                     Time Tracking:     PT Received On: 20  PT Start Time: 0943     PT Stop Time: 1006  PT Total Time (min): 23 min     Billable Minutes: Gait Training 10 and Therapeutic Exercise 13    Treatment Type: Treatment  PT/PTA: PT     PTA Visit Number: 0     Destiney Gunderson, PT  2020

## 2020-12-07 NOTE — PLAN OF CARE
VN note: VN cued into patient's room to review discharge papers. Nephew at bedside. Medication list reviewed. He is aware of medications changes. I verified with Dr. Lane patient is discharging on lipitor 40 mg. Follow-up information given. Bedside Delivery completed. VN educated patient on stroke signs and symptoms and when to seek medical attention. Patient verbalized understanding and all questions answered. Refer to clinical references for further education given. Transport requested.

## 2020-12-07 NOTE — PLAN OF CARE
Pt AAOx4 with clear speech and follows commands. Afebrile overnight. NSR. No complaints of pain but still states he has some numbness that has improved overnight. No weakness noted in extremities. UOP adequate with urinal. No BM overnight.  Frequent checks for safety done during shift. Will report to oncoming RN.

## 2020-12-07 NOTE — TELEPHONE ENCOUNTER
Kaylene Conklin pt access for [employer] called for appt. Appt scheduled and confirmed. She will give to pt.

## 2020-12-07 NOTE — PLAN OF CARE
Problem: Occupational Therapy Goal  Goal: Occupational Therapy Goal  Outcome: Met   Pt found in SF & agreeable to OT eval/tx this AM.  Pt perf all tx tasks w/o amb DME w/ MI & good safety.  Edu/tx re: S/S of CVA, general safety techs, rec tub GB/shower chair & HEP. Pt verbalized understanding.  Pt left UIC w/ alarm & nsg notified.    Per OT eval, pt w/o change in fxnl status from baseline (I) & no further acute OT svcs indicated at this time. DC OT.

## 2020-12-07 NOTE — PLAN OF CARE
Problem: Physical Therapy Goal  Goal: Physical Therapy Goal  Description: Goals to be met by: 1/3/21     Patient will increase functional independence with mobility by performin.  Sit in chair 2 hours MET 2020  2.  Bed to chair with Mod Ind MET 2020  3.  Ambulate 200' with Mod Ind MET 2020 1214 by Destiney Gunderson, PT  Outcome: Met     Pt has met all goals, is independent with all mobility without AD without any further PT needs. D/C PT.

## 2020-12-07 NOTE — PLAN OF CARE
Patient stable. Awake, alert, and oriented. Plan of care reviewed with patient. Verbal reported of understanding. NSR on monitor with no red alarms noted. Neurological assessment performed and no significant deficiency/weakness noted.  No complaint of pain or other discomfort throughout the shift. Due meds given per ordered. Patient's safety maintained. No acute distress noted. Bed in lowest position. Bed alarm on. Head of bed elevated.  Side rails x 2 are up. Call bell within reach. Patient will be monitored overnight.

## 2020-12-07 NOTE — PLAN OF CARE
Pt lives in [place] with his spouse.  They are both independent at home and drive.  Pt's nephew Alex [phone] is at his bedside and will transport pt home.  White board updated with CM name and contact information.  Discharge brochure provided.  Pt encouraged to call with any questions or concerns.  Cm will continue to follow pt through transitions of care and assist with any discharge needs.       12/07/20 1433   Discharge Assessment   Assessment Type Discharge Planning Assessment   Confirmed/corrected address and phone number on facesheet? Yes   Assessment information obtained from? Patient   Communicated expected length of stay with patient/caregiver yes   Prior to hospitilization cognitive status: Alert/Oriented   Prior to hospitalization functional status: Independent   Current cognitive status: Alert/Oriented   Current Functional Status: Independent   Facility Arrived From: home   Lives With spouse   Able to Return to Prior Arrangements yes   Is patient able to care for self after discharge? Yes   Who are your caregiver(s) and their phone number(s)? Alex (pt's nephew)  [phone]   Patient's perception of discharge disposition home or selfcare   Readmission Within the Last 30 Days no previous admission in last 30 days   Patient currently being followed by outpatient case management? No   Patient currently receives any other outside agency services? No   Equipment Currently Used at Home none   Do you have any problems affording any of your prescribed medications? No   Is the patient taking medications as prescribed? yes   Does the patient have transportation home? Yes   Transportation Anticipated family or friend will provide   Discharge Plan A Home   Discharge Plan B Home with family   DME Needed Upon Discharge  none   Patient/Family in Agreement with Plan yes   Does the patient have transportation to healthcare appointments? Yes     Jadiel Rivera RN,   [phone]

## 2020-12-07 NOTE — HOSPITAL COURSE
Patient was admitted for stroke work up. He was maintained on aspirin. His Lipitor was increased. He was seen by Vascular neurology. His MRI was negative for new CVA, carotid US was negative. Cardiac ECHO.  He was evaluated by PT and OT.    On the day of discharge:  His nephew is at the bedside.  He is stable  MRA is negative  Carotid US negative  FU ECHO results  OP Neurology, PT/OT    · Addendum:   · ECHO:  · The left ventricle is normal in size with normal systolic function. The estimated ejection fraction is 60%.  · Grade I diastolic dysfunction.  · With normal right ventricular systolic function.  · Moderate left atrial enlargement.  · Moderate right atrial enlargement.  · Mild tricuspid regurgitation.  · Normal central venous pressure (3 mmHg).  · The estimated PA systolic pressure is 28 mmHg.  There is no evidence of intracardiac shunting. Negative bubble study

## 2020-12-07 NOTE — PT/OT/SLP EVAL
Occupational Therapy   Evaluation and Discharge Note    Name: Pedro Ponce Jr.  MRN: [id number]  Admitting Diagnosis:  Acute ischemic left MCA stroke      Recommendations:     Discharge Recommendations: home  Discharge Equipment Recommendations:  none  Barriers to discharge:   none    Assessment:   Per OT eval, pt w/o change in fxnl status from baseline (I) & no further acute OT svcs indicated at this time. DC OT.    Pedro Ponce Jr. is a 80 y.o. male with a medical diagnosis of Acute ischemic left MCA stroke. At this time, patient is functioning at their prior level of function and does not require further acute OT services.     Plan:     During this hospitalization, patient does not require further acute OT services.  Please re-consult if situation changes.    · Plan of Care Reviewed with: patient    Subjective     Chief Complaint: RUE numbness  Patient/Family Comments/goals: return home    Occupational Profile:  Living Environment: w/ spouse in [place] w/ THE; t/s  Previous level of function: (I)  Roles and Routines: IADLs; driving; plays golf  Equipment Used at home:  none  Assistance upon Discharge: spouse    Pain/Comfort:  · Pain Rating 1: 0/10  · Pain Rating Post-Intervention 1: 0/10    Patients cultural, spiritual, [employer]ian conflicts given the current situation:      Objective:     Communicated with: nsg prior to session.  Patient found HOB elevated with bed alarm, telemetry upon OT entry to room.    General Precautions: Standard, fall   Orthopedic Precautions:N/A   Braces: N/A     Occupational Performance:    Bed Mobility:    · Patient completed Supine to Sit with modified independence    Functional Mobility/Transfers:  · Patient completed Sit <> Stand Transfer with modified independence  with  no assistive device   · Patient completed Bed <> Chair Transfer using Step Transfer technique with modified independence with no assistive device  · Functional Mobility: w/o DME around room for ADLs w/  MI    Activities of Daily Living:  · Grooming: modified independence standing at sink  · Lower Body Dressing: modified independence doff/don socks via figure 4 tech at EOB  · Toileting: modified independence standing    Cognitive/Visual Perceptual:  AO4    No deficits noted    Physical Exam:  BUEs WFL at 4+/5    Sit balance: G    Stand balance: G    Sensation/coordination:  Intact BU/LEs    AMPAC 6 Click ADL:  AMPAC Total Score: 24    Treatment & Education:  Pt found in SF & agreeable to OT eval/tx this AM.  Pt perf all tx tasks w/o amb DME w/ MI & good safety.  Edu/tx re: S/S of CVA, general safety techs, rec tub GB/shower chair & HEP. Pt verbalized understanding.  Pt left UIC w/ alarm & nsg notified.    Patient left up in chair with all lines intact, call button in reach, chair alarm on and nsg notified    GOALS:   Multidisciplinary Problems     Occupational Therapy Goals     Not on file          Multidisciplinary Problems (Resolved)        Problem: Occupational Therapy Goal    Goal Priority Disciplines Outcome Interventions   Occupational Therapy Goal   (Resolved)     OT, PT/OT Met                    History:     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       Past Surgical History:   Procedure Laterality Date    COLONOSCOPY N/A 4/9/2018    Procedure: COLONOSCOPY;  Surgeon: HAIM Mir MD;  Location: [address]);  Service: Endoscopy;  Laterality: N/A;    COLONOSCOPY N/A 1/14/2019    Procedure: COLONOSCOPY;  Surgeon: HAIM Mir MD;  Location: [address]);  Service: Endoscopy;  Laterality: N/A;  PM prep    CYST REMOVAL  2000    removed from left kidney    KIDNEY  SURGERY      PROSTATE SURGERY         Time Tracking:     OT Date of Treatment: 12/07/20  OT Start Time: 0855  OT Stop Time: 0919  OT Total Time (min): 24 min    Billable Minutes:Evaluation 10  Self Care/Home Management 14  Total Time 24    MILLER Pruitt  12/7/2020

## 2020-12-07 NOTE — PLAN OF CARE
Future Appointments   Date Time Provider Department Center   12/16/2020 10:00 AM Anthony Mcdonald MD [facility] CARDIO [facility]   12/18/2020  9:45 AM Fercho Roy Jr., MD [facility] GENSUR [facility]   12/22/2020 11:00 AM Portia Hooevr MD [facility] IMPRI [facility]   1/25/2021  9:40 AM Eddie Silva MD [facility] STROKE [facility]   2/2/2021  8:25 AM LAB, [facility] [place]   2/9/2021 11:00 AM John Ricci MD [facility]       Rounds completed on pt.  All questions addressed.  Bedside nurse to discuss d/c medications.  Discussed importance to attend all f/u appts and take medications as prescribed.  Verbalized understanding.       12/07/20 1519   Final Note   Assessment Type Final Discharge Note   Anticipated Discharge Disposition Home   Hospital Follow Up  Appt(s) scheduled? Yes   Discharge plans and expectations educations in teach back method with documentation complete? Yes   Right Care Referral Info   Post Acute Recommendation No Care   Post-Acute Status   Discharge Delays None known at this time     Jadiel Rivera RN,   [phone]

## 2020-12-07 NOTE — CARE UPDATE
MRI BRAIN WITHOUT CONTRAST  Narrative: EXAMINATION:  MRI BRAIN WITHOUT CONTRAST    CLINICAL HISTORY:  Stroke, follow up;    TECHNIQUE:  Multiplanar multisequence MR imaging of the brain was performed without contrast.    COMPARISON:  CT head performed 12/06/2020    FINDINGS:  Parenchyma: There is no restricted diffusion to suggest acute or subacute ischemic infarct.Generalized age-related parenchymal volume loss is noted.  Remote lacunar infarcts are noted in the corona radiata.  Nonspecific white-matter hypoattenuation involving the supratentorial white matter suggests sequela of chronic small vessel ischemic disease.    Additional comments: There is no midline shift, abnormal extra-axial fluid collection, or acute intracranial hemorrhage. The basal cisterns are patent.  2 small chronic microhemorrhages are suggested in the left peripheral temporal region and left cerebellum, nonspecific.    Ventricles: Normal.    Flow voids: The normal major intracranial arterial flow voids are visualized.    Sinuses and mastoid air cells: Essentially clear    Orbits: Status post bilateral lens replacements    Midline structures: The pituitary and craniocervical junction are normal.    Marrow: Normal  Impression: 1. No acute intracranial findings.  Specifically, no evidence of acute ischemia or recent infarction, as clinically questioned.  2. White matter signal abnormality suggests sequela of chronic small vessel ischemic disease.  Two small chronic microhemorrhages are noted in the left temporal and left cerebellar region, nonspecific.  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    Electronically signed by: Yakov Salazar  Date:    12/07/2020  Time:    07:37         Will resume meds with hold parameters.  Await PT/OT reccs for DC determination

## 2020-12-08 LAB
BACTERIA UR CULT: NORMAL
BACTERIA UR CULT: NORMAL
FINAL PATHOLOGIC DIAGNOSIS: NORMAL
Lab: NORMAL

## 2020-12-08 NOTE — ASSESSMENT & PLAN NOTE
Chronic findings on MRI  No acute CVA  This likely represented a TIA  Will Get OP PT evaluation  FU neurology

## 2020-12-08 NOTE — DISCHARGE SUMMARY
[facility] Hospital Medicine  Discharge Summary      Patient Name: Pedro Ponce Jr.  MRN: [id number]  Admission Date: 12/6/2020  Hospital Length of Stay: 0 days  Discharge Date and Time: 12/7/2020  3:58 PM  Attending Physician: No att. providers found   Discharging Provider: Doretha Lane MD  Primary Care Provider: John Ricci MD      HPI:   This is an 80 year old male who presented with right sided arm weakness and numbness. He has a history of HTN, DM, hyperlipidemia and BPH. He states that he has had symptoms for 2 days.  He usually does hand exercises with a stress ball and noticed it when he was doing the exercises. He noted that it had gotten worse and also that he was having some numbness on the right lower extremity as well  He denies Headache, Visual or speech symptoms  He states that he does take ASA 81 mg daily  He has been treated for uncontrolled hypertension    A CODE STROKE was called in the ED and he was evaluated by Vascular Neurology. He is out of the window for TPA. He is Tier 2 VAN negative  He was evaluated by speech therapy and does not exhibit aspiration.  He was evaluated by Neurology on the floor    * No surgery found *      Hospital Course:   Patient was admitted for stroke work up. He was maintained on aspirin. His Lipitor was increased. He was seen by Vascular neurology. His MRI was negative for new CVA, carotid US was negative. Cardiac ECHO.  He was evaluated by PT and OT.    On the day of discharge:  His nephew is at the bedside.  He is stable  MRA is negative  Carotid US negative  FU ECHO results  OP Neurology, PT/OT    · Addendum:   · ECHO:  · The left ventricle is normal in size with normal systolic function. The estimated ejection fraction is 60%.  · Grade I diastolic dysfunction.  · With normal right ventricular systolic function.  · Moderate left atrial enlargement.  · Moderate right atrial enlargement.  · Mild tricuspid regurgitation.  · Normal  central venous pressure (3 mmHg).  · The estimated PA systolic pressure is 28 mmHg.  There is no evidence of intracardiac shunting. Negative bubble study     Consults:   Consults (From admission, onward)        Status Ordering Provider     Inpatient consult to Neurology  Once     Provider:  Divine Dash MD    Completed CANDICE COLE          TIA (transient ischemic attack)  As above      Cerebrovascular disease  Chronic findings on MRI  No acute CVA  This likely represented a TIA  Will Get OP PT evaluation  FU neurology      Type 2 diabetes mellitus with renal manifestations    Not on meds  A1C 5.9  Glucose 109      BPH with urinary obstruction  Avodart, Flomax      Hyperlipidemia  On atorvastatin 10 mg   This will need to be increased 40 mg     Hypertension  Resume home meds  Home meds: Hydralazine, Irbesartan, metoprolol, nifedipine, spironolactone          Final Active Diagnoses:    Diagnosis Date Noted POA    TIA (transient ischemic attack) [G45.9] 12/07/2020 Yes    Cerebrovascular disease [I67.9] 12/06/2020 Yes    Type 2 diabetes mellitus with renal manifestations [E11.29] 01/19/2015 Yes    BPH with urinary obstruction [N40.1, N13.8] 01/21/2013 Yes    Hypertension [I10]  Yes    Hyperlipidemia [E78.5]  Yes      Problems Resolved During this Admission:    Diagnosis Date Noted Date Resolved POA    PRINCIPAL PROBLEM:  Acute ischemic left MCA stroke [I63.512] 12/06/2020 12/07/2020 Yes       Discharged Condition: good    Disposition: Home or Self Care    Follow Up:    Patient Instructions:      Ambulatory referral/consult to Neurology   Standing Status: Future   Referral Priority: Routine Referral Type: Consultation   Referral Reason: Specialty Services Required   Requested Specialty: Neurology   Number of Visits Requested: 1     Ambulatory referral/consult to Physical/Occupational Therapy   Standing Status: Future   Referral Priority: Routine Referral Type: Physical Medicine   Referral  Reason: Specialty Services Required   Number of Visits Requested: 1     Diet Adult Regular     Notify your health care provider if you experience any of the following:  persistent dizziness, light-headedness, or visual disturbances     Notify your health care provider if you experience any of the following:  increased confusion or weakness     Activity as tolerated       Significant Diagnostic Studies: Labs:   BMP:   Recent Labs   Lab 12/06/20  0915 12/07/20  0502    81    138   K 3.9 4.2    107   CO2 18* 21*   BUN 20 19   CREATININE 1.2 1.3   CALCIUM 10.5 10.0   MG  --  1.8  1.8   , CMP   Recent Labs   Lab 12/06/20  0915 12/07/20  0502    138   K 3.9 4.2    107   CO2 18* 21*    81   BUN 20 19   CREATININE 1.2 1.3   CALCIUM 10.5 10.0   PROT 7.3 6.6   ALBUMIN 3.9 3.5   BILITOT 1.0 1.0   ALKPHOS 59 54*   AST 16 13   ALT 11 10   ANIONGAP 14 10   ESTGFRAFRICA >60 59*   EGFRNONAA 56* 51*   , CBC   Recent Labs   Lab 12/06/20  0915 12/07/20  0502   WBC 4.42 3.79*   HGB 14.1 13.3*   HCT 41.5 40.2    227   , INR   Lab Results   Component Value Date    INR 1.0 12/07/2020    INR 1.0 12/07/2020    INR 1.0 12/06/2020   , Lipid Panel   Lab Results   Component Value Date    CHOL 139 12/06/2020    HDL 53 12/06/2020    LDLCALC 73.4 12/06/2020    TRIG 63 12/06/2020    CHOLHDL 38.1 12/06/2020   , Troponin   Recent Labs   Lab 12/06/20  0915 12/07/20  0502   TROPONINI <0.006 0.014  0.014   , A1C:   Recent Labs   Lab 06/23/20  0842 12/06/20  0917   HGBA1C 6.1* 5.9*    and All labs within the past 24 hours have been reviewed    Pending Diagnostic Studies:     None       Echo Color Flow Doppler? Yes; Bubble Contrast? Yes  · The left ventricle is normal in size with normal systolic function. The   estimated ejection fraction is 60%.  · Grade I diastolic dysfunction.  · With normal right ventricular systolic function.  · Moderate left atrial enlargement.  · Moderate right atrial enlargement.  ·  Mild tricuspid regurgitation.  · Normal central venous pressure (3 mmHg).  · The estimated PA systolic pressure is 28 mmHg.  · There is no evidence of intracardiac shunting. Negative bubble study     MRI BRAIN WITHOUT CONTRAST  Narrative: EXAMINATION:  MRI BRAIN WITHOUT CONTRAST    CLINICAL HISTORY:  Stroke, follow up;    TECHNIQUE:  Multiplanar multisequence MR imaging of the brain was performed without contrast.    COMPARISON:  CT head performed 12/06/2020    FINDINGS:  Parenchyma: There is no restricted diffusion to suggest acute or subacute ischemic infarct.Generalized age-related parenchymal volume loss is noted.  Remote lacunar infarcts are noted in the corona radiata.  Nonspecific white-matter hypoattenuation involving the supratentorial white matter suggests sequela of chronic small vessel ischemic disease.    Additional comments: There is no midline shift, abnormal extra-axial fluid collection, or acute intracranial hemorrhage. The basal cisterns are patent.  2 small chronic microhemorrhages are suggested in the left peripheral temporal region and left cerebellum, nonspecific.    Ventricles: Normal.    Flow voids: The normal major intracranial arterial flow voids are visualized.    Sinuses and mastoid air cells: Essentially clear    Orbits: Status post bilateral lens replacements    Midline structures: The pituitary and craniocervical junction are normal.    Marrow: Normal  Impression: 1. No acute intracranial findings.  Specifically, no evidence of acute ischemia or recent infarction, as clinically questioned.  2. White matter signal abnormality suggests sequela of chronic small vessel ischemic disease.  Two small chronic microhemorrhages are noted in the left temporal and left cerebellar region, nonspecific.  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    Electronically signed by: Yakov Salazar  Date:    12/07/2020  Time:    07:37     Carotid US  Impression:     No evidence of a  hemodynamically significant carotid bifurcation stenosis.    Medications:  Reconciled Home Medications:      Medication List      CHANGE how you take these medications    * atorvastatin 10 MG tablet  Commonly known as: LIPITOR  Take 10 mg by mouth every evening.  What changed: Another medication with the same name was added. Make sure you understand how and when to take each.     * atorvastatin 40 MG tablet  Commonly known as: LIPITOR  Take 1 tablet (40 mg total) by mouth once daily.  Start taking on: December 8, 2020  What changed: You were already taking a medication with the same name, and this prescription was added. Make sure you understand how and when to take each.         * This list has 2 medication(s) that are the same as other medications prescribed for you. Read the directions carefully, and ask your doctor or other care provider to review them with you.            CONTINUE taking these medications    aspirin 81 mg Tab  Take 81 mg by mouth. 1 Tablet Oral Every evening     chlorhexidine 0.12 % solution  Commonly known as: PERIDEX  RINSE 1/2 OZ TWICE DAILY AFTER BREAKFAST AND BEFORE BEDTIME     dutasteride 0.5 mg capsule  Commonly known as: AVODART  TAKE 1 CAPSULE (0.5 MG TOTAL) BY MOUTH ONCE DAILY.     hydrALAZINE 50 MG tablet  Commonly known as: APRESOLINE  Take 100 mg by mouth 3 (three) times daily. Per Dr. Mcdonald     irbesartan 300 MG tablet  Commonly known as: AVAPRO  TAKE 1 TABLET EVERY DAY     metoprolol tartrate 50 MG tablet  Commonly known as: LOPRESSOR  Take 0.5 tablets (25 mg total) by mouth 2 (two) times daily.     NIFEdipine 60 MG (OSM) 24 hr tablet  Commonly known as: PROCARDIA-XL  Take 1 tablet (60 mg total) by mouth 2 (two) times daily.     oxybutynin 10 MG 24 hr tablet  Commonly known as: DITROPAN-XL  Take 1 tablet (10 mg total) by mouth once daily.     oxyCODONE-acetaminophen 5-325 mg per tablet  Commonly known as: PERCOCET  Take 1 tablet by mouth every 4 to 6 hours as needed for  Pain.     spironolactone 50 MG tablet  Commonly known as: ALDACTONE  Take 1 tablet (50 mg total) by mouth once daily.     STOOL SOFTENER-STIMULANT LAXAT 8.6-50 mg per tablet  Generic drug: senna-docusate 8.6-50 mg  Take 1 tablet by mouth once daily.     tamsulosin 0.4 mg Cap  Commonly known as: FLOMAX  Take 1 capsule (0.4 mg total) by mouth every evening.        STOP taking these medications    flu vacc [id number](65yr up) mcg/0.5 mL Syrg  Commonly known as: FLUZONE HIGH-DOSE 2019-20 (PF)     FLUZONE HIGH-DOSE 2019-20 (PF) 180 mcg/0.5 mL Syrg  Generic drug: flu vacc [id number](65yr up)PF     FLUZONE HIGHDOSE QUAD 20-21  mcg/0.7 mL Syrg  Generic drug: flu vacc [id number](65yr up)-PF     SHINGRIX (PF) 50 mcg/0.5 mL injection  Generic drug: varicella-zoster [id number] (PF)            Indwelling Lines/Drains at time of discharge:   Lines/Drains/Airways     None                 Time spent on the discharge of patient: 40 minutes  Patient was seen and examined on the date of discharge and determined to be suitable for discharge.         Doretha Lane MD  Department of Hospital Medicine  [facility]

## 2020-12-09 PROBLEM — R53.1 GENERALIZED WEAKNESS: Status: ACTIVE | Noted: 2020-12-09

## 2020-12-09 PROBLEM — Z74.09 IMPAIRED MOBILITY: Status: ACTIVE | Noted: 2020-12-09

## 2020-12-14 ENCOUNTER — PATIENT OUTREACH (OUTPATIENT)
Dept: ADMINISTRATIVE | Facility: OTHER | Age: 80
End: 2020-12-14

## 2020-12-14 NOTE — PROGRESS NOTES
Health Maintenance Due   Topic Date Due    TETANUS VACCINE  05/20/2019     Updates were requested from care everywhere.  Chart was reviewed for overdue Proactive [facility] Encounters ([facility]) topics (CRS, Breast Cancer Screening, Eye exam)  Health Maintenance has been updated.  LIN[facility] immunization registry triggered.  Immunizations were reconciled.

## 2020-12-16 ENCOUNTER — OFFICE VISIT (OUTPATIENT)
Dept: CARDIOLOGY | Facility: CLINIC | Age: 80
End: 2020-12-16
Payer: MEDICARE

## 2020-12-16 VITALS
BODY MASS INDEX: 26.97 KG/M2 | DIASTOLIC BLOOD PRESSURE: 66 MMHG | HEART RATE: 65 BPM | WEIGHT: 193.31 LBS | OXYGEN SATURATION: 98 % | SYSTOLIC BLOOD PRESSURE: 109 MMHG

## 2020-12-16 DIAGNOSIS — Z86.73 RECENT CEREBROVASCULAR ACCIDENT (CVA): ICD-10-CM

## 2020-12-16 DIAGNOSIS — I70.0 AORTIC ATHEROSCLEROSIS: ICD-10-CM

## 2020-12-16 DIAGNOSIS — N18.31 TYPE 2 DIABETES MELLITUS WITH STAGE 3A CHRONIC KIDNEY DISEASE, WITHOUT LONG-TERM CURRENT USE OF INSULIN: ICD-10-CM

## 2020-12-16 DIAGNOSIS — T50.905A BRADYCARDIA, DRUG INDUCED: ICD-10-CM

## 2020-12-16 DIAGNOSIS — R00.1 BRADYCARDIA, DRUG INDUCED: ICD-10-CM

## 2020-12-16 DIAGNOSIS — I10 ESSENTIAL HYPERTENSION: ICD-10-CM

## 2020-12-16 DIAGNOSIS — E78.00 PURE HYPERCHOLESTEROLEMIA: ICD-10-CM

## 2020-12-16 DIAGNOSIS — I67.9 CEREBROVASCULAR DISEASE: Primary | ICD-10-CM

## 2020-12-16 DIAGNOSIS — E11.22 TYPE 2 DIABETES MELLITUS WITH STAGE 3A CHRONIC KIDNEY DISEASE, WITHOUT LONG-TERM CURRENT USE OF INSULIN: ICD-10-CM

## 2020-12-16 PROCEDURE — 99214 PR OFFICE/OUTPT VISIT, EST, LEVL IV, 30-39 MIN: ICD-10-PCS | Mod: S$GLB,,, | Performed by: INTERNAL MEDICINE

## 2020-12-16 PROCEDURE — 99999 PR PBB SHADOW E&M-EST. PATIENT-LVL IV: ICD-10-PCS | Mod: PBBFAC,,, | Performed by: INTERNAL MEDICINE

## 2020-12-16 PROCEDURE — 3078F DIAST BP <80 MM HG: CPT | Mod: CPTII,S$GLB,, | Performed by: INTERNAL MEDICINE

## 2020-12-16 PROCEDURE — 99214 OFFICE O/P EST MOD 30 MIN: CPT | Mod: S$GLB,,, | Performed by: INTERNAL MEDICINE

## 2020-12-16 PROCEDURE — 1101F PR PT FALLS ASSESS DOC 0-1 FALLS W/OUT INJ PAST YR: ICD-10-PCS | Mod: CPTII,S$GLB,, | Performed by: INTERNAL MEDICINE

## 2020-12-16 PROCEDURE — 3074F SYST BP LT 130 MM HG: CPT | Mod: CPTII,S$GLB,, | Performed by: INTERNAL MEDICINE

## 2020-12-16 PROCEDURE — 1126F AMNT PAIN NOTED NONE PRSNT: CPT | Mod: S$GLB,,, | Performed by: INTERNAL MEDICINE

## 2020-12-16 PROCEDURE — 1101F PT FALLS ASSESS-DOCD LE1/YR: CPT | Mod: CPTII,S$GLB,, | Performed by: INTERNAL MEDICINE

## 2020-12-16 PROCEDURE — 1159F MED LIST DOCD IN RCRD: CPT | Mod: S$GLB,,, | Performed by: INTERNAL MEDICINE

## 2020-12-16 PROCEDURE — 3288F FALL RISK ASSESSMENT DOCD: CPT | Mod: CPTII,S$GLB,, | Performed by: INTERNAL MEDICINE

## 2020-12-16 PROCEDURE — 3288F PR FALLS RISK ASSESSMENT DOCUMENTED: ICD-10-PCS | Mod: CPTII,S$GLB,, | Performed by: INTERNAL MEDICINE

## 2020-12-16 PROCEDURE — 1126F PR PAIN SEVERITY QUANTIFIED, NO PAIN PRESENT: ICD-10-PCS | Mod: S$GLB,,, | Performed by: INTERNAL MEDICINE

## 2020-12-16 PROCEDURE — 99999 PR PBB SHADOW E&M-EST. PATIENT-LVL IV: CPT | Mod: PBBFAC,,, | Performed by: INTERNAL MEDICINE

## 2020-12-16 PROCEDURE — 1159F PR MEDICATION LIST DOCUMENTED IN MEDICAL RECORD: ICD-10-PCS | Mod: S$GLB,,, | Performed by: INTERNAL MEDICINE

## 2020-12-16 PROCEDURE — 3074F PR MOST RECENT SYSTOLIC BLOOD PRESSURE < 130 MM HG: ICD-10-PCS | Mod: CPTII,S$GLB,, | Performed by: INTERNAL MEDICINE

## 2020-12-16 PROCEDURE — 3078F PR MOST RECENT DIASTOLIC BLOOD PRESSURE < 80 MM HG: ICD-10-PCS | Mod: CPTII,S$GLB,, | Performed by: INTERNAL MEDICINE

## 2020-12-16 NOTE — ASSESSMENT & PLAN NOTE
His LDL on atorvastatin 10 mg was in the 70 range.  I think this is adequate control.  He had recent carotid Doppler study which was negative.  He was advised to increase his dose to 40 mg per day which probably would be reasonable but not necessary.  I have told him he can drop back to 10 mg per day which was well tolerated.

## 2020-12-16 NOTE — ASSESSMENT & PLAN NOTE
The patient is in therapy.  He does have persistent right-sided weakness-mild.  His neurologic exam is essentially normal today.  His stroke workup while hospitalized was negative.

## 2020-12-16 NOTE — ASSESSMENT & PLAN NOTE
For now he will continue metoprolol.  He does not have bradycardia-symptomatic.  He is still having some periods of bradycardia heart rate 55 range.

## 2020-12-16 NOTE — PROGRESS NOTES
[facility]     Subjective:    Patient ID:  Pedro Ponce Jr. is a 80 y.o. male who presents for follow-up of Hypertension, Bradycardia, and Hyperlipidemia    Review of patient's allergies indicates:   Allergen Reactions    Pcn [penicillins] Rash      The patient had recent hernia surgery.  Six days later he was admitted with right-sided weakness.  There was stroke alert.  His workup was negative.  He stayed 24 hr.  He states that he still has residual right-sided weakness-mild.  He is in therapy.  While he was hospitalized his metoprolol was resumed.  His blood pressure was not markedly elevated when he was admitted.  He was increased on his statin dose from 10-40 mg of atorvastatin.  He has been monitoring his blood pressures at home and they have been in good range.  He has not had symptomatic bradycardia.  He has been losing some weight his wife states.  He is on aspirin.      Review of Systems   Constitution: Positive for weight gain. Negative for chills, decreased appetite, diaphoresis, fever, malaise/fatigue, night sweats and weight loss.   HENT: Negative for congestion, ear discharge, ear pain, hearing loss, hoarse voice, nosebleeds, odynophagia, sore throat, stridor and tinnitus.    Eyes: Negative for blurred vision, discharge, double vision, pain, photophobia, redness, vision loss in left eye, vision loss in right eye, visual disturbance and visual halos.   Cardiovascular: Negative for chest pain, claudication, cyanosis, dyspnea on exertion, irregular heartbeat, leg swelling, near-syncope, orthopnea, palpitations, paroxysmal nocturnal dyspnea and syncope.   Respiratory: Negative for cough, hemoptysis, shortness of breath, sleep disturbances due to breathing, snoring, sputum production and wheezing.    Endocrine: Negative for cold intolerance, heat intolerance, polydipsia, polyphagia and polyuria.   Hematologic/Lymphatic:  Negative for adenopathy and bleeding problem. Does not bruise/bleed easily.   Skin: Negative for color change, dry skin, flushing, itching, nail changes, poor wound healing, rash, skin cancer, suspicious lesions and unusual hair distribution.   Musculoskeletal: Positive for muscle weakness. Negative for arthritis, back pain, falls, gout, joint pain, joint swelling, muscle cramps, myalgias, neck pain and stiffness.   Gastrointestinal: Negative for bloating, abdominal pain, anorexia, change in bowel habit, bowel incontinence, constipation, diarrhea, dysphagia, excessive appetite, flatus, heartburn, hematemesis, hematochezia, hemorrhoids, jaundice, melena, nausea and vomiting.   Genitourinary: Negative for bladder incontinence, decreased libido, dysuria, flank pain, frequency, genital sores, hematuria, hesitancy, incomplete emptying, nocturia and urgency.   Neurological: Positive for focal weakness. Negative for aphonia, brief paralysis, difficulty with concentration, disturbances in coordination, excessive daytime sleepiness, dizziness, headaches, light-headedness, loss of balance, numbness, paresthesias, seizures, sensory change, tremors, vertigo and weakness.   Psychiatric/Behavioral: Negative for altered mental status, depression, hallucinations, memory loss, substance abuse, suicidal ideas and thoughts of violence. The patient does not have insomnia and is not nervous/anxious.    Allergic/Immunologic: Negative for hives and persistent infections.        Objective:       Vitals:    12/16/20 1017   BP: 109/66   BP Location: Right arm   Patient Position: Sitting   BP Method: Large (Automatic)   Pulse: 65   SpO2: 98%   Weight: 87.7 kg (193 lb 5.5 oz)    Physical Exam   Constitutional: He is oriented to person, place, and time. He appears well-developed and well-nourished. No distress.   HENT:   Head: Normocephalic and atraumatic.   Nose: Nose normal.   Mouth/Throat: Oropharynx is clear and moist.   Eyes: Pupils are  equal, round, and reactive to light. Conjunctivae and EOM are normal. Right eye exhibits no discharge. No scleral icterus.   Neck: Normal range of motion. Neck supple. No JVD present. No tracheal deviation present. No thyromegaly present.   Cardiovascular: Normal rate, regular rhythm, normal heart sounds and intact distal pulses. Exam reveals no gallop and no friction rub.   No murmur heard.  Pulmonary/Chest: Effort normal and breath sounds normal. No stridor. No respiratory distress. He has no wheezes. He has no rales. He exhibits no tenderness.   Abdominal: Soft. Bowel sounds are normal. He exhibits no distension and no mass. There is no abdominal tenderness. There is no rebound and no guarding.   Musculoskeletal: Normal range of motion.         General: No tenderness or edema.   Lymphadenopathy:     He has no cervical adenopathy.   Neurological: He is alert and oriented to person, place, and time. No cranial nerve deficit. Coordination normal.   Minimal clumsiness right arm/hand function.  Equal motor strength-    Skin: Skin is warm and dry. No rash noted. He is not diaphoretic. No erythema. No pallor.   Psychiatric: He has a normal mood and affect. His behavior is normal. Judgment and thought content normal.         Assessment:       1. Cerebrovascular disease    2. Aortic atherosclerosis    3. Essential hypertension    4. Pure hypercholesterolemia    5. Bradycardia, drug induced    6. Type 2 diabetes mellitus with stage 3a chronic kidney disease, without long-term current use of insulin    7. Recent cerebrovascular accident (CVA)      Results for orders placed or performed during the hospital encounter of 12/06/20   Urine culture    Specimen: Urine   Result Value Ref Range    Urine Culture, Routine       Multiple organisms isolated. None in predominance.  Repeat if    Urine Culture, Routine clinically necessary.    CBC W/ AUTO DIFFERENTIAL   Result Value Ref Range    WBC 4.42 3.90 - 12.70 K/uL    RBC 4.52  (L) 4.60 - 6.20 M/uL    Hemoglobin 14.1 14.0 - 18.0 g/dL    Hematocrit 41.5 40.0 - 54.0 %    MCV 92 82 - 98 fL    MCH 31.2 (H) 27.0 - 31.0 pg    MCHC 34.0 32.0 - 36.0 g/dL    RDW 13.4 11.5 - 14.5 %    Platelets 259 150 - 350 K/uL    MPV 9.7 9.2 - 12.9 fL    Immature Granulocytes 0.2 0.0 - 0.5 %    Gran # (ANC) 2.1 1.8 - 7.7 K/uL    Immature Grans (Abs) 0.01 0.00 - 0.04 K/uL    Lymph # 1.8 1.0 - 4.8 K/uL    Mono # 0.5 0.3 - 1.0 K/uL    Eos # 0.1 0.0 - 0.5 K/uL    Baso # 0.04 0.00 - 0.20 K/uL    nRBC 0 0 /100 WBC    Gran % 46.4 38.0 - 73.0 %    Lymph % 39.8 18.0 - 48.0 %    Mono % 10.2 4.0 - 15.0 %    Eosinophil % 2.5 0.0 - 8.0 %    Basophil % 0.9 0.0 - 1.9 %    Differential Method Automated    Comprehensive metabolic panel   Result Value Ref Range    Sodium 138 136 - 145 mmol/L    Potassium 3.9 3.5 - 5.1 mmol/L    Chloride 106 95 - 110 mmol/L    CO2 18 (L) 23 - 29 mmol/L    Glucose 108 70 - 110 mg/dL    BUN 20 8 - 23 mg/dL    Creatinine 1.2 0.5 - 1.4 mg/dL    Calcium 10.5 8.7 - 10.5 mg/dL    Total Protein 7.3 6.0 - 8.4 g/dL    Albumin 3.9 3.5 - 5.2 g/dL    Total Bilirubin 1.0 0.1 - 1.0 mg/dL    Alkaline Phosphatase 59 55 - 135 U/L    AST 16 10 - 40 U/L    ALT 11 10 - 44 U/L    Anion Gap 14 8 - 16 mmol/L    eGFR if African American >60 >60 mL/min/1.73 m^2    eGFR if non African American 56 (A) >60 mL/min/1.73 m^2   Protime-INR   Result Value Ref Range    Prothrombin Time 10.4 9.0 - 12.5 sec    INR 1.0 0.8 - 1.2   TSH   Result Value Ref Range    TSH 2.268 0.400 - 4.000 uIU/mL   LDL - Lipid Panel   Result Value Ref Range    Cholesterol 139 120 - 199 mg/dL    Triglycerides 63 30 - 150 mg/dL    HDL 53 40 - 75 mg/dL    LDL Cholesterol 73.4 63.0 - 159.0 mg/dL    HDL/Cholesterol Ratio 38.1 20.0 - 50.0 %    Total Cholesterol/HDL Ratio 2.6 2.0 - 5.0    Non-HDL Cholesterol 86 mg/dL   APTT   Result Value Ref Range    aPTT 29.7 21.0 - 32.0 sec   Troponin I   Result Value Ref Range    Troponin I <0.006 0.000 - 0.026 ng/mL   B-Type  natriuretic peptide   Result Value Ref Range    BNP 60 0 - 99 pg/mL   Urinalysis, Reflex to Urine Culture Urine, Clean Catch    Specimen: Urine   Result Value Ref Range    Specimen UA Urine, Clean Catch     Color, UA Yellow Yellow, Straw, Kiana    Appearance, UA Clear Clear    pH, UA 5.0 5.0 - 8.0    Specific Gravity, UA 1.015 1.005 - 1.030    Protein, UA Negative Negative    Glucose, UA Negative Negative    Ketones, UA Negative Negative    Bilirubin (UA) Negative Negative    Occult Blood UA Negative Negative    Nitrite, UA Negative Negative    Urobilinogen, UA Negative <2.0 EU/dL    Leukocytes, UA Negative Negative   COVID-19 Rapid Screening   Result Value Ref Range    SARS-CoV-2 RNA, Amplification, Qual Negative Negative   Urinalysis, Reflex to Urine Culture Urine, Clean Catch    Specimen: Urine   Result Value Ref Range    Specimen UA Urine, Clean Catch     Color, UA Yellow Yellow, Straw, Kiana    Appearance, UA Clear Clear    pH, UA 7.0 5.0 - 8.0    Specific Gravity, UA 1.015 1.005 - 1.030    Protein, UA Trace (A) Negative    Glucose, UA Negative Negative    Ketones, UA Negative Negative    Bilirubin (UA) Negative Negative    Occult Blood UA Negative Negative    Nitrite, UA Negative Negative    Urobilinogen, UA Negative <2.0 EU/dL    Leukocytes, UA Trace (A) Negative   Hemoglobin A1C   Result Value Ref Range    Hemoglobin A1C 5.9 (H) 4.0 - 5.6 %    Estimated Avg Glucose 123 68 - 131 mg/dL   Urinalysis Microscopic   Result Value Ref Range    WBC, UA 12 (H) 0 - 5 /hpf    WBC Clumps, UA Occasional (A) None-Rare    Microscopic Comment SEE COMMENT    Comprehensive metabolic panel   Result Value Ref Range    Sodium 138 136 - 145 mmol/L    Potassium 4.2 3.5 - 5.1 mmol/L    Chloride 107 95 - 110 mmol/L    CO2 21 (L) 23 - 29 mmol/L    Glucose 81 70 - 110 mg/dL    BUN 19 8 - 23 mg/dL    Creatinine 1.3 0.5 - 1.4 mg/dL    Calcium 10.0 8.7 - 10.5 mg/dL    Total Protein 6.6 6.0 - 8.4 g/dL    Albumin 3.5 3.5 - 5.2 g/dL    Total  Bilirubin 1.0 0.1 - 1.0 mg/dL    Alkaline Phosphatase 54 (L) 55 - 135 U/L    AST 13 10 - 40 U/L    ALT 10 10 - 44 U/L    Anion Gap 10 8 - 16 mmol/L    eGFR if African American 59 (A) >60 mL/min/1.73 m^2    eGFR if non African American 51 (A) >60 mL/min/1.73 m^2   Magnesium   Result Value Ref Range    Magnesium 1.8 1.6 - 2.6 mg/dL   Phosphorus   Result Value Ref Range    Phosphorus 2.7 2.7 - 4.5 mg/dL   Troponin I   Result Value Ref Range    Troponin I 0.014 0.000 - 0.026 ng/mL   CK-MB   Result Value Ref Range    CPK 39 20 - 200 U/L    CPK MB 1.0 0.1 - 6.5 ng/mL    MB % 2.6 0.0 - 5.0 %   CBC auto differential   Result Value Ref Range    WBC 3.79 (L) 3.90 - 12.70 K/uL    RBC 4.26 (L) 4.60 - 6.20 M/uL    Hemoglobin 13.3 (L) 14.0 - 18.0 g/dL    Hematocrit 40.2 40.0 - 54.0 %    MCV 94 82 - 98 fL    MCH 31.2 (H) 27.0 - 31.0 pg    MCHC 33.1 32.0 - 36.0 g/dL    RDW 13.5 11.5 - 14.5 %    Platelets 227 150 - 350 K/uL    MPV 9.5 9.2 - 12.9 fL    Immature Granulocytes 0.5 0.0 - 0.5 %    Gran # (ANC) 1.6 (L) 1.8 - 7.7 K/uL    Immature Grans (Abs) 0.02 0.00 - 0.04 K/uL    Lymph # 1.6 1.0 - 4.8 K/uL    Mono # 0.4 0.3 - 1.0 K/uL    Eos # 0.1 0.0 - 0.5 K/uL    Baso # 0.03 0.00 - 0.20 K/uL    nRBC 0 0 /100 WBC    Gran % 41.7 38.0 - 73.0 %    Lymph % 42.5 18.0 - 48.0 %    Mono % 11.3 4.0 - 15.0 %    Eosinophil % 3.2 0.0 - 8.0 %    Basophil % 0.8 0.0 - 1.9 %    Differential Method Automated    APTT   Result Value Ref Range    aPTT 30.9 21.0 - 32.0 sec   Protime-INR   Result Value Ref Range    Prothrombin Time 10.9 9.0 - 12.5 sec    INR 1.0 0.8 - 1.2   Magnesium   Result Value Ref Range    Magnesium 1.8 1.6 - 2.6 mg/dL   Phosphorus   Result Value Ref Range    Phosphorus 2.7 2.7 - 4.5 mg/dL   Troponin I   Result Value Ref Range    Troponin I 0.014 0.000 - 0.026 ng/mL   CK-MB   Result Value Ref Range    CPK 39 20 - 200 U/L    CPK MB 1.0 0.1 - 6.5 ng/mL    MB % 2.6 0.0 - 5.0 %   APTT   Result Value Ref Range    aPTT 30.9 21.0 - 32.0 sec    Protime-INR   Result Value Ref Range    Prothrombin Time 10.9 9.0 - 12.5 sec    INR 1.0 0.8 - 1.2   Echo Color Flow Doppler? Yes; Bubble Contrast? Yes   Result Value Ref Range    BSA 2.11 m2    TDI SEPTAL 0.04 m/s    LV LATERAL E/E' RATIO 12.00 m/s    LV SEPTAL E/E' RATIO 12.00 m/s    LA WIDTH 4.33 cm    TDI LATERAL 0.04 m/s    LVIDd 4.63 3.5 - 6.0 cm    IVS 1.03 0.6 - 1.1 cm    Posterior Wall 0.89 0.6 - 1.1 cm    Ao root annulus 3.89 cm    LVIDs 3.34 2.1 - 4.0 cm    FS 28 28 - 44 %    LA volume 89.12 cm3    STJ 3.71 cm    Ascending aorta 3.80 cm    LV mass 151.82 g    LA size 4.17 cm    RVDD 3.36 cm    TAPSE 2.29 cm    Left Ventricle Relative Wall Thickness 0.38 cm    AV mean gradient 4 mmHg    AV valve area 3.38 cm2    AV Velocity Ratio 0.89     AV index (prosthetic) 0.91     MV valve area p 1/2 method 3.30 cm2    E/A ratio 0.59     Mean e' 0.04 m/s    E wave decelartion time 230.12 msec    IVRT 122.74 msec    Pulm vein S/D ratio 1.67     LVOT diameter 2.18 cm    LVOT area 3.7 cm2    LVOT peak keanu 1.10 m/s    LVOT peak VTI 23.39 cm    Ao peak keanu 1.24 m/s    Ao VTI 25.81 cm    LVOT stroke volume 87.26 cm3    AV peak gradient 6 mmHg    E/E' ratio 12.00 m/s    MV Peak E Keanu 0.48 m/s    TR Max Keanu 2.51 m/s    MV stenosis pressure 1/2 time 66.73 ms    MV Peak A Keanu 0.82 m/s    PV Peak S Keanu 0.40 m/s    PV Peak D Keanu 0.24 m/s    LV Systolic Volume 45.35 mL    LV Systolic Volume Index 21.7 mL/m2    LV Diastolic Volume 98.63 mL    LV Diastolic Volume Index 47.17 mL/m2    LA Volume Index 42.6 mL/m2    LV Mass Index 73 g/m2    RA Major Axis 5.86 cm    Left Atrium Minor Axis 6.28 cm    Left Atrium Major Axis 5.40 cm    Triscuspid Valve Regurgitation Peak Gradient 25 mmHg    RA Width 3.73 cm    Right Atrial Pressure (from IVC) 3 mmHg    TV rest pulmonary artery pressure 28 mmHg   ISTAT PROCEDURE   Result Value Ref Range    POC PTWBT 11.7 9.7 - 14.3 sec    POC PTINR 1.0 0.9 - 1.2    Sample VENOUS     DelSys Room Air     ISTAT CREATININE   Result Value Ref Range    POC Creatinine 1.3 0.5 - 1.4 mg/dL    Sample VENOUS     DelSys Room Air    POCT glucose   Result Value Ref Range    POCT Glucose 111 (H) 70 - 110 mg/dL     Lab Results   Component Value Date    WBC 3.79 (L) 12/07/2020    RBC 4.26 (L) 12/07/2020    HGB 13.3 (L) 12/07/2020    HCT 40.2 12/07/2020    MCV 94 12/07/2020    MCH 31.2 (H) 12/07/2020    MCHC 33.1 12/07/2020    RDW 13.5 12/07/2020     12/07/2020    MPV 9.5 12/07/2020    GRAN 1.6 (L) 12/07/2020    GRAN 41.7 12/07/2020    LYMPH 1.6 12/07/2020    LYMPH 42.5 12/07/2020    MONO 0.4 12/07/2020    MONO 11.3 12/07/2020    EOS 0.1 12/07/2020    BASO 0.03 12/07/2020    EOSINOPHIL 3.2 12/07/2020    BASOPHIL 0.8 12/07/2020    MG 1.8 12/07/2020    MG 1.8 12/07/2020        CMP  Lab Results   Component Value Date     12/07/2020    K 4.2 12/07/2020     12/07/2020    CO2 21 (L) 12/07/2020    GLU 81 12/07/2020    BUN 19 12/07/2020    CREATININE 1.3 12/07/2020    CALCIUM 10.0 12/07/2020    PROT 6.6 12/07/2020    ALBUMIN 3.5 12/07/2020    BILITOT 1.0 12/07/2020    ALKPHOS 54 (L) 12/07/2020    AST 13 12/07/2020    ALT 10 12/07/2020    ANIONGAP 10 12/07/2020    ESTGFRAFRICA 59 (A) 12/07/2020    EGFRNONAA 51 (A) 12/07/2020        Lab Results   Component Value Date    LABURIN  12/06/2020     Multiple organisms isolated. None in predominance.  Repeat if    LABURIN clinically necessary. 12/06/2020            Results for orders placed or performed during the hospital encounter of 12/06/20   ECG 12 lead    Collection Time: 12/06/20  9:22 AM    Narrative    Test Reason : I63.9,    Vent. Rate : 098 BPM     Atrial Rate : 098 BPM     P-R Int : 168 ms          QRS Dur : 092 ms      QT Int : 370 ms       P-R-T Axes : 061 -27 071 degrees     QTc Int : 472 ms    Sinus rhythm with Premature atrial complexes  Voltage criteria for left ventricular hypertrophy  Nonspecific T wave abnormality  Prolonged QT  Abnormal ECG  When compared  with ECG of 01-NOV-2020 11:58,  Significant changes have occurred  Confirmed by Carmine REECE MD, Joel BANKS ([id number]) on 12/7/2020 8:35:51 AM    Referred By: EVELINA   SELF           Confirmed By:Joel Lou III, MD                  Plan:       Problem List Items Addressed This Visit        Neuro    Cerebrovascular disease - Primary    Recent cerebrovascular accident (CVA)     The patient is in therapy.  He does have persistent right-sided weakness-mild.  His neurologic exam is essentially normal today.  His stroke workup while hospitalized was negative.            Cardiac/Vascular    Hypertension     He will continue his current antihypertensive meds.  His blood pressure control is improved.         Hyperlipidemia     His LDL on atorvastatin 10 mg was in the 70 range.  I think this is adequate control.  He had recent carotid Doppler study which was negative.  He was advised to increase his dose to 40 mg per day which probably would be reasonable but not necessary.  I have told him he can drop back to 10 mg per day which was well tolerated.         Bradycardia, drug induced     For now he will continue metoprolol.  He does not have bradycardia-symptomatic.  He is still having some periods of bradycardia heart rate 55 range.         Aortic atherosclerosis     He will continue aspirin and statin therapy.            Endocrine    DM type 2 causing CKD stage 3     Stable status.                Hospital records reviewed.  Recent echocardiogram reviewed.  Lab studies reviewed.  Total time of chart reviewed 20 min.               Anthony Mcdonald MD  12/16/2020   11:13 AM

## 2020-12-18 ENCOUNTER — OFFICE VISIT (OUTPATIENT)
Dept: SURGERY | Facility: CLINIC | Age: 80
End: 2020-12-18
Payer: MEDICARE

## 2020-12-18 VITALS
DIASTOLIC BLOOD PRESSURE: 60 MMHG | SYSTOLIC BLOOD PRESSURE: 122 MMHG | WEIGHT: 196.19 LBS | HEART RATE: 78 BPM | BODY MASS INDEX: 27.47 KG/M2 | HEIGHT: 71 IN

## 2020-12-18 DIAGNOSIS — Z09 POSTOP CHECK: Primary | ICD-10-CM

## 2020-12-18 PROCEDURE — 99999 PR PBB SHADOW E&M-EST. PATIENT-LVL III: CPT | Mod: PBBFAC,,, | Performed by: SURGERY

## 2020-12-18 PROCEDURE — 99024 PR POST-OP FOLLOW-UP VISIT: ICD-10-PCS | Mod: S$GLB,,, | Performed by: SURGERY

## 2020-12-18 PROCEDURE — 99024 POSTOP FOLLOW-UP VISIT: CPT | Mod: S$GLB,,, | Performed by: SURGERY

## 2020-12-18 PROCEDURE — 1126F PR PAIN SEVERITY QUANTIFIED, NO PAIN PRESENT: ICD-10-PCS | Mod: S$GLB,,, | Performed by: SURGERY

## 2020-12-18 PROCEDURE — 99999 PR PBB SHADOW E&M-EST. PATIENT-LVL III: ICD-10-PCS | Mod: PBBFAC,,, | Performed by: SURGERY

## 2020-12-18 PROCEDURE — 1126F AMNT PAIN NOTED NONE PRSNT: CPT | Mod: S$GLB,,, | Performed by: SURGERY

## 2020-12-18 NOTE — PROGRESS NOTES
HPI:  The patient is status post-open repiar of left inguinal hernia.  Doing well.  No complaints today.    PHYSICAL EXAM:  Physical Exam     In NAD  Incision c/d/i    ASSESSMENT:    The patient is doing well after surgery.     PLAN:    Follow up PRN.  Activity: light duty for 4 weeks        Fercho Roy MD

## 2020-12-22 ENCOUNTER — OFFICE VISIT (OUTPATIENT)
Dept: PRIMARY CARE CLINIC | Facility: CLINIC | Age: 80
End: 2020-12-22
Payer: MEDICARE

## 2020-12-22 VITALS
DIASTOLIC BLOOD PRESSURE: 58 MMHG | OXYGEN SATURATION: 98 % | BODY MASS INDEX: 27.24 KG/M2 | HEART RATE: 60 BPM | WEIGHT: 195.31 LBS | SYSTOLIC BLOOD PRESSURE: 115 MMHG | TEMPERATURE: 98 F

## 2020-12-22 DIAGNOSIS — E11.9 TYPE 2 DIABETES MELLITUS WITHOUT COMPLICATION, WITHOUT LONG-TERM CURRENT USE OF INSULIN: ICD-10-CM

## 2020-12-22 DIAGNOSIS — I10 ESSENTIAL HYPERTENSION: ICD-10-CM

## 2020-12-22 DIAGNOSIS — E78.00 PURE HYPERCHOLESTEROLEMIA: ICD-10-CM

## 2020-12-22 DIAGNOSIS — G45.9 TIA (TRANSIENT ISCHEMIC ATTACK): Primary | ICD-10-CM

## 2020-12-22 PROBLEM — Z86.73 RECENT CEREBROVASCULAR ACCIDENT (CVA): Status: RESOLVED | Noted: 2020-12-16 | Resolved: 2020-12-22

## 2020-12-22 PROCEDURE — 99215 PR OFFICE/OUTPT VISIT, EST, LEVL V, 40-54 MIN: ICD-10-PCS | Mod: S$GLB,,, | Performed by: INTERNAL MEDICINE

## 2020-12-22 PROCEDURE — 1159F MED LIST DOCD IN RCRD: CPT | Mod: S$GLB,,, | Performed by: INTERNAL MEDICINE

## 2020-12-22 PROCEDURE — 3074F PR MOST RECENT SYSTOLIC BLOOD PRESSURE < 130 MM HG: ICD-10-PCS | Mod: CPTII,S$GLB,, | Performed by: INTERNAL MEDICINE

## 2020-12-22 PROCEDURE — 1101F PT FALLS ASSESS-DOCD LE1/YR: CPT | Mod: CPTII,S$GLB,, | Performed by: INTERNAL MEDICINE

## 2020-12-22 PROCEDURE — 99215 OFFICE O/P EST HI 40 MIN: CPT | Mod: S$GLB,,, | Performed by: INTERNAL MEDICINE

## 2020-12-22 PROCEDURE — 1101F PR PT FALLS ASSESS DOC 0-1 FALLS W/OUT INJ PAST YR: ICD-10-PCS | Mod: CPTII,S$GLB,, | Performed by: INTERNAL MEDICINE

## 2020-12-22 PROCEDURE — 3074F SYST BP LT 130 MM HG: CPT | Mod: CPTII,S$GLB,, | Performed by: INTERNAL MEDICINE

## 2020-12-22 PROCEDURE — 1126F AMNT PAIN NOTED NONE PRSNT: CPT | Mod: S$GLB,,, | Performed by: INTERNAL MEDICINE

## 2020-12-22 PROCEDURE — 1126F PR PAIN SEVERITY QUANTIFIED, NO PAIN PRESENT: ICD-10-PCS | Mod: S$GLB,,, | Performed by: INTERNAL MEDICINE

## 2020-12-22 PROCEDURE — 99999 PR PBB SHADOW E&M-EST. PATIENT-LVL IV: CPT | Mod: PBBFAC,,, | Performed by: INTERNAL MEDICINE

## 2020-12-22 PROCEDURE — 3078F PR MOST RECENT DIASTOLIC BLOOD PRESSURE < 80 MM HG: ICD-10-PCS | Mod: CPTII,S$GLB,, | Performed by: INTERNAL MEDICINE

## 2020-12-22 PROCEDURE — 3078F DIAST BP <80 MM HG: CPT | Mod: CPTII,S$GLB,, | Performed by: INTERNAL MEDICINE

## 2020-12-22 PROCEDURE — 3288F PR FALLS RISK ASSESSMENT DOCUMENTED: ICD-10-PCS | Mod: CPTII,S$GLB,, | Performed by: INTERNAL MEDICINE

## 2020-12-22 PROCEDURE — 99999 PR PBB SHADOW E&M-EST. PATIENT-LVL IV: ICD-10-PCS | Mod: PBBFAC,,, | Performed by: INTERNAL MEDICINE

## 2020-12-22 PROCEDURE — 3288F FALL RISK ASSESSMENT DOCD: CPT | Mod: CPTII,S$GLB,, | Performed by: INTERNAL MEDICINE

## 2020-12-22 PROCEDURE — 1159F PR MEDICATION LIST DOCUMENTED IN MEDICAL RECORD: ICD-10-PCS | Mod: S$GLB,,, | Performed by: INTERNAL MEDICINE

## 2020-12-22 RX ORDER — METFORMIN HYDROCHLORIDE 500 MG/1
500 TABLET ORAL 2 TIMES DAILY WITH MEALS
Qty: 60 TABLET | Refills: 3 | Status: SHIPPED | OUTPATIENT
Start: 2020-12-22 | End: 2021-02-09

## 2020-12-22 RX ORDER — ATORVASTATIN CALCIUM 40 MG/1
40 TABLET, FILM COATED ORAL DAILY
Qty: 30 TABLET | Refills: 11 | Status: SHIPPED | OUTPATIENT
Start: 2020-12-22 | End: 2021-02-26

## 2020-12-22 NOTE — PROGRESS NOTES
[facility]   New Visit Progress Note   Recent Hospital Discharge     PRESENTING HISTORY     Chief Complaint/Reason for Admission:  Follow up Hospital Discharge   PCP: John Ricci MD    History of Present Illness:  Mr. Pedro Ponce Jr. is a 80 y.o. male who was recently admitted to the hospital.    [facility]  Discharge Summary        Patient Name: Pedro Ponce Jr.  MRN: [id number]  Admission Date: 12/6/2020  Hospital Length of Stay: 0 days  Discharge Date and Time: 12/7/2020  3:58 PM  Attending Physician: Daphnie att. providers found   Discharging Provider: Doretha Lane MD  Primary Care Provider: John Ricci MD  ___________________________________________________________________    Today:  [facility] to [facility] for initial hospital follow up.  Recently hospitalized for TIA.  Presented with R arm numbness/weakness x 2 days.  Code STROKE activated in ED.  Outside TPA window at time of presentation.   Evaluated by Neurology team - medical management recommended and statin increase 10 mg -> 40 mg.   MRI without evidence of new CVA.   Evaluated by PT/OT/speech.  Patient discharged to home with referral to outpatient PT/OT/Vascular Neurology.     Seen by Cardiology team 12/16/2020- Dr Mcdonald.  Atorvastatin dropped back to initial dose of 10 mg daily but patient has continued on 40 mg.  He seems to be tolerating this dose.    Accompanied today by his wife.  Ambulatory and independent with ADL's.  Some fatigue but still conducting his usual activities and participating in light exercise.   Reports compliance with all medication.   Reports residual mild R arm weakness which is improving with PT/OT.     Review of Systems  General ROS: negative for chills, fever or weight loss  Psychological ROS: negative for hallucination, depression or suicidal ideation  Ophthalmic ROS: negative for blurry vision, photophobia or eye pain  ENT ROS: negative for  epistaxis, sore throat or rhinorrhea  Respiratory ROS: no cough, shortness of breath, or wheezing  Cardiovascular ROS: no chest pain or dyspnea on exertion  Gastrointestinal ROS: no abdominal pain, change in bowel habits, or black/ bloody stools  Genito-Urinary ROS: no dysuria, trouble voiding, or hematuria  Musculoskeletal ROS: negative for gait disturbance or muscular weakness  Neurological ROS: no syncope or seizures; no ataxia  R arm weakness   Dermatological ROS: negative for pruritis, rash and jaundice      PAST HISTORY:     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       Past Surgical History:   Procedure Laterality Date    COLONOSCOPY N/A 4/9/2018    Procedure: COLONOSCOPY;  Surgeon: HAIM Mir MD;  [facility]  Service: Endoscopy;  Laterality: N/A;    COLONOSCOPY N/A 1/14/2019    Procedure: COLONOSCOPY;  Surgeon: HAIM Mir MD;  [facility]  Service: Endoscopy;  Laterality: N/A;  PM prep    CYST REMOVAL  2000    removed from left kidney    KIDNEY SURGERY      PROSTATE SURGERY         Family History   Problem Relation Age of Onset    Cancer Mother         unkn ca    Diabetes Mother     Glaucoma Mother     Heart disease Mother     Hypertension Mother     Blindness Neg Hx     Macular degeneration Neg Hx     Retinal detachment Neg Hx          MEDICATIONS & ALLERGIES:     Current Outpatient Medications on File Prior to Visit   Medication Sig Dispense Refill    aspirin 81 mg Tab Take 81 mg by mouth. 1 Tablet Oral Every evening      atorvastatin (LIPITOR) 10 MG tablet Take 10 mg by mouth every evening.      atorvastatin (LIPITOR)  40 MG tablet Take 1 tablet (40 mg total) by mouth once daily. 30 tablet 0    chlorhexidine (PERIDEX) 0.12 % solution RINSE 1/2 OZ TWICE DAILY AFTER BREAKFAST AND BEFORE BEDTIME      dutasteride (AVODART) 0.5 mg capsule TAKE 1 CAPSULE (0.5 MG TOTAL) BY MOUTH ONCE DAILY. 90 capsule 3    hydrALAZINE (APRESOLINE) 50 MG tablet Take 100 mg by mouth 3 (three) times daily. Per Dr. Mcdonald       irbesartan (AVAPRO) 300 MG tablet TAKE 1 TABLET EVERY DAY 90 tablet 3    metoprolol tartrate (LOPRESSOR) 50 MG tablet Take 0.5 tablets (25 mg total) by mouth 2 (two) times daily. 90 tablet 3    NIFEdipine (PROCARDIA-XL) 60 MG (OSM) 24 hr tablet Take 1 tablet (60 mg total) by mouth 2 (two) times daily. 180 tablet 4    oxybutynin (DITROPAN-XL) 10 MG 24 hr tablet Take 1 tablet (10 mg total) by mouth once daily. 90 tablet 3    oxyCODONE-acetaminophen (PERCOCET) 5-325 mg per tablet Take 1 tablet by mouth every 4 to 6 hours as needed for Pain. (Patient not taking: Reported on 12/18/2020) 20 tablet 0    senna-docusate 8.6-50 mg (SENNA WITH DOCUSATE SODIUM) 8.6-50 mg per tablet Take 1 tablet by mouth once daily. 7 tablet 0    spironolactone (ALDACTONE) 50 MG tablet Take 1 tablet (50 mg total) by mouth once daily. 90 tablet 3    tamsulosin (FLOMAX) 0.4 mg Cap Take 1 capsule (0.4 mg total) by mouth every evening. 90 capsule 3     No current facility-administered medications on file prior to visit.         Review of patient's allergies indicates:   Allergen Reactions    Pcn [penicillins] Rash       OBJECTIVE:     Vital Signs:  BP (!) 115/58 (BP Location: Left arm, Patient Position: Sitting, BP Method: Medium (Automatic))   Pulse 60   Temp 98.2 °F (36.8 °C) (Oral)   Wt 88.6 kg (195 lb 5.2 oz)   SpO2 98%   BMI 27.24 kg/m²   Wt Readings from Last 1 Encounters:   12/18/20 0933 89 kg (196 lb 3.4 oz)     There is no height or weight on file to calculate BMI.        Physical Exam:  BP (!) 115/58 (BP Location: Left arm, Patient  Position: Sitting, BP Method: Medium (Automatic))   Pulse 60   Temp 98.2 °F (36.8 °C) (Oral)   Wt 88.6 kg (195 lb 5.2 oz)   SpO2 98%   BMI 27.24 kg/m²   General appearance: alert, cooperative, no distress  Constitutional:Oriented to person, place, and time  + appears well-developed and well-nourished.   HEENT: Normocephalic, atraumatic, neck symmetrical, no nasal discharge   Eyes: conjunctivae/corneas clear, PERRL, EOM's intact  Lungs: clear to auscultation bilaterally, no dullness to percussion bilaterally  Heart: regular rate and rhythm without rub; no displacement of the PMI   Abdomen: soft, non-tender; bowel sounds normoactive; no organomegaly  Extremities: extremities symmetric; no clubbing, cyanosis, or edema  Integument: Skin color, texture, turgor normal; no rashes; hair distrubution normal  Neurologic: Alert and oriented X 3, normal strength, normal coordination and gait  Psychiatric: no pressured speech; normal affect; no evidence of impaired cognition     Laboratory  Lab Results   Component Value Date    WBC 3.79 (L) 12/07/2020    HGB 13.3 (L) 12/07/2020    HCT 40.2 12/07/2020    MCV 94 12/07/2020     12/07/2020     BMP  Lab Results   Component Value Date     12/07/2020    K 4.2 12/07/2020     12/07/2020    CO2 21 (L) 12/07/2020    BUN 19 12/07/2020    CREATININE 1.3 12/07/2020    CALCIUM 10.0 12/07/2020    ANIONGAP 10 12/07/2020    ESTGFRAFRICA 59 (A) 12/07/2020    EGFRNONAA 51 (A) 12/07/2020     Lab Results   Component Value Date    ALT 10 12/07/2020    AST 13 12/07/2020    ALKPHOS 54 (L) 12/07/2020    BILITOT 1.0 12/07/2020     Lab Results   Component Value Date    INR 1.0 12/07/2020    INR 1.0 12/07/2020    INR 1.0 12/06/2020     Lab Results   Component Value Date    HGBA1C 5.9 (H) 12/06/2020       Diagnostic Results:    MRI 12/7/2020:  1. No acute intracranial findings.  Specifically, no evidence of acute ischemia or recent infarction, as clinically questioned.  2. White  matter signal abnormality suggests sequela of chronic small vessel ischemic disease.  Two small chronic microhemorrhages are noted in the left temporal and left cerebellar region, nonspecific.  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    2 D echo 12/7/2020:  · The left ventricle is normal in size with normal systolic function. The estimated ejection fraction is 60%.  · Grade I diastolic dysfunction.  · With normal right ventricular systolic function.  · Moderate left atrial enlargement.  · Moderate right atrial enlargement.  · Mild tricuspid regurgitation.  · Normal central venous pressure (3 mmHg).  · The estimated PA systolic pressure is 28 mmHg.  · There is no evidence of intracardiac shunting. Negative bubble study    ASSESSMENT & PLAN:     TIA (transient ischemic attack)  - residual R arm weakness present (subjective, not appreciated on todays exam)  - on aspirin, statin  - will see vascular neurology 1/25/21  - continue outpatient PT/OT    Type 2 diabetes mellitus without complication, without long-term current use of insulin  -     Ambulatory referral/consult to Diabetes Education; Future; Expected date: 12/29/2020  -     metFORMIN (GLUCOPHAGE) 500 MG tablet; Take 1 tablet (500 mg total) by mouth 2 (two) times daily with meals.  Dispense: 60 tablet; Refill: 3    Essential hypertension  - at goal on current regimen     Pure hypercholesterolemia  -     atorvastatin (LIPITOR) 40 MG tablet; Take 1 tablet (40 mg total) by mouth once daily.  Dispense: 30 tablet; Refill: 11    [facility].  He will see his PCP, Dr Barker, 2/9/2021.     Instructions for the patient:      Scheduled Follow-up :  Future Appointments   Date Time Provider Department Center   12/29/2020 10:15 AM Raymond Campbell, PTA [facility] [place]   1/4/2021  8:15 AM Raymond Campbell, PTA [facility] [place]   1/12/2021  8:00 AM Raymond Campbell, PTA [facility] [place]   1/21/2021  9:15 AM  Joey Zabala, PT [facility] [place]   1/25/2021  9:40 AM Eddie Silva MD [facility] [address]   2/2/2021  8:25 AM LAB, [facility] [place]   2/9/2021 11:00 AM John Ricci MD [facility] MED [place]   4/21/2021 10:00 AM Anthony Mcdonald MD [facility] CARDIO [place] [facility]       Post Visit Medication List:     Medication List          Accurate as of December 22, 2020  1:19 PM. If you have any questions, ask your nurse or doctor.            START taking these medications    metFORMIN 500 MG tablet  Commonly known as: GLUCOPHAGE  Take 1 tablet (500 mg total) by mouth 2 (two) times daily with meals.  Started by: Portia Hoover MD        CONTINUE taking these medications    aspirin 81 mg Tab     atorvastatin 40 MG tablet  Commonly known as: LIPITOR  Take 1 tablet (40 mg total) by mouth once daily.     chlorhexidine 0.12 % solution  Commonly known as: PERIDEX     dutasteride 0.5 mg capsule  Commonly known as: AVODART  TAKE 1 CAPSULE (0.5 MG TOTAL) BY MOUTH ONCE DAILY.     hydrALAZINE 50 MG tablet  Commonly known as: APRESOLINE     irbesartan 300 MG tablet  Commonly known as: AVAPRO  TAKE 1 TABLET EVERY DAY     metoprolol tartrate 50 MG tablet  Commonly known as: LOPRESSOR  Take 0.5 tablets (25 mg total) by mouth 2 (two) times daily.     NIFEdipine 60 MG (OSM) 24 hr tablet  Commonly known as: PROCARDIA-XL  Take 1 tablet (60 mg total) by mouth 2 (two) times daily.     oxybutynin 10 MG 24 hr tablet  Commonly known as: DITROPAN-XL  Take 1 tablet (10 mg total) by mouth once daily.     oxyCODONE-acetaminophen 5-325 mg per tablet  Commonly known as: PERCOCET  Take 1 tablet by mouth every 4 to 6 hours as needed for Pain.     spironolactone 50 MG tablet  Commonly known as: ALDACTONE  Take 1 tablet (50 mg total) by mouth once daily.     STOOL SOFTENER-STIMULANT LAXAT 8.6-50 mg per tablet  Generic drug: senna-docusate 8.6-50 mg  Take 1 tablet by mouth once daily.     tamsulosin 0.4 mg Cap  Commonly known as:  FLOMAX  Take 1 capsule (0.4 mg total) by mouth every evening.           Where to Get Your Medications      These medications were sent to [facility]/pharmacy #[id number] - [place] - [address] [facility]  [address] [place] [address]    Phone: [phone]   · atorvastatin 40 MG tablet  · metFORMIN 500 MG tablet         Signing Physician:  Portia Hoover MD

## 2020-12-24 ENCOUNTER — HOSPITAL ENCOUNTER (INPATIENT)
Facility: HOSPITAL | Age: 80
LOS: 2 days | Discharge: HOME | DRG: 246 | End: 2020-12-26
Attending: EMERGENCY MEDICINE | Admitting: INTERNAL MEDICINE
Payer: MEDICARE

## 2020-12-24 ENCOUNTER — HOSPITAL ENCOUNTER (OUTPATIENT)
Dept: CARDIOLOGY | Facility: CLINIC | Age: 80
Discharge: HOME | DRG: 246 | End: 2020-12-24
Payer: MEDICARE

## 2020-12-24 DIAGNOSIS — R07.9 CHEST PAIN: ICD-10-CM

## 2020-12-24 DIAGNOSIS — R07.9 CHEST PAIN, UNSPECIFIED TYPE: ICD-10-CM

## 2020-12-24 DIAGNOSIS — I21.3 ST ELEVATION MYOCARDIAL INFARCTION (STEMI), UNSPECIFIED ARTERY: Primary | ICD-10-CM

## 2020-12-24 DIAGNOSIS — R55 SYNCOPE AND COLLAPSE: ICD-10-CM

## 2020-12-24 DIAGNOSIS — R55 SYNCOPE: ICD-10-CM

## 2020-12-24 DIAGNOSIS — Z98.61 CAD S/P PERCUTANEOUS CORONARY ANGIOPLASTY: ICD-10-CM

## 2020-12-24 DIAGNOSIS — I46.9 CARDIAC ARREST: ICD-10-CM

## 2020-12-24 DIAGNOSIS — I21.4 NSTEMI (NON-ST ELEVATED MYOCARDIAL INFARCTION): ICD-10-CM

## 2020-12-24 DIAGNOSIS — I25.10 CAD S/P PERCUTANEOUS CORONARY ANGIOPLASTY: ICD-10-CM

## 2020-12-24 LAB
ALBUMIN SERPL BCP-MCNC: 3.6 G/DL (ref 3.5–5.2)
ALP SERPL-CCNC: 56 U/L (ref 55–135)
ALT SERPL W/O P-5'-P-CCNC: 12 U/L (ref 10–44)
ANION GAP SERPL CALC-SCNC: 9 MMOL/L (ref 8–16)
ANION GAP SERPL CALC-SCNC: 9 MMOL/L (ref 8–16)
AST SERPL-CCNC: 16 U/L (ref 10–40)
BASOPHILS # BLD AUTO: 0.03 K/UL (ref 0–0.2)
BASOPHILS # BLD AUTO: 0.03 K/UL (ref 0–0.2)
BASOPHILS NFR BLD: 0.5 % (ref 0–1.9)
BASOPHILS NFR BLD: 0.5 % (ref 0–1.9)
BILIRUB SERPL-MCNC: 0.9 MG/DL (ref 0.1–1)
BNP SERPL-MCNC: 55 PG/ML (ref 0–99)
BUN SERPL-MCNC: 29 MG/DL (ref 8–23)
BUN SERPL-MCNC: 29 MG/DL (ref 8–23)
CALCIUM SERPL-MCNC: 10.1 MG/DL (ref 8.7–10.5)
CALCIUM SERPL-MCNC: 10.1 MG/DL (ref 8.7–10.5)
CHLORIDE SERPL-SCNC: 104 MMOL/L (ref 95–110)
CHLORIDE SERPL-SCNC: 104 MMOL/L (ref 95–110)
CO2 SERPL-SCNC: 20 MMOL/L (ref 23–29)
CO2 SERPL-SCNC: 20 MMOL/L (ref 23–29)
CREAT SERPL-MCNC: 1.8 MG/DL (ref 0.5–1.4)
CREAT SERPL-MCNC: 1.8 MG/DL (ref 0.5–1.4)
CTP QC/QA: YES
DIFFERENTIAL METHOD: ABNORMAL
DIFFERENTIAL METHOD: ABNORMAL
EOSINOPHIL # BLD AUTO: 0.1 K/UL (ref 0–0.5)
EOSINOPHIL # BLD AUTO: 0.1 K/UL (ref 0–0.5)
EOSINOPHIL NFR BLD: 1.1 % (ref 0–8)
EOSINOPHIL NFR BLD: 1.1 % (ref 0–8)
ERYTHROCYTE [DISTWIDTH] IN BLOOD BY AUTOMATED COUNT: 13.9 % (ref 11.5–14.5)
ERYTHROCYTE [DISTWIDTH] IN BLOOD BY AUTOMATED COUNT: 13.9 % (ref 11.5–14.5)
EST. GFR  (AFRICAN AMERICAN): 39.9 ML/MIN/1.73 M^2
EST. GFR  (AFRICAN AMERICAN): 39.9 ML/MIN/1.73 M^2
EST. GFR  (NON AFRICAN AMERICAN): 34.5 ML/MIN/1.73 M^2
EST. GFR  (NON AFRICAN AMERICAN): 34.5 ML/MIN/1.73 M^2
GLUCOSE SERPL-MCNC: 102 MG/DL (ref 70–110)
GLUCOSE SERPL-MCNC: 108 MG/DL (ref 70–110)
GLUCOSE SERPL-MCNC: 108 MG/DL (ref 70–110)
HCT VFR BLD AUTO: 36.9 % (ref 40–54)
HCT VFR BLD AUTO: 36.9 % (ref 40–54)
HGB BLD-MCNC: 11.8 G/DL (ref 14–18)
HGB BLD-MCNC: 11.8 G/DL (ref 14–18)
IMM GRANULOCYTES # BLD AUTO: 0.02 K/UL (ref 0–0.04)
IMM GRANULOCYTES # BLD AUTO: 0.02 K/UL (ref 0–0.04)
IMM GRANULOCYTES NFR BLD AUTO: 0.4 % (ref 0–0.5)
IMM GRANULOCYTES NFR BLD AUTO: 0.4 % (ref 0–0.5)
LACTATE SERPL-SCNC: 2.6 MMOL/L (ref 0.5–2.2)
LYMPHOCYTES # BLD AUTO: 2.3 K/UL (ref 1–4.8)
LYMPHOCYTES # BLD AUTO: 2.3 K/UL (ref 1–4.8)
LYMPHOCYTES NFR BLD: 40.7 % (ref 18–48)
LYMPHOCYTES NFR BLD: 40.7 % (ref 18–48)
MCH RBC QN AUTO: 30.3 PG (ref 27–31)
MCH RBC QN AUTO: 30.3 PG (ref 27–31)
MCHC RBC AUTO-ENTMCNC: 32 G/DL (ref 32–36)
MCHC RBC AUTO-ENTMCNC: 32 G/DL (ref 32–36)
MCV RBC AUTO: 95 FL (ref 82–98)
MCV RBC AUTO: 95 FL (ref 82–98)
MONOCYTES # BLD AUTO: 0.8 K/UL (ref 0.3–1)
MONOCYTES # BLD AUTO: 0.8 K/UL (ref 0.3–1)
MONOCYTES NFR BLD: 14.6 % (ref 4–15)
MONOCYTES NFR BLD: 14.6 % (ref 4–15)
NEUTROPHILS # BLD AUTO: 2.4 K/UL (ref 1.8–7.7)
NEUTROPHILS # BLD AUTO: 2.4 K/UL (ref 1.8–7.7)
NEUTROPHILS NFR BLD: 42.7 % (ref 38–73)
NEUTROPHILS NFR BLD: 42.7 % (ref 38–73)
NRBC BLD-RTO: 0 /100 WBC
NRBC BLD-RTO: 0 /100 WBC
PLATELET # BLD AUTO: 256 K/UL (ref 150–350)
PLATELET # BLD AUTO: 256 K/UL (ref 150–350)
PMV BLD AUTO: 9.8 FL (ref 9.2–12.9)
PMV BLD AUTO: 9.8 FL (ref 9.2–12.9)
POCT GLUCOSE: 109 MG/DL (ref 70–110)
POCT GLUCOSE: 109 MG/DL (ref 70–110)
POCT GLUCOSE: 114 MG/DL (ref 70–110)
POCT GLUCOSE: 95 MG/DL (ref 70–110)
POTASSIUM SERPL-SCNC: 4.8 MMOL/L (ref 3.5–5.1)
POTASSIUM SERPL-SCNC: 4.8 MMOL/L (ref 3.5–5.1)
PROT SERPL-MCNC: 6.4 G/DL (ref 6–8.4)
RBC # BLD AUTO: 3.89 M/UL (ref 4.6–6.2)
RBC # BLD AUTO: 3.89 M/UL (ref 4.6–6.2)
SARS-COV-2 RDRP RESP QL NAA+PROBE: NEGATIVE
SODIUM SERPL-SCNC: 133 MMOL/L (ref 136–145)
SODIUM SERPL-SCNC: 133 MMOL/L (ref 136–145)
TROPONIN I SERPL DL<=0.01 NG/ML-MCNC: 0.01 NG/ML (ref 0–0.03)
TROPONIN I SERPL DL<=0.01 NG/ML-MCNC: 0.01 NG/ML (ref 0–0.03)
TROPONIN I SERPL DL<=0.01 NG/ML-MCNC: 0.03 NG/ML (ref 0–0.03)
WBC # BLD AUTO: 5.7 K/UL (ref 3.9–12.7)
WBC # BLD AUTO: 5.7 K/UL (ref 3.9–12.7)

## 2020-12-24 PROCEDURE — 83880 ASSAY OF NATRIURETIC PEPTIDE: CPT

## 2020-12-24 PROCEDURE — 25000003 PHARM REV CODE 250: Performed by: STUDENT IN AN ORGANIZED HEALTH CARE EDUCATION/TRAINING PROGRAM

## 2020-12-24 PROCEDURE — 82962 GLUCOSE BLOOD TEST: CPT

## 2020-12-24 PROCEDURE — 84484 ASSAY OF TROPONIN QUANT: CPT

## 2020-12-24 PROCEDURE — 94761 N-INVAS EAR/PLS OXIMETRY MLT: CPT

## 2020-12-24 PROCEDURE — 85025 COMPLETE CBC W/AUTO DIFF WBC: CPT

## 2020-12-24 PROCEDURE — 99223 PR INITIAL HOSPITAL CARE,LEVL III: ICD-10-PCS | Mod: AI,,, | Performed by: INTERNAL MEDICINE

## 2020-12-24 PROCEDURE — 93010 EKG 12-LEAD: ICD-10-PCS | Mod: ,,, | Performed by: INTERNAL MEDICINE

## 2020-12-24 PROCEDURE — 93010 ELECTROCARDIOGRAM REPORT: CPT | Mod: S$GLB,,, | Performed by: INTERNAL MEDICINE

## 2020-12-24 PROCEDURE — 84484 ASSAY OF TROPONIN QUANT: CPT | Mod: 91

## 2020-12-24 PROCEDURE — 93005 ELECTROCARDIOGRAM TRACING: CPT | Mod: S$GLB,,, | Performed by: INTERNAL MEDICINE

## 2020-12-24 PROCEDURE — 63600175 PHARM REV CODE 636 W HCPCS: Performed by: STUDENT IN AN ORGANIZED HEALTH CARE EDUCATION/TRAINING PROGRAM

## 2020-12-24 PROCEDURE — 92950 HEART/LUNG RESUSCITATION CPR: CPT

## 2020-12-24 PROCEDURE — 99291 PR CRITICAL CARE, E/M 30-74 MINUTES: ICD-10-PCS | Mod: ,,, | Performed by: EMERGENCY MEDICINE

## 2020-12-24 PROCEDURE — 20600001 HC STEP DOWN PRIVATE ROOM

## 2020-12-24 PROCEDURE — 83605 ASSAY OF LACTIC ACID: CPT

## 2020-12-24 PROCEDURE — 99900035 HC TECH TIME PER 15 MIN (STAT)

## 2020-12-24 PROCEDURE — U0002 COVID-19 LAB TEST NON-CDC: HCPCS | Performed by: EMERGENCY MEDICINE

## 2020-12-24 PROCEDURE — 93005 ELECTROCARDIOGRAM TRACING: CPT

## 2020-12-24 PROCEDURE — 93010 EKG 12-LEAD: ICD-10-PCS | Mod: S$GLB,,, | Performed by: INTERNAL MEDICINE

## 2020-12-24 PROCEDURE — 99291 CRITICAL CARE FIRST HOUR: CPT

## 2020-12-24 PROCEDURE — 99223 1ST HOSP IP/OBS HIGH 75: CPT | Mod: AI,,, | Performed by: INTERNAL MEDICINE

## 2020-12-24 PROCEDURE — 93005 EKG 12-LEAD: ICD-10-PCS | Mod: S$GLB,,, | Performed by: INTERNAL MEDICINE

## 2020-12-24 PROCEDURE — 99291 CRITICAL CARE FIRST HOUR: CPT | Mod: ,,, | Performed by: EMERGENCY MEDICINE

## 2020-12-24 PROCEDURE — 93010 ELECTROCARDIOGRAM REPORT: CPT | Mod: ,,, | Performed by: INTERNAL MEDICINE

## 2020-12-24 PROCEDURE — 80053 COMPREHEN METABOLIC PANEL: CPT

## 2020-12-24 RX ORDER — HEPARIN SODIUM 5000 [USP'U]/ML
5000 INJECTION, SOLUTION INTRAVENOUS; SUBCUTANEOUS EVERY 8 HOURS
Status: DISCONTINUED | OUTPATIENT
Start: 2020-12-24 | End: 2020-12-25

## 2020-12-24 RX ORDER — CLOPIDOGREL 300 MG/1
600 TABLET, FILM COATED ORAL ONCE
Status: COMPLETED | OUTPATIENT
Start: 2020-12-24 | End: 2020-12-24

## 2020-12-24 RX ORDER — IBUPROFEN 200 MG
24 TABLET ORAL
Status: DISCONTINUED | OUTPATIENT
Start: 2020-12-24 | End: 2020-12-26 | Stop reason: HOSPADM

## 2020-12-24 RX ORDER — ACETAMINOPHEN 325 MG/1
650 TABLET ORAL EVERY 4 HOURS PRN
Status: DISCONTINUED | OUTPATIENT
Start: 2020-12-24 | End: 2020-12-26 | Stop reason: HOSPADM

## 2020-12-24 RX ORDER — NAPROXEN SODIUM 220 MG/1
81 TABLET, FILM COATED ORAL DAILY
Status: DISCONTINUED | OUTPATIENT
Start: 2020-12-25 | End: 2020-12-24 | Stop reason: SDUPTHER

## 2020-12-24 RX ORDER — ASPIRIN 325 MG
325 TABLET ORAL DAILY
Status: DISCONTINUED | OUTPATIENT
Start: 2020-12-24 | End: 2020-12-25

## 2020-12-24 RX ORDER — INSULIN ASPART 100 [IU]/ML
1-10 INJECTION, SOLUTION INTRAVENOUS; SUBCUTANEOUS
Status: DISCONTINUED | OUTPATIENT
Start: 2020-12-24 | End: 2020-12-26 | Stop reason: HOSPADM

## 2020-12-24 RX ORDER — IRBESARTAN 150 MG/1
150 TABLET ORAL DAILY
Status: DISCONTINUED | OUTPATIENT
Start: 2020-12-24 | End: 2020-12-24

## 2020-12-24 RX ORDER — IBUPROFEN 200 MG
16 TABLET ORAL
Status: DISCONTINUED | OUTPATIENT
Start: 2020-12-24 | End: 2020-12-26 | Stop reason: HOSPADM

## 2020-12-24 RX ORDER — SODIUM CHLORIDE 9 MG/ML
INJECTION, SOLUTION INTRAVENOUS CONTINUOUS
Status: CANCELLED | OUTPATIENT
Start: 2020-12-24 | End: 2020-12-24

## 2020-12-24 RX ORDER — GLUCAGON 1 MG
1 KIT INJECTION
Status: DISCONTINUED | OUTPATIENT
Start: 2020-12-24 | End: 2020-12-26 | Stop reason: HOSPADM

## 2020-12-24 RX ORDER — TAMSULOSIN HYDROCHLORIDE 0.4 MG/1
0.4 CAPSULE ORAL NIGHTLY
Status: DISCONTINUED | OUTPATIENT
Start: 2020-12-24 | End: 2020-12-26 | Stop reason: HOSPADM

## 2020-12-24 RX ORDER — DIPHENHYDRAMINE HCL 50 MG
50 CAPSULE ORAL ONCE
Status: CANCELLED | OUTPATIENT
Start: 2020-12-24 | End: 2020-12-24

## 2020-12-24 RX ORDER — TALC
6 POWDER (GRAM) TOPICAL NIGHTLY PRN
Status: DISCONTINUED | OUTPATIENT
Start: 2020-12-24 | End: 2020-12-26 | Stop reason: HOSPADM

## 2020-12-24 RX ORDER — SODIUM CHLORIDE 0.9 % (FLUSH) 0.9 %
10 SYRINGE (ML) INJECTION
Status: DISCONTINUED | OUTPATIENT
Start: 2020-12-24 | End: 2020-12-26 | Stop reason: HOSPADM

## 2020-12-24 RX ORDER — ONDANSETRON 8 MG/1
8 TABLET, ORALLY DISINTEGRATING ORAL EVERY 8 HOURS PRN
Status: DISCONTINUED | OUTPATIENT
Start: 2020-12-24 | End: 2020-12-26 | Stop reason: HOSPADM

## 2020-12-24 RX ORDER — IRBESARTAN 75 MG/1
150 TABLET ORAL DAILY
Status: DISCONTINUED | OUTPATIENT
Start: 2020-12-25 | End: 2020-12-25

## 2020-12-24 RX ORDER — ATORVASTATIN CALCIUM 20 MG/1
40 TABLET, FILM COATED ORAL DAILY
Status: DISCONTINUED | OUTPATIENT
Start: 2020-12-24 | End: 2020-12-26 | Stop reason: HOSPADM

## 2020-12-24 RX ADMIN — HEPARIN SODIUM 5000 UNITS: 5000 INJECTION INTRAVENOUS; SUBCUTANEOUS at 04:12

## 2020-12-24 RX ADMIN — HEPARIN SODIUM 5000 UNITS: 5000 INJECTION INTRAVENOUS; SUBCUTANEOUS at 10:12

## 2020-12-24 RX ADMIN — ATORVASTATIN CALCIUM 40 MG: 20 TABLET, FILM COATED ORAL at 04:12

## 2020-12-24 RX ADMIN — TAMSULOSIN HYDROCHLORIDE 0.4 MG: 0.4 CAPSULE ORAL at 08:12

## 2020-12-24 RX ADMIN — CLOPIDOGREL BISULFATE 600 MG: 300 TABLET, FILM COATED ORAL at 12:12

## 2020-12-24 NOTE — Clinical Note
The catheter is repositioned ostium   right coronary artery. Angiography performed of the right coronary arteries. Multiple views were taken.

## 2020-12-24 NOTE — ED PROVIDER NOTES
Encounter Date: 12/24/2020       History   No chief complaint on file.    History obtained from the rapid response team, the wife, and the patient.    The patient is an 80-year-old male who presents to the emergency department with the rapid response team after a code was called in the hospital.  He was accompanying his wife in clinic when he states that he became very weak.  He kept laying his head back.  At some point, bystanders helped him to the ground.  He sustained no trauma.  According to the rapid response team, medical personnel with the patient determine that he was pulseless and apneic.  They began CPR.  By the time the rapid response team had arrived, the patient had achieved return of spontaneous circulation.  The patient is mentating clearly now.  He denies any chest pain but states that he has shortness of breath and generalized weakness.  He denies any other complaints.  He denies previous episodes.        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     Past Surgical History:   Procedure Laterality Date    COLONOSCOPY N/A 4/9/2018    Procedure: COLONOSCOPY;  Surgeon: HAIM Mir MD;  Location: [address]);  Service: Endoscopy;  Laterality: N/A;    COLONOSCOPY N/A 1/14/2019    Procedure: COLONOSCOPY;  Surgeon: HAIM Mir MD;  Location: [address]);  Service: Endoscopy;  Laterality: N/A;  PM prep    CYST REMOVAL  2000    removed from left kidney    KIDNEY SURGERY      PROSTATE SURGERY       Family History   Problem Relation Age of Onset    Cancer Mother          unkn ca    Diabetes Mother     Glaucoma Mother     Heart disease Mother     Hypertension Mother     Blindness Neg Hx     Macular degeneration Neg Hx     Retinal detachment Neg Hx      Social History     Tobacco Use    Smoking status: Never Smoker    Smokeless tobacco: Never Used   Substance Use Topics    Alcohol use: No    Drug use: No     Review of Systems  I am unable to obtain a review of systems secondary to the acuity of the patient's medical illness.  My limited review of systems as below:    General: Reports generalized weakness.  Eyes: Denies visual changes.    Cardiovascular: Denies chest pain.  Reports shortness of breath.    Respiratory: Denies shortness of breath.  Denies wheezing.  Denies coughing.  GI: Denies abdominal pain.  Denies nausea.  Denies vomiting.  Skin:  Denies diaphoresis.  Neuro:  Reports dizziness.  Musculoskeletal: Denies neck pain.  Denies back pain.  Denies extremity pain.        Physical Exam     Initial Vitals   BP Pulse Resp Temp SpO2   12/24/20 1205 12/24/20 1152 12/24/20 1152 12/24/20 1205 12/24/20 1152   136/80 (!) 51 17 98.5 °F (36.9 °C) 99 %      MAP       --                Physical Exam  General:  The patient appears weak.  Well-nourished.  Well-developed.  Alert and oriented x3.  HENT: Moist mucous membranes.  Normocephalic atraumatic.    Eyes: Pupils equally round and reactive to light.  Extraocular movements intact.  No scleral icterus.  No conjunctival pallor.  Cardiovascular:  Regular rhythm.  Bradycardia noted.  No murmurs, rubs, or gallops.  Brisk capillary fill.  2+ distal pulses.  Respiratory: Clear to auscultation bilaterally.  No wheezes, rales, or rhonchi.  No respiratory distress.  Abdomen: Soft.  Nontender.  Nondistended.  No guarding.  No rebound.  No masses.  No abdominal bruit auscultated.  Skin:  No diaphoresis noted.  Neuro: Cranial nerves II through XII grossly intact.  Moving all extremities equally.  No sensory deficits.  Strength 5 out of 5  in all 4 extremities.  Musculoskeletal: Neck supple.  No extremity tenderness.  Moving all extremities without pain.  Negative Homans sign.  No palpable cord.  No calf tenderness.      ED Course   Critical Care    Date/Time: 12/24/2020 1:51 PM  Performed by: George Olivarez MD  Authorized by: George Olivarez MD   Direct patient critical care time: 12 minutes  Additional history critical care time: 7 minutes  Ordering / reviewing critical care time: 10 minutes  Documentation critical care time: 8 minutes  Consulting other physicians critical care time: 9 minutes  Total critical care time (exclusive of procedural time) : 46 minutes        Labs Reviewed   CBC W/ AUTO DIFFERENTIAL - Abnormal; Notable for the following components:       Result Value    RBC 3.89 (*)     Hemoglobin 11.8 (*)     Hematocrit 36.9 (*)     All other components within normal limits   COMPREHENSIVE METABOLIC PANEL - Abnormal; Notable for the following components:    Sodium 133 (*)     CO2 20 (*)     BUN 29 (*)     Creatinine 1.8 (*)     eGFR if  39.9 (*)     eGFR if non  34.5 (*)     All other components within normal limits   BASIC METABOLIC PANEL - Abnormal; Notable for the following components:    Sodium 133 (*)     CO2 20 (*)     BUN 29 (*)     Creatinine 1.8 (*)     eGFR if  39.9 (*)     eGFR if non  34.5 (*)     All other components within normal limits   CBC W/ AUTO DIFFERENTIAL - Abnormal; Notable for the following components:    RBC 3.89 (*)     Hemoglobin 11.8 (*)     Hematocrit 36.9 (*)     All other components within normal limits   TROPONIN I   B-TYPE NATRIURETIC PEPTIDE   SARS-COV-2 RDRP GENE    Narrative:     This test utilizes isothermal nucleic acid amplification   technology to detect the SARS-CoV-2 RdRp nucleic acid segment.   The analytical sensitivity (limit of detection) is 125 genome   equivalents/mL.   A POSITIVE result implies infection with the SARS-CoV-2  virus;   the patient is presumed to be contagious.     A NEGATIVE result means that SARS-CoV-2 nucleic acids are not   present above the limit of detection. A NEGATIVE result should be   treated as presumptive. It does not rule out the possibility of   COVID-19 and should not be the sole basis for treatment decisions.   If COVID-19 is strongly suspected based on clinical and exposure   history, re-testing using an alternate molecular assay should be   considered.   This test is only for use under the Food and Drug   Administration s Emergency Use Authorization (EUA).   Commercial kits are provided by [employer].   Performance characteristics of the EUA have been independently   verified by [facility] Department of   Pathology and Laboratory Medicine.   _________________________________________________________________   The authorized Fact Sheet for Healthcare Providers and the authorized Fact   Sheet for Patients of the ID NOW COVID-19 are available on the FDA   website:     https://www.fda.gov/media/054974/download  https://www.fda.gov/media/996260/download       SARS-COV-2 RDRP GENE   POCT GLUCOSE   POCT GLUCOSE MONITORING CONTINUOUS     EKG Readings: (Independently Interpreted)   EKG 1:  I interpreted this EKG myself.  Sinus bradycardia with a rate of 51.  Left axis deviation.  Normal intervals.  ST elevations are noted in the lateral, septal, and anterior leads.  There are ST depressions with T-wave inversions in the inferior leads consistent with reciprocal changes.    EKG 2:  I interpreted this EKG myself.  Sinus bradycardia with a rate of 51.  Left axis deviation.  Normal intervals.  ST elevations are noted in the lateral, septal, and anterior leads.  These findings are more consistent with LVH than STEMI.  Reciprocal changes that were seen on the 1st EKG have normalized.     ECG Results          EKG 12-lead (Final result)  Result time 12/24/20 12:40:17    Final result by Interface, Lab In  [facility] (12/24/20 12:40:17)                 Narrative:    Test Reason : R07.9,    Vent. Rate : 051 BPM     Atrial Rate : 051 BPM     P-R Int : 210 ms          QRS Dur : 104 ms      QT Int : 420 ms       P-R-T Axes : 194 -42 004 degrees     QTc Int : 387 ms    Unusual P axis, possible ectopic atrial bradycardia  Left axis deviation  LVH with QRS widening and repolarization abnormality  ST elevation, consider early repolarization, pericarditis, or injury  Inferior infarct ,age undetermined  Abnormal ECG  When compared with ECG of 24-DEC-2020 11:56,  T wave inversions no longer present in the inferior leads  Criteria for inferior infarct now present  Confirmed by Taylor Jones MD ([id number]) on 12/24/2020 12:40:05 PM    Referred By: System System           Confirmed By:Taylor Jones MD                            Imaging Results          CT Head Without Contrast (In process)                  Medical Decision Making:   Initial Assessment:   This is an emergent evaluation of a critically ill patient.  The patient sustained cardiac arrest while accompanying his wife in clinic just prior to arrival in the emergency department.  EKG indicates ST segment elevation myocardial infarction.  Cardiology will be consulted emergently.  This patient will require cardiac catheterization emergently.  I have significant concern for the patient's clinical status.  The patient received a full aspirin prior to arrival in the emergency department.  Plavix has been ordered by Cardiology.  I have ordered laboratory studies and a COVID test.  ED Management:  12:06 p.m.  Cardiology is already at the bedside evaluating the patient.    12:21 p.m.  The patient remains chest pain-free.  Heart rate remains in the 50s.  Blood pressure is within normal limits.  Oxygen saturation is 100%.    12:24 p.m.  Cardiology states that they are not convinced that the patient is having a STEMI.  They are going to perform an emergent bedside echo.  The patient is  currently chest pain-free.    1:50 p.m.  The patient is resting comfortably.  He is able to ambulate without issues at this time.  Cardiology has come and reassess the patient.  They will admit the patient to their service.  A CT scan of the head has been ordered.                             Clinical Impression:       ICD-10-CM ICD-9-CM   1. ST elevation myocardial infarction (STEMI), unspecified artery  I21.3 410.90   2. Cardiac arrest  I46.9 427.5   3. Chest pain  R07.9 786.50   4. Syncope and collapse  R55 780.2   5. Syncope  R55 780.2                      Disposition:   Disposition: Admitted  Condition: Serious     ED Disposition Condition    Admit                             George Olivarez MD  12/24/20 [id number]

## 2020-12-24 NOTE — ASSESSMENT & PLAN NOTE
Not a STEMI-no indication to take patient to cath lab urgently as patient has never had any chest pain, transient elevation in ST segments that resolved with normal EF and no WMA  Recommend admission to hospital for syncope workup  Ok to trend troponin-if significant elevation can consider treatment for NSTEMI  Recommend Full echo with CFD  Consider 30 day event monitor to rule out arrythmia    Case discussed with Dr John

## 2020-12-24 NOTE — Clinical Note
85 ml injected throughout the case. 115 mL total wasted during the case. 200 mL total used in the case.

## 2020-12-24 NOTE — HPI
79 YO M w HTN, CVA, DLD, DM2, CKD3 who was taken to the ED after having a cardiac arrest whilst accompanying his wife in the echo lab.     The patient had a witnessed cardiac arrest around noon, and received CPR x 1 min prior to achieving ROSC. His EKG was concerning for lateral STEMI but he was chest pain free, and denied any symptoms. He sustained no trauma. He was taken from the echo lab area to the ED, where a subsequent EKG evidenced ectopic atrial rhythm at a rate of 50 bpm w ST abnormalities expected for LVH. He is feeling well and at least up to the moment of our encounter remained asymptomatic. He seems to be neurologically intact. HR 50, SBP 130s. Bedside TTE showed normal LVEF without WMA. His labs showed KOURTNEY w creatinine of 1.89 (from 1.3).

## 2020-12-24 NOTE — CODE/ RAPID DOCUMENTATION
Rapid Response Respiratory Therapy Code Blue Note      Code Status: Prior   : 1940  Bed: ED :   MRN: [id number]  Time page Received: 1152  Time Rapid Response RT at Bedside: 1155  Time Rapid Response RT left Bedside: 1210  Report given to: Milton MATA     Evaluated patient for: Code Blue paged     BACKGROUND     Patient has a past medical history of BPH (benign prostatic hypertrophy), Cataract, Colon polyp, Diabetes mellitus, Diabetes mellitus type II, Glaucoma (increased eye pressure), Gout, joint, Hyperlipidemia, Hypertension, Macular degeneration, Obesity, and Retinal pigment epithelial detachment.    ASSESSMENT     Was the patient on NIPPV prior to code?: No  Does the patient have a surgical airway: No  Was the patient intubated? NO  ETCO2 monitored:    Ambu at bedside:      INTERVENTIONS/RECOMMENDATIONS     Upon arrival, pt was laying flat on the ground. Pt was spont breathing and moving head. Clinic nurses were starting an IV and getting an EKG. Rapid RT and nurses arrived to take over and rush pt to ED to be admitted and evaluated. Pt being transferred ED was prolonged with clinic attempting EKG and trying to get labs drawn. Pt finally placed on stretcher and rushed to ED on NRB by RRS- RNs and RRS-RT.     Pt was responsive and alert with no complaints of pain or SOB while being transported.     Please see Code Blue Documentation for more details.    OUTCOME     ROSC obtained at prior to RRS arrival.     Disposition: Tx to ER bed 03.    FOLLOW-UP     Please call back the Rapid Response RT, Nat Rizvi, RRT at x [phone] for any questions or concerns.

## 2020-12-24 NOTE — H&P
[facility]  Cardiology  History and Physical     Patient Name: Pedro Ponce Jr.  MRN: [id number]  Admission Date: 12/24/2020  Code Status: Full Code   Attending Provider: George Olivarez MD   Primary Care Physician: John Ricci MD  Principal Problem:<principal problem not specified>    Patient information was obtained from patient, spouse/SO and ER records.     Subjective:     Chief Complaint:  Cardiac arrest     HPI:  79 YO M w HTN, CVA, DLD, DM2, CKD3 who was taken to the ED after having a cardiac arrest whilst accompanying his wife in the echo lab.     The patient had a witnessed cardiac arrest around noon, and received CPR x 1 min prior to achieving ROSC. His EKG was concerning for lateral STEMI but he was chest pain free, and denied any symptoms. He sustained no trauma. He was taken from the echo lab area to the ED, where a subsequent EKG evidenced ectopic atrial rhythm at a rate of 50 bpm w ST abnormalities expected for LVH. He is feeling well and at least up to the moment of our encounter remained asymptomatic. He seems to be neurologically intact. HR 50, SBP 130s. Bedside TTE showed normal LVEF without WMA. His labs showed KOURTNEY w creatinine of 1.89 (from 1.3).     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       Past Surgical History:   Procedure Laterality Date    COLONOSCOPY N/A 4/9/2018    Procedure: COLONOSCOPY;  Surgeon: HAIM Mir MD;  Location: [address]);  Service: Endoscopy;  Laterality: N/A;    COLONOSCOPY N/A 1/14/2019    Procedure: COLONOSCOPY;  Surgeon: HAIM Mir MD;  Location: [facility] ([address] [place] [address]);  Service: Endoscopy;   Laterality: N/A;  PM prep    CYST REMOVAL  2000    removed from left kidney    KIDNEY SURGERY      PROSTATE SURGERY         Review of patient's allergies indicates:   Allergen Reactions    Pcn [penicillins] Rash       No current facility-administered medications on file prior to encounter.      Current Outpatient Medications on File Prior to Encounter   Medication Sig    aspirin 81 mg Tab Take 81 mg by mouth. 1 Tablet Oral Every evening    atorvastatin (LIPITOR) 40 MG tablet Take 1 tablet (40 mg total) by mouth once daily.    chlorhexidine (PERIDEX) 0.12 % solution RINSE 1/2 OZ TWICE DAILY AFTER BREAKFAST AND BEFORE BEDTIME    dutasteride (AVODART) 0.5 mg capsule TAKE 1 CAPSULE (0.5 MG TOTAL) BY MOUTH ONCE DAILY.    hydrALAZINE (APRESOLINE) 50 MG tablet Take 100 mg by mouth 3 (three) times daily. Per Dr. Mcdonald     irbesartan (AVAPRO) 300 MG tablet TAKE 1 TABLET EVERY DAY    metFORMIN (GLUCOPHAGE) 500 MG tablet Take 1 tablet (500 mg total) by mouth 2 (two) times daily with meals.    metoprolol tartrate (LOPRESSOR) 50 MG tablet Take 0.5 tablets (25 mg total) by mouth 2 (two) times daily.    NIFEdipine (PROCARDIA-XL) 60 MG (OSM) 24 hr tablet Take 1 tablet (60 mg total) by mouth 2 (two) times daily.    oxybutynin (DITROPAN-XL) 10 MG 24 hr tablet Take 1 tablet (10 mg total) by mouth once daily.    oxyCODONE-acetaminophen (PERCOCET) 5-325 mg per tablet Take 1 tablet by mouth every 4 to 6 hours as needed for Pain. (Patient not taking: Reported on 12/18/2020)    senna-docusate 8.6-50 mg (SENNA WITH DOCUSATE SODIUM) 8.6-50 mg per tablet Take 1 tablet by mouth once daily.    spironolactone (ALDACTONE) 50 MG tablet Take 1 tablet (50 mg total) by mouth once daily.    tamsulosin (FLOMAX) 0.4 mg Cap Take 1 capsule (0.4 mg total) by mouth every evening.     Family History     Problem Relation (Age of Onset)    Cancer Mother    Diabetes Mother    Glaucoma Mother    Heart disease Mother    Hypertension Mother         Tobacco Use    Smoking status: Never Smoker    Smokeless tobacco: Never Used   Substance and Sexual Activity    Alcohol use: No    Drug use: No    Sexual activity: Not Currently     Partners: Female     Review of Systems   Unable to perform ROS: acuity of condition     Objective:     Vital Signs (Most Recent):  Temp: 98.5 °F (36.9 °C) (12/24/20 1205)  Pulse: (!) 55 (12/24/20 1205)  Resp: 16 (12/24/20 1205)  BP: 136/80 (12/24/20 1205)  SpO2: 100 % (12/24/20 1205) Vital Signs (24h Range):  Temp:  [98.5 °F (36.9 °C)] 98.5 °F (36.9 °C)  Pulse:  [50-55] 55  Resp:  [16-17] 16  SpO2:  [99 %-100 %] 100 %  BP: (136)/(80) 136/80     Weight: 88.5 kg (195 lb)  Body mass index is 27.2 kg/m².    SpO2: 100 %  O2 Device (Oxygen Therapy): room air    No intake or output data in the 24 hours ending 12/24/20 1400    Lines/Drains/Airways     Peripheral Intravenous Line                 Peripheral IV - Single Lumen 12/24/20 1219 18 G Right Antecubital less than 1 day         Peripheral IV - Single Lumen 12/24/20 20 G Left Antecubital less than 1 day                Physical Exam   Constitutional: He is oriented to person, place, and time. He appears well-developed and well-nourished.   HENT:   Head: Normocephalic and atraumatic.   Nose: Nose normal.   Mouth/Throat: No oropharyngeal exudate.   Eyes: Right eye exhibits no discharge. Left eye exhibits no discharge. No scleral icterus.   Neck: Normal range of motion. Neck supple. No JVD present.   Cardiovascular: Normal rate, regular rhythm, S1 normal and S2 normal. Exam reveals no gallop, no S3, no S4, no distant heart sounds, no friction rub, no midsystolic click and no opening snap.   No murmur heard.  Pulses:       Radial pulses are 2+ on the right side and 2+ on the left side.        Femoral pulses are 2+ on the right side and 2+ on the left side.  Pulmonary/Chest: Effort normal and breath sounds normal. No respiratory distress. He has no wheezes. He has no rales. He exhibits no  tenderness.   Abdominal: Soft. Bowel sounds are normal. He exhibits no distension. There is no abdominal tenderness. There is no rebound.   Musculoskeletal: Normal range of motion.         General: No tenderness, deformity or edema.   Lymphadenopathy:     He has no cervical adenopathy.   Neurological: He is alert and oriented to person, place, and time. No cranial nerve deficit.   Skin: Skin is warm and dry.   Psychiatric: He has a normal mood and affect. His behavior is normal.       Significant Labs: All pertinent lab results from the last 24 hours have been reviewed.    Significant Imaging: All pertinent images from the last 24h have been reviewed.      Assessment and Plan:     Cardiac arrest  Not a STEMI-no indication to take patient to cath lab urgently as patient has never had any chest pain, transient elevation in ST segments that resolved with normal EF and no WMA - Discussed w Dr John  Admit to cardiovascular step down for work up of cardiac arrest  Ok to trend troponin-if significant elevation can consider treatment for NSTEMI  F/U TTE  OP 30-day event monitor vs. Holter  -F/U renal function - suspect KOURTNEY may be related to cardiac arrest           VTE Risk Mitigation (From admission, onward)         Ordered     heparin (porcine) injection 5,000 Units  Every 8 hours      12/24/20 1357     IP VTE HIGH RISK PATIENT  Once      12/24/20 1357     Place sequential compression device  Until discontinued      12/24/20 1357                Marquis Jesus MD  Cardiology   [facility]  I have personally taken the history and examined the patient and agree with the resident's note as stated above.  Will need cath if troponin increases -if not 48 hour Holter, IV fluids, reduce BP meds and out patient stress next week.

## 2020-12-24 NOTE — ED NOTES
Patient placed on cardiac leads, cath lab leads, and cath lab pads.  Patient hooked to zoll and ready for transfer

## 2020-12-24 NOTE — PLAN OF CARE
Patient arrived to the unit. Telemetry box applied and vital signs documented in flow sheet. 18G R AC and 20 L AC maintained. Identification band, fall risk, and allergy band on patient. Oriented to room, call bell with in reach, bed is in low lock position. Educated patient on symptoms of hypoglycemia and to call if he experiences any. Admit completed, plan of care initiated. Explained to patient that we will be monitoring vital signs, blood sugar, labs, and heart rhythm for any abnormalities.     Pt Aox4, stand by assist to assure he is not weak. AC/HS blood sugar monitoring with sliding scale insulin. VSS, no complaints of SOB on RA. Diabetic diet. NSR on monitor. No complaints of chest pain. Patient has dentures in place, gave him denture cup with patient label on it. Explained to patient not to leave dentures on meal trays. Plan of care discussed with patient and patient's wife, questions encouraged.

## 2020-12-24 NOTE — HPI
80 year old male with history of  HTN, DM-2, Hyperlipidemia,recent TIA who presents to the emergency department with the rapid response team after a code was called in the hospital.  He was accompanying his wife in clinic when he states that he became very weak and kept laying his head back.  At some point, bystanders helped him to the ground.  He sustained no trauma.  According to the rapid response team, medical personnel with the patient determined that patient was pulseless and apneic and started CPR.  By the time the rapid response team had arrived, the patient had achieved ROSC and mentating clearly.  Code STEMI was called due to ST elevation in lateral leads on EKG immediately after Code. Patient denied ever having any chest pain and denies any complaints now. He reports weakness. Repeat EKG showed LVH with repolarization changes and ectopic p waves, bedside echo showed normal EF with no wall motion abnormalities.    This morning 12/25 he began having 5/10 chest pain dull for 10 minutes responsive to  2 x nitroglycerine and EKG with NSR and PACs. Bedside TTE unchanged normal EF 60%. Troponin to 0.034 yesterday and 0.014 this morning.

## 2020-12-24 NOTE — SUBJECTIVE & OBJECTIVE
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       Past Surgical History:   Procedure Laterality Date    COLONOSCOPY N/A 4/9/2018    Procedure: COLONOSCOPY;  Surgeon: HAIM Mir MD;  [facility]  Service: Endoscopy;  Laterality: N/A;    COLONOSCOPY N/A 1/14/2019    Procedure: COLONOSCOPY;  Surgeon: HAIM Mir MD;  [facility]  Service: Endoscopy;  Laterality: N/A;  PM prep    CYST REMOVAL  2000    removed from left kidney    KIDNEY SURGERY      PROSTATE SURGERY         Review of patient's allergies indicates:   Allergen Reactions    Pcn [penicillins] Rash       No current facility-administered medications on file prior to encounter.      Current Outpatient Medications on File Prior to Encounter   Medication Sig    aspirin 81 mg Tab Take 81 mg by mouth. 1 Tablet Oral Every evening    atorvastatin (LIPITOR) 40 MG tablet Take 1 tablet (40 mg total) by mouth once daily.    chlorhexidine (PERIDEX) 0.12 % solution RINSE 1/2 OZ TWICE DAILY AFTER BREAKFAST AND BEFORE BEDTIME    dutasteride (AVODART) 0.5 mg capsule TAKE 1 CAPSULE (0.5 MG TOTAL) BY MOUTH ONCE DAILY.    hydrALAZINE (APRESOLINE) 50 MG tablet Take 100 mg by mouth 3 (three) times daily. Per Dr. Mcdonald     irbesartan (AVAPRO) 300 MG tablet TAKE 1 TABLET EVERY DAY    metFORMIN (GLUCOPHAGE) 500 MG tablet Take 1 tablet (500 mg total) by mouth 2 (two) times daily with meals.    metoprolol tartrate (LOPRESSOR) 50 MG tablet Take 0.5 tablets (25 mg total) by mouth 2 (two) times daily.    NIFEdipine (PROCARDIA-XL) 60 MG (OSM) 24 hr tablet Take 1 tablet (60 mg total) by mouth 2 (two) times  daily.    oxybutynin (DITROPAN-XL) 10 MG 24 hr tablet Take 1 tablet (10 mg total) by mouth once daily.    oxyCODONE-acetaminophen (PERCOCET) 5-325 mg per tablet Take 1 tablet by mouth every 4 to 6 hours as needed for Pain. (Patient not taking: Reported on 12/18/2020)    senna-docusate 8.6-50 mg (SENNA WITH DOCUSATE SODIUM) 8.6-50 mg per tablet Take 1 tablet by mouth once daily.    spironolactone (ALDACTONE) 50 MG tablet Take 1 tablet (50 mg total) by mouth once daily.    tamsulosin (FLOMAX) 0.4 mg Cap Take 1 capsule (0.4 mg total) by mouth every evening.     Family History     Problem Relation (Age of Onset)    Cancer Mother    Diabetes Mother    Glaucoma Mother    Heart disease Mother    Hypertension Mother        Tobacco Use    Smoking status: Never Smoker    Smokeless tobacco: Never Used   Substance and Sexual Activity    Alcohol use: No    Drug use: No    Sexual activity: Not Currently     Partners: Female     Review of Systems   Unable to perform ROS: acuity of condition     Objective:     Vital Signs (Most Recent):  Temp: 98.5 °F (36.9 °C) (12/24/20 1205)  Pulse: (!) 55 (12/24/20 1205)  Resp: 16 (12/24/20 1205)  BP: 136/80 (12/24/20 1205)  SpO2: 100 % (12/24/20 1205) Vital Signs (24h Range):  Temp:  [98.5 °F (36.9 °C)] 98.5 °F (36.9 °C)  Pulse:  [50-55] 55  Resp:  [16-17] 16  SpO2:  [99 %-100 %] 100 %  BP: (136)/(80) 136/80     Weight: 88.5 kg (195 lb)  Body mass index is 27.2 kg/m².    SpO2: 100 %  O2 Device (Oxygen Therapy): room air    No intake or output data in the 24 hours ending 12/24/20 1400    Lines/Drains/Airways     Peripheral Intravenous Line                 Peripheral IV - Single Lumen 12/24/20 1219 18 G Right Antecubital less than 1 day         Peripheral IV - Single Lumen 12/24/20 20 G Left Antecubital less than 1 day                Physical Exam   Constitutional: He is oriented to person, place, and time. He appears well-developed and well-nourished.   HENT:   Head: Normocephalic and  atraumatic.   Nose: Nose normal.   Mouth/Throat: No oropharyngeal exudate.   Eyes: Right eye exhibits no discharge. Left eye exhibits no discharge. No scleral icterus.   Neck: Normal range of motion. Neck supple. No JVD present.   Cardiovascular: Normal rate, regular rhythm, S1 normal and S2 normal. Exam reveals no gallop, no S3, no S4, no distant heart sounds, no friction rub, no midsystolic click and no opening snap.   No murmur heard.  Pulses:       Radial pulses are 2+ on the right side and 2+ on the left side.        Femoral pulses are 2+ on the right side and 2+ on the left side.  Pulmonary/Chest: Effort normal and breath sounds normal. No respiratory distress. He has no wheezes. He has no rales. He exhibits no tenderness.   Abdominal: Soft. Bowel sounds are normal. He exhibits no distension. There is no abdominal tenderness. There is no rebound.   Musculoskeletal: Normal range of motion.         General: No tenderness, deformity or edema.   Lymphadenopathy:     He has no cervical adenopathy.   Neurological: He is alert and oriented to person, place, and time. No cranial nerve deficit.   Skin: Skin is warm and dry.   Psychiatric: He has a normal mood and affect. His behavior is normal.       Significant Labs: All pertinent lab results from the last 24 hours have been reviewed.    Significant Imaging: All pertinent images from the last 24h have been reviewed.

## 2020-12-24 NOTE — SUBJECTIVE & OBJECTIVE
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  Service: Endoscopy;  Laterality: N/A;    COLONOSCOPY N/A 1/14/2019    Procedure: COLONOSCOPY;  Surgeon: HAIM Mir MD;  [facility]  Service: Endoscopy;  Laterality: N/A;  PM prep    CYST REMOVAL  2000    removed from left kidney    KIDNEY SURGERY      PROSTATE SURGERY         Review of patient's allergies indicates:   Allergen Reactions    Pcn [penicillins] Rash       (Not in a hospital admission)    Family History     Problem Relation (Age of Onset)    Cancer Mother    Diabetes Mother    Glaucoma Mother    Heart disease Mother    Hypertension Mother        Tobacco Use    Smoking status: Never Smoker    Smokeless tobacco: Never Used   Substance and Sexual Activity    Alcohol use: No    Drug use: No    Sexual activity: Not Currently     Partners: Female     Review of Systems   All other systems reviewed and are negative.    Objective:     Vital Signs (Most Recent):  Temp: 98.5 °F (36.9 °C) (12/24/20 1205)  Pulse: (!) 55 (12/24/20 1205)  Resp: 16 (12/24/20 1205)  BP: 136/80 (12/24/20 1205)  SpO2: 100 % (12/24/20 1205) Vital Signs (24h Range):  Temp:  [98.5 °F (36.9 °C)] 98.5 °F (36.9 °C)  Pulse:  [50-55] 55  Resp:  [16-17] 16  SpO2:  [99 %-100 %] 100 %  BP: (136)/(80) 136/80     Weight: 88.5 kg (195 lb)  Body mass index is 27.2 kg/m².    SpO2: 100 %  O2 Device (Oxygen Therapy): room air    No intake or output data in the 24 hours ending 12/24/20  1350    Lines/Drains/Airways     Peripheral Intravenous Line                 Peripheral IV - Single Lumen 12/24/20 1219 18 G Right Antecubital less than 1 day         Peripheral IV - Single Lumen 12/24/20 20 G Left Antecubital less than 1 day                Physical Exam  General: alert, awake and oriented x 3  Eyes:PERRL.   Neck:no JVD   Lungs:  clear to auscultation bilaterally   Cardiovascular: Heart: regular rate and rhythm, S1, S2 normal, no murmur, click, rub or gallop.   Chest Wall: no tenderness.   Pulses-2+ radial, femoral, DP, PT b/l  Extremities: no cyanosis or edema.   Abdomen/Rectal: Abdomen: soft, non-tender non-distented; bowel sounds normal  Neurologic: Normal strength and tone. No focal numbness or weakness  Significant Labs:   CMP   Recent Labs   Lab 12/24/20  1218   *  133*   K 4.8  4.8     104   CO2 20*  20*     108   BUN 29*  29*   CREATININE 1.8*  1.8*   CALCIUM 10.1  10.1   PROT 6.4   ALBUMIN 3.6   BILITOT 0.9   ALKPHOS 56   AST 16   ALT 12   ANIONGAP 9  9   ESTGFRAFRICA 39.9*  39.9*   EGFRNONAA 34.5*  34.5*   , CBC   Recent Labs   Lab 12/24/20  1218   WBC 5.70  5.70   HGB 11.8*  11.8*   HCT 36.9*  36.9*     256    and INR No results for input(s): INR, PROTIME in the last 48 hours.    Significant Imaging: Echocardiogram:   2D echo with color flow doppler:   Results for orders placed or performed during the hospital encounter of 06/02/14   2D echo with color flow doppler   Result Value Ref Range    EF 60     Diastolic Dysfunction Yes     Narrative    TEST DESCRIPTION   Technical Quality: This study was performed in conjunction with a 3ml intravenous injection of Optison contrast agent because of poor endocardial visualization.     Aorta: The aortic root is normal in size, measuring 3.5 cm at sinotubular junction and 3.7 cm at Sinuses of Valsalva. The proximal ascending aorta is normal in size, measuring 3.3 cm across.     Left Atrium: The left atrial  volume index is normal, measuring 27.76 cc/m2.     Left Ventricle: The left ventricle is normal in size, with an end-diastolic diameter of 5.0 cm, and an end-systolic diameter of 3.4 cm. LV wall thickness is normal, with the septum measuring 1.2 cm and the posterior wall measuring 1.0 cm across. Relative   wall thickness was normal at 0.40, and the LV mass index was 109.2 g/m2 consistent with normal left ventricular mass. Global left ventricular systolic function appears normal. Visually estimated ejection fraction is 60-65%.   There is diastolic dysfunction secondary to relaxation abnormality.     Right Atrium: The right atrium is normal in size, measuring 5.8 cm in length and 3.8 cm in width in the apical view.     Right Ventricle: The right ventricle is normal in size. Global right ventricular systolic function appears normal. Tissue Doppler-derived tricuspid annular peak systolic velocity (S prime) is 12.0 cm/s.     Aortic Valve:  The aortic valve is normal in structure.     Mitral Valve:  The mitral valve is normal in structure.     Tricuspid Valve:  The tricuspid valve is normal in structure.     Pulmonary Valve:  The pulmonic valve is not well seen.     IVC: The IVC is not visualized.     Intracavitary: There is no evidence of pericardial effusion, intracavity mass, thrombi, or vegetation.         CONCLUSIONS     1 - Normal left ventricular systolic function (EF 60-65%).     2 - Left ventricular diastolic dysfunction.     3 - Normal right ventricular systolic function .         This document has been electronically    SIGNED BY: Lissa Garcia MD On: 06/02/2014 15:08

## 2020-12-24 NOTE — ASSESSMENT & PLAN NOTE
Not a STEMI-no indication to take patient to cath lab urgently as patient has never had any chest pain, transient elevation in ST segments that resolved with normal EF and no WMA - Discussed w Dr John  Admit to cardiovascular step down for work up of cardiac arrest  Ok to trend troponin-if significant elevation can consider treatment for NSTEMI  F/U TTE  OP 30-day event monitor vs. Holter  -F/U renal function - suspect KOURTNEY may be related to cardiac arrest

## 2020-12-24 NOTE — ED NOTES
.  Pedro Ponce , a 80 y.o. male presents to the ED w/ complaint of was with wife at her Dr appt and began gurgling and passed out.  Wife at bedside.  Brought down by RAPID RESPONSE    Triage note:  No chief complaint on file.    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 The patient is awake, alert and aware of environment with an appropriate affect, the patient is oriented x 3 and speaking appropriately.   APPEARANCE: Patient resting comfortably, patient is clean and well groomed, patient's clothing is properly fastened.  SKIN: The skin is warm and dry, color consistent with ethnicity, patient has normal skin turgor and moist mucus membranes, skin intact, no breakdown or bruising noted.  MUSCULOSKELETAL: Patient moving all extremities well, no obvious swelling or deformities noted.   RESPIRATORY: Respirations are spontaneous, patient has a normal effort and rate, no accessory muscle use noted.  CARDIAC: Patient has a normal rate and rhythm, no periphreal edema noted, capillary refill < 3 seconds.  ABDOMEN: Soft and non tender to palpation, no distention noted. Bowel sounds present in all four quadrants.  NEUROLOGIC: Eyes open spontaneously, behavior appropriate to situation, follows commands, facial expression symmetrical, bilateral hand grasp equal and even, purposeful motor response noted, normal sensation in all extremities when touched with a finger.

## 2020-12-24 NOTE — CODE/ RAPID DOCUMENTATION
"RAPID RESPONSE NURSE NOTE     Admit Date: 2020  LOS: 0  Code Status: Prior   Date of Consult: 2020  : 1940  Age: 80 y.o.  Weight:   Wt Readings from Last 1 Encounters:   20 88.5 kg (195 lb)     Sex: male  Race: Black or    Bed: ED :   MRN: [id number]  Time Rapid Response Team page Received: 1153   Time Rapid Response Team at Bedside: 1157  Time Rapid Response Team left Bedside: 1220  Was the patient discharged from an ICU this admission?   no  Was the patient discharged from a PACU within last 24 hours?  no  Did the patient receive conscious sedation/general anesthesia within last 24 hours?  no  Was the patient in the ED within the past 24 hours?  no  Was the patient started on NIPPV within the past 24 hours?  no  Did this progress into an ARC or CPA:  no  Attending Physician: George Olivarez MD  Primary Service: Networked reference to record PCT   Consult Requested By: George Olivarez MD     SITUATION     Notified by code pager.  Reason for alert: Unresponsive   Called to evaluate the patient for Circulatory    BACKGROUND     Why is the patient in the hospital?: <principal problem not specified>    Patient has a past medical history of BPH (benign prostatic hypertrophy), Cataract, Colon polyp, Diabetes mellitus, Diabetes mellitus type II, Glaucoma (increased eye pressure), Gout, joint, Hyperlipidemia, Hypertension, Macular degeneration, Obesity, and Retinal pigment epithelial detachment.    ASSESSMENT/INTERVENTIONS     /80   Pulse (!) 55   Temp 98.5 °F (36.9 °C) (Oral)   Resp 16   Ht 5' 11" (1.803 m)   Wt 88.5 kg (195 lb)   SpO2 100%   BMI 27.20 kg/m²     What did you find: Patient found supine on the floor with MD and cardiology staff at bedside. Per MD ANTON Caballero, patient received approximately 30 seconds of BLS before consciousness was regained. IV started by cardiology staff. Patient given 325mg Aspirin by MD Federico before transport to the ED. Patient " transported to ED via RRN x2 and RRT x1.      RECOMMENDATIONS     We recommend: Continue to follow Emergency Medicine plan of care.     FOLLOW-UP/CONTINGENCY PLAN     Call the Rapid Response Nurse, Clint Galvez RN at x [phone] for additional questions or concerns.    PHYSICIAN ESCALATION     Orders received and case discussed with Dr. Olivarez.    Disposition: Tx to ER bed 03.

## 2020-12-24 NOTE — ED NOTES
Telemetry Verification   Patient placed on Telemetry Box  Verified with War Room  Box # [id number]   Monitor Tech    Rate 80   Rhythm NSR

## 2020-12-24 NOTE — CONSULTS
[facility]  Interventional Cardiology  Consult Note    Patient Name: Pedro Ponce Jr.  MRN: [id number]  Admission Date: 12/24/2020  Hospital Length of Stay: 0 days  Code Status: Prior   Attending Provider: George Olivarez MD  Consulting Provider: Dany Gomez MD  Primary Care Physician: John Ricci MD  Principal Problem:<principal problem not specified>    Patient information was obtained from patient, spouse/SO and past medical records.     Consults  Subjective:     Chief Complaint:  Abnormal EKG     HPI:  80 year old male with history of  HTN, DM-2, Hyperlipidemia,recent TIA who presents to the emergency department with the rapid response team after a code was called in the hospital.  He was accompanying his wife in clinic when he states that he became very weak and kept laying his head back.  At some point, bystanders helped him to the ground.  He sustained no trauma.  According to the rapid response team, medical personnel with the patient determined that patient was pulseless and apneic and started CPR.  By the time the rapid response team had arrived, the patient had achieved ROSC and mentating clearly.  Code STEMI was called due to ST elevation in lateral leads on EKG immediately after Code. Patient denied ever having any chest pain and denies any complaints now. He reports weakness. Repeat EKG showed LVH with repolarization changes and ectopic p waves, bedside echo showed normal EF with no wall motion abnormalities. 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       Past Surgical History:   Procedure Laterality Date     COLONOSCOPY N/A 4/9/2018    Procedure: COLONOSCOPY;  Surgeon: HAIM Mir MD;  Location: [facility] ENDO (4TH FLR);  Service: Endoscopy;  Laterality: N/A;    COLONOSCOPY N/A 1/14/2019    Procedure: COLONOSCOPY;  Surgeon: HAIM Mir MD;  Location: [facility] ENDO (4TH FLR);  Service: Endoscopy;  Laterality: N/A;  PM prep    CYST REMOVAL  2000    removed from left kidney    KIDNEY SURGERY      PROSTATE SURGERY         Review of patient's allergies indicates:   Allergen Reactions    Pcn [penicillins] Rash       (Not in a hospital admission)    Family History     Problem Relation (Age of Onset)    Cancer Mother    Diabetes Mother    Glaucoma Mother    Heart disease Mother    Hypertension Mother        Tobacco Use    Smoking status: Never Smoker    Smokeless tobacco: Never Used   Substance and Sexual Activity    Alcohol use: No    Drug use: No    Sexual activity: Not Currently     Partners: Female     Review of Systems   All other systems reviewed and are negative.    Objective:     Vital Signs (Most Recent):  Temp: 98.5 °F (36.9 °C) (12/24/20 1205)  Pulse: (!) 55 (12/24/20 1205)  Resp: 16 (12/24/20 1205)  BP: 136/80 (12/24/20 1205)  SpO2: 100 % (12/24/20 1205) Vital Signs (24h Range):  Temp:  [98.5 °F (36.9 °C)] 98.5 °F (36.9 °C)  Pulse:  [50-55] 55  Resp:  [16-17] 16  SpO2:  [99 %-100 %] 100 %  BP: (136)/(80) 136/80     Weight: 88.5 kg (195 lb)  Body mass index is 27.2 kg/m².    SpO2: 100 %  O2 Device (Oxygen Therapy): room air    No intake or output data in the 24 hours ending 12/24/20 1350    Lines/Drains/Airways     Peripheral Intravenous Line                 Peripheral IV - Single Lumen 12/24/20 1219 18 G Right Antecubital less than 1 day         Peripheral IV - Single Lumen 12/24/20 20 G Left Antecubital less than 1 day                Physical Exam  General: alert, awake and oriented x 3  Eyes:PERRL.   Neck:no JVD   Lungs:  clear to auscultation bilaterally   Cardiovascular: Heart: regular rate and  rhythm, S1, S2 normal, no murmur, click, rub or gallop.   Chest Wall: no tenderness.   Pulses-2+ radial, femoral, DP, PT b/l  Extremities: no cyanosis or edema.   Abdomen/Rectal: Abdomen: soft, non-tender non-distented; bowel sounds normal  Neurologic: Normal strength and tone. No focal numbness or weakness  Significant Labs:   CMP   Recent Labs   Lab 12/24/20  1218   *  133*   K 4.8  4.8     104   CO2 20*  20*     108   BUN 29*  29*   CREATININE 1.8*  1.8*   CALCIUM 10.1  10.1   PROT 6.4   ALBUMIN 3.6   BILITOT 0.9   ALKPHOS 56   AST 16   ALT 12   ANIONGAP 9  9   ESTGFRAFRICA 39.9*  39.9*   EGFRNONAA 34.5*  34.5*   , CBC   Recent Labs   Lab 12/24/20  1218   WBC 5.70  5.70   HGB 11.8*  11.8*   HCT 36.9*  36.9*     256    and INR No results for input(s): INR, PROTIME in the last 48 hours.    Significant Imaging: Echocardiogram:   2D echo with color flow doppler:   Results for orders placed or performed during the hospital encounter of 06/02/14   2D echo with color flow doppler   Result Value Ref Range    EF 60     Diastolic Dysfunction Yes     Narrative    TEST DESCRIPTION   Technical Quality: This study was performed in conjunction with a 3ml intravenous injection of Optison contrast agent because of poor endocardial visualization.     Aorta: The aortic root is normal in size, measuring 3.5 cm at sinotubular junction and 3.7 cm at Sinuses of Valsalva. The proximal ascending aorta is normal in size, measuring 3.3 cm across.     Left Atrium: The left atrial volume index is normal, measuring 27.76 cc/m2.     Left Ventricle: The left ventricle is normal in size, with an end-diastolic diameter of 5.0 cm, and an end-systolic diameter of 3.4 cm. LV wall thickness is normal, with the septum measuring 1.2 cm and the posterior wall measuring 1.0 cm across. Relative   wall thickness was normal at 0.40, and the LV mass index was 109.2 g/m2 consistent with normal left ventricular mass.  Global left ventricular systolic function appears normal. Visually estimated ejection fraction is 60-65%.   There is diastolic dysfunction secondary to relaxation abnormality.     Right Atrium: The right atrium is normal in size, measuring 5.8 cm in length and 3.8 cm in width in the apical view.     Right Ventricle: The right ventricle is normal in size. Global right ventricular systolic function appears normal. Tissue Doppler-derived tricuspid annular peak systolic velocity (S prime) is 12.0 cm/s.     Aortic Valve:  The aortic valve is normal in structure.     Mitral Valve:  The mitral valve is normal in structure.     Tricuspid Valve:  The tricuspid valve is normal in structure.     Pulmonary Valve:  The pulmonic valve is not well seen.     IVC: The IVC is not visualized.     Intracavitary: There is no evidence of pericardial effusion, intracavity mass, thrombi, or vegetation.         CONCLUSIONS     1 - Normal left ventricular systolic function (EF 60-65%).     2 - Left ventricular diastolic dysfunction.     3 - Normal right ventricular systolic function .         This document has been electronically    SIGNED BY: Lissa Garcia MD On: 06/02/2014 15:08     Assessment and Plan:     Syncope and collapse  Not a STEMI-no indication to take patient to cath lab urgently as patient has never had any chest pain, transient elevation in ST segments that resolved with normal EF and no WMA  Recommend admission to hospital for syncope workup  Ok to trend troponin-if significant elevation can consider treatment for NSTEMI  Recommend Full echo with CFD  Consider 30 day event monitor to rule out arrythmia    Case discussed with Dr John        VTE Risk Mitigation (From admission, onward)    None          Thank you for your consult. I will sign off. Please contact us if you have any additional questions.    Dany Gomez MD  Interventional Cardiology   [facility]

## 2020-12-25 PROBLEM — I21.4 NSTEMI (NON-ST ELEVATED MYOCARDIAL INFARCTION): Status: ACTIVE | Noted: 2020-12-25

## 2020-12-25 LAB
ALBUMIN SERPL BCP-MCNC: 3.8 G/DL (ref 3.5–5.2)
ALP SERPL-CCNC: 63 U/L (ref 55–135)
ALT SERPL W/O P-5'-P-CCNC: 15 U/L (ref 10–44)
ANION GAP SERPL CALC-SCNC: 10 MMOL/L (ref 8–16)
ANION GAP SERPL CALC-SCNC: 10 MMOL/L (ref 8–16)
APTT BLDCRRT: 26.3 SEC (ref 21–32)
AST SERPL-CCNC: 16 U/L (ref 10–40)
BASOPHILS # BLD AUTO: 0.02 K/UL (ref 0–0.2)
BASOPHILS # BLD AUTO: 0.04 K/UL (ref 0–0.2)
BASOPHILS NFR BLD: 0.4 % (ref 0–1.9)
BASOPHILS NFR BLD: 0.6 % (ref 0–1.9)
BILIRUB SERPL-MCNC: 0.8 MG/DL (ref 0.1–1)
BUN SERPL-MCNC: 24 MG/DL (ref 8–23)
BUN SERPL-MCNC: 24 MG/DL (ref 8–23)
CALCIUM SERPL-MCNC: 10.2 MG/DL (ref 8.7–10.5)
CALCIUM SERPL-MCNC: 9.6 MG/DL (ref 8.7–10.5)
CHLORIDE SERPL-SCNC: 104 MMOL/L (ref 95–110)
CHLORIDE SERPL-SCNC: 104 MMOL/L (ref 95–110)
CO2 SERPL-SCNC: 22 MMOL/L (ref 23–29)
CO2 SERPL-SCNC: 24 MMOL/L (ref 23–29)
CREAT SERPL-MCNC: 1.3 MG/DL (ref 0.5–1.4)
CREAT SERPL-MCNC: 1.4 MG/DL (ref 0.5–1.4)
DIFFERENTIAL METHOD: ABNORMAL
DIFFERENTIAL METHOD: ABNORMAL
EOSINOPHIL # BLD AUTO: 0 K/UL (ref 0–0.5)
EOSINOPHIL # BLD AUTO: 0.1 K/UL (ref 0–0.5)
EOSINOPHIL NFR BLD: 0.5 % (ref 0–8)
EOSINOPHIL NFR BLD: 0.9 % (ref 0–8)
ERYTHROCYTE [DISTWIDTH] IN BLOOD BY AUTOMATED COUNT: 13.6 % (ref 11.5–14.5)
ERYTHROCYTE [DISTWIDTH] IN BLOOD BY AUTOMATED COUNT: 13.9 % (ref 11.5–14.5)
EST. GFR  (AFRICAN AMERICAN): 54.1 ML/MIN/1.73 M^2
EST. GFR  (AFRICAN AMERICAN): 59.2 ML/MIN/1.73 M^2
EST. GFR  (NON AFRICAN AMERICAN): 46.8 ML/MIN/1.73 M^2
EST. GFR  (NON AFRICAN AMERICAN): 51.2 ML/MIN/1.73 M^2
GLUCOSE SERPL-MCNC: 105 MG/DL (ref 70–110)
GLUCOSE SERPL-MCNC: 112 MG/DL (ref 70–110)
GLUCOSE SERPL-MCNC: 118 MG/DL (ref 70–110)
GLUCOSE SERPL-MCNC: 125 MG/DL (ref 70–110)
GLUCOSE SERPL-MCNC: 91 MG/DL (ref 70–110)
GLUCOSE SERPL-MCNC: 95 MG/DL (ref 70–110)
HCT VFR BLD AUTO: 37.3 % (ref 40–54)
HCT VFR BLD AUTO: 40.1 % (ref 40–54)
HGB BLD-MCNC: 12.3 G/DL (ref 14–18)
HGB BLD-MCNC: 13.1 G/DL (ref 14–18)
IMM GRANULOCYTES # BLD AUTO: 0.01 K/UL (ref 0–0.04)
IMM GRANULOCYTES # BLD AUTO: 0.01 K/UL (ref 0–0.04)
IMM GRANULOCYTES NFR BLD AUTO: 0.2 % (ref 0–0.5)
IMM GRANULOCYTES NFR BLD AUTO: 0.2 % (ref 0–0.5)
INR PPP: 1 (ref 0.8–1.2)
LACTATE SERPL-SCNC: 1.7 MMOL/L (ref 0.5–2.2)
LYMPHOCYTES # BLD AUTO: 1.5 K/UL (ref 1–4.8)
LYMPHOCYTES # BLD AUTO: 2.7 K/UL (ref 1–4.8)
LYMPHOCYTES NFR BLD: 28.2 % (ref 18–48)
LYMPHOCYTES NFR BLD: 40.5 % (ref 18–48)
MAGNESIUM SERPL-MCNC: 1.9 MG/DL (ref 1.6–2.6)
MCH RBC QN AUTO: 30.8 PG (ref 27–31)
MCH RBC QN AUTO: 31 PG (ref 27–31)
MCHC RBC AUTO-ENTMCNC: 32.7 G/DL (ref 32–36)
MCHC RBC AUTO-ENTMCNC: 33 G/DL (ref 32–36)
MCV RBC AUTO: 94 FL (ref 82–98)
MCV RBC AUTO: 95 FL (ref 82–98)
MONOCYTES # BLD AUTO: 0.8 K/UL (ref 0.3–1)
MONOCYTES # BLD AUTO: 0.8 K/UL (ref 0.3–1)
MONOCYTES NFR BLD: 12 % (ref 4–15)
MONOCYTES NFR BLD: 14.7 % (ref 4–15)
NEUTROPHILS # BLD AUTO: 3.1 K/UL (ref 1.8–7.7)
NEUTROPHILS # BLD AUTO: 3.1 K/UL (ref 1.8–7.7)
NEUTROPHILS NFR BLD: 45.8 % (ref 38–73)
NEUTROPHILS NFR BLD: 56 % (ref 38–73)
NRBC BLD-RTO: 0 /100 WBC
NRBC BLD-RTO: 0 /100 WBC
PLATELET # BLD AUTO: 251 K/UL (ref 150–350)
PLATELET # BLD AUTO: 281 K/UL (ref 150–350)
PMV BLD AUTO: 9.6 FL (ref 9.2–12.9)
PMV BLD AUTO: 9.9 FL (ref 9.2–12.9)
POCT GLUCOSE: 110 MG/DL (ref 70–110)
POCT GLUCOSE: 95 MG/DL (ref 70–110)
POTASSIUM SERPL-SCNC: 4.4 MMOL/L (ref 3.5–5.1)
POTASSIUM SERPL-SCNC: 4.5 MMOL/L (ref 3.5–5.1)
PROT SERPL-MCNC: 6.6 G/DL (ref 6–8.4)
PROTHROMBIN TIME: 11.2 SEC (ref 9–12.5)
RBC # BLD AUTO: 3.99 M/UL (ref 4.6–6.2)
RBC # BLD AUTO: 4.23 M/UL (ref 4.6–6.2)
SODIUM SERPL-SCNC: 136 MMOL/L (ref 136–145)
SODIUM SERPL-SCNC: 138 MMOL/L (ref 136–145)
TROPONIN I SERPL DL<=0.01 NG/ML-MCNC: 0.01 NG/ML (ref 0–0.03)
WBC # BLD AUTO: 5.46 K/UL (ref 3.9–12.7)
WBC # BLD AUTO: 6.65 K/UL (ref 3.9–12.7)

## 2020-12-25 PROCEDURE — 93005 ELECTROCARDIOGRAM TRACING: CPT

## 2020-12-25 PROCEDURE — 25000003 PHARM REV CODE 250: Performed by: INTERNAL MEDICINE

## 2020-12-25 PROCEDURE — C1887 CATHETER, GUIDING: HCPCS | Performed by: INTERNAL MEDICINE

## 2020-12-25 PROCEDURE — 25000003 PHARM REV CODE 250: Performed by: STUDENT IN AN ORGANIZED HEALTH CARE EDUCATION/TRAINING PROGRAM

## 2020-12-25 PROCEDURE — 93454 PR CATH PLACE/CORONARY ANGIO, IMG SUPER/INTERP: ICD-10-PCS | Mod: 26,59,, | Performed by: INTERNAL MEDICINE

## 2020-12-25 PROCEDURE — 80053 COMPREHEN METABOLIC PANEL: CPT

## 2020-12-25 PROCEDURE — 99153 MOD SED SAME PHYS/QHP EA: CPT | Performed by: INTERNAL MEDICINE

## 2020-12-25 PROCEDURE — 99232 SBSQ HOSP IP/OBS MODERATE 35: CPT | Mod: 25,,, | Performed by: INTERNAL MEDICINE

## 2020-12-25 PROCEDURE — 63600175 PHARM REV CODE 636 W HCPCS: Performed by: INTERNAL MEDICINE

## 2020-12-25 PROCEDURE — C1769 GUIDE WIRE: HCPCS | Performed by: INTERNAL MEDICINE

## 2020-12-25 PROCEDURE — 93010 ELECTROCARDIOGRAM REPORT: CPT | Mod: ,,, | Performed by: INTERNAL MEDICINE

## 2020-12-25 PROCEDURE — 20600001 HC STEP DOWN PRIVATE ROOM

## 2020-12-25 PROCEDURE — 92933 PR STENT & ATHERECTOMY: ICD-10-PCS | Mod: LD,,, | Performed by: INTERNAL MEDICINE

## 2020-12-25 PROCEDURE — 27201423 OPTIME MED/SURG SUP & DEVICES STERILE SUPPLY: Performed by: INTERNAL MEDICINE

## 2020-12-25 PROCEDURE — 85610 PROTHROMBIN TIME: CPT

## 2020-12-25 PROCEDURE — C9602 PERC D-E COR STENT ATHER S: HCPCS | Mod: LD | Performed by: INTERNAL MEDICINE

## 2020-12-25 PROCEDURE — 25500020 PHARM REV CODE 255: Performed by: INTERNAL MEDICINE

## 2020-12-25 PROCEDURE — 93454 CORONARY ARTERY ANGIO S&I: CPT | Mod: 59 | Performed by: INTERNAL MEDICINE

## 2020-12-25 PROCEDURE — 93010 EKG 12-LEAD: ICD-10-PCS | Mod: ,,, | Performed by: INTERNAL MEDICINE

## 2020-12-25 PROCEDURE — 63600175 PHARM REV CODE 636 W HCPCS

## 2020-12-25 PROCEDURE — 99152 PR MOD CONSCIOUS SEDATION, SAME PHYS, 5+ YRS, FIRST 15 MIN: ICD-10-PCS | Mod: ,,, | Performed by: INTERNAL MEDICINE

## 2020-12-25 PROCEDURE — 80048 BASIC METABOLIC PNL TOTAL CA: CPT

## 2020-12-25 PROCEDURE — 83605 ASSAY OF LACTIC ACID: CPT

## 2020-12-25 PROCEDURE — C1894 INTRO/SHEATH, NON-LASER: HCPCS | Performed by: INTERNAL MEDICINE

## 2020-12-25 PROCEDURE — 25000003 PHARM REV CODE 250

## 2020-12-25 PROCEDURE — 99152 MOD SED SAME PHYS/QHP 5/>YRS: CPT | Mod: ,,, | Performed by: INTERNAL MEDICINE

## 2020-12-25 PROCEDURE — 63600175 PHARM REV CODE 636 W HCPCS: Performed by: STUDENT IN AN ORGANIZED HEALTH CARE EDUCATION/TRAINING PROGRAM

## 2020-12-25 PROCEDURE — C1724 CATH, TRANS ATHEREC,ROTATION: HCPCS | Performed by: INTERNAL MEDICINE

## 2020-12-25 PROCEDURE — C1725 CATH, TRANSLUMIN NON-LASER: HCPCS | Performed by: INTERNAL MEDICINE

## 2020-12-25 PROCEDURE — 83735 ASSAY OF MAGNESIUM: CPT

## 2020-12-25 PROCEDURE — 25000242 PHARM REV CODE 250 ALT 637 W/ HCPCS

## 2020-12-25 PROCEDURE — 85025 COMPLETE CBC W/AUTO DIFF WBC: CPT | Mod: 91

## 2020-12-25 PROCEDURE — 93454 CORONARY ARTERY ANGIO S&I: CPT | Mod: 26,59,, | Performed by: INTERNAL MEDICINE

## 2020-12-25 PROCEDURE — 99232 PR SUBSEQUENT HOSPITAL CARE,LEVL II: ICD-10-PCS | Mod: 25,,, | Performed by: INTERNAL MEDICINE

## 2020-12-25 PROCEDURE — 85730 THROMBOPLASTIN TIME PARTIAL: CPT

## 2020-12-25 PROCEDURE — C1874 STENT, COATED/COV W/DEL SYS: HCPCS | Performed by: INTERNAL MEDICINE

## 2020-12-25 PROCEDURE — 99152 MOD SED SAME PHYS/QHP 5/>YRS: CPT | Performed by: INTERNAL MEDICINE

## 2020-12-25 PROCEDURE — 84484 ASSAY OF TROPONIN QUANT: CPT

## 2020-12-25 PROCEDURE — 92933 PRQ TRLML C ATHRC ST ANGIOP1: CPT | Mod: LD,,, | Performed by: INTERNAL MEDICINE

## 2020-12-25 PROCEDURE — 36415 COLL VENOUS BLD VENIPUNCTURE: CPT

## 2020-12-25 DEVICE — STENT RONYX27522UX RESOLUTE ONYX 2.75X22
Type: IMPLANTABLE DEVICE | Site: CORONARY | Status: FUNCTIONAL
Brand: RESOLUTE ONYX™

## 2020-12-25 RX ORDER — CLOPIDOGREL BISULFATE 75 MG/1
75 TABLET ORAL DAILY
Status: DISCONTINUED | OUTPATIENT
Start: 2020-12-26 | End: 2020-12-26 | Stop reason: HOSPADM

## 2020-12-25 RX ORDER — SODIUM CHLORIDE 9 MG/ML
INJECTION, SOLUTION INTRAVENOUS CONTINUOUS
Status: CANCELLED | OUTPATIENT
Start: 2020-12-25 | End: 2020-12-25

## 2020-12-25 RX ORDER — MIDAZOLAM HYDROCHLORIDE 1 MG/ML
INJECTION, SOLUTION INTRAMUSCULAR; INTRAVENOUS
Status: DISCONTINUED | OUTPATIENT
Start: 2020-12-25 | End: 2020-12-25 | Stop reason: HOSPADM

## 2020-12-25 RX ORDER — DIPHENHYDRAMINE HYDROCHLORIDE 50 MG/ML
INJECTION INTRAMUSCULAR; INTRAVENOUS
Status: DISCONTINUED | OUTPATIENT
Start: 2020-12-25 | End: 2020-12-25 | Stop reason: HOSPADM

## 2020-12-25 RX ORDER — NITROGLYCERIN 0.4 MG/1
0.4 TABLET SUBLINGUAL EVERY 5 MIN PRN
Status: DISCONTINUED | OUTPATIENT
Start: 2020-12-25 | End: 2020-12-26 | Stop reason: HOSPADM

## 2020-12-25 RX ORDER — FENTANYL CITRATE 50 UG/ML
INJECTION, SOLUTION INTRAMUSCULAR; INTRAVENOUS
Status: DISCONTINUED | OUTPATIENT
Start: 2020-12-25 | End: 2020-12-25 | Stop reason: HOSPADM

## 2020-12-25 RX ORDER — IRBESARTAN 75 MG/1
150 TABLET ORAL DAILY
Status: DISCONTINUED | OUTPATIENT
Start: 2020-12-26 | End: 2020-12-26 | Stop reason: HOSPADM

## 2020-12-25 RX ORDER — MAGNESIUM SULFATE HEPTAHYDRATE 40 MG/ML
2 INJECTION, SOLUTION INTRAVENOUS ONCE
Status: COMPLETED | OUTPATIENT
Start: 2020-12-25 | End: 2020-12-25

## 2020-12-25 RX ORDER — NIFEDIPINE 60 MG/1
60 TABLET, EXTENDED RELEASE ORAL DAILY
Status: DISCONTINUED | OUTPATIENT
Start: 2020-12-25 | End: 2020-12-25

## 2020-12-25 RX ORDER — HEPARIN SODIUM 1000 [USP'U]/ML
INJECTION, SOLUTION INTRAVENOUS; SUBCUTANEOUS
Status: DISCONTINUED | OUTPATIENT
Start: 2020-12-25 | End: 2020-12-25 | Stop reason: HOSPADM

## 2020-12-25 RX ORDER — CLOPIDOGREL BISULFATE 75 MG/1
75 TABLET ORAL DAILY
Status: DISCONTINUED | OUTPATIENT
Start: 2020-12-25 | End: 2020-12-25

## 2020-12-25 RX ORDER — DIPHENHYDRAMINE HCL 50 MG
50 CAPSULE ORAL ONCE
Status: CANCELLED | OUTPATIENT
Start: 2020-12-25 | End: 2020-12-25

## 2020-12-25 RX ORDER — ASPIRIN 81 MG/1
81 TABLET ORAL DAILY
Status: DISCONTINUED | OUTPATIENT
Start: 2020-12-26 | End: 2020-12-26 | Stop reason: HOSPADM

## 2020-12-25 RX ORDER — LIDOCAINE HYDROCHLORIDE 20 MG/ML
INJECTION, SOLUTION EPIDURAL; INFILTRATION; INTRACAUDAL; PERINEURAL
Status: DISCONTINUED | OUTPATIENT
Start: 2020-12-25 | End: 2020-12-25 | Stop reason: HOSPADM

## 2020-12-25 RX ORDER — IRBESARTAN 75 MG/1
75 TABLET ORAL ONCE
Status: COMPLETED | OUTPATIENT
Start: 2020-12-25 | End: 2020-12-25

## 2020-12-25 RX ORDER — HEPARIN SODIUM,PORCINE/D5W 25000/250
0-40 INTRAVENOUS SOLUTION INTRAVENOUS CONTINUOUS
Status: DISCONTINUED | OUTPATIENT
Start: 2020-12-25 | End: 2020-12-25

## 2020-12-25 RX ORDER — CLOPIDOGREL 300 MG/1
600 TABLET, FILM COATED ORAL ONCE
Status: COMPLETED | OUTPATIENT
Start: 2020-12-25 | End: 2020-12-25

## 2020-12-25 RX ORDER — HEPARIN SOD,PORCINE/0.9 % NACL 1000/500ML
INTRAVENOUS SOLUTION INTRAVENOUS
Status: DISCONTINUED | OUTPATIENT
Start: 2020-12-25 | End: 2020-12-25 | Stop reason: HOSPADM

## 2020-12-25 RX ORDER — NITROGLYCERIN 5 MG/ML
INJECTION, SOLUTION INTRAVENOUS
Status: DISCONTINUED | OUTPATIENT
Start: 2020-12-25 | End: 2020-12-25 | Stop reason: HOSPADM

## 2020-12-25 RX ORDER — METOPROLOL SUCCINATE 25 MG/1
25 TABLET, EXTENDED RELEASE ORAL DAILY
Status: DISCONTINUED | OUTPATIENT
Start: 2020-12-25 | End: 2020-12-26 | Stop reason: HOSPADM

## 2020-12-25 RX ORDER — SODIUM CHLORIDE 9 MG/ML
INJECTION, SOLUTION INTRAVENOUS CONTINUOUS
Status: ACTIVE | OUTPATIENT
Start: 2020-12-25 | End: 2020-12-25

## 2020-12-25 RX ORDER — NIFEDIPINE 60 MG/1
60 TABLET, EXTENDED RELEASE ORAL DAILY
Status: DISCONTINUED | OUTPATIENT
Start: 2020-12-25 | End: 2020-12-26 | Stop reason: HOSPADM

## 2020-12-25 RX ADMIN — TAMSULOSIN HYDROCHLORIDE 0.4 MG: 0.4 CAPSULE ORAL at 08:12

## 2020-12-25 RX ADMIN — NITROGLYCERIN 0.4 MG: 0.4 TABLET, ORALLY DISINTEGRATING SUBLINGUAL at 06:12

## 2020-12-25 RX ADMIN — CLOPIDOGREL BISULFATE 600 MG: 300 TABLET, FILM COATED ORAL at 07:12

## 2020-12-25 RX ADMIN — ASPIRIN 325 MG ORAL TABLET 325 MG: 325 PILL ORAL at 08:12

## 2020-12-25 RX ADMIN — HEPARIN SODIUM 5000 UNITS: 5000 INJECTION INTRAVENOUS; SUBCUTANEOUS at 06:12

## 2020-12-25 RX ADMIN — ATORVASTATIN CALCIUM 40 MG: 20 TABLET, FILM COATED ORAL at 08:12

## 2020-12-25 RX ADMIN — MAGNESIUM SULFATE IN WATER 2 G: 40 INJECTION, SOLUTION INTRAVENOUS at 07:12

## 2020-12-25 RX ADMIN — SODIUM CHLORIDE 150 ML/HR: 0.9 INJECTION, SOLUTION INTRAVENOUS at 11:12

## 2020-12-25 RX ADMIN — HEPARIN SODIUM AND DEXTROSE 12 UNITS/KG/HR: 10000; 5 INJECTION INTRAVENOUS at 07:12

## 2020-12-25 RX ADMIN — NIFEDIPINE 60 MG: 60 TABLET, FILM COATED, EXTENDED RELEASE ORAL at 05:12

## 2020-12-25 RX ADMIN — IRBESARTAN 75 MG: 75 TABLET, FILM COATED ORAL at 06:12

## 2020-12-25 RX ADMIN — METOPROLOL SUCCINATE 25 MG: 25 TABLET, EXTENDED RELEASE ORAL at 01:12

## 2020-12-25 RX ADMIN — ONDANSETRON 8 MG: 8 TABLET, ORALLY DISINTEGRATING ORAL at 08:12

## 2020-12-25 NOTE — ASSESSMENT & PLAN NOTE
- Patient seen and examined  - Chest pain with mild troponin elevation last night in the setting of prior EKG changes and syncope  - Agree with Heparin gtt and Plavix  - Proceed with [facility] +/- PCI this morning  -The risks, benefits & alternatives of the procedure were explained to the patient.    -The risks of coronary angiography include but are not limited to:  Bleeding, infection, heart rhythm abnormalities, allergic reactions, kidney injury, stroke and death.    -Should stenting be indicated, the patient has agreed to dual anti-platelet therapy for 1-consecutive year with a drug-eluting stent and a minimum of 1-month with the use of a bare metal stent.    -The risks of moderate sedation include hypotension, respiratory depression, arrhythmias, bronchospasm, & death.    -Informed consent was obtained & the patient is agreeable to proceed with the procedure.

## 2020-12-25 NOTE — OP NOTE
"    Post Cath Note  Referring Physician: Shade Lewis MD  Procedure: Left heart cath (Left), Stent, Drug Eluting, Single Vessel, Coronary, Atherectomy-coronary (N/A)      : Alan Rothman MD     Referring Physician: Self,Aaareferral     All Operators: Surgeon(s):  MD Dany Machado MD     Preoperative Diagnosis: Chest pain [R07.9]     Postop Diagnosis: Chest pain [R07.9]    Treatments/Procedures: Procedure(s) (LRB):  Left heart cath (Left)  Stent, Drug Eluting, Single Vessel, Coronary  Atherectomy-coronary (N/A)    Estimated Blood loss: <50 cc         Access: Right radial      See full report for further details    Intervention:   S/p mLAD DENNIS x 1(2.75 x 22 MM)   Closure device: Radial band    Post Cath Exam:   /75 (BP Location: Left arm, Patient Position: Lying)   Pulse 93   Temp 97.5 °F (36.4 °C) (Oral)   Resp 16   Ht 5' 11" (1.803 m)   Wt 86.3 kg (190 lb 4.8 oz)   SpO2 95%   BMI 26.54 kg/m²   No unusual pain, hematoma, thrill or bruit at vascular access site.  Distal pulse present without signs of ischemia.    Recommendations:   - Routine post-cath care  - IVF at 150 cc/hr for 4 hrs  - Cardiac rehab referral, Continue medical management, Risk factor reduction, Plavix for at least 1 year and ASA 81 mg indefinitely, Follow-up with outpatient cardiologist    Dany Gomez  "

## 2020-12-25 NOTE — ASSESSMENT & PLAN NOTE
Presented to the emergency department with the rapid response team after a code was called in the hospital. He was accompanying his wife in clinic when he states that he became very weak and kept laying his head back. At some point, bystanders helped him to the ground. He sustained no trauma. According to the rapid response team, medical personnel with the patient determined that patient was pulseless and apneic and started CPR. By the time the rapid response team had arrived, the patient had achieved ROSC and mentating clearly. Code STEMI was called due to ST elevation in lateral leads on EKG immediately after Code. Patient denied ever having any chest pain during this episode. Syncope and collapse likely 2/2 NSTEMI.

## 2020-12-25 NOTE — ASSESSMENT & PLAN NOTE
Repeat EKG after admission showed LVH with repolarization changes and ectopic p waves, bedside echo showed normal EF with no wall motion abnormalities. This morning 12/25 he began having 5/10 chest pain dull for 10 minutes responsive to  2 x nitroglycerine and EKG with NSR and PACs. Bedside TTE unchanged normal EF 60%. Troponin to 0.034 yesterday and 0.014 this morning. Rapid response and cardiology called to bedside.    Results for orders placed during the hospital encounter of 12/06/20   Echo Color Flow Doppler? Yes; Bubble Contrast? Yes    Narrative · The left ventricle is normal in size with normal systolic function. The   estimated ejection fraction is 60%.  · Grade I diastolic dysfunction.  · With normal right ventricular systolic function.  · Moderate left atrial enlargement.  · Moderate right atrial enlargement.  · Mild tricuspid regurgitation.  · Normal central venous pressure (3 mmHg).  · The estimated PA systolic pressure is 28 mmHg.  · There is no evidence of intracardiac shunting. Negative bubble study        .  Results for orders placed during the hospital encounter of 12/24/20   Cardiac catheterization    Narrative · The Mid LAD to Dist LAD lesion was 90% stenosed with 0% stenosis   post-intervention.  · The estimated blood loss was <50 mL.  · There was single vessel coronary artery disease.  · The PCI was successful.  · A stent was not successfully placed.  · A STENT RESOLUTE LATANYA [id number] stent was successfully placed at 16 MATTHIAS   for 5 sec.     The procedure log was documented by Documenter: RT Gasper and   verified by Alan Rothman MD.    Date: 12/25/2020  Time: 10:34 AM       .    Plan:   - interventional cardiology consulted   - [facility] performed this AM   - trending troponins.   - heparin gtt and plavix given, heparin gtt d/c'd.   - Started ASA, plavix   - started metoprolol succinate 25mg daily

## 2020-12-25 NOTE — PROGRESS NOTES
12/25/20 1629   Vital Signs   Pulse 82   Heart Rate Source Monitor   Resp 16   SpO2 99 %   O2 Device (Oxygen Therapy) room air   BP (!) 173/97   MAP (mmHg) 128     James Vides MD notified of patient's blood pressure. BMP ordered. MD to follow with orders for medication.

## 2020-12-25 NOTE — NURSING TRANSFER
Nursing Transfer Note      12/25/2020     Transfer To: cath lab    Transfer via bed    Transfer with cardiac monitoring    Transported by cath lab RNs and MD

## 2020-12-25 NOTE — CARE UPDATE
Rapid Response Nurse Follow-up Note     Followed up with patient for proactive rounding.   No acute issues at this time. Reviewed plan of care with bedside RNPreeti.   Team will continue to follow.  Please call Rapid Response RN, Randy Martin RN with any questions or concerns at [phone].

## 2020-12-25 NOTE — NURSING
Pt. C/O nausea and vomiting. Pt had thrown up by time RN responded to his room. Given SBL zofran and then taken to cath lab.

## 2020-12-25 NOTE — NURSING
Pressure closure device removed. No bleeding, hematoma, swelling noted at site. Gauze and tegaderm applied. Pt educated on when to call for help regarding site: bleeding, new onset of pain, swelling. Will continue to monitor.

## 2020-12-25 NOTE — PT/OT/SLP PROGRESS
Physical Therapy      Patient Name:  Pedro Ponce Jr.   MRN:  [id number]    Physical therapy orders acknowledged and chart review completed. Patient not seen today secondary to medical hold. Per primary RN and rapid response RN, patient with BP 68/47 and brielfy unresponsive. Rapid response team and MD present on attempt. Pt now scheduled for cardiac cath this date. PT to follow up as medically appropriate.     NINFA MURPHY, PT

## 2020-12-25 NOTE — ASSESSMENT & PLAN NOTE
Admitted to cardiovascular step down for work up of cardiac arrest. ACS protocol initiated the morning of HD 1 and decision made to take patient for [facility] +/- PCI due to chest pain and hypotension. Pt found to have Mid LAD to Dist LAD was found to be 90% stenosed w/ 0% stenosis post-intervention..      Plan:   - telemetry   - GDMT   - trending troponins   - f/u TTE   - closely monitor renal function - suspect KOURTNEY may be related to cardiac arrest

## 2020-12-25 NOTE — NURSING
At 0650 pt starting complaining of 7/10 tight chest pain with a blood pressure of 160/82 and map of 116. VS checked, MD notified. Stat EKG obtained, showed patient is in Afib. One dose of nitroglycerin given with minimal relief of chest pain. After this dose given, patient's blood pressure was 110/62 with 5/10 chest pain. MD at bedside at this time. Instructed to give second dose of nitroglycerin for further chest pain relief five minutes after first dose. After second dose, patient's blood pressure dropped to 68/47 with a map of 53. Patient became unresponsive at this time. Charge RN notified of patient's state while RN remained at bedside. Rapid response and MD notified of patient's condition. MD ordered 250ml bolus of NS. Patient more responsive once MD and rapid response arrived to room. BP responded to fluid bolus and patient placed in trendelenburg. Heparin gtt started and patient given magnesium IV. Patient scheduled for cath lab procedure following this.

## 2020-12-25 NOTE — ASSESSMENT & PLAN NOTE
On admission, not a STEMI and there was no indication to urgently take patient to cath lab as patient has never had any chest pain, transient elevation in ST segments that resolved with normal EF and no WMA - Discussed w Dr John. Decision made to admit to cardiovascular step down for work up of cardiac arrest. ACS protocol initiated the morning of HD 1 and decision made to take patient cath due to chest pain and hypotension.    Plan:   - telemetry   - LHC   - trending troponins   - f/u TTE   - closely monitor renal function - suspect KOURTNEY may be related to cardiac arrest

## 2020-12-25 NOTE — CONSULTS
[facility]  Interventional Cardiology  Consult Note    Patient Name: Pedro Ponce Jr.  MRN: [id number]  Admission Date: 12/24/2020  Hospital Length of Stay: 1 days  Code Status: Full Code   Attending Provider: Shade Lewis MD  Consulting Provider: John Vides MD  Primary Care Physician: John Ricci MD  Principal Problem:<principal problem not specified>    Patient information was obtained from patient and ER records.     Consults  Subjective:     Chief Complaint:  Chest Pain     HPI:  80 year old male with history of  HTN, DM-2, Hyperlipidemia,recent TIA who presents to the emergency department with the rapid response team after a code was called in the hospital.  He was accompanying his wife in clinic when he states that he became very weak and kept laying his head back.  At some point, bystanders helped him to the ground.  He sustained no trauma.  According to the rapid response team, medical personnel with the patient determined that patient was pulseless and apneic and started CPR.  By the time the rapid response team had arrived, the patient had achieved ROSC and mentating clearly.  Code STEMI was called due to ST elevation in lateral leads on EKG immediately after Code. Patient denied ever having any chest pain and denies any complaints now. He reports weakness. Repeat EKG showed LVH with repolarization changes and ectopic p waves, bedside echo showed normal EF with no wall motion abnormalities.    This morning 12/25 he began having 5/10 chest pain dull for 10 minutes responsive to  2 x nitroglycerine and EKG with NSR and PACs. Bedside TTE unchanged normal EF 60%. Troponin to 0.034 yesterday and 0.014 this morning.    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       Past Surgical History:   Procedure Laterality Date    COLONOSCOPY N/A 4/9/2018    Procedure: COLONOSCOPY;  Surgeon: HAIM Mir MD;  Location: [facility] ENDO (4TH FLR);  Service: Endoscopy;  Laterality: N/A;    COLONOSCOPY N/A 1/14/2019    Procedure: COLONOSCOPY;  Surgeon: HAIM Mir MD;  Location: [facility] ENDO (4TH FLR);  Service: Endoscopy;  Laterality: N/A;  PM prep    CYST REMOVAL  2000    removed from left kidney    KIDNEY SURGERY      PROSTATE SURGERY         Review of patient's allergies indicates:   Allergen Reactions    Pcn [penicillins] Rash       PTA Medications   Medication Sig    aspirin 81 mg Tab Take 81 mg by mouth. 1 Tablet Oral Every evening    atorvastatin (LIPITOR) 40 MG tablet Take 1 tablet (40 mg total) by mouth once daily.    chlorhexidine (PERIDEX) 0.12 % solution RINSE 1/2 OZ TWICE DAILY AFTER BREAKFAST AND BEFORE BEDTIME    dutasteride (AVODART) 0.5 mg capsule TAKE 1 CAPSULE (0.5 MG TOTAL) BY MOUTH ONCE DAILY.    hydrALAZINE (APRESOLINE) 50 MG tablet Take 100 mg by mouth 3 (three) times daily. Per Dr. Mcdonald     irbesartan (AVAPRO) 300 MG tablet TAKE 1 TABLET EVERY DAY    metFORMIN (GLUCOPHAGE) 500 MG tablet Take 1 tablet (500 mg total) by mouth 2 (two) times daily with meals.    metoprolol tartrate (LOPRESSOR) 50 MG tablet Take 0.5 tablets (25 mg total) by mouth 2 (two) times daily.    NIFEdipine (PROCARDIA-XL) 60 MG (OSM) 24 hr tablet Take 1 tablet (60 mg total) by mouth 2 (two) times daily.    oxybutynin (DITROPAN-XL) 10 MG 24 hr tablet Take 1 tablet (10 mg total) by mouth once daily.    oxyCODONE-acetaminophen (PERCOCET) 5-325 mg per tablet Take 1 tablet by mouth every 4 to 6 hours as needed for Pain. (Patient not taking: Reported on 12/18/2020)    senna-docusate 8.6-50 mg (SENNA WITH DOCUSATE SODIUM) 8.6-50 mg per tablet Take 1 tablet  by mouth once daily.    spironolactone (ALDACTONE) 50 MG tablet Take 1 tablet (50 mg total) by mouth once daily.    tamsulosin (FLOMAX) 0.4 mg Cap Take 1 capsule (0.4 mg total) by mouth every evening.     Family History     Problem Relation (Age of Onset)    Cancer Mother    Diabetes Mother    Glaucoma Mother    Heart disease Mother    Hypertension Mother        Tobacco Use    Smoking status: Never Smoker    Smokeless tobacco: Never Used   Substance and Sexual Activity    Alcohol use: No    Drug use: No    Sexual activity: Not Currently     Partners: Female     Review of Systems   Constitution: Negative for chills, decreased appetite and diaphoresis.   HENT: Negative for congestion and ear discharge.    Eyes: Negative for blurred vision and discharge.   Cardiovascular: Negative for chest pain, dyspnea on exertion, irregular heartbeat, leg swelling and paroxysmal nocturnal dyspnea.   Respiratory: Negative for cough, hemoptysis and shortness of breath.    Gastrointestinal: Negative for abdominal pain.     Objective:     Vital Signs (Most Recent):  Temp: 97.9 °F (36.6 °C) (12/25/20 0644)  Pulse: 90 (12/25/20 0745)  Resp: 14 (12/25/20 0644)  BP: 114/61 (12/25/20 0745)  SpO2: 97 % (12/25/20 0727) Vital Signs (24h Range):  Temp:  [97.5 °F (36.4 °C)-99.1 °F (37.3 °C)] 97.9 °F (36.6 °C)  Pulse:  [] 90  Resp:  [14-18] 14  SpO2:  [92 %-100 %] 97 %  BP: ()/(45-89) 114/61     Weight: 86.3 kg (190 lb 4.8 oz)  Body mass index is 26.54 kg/m².    SpO2: 97 %  O2 Device (Oxygen Therapy): nasal cannula      Intake/Output Summary (Last 24 hours) at 12/25/2020 0827  Last data filed at 12/25/2020 0647  Gross per 24 hour   Intake 420 ml   Output 850 ml   Net -430 ml       Lines/Drains/Airways     Peripheral Intravenous Line                 Peripheral IV - Single Lumen 12/24/20 20 G Left Antecubital 1 day         Peripheral IV - Single Lumen 12/24/20 1219 18 G Right Antecubital less than 1 day                Physical  Exam   Constitutional: He is oriented to person, place, and time. He appears well-developed and well-nourished. No distress.   Eyes: Pupils are equal, round, and reactive to light. Conjunctivae are normal.   Neck: No tracheal deviation present. No thyromegaly present.   Cardiovascular: Normal rate, regular rhythm, normal heart sounds and intact distal pulses. Exam reveals no gallop and no friction rub.   No murmur heard.  Pulses:       Radial pulses are 2+ on the right side and 2+ on the left side.        Femoral pulses are 2+ on the right side and 2+ on the left side.  Pulmonary/Chest: Effort normal and breath sounds normal. No respiratory distress. He has no wheezes. He has no rales.   Abdominal: Soft. Bowel sounds are normal. He exhibits no distension. There is no abdominal tenderness.   Musculoskeletal:         General: No deformity or edema.   Neurological: He is alert and oriented to person, place, and time. No cranial nerve deficit. Coordination normal.   Skin: Skin is warm and dry. He is not diaphoretic.   Psychiatric: He has a normal mood and affect. His behavior is normal.       Significant Labs:   BMP:   Recent Labs   Lab 12/24/20  1218 12/25/20  0414     108 91   *  133* 136   K 4.8  4.8 4.5     104 104   CO2 20*  20* 22*   BUN 29*  29* 24*   CREATININE 1.8*  1.8* 1.3   CALCIUM 10.1  10.1 10.2   MG  --  1.9       Significant Imaging: Echocardiogram:   2D echo with color flow doppler:   Results for orders placed or performed during the hospital encounter of 06/02/14   2D echo with color flow doppler   Result Value Ref Range    EF 60     Diastolic Dysfunction Yes     Narrative    TEST DESCRIPTION   Technical Quality: This study was performed in conjunction with a 3ml intravenous injection of Optison contrast agent because of poor endocardial visualization.     Aorta: The aortic root is normal in size, measuring 3.5 cm at sinotubular junction and 3.7 cm at Sinuses of Valsalva. The  proximal ascending aorta is normal in size, measuring 3.3 cm across.     Left Atrium: The left atrial volume index is normal, measuring 27.76 cc/m2.     Left Ventricle: The left ventricle is normal in size, with an end-diastolic diameter of 5.0 cm, and an end-systolic diameter of 3.4 cm. LV wall thickness is normal, with the septum measuring 1.2 cm and the posterior wall measuring 1.0 cm across. Relative   wall thickness was normal at 0.40, and the LV mass index was 109.2 g/m2 consistent with normal left ventricular mass. Global left ventricular systolic function appears normal. Visually estimated ejection fraction is 60-65%.   There is diastolic dysfunction secondary to relaxation abnormality.     Right Atrium: The right atrium is normal in size, measuring 5.8 cm in length and 3.8 cm in width in the apical view.     Right Ventricle: The right ventricle is normal in size. Global right ventricular systolic function appears normal. Tissue Doppler-derived tricuspid annular peak systolic velocity (S prime) is 12.0 cm/s.     Aortic Valve:  The aortic valve is normal in structure.     Mitral Valve:  The mitral valve is normal in structure.     Tricuspid Valve:  The tricuspid valve is normal in structure.     Pulmonary Valve:  The pulmonic valve is not well seen.     IVC: The IVC is not visualized.     Intracavitary: There is no evidence of pericardial effusion, intracavity mass, thrombi, or vegetation.         CONCLUSIONS     1 - Normal left ventricular systolic function (EF 60-65%).     2 - Left ventricular diastolic dysfunction.     3 - Normal right ventricular systolic function .         This document has been electronically    SIGNED BY: Lissa Garcia MD On: 06/02/2014 15:08    and Transthoracic echo (TTE) complete (Cupid Only):   Results for orders placed or performed during the hospital encounter of 12/06/20   Echo Color Flow Doppler? Yes; Bubble Contrast? Yes   Result Value Ref Range    BSA 2.11 m2    TDI SEPTAL  0.04 m/s    LV LATERAL E/E' RATIO 12.00 m/s    LV SEPTAL E/E' RATIO 12.00 m/s    LA WIDTH 4.33 cm    TDI LATERAL 0.04 m/s    LVIDd 4.63 3.5 - 6.0 cm    IVS 1.03 0.6 - 1.1 cm    Posterior Wall 0.89 0.6 - 1.1 cm    Ao root annulus 3.89 cm    LVIDs 3.34 2.1 - 4.0 cm    FS 28 28 - 44 %    LA volume 89.12 cm3    STJ 3.71 cm    Ascending aorta 3.80 cm    LV mass 151.82 g    LA size 4.17 cm    RVDD 3.36 cm    TAPSE 2.29 cm    Left Ventricle Relative Wall Thickness 0.38 cm    AV mean gradient 4 mmHg    AV valve area 3.38 cm2    AV Velocity Ratio 0.89     AV index (prosthetic) 0.91     MV valve area p 1/2 method 3.30 cm2    E/A ratio 0.59     Mean e' 0.04 m/s    E wave decelartion time 230.12 msec    IVRT 122.74 msec    Pulm vein S/D ratio 1.67     LVOT diameter 2.18 cm    LVOT area 3.7 cm2    LVOT peak keanu 1.10 m/s    LVOT peak VTI 23.39 cm    Ao peak keanu 1.24 m/s    Ao VTI 25.81 cm    LVOT stroke volume 87.26 cm3    AV peak gradient 6 mmHg    E/E' ratio 12.00 m/s    MV Peak E Keanu 0.48 m/s    TR Max Keanu 2.51 m/s    MV stenosis pressure 1/2 time 66.73 ms    MV Peak A Keanu 0.82 m/s    PV Peak S Keanu 0.40 m/s    PV Peak D Keanu 0.24 m/s    LV Systolic Volume 45.35 mL    LV Systolic Volume Index 21.7 mL/m2    LV Diastolic Volume 98.63 mL    LV Diastolic Volume Index 47.17 mL/m2    LA Volume Index 42.6 mL/m2    LV Mass Index 73 g/m2    RA Major Axis 5.86 cm    Left Atrium Minor Axis 6.28 cm    Left Atrium Major Axis 5.40 cm    Triscuspid Valve Regurgitation Peak Gradient 25 mmHg    RA Width 3.73 cm    Right Atrial Pressure (from IVC) 3 mmHg    TV rest pulmonary artery pressure 28 mmHg    Narrative    · The left ventricle is normal in size with normal systolic function. The   estimated ejection fraction is 60%.  · Grade I diastolic dysfunction.  · With normal right ventricular systolic function.  · Moderate left atrial enlargement.  · Moderate right atrial enlargement.  · Mild tricuspid regurgitation.  · Normal central venous  pressure (3 mmHg).  · The estimated PA systolic pressure is 28 mmHg.  · There is no evidence of intracardiac shunting. Negative bubble study        Assessment and Plan:     NSTEMI (non-ST elevated myocardial infarction)  - Patient seen and examined  - Chest pain with mild troponin elevation last night in the setting of prior EKG changes and syncope  - Agree with Heparin gtt and Plavix  - Proceed with [facility] +/- PCI this morning  -The risks, benefits & alternatives of the procedure were explained to the patient.    -The risks of coronary angiography include but are not limited to:  Bleeding, infection, heart rhythm abnormalities, allergic reactions, kidney injury, stroke and death.    -Should stenting be indicated, the patient has agreed to dual anti-platelet therapy for 1-consecutive year with a drug-eluting stent and a minimum of 1-month with the use of a bare metal stent.    -The risks of moderate sedation include hypotension, respiratory depression, arrhythmias, bronchospasm, & death.    -Informed consent was obtained & the patient is agreeable to proceed with the procedure.          VTE Risk Mitigation (From admission, onward)         Ordered     heparin 25,000 units in dextrose 5% (100 units/ml) IV bolus from bag - ADDITIONAL PRN BOLUS - 60 units/kg (max bolus 4000 units)  As needed (PRN)     Question:  Heparin Infusion Adjustment (DO NOT MODIFY ANSWER)  Answer:  \\[email or web address]\Images\Pharmacy\HeparinInfusions\heparin LOW INTENSITY nomogram for [facility] [id number].pdf    12/25/20 0709     heparin 25,000 units in dextrose 5% (100 units/ml) IV bolus from bag - ADDITIONAL PRN BOLUS - 30 units/kg (max bolus 4000 units)  As needed (PRN)     Question:  Heparin Infusion Adjustment (DO NOT MODIFY ANSWER)  Answer:  \[facility] [employer] [employer]\Images\Pharmacy\HeparinInfusions\heparin LOW INTENSITY nomogram for [facility] [id number].pdf    12/25/20 0709     heparin 25,000 units in dextrose 5% 250 mL (100 units/mL) infusion LOW INTENSITY nomogram -  [facility]  Continuous     Question Answer Comment   Heparin Infusion Adjustment (DO NOT MODIFY ANSWER) \\[email or web address]\Images\Pharmacy\HeparinInfusions\heparin LOW INTENSITY nomogram for [facility] [id number].pdf    Begin at (in units/kg/hr) 12        12/25/20 0709     IP VTE HIGH RISK PATIENT  Once      12/24/20 1357     Place sequential compression device  Until discontinued      12/24/20 1357                Thank you for your consult. I will follow-up with patient. Please contact us if you have any additional questions.    John Vides MD  Interventional Cardiology   [facility]

## 2020-12-25 NOTE — CARE UPDATE
Pt with chest pain. 5/10 in intensity. EKG with no acute ST elevations. Will give additional NTG, send Troponin. Updated IC and primary team. BP dropped with 2 dose of NTG. ACS protocol started. NPO past midnight.

## 2020-12-25 NOTE — ASSESSMENT & PLAN NOTE
Repeat EKG after admission showed LVH with repolarization changes and ectopic p waves, bedside echo showed normal EF with no wall motion abnormalities. This morning 12/25 he began having 5/10 chest pain dull for 10 minutes responsive to  2 x nitroglycerine and EKG with NSR and PACs. Bedside TTE unchanged normal EF 60%. Troponin to 0.034 yesterday and 0.014 this morning. Rapid response and cardiology called to bedside.    Results for orders placed during the hospital encounter of 12/06/20   Echo Color Flow Doppler? Yes; Bubble Contrast? Yes    Narrative · The left ventricle is normal in size with normal systolic function. The   estimated ejection fraction is 60%.  · Grade I diastolic dysfunction.  · With normal right ventricular systolic function.  · Moderate left atrial enlargement.  · Moderate right atrial enlargement.  · Mild tricuspid regurgitation.  · Normal central venous pressure (3 mmHg).  · The estimated PA systolic pressure is 28 mmHg.  · There is no evidence of intracardiac shunting. Negative bubble study        .  Results for orders placed during the hospital encounter of 12/24/20   Cardiac catheterization    Narrative · The Mid LAD to Dist LAD lesion was 90% stenosed with 0% stenosis   post-intervention.  · The estimated blood loss was <50 mL.  · There was single vessel coronary artery disease.  · The PCI was successful.  · A stent was not successfully placed.  · A STENT RESOLUTE LATANYA [id number] stent was successfully placed at 16 MATTHIAS   for 5 sec.     The procedure log was documented by Documenter: RT Gasper and   verified by Alan Rothman MD.    Date: 12/25/2020  Time: 10:34 AM       .    Plan:   - interventional cardiology consulted   - [facility] performed this AM   - trending troponins.   - heparin gtt and plavix given

## 2020-12-25 NOTE — CODE/ RAPID DOCUMENTATION
"RAPID RESPONSE NURSE NOTE     Admit Date: 2020  LOS: 1  Code Status: Full Code   Date of Consult: 2020  : 1940  Age: 80 y.o.  Weight:   Wt Readings from Last 1 Encounters:   20 86.3 kg (190 lb 4.8 oz)     Sex: male  Race: Black or    Bed: 343/[facility] A:   MRN: [id number]  Time Rapid Response Team page Received: 712  Time Rapid Response Team at Bedside: 0715  Time Rapid Response Team left Bedside: 07  Was the patient discharged from an ICU this admission?   no  Was the patient discharged from a PACU within last 24 hours?  no  Did the patient receive conscious sedation/general anesthesia within last 24 hours?  no  Was the patient in the ED within the past 24 hours?  yes  Was the patient started on NIPPV within the past 24 hours?  no  Did this progress into an ARC or CPA:  no  Attending Physician: Shade Lewis MD  Primary Service: Networked reference to record PCT   Consult Requested By: Shade Lewis MD     SITUATION     Notified by charge RN via phone call.  Reason for alert: Hypotension following SL nitro  Called to evaluate the patient for Circulatory    BACKGROUND     Why is the patient in the hospital?: <principal problem not specified>    Patient has a past medical history of BPH (benign prostatic hypertrophy), Cataract, Colon polyp, Diabetes mellitus, Diabetes mellitus type II, Glaucoma (increased eye pressure), Gout, joint, Hyperlipidemia, Hypertension, Macular degeneration, Obesity, and Retinal pigment epithelial detachment.    ASSESSMENT/INTERVENTIONS     /61   Pulse 90   Temp 97.9 °F (36.6 °C) (Tympanic)   Resp 14   Ht 5' 11" (1.803 m)   Wt 86.3 kg (190 lb 4.8 oz)   SpO2 97%   BMI 26.54 kg/m²     What did you find: Pt laying in bed AAO, pt had chest pain this morning, primary team at bedside, primary RN and charge RN at bedside. Pt became hypotensive following second SL nitro tab. Pt placed in shock position with improvement in BP, orders per primary team " for fluids, meds and labs. Bedside Echo to be performed by primary team. Pt remained asymptomatic during period of time RRRN at bedside. Pt VS returned to WNL and RRRN discussed case with charge nurse and bedside nurse. Staff comfortable at this time with pt picture. RRRN to follow.     RECOMMENDATIONS     We recommend: close VS and neuro monitoring, call primary team and RRRN with changes.     FOLLOW-UP/CONTINGENCY PLAN     Call the Rapid Response Nurse, Randy Martin RN at x [phone] for additional questions or concerns.    PHYSICIAN ESCALATION     Orders received and case discussed with Dr. Rodriguez.    Disposition: Remain in room 343.

## 2020-12-25 NOTE — ASSESSMENT & PLAN NOTE
Presented to the emergency department with the rapid response team after a code was called in the hospital. He was accompanying his wife in clinic when he states that he became very weak and kept laying his head back. At some point, bystanders helped him to the ground. He sustained no trauma. According to the rapid response team, medical personnel with the patient determined that patient was pulseless and apneic and started CPR. By the time the rapid response team had arrived, the patient had achieved ROSC and mentating clearly. Code STEMI was called due to ST elevation in lateral leads on EKG immediately after Code. Patient denied ever having any chest pain during this episode.

## 2020-12-25 NOTE — NURSING
Called MD. Rodriguez for clarification of LHC tomorrow, and was told not sure about the procedure yet but  keep Pt NPO start midnight 12/25/2020.

## 2020-12-25 NOTE — PLAN OF CARE
VSS. . MD. Rodriguez not sure about [facility] yet. Keep NPO per MD order. Pt cooperative to receive routine care. Pt educated on fall risk overnight. Resting quietly. Denies chest pain, SOB, palpitations, dizziness, pain, or discomfort. Plan of care reviewed with pt, all questions answered. Bed locked in lowest position, call bell within reach, no acute distress noted, will continue to monitor.

## 2020-12-25 NOTE — SUBJECTIVE & OBJECTIVE
Interval History:Early this AM, Mr. Ponce began complaining of chest pain. He rated it an 5/10 initially and was given sublingual nitroglycerin, which did lead to improvement of his pain.  EKG with no acute ST elevations. Another NTG given and troponin sent. After the SL nitro, pt became hypotensive, but his pain was 2/10. ACS protocol started. NPO past midnight. Plans made to take to cardiac cath this AM.     Review of Systems   Constitution: Positive for decreased appetite. Negative for chills, fever, malaise/fatigue and night sweats.   HENT: Negative for congestion, hoarse voice and sore throat.    Eyes: Negative for blurred vision, double vision, pain, photophobia and visual disturbance.   Cardiovascular: Positive for chest pain (2/10) and syncope. Negative for dyspnea on exertion, irregular heartbeat, leg swelling, orthopnea and palpitations.   Respiratory: Positive for shortness of breath. Negative for cough, sleep disturbances due to breathing, sputum production and wheezing.    Endocrine: Negative for polyphagia.   Musculoskeletal: Positive for back pain. Negative for falls, muscle cramps and muscle weakness.   Gastrointestinal: Negative for abdominal pain, change in bowel habit, diarrhea, heartburn, jaundice, nausea and vomiting.   Genitourinary: Negative for bladder incontinence, decreased libido, dysuria, flank pain, frequency and pelvic pain.   Neurological: Positive for light-headedness. Negative for disturbances in coordination, headaches, numbness, paresthesias, seizures, sensory change and tremors.   Psychiatric/Behavioral: Negative for altered mental status and memory loss. The patient does not have insomnia and is not nervous/anxious.      Objective:     Vital Signs (Most Recent):  Temp: 97.9 °F (36.6 °C) (12/25/20 0644)  Pulse: 90 (12/25/20 0745)  Resp: 14 (12/25/20 0644)  BP: 114/61 (12/25/20 0745)  SpO2: 97 % (12/25/20 0727) Vital Signs (24h Range):  Temp:  [97.5 °F (36.4 °C)-99.1 °F (37.3  °C)] 97.9 °F (36.6 °C)  Pulse:  [] 90  Resp:  [14-18] 14  SpO2:  [92 %-100 %] 97 %  BP: ()/(45-89) 114/61     Weight: 86.3 kg (190 lb 4.8 oz)  Body mass index is 26.54 kg/m².     SpO2: 97 %  O2 Device (Oxygen Therapy): nasal cannula      Intake/Output Summary (Last 24 hours) at 12/25/2020 0801  Last data filed at 12/25/2020 0647  Gross per 24 hour   Intake 420 ml   Output 850 ml   Net -430 ml       Lines/Drains/Airways     Peripheral Intravenous Line                 Peripheral IV - Single Lumen 12/24/20 20 G Left Antecubital 1 day         Peripheral IV - Single Lumen 12/24/20 1219 18 G Right Antecubital less than 1 day                Physical Exam   Constitutional: He is oriented to person, place, and time. He appears well-developed and well-nourished.   HENT:   Head: Normocephalic and atraumatic.   Nose: Nose normal.   Mouth/Throat: No oropharyngeal exudate.   Eyes: Right eye exhibits no discharge. Left eye exhibits no discharge. No scleral icterus.   Neck: Normal range of motion. Neck supple. No JVD present.   Cardiovascular: Regular rhythm, S1 normal and S2 normal. Exam reveals no gallop, no S3, no S4, no distant heart sounds, no friction rub, no midsystolic click and no opening snap.   No murmur heard.  Pulses:       Radial pulses are 2+ on the right side and 2+ on the left side.        Femoral pulses are 2+ on the right side and 2+ on the left side.  Pulmonary/Chest: Effort normal and breath sounds normal. No respiratory distress. He has no wheezes. He has no rales. He exhibits no tenderness.   Abdominal: Soft. Bowel sounds are normal. He exhibits no distension. There is no abdominal tenderness. There is no rebound.   Musculoskeletal: Normal range of motion.         General: No tenderness, deformity or edema.   Lymphadenopathy:     He has no cervical adenopathy.   Neurological: He is alert and oriented to person, place, and time. No cranial nerve deficit.   Skin: Skin is warm and dry.   Psychiatric:  He has a normal mood and affect. His behavior is normal.       Significant Labs:   ABG: No results for input(s): PH, PCO2, HCO3, POCSATURATED, BE in the last 48 hours., CMP   Recent Labs   Lab 12/24/20  1218 12/25/20  0414   *  133* 136   K 4.8  4.8 4.5     104 104   CO2 20*  20* 22*     108 91   BUN 29*  29* 24*   CREATININE 1.8*  1.8* 1.3   CALCIUM 10.1  10.1 10.2   PROT 6.4 6.6   ALBUMIN 3.6 3.8   BILITOT 0.9 0.8   ALKPHOS 56 63   AST 16 16   ALT 12 15   ANIONGAP 9  9 10   ESTGFRAFRICA 39.9*  39.9* 59.2*   EGFRNONAA 34.5*  34.5* 51.2*   , CBC   Recent Labs   Lab 12/24/20  1218 12/25/20  0414 12/25/20  0726   WBC 5.70  5.70 5.46 6.65   HGB 11.8*  11.8* 13.1* 12.3*   HCT 36.9*  36.9* 40.1 37.3*     256 251 281   , INR No results for input(s): INR, PROTIME in the last 48 hours. and All pertinent lab results from the last 24 hours have been reviewed.    Significant Imaging:   Results for orders placed during the hospital encounter of 12/06/20   Echo Color Flow Doppler? Yes; Bubble Contrast? Yes    Narrative · The left ventricle is normal in size with normal systolic function. The   estimated ejection fraction is 60%.  · Grade I diastolic dysfunction.  · With normal right ventricular systolic function.  · Moderate left atrial enlargement.  · Moderate right atrial enlargement.  · Mild tricuspid regurgitation.  · Normal central venous pressure (3 mmHg).  · The estimated PA systolic pressure is 28 mmHg.  · There is no evidence of intracardiac shunting. Negative bubble study

## 2020-12-25 NOTE — HOSPITAL COURSE
Pt admitted to cardiology on 12/24 s/p CA with ROSC within 1 minute. Initally EKG concerning for transient ST elevation; however, repeat EKG with resolution of ischemic changes and bedside echo with normal EF and no WMA. Initial troponin's were WNL but peaked at 0.03. He was started on ACS protocol with DAPT and heparin gtt. He then developed chest pain and hypotension so decision was made to take patient to cath lab for suspected NSTEMI. LHC on 12/25 on showed 90% lesion to Mid LAD to Dist LAD s/p PCI with DENNIS. He did well post op and was discharged home on 12/26. Was started on irbesartan 50mg and nefidpine 60mg for BP control. Also, given Rx for ASA, plavix, and toprol 25mg, continued on lipitor 40mg. Will have follow up with cardiology in 2 weeks.

## 2020-12-25 NOTE — SUBJECTIVE & OBJECTIVE
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       Past Surgical History:   Procedure Laterality Date    COLONOSCOPY N/A 4/9/2018    Procedure: COLONOSCOPY;  Surgeon: HAIM Mir MD;  [facility]  Service: Endoscopy;  Laterality: N/A;    COLONOSCOPY N/A 1/14/2019    Procedure: COLONOSCOPY;  Surgeon: HAIM Mir MD;  [facility]  Service: Endoscopy;  Laterality: N/A;  PM prep    CYST REMOVAL  2000    removed from left kidney    KIDNEY SURGERY      PROSTATE SURGERY         Review of patient's allergies indicates:   Allergen Reactions    Pcn [penicillins] Rash       PTA Medications   Medication Sig    aspirin 81 mg Tab Take 81 mg by mouth. 1 Tablet Oral Every evening    atorvastatin (LIPITOR) 40 MG tablet Take 1 tablet (40 mg total) by mouth once daily.    chlorhexidine (PERIDEX) 0.12 % solution RINSE 1/2 OZ TWICE DAILY AFTER BREAKFAST AND BEFORE BEDTIME    dutasteride (AVODART) 0.5 mg capsule TAKE 1 CAPSULE (0.5 MG TOTAL) BY MOUTH ONCE DAILY.    hydrALAZINE (APRESOLINE) 50 MG tablet Take 100 mg by mouth 3 (three) times daily. Per Dr. Mcdonald     irbesartan (AVAPRO) 300 MG tablet TAKE 1 TABLET EVERY DAY    metFORMIN (GLUCOPHAGE) 500 MG tablet Take 1 tablet (500 mg total) by mouth 2 (two) times daily with meals.    metoprolol tartrate (LOPRESSOR) 50 MG tablet Take 0.5 tablets (25 mg total) by mouth 2 (two) times daily.    NIFEdipine (PROCARDIA-XL) 60 MG (OSM) 24 hr tablet Take 1 tablet (60 mg total) by mouth 2 (two) times daily.    oxybutynin (DITROPAN-XL) 10 MG 24 hr tablet Take 1 tablet (10 mg total) by mouth once daily.     oxyCODONE-acetaminophen (PERCOCET) 5-325 mg per tablet Take 1 tablet by mouth every 4 to 6 hours as needed for Pain. (Patient not taking: Reported on 12/18/2020)    senna-docusate 8.6-50 mg (SENNA WITH DOCUSATE SODIUM) 8.6-50 mg per tablet Take 1 tablet by mouth once daily.    spironolactone (ALDACTONE) 50 MG tablet Take 1 tablet (50 mg total) by mouth once daily.    tamsulosin (FLOMAX) 0.4 mg Cap Take 1 capsule (0.4 mg total) by mouth every evening.     Family History     Problem Relation (Age of Onset)    Cancer Mother    Diabetes Mother    Glaucoma Mother    Heart disease Mother    Hypertension Mother        Tobacco Use    Smoking status: Never Smoker    Smokeless tobacco: Never Used   Substance and Sexual Activity    Alcohol use: No    Drug use: No    Sexual activity: Not Currently     Partners: Female     Review of Systems   Constitution: Negative for chills, decreased appetite and diaphoresis.   HENT: Negative for congestion and ear discharge.    Eyes: Negative for blurred vision and discharge.   Cardiovascular: Negative for chest pain, dyspnea on exertion, irregular heartbeat, leg swelling and paroxysmal nocturnal dyspnea.   Respiratory: Negative for cough, hemoptysis and shortness of breath.    Gastrointestinal: Negative for abdominal pain.     Objective:     Vital Signs (Most Recent):  Temp: 97.9 °F (36.6 °C) (12/25/20 0644)  Pulse: 90 (12/25/20 0745)  Resp: 14 (12/25/20 0644)  BP: 114/61 (12/25/20 0745)  SpO2: 97 % (12/25/20 0727) Vital Signs (24h Range):  Temp:  [97.5 °F (36.4 °C)-99.1 °F (37.3 °C)] 97.9 °F (36.6 °C)  Pulse:  [] 90  Resp:  [14-18] 14  SpO2:  [92 %-100 %] 97 %  BP: ()/(45-89) 114/61     Weight: 86.3 kg (190 lb 4.8 oz)  Body mass index is 26.54 kg/m².    SpO2: 97 %  O2 Device (Oxygen Therapy): nasal cannula      Intake/Output Summary (Last 24 hours) at 12/25/2020 0827  Last data filed at 12/25/2020 0647  Gross per 24 hour   Intake 420 ml   Output 850 ml   Net -430 ml        Lines/Drains/Airways     Peripheral Intravenous Line                 Peripheral IV - Single Lumen 12/24/20 20 G Left Antecubital 1 day         Peripheral IV - Single Lumen 12/24/20 1219 18 G Right Antecubital less than 1 day                Physical Exam   Constitutional: He is oriented to person, place, and time. He appears well-developed and well-nourished. No distress.   Eyes: Pupils are equal, round, and reactive to light. Conjunctivae are normal.   Neck: No tracheal deviation present. No thyromegaly present.   Cardiovascular: Normal rate, regular rhythm, normal heart sounds and intact distal pulses. Exam reveals no gallop and no friction rub.   No murmur heard.  Pulses:       Radial pulses are 2+ on the right side and 2+ on the left side.        Femoral pulses are 2+ on the right side and 2+ on the left side.  Pulmonary/Chest: Effort normal and breath sounds normal. No respiratory distress. He has no wheezes. He has no rales.   Abdominal: Soft. Bowel sounds are normal. He exhibits no distension. There is no abdominal tenderness.   Musculoskeletal:         General: No deformity or edema.   Neurological: He is alert and oriented to person, place, and time. No cranial nerve deficit. Coordination normal.   Skin: Skin is warm and dry. He is not diaphoretic.   Psychiatric: He has a normal mood and affect. His behavior is normal.       Significant Labs:   BMP:   Recent Labs   Lab 12/24/20  1218 12/25/20  0414     108 91   *  133* 136   K 4.8  4.8 4.5     104 104   CO2 20*  20* 22*   BUN 29*  29* 24*   CREATININE 1.8*  1.8* 1.3   CALCIUM 10.1  10.1 10.2   MG  --  1.9       Significant Imaging: Echocardiogram:   2D echo with color flow doppler:   Results for orders placed or performed during the hospital encounter of 06/02/14   2D echo with color flow doppler   Result Value Ref Range    EF 60     Diastolic Dysfunction Yes     Narrative    TEST DESCRIPTION   Technical Quality: This study  was performed in conjunction with a 3ml intravenous injection of Optison contrast agent because of poor endocardial visualization.     Aorta: The aortic root is normal in size, measuring 3.5 cm at sinotubular junction and 3.7 cm at Sinuses of Valsalva. The proximal ascending aorta is normal in size, measuring 3.3 cm across.     Left Atrium: The left atrial volume index is normal, measuring 27.76 cc/m2.     Left Ventricle: The left ventricle is normal in size, with an end-diastolic diameter of 5.0 cm, and an end-systolic diameter of 3.4 cm. LV wall thickness is normal, with the septum measuring 1.2 cm and the posterior wall measuring 1.0 cm across. Relative   wall thickness was normal at 0.40, and the LV mass index was 109.2 g/m2 consistent with normal left ventricular mass. Global left ventricular systolic function appears normal. Visually estimated ejection fraction is 60-65%.   There is diastolic dysfunction secondary to relaxation abnormality.     Right Atrium: The right atrium is normal in size, measuring 5.8 cm in length and 3.8 cm in width in the apical view.     Right Ventricle: The right ventricle is normal in size. Global right ventricular systolic function appears normal. Tissue Doppler-derived tricuspid annular peak systolic velocity (S prime) is 12.0 cm/s.     Aortic Valve:  The aortic valve is normal in structure.     Mitral Valve:  The mitral valve is normal in structure.     Tricuspid Valve:  The tricuspid valve is normal in structure.     Pulmonary Valve:  The pulmonic valve is not well seen.     IVC: The IVC is not visualized.     Intracavitary: There is no evidence of pericardial effusion, intracavity mass, thrombi, or vegetation.         CONCLUSIONS     1 - Normal left ventricular systolic function (EF 60-65%).     2 - Left ventricular diastolic dysfunction.     3 - Normal right ventricular systolic function .         This document has been electronically    SIGNED BY: Lissa Garcia MD On:  06/02/2014 15:08    and Transthoracic echo (TTE) complete (Cupid Only):   Results for orders placed or performed during the hospital encounter of 12/06/20   Echo Color Flow Doppler? Yes; Bubble Contrast? Yes   Result Value Ref Range    BSA 2.11 m2    TDI SEPTAL 0.04 m/s    LV LATERAL E/E' RATIO 12.00 m/s    LV SEPTAL E/E' RATIO 12.00 m/s    LA WIDTH 4.33 cm    TDI LATERAL 0.04 m/s    LVIDd 4.63 3.5 - 6.0 cm    IVS 1.03 0.6 - 1.1 cm    Posterior Wall 0.89 0.6 - 1.1 cm    Ao root annulus 3.89 cm    LVIDs 3.34 2.1 - 4.0 cm    FS 28 28 - 44 %    LA volume 89.12 cm3    STJ 3.71 cm    Ascending aorta 3.80 cm    LV mass 151.82 g    LA size 4.17 cm    RVDD 3.36 cm    TAPSE 2.29 cm    Left Ventricle Relative Wall Thickness 0.38 cm    AV mean gradient 4 mmHg    AV valve area 3.38 cm2    AV Velocity Ratio 0.89     AV index (prosthetic) 0.91     MV valve area p 1/2 method 3.30 cm2    E/A ratio 0.59     Mean e' 0.04 m/s    E wave decelartion time 230.12 msec    IVRT 122.74 msec    Pulm vein S/D ratio 1.67     LVOT diameter 2.18 cm    LVOT area 3.7 cm2    LVOT peak keanu 1.10 m/s    LVOT peak VTI 23.39 cm    Ao peak keanu 1.24 m/s    Ao VTI 25.81 cm    LVOT stroke volume 87.26 cm3    AV peak gradient 6 mmHg    E/E' ratio 12.00 m/s    MV Peak E Keanu 0.48 m/s    TR Max Keanu 2.51 m/s    MV stenosis pressure 1/2 time 66.73 ms    MV Peak A Keanu 0.82 m/s    PV Peak S Keanu 0.40 m/s    PV Peak D Keanu 0.24 m/s    LV Systolic Volume 45.35 mL    LV Systolic Volume Index 21.7 mL/m2    LV Diastolic Volume 98.63 mL    LV Diastolic Volume Index 47.17 mL/m2    LA Volume Index 42.6 mL/m2    LV Mass Index 73 g/m2    RA Major Axis 5.86 cm    Left Atrium Minor Axis 6.28 cm    Left Atrium Major Axis 5.40 cm    Triscuspid Valve Regurgitation Peak Gradient 25 mmHg    RA Width 3.73 cm    Right Atrial Pressure (from IVC) 3 mmHg    TV rest pulmonary artery pressure 28 mmHg    Narrative    · The left ventricle is normal in size with normal systolic function. The    estimated ejection fraction is 60%.  · Grade I diastolic dysfunction.  · With normal right ventricular systolic function.  · Moderate left atrial enlargement.  · Moderate right atrial enlargement.  · Mild tricuspid regurgitation.  · Normal central venous pressure (3 mmHg).  · The estimated PA systolic pressure is 28 mmHg.  · There is no evidence of intracardiac shunting. Negative bubble study

## 2020-12-25 NOTE — PROGRESS NOTES
[facility]  Cardiology  Progress Note    Patient Name: Pedro Ponce Jr.  MRN: [id number]  Admission Date: 12/24/2020  Hospital Length of Stay: 1 days  Code Status: Full Code   Attending Physician: Shade Lewis MD   Primary Care Physician: John Ricci MD  Expected Discharge Date:   Principal Problem:<principal problem not specified>    Subjective:     Hospital Course:   Pt admitted to inpatient cardiology team on 12/24 with plans to set up with outpatient cardiac monitor. On admission, no STEMI detected and there was no indication to urgently take patient to cath lab as patient has never had any chest pain, transient elevation in ST segments that resolved with normal EF and no WMA. Decision made to admit to cardiovascular step down for work up of cardiac arrest. ACS protocol intitiated the morning of HD 1 and treatment plan changed. Decision was made to take patient cath due to persistent chest pain and hypotension and suspected NSTEMI. LHC w/ PCI performed on AM of 12/25. The Mid LAD to Dist LAD was found to be 90% stenosed w/ 0% stenosis post-intervention.    Interval History:Early this AM, Mr. Ponce began complaining of chest pain. He rated it an 5/10 initially and was given sublingual nitroglycerin, which did lead to improvement of his pain.  EKG with no acute ST elevations. Another NTG given and troponin sent. After the SL nitro, pt became hypotensive, but his pain was 2/10. ACS protocol started. NPO past midnight. Plans made to take to cardiac cath this AM.     Review of Systems   Constitution: Positive for decreased appetite. Negative for chills, fever, malaise/fatigue and night sweats.   HENT: Negative for congestion, hoarse voice and sore throat.    Eyes: Negative for blurred vision, double vision, pain, photophobia and visual disturbance.   Cardiovascular: Positive for chest pain (2/10) and syncope. Negative for dyspnea on exertion, irregular heartbeat, leg swelling,  orthopnea and palpitations.   Respiratory: Positive for shortness of breath. Negative for cough, sleep disturbances due to breathing, sputum production and wheezing.    Endocrine: Negative for polyphagia.   Musculoskeletal: Positive for back pain. Negative for falls, muscle cramps and muscle weakness.   Gastrointestinal: Negative for abdominal pain, change in bowel habit, diarrhea, heartburn, jaundice, nausea and vomiting.   Genitourinary: Negative for bladder incontinence, decreased libido, dysuria, flank pain, frequency and pelvic pain.   Neurological: Positive for light-headedness. Negative for disturbances in coordination, headaches, numbness, paresthesias, seizures, sensory change and tremors.   Psychiatric/Behavioral: Negative for altered mental status and memory loss. The patient does not have insomnia and is not nervous/anxious.      Objective:     Vital Signs (Most Recent):  Temp: 97.9 °F (36.6 °C) (12/25/20 0644)  Pulse: 90 (12/25/20 0745)  Resp: 14 (12/25/20 0644)  BP: 114/61 (12/25/20 0745)  SpO2: 97 % (12/25/20 0727) Vital Signs (24h Range):  Temp:  [97.5 °F (36.4 °C)-99.1 °F (37.3 °C)] 97.9 °F (36.6 °C)  Pulse:  [] 90  Resp:  [14-18] 14  SpO2:  [92 %-100 %] 97 %  BP: ()/(45-89) 114/61     Weight: 86.3 kg (190 lb 4.8 oz)  Body mass index is 26.54 kg/m².     SpO2: 97 %  O2 Device (Oxygen Therapy): nasal cannula      Intake/Output Summary (Last 24 hours) at 12/25/2020 0801  Last data filed at 12/25/2020 0647  Gross per 24 hour   Intake 420 ml   Output 850 ml   Net -430 ml       Lines/Drains/Airways     Peripheral Intravenous Line                 Peripheral IV - Single Lumen 12/24/20 20 G Left Antecubital 1 day         Peripheral IV - Single Lumen 12/24/20 1219 18 G Right Antecubital less than 1 day                Physical Exam   Constitutional: He is oriented to person, place, and time. He appears well-developed and well-nourished.   HENT:   Head: Normocephalic and atraumatic.   Nose: Nose  normal.   Mouth/Throat: No oropharyngeal exudate.   Eyes: Right eye exhibits no discharge. Left eye exhibits no discharge. No scleral icterus.   Neck: Normal range of motion. Neck supple. No JVD present.   Cardiovascular: Regular rhythm, S1 normal and S2 normal. Exam reveals no gallop, no S3, no S4, no distant heart sounds, no friction rub, no midsystolic click and no opening snap.   No murmur heard.  Pulses:       Radial pulses are 2+ on the right side and 2+ on the left side.        Femoral pulses are 2+ on the right side and 2+ on the left side.  Pulmonary/Chest: Effort normal and breath sounds normal. No respiratory distress. He has no wheezes. He has no rales. He exhibits no tenderness.   Abdominal: Soft. Bowel sounds are normal. He exhibits no distension. There is no abdominal tenderness. There is no rebound.   Musculoskeletal: Normal range of motion.         General: No tenderness, deformity or edema.   Lymphadenopathy:     He has no cervical adenopathy.   Neurological: He is alert and oriented to person, place, and time. No cranial nerve deficit.   Skin: Skin is warm and dry.   Psychiatric: He has a normal mood and affect. His behavior is normal.       Significant Labs:   ABG: No results for input(s): PH, PCO2, HCO3, POCSATURATED, BE in the last 48 hours., CMP   Recent Labs   Lab 12/24/20 1218 12/25/20  0414   *  133* 136   K 4.8  4.8 4.5     104 104   CO2 20*  20* 22*     108 91   BUN 29*  29* 24*   CREATININE 1.8*  1.8* 1.3   CALCIUM 10.1  10.1 10.2   PROT 6.4 6.6   ALBUMIN 3.6 3.8   BILITOT 0.9 0.8   ALKPHOS 56 63   AST 16 16   ALT 12 15   ANIONGAP 9  9 10   ESTGFRAFRICA 39.9*  39.9* 59.2*   EGFRNONAA 34.5*  34.5* 51.2*   , CBC   Recent Labs   Lab 12/24/20 1218 12/25/20  0414 12/25/20  0726   WBC 5.70  5.70 5.46 6.65   HGB 11.8*  11.8* 13.1* 12.3*   HCT 36.9*  36.9* 40.1 37.3*     256 251 281   , INR No results for input(s): INR, PROTIME in the last 48 hours. and  All pertinent lab results from the last 24 hours have been reviewed.    Significant Imaging:   Results for orders placed during the hospital encounter of 12/06/20   Echo Color Flow Doppler? Yes; Bubble Contrast? Yes    Narrative · The left ventricle is normal in size with normal systolic function. The   estimated ejection fraction is 60%.  · Grade I diastolic dysfunction.  · With normal right ventricular systolic function.  · Moderate left atrial enlargement.  · Moderate right atrial enlargement.  · Mild tricuspid regurgitation.  · Normal central venous pressure (3 mmHg).  · The estimated PA systolic pressure is 28 mmHg.  · There is no evidence of intracardiac shunting. Negative bubble study            Assessment and Plan:     Brief HPI: 81 yo with NSTEMI s/p LHC and succesful PCI.    * NSTEMI (non-ST elevated myocardial infarction)  Repeat EKG after admission showed LVH with repolarization changes and ectopic p waves, bedside echo showed normal EF with no wall motion abnormalities. This morning 12/25 he began having 5/10 chest pain dull for 10 minutes responsive to  2 x nitroglycerine and EKG with NSR and PACs. Bedside TTE unchanged normal EF 60%. Troponin to 0.034 yesterday and 0.014 this morning. Rapid response and cardiology called to bedside.    Results for orders placed during the hospital encounter of 12/06/20   Echo Color Flow Doppler? Yes; Bubble Contrast? Yes    Narrative · The left ventricle is normal in size with normal systolic function. The   estimated ejection fraction is 60%.  · Grade I diastolic dysfunction.  · With normal right ventricular systolic function.  · Moderate left atrial enlargement.  · Moderate right atrial enlargement.  · Mild tricuspid regurgitation.  · Normal central venous pressure (3 mmHg).  · The estimated PA systolic pressure is 28 mmHg.  · There is no evidence of intracardiac shunting. Negative bubble study        .  Results for orders placed during the hospital encounter of  12/24/20   Cardiac catheterization    Narrative · The Mid LAD to Dist LAD lesion was 90% stenosed with 0% stenosis   post-intervention.  · The estimated blood loss was <50 mL.  · There was single vessel coronary artery disease.  · The PCI was successful.  · A stent was not successfully placed.  · A STENT RESOLUTE LATANYA [id number] stent was successfully placed at 16 MATTHIAS   for 5 sec.     The procedure log was documented by Documenter: RT Gasper and   verified by Alan Rothman MD.    Date: 12/25/2020  Time: 10:34 AM       .    Plan:   - interventional cardiology consulted   - [facility] performed this AM   - trending troponins.   - heparin gtt and plavix given, heparin gtt d/c'd.   - Started ASA, plavix   - started metoprolol succinate 25mg daily    Cardiac arrest  Admitted to cardiovascular step down for work up of cardiac arrest. ACS protocol initiated the morning of HD 1 and decision made to take patient for LHC +/- PCI due to chest pain and hypotension. Pt found to have Mid LAD to Dist LAD was found to be 90% stenosed w/ 0% stenosis post-intervention..      Plan:   - telemetry   - GDMT   - trending troponins   - f/u TTE   - closely monitor renal function - suspect KOURTNEY may be related to cardiac arrest       Syncope and collapse  Presented to the emergency department with the rapid response team after a code was called in the hospital. He was accompanying his wife in clinic when he states that he became very weak and kept laying his head back. At some point, bystanders helped him to the ground. He sustained no trauma. According to the rapid response team, medical personnel with the patient determined that patient was pulseless and apneic and started CPR. By the time the rapid response team had arrived, the patient had achieved ROSC and mentating clearly. Code STEMI was called due to ST elevation in lateral leads on EKG immediately after Code. Patient denied ever having any chest pain during this episode. Syncope  and collapse likely 2/2 NSTEMI.      VTE Risk Mitigation (From admission, onward)         Ordered     heparin (porcine) 3,000 Units, verapamiL 5 mg, nitroGLYCERIN 5 mg in sodium chloride 0.9% 1,000 mL  As needed (PRN)      12/25/20 1005     heparin (porcine) injection  As needed (PRN)      12/25/20 0922     heparin (porcine) 750 Units, verapamiL 1.25 mg, nitroGLYCERIN 1.25 mg in sodium chloride 0.9% 250 mL  As needed (PRN)      12/25/20 0912     heparin infusion 1,000 units/500 ml in 0.9% NaCl (on sterile field)  As needed (PRN)      12/25/20 0912     heparin 25,000 units in dextrose 5% (100 units/ml) IV bolus from bag - ADDITIONAL PRN BOLUS - 60 units/kg (max bolus 4000 units)  As needed (PRN)     Question:  Heparin Infusion Adjustment (DO NOT MODIFY ANSWER)  Answer:  [email or web address]\epic\Images\Pharmacy\HeparinInfusions\heparin LOW INTENSITY nomogram for [facility] [id number]    12/25/20 0709     heparin 25,000 units in dextrose 5% (100 units/ml) IV bolus from bag - ADDITIONAL PRN BOLUS - 30 units/kg (max bolus 4000 units)  As needed (PRN)     Question:  Heparin Infusion Adjustment (DO NOT MODIFY ANSWER)  Answer:  \[email or web address]\epic\Images\Pharmacy\HeparinInfusions\heparin LOW INTENSITY nomogram for [facility] [id number]    12/25/20 0709     heparin 25,000 units in dextrose 5% 250 mL (100 units/mL) infusion LOW INTENSITY nomogram - [facility]  Continuous     Question Answer Comment   Heparin Infusion Adjustment (DO NOT MODIFY ANSWER) \[email or web address]\epic\Images\Pharmacy\HeparinInfusions\heparin LOW INTENSITY nomogram for [facility] [id number]    Begin at (in units/kg/hr) 12        12/25/20 0709     IP VTE HIGH RISK PATIENT  Once      12/24/20 1357     Place sequential compression device  Until discontinued      12/24/20 1357                Yenifer Horvath MD  Cardiology  [facility]

## 2020-12-25 NOTE — PLAN OF CARE
Pt free of falls/trauma/injuries. Pt had chest pain and SOB this morning that was treated with nitro at bedside. Rapid response called to assess patient as well, see note in chart. Pt ended up going to cath lab due to chest pain. Right radial access used, stent placed in LAD. No complaints of chest pain following procedure. Right radial site WNL. Frequents completed, pressure band removed and gauze with tegaderm placed on site. Patient to potentially be discharged tomorrow. Pt restarted on home medications to control blood pressure. Plan of care discussed with patient and spouse, questions encouraged.

## 2020-12-26 VITALS
OXYGEN SATURATION: 96 % | HEIGHT: 71 IN | HEART RATE: 62 BPM | RESPIRATION RATE: 16 BRPM | DIASTOLIC BLOOD PRESSURE: 79 MMHG | TEMPERATURE: 98 F | BODY MASS INDEX: 26.85 KG/M2 | SYSTOLIC BLOOD PRESSURE: 155 MMHG | WEIGHT: 191.81 LBS

## 2020-12-26 LAB
ALBUMIN SERPL BCP-MCNC: 3.2 G/DL (ref 3.5–5.2)
ALP SERPL-CCNC: 55 U/L (ref 55–135)
ALT SERPL W/O P-5'-P-CCNC: 11 U/L (ref 10–44)
ANION GAP SERPL CALC-SCNC: 6 MMOL/L (ref 8–16)
AST SERPL-CCNC: 14 U/L (ref 10–40)
BASOPHILS # BLD AUTO: 0.03 K/UL (ref 0–0.2)
BASOPHILS NFR BLD: 0.4 % (ref 0–1.9)
BILIRUB SERPL-MCNC: 0.5 MG/DL (ref 0.1–1)
BUN SERPL-MCNC: 30 MG/DL (ref 8–23)
CALCIUM SERPL-MCNC: 8.8 MG/DL (ref 8.7–10.5)
CHLORIDE SERPL-SCNC: 108 MMOL/L (ref 95–110)
CO2 SERPL-SCNC: 22 MMOL/L (ref 23–29)
CREAT SERPL-MCNC: 1.5 MG/DL (ref 0.5–1.4)
DIFFERENTIAL METHOD: ABNORMAL
EOSINOPHIL # BLD AUTO: 0.1 K/UL (ref 0–0.5)
EOSINOPHIL NFR BLD: 1.5 % (ref 0–8)
ERYTHROCYTE [DISTWIDTH] IN BLOOD BY AUTOMATED COUNT: 14.1 % (ref 11.5–14.5)
EST. GFR  (AFRICAN AMERICAN): 49.8 ML/MIN/1.73 M^2
EST. GFR  (NON AFRICAN AMERICAN): 43 ML/MIN/1.73 M^2
GLUCOSE SERPL-MCNC: 93 MG/DL (ref 70–110)
HCT VFR BLD AUTO: 34.2 % (ref 40–54)
HGB BLD-MCNC: 11.1 G/DL (ref 14–18)
IMM GRANULOCYTES # BLD AUTO: 0.01 K/UL (ref 0–0.04)
IMM GRANULOCYTES NFR BLD AUTO: 0.1 % (ref 0–0.5)
LYMPHOCYTES # BLD AUTO: 1.3 K/UL (ref 1–4.8)
LYMPHOCYTES NFR BLD: 19.4 % (ref 18–48)
MAGNESIUM SERPL-MCNC: 2.1 MG/DL (ref 1.6–2.6)
MCH RBC QN AUTO: 30.8 PG (ref 27–31)
MCHC RBC AUTO-ENTMCNC: 32.5 G/DL (ref 32–36)
MCV RBC AUTO: 95 FL (ref 82–98)
MONOCYTES # BLD AUTO: 0.8 K/UL (ref 0.3–1)
MONOCYTES NFR BLD: 12.3 % (ref 4–15)
NEUTROPHILS # BLD AUTO: 4.5 K/UL (ref 1.8–7.7)
NEUTROPHILS NFR BLD: 66.3 % (ref 38–73)
NRBC BLD-RTO: 0 /100 WBC
PLATELET # BLD AUTO: 222 K/UL (ref 150–350)
PMV BLD AUTO: 10 FL (ref 9.2–12.9)
POTASSIUM SERPL-SCNC: 4.5 MMOL/L (ref 3.5–5.1)
PROT SERPL-MCNC: 5.7 G/DL (ref 6–8.4)
RBC # BLD AUTO: 3.6 M/UL (ref 4.6–6.2)
SODIUM SERPL-SCNC: 136 MMOL/L (ref 136–145)
WBC # BLD AUTO: 6.82 K/UL (ref 3.9–12.7)

## 2020-12-26 PROCEDURE — 97161 PT EVAL LOW COMPLEX 20 MIN: CPT

## 2020-12-26 PROCEDURE — 97535 SELF CARE MNGMENT TRAINING: CPT

## 2020-12-26 PROCEDURE — 97530 THERAPEUTIC ACTIVITIES: CPT

## 2020-12-26 PROCEDURE — 25000003 PHARM REV CODE 250: Performed by: STUDENT IN AN ORGANIZED HEALTH CARE EDUCATION/TRAINING PROGRAM

## 2020-12-26 PROCEDURE — 99232 SBSQ HOSP IP/OBS MODERATE 35: CPT | Mod: ,,, | Performed by: INTERNAL MEDICINE

## 2020-12-26 PROCEDURE — 25000003 PHARM REV CODE 250

## 2020-12-26 PROCEDURE — 36415 COLL VENOUS BLD VENIPUNCTURE: CPT

## 2020-12-26 PROCEDURE — 80053 COMPREHEN METABOLIC PANEL: CPT

## 2020-12-26 PROCEDURE — 99232 PR SUBSEQUENT HOSPITAL CARE,LEVL II: ICD-10-PCS | Mod: ,,, | Performed by: INTERNAL MEDICINE

## 2020-12-26 PROCEDURE — 85025 COMPLETE CBC W/AUTO DIFF WBC: CPT

## 2020-12-26 PROCEDURE — 97165 OT EVAL LOW COMPLEX 30 MIN: CPT

## 2020-12-26 PROCEDURE — 83735 ASSAY OF MAGNESIUM: CPT

## 2020-12-26 RX ORDER — CLOPIDOGREL BISULFATE 75 MG/1
75 TABLET ORAL DAILY
Qty: 30 TABLET | Refills: 3 | Status: SHIPPED | OUTPATIENT
Start: 2020-12-27 | End: 2021-04-23 | Stop reason: SDUPTHER

## 2020-12-26 RX ORDER — POLYETHYLENE GLYCOL 3350 17 G/17G
17 POWDER, FOR SOLUTION ORAL DAILY
Status: DISCONTINUED | OUTPATIENT
Start: 2020-12-26 | End: 2020-12-26 | Stop reason: HOSPADM

## 2020-12-26 RX ORDER — NITROGLYCERIN 0.4 MG/1
0.4 TABLET SUBLINGUAL EVERY 5 MIN PRN
Qty: 30 TABLET | Refills: 3 | Status: SHIPPED | OUTPATIENT
Start: 2020-12-26 | End: 2022-07-26

## 2020-12-26 RX ORDER — NIFEDIPINE 60 MG/1
60 TABLET, EXTENDED RELEASE ORAL DAILY
Qty: 30 TABLET | Refills: 11 | Status: SHIPPED | OUTPATIENT
Start: 2020-12-27 | End: 2021-04-05 | Stop reason: SDUPTHER

## 2020-12-26 RX ORDER — ASPIRIN 81 MG/1
81 TABLET ORAL DAILY
Qty: 90 TABLET | Refills: 3 | Status: SHIPPED | OUTPATIENT
Start: 2020-12-27 | End: 2023-02-10

## 2020-12-26 RX ORDER — IRBESARTAN 150 MG/1
150 TABLET ORAL DAILY
Qty: 90 TABLET | Refills: 3 | Status: SHIPPED | OUTPATIENT
Start: 2020-12-27 | End: 2021-04-01

## 2020-12-26 RX ORDER — METOPROLOL SUCCINATE 25 MG/1
25 TABLET, EXTENDED RELEASE ORAL DAILY
Qty: 30 TABLET | Refills: 11 | Status: ON HOLD | OUTPATIENT
Start: 2020-12-27 | End: 2021-04-08 | Stop reason: HOSPADM

## 2020-12-26 RX ADMIN — POLYETHYLENE GLYCOL 3350 17 G: 17 POWDER, FOR SOLUTION ORAL at 01:12

## 2020-12-26 RX ADMIN — METOPROLOL SUCCINATE 25 MG: 25 TABLET, EXTENDED RELEASE ORAL at 09:12

## 2020-12-26 RX ADMIN — IRBESARTAN 150 MG: 75 TABLET, FILM COATED ORAL at 09:12

## 2020-12-26 RX ADMIN — ASPIRIN 81 MG: 81 TABLET, COATED ORAL at 09:12

## 2020-12-26 RX ADMIN — ATORVASTATIN CALCIUM 40 MG: 20 TABLET, FILM COATED ORAL at 09:12

## 2020-12-26 RX ADMIN — NIFEDIPINE 60 MG: 60 TABLET, FILM COATED, EXTENDED RELEASE ORAL at 09:12

## 2020-12-26 RX ADMIN — CLOPIDOGREL 75 MG: 75 TABLET, FILM COATED ORAL at 09:12

## 2020-12-26 NOTE — PLAN OF CARE
Plan of care discussed with patient. Patient is free of fall/trauma/injury. Denies CP, SOB, or pain/discomfort. Patient is ACHS; BG protocol maintained per order. Patient to be dc'd home today. All questions addressed. Will continue to monitor

## 2020-12-26 NOTE — PT/OT/SLP EVAL
Physical Therapy Evaluation  Co-evaluation/co-treatment performed with OT    Patient Name:  Pedro Ponce Jr.   MRN:  [id number]    Recommendations:     Discharge Recommendations:  outpatient PT   Discharge Equipment Recommendations: none   Barriers to discharge: None    Assessment:     Pedro Ponce Jr. is a 80 y.o. male admitted with a medical diagnosis of NSTEMI (non-ST elevated myocardial infarction).  He presents with the following impairments/functional limitations:  impaired endurance, impaired functional mobilty, gait instability. Patient tolerated initial evaluation well. Patients son present for session and very supportive of patient. Patient preparing for d/c this date but if patient does not d/c, pt. would benefit from acute PT intervention to address listed functional deficits, provide patient/caregiver education, reduce fall risk, and maximize (I) and safety with functional mobility.    Rehab Prognosis: Good; patient would benefit from acute skilled PT services to address these deficits and reach maximum level of function.      Plan:     During this hospitalization, patient to be seen 3 x/week to address the identified rehab impairments via gait training, therapeutic activities, therapeutic exercises, neuromuscular re-education and progress toward the following goals:    · Plan of Care Expires:  01/25/21    This plan of care has been discussed with the patient/caregiver, who was included in its development and is in agreement with the identified goals and treatment plan.     Subjective     Communicated with RN prior to session.  Patient agreeable to participate.     Chief Complaint: none  Patient/Family Comments/goals: to go home     Pain/Comfort:  · Pain Rating 1: 0/10  · Pain Rating Post-Intervention 1: 0/10    Patients cultural, spiritual, Confucianist conflicts given the current situation: no    Living Environment: Pt lives with his wife in [facility] with small [place]. Bathroom set-up: tub/shower combo  and raised toilet seat  Prior Level of Function: independent with mobility and ADLs, though pt and son report that he has been more fatigued over the last several months. 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 He was going to OP PT 1x/ week   DME used: none  DME owned (not currently used): none  Upon discharge, patient will have assistance from: wife, she is home all the time and his children are supportive as well    Objective:     Patient found supine with telemetry  upon PT entry to room.    General Precautions: Standard, fall   Orthopedic Precautions:N/A   Braces: N/A     Exams:     Cognition:  o Pt is alert and oriented x 4  o Command followin%     Sensation:   o Light touch sensation: Intact BLEs     Gross Motor Coordination: No deficits noted during functional mobility tasks      Edema: none noted      Postural examination/scapula alignment: Rounded shoulder and Head forward     Lower Extremity Range of Motion:  o Right Lower Extremity: WFL  o Left Lower Extremity: WFL     Lower Extremity Strength:  o Right Lower Extremity: WFL  o Left Lower Extremity: WFL    Functional Mobility:    Bed Mobility:  · Supine > Sit: Modified Independent  · Sit > Supine: Modified Independent    Transfers:   · Sit <> Stand Transfer: Modified Independent x 2 trials from EOB with no AD   · Toilet transfer: supervision             Gait:  · Patient ambulated 30 feet  To/from bathroom with SBA and no AD  · Gait Assessment: decreased gait speed and unsteady gait    · Pt declined use of AD, pt aware that his gait is not his baseline. No overt LOB. Pt reports that he will have supervision after d/c    Balance:  · Static Sit: Supervision at EOB x 5 minutes  · Static Stand: Stand-By Assist with no AD      Therapeutic Activities/Exercises   - pt and family educated on energy conservation techniques and home safety as well as PT rec to continue OP PT    Patient was  instructed to utilize staff assistance for mobility/transfers  Patient was educated on PT POC and all questions answered within PT scope of practice.  Patient/caregiver able to verbalize understanding; will follow-up with pt/caregiver during current admit for additional questions/concerns within scope of practice.     White board updated.     AM-PAC 6 CLICK MOBILITY  Total Score:20     Patient left supine with all lines intact and call button in reach.      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       Past Surgical History:   Procedure Laterality Date    COLONOSCOPY N/A 2018    Procedure: COLONOSCOPY;  Surgeon: HAIM Mir MD;  [facility]  Service: Endoscopy;  Laterality: N/A;    COLONOSCOPY N/A 2019    Procedure: COLONOSCOPY;  Surgeon: HAMI Mir MD;  [facility]  Service: Endoscopy;  Laterality: N/A;  PM prep    CYST REMOVAL      removed from left kidney    KIDNEY SURGERY      PROSTATE SURGERY         GOALS:   Multidisciplinary Problems     Physical Therapy Goals        Problem: Physical Therapy Goal    Goal Priority Disciplines Outcome Goal Variances Interventions   Physical Therapy Goal     PT, PT/OT Ongoing, Progressing     Description: Goals to be met by: 21     Patient will increase functional independence with mobility by performin. Sit to stand transfer with [place]  2. Bed to chair transfer with [place] using LRAD or none  3. Gait  x 150 feet with Supervision using LRAD or none.                      Time Tracking:     PT Received On: 20  PT Start  Time: 1400     PT Stop Time: 1417  PT Total Time (min): 17 min     Billable Minutes: Evaluation 7 and Therapeutic Activity 10    Additional staff present: OT present for co-evaluation and co-treatment to accommodate for patients multiple deficits and co-morbidities requiring two skilled therapists. Co-treatment is medically necessary to appropriately assess patient strength and endurance and to appropriately facilitate neuromuscular postural balance and control while working within the individuals activity tolerance.     NINFA MURPHY, PT  12/26/2020  Pager # [phone]

## 2020-12-26 NOTE — PROGRESS NOTES
[facility]  Cardiology  Progress Note    Patient Name: Pedro Ponce Jr.  MRN: [id number]  Admission Date: 12/24/2020  Hospital Length of Stay: 2 days  Code Status: Full Code   Attending Physician: Shade Lewis MD   Primary Care Physician: John Ricci MD  Expected Discharge Date: 12/26/2020  Principal Problem:NSTEMI (non-ST elevated myocardial infarction)    Subjective:     Hospital Course:   Pt admitted to cardiology on 12/24 s/p CA with ROSC within 1 minute. Initally EKG concerning for transient ST elevation; however, repeat EKG with resolution of ischemic changes and bedside echo with normal EF and no WMA. Initial troponin's were WNL but peaked at 0.03. He was started on ACS protocol with DAPT and heparin gtt. He then developed chest pain and hypotension so decision was made to take patient to cath lab for suspected NSTEMI. LHC on 12/25 showed 90% lesion to Mid LAD to Dist LAD s/p PCI with DENNIS. He did well post op and was discharged home on 12/26. Was started on irbesartan 50mg and nefidpine 60mg for BP control. Also, given Rx for ASA, plavix, and toprol 25mg, continued on lipitor 40mg. Will have follow up with cardiology in 2 weeks.     Interval History:   No acute events overnight. No chest discomfort. Right radial site with no hematoma and good pulses. Plant to DC home today    Review of Systems   Constitution: Negative for chills, decreased appetite and diaphoresis.   HENT: Negative for congestion and ear discharge.    Eyes: Negative for blurred vision and discharge.   Cardiovascular: Negative for chest pain, dyspnea on exertion, irregular heartbeat, leg swelling and paroxysmal nocturnal dyspnea.   Respiratory: Negative for cough, hemoptysis and shortness of breath.    Gastrointestinal: Negative for abdominal pain.     Objective:     Vital Signs (Most Recent):  Temp: 98.1 °F (36.7 °C) (12/26/20 0732)  Pulse: 69 (12/26/20 1147)  Resp: 16 (12/26/20 0732)  BP: (!) 140/69  (12/26/20 0732)  SpO2: 95 % (12/26/20 0732) Vital Signs (24h Range):  Temp:  [97.4 °F (36.3 °C)-98.6 °F (37 °C)] 98.1 °F (36.7 °C)  Pulse:  [60-91] 69  Resp:  [16-18] 16  SpO2:  [95 %-99 %] 95 %  BP: (123-173)/(63-97) 140/69     Weight: 87 kg (191 lb 12.8 oz)  Body mass index is 26.75 kg/m².     SpO2: 95 %  O2 Device (Oxygen Therapy): room air      Intake/Output Summary (Last 24 hours) at 12/26/2020 1301  Last data filed at 12/26/2020 1200  Gross per 24 hour   Intake 1611.5 ml   Output 900 ml   Net 711.5 ml       Lines/Drains/Airways     Peripheral Intravenous Line                 Peripheral IV - Single Lumen 12/24/20 1219 18 G Right Antecubital 2 days         Peripheral IV - Single Lumen 12/24/20 20 G Left Antecubital 2 days                Physical Exam   Constitutional: He is oriented to person, place, and time. He appears well-developed and well-nourished. No distress.   Eyes: Pupils are equal, round, and reactive to light. Conjunctivae are normal.   Neck: No tracheal deviation present. No thyromegaly present.   Cardiovascular: Normal rate, regular rhythm, normal heart sounds and intact distal pulses. Exam reveals no gallop and no friction rub.   No murmur heard.  Pulses:       Radial pulses are 2+ on the right side and 2+ on the left side.        Femoral pulses are 2+ on the right side and 2+ on the left side.  Pulmonary/Chest: Effort normal and breath sounds normal. No respiratory distress. He has no wheezes. He has no rales.   Abdominal: Soft. Bowel sounds are normal. He exhibits no distension. There is no abdominal tenderness.   Musculoskeletal:         General: No deformity or edema.   Neurological: He is alert and oriented to person, place, and time. No cranial nerve deficit. Coordination normal.   Skin: Skin is warm and dry. He is not diaphoretic.   Psychiatric: He has a normal mood and affect. His behavior is normal.       Significant Labs: All pertinent lab results from the last 24 hours have been  reviewed.        Assessment and Plan:       * NSTEMI (non-ST elevated myocardial infarction)  Repeat EKG after admission showed LVH with repolarization changes and ectopic p waves, bedside echo showed normal EF with no wall motion abnormalities. On 12/25 he began having 5/10 chest pain dull for 10 minutes responsive to  2 x nitroglycerine and EKG with NSR and PACs. Bedside TTE unchanged normal EF 60%. Troponin to 0.034 --> 0.014     Doing well, no chest discomfort. Plan to DC home today.    Plan:   - LHC on 12/25  showed 90% lesion to Mid LAD to Dist LAD s/p PCI with DENNIS.   - continue dapt,  heparin gtt x 48 hours complete    - continue metoprolol succinate 25mg daily      Results for orders placed during the hospital encounter of 12/06/20   Echo Color Flow Doppler? Yes; Bubble Contrast? Yes    Narrative · The left ventricle is normal in size with normal systolic function. The   estimated ejection fraction is 60%.  · Grade I diastolic dysfunction.  · With normal right ventricular systolic function.  · Moderate left atrial enlargement.  · Moderate right atrial enlargement.  · Mild tricuspid regurgitation.  · Normal central venous pressure (3 mmHg).  · The estimated PA systolic pressure is 28 mmHg.  · There is no evidence of intracardiac shunting. Negative bubble study        .  Results for orders placed during the hospital encounter of 12/24/20   Cardiac catheterization    Narrative · The Mid LAD to Dist LAD lesion was 90% stenosed with 0% stenosis   post-intervention.  · The estimated blood loss was <50 mL.  · There was single vessel coronary artery disease.  · The PCI was successful.  · A stent was not successfully placed.  · A STENT RESOLUTE LATANYA [id number] stent was successfully placed at 16 MATTHIAS   for 5 sec.     The procedure log was documented by Documenter: RT Gasper and   verified by Alan Rothman MD.    Date: 12/25/2020  Time: 10:34 AM       .        Cardiac arrest  - see above        Syncope and  collapse  Presented to the emergency department with the rapid response team after a code was called in the hospital. He was accompanying his wife in clinic when he states that he became very weak and kept laying his head back. At some point, bystanders helped him to the ground. He sustained no trauma. According to the rapid response team, medical personnel with the patient determined that patient was pulseless and apneic and started CPR. By the time the rapid response team had arrived, the patient had achieved ROSC and mentating clearly. Code STEMI was called due to ST elevation in lateral leads on EKG immediately after Code. Patient denied ever having any chest pain during this episode. Syncope and collapse likely 2/2 NSTEMI.    - see above        VTE Risk Mitigation (From admission, onward)         Ordered     IP VTE HIGH RISK PATIENT  Once      12/24/20 1357     Place sequential compression device  Until discontinued      12/24/20 1357                Enrrique Coulter MD  Cardiology  [facility]

## 2020-12-26 NOTE — DISCHARGE SUMMARY
[facility]  Cardiology  Discharge Summary      Patient Name: Pedro Ponce Jr.  MRN: [id number]  Admission Date: 12/24/2020  Hospital Length of Stay: 2 days  Discharge Date and Time:  12/26/2020 1:07 PM  Attending Physician: Shade Lewis MD    Discharging Provider: Enrrique Coulter MD  Primary Care Physician: John Ricci MD    HPI:   79 YO M w HTN, CVA, DLD, DM2, CKD3 who was taken to the ED after having a cardiac arrest whilst accompanying his wife in the echo lab.     The patient had a witnessed cardiac arrest around noon, and received CPR x 1 min prior to achieving ROSC. His EKG was concerning for lateral STEMI but he was chest pain free, and denied any symptoms. He sustained no trauma. He was taken from the echo lab area to the ED, where a subsequent EKG evidenced ectopic atrial rhythm at a rate of 50 bpm w ST abnormalities expected for LVH. He is feeling well and at least up to the moment of our encounter remained asymptomatic. He seems to be neurologically intact. HR 50, SBP 130s. Bedside TTE showed normal LVEF without WMA. His labs showed KOURTNEY w creatinine of 1.89 (from 1.3).     Procedure(s) (LRB):  Left heart cath (Left)  Stent, Drug Eluting, Single Vessel, Coronary  Atherectomy-coronary (N/A)     Indwelling Lines/Drains at time of discharge:  Lines/Drains/Airways     None                 Hospital Course:  Pt admitted to cardiology on 12/24 s/p CA with ROSC within 1 minute. Initally EKG concerning for transient ST elevation; however, repeat EKG with resolution of ischemic changes and bedside echo with normal EF and no WMA. Initial troponin's were WNL but peaked at 0.03. He was started on ACS protocol with DAPT and heparin gtt. He then developed chest pain and hypotension so decision was made to take patient to cath lab for suspected NSTEMI. LHC on 12/25 showed 90% lesion to Mid LAD to Dist LAD s/p PCI with DENNIS x1. He did well post op and was discharged home on 12/26. Was  started on irbesartan 50mg and nefidpine 60mg for BP control. Also, given Rx for ASA, plavix, and toprol 25mg, continued on lipitor 40mg. Will have follow up with cardiology in 2 weeks.     Results for orders placed during the hospital encounter of 12/06/20   Echo Color Flow Doppler? Yes; Bubble Contrast? Yes    Narrative · The left ventricle is normal in size with normal systolic function. The   estimated ejection fraction is 60%.  · Grade I diastolic dysfunction.  · With normal right ventricular systolic function.  · Moderate left atrial enlargement.  · Moderate right atrial enlargement.  · Mild tricuspid regurgitation.  · Normal central venous pressure (3 mmHg).  · The estimated PA systolic pressure is 28 mmHg.  · There is no evidence of intracardiac shunting. Negative bubble study            Consults:         Pending Diagnostic Studies:     Procedure Component Value Units Date/Time    Echo Color Flow Doppler? Yes [[id number]]     Order Status: Sent Lab Status: No result           Final Active Diagnoses:    Diagnosis Date Noted POA    PRINCIPAL PROBLEM:  NSTEMI (non-ST elevated myocardial infarction) [I21.4] 12/25/2020 Unknown    Syncope and collapse [R55] 12/24/2020 Yes    Cardiac arrest [I46.9] 12/24/2020 Yes      Problems Resolved During this Admission:     No new Assessment & Plan notes have been filed under this hospital service since the last note was generated.  Service: Cardiology      Discharged Condition: good    Disposition: Home or Self Care    Follow Up:  Follow-up Information     John Ricci MD In 2 days.    Specialty: Family Medicine  Contact information:  [address] [facility] [address] [phone]             [facility] 3rd Floor In 2 weeks.    Specialty: Cardiology  Contact information:  [address]  [facility] [address] [phone]  Additional information:  Cardiology Services Clinics - 3rd floor               Patient Instructions:       Ambulatory referral/consult to Cardiology   Standing Status: Future   Referral Priority: Routine Referral Type: Consultation   Referral Reason: Specialty Services Required   Requested Specialty: Cardiology   Number of Visits Requested: 1     Cardiac event monitor   Standing Status: Future Standing Exp. Date: 12/24/21     Order Specific Question Answer Comments   Cardiac Event Monitor Auto Trigger      Medications:  Reconciled Home Medications:      Medication List      START taking these medications    aspirin 81 MG EC tablet  Commonly known as: ECOTRIN  Take 1 tablet (81 mg total) by mouth once daily.  Start taking on: December 27, 2020  Replaces: aspirin 81 mg Tab     clopidogreL 75 mg tablet  Commonly known as: PLAVIX  Take 1 tablet (75 mg total) by mouth once daily.  Start taking on: December 27, 2020     metoprolol succinate 25 MG 24 hr tablet  Commonly known as: TOPROL-XL  Take 1 tablet (25 mg total) by mouth once daily.  Start taking on: December 27, 2020     nitroGLYCERIN 0.4 MG SL tablet  Commonly known as: NITROSTAT  Place 1 tablet (0.4 mg total) under the tongue every 5 (five) minutes as needed for Chest pain.        CHANGE how you take these medications    irbesartan 150 MG tablet  Commonly known as: AVAPRO  Take 1 tablet (150 mg total) by mouth once daily.  Start taking on: December 27, 2020  What changed:   · medication strength  · how much to take     NIFEdipine 60 MG (OSM) 24 hr tablet  Commonly known as: PROCARDIA-XL  Take 1 tablet (60 mg total) by mouth once daily.  Start taking on: December 27, 2020  What changed: when to take this        CONTINUE taking these medications    atorvastatin 40 MG tablet  Commonly known as: LIPITOR  Take 1 tablet (40 mg total) by mouth once daily.     chlorhexidine 0.12 % solution  Commonly known as: PERIDEX  RINSE 1/2 OZ TWICE DAILY AFTER BREAKFAST AND BEFORE BEDTIME     dutasteride 0.5 mg capsule  Commonly known as: AVODART  TAKE 1 CAPSULE (0.5 MG TOTAL) BY MOUTH  ONCE DAILY.     metFORMIN 500 MG tablet  Commonly known as: GLUCOPHAGE  Take 1 tablet (500 mg total) by mouth 2 (two) times daily with meals.     oxybutynin 10 MG 24 hr tablet  Commonly known as: DITROPAN-XL  Take 1 tablet (10 mg total) by mouth once daily.     oxyCODONE-acetaminophen 5-325 mg per tablet  Commonly known as: PERCOCET  Take 1 tablet by mouth every 4 to 6 hours as needed for Pain.     STOOL SOFTENER-STIMULANT LAXAT 8.6-50 mg per tablet  Generic drug: senna-docusate 8.6-50 mg  Take 1 tablet by mouth once daily.     tamsulosin 0.4 mg Cap  Commonly known as: FLOMAX  Take 1 capsule (0.4 mg total) by mouth every evening.        STOP taking these medications    aspirin 81 mg Tab  Replaced by: aspirin 81 MG EC tablet     hydrALAZINE 50 MG tablet  Commonly known as: APRESOLINE     metoprolol tartrate 50 MG tablet  Commonly known as: LOPRESSOR     spironolactone 50 MG tablet  Commonly known as: ALDACTONE            Time spent on the discharge of patient: 35 minutes    Enrrique MD julian  Cardiology  [facility]

## 2020-12-26 NOTE — NURSING
Patient is ready for discharge. Patient stable alert and oriented. IVs and tele removed. No complaints of pain. Discussed discharge plan. Reviewed medications and side effects, appointments, and answered questions with patient and family. Six medications given to patient by bedside delivery. Patient left unit via wheelchair with son.

## 2020-12-26 NOTE — PLAN OF CARE
VSS. SBP trends down from 160s to 120s. Night . Dressing changed on Rt radial access site due to opening of Tegaderm. Assessed incision site WDL, no hematoma or bleeding noted. Bilateral pulse 2+. All extremities no numbness and no tingling. Reeducated on cath lab site care and precaution. Resting quietly without complaing og chest pain, SOB, palpitation and dizziness. Pt educated on fall risk overnight, and he hopes he would get discharge today. Plan of care reviewed with pt, all questions answered. Bed locked in lowest position, call bell within reach, no acute distress noted, will continue to monitor.

## 2020-12-26 NOTE — PT/OT/SLP EVAL
"Occupational Therapy   Evaluation and Discharge Note    Name: Pedro Ponce Jr.  MRN: [id number]  Admitting Diagnosis:  NSTEMI (non-ST elevated myocardial infarction) 1 Day Post-Op    Recommendations:     Discharge Recommendations: home  Discharge Equipment Recommendations:  none  Barriers to discharge:  None    Assessment:     Pedro Ponce Jr. is a 80 y.o. male with a medical diagnosis of NSTEMI (non-ST elevated myocardial infarction). At this time, patient is functioning at their prior level of function and does not require further acute OT services.     Plan:     During this hospitalization, patient does not require further acute OT services.  Please re-consult if situation changes.    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  Patient found supine with telemetry upon OT entry to room.    General Precautions: Standard, fall   Orthopedic Precautions:N/A   Braces: N/A     Occupational Performance:    Bed Mobility:    · Patient completed Supine to Sit with modified independence  · Patient completed Sit to Supine with modified independence    Functional Mobility/Transfers:  · Patient completed Sit <> Stand Transfer with modified independence  with  no assistive device   · Functional Mobility: patient ambulated to/from " bathroom with no assistive device at supervision     Activities of Daily Living:  · Grooming: modified independence standing at sink to wash hands  · Upper Body Dressing: modified independence sitting edge of bed to don overhead shirt  · Lower Body Dressing: modified independence sitting edge of bed to don underwear and pants  · Toileting: modified independence completed in standing to urinate and then in sitting to try to have bowel movement    Cognitive/Visual Perceptual:  Cognitive/Psychosocial Skills:     -       Oriented to: Person, Place and Time   -       Follows Commands/attention:Follows multistep  commands    Physical Exam:  Upper Extremity Range of Motion:     -       Right Upper Extremity: WFL  -       Left Upper Extremity: WFL  Upper Extremity Strength:    -       Right Upper Extremity: WFL  -       Left Upper Extremity: WFL    AMPAC 6 Click ADL:  AMPAC Total Score: 24    Treatment & Education:  -Patient and son educated on energy conservation techniques with ADLs   -Patient educated on role of OT and plan of care while in acute care   Education:    Patient left supine with all lines intact, call button in reach and RN and son present    GOALS:   Multidisciplinary Problems     Occupational Therapy Goals     Not on file          Multidisciplinary Problems (Resolved)        Problem: Occupational Therapy Goal    Goal Priority Disciplines Outcome Interventions   Occupational Therapy Goal   (Resolved)     OT, PT/OT Met                    History:     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       Past Surgical History:   Procedure Laterality Date    COLONOSCOPY N/A 4/9/2018     Procedure: COLONOSCOPY;  Surgeon: HAIM Mir MD;  Location: [facility] ([address] [place] [address]);  Service: Endoscopy;  Laterality: N/A;    COLONOSCOPY N/A 1/14/2019    Procedure: COLONOSCOPY;  Surgeon: HAIM Mir MD;  Location: [facility] ([address] [place] [address]);  Service: Endoscopy;  Laterality: N/A;  PM prep    CYST REMOVAL  2000    removed from left kidney    KIDNEY SURGERY      PROSTATE SURGERY         Time Tracking:     OT Date of Treatment: 12/26/20  OT Start Time: 1359  OT Stop Time: 1417  OT Total Time (min): 18 min    Billable Minutes:Evaluation 10  Self Care/Home Management 8    Wendi Sutherland OT  12/26/2020

## 2020-12-26 NOTE — SUBJECTIVE & OBJECTIVE
Interval History:   No acute events overnight. No chest discomfort. Right radial site with no hematoma and good pulses. Plant to [place] home today    Review of Systems   Constitution: Negative for chills, decreased appetite and diaphoresis.   HENT: Negative for congestion and ear discharge.    Eyes: Negative for blurred vision and discharge.   Cardiovascular: Negative for chest pain, dyspnea on exertion, irregular heartbeat, leg swelling and paroxysmal nocturnal dyspnea.   Respiratory: Negative for cough, hemoptysis and shortness of breath.    Gastrointestinal: Negative for abdominal pain.     Objective:     Vital Signs (Most Recent):  Temp: 98.1 °F (36.7 °C) (12/26/20 0732)  Pulse: 69 (12/26/20 1147)  Resp: 16 (12/26/20 0732)  BP: (!) 140/69 (12/26/20 0732)  SpO2: 95 % (12/26/20 0732) Vital Signs (24h Range):  Temp:  [97.4 °F (36.3 °C)-98.6 °F (37 °C)] 98.1 °F (36.7 °C)  Pulse:  [60-91] 69  Resp:  [16-18] 16  SpO2:  [95 %-99 %] 95 %  BP: (123-173)/(63-97) 140/69     Weight: 87 kg (191 lb 12.8 oz)  Body mass index is 26.75 kg/m².     SpO2: 95 %  O2 Device (Oxygen Therapy): room air      Intake/Output Summary (Last 24 hours) at 12/26/2020 1301  Last data filed at 12/26/2020 1200  Gross per 24 hour   Intake 1611.5 ml   Output 900 ml   Net 711.5 ml       Lines/Drains/Airways     Peripheral Intravenous Line                 Peripheral IV - Single Lumen 12/24/20 1219 18 G Right Antecubital 2 days         Peripheral IV - Single Lumen 12/24/20 20 G Left Antecubital 2 days                Physical Exam   Constitutional: He is oriented to person, place, and time. He appears well-developed and well-nourished. No distress.   Eyes: Pupils are equal, round, and reactive to light. Conjunctivae are normal.   Neck: No tracheal deviation present. No thyromegaly present.   Cardiovascular: Normal rate, regular rhythm, normal heart sounds and intact distal pulses. Exam reveals no gallop and no friction rub.   No murmur heard.  Pulses:        Radial pulses are 2+ on the right side and 2+ on the left side.        Femoral pulses are 2+ on the right side and 2+ on the left side.  Pulmonary/Chest: Effort normal and breath sounds normal. No respiratory distress. He has no wheezes. He has no rales.   Abdominal: Soft. Bowel sounds are normal. He exhibits no distension. There is no abdominal tenderness.   Musculoskeletal:         General: No deformity or edema.   Neurological: He is alert and oriented to person, place, and time. No cranial nerve deficit. Coordination normal.   Skin: Skin is warm and dry. He is not diaphoretic.   Psychiatric: He has a normal mood and affect. His behavior is normal.       Significant Labs: All pertinent lab results from the last 24 hours have been reviewed.

## 2020-12-26 NOTE — PLAN OF CARE
Problem: Physical Therapy Goal  Goal: Physical Therapy Goal  Description: Goals to be met by: 21     Patient will increase functional independence with mobility by performin. Sit to stand transfer with [place]  2. Bed to chair transfer with [place] using LRAD or none  3. Gait  x 150 feet with Supervision using LRAD or none.     Outcome: Ongoing, Progressing     Initial Evaluation performed and Physical Therapy POC established this date. Patient is appropriate for acute PT services and is in agreement with established goals.     Radha Devi, PT, DPT  2020  Pager# [phone]

## 2020-12-26 NOTE — DISCHARGE INSTRUCTIONS
Please take meds as prescribed. Follow up with your pcp this week. Follow up in cardiology clinic in 2 weeks

## 2020-12-26 NOTE — PLAN OF CARE
Problem: Occupational Therapy Goal  Goal: Occupational Therapy Goal  Outcome: Met    OT evaluation with goals met for discharge. Patient with no skilled OT needs while in acute care.     Wendi Sutherland, OT  12/26/2020

## 2020-12-27 LAB — POCT GLUCOSE: 94 MG/DL (ref 70–110)

## 2020-12-28 DIAGNOSIS — R07.9 CHEST PAIN, UNSPECIFIED TYPE: Primary | ICD-10-CM

## 2020-12-29 ENCOUNTER — TELEPHONE (OUTPATIENT)
Dept: FAMILY MEDICINE | Facility: CLINIC | Age: 80
End: 2020-12-29

## 2020-12-29 ENCOUNTER — HOSPITAL ENCOUNTER (EMERGENCY)
Facility: HOSPITAL | Age: 80
Discharge: HOME | End: 2020-12-29
Attending: EMERGENCY MEDICINE
Payer: MEDICARE

## 2020-12-29 VITALS
OXYGEN SATURATION: 99 % | RESPIRATION RATE: 20 BRPM | DIASTOLIC BLOOD PRESSURE: 82 MMHG | BODY MASS INDEX: 26.74 KG/M2 | SYSTOLIC BLOOD PRESSURE: 146 MMHG | TEMPERATURE: 99 F | HEART RATE: 80 BPM | HEIGHT: 71 IN | WEIGHT: 191 LBS

## 2020-12-29 DIAGNOSIS — F43.20 ADJUSTMENT DISORDER, UNSPECIFIED TYPE: Primary | ICD-10-CM

## 2020-12-29 DIAGNOSIS — F43.23 ADJUSTMENT DISORDER WITH MIXED ANXIETY AND DEPRESSED MOOD: ICD-10-CM

## 2020-12-29 PROCEDURE — 99282 EMERGENCY DEPT VISIT SF MDM: CPT

## 2020-12-29 PROCEDURE — 99205 PR OFFICE/OUTPT VISIT, NEW, LEVL V, 60-74 MIN: ICD-10-PCS | Mod: 95,,, | Performed by: PSYCHIATRY & NEUROLOGY

## 2020-12-29 PROCEDURE — 99205 OFFICE O/P NEW HI 60 MIN: CPT | Mod: 95,,, | Performed by: PSYCHIATRY & NEUROLOGY

## 2020-12-29 NOTE — ED PROVIDER NOTES
"COVID Statement  The [place] [facility] [employer] [facility] and Alfred Vergara, Governor of the [facility], have declared a State of Public Health Emergency due to the spread of a novel coronavirus and disease (COVID-19).  There is no currently accepted treatment except conservative measures and respiratory support if appropriate.  This has lead to significant resource capacity and potential delays in care.             Encounter Date: 12/29/2020    SCRIBE #1 NOTE: I, Shannon Thurston, am scribing for, and in the presence of,  Zari Mcclure MD. I have scribed the entire note.       History     Chief Complaint   Patient presents with    Anxiety     states did not sleeping well, recently in and out of hospital, states " passed out" on silvia rex, denies CP/SOB/palpations      This is a 80 y.o. 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     The patient presents to the Emergency Department due to depression and  increased anxiety.   Patient reports he had a heart attack on 12/24 and had stent placed the next day.   Since being discharged on 12/26/2020, patient's anxiety has worsened.   He described it as "feeling down because I've been under a lot of stress but want to get better".   Symptoms are associated with trouble sleeping.   He has not tried any OTC medications to help him sleep.   Pt denies any chest pain, shortness of breath, palpitations, fever, chills or any other complaints.   Patient denies any suicidal ideation or homicidal ideation.   Patient has no prior history of similar symptoms.     The history is provided by the patient. No  was used.     Review of patient's allergies indicates:   Allergen Reactions    Pcn " [penicillins] Rash     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     Past Surgical History:   Procedure Laterality Date    COLONOSCOPY N/A 4/9/2018    Procedure: COLONOSCOPY;  Surgeon: HAIM Mir MD;  [facility] [facility] [address] [address]  Service: Endoscopy;  Laterality: N/A;    COLONOSCOPY N/A 1/14/2019    Procedure: COLONOSCOPY;  Surgeon: HAIM Mir MD;  [facility]  Service: Endoscopy;  Laterality: N/A;  PM prep    CYST REMOVAL  2000    removed from left kidney    KIDNEY SURGERY      LEFT HEART CATHETERIZATION Left 12/25/2020    Procedure: Left heart cath;  Surgeon: Alan Rothman MD;  Location: [facility] CATH LAB;  Service: Cardiology;  Laterality: Left;    PROSTATE SURGERY       Family History   Problem Relation Age of Onset    Cancer Mother         unkn ca    Diabetes Mother     Glaucoma Mother     Heart disease Mother     Hypertension Mother     Blindness Neg Hx     Macular degeneration Neg Hx     Retinal detachment Neg Hx      Social History     Tobacco Use    Smoking status: Never Smoker    Smokeless tobacco: Never Used   Substance Use Topics    Alcohol use: No    Drug use: No     Review of Systems   Constitutional: Negative for chills, fatigue and fever.   HENT: Negative for facial swelling, trouble swallowing and voice change.    Respiratory: Negative for cough, choking and shortness of breath.    Cardiovascular: Negative for chest pain, palpitations and leg swelling.   Gastrointestinal: Negative for abdominal pain, diarrhea, nausea and vomiting.   Genitourinary: Negative for dysuria, frequency and urgency.   Musculoskeletal: Negative for back  pain, neck pain and neck stiffness.   Neurological: Negative for seizures, speech difficulty, light-headedness, numbness and headaches.   Psychiatric/Behavioral: Positive for sleep disturbance. Negative for suicidal ideas. The patient is nervous/anxious.    All other systems reviewed and are negative.      Physical Exam     Initial Vitals [12/29/20 0948]   BP Pulse Resp Temp SpO2   (!) 147/82 79 17 98.1 °F (36.7 °C) 98 %      MAP       --         Physical Exam    Nursing note and vitals reviewed.  Constitutional: He appears well-developed and well-nourished. No distress.   HENT:   Head: Normocephalic and atraumatic.   Mouth/Throat: Oropharynx is clear and moist.   Eyes: Conjunctivae and EOM are normal. Pupils are equal, round, and reactive to light.   Neck: Normal range of motion. Neck supple.   Cardiovascular: Normal rate, regular rhythm, normal heart sounds and intact distal pulses.   Pulmonary/Chest: Breath sounds normal. No respiratory distress. He has no wheezes. He has no rhonchi. He has no rales.   Abdominal: Soft. Bowel sounds are normal. He exhibits no distension. There is no abdominal tenderness.   Musculoskeletal: Normal range of motion. No tenderness or edema.   Neurological: He is alert and oriented to person, place, and time. He has normal strength. No cranial nerve deficit.   Skin: Skin is warm and dry. Capillary refill takes less than 2 seconds.         ED Course   Procedures  Labs Reviewed - No data to display       Imaging Results    None          Medical Decision Making:   Initial Assessment:   The patient presents to the Emergency Department due to depression and  increased anxiety.   Differential Diagnosis:   Depression, anxiety, adjustment d/o, SI  ED Management:    On re-evaluation, the patient's status has improved.  After complete ED evaluation, clinical impression is most consistent with adjustment disorder.  Patient has been seen and evaluated by psych who recommended outpatient f/u with  PCP  PCP follow-up within 2-3 days was recommended.    After taking into careful account the patient's history, physical exam findings, as well as empirical and objective data obtained throughout ED workup, I feel no emergent medical condition has been identified. No further evaluation or admission was felt to be required, and the patient is stable for discharge from the ED. The patient and any additional family present were updated with test results, overall clinical impression, and recommended further plan of care, including discharge instructions as provided and outpatient follow-up for continued evaluation and management as needed. All questions were answered. The patient expressed understanding and agreed with current plan for discharge and follow-up plan of care. Strict ED return precautions were provided, including return/worsening of current symptoms, new symptoms, or any other concerns.                     ED Course as of Dec 30 2144   Tue Dec 29, 2020   1033 BP(!): 147/82 [LD]   1033 Temp: 98.1 °F (36.7 °C) [LD]   1033 Pulse: 79 [LD]   1033 Resp: 17 [LD]   1033 SpO2: 98 % [LD]      ED Course User Index  [LD] Zari Mcclure MD            Clinical Impression:     ICD-10-CM ICD-9-CM   1. Adjustment disorder, unspecified type  F43.20 309.9   2. Adjustment disorder with mixed anxiety and depressed mood  F43.23 309.28                      Disposition:   Disposition: Discharged  Condition: Stable     ED Disposition Condition    Discharge Stable        ED Prescriptions     None        Follow-up Information     Follow up With Specialties Details Why Contact Info    John Ricci MD Family Medicine Call today to arrange outpatient follow up [address] [facility] [address]  [phone]                        I, Zari Mcclure,  personally performed the services described in this documentation. All medical record entries made by the scribe were at my direction and in my presence.  I have reviewed  the chart and agree that the record reflects my personal performance and is accurate and complete. Zari Mcclure M.D. 9:44 PM12/30/2020                 Zari Mcclure MD  12/30/20 8785

## 2020-12-29 NOTE — DISCHARGE INSTRUCTIONS
Follow up with PCP or [facility] for outpatient treatment of adjustment disorder, also recommended therapy.

## 2020-12-29 NOTE — ED NOTES
Patient and son verbalized understanding of plan to coordinate with PCP after telepsych consult with Dr. Starks.  Advised to return to ED if symptoms worsened.

## 2020-12-29 NOTE — ED NOTES
APPEARANCE: Alert, oriented and in no acute distress.  CARDIAC: Normal rate and rhythm, no murmur heard.   PERIPHERAL VASCULAR: peripheral pulses present. Normal cap refill. No edema. Warm to touch.    RESPIRATORY:Normal rate and effort, breath sounds clear bilaterally throughout chest. Respirations are equal and unlabored no obvious signs of distress.  GASTRO: soft, bowel sounds normal, no tenderness, no abdominal distention.  MUSC: Full ROM. No bony tenderness or soft tissue tenderness. No obvious deformity.  SKIN: Skin is warm and dry, normal skin turgor, mucous membranes moist.  NEURO: 5/5 strength major flexors/extensors bilaterally. Sensory intact to light touch bilaterally. Ermine coma scale: eyes open spontaneously-4, oriented & converses-5, obeys commands-6. No neurological abnormalities.   MENTAL STATUS: awake, alert and aware of environment.  EYE: PERRL, both eyes: pupils brisk and reactive to light. Normal size.  ENT: EARS: no obvious drainage. NOSE: no active bleeding.   .

## 2020-12-29 NOTE — TELEPHONE ENCOUNTER
Spoke with patients son Issac he stated his father requested to be brought to the ED because he wasn't feeling well. Per Issac his father recently fainted at his cardiologist office which lead to be brought to hospital an have a stint put in. I advised Issac to take his father to the ED per his father request. Issac stated he will bring his father to ED. Patients son voiced understanding.

## 2020-12-29 NOTE — TELEPHONE ENCOUNTER
----- Message from Kina Loera sent at 12/29/2020  8:10 AM CST -----  Contact: Son Issac [phone]  Type: Requesting to speak with nurse    Who Called: Pt's son   Regarding: health concerns   Would the patient rather a call back or a response via [facility]? Call back  Best Call Back Number: [phone]  Additional Information:
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
## 2020-12-29 NOTE — TELEPHONE ENCOUNTER
Spoke to son Issac he took patient to ER and was diagnosed with Depression/ Anxiety. Patient was advised to see his PCP for further evaluation  and medication. Patient was given an appointment foe 12/31/2020 at 4:30pm. Patient son voiced understanding

## 2020-12-29 NOTE — PROGRESS NOTES
80-year-old male presents emergency department complaining of anxiety.  States he did not sleep last night.  Notes he has been in the hospital over the past few weeks after a syncopal episode and he had a left heart catheterization that revealed 100 percent blockage and was fixed with a stent.  He denies any chest pain, shortness of breath, palpitations, lightheadedness, dizziness, nausea, vomiting, cough, congestion, fever, sore throat.  Just states that he feels anxious and uneasy and he is not sure what to do about it.  On exam, respirations are even and nonlabored, patient denies any suicidal or homicidal ideation, appears to have good insight, no acute distress.    Patient seen by me in triage due to ED overcrowding.  I have placed preliminary orders.  His care will be transitioned to another provider when a bed is available in the main department.

## 2020-12-31 ENCOUNTER — OFFICE VISIT (OUTPATIENT)
Dept: FAMILY MEDICINE | Facility: CLINIC | Age: 80
End: 2020-12-31
Payer: MEDICARE

## 2020-12-31 VITALS
HEART RATE: 102 BPM | BODY MASS INDEX: 26.7 KG/M2 | HEIGHT: 71 IN | SYSTOLIC BLOOD PRESSURE: 136 MMHG | WEIGHT: 190.69 LBS | OXYGEN SATURATION: 95 % | TEMPERATURE: 98 F | DIASTOLIC BLOOD PRESSURE: 70 MMHG

## 2020-12-31 DIAGNOSIS — I10 ESSENTIAL HYPERTENSION: ICD-10-CM

## 2020-12-31 DIAGNOSIS — F33.1 MODERATE EPISODE OF RECURRENT MAJOR DEPRESSIVE DISORDER: ICD-10-CM

## 2020-12-31 DIAGNOSIS — I21.4 NSTEMI (NON-ST ELEVATED MYOCARDIAL INFARCTION): Primary | ICD-10-CM

## 2020-12-31 PROCEDURE — 3078F PR MOST RECENT DIASTOLIC BLOOD PRESSURE < 80 MM HG: ICD-10-PCS | Mod: CPTII,S$GLB,, | Performed by: FAMILY MEDICINE

## 2020-12-31 PROCEDURE — 99999 PR PBB SHADOW E&M-EST. PATIENT-LVL IV: CPT | Mod: PBBFAC,,, | Performed by: FAMILY MEDICINE

## 2020-12-31 PROCEDURE — 1126F PR PAIN SEVERITY QUANTIFIED, NO PAIN PRESENT: ICD-10-PCS | Mod: S$GLB,,, | Performed by: FAMILY MEDICINE

## 2020-12-31 PROCEDURE — 99999 PR PBB SHADOW E&M-EST. PATIENT-LVL IV: ICD-10-PCS | Mod: PBBFAC,,, | Performed by: FAMILY MEDICINE

## 2020-12-31 PROCEDURE — 3075F SYST BP GE 130 - 139MM HG: CPT | Mod: CPTII,S$GLB,, | Performed by: FAMILY MEDICINE

## 2020-12-31 PROCEDURE — 3288F PR FALLS RISK ASSESSMENT DOCUMENTED: ICD-10-PCS | Mod: CPTII,S$GLB,, | Performed by: FAMILY MEDICINE

## 2020-12-31 PROCEDURE — 1101F PR PT FALLS ASSESS DOC 0-1 FALLS W/OUT INJ PAST YR: ICD-10-PCS | Mod: CPTII,S$GLB,, | Performed by: FAMILY MEDICINE

## 2020-12-31 PROCEDURE — 1159F MED LIST DOCD IN RCRD: CPT | Mod: S$GLB,,, | Performed by: FAMILY MEDICINE

## 2020-12-31 PROCEDURE — 1126F AMNT PAIN NOTED NONE PRSNT: CPT | Mod: S$GLB,,, | Performed by: FAMILY MEDICINE

## 2020-12-31 PROCEDURE — 3078F DIAST BP <80 MM HG: CPT | Mod: CPTII,S$GLB,, | Performed by: FAMILY MEDICINE

## 2020-12-31 PROCEDURE — 99214 OFFICE O/P EST MOD 30 MIN: CPT | Mod: S$GLB,,, | Performed by: FAMILY MEDICINE

## 2020-12-31 PROCEDURE — 99214 PR OFFICE/OUTPT VISIT, EST, LEVL IV, 30-39 MIN: ICD-10-PCS | Mod: S$GLB,,, | Performed by: FAMILY MEDICINE

## 2020-12-31 PROCEDURE — 3288F FALL RISK ASSESSMENT DOCD: CPT | Mod: CPTII,S$GLB,, | Performed by: FAMILY MEDICINE

## 2020-12-31 PROCEDURE — 1159F PR MEDICATION LIST DOCUMENTED IN MEDICAL RECORD: ICD-10-PCS | Mod: S$GLB,,, | Performed by: FAMILY MEDICINE

## 2020-12-31 PROCEDURE — 3075F PR MOST RECENT SYSTOLIC BLOOD PRESS GE 130-139MM HG: ICD-10-PCS | Mod: CPTII,S$GLB,, | Performed by: FAMILY MEDICINE

## 2020-12-31 PROCEDURE — 1101F PT FALLS ASSESS-DOCD LE1/YR: CPT | Mod: CPTII,S$GLB,, | Performed by: FAMILY MEDICINE

## 2020-12-31 RX ORDER — HYDRALAZINE HYDROCHLORIDE 100 MG/1
TABLET, FILM COATED ORAL
Status: ON HOLD | COMMUNITY
Start: 2020-12-22 | End: 2021-01-11

## 2020-12-31 RX ORDER — ESCITALOPRAM OXALATE 10 MG/1
10 TABLET ORAL DAILY
Qty: 90 TABLET | Refills: 0 | Status: SHIPPED | OUTPATIENT
Start: 2020-12-31 | End: 2021-03-25

## 2020-12-31 NOTE — PROGRESS NOTES
Subjective:       Patient ID: Pedro Ponce Jr. is a 80 y.o. male.    Chief Complaint: Anxiety (stress)    80 years old male who came to the clinic with significant depression after recent myocardial infarction.  No suicidal or homicidal ideations.  Patient requesting possible medicine to help with the symptoms .  Blood pressure today stable.  No chest pain, palpitation, orthopnea PND.    Review of Systems   Constitutional: Negative.    HENT: Negative.    Eyes: Negative.    Respiratory: Negative.    Cardiovascular: Negative.  Negative for chest pain, palpitations, leg swelling and claudication.   Gastrointestinal: Negative.    Genitourinary: Negative.    Musculoskeletal: Negative.    Integumentary:  Negative.   Neurological: Negative.    Psychiatric/Behavioral: Positive for dysphoric mood. The patient is nervous/anxious.          Objective:      Physical Exam  Vitals signs and nursing note reviewed.   Constitutional:       General: He is not in acute distress.     Appearance: He is well-developed. He is not diaphoretic.   HENT:      Head: Normocephalic and atraumatic.      Right Ear: External ear normal.      Left Ear: External ear normal.      Nose: Nose normal.      Mouth/Throat:      Pharynx: No oropharyngeal exudate.   Eyes:      General: No scleral icterus.        Right eye: No discharge.         Left eye: No discharge.      Conjunctiva/sclera: Conjunctivae normal.      Pupils: Pupils are equal, round, and reactive to light.   Neck:      Musculoskeletal: Normal range of motion and neck supple.      Thyroid: No thyromegaly.      Vascular: No JVD.      Trachea: No tracheal deviation.   Cardiovascular:      Rate and Rhythm: Normal rate and regular rhythm.      Heart sounds: Normal heart sounds. No murmur. No friction rub. No gallop.    Pulmonary:      Effort: Pulmonary effort is normal. No respiratory distress.      Breath sounds: Normal breath sounds. No stridor. No wheezing or rales.   Chest:      Chest  wall: No tenderness.   Abdominal:      General: Bowel sounds are normal. There is no distension.      Palpations: Abdomen is soft. There is no mass.      Tenderness: There is no abdominal tenderness. There is no guarding or rebound.   Musculoskeletal: Normal range of motion.         General: No tenderness.   Lymphadenopathy:      Cervical: No cervical adenopathy.   Skin:     General: Skin is warm and dry.      Coloration: Skin is not pale.      Findings: No erythema or rash.   Neurological:      Mental Status: He is alert and oriented to person, place, and time.      Cranial Nerves: No cranial nerve deficit.      Motor: No abnormal muscle tone.      Coordination: Coordination normal.      Deep Tendon Reflexes: Reflexes are normal and symmetric. Reflexes normal.   Psychiatric:         Mood and Affect: Mood is anxious and depressed.         Behavior: Behavior normal.         Thought Content: Thought content normal.         Judgment: Judgment normal.         Assessment:       1. NSTEMI (non-ST elevated myocardial infarction)    2. Moderate episode of recurrent major depressive disorder    3. Essential hypertension        Plan:         Pedro was seen today for anxiety.    Diagnoses and all orders for this visit:    NSTEMI (non-ST elevated myocardial infarction)    Moderate episode of recurrent major depressive disorder  -     escitalopram oxalate (LEXAPRO) 10 MG tablet; Take 1 tablet (10 mg total) by mouth once daily.    Essential hypertension    Continue monitoring blood pressure at home, low sodium diet.

## 2021-01-04 ENCOUNTER — CLINICAL SUPPORT (OUTPATIENT)
Dept: DIABETES | Facility: CLINIC | Age: 81
End: 2021-01-04
Payer: MEDICARE

## 2021-01-04 DIAGNOSIS — E11.9 TYPE 2 DIABETES MELLITUS WITHOUT COMPLICATION, WITHOUT LONG-TERM CURRENT USE OF INSULIN: ICD-10-CM

## 2021-01-04 DIAGNOSIS — E11.65 TYPE 2 DIABETES MELLITUS WITH HYPERGLYCEMIA, WITHOUT LONG-TERM CURRENT USE OF INSULIN: Primary | ICD-10-CM

## 2021-01-04 PROCEDURE — G0108 PR DIAB MANAGE TRN  PER INDIV: ICD-10-PCS | Mod: S$GLB,,, | Performed by: DIETITIAN, REGISTERED

## 2021-01-04 PROCEDURE — 99999 PR PBB SHADOW E&M-EST. PATIENT-LVL I: CPT | Mod: PBBFAC,,, | Performed by: DIETITIAN, REGISTERED

## 2021-01-04 PROCEDURE — G0108 DIAB MANAGE TRN  PER INDIV: HCPCS | Mod: S$GLB,,, | Performed by: DIETITIAN, REGISTERED

## 2021-01-04 PROCEDURE — 99999 PR PBB SHADOW E&M-EST. PATIENT-LVL I: ICD-10-PCS | Mod: PBBFAC,,, | Performed by: DIETITIAN, REGISTERED

## 2021-01-05 RX ORDER — IBUPROFEN 200 MG
1 CAPSULE ORAL DAILY PRN
Qty: 30 EACH | Refills: 3 | Status: SHIPPED | OUTPATIENT
Start: 2021-01-05 | End: 2021-05-15

## 2021-01-05 RX ORDER — LANCETS
1 EACH MISCELLANEOUS DAILY PRN
Qty: 30 EACH | Refills: 3 | Status: SHIPPED | OUTPATIENT
Start: 2021-01-05 | End: 2023-02-10

## 2021-01-05 RX ORDER — INSULIN PUMP SYRINGE, 3 ML
EACH MISCELLANEOUS
Qty: 1 EACH | Refills: 0 | Status: SHIPPED | OUTPATIENT
Start: 2021-01-05 | End: 2022-02-01

## 2021-01-11 ENCOUNTER — HOSPITAL ENCOUNTER (OUTPATIENT)
Facility: HOSPITAL | Age: 81
Discharge: HOME | End: 2021-01-12
Attending: EMERGENCY MEDICINE | Admitting: HOSPITALIST
Payer: MEDICARE

## 2021-01-11 DIAGNOSIS — R07.9 CHEST PAIN, UNSPECIFIED TYPE: Primary | ICD-10-CM

## 2021-01-11 DIAGNOSIS — R07.9 CHEST PAIN: ICD-10-CM

## 2021-01-11 LAB
ALBUMIN SERPL BCP-MCNC: 3.7 G/DL (ref 3.5–5.2)
ALP SERPL-CCNC: 60 U/L (ref 55–135)
ALT SERPL W/O P-5'-P-CCNC: 13 U/L (ref 10–44)
ANION GAP SERPL CALC-SCNC: 10 MMOL/L (ref 8–16)
AORTIC ROOT ANNULUS: 3.78 CM
AORTIC VALVE CUSP SEPERATION: 2.19 CM
AST SERPL-CCNC: 13 U/L (ref 10–40)
AV INDEX (PROSTH): 0.99
AV MEAN GRADIENT: 5 MMHG
AV PEAK GRADIENT: 8 MMHG
AV VALVE AREA: 3.7 CM2
AV VELOCITY RATIO: 0.96
BASOPHILS # BLD AUTO: 0.02 K/UL (ref 0–0.2)
BASOPHILS NFR BLD: 0.5 % (ref 0–1.9)
BILIRUB SERPL-MCNC: 0.6 MG/DL (ref 0.1–1)
BNP SERPL-MCNC: 91 PG/ML (ref 0–99)
BSA FOR ECHO PROCEDURE: 2.08 M2
BUN SERPL-MCNC: 24 MG/DL (ref 8–23)
CALCIUM SERPL-MCNC: 9.9 MG/DL (ref 8.7–10.5)
CHLORIDE SERPL-SCNC: 104 MMOL/L (ref 95–110)
CO2 SERPL-SCNC: 23 MMOL/L (ref 23–29)
CREAT SERPL-MCNC: 1.3 MG/DL (ref 0.5–1.4)
CTP QC/QA: YES
CV ECHO LV RWT: 0.62 CM
D DIMER PPP IA.FEU-MCNC: 0.53 MG/L FEU
DIFFERENTIAL METHOD: ABNORMAL
DOP CALC AO PEAK VEL: 1.45 M/S
DOP CALC AO VTI: 31.72 CM
DOP CALC LVOT AREA: 3.7 CM2
DOP CALC LVOT DIAMETER: 2.18 CM
DOP CALC LVOT PEAK VEL: 1.39 M/S
DOP CALC LVOT STROKE VOLUME: 117.25 CM3
DOP CALCLVOT PEAK VEL VTI: 31.43 CM
E WAVE DECELERATION TIME: 300.18 MSEC
E/A RATIO: 0.72
E/E' RATIO: 9 M/S
ECHO LV POSTERIOR WALL: 1.5 CM (ref 0.6–1.1)
EOSINOPHIL # BLD AUTO: 0.1 K/UL (ref 0–0.5)
EOSINOPHIL NFR BLD: 2.1 % (ref 0–8)
ERYTHROCYTE [DISTWIDTH] IN BLOOD BY AUTOMATED COUNT: 15.4 % (ref 11.5–14.5)
EST. GFR  (AFRICAN AMERICAN): 59 ML/MIN/1.73 M^2
EST. GFR  (NON AFRICAN AMERICAN): 51 ML/MIN/1.73 M^2
FRACTIONAL SHORTENING: 38 % (ref 28–44)
GLUCOSE SERPL-MCNC: 136 MG/DL (ref 70–110)
HCT VFR BLD AUTO: 30.4 % (ref 40–54)
HGB BLD-MCNC: 10.1 G/DL (ref 14–18)
IMM GRANULOCYTES # BLD AUTO: 0.01 K/UL (ref 0–0.04)
IMM GRANULOCYTES NFR BLD AUTO: 0.3 % (ref 0–0.5)
INTERVENTRICULAR SEPTUM: 1.48 CM (ref 0.6–1.1)
IVRT: 98.95 MSEC
LA MAJOR: 5.13 CM
LA MINOR: 6.06 CM
LA WIDTH: 4.08 CM
LEFT ATRIUM SIZE: 3.63 CM
LEFT ATRIUM VOLUME INDEX MOD: 19.4 ML/M2
LEFT ATRIUM VOLUME INDEX: 33.9 ML/M2
LEFT ATRIUM VOLUME MOD: 40.04 CM3
LEFT ATRIUM VOLUME: 69.95 CM3
LEFT INTERNAL DIMENSION IN SYSTOLE: 2.99 CM (ref 2.1–4)
LEFT VENTRICLE DIASTOLIC VOLUME INDEX: 53.14 ML/M2
LEFT VENTRICLE DIASTOLIC VOLUME: 109.65 ML
LEFT VENTRICLE MASS INDEX: 147 G/M2
LEFT VENTRICLE SYSTOLIC VOLUME INDEX: 16.9 ML/M2
LEFT VENTRICLE SYSTOLIC VOLUME: 34.84 ML
LEFT VENTRICULAR INTERNAL DIMENSION IN DIASTOLE: 4.84 CM (ref 3.5–6)
LEFT VENTRICULAR MASS: 304.14 G
LV LATERAL E/E' RATIO: 7.71 M/S
LV SEPTAL E/E' RATIO: 10.8 M/S
LYMPHOCYTES # BLD AUTO: 0.7 K/UL (ref 1–4.8)
LYMPHOCYTES NFR BLD: 19.2 % (ref 18–48)
MAGNESIUM SERPL-MCNC: 1.9 MG/DL (ref 1.6–2.6)
MCH RBC QN AUTO: 31.8 PG (ref 27–31)
MCHC RBC AUTO-ENTMCNC: 33.2 G/DL (ref 32–36)
MCV RBC AUTO: 96 FL (ref 82–98)
MONOCYTES # BLD AUTO: 0.4 K/UL (ref 0.3–1)
MONOCYTES NFR BLD: 10.5 % (ref 4–15)
MV A" WAVE DURATION": 17.7 MSEC
MV PEAK A VEL: 0.75 M/S
MV PEAK E VEL: 0.54 M/S
NEUTROPHILS # BLD AUTO: 2.6 K/UL (ref 1.8–7.7)
NEUTROPHILS NFR BLD: 67.4 % (ref 38–73)
NRBC BLD-RTO: 0 /100 WBC
PISA MRMAX VEL: 0.03 M/S
PISA TR MAX VEL: 2.96 M/S
PLATELET # BLD AUTO: 221 K/UL (ref 150–350)
PMV BLD AUTO: 9.7 FL (ref 9.2–12.9)
POCT GLUCOSE: 149 MG/DL (ref 70–110)
POTASSIUM SERPL-SCNC: 3.6 MMOL/L (ref 3.5–5.1)
PROT SERPL-MCNC: 6.4 G/DL (ref 6–8.4)
PULM VEIN S/D RATIO: 1.41
PV PEAK D VEL: 0.22 M/S
PV PEAK S VEL: 0.31 M/S
PV PEAK VELOCITY: 0.98 CM/S
RA MAJOR: 6.1 CM
RA PRESSURE: 8 MMHG
RA WIDTH: 3.97 CM
RBC # BLD AUTO: 3.18 M/UL (ref 4.6–6.2)
RIGHT VENTRICULAR END-DIASTOLIC DIMENSION: 2.86 CM
SARS-COV-2 RDRP RESP QL NAA+PROBE: NEGATIVE
SODIUM SERPL-SCNC: 137 MMOL/L (ref 136–145)
TDI LATERAL: 0.07 M/S
TDI SEPTAL: 0.05 M/S
TDI: 0.06 M/S
TR MAX PG: 35 MMHG
TROPONIN I SERPL DL<=0.01 NG/ML-MCNC: 0.01 NG/ML (ref 0–0.03)
TV REST PULMONARY ARTERY PRESSURE: 43 MMHG
WBC # BLD AUTO: 3.8 K/UL (ref 3.9–12.7)

## 2021-01-11 PROCEDURE — 93005 ELECTROCARDIOGRAM TRACING: CPT

## 2021-01-11 PROCEDURE — 99214 OFFICE O/P EST MOD 30 MIN: CPT | Mod: ,,, | Performed by: INTERNAL MEDICINE

## 2021-01-11 PROCEDURE — 84484 ASSAY OF TROPONIN QUANT: CPT | Mod: 91

## 2021-01-11 PROCEDURE — G0378 HOSPITAL OBSERVATION PER HR: HCPCS

## 2021-01-11 PROCEDURE — 25000242 PHARM REV CODE 250 ALT 637 W/ HCPCS: Performed by: EMERGENCY MEDICINE

## 2021-01-11 PROCEDURE — 99214 PR OFFICE/OUTPT VISIT, EST, LEVL IV, 30-39 MIN: ICD-10-PCS | Mod: ,,, | Performed by: INTERNAL MEDICINE

## 2021-01-11 PROCEDURE — 93010 ELECTROCARDIOGRAM REPORT: CPT | Mod: ,,, | Performed by: INTERNAL MEDICINE

## 2021-01-11 PROCEDURE — 25000003 PHARM REV CODE 250: Performed by: PHYSICIAN ASSISTANT

## 2021-01-11 PROCEDURE — 25000003 PHARM REV CODE 250: Performed by: EMERGENCY MEDICINE

## 2021-01-11 PROCEDURE — U0002 COVID-19 LAB TEST NON-CDC: HCPCS | Performed by: EMERGENCY MEDICINE

## 2021-01-11 PROCEDURE — 63600175 PHARM REV CODE 636 W HCPCS: Performed by: EMERGENCY MEDICINE

## 2021-01-11 PROCEDURE — 85379 FIBRIN DEGRADATION QUANT: CPT

## 2021-01-11 PROCEDURE — 99285 EMERGENCY DEPT VISIT HI MDM: CPT | Mod: 25

## 2021-01-11 PROCEDURE — 99220 PR INITIAL OBSERVATION CARE,LEVL III: ICD-10-PCS | Mod: ,,, | Performed by: PHYSICIAN ASSISTANT

## 2021-01-11 PROCEDURE — 83880 ASSAY OF NATRIURETIC PEPTIDE: CPT

## 2021-01-11 PROCEDURE — 96375 TX/PRO/DX INJ NEW DRUG ADDON: CPT

## 2021-01-11 PROCEDURE — 80053 COMPREHEN METABOLIC PANEL: CPT

## 2021-01-11 PROCEDURE — 36415 COLL VENOUS BLD VENIPUNCTURE: CPT

## 2021-01-11 PROCEDURE — 96361 HYDRATE IV INFUSION ADD-ON: CPT

## 2021-01-11 PROCEDURE — 96374 THER/PROPH/DIAG INJ IV PUSH: CPT | Mod: 59

## 2021-01-11 PROCEDURE — 93010 EKG 12-LEAD: ICD-10-PCS | Mod: ,,, | Performed by: INTERNAL MEDICINE

## 2021-01-11 PROCEDURE — 83735 ASSAY OF MAGNESIUM: CPT

## 2021-01-11 PROCEDURE — 99220 PR INITIAL OBSERVATION CARE,LEVL III: CPT | Mod: ,,, | Performed by: PHYSICIAN ASSISTANT

## 2021-01-11 PROCEDURE — 85025 COMPLETE CBC W/AUTO DIFF WBC: CPT

## 2021-01-11 RX ORDER — DUTASTERIDE 0.5 MG/1
0.5 CAPSULE, LIQUID FILLED ORAL DAILY
Status: DISCONTINUED | OUTPATIENT
Start: 2021-01-12 | End: 2021-01-12 | Stop reason: HOSPADM

## 2021-01-11 RX ORDER — GLUCAGON 1 MG
1 KIT INJECTION
Status: DISCONTINUED | OUTPATIENT
Start: 2021-01-11 | End: 2021-01-12 | Stop reason: HOSPADM

## 2021-01-11 RX ORDER — TALC
9 POWDER (GRAM) TOPICAL NIGHTLY PRN
Status: DISCONTINUED | OUTPATIENT
Start: 2021-01-11 | End: 2021-01-12 | Stop reason: HOSPADM

## 2021-01-11 RX ORDER — METOPROLOL SUCCINATE 25 MG/1
25 TABLET, EXTENDED RELEASE ORAL DAILY
Status: DISCONTINUED | OUTPATIENT
Start: 2021-01-12 | End: 2021-01-12 | Stop reason: HOSPADM

## 2021-01-11 RX ORDER — BISACODYL 10 MG
10 SUPPOSITORY, RECTAL RECTAL DAILY PRN
Status: DISCONTINUED | OUTPATIENT
Start: 2021-01-11 | End: 2021-01-12 | Stop reason: HOSPADM

## 2021-01-11 RX ORDER — LOSARTAN POTASSIUM 50 MG/1
50 TABLET ORAL DAILY
Status: DISCONTINUED | OUTPATIENT
Start: 2021-01-12 | End: 2021-01-12 | Stop reason: HOSPADM

## 2021-01-11 RX ORDER — ONDANSETRON 8 MG/1
8 TABLET, ORALLY DISINTEGRATING ORAL EVERY 8 HOURS PRN
Status: DISCONTINUED | OUTPATIENT
Start: 2021-01-11 | End: 2021-01-12 | Stop reason: HOSPADM

## 2021-01-11 RX ORDER — IBUPROFEN 200 MG
16 TABLET ORAL
Status: DISCONTINUED | OUTPATIENT
Start: 2021-01-11 | End: 2021-01-12 | Stop reason: HOSPADM

## 2021-01-11 RX ORDER — ONDANSETRON 2 MG/ML
4 INJECTION INTRAMUSCULAR; INTRAVENOUS
Status: COMPLETED | OUTPATIENT
Start: 2021-01-11 | End: 2021-01-11

## 2021-01-11 RX ORDER — NIFEDIPINE 30 MG/1
60 TABLET, EXTENDED RELEASE ORAL DAILY
Status: DISCONTINUED | OUTPATIENT
Start: 2021-01-12 | End: 2021-01-12 | Stop reason: HOSPADM

## 2021-01-11 RX ORDER — LIDOCAINE 50 MG/G
2 PATCH TOPICAL DAILY
Status: DISCONTINUED | OUTPATIENT
Start: 2021-01-11 | End: 2021-01-12 | Stop reason: HOSPADM

## 2021-01-11 RX ORDER — ASPIRIN 81 MG/1
81 TABLET ORAL DAILY
Status: DISCONTINUED | OUTPATIENT
Start: 2021-01-12 | End: 2021-01-12 | Stop reason: HOSPADM

## 2021-01-11 RX ORDER — CLOPIDOGREL BISULFATE 75 MG/1
75 TABLET ORAL DAILY
Status: DISCONTINUED | OUTPATIENT
Start: 2021-01-12 | End: 2021-01-12 | Stop reason: HOSPADM

## 2021-01-11 RX ORDER — POLYETHYLENE GLYCOL 3350 17 G/17G
17 POWDER, FOR SOLUTION ORAL 2 TIMES DAILY PRN
Status: DISCONTINUED | OUTPATIENT
Start: 2021-01-11 | End: 2021-01-12 | Stop reason: HOSPADM

## 2021-01-11 RX ORDER — NITROGLYCERIN 0.4 MG/1
0.4 TABLET SUBLINGUAL EVERY 5 MIN PRN
Status: DISCONTINUED | OUTPATIENT
Start: 2021-01-11 | End: 2021-01-11

## 2021-01-11 RX ORDER — AMOXICILLIN 250 MG
1 CAPSULE ORAL DAILY
Status: DISCONTINUED | OUTPATIENT
Start: 2021-01-12 | End: 2021-01-12 | Stop reason: HOSPADM

## 2021-01-11 RX ORDER — OXYBUTYNIN CHLORIDE 5 MG/1
10 TABLET, EXTENDED RELEASE ORAL DAILY
Status: DISCONTINUED | OUTPATIENT
Start: 2021-01-12 | End: 2021-01-12 | Stop reason: HOSPADM

## 2021-01-11 RX ORDER — IRBESARTAN 150 MG/1
150 TABLET ORAL DAILY
Status: DISCONTINUED | OUTPATIENT
Start: 2021-01-12 | End: 2021-01-11

## 2021-01-11 RX ORDER — MORPHINE SULFATE 2 MG/ML
2 INJECTION, SOLUTION INTRAMUSCULAR; INTRAVENOUS
Status: COMPLETED | OUTPATIENT
Start: 2021-01-11 | End: 2021-01-11

## 2021-01-11 RX ORDER — INSULIN ASPART 100 [IU]/ML
0-5 INJECTION, SOLUTION INTRAVENOUS; SUBCUTANEOUS
Status: DISCONTINUED | OUTPATIENT
Start: 2021-01-11 | End: 2021-01-12 | Stop reason: HOSPADM

## 2021-01-11 RX ORDER — TAMSULOSIN HYDROCHLORIDE 0.4 MG/1
0.4 CAPSULE ORAL NIGHTLY
Status: DISCONTINUED | OUTPATIENT
Start: 2021-01-11 | End: 2021-01-12 | Stop reason: HOSPADM

## 2021-01-11 RX ORDER — ASPIRIN 325 MG
325 TABLET ORAL
Status: COMPLETED | OUTPATIENT
Start: 2021-01-11 | End: 2021-01-11

## 2021-01-11 RX ORDER — ATORVASTATIN CALCIUM 40 MG/1
40 TABLET, FILM COATED ORAL DAILY
Status: DISCONTINUED | OUTPATIENT
Start: 2021-01-12 | End: 2021-01-12 | Stop reason: HOSPADM

## 2021-01-11 RX ORDER — IPRATROPIUM BROMIDE AND ALBUTEROL SULFATE 2.5; .5 MG/3ML; MG/3ML
3 SOLUTION RESPIRATORY (INHALATION) EVERY 4 HOURS PRN
Status: DISCONTINUED | OUTPATIENT
Start: 2021-01-11 | End: 2021-01-12 | Stop reason: HOSPADM

## 2021-01-11 RX ORDER — ACETAMINOPHEN 325 MG/1
650 TABLET ORAL EVERY 4 HOURS PRN
Status: DISCONTINUED | OUTPATIENT
Start: 2021-01-11 | End: 2021-01-12 | Stop reason: HOSPADM

## 2021-01-11 RX ORDER — IBUPROFEN 200 MG
24 TABLET ORAL
Status: DISCONTINUED | OUTPATIENT
Start: 2021-01-11 | End: 2021-01-12 | Stop reason: HOSPADM

## 2021-01-11 RX ORDER — ESCITALOPRAM OXALATE 10 MG/1
10 TABLET ORAL DAILY
Status: DISCONTINUED | OUTPATIENT
Start: 2021-01-12 | End: 2021-01-12 | Stop reason: HOSPADM

## 2021-01-11 RX ADMIN — ONDANSETRON 4 MG: 2 INJECTION INTRAMUSCULAR; INTRAVENOUS at 08:01

## 2021-01-11 RX ADMIN — LIDOCAINE 2 PATCH: 50 PATCH TOPICAL at 08:01

## 2021-01-11 RX ADMIN — SODIUM CHLORIDE 1000 ML: 0.9 INJECTION, SOLUTION INTRAVENOUS at 08:01

## 2021-01-11 RX ADMIN — NITROGLYCERIN 0.4 MG: 0.4 TABLET SUBLINGUAL at 07:01

## 2021-01-11 RX ADMIN — MORPHINE SULFATE 2 MG: 2 INJECTION, SOLUTION INTRAMUSCULAR; INTRAVENOUS at 10:01

## 2021-01-11 RX ADMIN — TAMSULOSIN HYDROCHLORIDE 0.4 MG: 0.4 CAPSULE ORAL at 08:01

## 2021-01-11 RX ADMIN — ASPIRIN 325 MG ORAL TABLET 325 MG: 325 PILL ORAL at 07:01

## 2021-01-12 ENCOUNTER — IMMUNIZATION (OUTPATIENT)
Dept: FAMILY MEDICINE | Facility: CLINIC | Age: 81
End: 2021-01-12
Payer: MEDICARE

## 2021-01-12 VITALS
TEMPERATURE: 97 F | RESPIRATION RATE: 18 BRPM | SYSTOLIC BLOOD PRESSURE: 174 MMHG | HEIGHT: 71 IN | OXYGEN SATURATION: 98 % | HEART RATE: 54 BPM | WEIGHT: 190 LBS | BODY MASS INDEX: 26.6 KG/M2 | DIASTOLIC BLOOD PRESSURE: 87 MMHG

## 2021-01-12 DIAGNOSIS — Z23 NEED FOR VACCINATION: ICD-10-CM

## 2021-01-12 PROBLEM — R07.9 CHEST PAIN: Status: RESOLVED | Noted: 2021-01-11 | Resolved: 2021-01-12

## 2021-01-12 LAB
ANION GAP SERPL CALC-SCNC: 7 MMOL/L (ref 8–16)
BASOPHILS # BLD AUTO: 0.02 K/UL (ref 0–0.2)
BASOPHILS NFR BLD: 0.5 % (ref 0–1.9)
BUN SERPL-MCNC: 18 MG/DL (ref 8–23)
CALCIUM SERPL-MCNC: 9.6 MG/DL (ref 8.7–10.5)
CHLORIDE SERPL-SCNC: 104 MMOL/L (ref 95–110)
CO2 SERPL-SCNC: 27 MMOL/L (ref 23–29)
CREAT SERPL-MCNC: 1.2 MG/DL (ref 0.5–1.4)
DIFFERENTIAL METHOD: ABNORMAL
EOSINOPHIL # BLD AUTO: 0.1 K/UL (ref 0–0.5)
EOSINOPHIL NFR BLD: 2.9 % (ref 0–8)
ERYTHROCYTE [DISTWIDTH] IN BLOOD BY AUTOMATED COUNT: 15.7 % (ref 11.5–14.5)
EST. GFR  (AFRICAN AMERICAN): >60 ML/MIN/1.73 M^2
EST. GFR  (NON AFRICAN AMERICAN): 56 ML/MIN/1.73 M^2
GLUCOSE SERPL-MCNC: 92 MG/DL (ref 70–110)
HCT VFR BLD AUTO: 28.3 % (ref 40–54)
HGB BLD-MCNC: 9.3 G/DL (ref 14–18)
IMM GRANULOCYTES # BLD AUTO: 0.01 K/UL (ref 0–0.04)
IMM GRANULOCYTES NFR BLD AUTO: 0.2 % (ref 0–0.5)
LYMPHOCYTES # BLD AUTO: 1.2 K/UL (ref 1–4.8)
LYMPHOCYTES NFR BLD: 28.3 % (ref 18–48)
MAGNESIUM SERPL-MCNC: 1.9 MG/DL (ref 1.6–2.6)
MCH RBC QN AUTO: 31.7 PG (ref 27–31)
MCHC RBC AUTO-ENTMCNC: 32.9 G/DL (ref 32–36)
MCV RBC AUTO: 97 FL (ref 82–98)
MONOCYTES # BLD AUTO: 0.6 K/UL (ref 0.3–1)
MONOCYTES NFR BLD: 13.8 % (ref 4–15)
NEUTROPHILS # BLD AUTO: 2.3 K/UL (ref 1.8–7.7)
NEUTROPHILS NFR BLD: 54.3 % (ref 38–73)
NRBC BLD-RTO: 0 /100 WBC
PLATELET # BLD AUTO: 194 K/UL (ref 150–350)
PMV BLD AUTO: 9.2 FL (ref 9.2–12.9)
POCT GLUCOSE: 101 MG/DL (ref 70–110)
POCT GLUCOSE: 107 MG/DL (ref 70–110)
POCT GLUCOSE: 86 MG/DL (ref 70–110)
POCT GLUCOSE: 88 MG/DL (ref 70–110)
POTASSIUM SERPL-SCNC: 4.1 MMOL/L (ref 3.5–5.1)
RBC # BLD AUTO: 2.93 M/UL (ref 4.6–6.2)
SODIUM SERPL-SCNC: 138 MMOL/L (ref 136–145)
WBC # BLD AUTO: 4.2 K/UL (ref 3.9–12.7)

## 2021-01-12 PROCEDURE — 91300 COVID-19, MRNA, LNP-S, PF, 30 MCG/0.3 ML DOSE VACCINE: CPT | Mod: PBBFAC | Performed by: FAMILY MEDICINE

## 2021-01-12 PROCEDURE — 83735 ASSAY OF MAGNESIUM: CPT

## 2021-01-12 PROCEDURE — 25000003 PHARM REV CODE 250: Performed by: PHYSICIAN ASSISTANT

## 2021-01-12 PROCEDURE — 36415 COLL VENOUS BLD VENIPUNCTURE: CPT

## 2021-01-12 PROCEDURE — 94761 N-INVAS EAR/PLS OXIMETRY MLT: CPT

## 2021-01-12 PROCEDURE — G0378 HOSPITAL OBSERVATION PER HR: HCPCS

## 2021-01-12 PROCEDURE — 80048 BASIC METABOLIC PNL TOTAL CA: CPT

## 2021-01-12 PROCEDURE — 99213 PR OFFICE/OUTPT VISIT, EST, LEVL III, 20-29 MIN: ICD-10-PCS | Mod: ,,, | Performed by: NURSE PRACTITIONER

## 2021-01-12 PROCEDURE — 99900035 HC TECH TIME PER 15 MIN (STAT)

## 2021-01-12 PROCEDURE — 99213 OFFICE O/P EST LOW 20 MIN: CPT | Mod: ,,, | Performed by: NURSE PRACTITIONER

## 2021-01-12 PROCEDURE — 85025 COMPLETE CBC W/AUTO DIFF WBC: CPT

## 2021-01-12 RX ADMIN — ATORVASTATIN CALCIUM 40 MG: 40 TABLET, FILM COATED ORAL at 09:01

## 2021-01-12 RX ADMIN — LIDOCAINE 2 PATCH: 50 PATCH TOPICAL at 09:01

## 2021-01-12 RX ADMIN — LOSARTAN POTASSIUM 50 MG: 50 TABLET ORAL at 09:01

## 2021-01-12 RX ADMIN — NIFEDIPINE 60 MG: 30 TABLET, FILM COATED, EXTENDED RELEASE ORAL at 09:01

## 2021-01-12 RX ADMIN — DOCUSATE SODIUM 50 MG AND SENNOSIDES 8.6 MG 1 TABLET: 8.6; 5 TABLET, FILM COATED ORAL at 09:01

## 2021-01-12 RX ADMIN — ASPIRIN 81 MG: 81 TABLET, COATED ORAL at 09:01

## 2021-01-12 RX ADMIN — ESCITALOPRAM OXALATE 10 MG: 10 TABLET ORAL at 09:01

## 2021-01-12 RX ADMIN — CLOPIDOGREL 75 MG: 75 TABLET, FILM COATED ORAL at 09:01

## 2021-01-12 RX ADMIN — OXYBUTYNIN CHLORIDE 10 MG: 5 TABLET, EXTENDED RELEASE ORAL at 09:01

## 2021-01-15 ENCOUNTER — PATIENT OUTREACH (OUTPATIENT)
Dept: ADMINISTRATIVE | Facility: OTHER | Age: 81
End: 2021-01-15

## 2021-01-15 ENCOUNTER — OFFICE VISIT (OUTPATIENT)
Dept: PODIATRY | Facility: CLINIC | Age: 81
End: 2021-01-15
Payer: MEDICARE

## 2021-01-15 VITALS
HEIGHT: 71 IN | BODY MASS INDEX: 26.61 KG/M2 | SYSTOLIC BLOOD PRESSURE: 136 MMHG | WEIGHT: 190.06 LBS | HEART RATE: 71 BPM | DIASTOLIC BLOOD PRESSURE: 66 MMHG

## 2021-01-15 DIAGNOSIS — E11.49 TYPE 2 DIABETES MELLITUS WITH NEUROLOGICAL MANIFESTATIONS: ICD-10-CM

## 2021-01-15 DIAGNOSIS — B35.1 ONYCHOMYCOSIS: Primary | ICD-10-CM

## 2021-01-15 PROCEDURE — 1159F PR MEDICATION LIST DOCUMENTED IN MEDICAL RECORD: ICD-10-PCS | Mod: S$GLB,,, | Performed by: PODIATRIST

## 2021-01-15 PROCEDURE — 3075F SYST BP GE 130 - 139MM HG: CPT | Mod: CPTII,S$GLB,, | Performed by: PODIATRIST

## 2021-01-15 PROCEDURE — 1126F AMNT PAIN NOTED NONE PRSNT: CPT | Mod: S$GLB,,, | Performed by: PODIATRIST

## 2021-01-15 PROCEDURE — 1126F PR PAIN SEVERITY QUANTIFIED, NO PAIN PRESENT: ICD-10-PCS | Mod: S$GLB,,, | Performed by: PODIATRIST

## 2021-01-15 PROCEDURE — 11721 ROUTINE FOOT CARE: ICD-10-PCS | Mod: Q9,S$GLB,, | Performed by: PODIATRIST

## 2021-01-15 PROCEDURE — 1101F PR PT FALLS ASSESS DOC 0-1 FALLS W/OUT INJ PAST YR: ICD-10-PCS | Mod: CPTII,S$GLB,, | Performed by: PODIATRIST

## 2021-01-15 PROCEDURE — 3078F DIAST BP <80 MM HG: CPT | Mod: CPTII,S$GLB,, | Performed by: PODIATRIST

## 2021-01-15 PROCEDURE — 99213 OFFICE O/P EST LOW 20 MIN: CPT | Mod: 25,S$GLB,, | Performed by: PODIATRIST

## 2021-01-15 PROCEDURE — 99999 PR PBB SHADOW E&M-EST. PATIENT-LVL IV: ICD-10-PCS | Mod: PBBFAC,,, | Performed by: PODIATRIST

## 2021-01-15 PROCEDURE — 1101F PT FALLS ASSESS-DOCD LE1/YR: CPT | Mod: CPTII,S$GLB,, | Performed by: PODIATRIST

## 2021-01-15 PROCEDURE — 3075F PR MOST RECENT SYSTOLIC BLOOD PRESS GE 130-139MM HG: ICD-10-PCS | Mod: CPTII,S$GLB,, | Performed by: PODIATRIST

## 2021-01-15 PROCEDURE — 1159F MED LIST DOCD IN RCRD: CPT | Mod: S$GLB,,, | Performed by: PODIATRIST

## 2021-01-15 PROCEDURE — 3288F PR FALLS RISK ASSESSMENT DOCUMENTED: ICD-10-PCS | Mod: CPTII,S$GLB,, | Performed by: PODIATRIST

## 2021-01-15 PROCEDURE — 99213 PR OFFICE/OUTPT VISIT, EST, LEVL III, 20-29 MIN: ICD-10-PCS | Mod: 25,S$GLB,, | Performed by: PODIATRIST

## 2021-01-15 PROCEDURE — 3078F PR MOST RECENT DIASTOLIC BLOOD PRESSURE < 80 MM HG: ICD-10-PCS | Mod: CPTII,S$GLB,, | Performed by: PODIATRIST

## 2021-01-15 PROCEDURE — 11721 DEBRIDE NAIL 6 OR MORE: CPT | Mod: Q9,S$GLB,, | Performed by: PODIATRIST

## 2021-01-15 PROCEDURE — 3288F FALL RISK ASSESSMENT DOCD: CPT | Mod: CPTII,S$GLB,, | Performed by: PODIATRIST

## 2021-01-15 PROCEDURE — 99999 PR PBB SHADOW E&M-EST. PATIENT-LVL IV: CPT | Mod: PBBFAC,,, | Performed by: PODIATRIST

## 2021-01-20 ENCOUNTER — TELEPHONE (OUTPATIENT)
Dept: CARDIOLOGY | Facility: CLINIC | Age: 81
End: 2021-01-20

## 2021-01-26 ENCOUNTER — OFFICE VISIT (OUTPATIENT)
Dept: CARDIOLOGY | Facility: CLINIC | Age: 81
End: 2021-01-26
Payer: MEDICARE

## 2021-01-26 ENCOUNTER — TELEPHONE (OUTPATIENT)
Dept: FAMILY MEDICINE | Facility: CLINIC | Age: 81
End: 2021-01-26

## 2021-01-26 VITALS
HEIGHT: 71 IN | BODY MASS INDEX: 26.35 KG/M2 | HEART RATE: 55 BPM | DIASTOLIC BLOOD PRESSURE: 70 MMHG | SYSTOLIC BLOOD PRESSURE: 156 MMHG | OXYGEN SATURATION: 99 % | WEIGHT: 188.19 LBS

## 2021-01-26 DIAGNOSIS — T50.905A BRADYCARDIA, DRUG INDUCED: ICD-10-CM

## 2021-01-26 DIAGNOSIS — I46.9 CARDIAC ARREST: ICD-10-CM

## 2021-01-26 DIAGNOSIS — Z95.5 STATUS POST INSERTION OF DRUG-ELUTING STENT INTO LEFT ANTERIOR DESCENDING (LAD) ARTERY: ICD-10-CM

## 2021-01-26 DIAGNOSIS — N18.31 TYPE 2 DIABETES MELLITUS WITH STAGE 3A CHRONIC KIDNEY DISEASE, WITHOUT LONG-TERM CURRENT USE OF INSULIN: ICD-10-CM

## 2021-01-26 DIAGNOSIS — I10 ESSENTIAL HYPERTENSION: ICD-10-CM

## 2021-01-26 DIAGNOSIS — R00.1 BRADYCARDIA, DRUG INDUCED: ICD-10-CM

## 2021-01-26 DIAGNOSIS — E11.22 TYPE 2 DIABETES MELLITUS WITH STAGE 3A CHRONIC KIDNEY DISEASE, WITHOUT LONG-TERM CURRENT USE OF INSULIN: ICD-10-CM

## 2021-01-26 DIAGNOSIS — I67.9 CEREBROVASCULAR DISEASE: ICD-10-CM

## 2021-01-26 DIAGNOSIS — I21.4 NSTEMI (NON-ST ELEVATED MYOCARDIAL INFARCTION): ICD-10-CM

## 2021-01-26 DIAGNOSIS — I70.0 AORTIC ATHEROSCLEROSIS: ICD-10-CM

## 2021-01-26 DIAGNOSIS — E78.00 PURE HYPERCHOLESTEROLEMIA: ICD-10-CM

## 2021-01-26 PROCEDURE — 1101F PT FALLS ASSESS-DOCD LE1/YR: CPT | Mod: CPTII,S$GLB,, | Performed by: INTERNAL MEDICINE

## 2021-01-26 PROCEDURE — 99999 PR PBB SHADOW E&M-EST. PATIENT-LVL III: ICD-10-PCS | Mod: PBBFAC,,, | Performed by: INTERNAL MEDICINE

## 2021-01-26 PROCEDURE — 99214 OFFICE O/P EST MOD 30 MIN: CPT | Mod: S$GLB,,, | Performed by: INTERNAL MEDICINE

## 2021-01-26 PROCEDURE — 3078F DIAST BP <80 MM HG: CPT | Mod: CPTII,S$GLB,, | Performed by: INTERNAL MEDICINE

## 2021-01-26 PROCEDURE — 1159F MED LIST DOCD IN RCRD: CPT | Mod: S$GLB,,, | Performed by: INTERNAL MEDICINE

## 2021-01-26 PROCEDURE — 99214 PR OFFICE/OUTPT VISIT, EST, LEVL IV, 30-39 MIN: ICD-10-PCS | Mod: S$GLB,,, | Performed by: INTERNAL MEDICINE

## 2021-01-26 PROCEDURE — 3077F PR MOST RECENT SYSTOLIC BLOOD PRESSURE >= 140 MM HG: ICD-10-PCS | Mod: CPTII,S$GLB,, | Performed by: INTERNAL MEDICINE

## 2021-01-26 PROCEDURE — 3288F PR FALLS RISK ASSESSMENT DOCUMENTED: ICD-10-PCS | Mod: CPTII,S$GLB,, | Performed by: INTERNAL MEDICINE

## 2021-01-26 PROCEDURE — 3078F PR MOST RECENT DIASTOLIC BLOOD PRESSURE < 80 MM HG: ICD-10-PCS | Mod: CPTII,S$GLB,, | Performed by: INTERNAL MEDICINE

## 2021-01-26 PROCEDURE — 3288F FALL RISK ASSESSMENT DOCD: CPT | Mod: CPTII,S$GLB,, | Performed by: INTERNAL MEDICINE

## 2021-01-26 PROCEDURE — 1101F PR PT FALLS ASSESS DOC 0-1 FALLS W/OUT INJ PAST YR: ICD-10-PCS | Mod: CPTII,S$GLB,, | Performed by: INTERNAL MEDICINE

## 2021-01-26 PROCEDURE — 99999 PR PBB SHADOW E&M-EST. PATIENT-LVL III: CPT | Mod: PBBFAC,,, | Performed by: INTERNAL MEDICINE

## 2021-01-26 PROCEDURE — 1126F AMNT PAIN NOTED NONE PRSNT: CPT | Mod: S$GLB,,, | Performed by: INTERNAL MEDICINE

## 2021-01-26 PROCEDURE — 1159F PR MEDICATION LIST DOCUMENTED IN MEDICAL RECORD: ICD-10-PCS | Mod: S$GLB,,, | Performed by: INTERNAL MEDICINE

## 2021-01-26 PROCEDURE — 3077F SYST BP >= 140 MM HG: CPT | Mod: CPTII,S$GLB,, | Performed by: INTERNAL MEDICINE

## 2021-01-26 PROCEDURE — 1126F PR PAIN SEVERITY QUANTIFIED, NO PAIN PRESENT: ICD-10-PCS | Mod: S$GLB,,, | Performed by: INTERNAL MEDICINE

## 2021-02-02 ENCOUNTER — IMMUNIZATION (OUTPATIENT)
Dept: FAMILY MEDICINE | Facility: CLINIC | Age: 81
End: 2021-02-02
Payer: MEDICARE

## 2021-02-02 ENCOUNTER — LAB VISIT (OUTPATIENT)
Dept: LAB | Facility: HOSPITAL | Age: 81
End: 2021-02-02
Attending: FAMILY MEDICINE
Payer: MEDICARE

## 2021-02-02 DIAGNOSIS — E11.65 TYPE 2 DIABETES MELLITUS WITH HYPERGLYCEMIA, WITHOUT LONG-TERM CURRENT USE OF INSULIN: ICD-10-CM

## 2021-02-02 DIAGNOSIS — Z23 NEED FOR VACCINATION: Primary | ICD-10-CM

## 2021-02-02 DIAGNOSIS — I10 ESSENTIAL HYPERTENSION: ICD-10-CM

## 2021-02-02 PROCEDURE — 80061 LIPID PANEL: CPT

## 2021-02-02 PROCEDURE — 0002A COVID-19, MRNA, LNP-S, PF, 30 MCG/0.3 ML DOSE VACCINE: CPT | Mod: PBBFAC | Performed by: FAMILY MEDICINE

## 2021-02-02 PROCEDURE — 36415 COLL VENOUS BLD VENIPUNCTURE: CPT | Mod: PO

## 2021-02-02 PROCEDURE — 80053 COMPREHEN METABOLIC PANEL: CPT

## 2021-02-02 PROCEDURE — 91300 COVID-19, MRNA, LNP-S, PF, 30 MCG/0.3 ML DOSE VACCINE: CPT | Mod: PBBFAC | Performed by: FAMILY MEDICINE

## 2021-02-02 PROCEDURE — 83036 HEMOGLOBIN GLYCOSYLATED A1C: CPT

## 2021-02-03 LAB
ALBUMIN SERPL BCP-MCNC: 4 G/DL (ref 3.5–5.2)
ALP SERPL-CCNC: 63 U/L (ref 55–135)
ALT SERPL W/O P-5'-P-CCNC: 12 U/L (ref 10–44)
ANION GAP SERPL CALC-SCNC: 10 MMOL/L (ref 8–16)
AST SERPL-CCNC: 15 U/L (ref 10–40)
BILIRUB SERPL-MCNC: 0.7 MG/DL (ref 0.1–1)
BUN SERPL-MCNC: 21 MG/DL (ref 8–23)
CALCIUM SERPL-MCNC: 10 MG/DL (ref 8.7–10.5)
CHLORIDE SERPL-SCNC: 102 MMOL/L (ref 95–110)
CHOLEST SERPL-MCNC: 135 MG/DL (ref 120–199)
CHOLEST/HDLC SERPL: 2.3 {RATIO} (ref 2–5)
CO2 SERPL-SCNC: 25 MMOL/L (ref 23–29)
CREAT SERPL-MCNC: 1.3 MG/DL (ref 0.5–1.4)
EST. GFR  (AFRICAN AMERICAN): 59.2 ML/MIN/1.73 M^2
EST. GFR  (NON AFRICAN AMERICAN): 51.2 ML/MIN/1.73 M^2
ESTIMATED AVG GLUCOSE: 94 MG/DL (ref 68–131)
GLUCOSE SERPL-MCNC: 75 MG/DL (ref 70–110)
HBA1C MFR BLD: 4.9 % (ref 4–5.6)
HDLC SERPL-MCNC: 59 MG/DL (ref 40–75)
HDLC SERPL: 43.7 % (ref 20–50)
LDLC SERPL CALC-MCNC: 67 MG/DL (ref 63–159)
NONHDLC SERPL-MCNC: 76 MG/DL
POTASSIUM SERPL-SCNC: 4 MMOL/L (ref 3.5–5.1)
PROT SERPL-MCNC: 6.8 G/DL (ref 6–8.4)
SODIUM SERPL-SCNC: 137 MMOL/L (ref 136–145)
TRIGL SERPL-MCNC: 45 MG/DL (ref 30–150)

## 2021-02-09 ENCOUNTER — OFFICE VISIT (OUTPATIENT)
Dept: FAMILY MEDICINE | Facility: CLINIC | Age: 81
End: 2021-02-09
Payer: MEDICARE

## 2021-02-09 VITALS
HEART RATE: 61 BPM | SYSTOLIC BLOOD PRESSURE: 130 MMHG | DIASTOLIC BLOOD PRESSURE: 60 MMHG | OXYGEN SATURATION: 99 % | BODY MASS INDEX: 27.32 KG/M2 | TEMPERATURE: 97 F | HEIGHT: 71 IN | WEIGHT: 195.13 LBS

## 2021-02-09 DIAGNOSIS — Z00.00 ANNUAL PHYSICAL EXAM: Primary | ICD-10-CM

## 2021-02-09 DIAGNOSIS — F33.40 RECURRENT MAJOR DEPRESSIVE DISORDER, IN REMISSION: ICD-10-CM

## 2021-02-09 DIAGNOSIS — N40.1 BPH WITH URINARY OBSTRUCTION: ICD-10-CM

## 2021-02-09 DIAGNOSIS — I70.0 AORTIC ATHEROSCLEROSIS: ICD-10-CM

## 2021-02-09 DIAGNOSIS — N13.8 BPH WITH URINARY OBSTRUCTION: ICD-10-CM

## 2021-02-09 DIAGNOSIS — I10 ESSENTIAL HYPERTENSION: ICD-10-CM

## 2021-02-09 DIAGNOSIS — E66.3 OVERWEIGHT (BMI 25.0-29.9): ICD-10-CM

## 2021-02-09 PROCEDURE — 3288F FALL RISK ASSESSMENT DOCD: CPT | Mod: CPTII,S$GLB,, | Performed by: NURSE PRACTITIONER

## 2021-02-09 PROCEDURE — 1101F PR PT FALLS ASSESS DOC 0-1 FALLS W/OUT INJ PAST YR: ICD-10-PCS | Mod: CPTII,S$GLB,, | Performed by: NURSE PRACTITIONER

## 2021-02-09 PROCEDURE — 99999 PR PBB SHADOW E&M-EST. PATIENT-LVL IV: ICD-10-PCS | Mod: PBBFAC,,, | Performed by: NURSE PRACTITIONER

## 2021-02-09 PROCEDURE — 1126F PR PAIN SEVERITY QUANTIFIED, NO PAIN PRESENT: ICD-10-PCS | Mod: S$GLB,,, | Performed by: NURSE PRACTITIONER

## 2021-02-09 PROCEDURE — 99214 PR OFFICE/OUTPT VISIT, EST, LEVL IV, 30-39 MIN: ICD-10-PCS | Mod: S$GLB,,, | Performed by: NURSE PRACTITIONER

## 2021-02-09 PROCEDURE — 1101F PT FALLS ASSESS-DOCD LE1/YR: CPT | Mod: CPTII,S$GLB,, | Performed by: NURSE PRACTITIONER

## 2021-02-09 PROCEDURE — 1126F AMNT PAIN NOTED NONE PRSNT: CPT | Mod: S$GLB,,, | Performed by: NURSE PRACTITIONER

## 2021-02-09 PROCEDURE — 3078F PR MOST RECENT DIASTOLIC BLOOD PRESSURE < 80 MM HG: ICD-10-PCS | Mod: CPTII,S$GLB,, | Performed by: NURSE PRACTITIONER

## 2021-02-09 PROCEDURE — 99214 OFFICE O/P EST MOD 30 MIN: CPT | Mod: S$GLB,,, | Performed by: NURSE PRACTITIONER

## 2021-02-09 PROCEDURE — 3078F DIAST BP <80 MM HG: CPT | Mod: CPTII,S$GLB,, | Performed by: NURSE PRACTITIONER

## 2021-02-09 PROCEDURE — 99999 PR PBB SHADOW E&M-EST. PATIENT-LVL IV: CPT | Mod: PBBFAC,,, | Performed by: NURSE PRACTITIONER

## 2021-02-09 PROCEDURE — 1159F MED LIST DOCD IN RCRD: CPT | Mod: S$GLB,,, | Performed by: NURSE PRACTITIONER

## 2021-02-09 PROCEDURE — 3075F PR MOST RECENT SYSTOLIC BLOOD PRESS GE 130-139MM HG: ICD-10-PCS | Mod: CPTII,S$GLB,, | Performed by: NURSE PRACTITIONER

## 2021-02-09 PROCEDURE — 3075F SYST BP GE 130 - 139MM HG: CPT | Mod: CPTII,S$GLB,, | Performed by: NURSE PRACTITIONER

## 2021-02-09 PROCEDURE — 1159F PR MEDICATION LIST DOCUMENTED IN MEDICAL RECORD: ICD-10-PCS | Mod: S$GLB,,, | Performed by: NURSE PRACTITIONER

## 2021-02-09 PROCEDURE — 3288F PR FALLS RISK ASSESSMENT DOCUMENTED: ICD-10-PCS | Mod: CPTII,S$GLB,, | Performed by: NURSE PRACTITIONER

## 2021-02-09 RX ORDER — METOPROLOL TARTRATE 50 MG/1
TABLET ORAL
COMMUNITY
Start: 2021-02-05 | End: 2021-04-05

## 2021-03-05 ENCOUNTER — TELEPHONE (OUTPATIENT)
Dept: FAMILY MEDICINE | Facility: CLINIC | Age: 81
End: 2021-03-05

## 2021-03-05 DIAGNOSIS — E78.5 DYSLIPIDEMIA: ICD-10-CM

## 2021-03-08 RX ORDER — ATORVASTATIN CALCIUM 40 MG/1
40 TABLET, FILM COATED ORAL NIGHTLY
Qty: 90 TABLET | Refills: 3 | Status: SHIPPED | OUTPATIENT
Start: 2021-03-08 | End: 2021-12-20

## 2021-03-17 ENCOUNTER — OFFICE VISIT (OUTPATIENT)
Dept: NEUROLOGY | Facility: CLINIC | Age: 81
End: 2021-03-17
Payer: MEDICARE

## 2021-03-17 VITALS
BODY MASS INDEX: 27.21 KG/M2 | HEIGHT: 71 IN | SYSTOLIC BLOOD PRESSURE: 170 MMHG | HEART RATE: 60 BPM | DIASTOLIC BLOOD PRESSURE: 73 MMHG

## 2021-03-17 DIAGNOSIS — G45.9 TIA (TRANSIENT ISCHEMIC ATTACK): ICD-10-CM

## 2021-03-17 PROCEDURE — 1126F PR PAIN SEVERITY QUANTIFIED, NO PAIN PRESENT: ICD-10-PCS | Mod: S$GLB,,, | Performed by: NURSE PRACTITIONER

## 2021-03-17 PROCEDURE — 99215 PR OFFICE/OUTPT VISIT, EST, LEVL V, 40-54 MIN: ICD-10-PCS | Mod: S$GLB,ICN,, | Performed by: NURSE PRACTITIONER

## 2021-03-17 PROCEDURE — 3078F DIAST BP <80 MM HG: CPT | Mod: CPTII,S$GLB,, | Performed by: NURSE PRACTITIONER

## 2021-03-17 PROCEDURE — 3078F PR MOST RECENT DIASTOLIC BLOOD PRESSURE < 80 MM HG: ICD-10-PCS | Mod: CPTII,S$GLB,, | Performed by: NURSE PRACTITIONER

## 2021-03-17 PROCEDURE — 3077F PR MOST RECENT SYSTOLIC BLOOD PRESSURE >= 140 MM HG: ICD-10-PCS | Mod: CPTII,S$GLB,, | Performed by: NURSE PRACTITIONER

## 2021-03-17 PROCEDURE — 99999 PR PBB SHADOW E&M-EST. PATIENT-LVL IV: CPT | Mod: PBBFAC,,, | Performed by: NURSE PRACTITIONER

## 2021-03-17 PROCEDURE — 1159F MED LIST DOCD IN RCRD: CPT | Mod: S$GLB,,, | Performed by: NURSE PRACTITIONER

## 2021-03-17 PROCEDURE — 99215 OFFICE O/P EST HI 40 MIN: CPT | Mod: S$GLB,ICN,, | Performed by: NURSE PRACTITIONER

## 2021-03-17 PROCEDURE — 1126F AMNT PAIN NOTED NONE PRSNT: CPT | Mod: S$GLB,,, | Performed by: NURSE PRACTITIONER

## 2021-03-17 PROCEDURE — 1159F PR MEDICATION LIST DOCUMENTED IN MEDICAL RECORD: ICD-10-PCS | Mod: S$GLB,,, | Performed by: NURSE PRACTITIONER

## 2021-03-17 PROCEDURE — 3077F SYST BP >= 140 MM HG: CPT | Mod: CPTII,S$GLB,, | Performed by: NURSE PRACTITIONER

## 2021-03-17 PROCEDURE — 1101F PT FALLS ASSESS-DOCD LE1/YR: CPT | Mod: CPTII,S$GLB,, | Performed by: NURSE PRACTITIONER

## 2021-03-17 PROCEDURE — 3288F PR FALLS RISK ASSESSMENT DOCUMENTED: ICD-10-PCS | Mod: CPTII,S$GLB,, | Performed by: NURSE PRACTITIONER

## 2021-03-17 PROCEDURE — 1101F PR PT FALLS ASSESS DOC 0-1 FALLS W/OUT INJ PAST YR: ICD-10-PCS | Mod: CPTII,S$GLB,, | Performed by: NURSE PRACTITIONER

## 2021-03-17 PROCEDURE — 99999 PR PBB SHADOW E&M-EST. PATIENT-LVL IV: ICD-10-PCS | Mod: PBBFAC,,, | Performed by: NURSE PRACTITIONER

## 2021-03-17 PROCEDURE — 3288F FALL RISK ASSESSMENT DOCD: CPT | Mod: CPTII,S$GLB,, | Performed by: NURSE PRACTITIONER

## 2021-03-19 DIAGNOSIS — N18.30 STAGE 3 CHRONIC KIDNEY DISEASE, UNSPECIFIED WHETHER STAGE 3A OR 3B CKD: Primary | ICD-10-CM

## 2021-03-29 ENCOUNTER — LAB VISIT (OUTPATIENT)
Dept: LAB | Facility: HOSPITAL | Age: 81
End: 2021-03-29
Attending: FAMILY MEDICINE
Payer: MEDICARE

## 2021-03-29 DIAGNOSIS — N18.30 STAGE 3 CHRONIC KIDNEY DISEASE, UNSPECIFIED WHETHER STAGE 3A OR 3B CKD: ICD-10-CM

## 2021-03-29 LAB
25(OH)D3+25(OH)D2 SERPL-MCNC: 34 NG/ML (ref 30–96)
ALBUMIN SERPL BCP-MCNC: 4 G/DL (ref 3.5–5.2)
ANION GAP SERPL CALC-SCNC: 10 MMOL/L (ref 8–16)
BUN SERPL-MCNC: 16 MG/DL (ref 8–23)
CALCIUM SERPL-MCNC: 9.9 MG/DL (ref 8.7–10.5)
CHLORIDE SERPL-SCNC: 103 MMOL/L (ref 95–110)
CO2 SERPL-SCNC: 28 MMOL/L (ref 23–29)
CREAT SERPL-MCNC: 1.3 MG/DL (ref 0.5–1.4)
EST. GFR  (AFRICAN AMERICAN): 58.7 ML/MIN/1.73 M^2
EST. GFR  (NON AFRICAN AMERICAN): 50.8 ML/MIN/1.73 M^2
GLUCOSE SERPL-MCNC: 107 MG/DL (ref 70–110)
HCT VFR BLD AUTO: 41 % (ref 40–54)
HGB BLD-MCNC: 13.3 G/DL (ref 14–18)
PHOSPHATE SERPL-MCNC: 2.4 MG/DL (ref 2.7–4.5)
POTASSIUM SERPL-SCNC: 3.3 MMOL/L (ref 3.5–5.1)
PTH-INTACT SERPL-MCNC: 110 PG/ML (ref 9–77)
SODIUM SERPL-SCNC: 141 MMOL/L (ref 136–145)

## 2021-03-29 PROCEDURE — 83970 ASSAY OF PARATHORMONE: CPT | Performed by: INTERNAL MEDICINE

## 2021-03-29 PROCEDURE — 36415 COLL VENOUS BLD VENIPUNCTURE: CPT | Mod: PO | Performed by: INTERNAL MEDICINE

## 2021-03-29 PROCEDURE — 82306 VITAMIN D 25 HYDROXY: CPT | Performed by: INTERNAL MEDICINE

## 2021-03-29 PROCEDURE — 85018 HEMOGLOBIN: CPT | Performed by: INTERNAL MEDICINE

## 2021-03-29 PROCEDURE — 85014 HEMATOCRIT: CPT | Performed by: INTERNAL MEDICINE

## 2021-03-29 PROCEDURE — 80069 RENAL FUNCTION PANEL: CPT | Performed by: INTERNAL MEDICINE

## 2021-03-31 ENCOUNTER — PATIENT OUTREACH (OUTPATIENT)
Dept: ADMINISTRATIVE | Facility: OTHER | Age: 81
End: 2021-03-31

## 2021-04-01 ENCOUNTER — OFFICE VISIT (OUTPATIENT)
Dept: NEPHROLOGY | Facility: CLINIC | Age: 81
End: 2021-04-01
Payer: MEDICARE

## 2021-04-01 VITALS
DIASTOLIC BLOOD PRESSURE: 80 MMHG | HEART RATE: 50 BPM | SYSTOLIC BLOOD PRESSURE: 208 MMHG | BODY MASS INDEX: 28.29 KG/M2 | WEIGHT: 202.81 LBS | OXYGEN SATURATION: 98 %

## 2021-04-01 DIAGNOSIS — I10 HYPERTENSION, UNSPECIFIED TYPE: ICD-10-CM

## 2021-04-01 DIAGNOSIS — R80.9 PROTEINURIA, UNSPECIFIED TYPE: ICD-10-CM

## 2021-04-01 DIAGNOSIS — N18.30 STAGE 3 CHRONIC KIDNEY DISEASE, UNSPECIFIED WHETHER STAGE 3A OR 3B CKD: Primary | ICD-10-CM

## 2021-04-01 PROCEDURE — 1126F AMNT PAIN NOTED NONE PRSNT: CPT | Mod: S$GLB,,, | Performed by: INTERNAL MEDICINE

## 2021-04-01 PROCEDURE — 1159F MED LIST DOCD IN RCRD: CPT | Mod: S$GLB,,, | Performed by: INTERNAL MEDICINE

## 2021-04-01 PROCEDURE — 3077F PR MOST RECENT SYSTOLIC BLOOD PRESSURE >= 140 MM HG: ICD-10-PCS | Mod: CPTII,S$GLB,, | Performed by: INTERNAL MEDICINE

## 2021-04-01 PROCEDURE — 99999 PR PBB SHADOW E&M-EST. PATIENT-LVL III: CPT | Mod: PBBFAC,,, | Performed by: INTERNAL MEDICINE

## 2021-04-01 PROCEDURE — 3079F DIAST BP 80-89 MM HG: CPT | Mod: CPTII,S$GLB,, | Performed by: INTERNAL MEDICINE

## 2021-04-01 PROCEDURE — 3288F PR FALLS RISK ASSESSMENT DOCUMENTED: ICD-10-PCS | Mod: CPTII,S$GLB,, | Performed by: INTERNAL MEDICINE

## 2021-04-01 PROCEDURE — 1101F PT FALLS ASSESS-DOCD LE1/YR: CPT | Mod: CPTII,S$GLB,, | Performed by: INTERNAL MEDICINE

## 2021-04-01 PROCEDURE — 99999 PR PBB SHADOW E&M-EST. PATIENT-LVL III: ICD-10-PCS | Mod: PBBFAC,,, | Performed by: INTERNAL MEDICINE

## 2021-04-01 PROCEDURE — 1159F PR MEDICATION LIST DOCUMENTED IN MEDICAL RECORD: ICD-10-PCS | Mod: S$GLB,,, | Performed by: INTERNAL MEDICINE

## 2021-04-01 PROCEDURE — 99215 OFFICE O/P EST HI 40 MIN: CPT | Mod: S$GLB,,, | Performed by: INTERNAL MEDICINE

## 2021-04-01 PROCEDURE — 1126F PR PAIN SEVERITY QUANTIFIED, NO PAIN PRESENT: ICD-10-PCS | Mod: S$GLB,,, | Performed by: INTERNAL MEDICINE

## 2021-04-01 PROCEDURE — 3079F PR MOST RECENT DIASTOLIC BLOOD PRESSURE 80-89 MM HG: ICD-10-PCS | Mod: CPTII,S$GLB,, | Performed by: INTERNAL MEDICINE

## 2021-04-01 PROCEDURE — 3077F SYST BP >= 140 MM HG: CPT | Mod: CPTII,S$GLB,, | Performed by: INTERNAL MEDICINE

## 2021-04-01 PROCEDURE — 99215 PR OFFICE/OUTPT VISIT, EST, LEVL V, 40-54 MIN: ICD-10-PCS | Mod: S$GLB,,, | Performed by: INTERNAL MEDICINE

## 2021-04-01 PROCEDURE — 3288F FALL RISK ASSESSMENT DOCD: CPT | Mod: CPTII,S$GLB,, | Performed by: INTERNAL MEDICINE

## 2021-04-01 PROCEDURE — 1101F PR PT FALLS ASSESS DOC 0-1 FALLS W/OUT INJ PAST YR: ICD-10-PCS | Mod: CPTII,S$GLB,, | Performed by: INTERNAL MEDICINE

## 2021-04-01 RX ORDER — IRBESARTAN 300 MG/1
300 TABLET ORAL DAILY
Qty: 90 TABLET | Refills: 3 | Status: SHIPPED | OUTPATIENT
Start: 2021-04-01 | End: 2022-02-01

## 2021-04-05 ENCOUNTER — OFFICE VISIT (OUTPATIENT)
Dept: FAMILY MEDICINE | Facility: CLINIC | Age: 81
End: 2021-04-05
Payer: MEDICARE

## 2021-04-05 ENCOUNTER — HOSPITAL ENCOUNTER (OUTPATIENT)
Facility: HOSPITAL | Age: 81
Discharge: HOME | End: 2021-04-08
Attending: EMERGENCY MEDICINE
Payer: MEDICARE

## 2021-04-05 ENCOUNTER — TELEPHONE (OUTPATIENT)
Dept: FAMILY MEDICINE | Facility: CLINIC | Age: 81
End: 2021-04-05

## 2021-04-05 VITALS
WEIGHT: 209.44 LBS | DIASTOLIC BLOOD PRESSURE: 84 MMHG | OXYGEN SATURATION: 98 % | HEIGHT: 71 IN | SYSTOLIC BLOOD PRESSURE: 160 MMHG | HEART RATE: 53 BPM | BODY MASS INDEX: 29.32 KG/M2

## 2021-04-05 DIAGNOSIS — I10 ELEVATED BLOOD PRESSURE READING IN OFFICE WITH DIAGNOSIS OF HYPERTENSION: ICD-10-CM

## 2021-04-05 DIAGNOSIS — I16.0 HYPERTENSIVE URGENCY: ICD-10-CM

## 2021-04-05 DIAGNOSIS — E66.3 OVERWEIGHT (BMI 25.0-29.9): ICD-10-CM

## 2021-04-05 DIAGNOSIS — R07.9 CHEST PAIN: ICD-10-CM

## 2021-04-05 DIAGNOSIS — I10 ESSENTIAL HYPERTENSION: Primary | ICD-10-CM

## 2021-04-05 DIAGNOSIS — R94.31 EKG ABNORMALITIES: Primary | ICD-10-CM

## 2021-04-05 DIAGNOSIS — I10 HTN (HYPERTENSION): ICD-10-CM

## 2021-04-05 DIAGNOSIS — I10 HYPERTENSION: ICD-10-CM

## 2021-04-05 DIAGNOSIS — N18.30 STAGE 3 CHRONIC KIDNEY DISEASE, UNSPECIFIED WHETHER STAGE 3A OR 3B CKD: ICD-10-CM

## 2021-04-05 DIAGNOSIS — R94.31 ABNORMAL EKG: ICD-10-CM

## 2021-04-05 LAB
ALBUMIN SERPL BCP-MCNC: 3.8 G/DL (ref 3.5–5.2)
ALP SERPL-CCNC: 71 U/L (ref 55–135)
ALT SERPL W/O P-5'-P-CCNC: 18 U/L (ref 10–44)
ANION GAP SERPL CALC-SCNC: 9 MMOL/L (ref 8–16)
AST SERPL-CCNC: 23 U/L (ref 10–40)
BASOPHILS # BLD AUTO: 0.02 K/UL (ref 0–0.2)
BASOPHILS NFR BLD: 0.6 % (ref 0–1.9)
BILIRUB SERPL-MCNC: 0.4 MG/DL (ref 0.1–1)
BILIRUB UR QL STRIP: NEGATIVE
BNP SERPL-MCNC: 217 PG/ML (ref 0–99)
BUN SERPL-MCNC: 20 MG/DL (ref 8–23)
CALCIUM SERPL-MCNC: 9.4 MG/DL (ref 8.7–10.5)
CHLORIDE SERPL-SCNC: 105 MMOL/L (ref 95–110)
CLARITY UR: CLEAR
CO2 SERPL-SCNC: 25 MMOL/L (ref 23–29)
COLOR UR: NORMAL
CREAT SERPL-MCNC: 1.4 MG/DL (ref 0.5–1.4)
CTP QC/QA: YES
DIFFERENTIAL METHOD: ABNORMAL
EOSINOPHIL # BLD AUTO: 0.1 K/UL (ref 0–0.5)
EOSINOPHIL NFR BLD: 3.1 % (ref 0–8)
ERYTHROCYTE [DISTWIDTH] IN BLOOD BY AUTOMATED COUNT: 13.4 % (ref 11.5–14.5)
EST. GFR  (AFRICAN AMERICAN): 54 ML/MIN/1.73 M^2
EST. GFR  (NON AFRICAN AMERICAN): 46 ML/MIN/1.73 M^2
GLUCOSE SERPL-MCNC: 93 MG/DL (ref 70–110)
GLUCOSE UR QL STRIP: NEGATIVE
HCT VFR BLD AUTO: 39.3 % (ref 40–54)
HGB BLD-MCNC: 13.3 G/DL (ref 14–18)
HGB UR QL STRIP: NEGATIVE
IMM GRANULOCYTES # BLD AUTO: 0.01 K/UL (ref 0–0.04)
IMM GRANULOCYTES NFR BLD AUTO: 0.3 % (ref 0–0.5)
INR PPP: 1 (ref 0.8–1.2)
KETONES UR QL STRIP: NEGATIVE
LEUKOCYTE ESTERASE UR QL STRIP: NEGATIVE
LYMPHOCYTES # BLD AUTO: 1.1 K/UL (ref 1–4.8)
LYMPHOCYTES NFR BLD: 31.8 % (ref 18–48)
MCH RBC QN AUTO: 31.4 PG (ref 27–31)
MCHC RBC AUTO-ENTMCNC: 33.8 G/DL (ref 32–36)
MCV RBC AUTO: 93 FL (ref 82–98)
MONOCYTES # BLD AUTO: 0.5 K/UL (ref 0.3–1)
MONOCYTES NFR BLD: 14.8 % (ref 4–15)
NEUTROPHILS # BLD AUTO: 1.8 K/UL (ref 1.8–7.7)
NEUTROPHILS NFR BLD: 49.4 % (ref 38–73)
NITRITE UR QL STRIP: NEGATIVE
NRBC BLD-RTO: 0 /100 WBC
PH UR STRIP: 8 [PH] (ref 5–8)
PLATELET # BLD AUTO: 205 K/UL (ref 150–450)
PMV BLD AUTO: 9.9 FL (ref 9.2–12.9)
POTASSIUM SERPL-SCNC: 4.5 MMOL/L (ref 3.5–5.1)
PROT SERPL-MCNC: 6.8 G/DL (ref 6–8.4)
PROT UR QL STRIP: NEGATIVE
PROTHROMBIN TIME: 10.9 SEC (ref 9–12.5)
RBC # BLD AUTO: 4.24 M/UL (ref 4.6–6.2)
SARS-COV-2 RDRP RESP QL NAA+PROBE: NEGATIVE
SODIUM SERPL-SCNC: 139 MMOL/L (ref 136–145)
SP GR UR STRIP: 1.01 (ref 1–1.03)
TROPONIN I SERPL DL<=0.01 NG/ML-MCNC: 0.02 NG/ML (ref 0–0.03)
URN SPEC COLLECT METH UR: NORMAL
UROBILINOGEN UR STRIP-ACNC: NEGATIVE EU/DL
WBC # BLD AUTO: 3.59 K/UL (ref 3.9–12.7)

## 2021-04-05 PROCEDURE — 3288F FALL RISK ASSESSMENT DOCD: CPT | Mod: CPTII,S$GLB,, | Performed by: NURSE PRACTITIONER

## 2021-04-05 PROCEDURE — 80053 COMPREHEN METABOLIC PANEL: CPT | Performed by: NURSE PRACTITIONER

## 2021-04-05 PROCEDURE — 85610 PROTHROMBIN TIME: CPT | Performed by: NURSE PRACTITIONER

## 2021-04-05 PROCEDURE — U0002 COVID-19 LAB TEST NON-CDC: HCPCS | Performed by: NURSE PRACTITIONER

## 2021-04-05 PROCEDURE — G0378 HOSPITAL OBSERVATION PER HR: HCPCS

## 2021-04-05 PROCEDURE — 1126F PR PAIN SEVERITY QUANTIFIED, NO PAIN PRESENT: ICD-10-PCS | Mod: S$GLB,,, | Performed by: NURSE PRACTITIONER

## 2021-04-05 PROCEDURE — 3077F PR MOST RECENT SYSTOLIC BLOOD PRESSURE >= 140 MM HG: ICD-10-PCS | Mod: CPTII,S$GLB,, | Performed by: NURSE PRACTITIONER

## 2021-04-05 PROCEDURE — 1101F PR PT FALLS ASSESS DOC 0-1 FALLS W/OUT INJ PAST YR: ICD-10-PCS | Mod: CPTII,S$GLB,, | Performed by: NURSE PRACTITIONER

## 2021-04-05 PROCEDURE — 99214 OFFICE O/P EST MOD 30 MIN: CPT | Mod: S$GLB,,, | Performed by: NURSE PRACTITIONER

## 2021-04-05 PROCEDURE — 3079F PR MOST RECENT DIASTOLIC BLOOD PRESSURE 80-89 MM HG: ICD-10-PCS | Mod: CPTII,S$GLB,, | Performed by: NURSE PRACTITIONER

## 2021-04-05 PROCEDURE — 93005 ELECTROCARDIOGRAM TRACING: CPT

## 2021-04-05 PROCEDURE — 81003 URINALYSIS AUTO W/O SCOPE: CPT | Performed by: NURSE PRACTITIONER

## 2021-04-05 PROCEDURE — 63600175 PHARM REV CODE 636 W HCPCS: Performed by: NURSE PRACTITIONER

## 2021-04-05 PROCEDURE — 1101F PT FALLS ASSESS-DOCD LE1/YR: CPT | Mod: CPTII,S$GLB,, | Performed by: NURSE PRACTITIONER

## 2021-04-05 PROCEDURE — 25000003 PHARM REV CODE 250: Performed by: NURSE PRACTITIONER

## 2021-04-05 PROCEDURE — 93010 EKG 12-LEAD: ICD-10-PCS | Mod: 76,,, | Performed by: INTERNAL MEDICINE

## 2021-04-05 PROCEDURE — 93010 ELECTROCARDIOGRAM REPORT: CPT | Mod: ,,, | Performed by: INTERNAL MEDICINE

## 2021-04-05 PROCEDURE — 99999 PR PBB SHADOW E&M-EST. PATIENT-LVL V: CPT | Mod: PBBFAC,,, | Performed by: NURSE PRACTITIONER

## 2021-04-05 PROCEDURE — 99999 PR PBB SHADOW E&M-EST. PATIENT-LVL V: ICD-10-PCS | Mod: PBBFAC,,, | Performed by: NURSE PRACTITIONER

## 2021-04-05 PROCEDURE — 3079F DIAST BP 80-89 MM HG: CPT | Mod: CPTII,S$GLB,, | Performed by: NURSE PRACTITIONER

## 2021-04-05 PROCEDURE — 1126F AMNT PAIN NOTED NONE PRSNT: CPT | Mod: S$GLB,,, | Performed by: NURSE PRACTITIONER

## 2021-04-05 PROCEDURE — 93010 ELECTROCARDIOGRAM REPORT: CPT | Mod: 76,,, | Performed by: INTERNAL MEDICINE

## 2021-04-05 PROCEDURE — 99214 PR OFFICE/OUTPT VISIT, EST, LEVL IV, 30-39 MIN: ICD-10-PCS | Mod: S$GLB,,, | Performed by: NURSE PRACTITIONER

## 2021-04-05 PROCEDURE — 99285 EMERGENCY DEPT VISIT HI MDM: CPT | Mod: 25

## 2021-04-05 PROCEDURE — 1159F PR MEDICATION LIST DOCUMENTED IN MEDICAL RECORD: ICD-10-PCS | Mod: S$GLB,,, | Performed by: NURSE PRACTITIONER

## 2021-04-05 PROCEDURE — 3288F PR FALLS RISK ASSESSMENT DOCUMENTED: ICD-10-PCS | Mod: CPTII,S$GLB,, | Performed by: NURSE PRACTITIONER

## 2021-04-05 PROCEDURE — 85025 COMPLETE CBC W/AUTO DIFF WBC: CPT | Performed by: NURSE PRACTITIONER

## 2021-04-05 PROCEDURE — 1159F MED LIST DOCD IN RCRD: CPT | Mod: S$GLB,,, | Performed by: NURSE PRACTITIONER

## 2021-04-05 PROCEDURE — 3077F SYST BP >= 140 MM HG: CPT | Mod: CPTII,S$GLB,, | Performed by: NURSE PRACTITIONER

## 2021-04-05 PROCEDURE — 96372 THER/PROPH/DIAG INJ SC/IM: CPT | Mod: 59

## 2021-04-05 PROCEDURE — 96374 THER/PROPH/DIAG INJ IV PUSH: CPT

## 2021-04-05 PROCEDURE — 83880 ASSAY OF NATRIURETIC PEPTIDE: CPT | Performed by: NURSE PRACTITIONER

## 2021-04-05 PROCEDURE — 84484 ASSAY OF TROPONIN QUANT: CPT | Performed by: NURSE PRACTITIONER

## 2021-04-05 RX ORDER — HYDRALAZINE HYDROCHLORIDE 25 MG/1
25 TABLET, FILM COATED ORAL EVERY 8 HOURS
Status: DISCONTINUED | OUTPATIENT
Start: 2021-04-05 | End: 2021-04-06

## 2021-04-05 RX ORDER — CLONIDINE HYDROCHLORIDE 0.1 MG/1
0.1 TABLET ORAL
Status: DISCONTINUED | OUTPATIENT
Start: 2021-04-05 | End: 2021-04-05 | Stop reason: HOSPADM

## 2021-04-05 RX ORDER — HYDRALAZINE HYDROCHLORIDE 20 MG/ML
10 INJECTION INTRAMUSCULAR; INTRAVENOUS
Status: COMPLETED | OUTPATIENT
Start: 2021-04-05 | End: 2021-04-05

## 2021-04-05 RX ORDER — IBUPROFEN 200 MG
16 TABLET ORAL
Status: DISCONTINUED | OUTPATIENT
Start: 2021-04-05 | End: 2021-04-08 | Stop reason: HOSPADM

## 2021-04-05 RX ORDER — IBUPROFEN 200 MG
24 TABLET ORAL
Status: DISCONTINUED | OUTPATIENT
Start: 2021-04-05 | End: 2021-04-08 | Stop reason: HOSPADM

## 2021-04-05 RX ORDER — AMLODIPINE BESYLATE 5 MG/1
10 TABLET ORAL DAILY
Status: DISCONTINUED | OUTPATIENT
Start: 2021-04-05 | End: 2021-04-06

## 2021-04-05 RX ORDER — ENOXAPARIN SODIUM 100 MG/ML
40 INJECTION SUBCUTANEOUS EVERY 24 HOURS
Status: DISCONTINUED | OUTPATIENT
Start: 2021-04-05 | End: 2021-04-08 | Stop reason: HOSPADM

## 2021-04-05 RX ORDER — TALC
6 POWDER (GRAM) TOPICAL NIGHTLY PRN
Status: DISCONTINUED | OUTPATIENT
Start: 2021-04-05 | End: 2021-04-08 | Stop reason: HOSPADM

## 2021-04-05 RX ORDER — ONDANSETRON 2 MG/ML
4 INJECTION INTRAMUSCULAR; INTRAVENOUS EVERY 8 HOURS PRN
Status: DISCONTINUED | OUTPATIENT
Start: 2021-04-05 | End: 2021-04-08 | Stop reason: HOSPADM

## 2021-04-05 RX ORDER — NIFEDIPINE 60 MG/1
60 TABLET, EXTENDED RELEASE ORAL DAILY
COMMUNITY
Start: 2021-04-05 | End: 2021-04-05 | Stop reason: SDUPTHER

## 2021-04-05 RX ORDER — GLUCAGON 1 MG
1 KIT INJECTION
Status: DISCONTINUED | OUTPATIENT
Start: 2021-04-05 | End: 2021-04-08 | Stop reason: HOSPADM

## 2021-04-05 RX ORDER — CLONIDINE HYDROCHLORIDE 0.1 MG/1
0.1 TABLET ORAL
Status: DISCONTINUED | OUTPATIENT
Start: 2021-04-05 | End: 2021-04-05

## 2021-04-05 RX ORDER — SODIUM CHLORIDE 0.9 % (FLUSH) 0.9 %
10 SYRINGE (ML) INJECTION
Status: DISCONTINUED | OUTPATIENT
Start: 2021-04-05 | End: 2021-04-08 | Stop reason: HOSPADM

## 2021-04-05 RX ORDER — ACETAMINOPHEN 325 MG/1
650 TABLET ORAL EVERY 4 HOURS PRN
Status: DISCONTINUED | OUTPATIENT
Start: 2021-04-05 | End: 2021-04-08 | Stop reason: HOSPADM

## 2021-04-05 RX ORDER — NIFEDIPINE 60 MG/1
60 TABLET, EXTENDED RELEASE ORAL DAILY
Qty: 30 TABLET | Refills: 11 | Status: ON HOLD | OUTPATIENT
Start: 2021-04-05 | End: 2021-04-08 | Stop reason: HOSPADM

## 2021-04-05 RX ADMIN — CLONIDINE HYDROCHLORIDE 0.1 MG: 0.1 TABLET ORAL at 08:04

## 2021-04-05 RX ADMIN — AMLODIPINE BESYLATE 10 MG: 5 TABLET ORAL at 09:04

## 2021-04-05 RX ADMIN — HYDRALAZINE HYDROCHLORIDE 25 MG: 25 TABLET, FILM COATED ORAL at 11:04

## 2021-04-05 RX ADMIN — ENOXAPARIN SODIUM 40 MG: 40 INJECTION SUBCUTANEOUS at 11:04

## 2021-04-05 RX ADMIN — HYDRALAZINE HYDROCHLORIDE 10 MG: 20 INJECTION, SOLUTION INTRAMUSCULAR; INTRAVENOUS at 08:04

## 2021-04-06 ENCOUNTER — TELEPHONE (OUTPATIENT)
Dept: FAMILY MEDICINE | Facility: CLINIC | Age: 81
End: 2021-04-06

## 2021-04-06 DIAGNOSIS — I10 ESSENTIAL HYPERTENSION: Primary | ICD-10-CM

## 2021-04-06 PROBLEM — I25.10 CAD (CORONARY ARTERY DISEASE): Status: ACTIVE | Noted: 2021-04-06

## 2021-04-06 PROBLEM — N18.30 STAGE 3 CHRONIC KIDNEY DISEASE: Status: ACTIVE | Noted: 2021-04-06

## 2021-04-06 LAB
ANION GAP SERPL CALC-SCNC: 12 MMOL/L (ref 8–16)
BASOPHILS # BLD AUTO: 0.02 K/UL (ref 0–0.2)
BASOPHILS NFR BLD: 0.5 % (ref 0–1.9)
BUN SERPL-MCNC: 18 MG/DL (ref 8–23)
CALCIUM SERPL-MCNC: 9.2 MG/DL (ref 8.7–10.5)
CHLORIDE SERPL-SCNC: 106 MMOL/L (ref 95–110)
CO2 SERPL-SCNC: 20 MMOL/L (ref 23–29)
CREAT SERPL-MCNC: 1.1 MG/DL (ref 0.5–1.4)
DIFFERENTIAL METHOD: ABNORMAL
EOSINOPHIL # BLD AUTO: 0.1 K/UL (ref 0–0.5)
EOSINOPHIL NFR BLD: 2.7 % (ref 0–8)
ERYTHROCYTE [DISTWIDTH] IN BLOOD BY AUTOMATED COUNT: 13.4 % (ref 11.5–14.5)
EST. GFR  (AFRICAN AMERICAN): >60 ML/MIN/1.73 M^2
EST. GFR  (NON AFRICAN AMERICAN): >60 ML/MIN/1.73 M^2
GLUCOSE SERPL-MCNC: 92 MG/DL (ref 70–110)
HCT VFR BLD AUTO: 41.2 % (ref 40–54)
HGB BLD-MCNC: 13.7 G/DL (ref 14–18)
IMM GRANULOCYTES # BLD AUTO: 0.01 K/UL (ref 0–0.04)
IMM GRANULOCYTES NFR BLD AUTO: 0.2 % (ref 0–0.5)
LYMPHOCYTES # BLD AUTO: 1.5 K/UL (ref 1–4.8)
LYMPHOCYTES NFR BLD: 34.2 % (ref 18–48)
MAGNESIUM SERPL-MCNC: 1.9 MG/DL (ref 1.6–2.6)
MCH RBC QN AUTO: 31.4 PG (ref 27–31)
MCHC RBC AUTO-ENTMCNC: 33.3 G/DL (ref 32–36)
MCV RBC AUTO: 94 FL (ref 82–98)
MONOCYTES # BLD AUTO: 0.6 K/UL (ref 0.3–1)
MONOCYTES NFR BLD: 13.7 % (ref 4–15)
NEUTROPHILS # BLD AUTO: 2.1 K/UL (ref 1.8–7.7)
NEUTROPHILS NFR BLD: 48.7 % (ref 38–73)
NRBC BLD-RTO: 0 /100 WBC
PLATELET # BLD AUTO: 206 K/UL (ref 150–450)
PMV BLD AUTO: 10.9 FL (ref 9.2–12.9)
POTASSIUM SERPL-SCNC: 3.5 MMOL/L (ref 3.5–5.1)
RBC # BLD AUTO: 4.37 M/UL (ref 4.6–6.2)
SODIUM SERPL-SCNC: 138 MMOL/L (ref 136–145)
TROPONIN I SERPL DL<=0.01 NG/ML-MCNC: 0.01 NG/ML (ref 0–0.03)
WBC # BLD AUTO: 4.38 K/UL (ref 3.9–12.7)

## 2021-04-06 PROCEDURE — 63600175 PHARM REV CODE 636 W HCPCS: Performed by: NURSE PRACTITIONER

## 2021-04-06 PROCEDURE — G0378 HOSPITAL OBSERVATION PER HR: HCPCS

## 2021-04-06 PROCEDURE — 93010 ELECTROCARDIOGRAM REPORT: CPT | Mod: ,,, | Performed by: INTERNAL MEDICINE

## 2021-04-06 PROCEDURE — 85025 COMPLETE CBC W/AUTO DIFF WBC: CPT | Performed by: NURSE PRACTITIONER

## 2021-04-06 PROCEDURE — 93005 ELECTROCARDIOGRAM TRACING: CPT

## 2021-04-06 PROCEDURE — 99219 PR INITIAL OBSERVATION CARE,LEVL II: CPT | Mod: ,,, | Performed by: INTERNAL MEDICINE

## 2021-04-06 PROCEDURE — 25000003 PHARM REV CODE 250: Performed by: NURSE PRACTITIONER

## 2021-04-06 PROCEDURE — 83735 ASSAY OF MAGNESIUM: CPT | Performed by: NURSE PRACTITIONER

## 2021-04-06 PROCEDURE — 36415 COLL VENOUS BLD VENIPUNCTURE: CPT | Performed by: NURSE PRACTITIONER

## 2021-04-06 PROCEDURE — 99219 PR INITIAL OBSERVATION CARE,LEVL II: ICD-10-PCS | Mod: ,,, | Performed by: INTERNAL MEDICINE

## 2021-04-06 PROCEDURE — 84484 ASSAY OF TROPONIN QUANT: CPT | Performed by: NURSE PRACTITIONER

## 2021-04-06 PROCEDURE — 80048 BASIC METABOLIC PNL TOTAL CA: CPT | Performed by: NURSE PRACTITIONER

## 2021-04-06 PROCEDURE — 96372 THER/PROPH/DIAG INJ SC/IM: CPT

## 2021-04-06 PROCEDURE — 93010 EKG 12-LEAD: ICD-10-PCS | Mod: ,,, | Performed by: INTERNAL MEDICINE

## 2021-04-06 RX ORDER — CLOPIDOGREL BISULFATE 75 MG/1
75 TABLET ORAL DAILY
Status: DISCONTINUED | OUTPATIENT
Start: 2021-04-06 | End: 2021-04-08 | Stop reason: HOSPADM

## 2021-04-06 RX ORDER — TAMSULOSIN HYDROCHLORIDE 0.4 MG/1
0.4 CAPSULE ORAL NIGHTLY
Status: DISCONTINUED | OUTPATIENT
Start: 2021-04-06 | End: 2021-04-08 | Stop reason: HOSPADM

## 2021-04-06 RX ORDER — LOSARTAN POTASSIUM 50 MG/1
100 TABLET ORAL DAILY
Refills: 3 | Status: DISCONTINUED | OUTPATIENT
Start: 2021-04-06 | End: 2021-04-08 | Stop reason: HOSPADM

## 2021-04-06 RX ORDER — ASPIRIN 81 MG/1
81 TABLET ORAL DAILY
Status: DISCONTINUED | OUTPATIENT
Start: 2021-04-06 | End: 2021-04-08 | Stop reason: HOSPADM

## 2021-04-06 RX ORDER — DUTASTERIDE 0.5 MG/1
0.5 CAPSULE, LIQUID FILLED ORAL DAILY
Status: DISCONTINUED | OUTPATIENT
Start: 2021-04-06 | End: 2021-04-08 | Stop reason: HOSPADM

## 2021-04-06 RX ORDER — HYDRALAZINE HYDROCHLORIDE 25 MG/1
50 TABLET, FILM COATED ORAL EVERY 8 HOURS
Status: DISCONTINUED | OUTPATIENT
Start: 2021-04-06 | End: 2021-04-07

## 2021-04-06 RX ORDER — AMOXICILLIN 250 MG
1 CAPSULE ORAL DAILY
Status: DISCONTINUED | OUTPATIENT
Start: 2021-04-06 | End: 2021-04-08 | Stop reason: HOSPADM

## 2021-04-06 RX ORDER — ATORVASTATIN CALCIUM 40 MG/1
40 TABLET, FILM COATED ORAL NIGHTLY
Status: DISCONTINUED | OUTPATIENT
Start: 2021-04-06 | End: 2021-04-08 | Stop reason: HOSPADM

## 2021-04-06 RX ORDER — OXYBUTYNIN CHLORIDE 5 MG/1
10 TABLET, EXTENDED RELEASE ORAL DAILY
Status: DISCONTINUED | OUTPATIENT
Start: 2021-04-06 | End: 2021-04-08 | Stop reason: HOSPADM

## 2021-04-06 RX ADMIN — HYDRALAZINE HYDROCHLORIDE 25 MG: 25 TABLET, FILM COATED ORAL at 06:04

## 2021-04-06 RX ADMIN — Medication 81 MG: at 08:04

## 2021-04-06 RX ADMIN — TAMSULOSIN HYDROCHLORIDE 0.4 MG: 0.4 CAPSULE ORAL at 09:04

## 2021-04-06 RX ADMIN — HYDRALAZINE HYDROCHLORIDE 50 MG: 25 TABLET, FILM COATED ORAL at 09:04

## 2021-04-06 RX ADMIN — Medication 1 TABLET: at 08:04

## 2021-04-06 RX ADMIN — ACETAMINOPHEN 650 MG: 325 TABLET ORAL at 09:04

## 2021-04-06 RX ADMIN — ATORVASTATIN CALCIUM 40 MG: 40 TABLET, FILM COATED ORAL at 09:04

## 2021-04-06 RX ADMIN — ACETAMINOPHEN 650 MG: 325 TABLET ORAL at 06:04

## 2021-04-06 RX ADMIN — CLOPIDOGREL 75 MG: 75 TABLET, FILM COATED ORAL at 08:04

## 2021-04-06 RX ADMIN — ENOXAPARIN SODIUM 40 MG: 40 INJECTION SUBCUTANEOUS at 05:04

## 2021-04-06 RX ADMIN — DUTASTERIDE 0.5 MG: 0.5 CAPSULE, LIQUID FILLED ORAL at 10:04

## 2021-04-06 RX ADMIN — OXYBUTYNIN CHLORIDE 10 MG: 5 TABLET, EXTENDED RELEASE ORAL at 08:04

## 2021-04-06 RX ADMIN — HYDRALAZINE HYDROCHLORIDE 50 MG: 25 TABLET, FILM COATED ORAL at 02:04

## 2021-04-06 RX ADMIN — LOSARTAN POTASSIUM 100 MG: 50 TABLET, FILM COATED ORAL at 08:04

## 2021-04-07 LAB
ANION GAP SERPL CALC-SCNC: 10 MMOL/L (ref 8–16)
BASOPHILS # BLD AUTO: 0.03 K/UL (ref 0–0.2)
BASOPHILS NFR BLD: 0.7 % (ref 0–1.9)
BUN SERPL-MCNC: 16 MG/DL (ref 8–23)
CALCIUM SERPL-MCNC: 9.6 MG/DL (ref 8.7–10.5)
CHLORIDE SERPL-SCNC: 106 MMOL/L (ref 95–110)
CO2 SERPL-SCNC: 22 MMOL/L (ref 23–29)
CREAT SERPL-MCNC: 1.1 MG/DL (ref 0.5–1.4)
DIFFERENTIAL METHOD: NORMAL
EOSINOPHIL # BLD AUTO: 0.1 K/UL (ref 0–0.5)
EOSINOPHIL NFR BLD: 2.1 % (ref 0–8)
ERYTHROCYTE [DISTWIDTH] IN BLOOD BY AUTOMATED COUNT: 13.6 % (ref 11.5–14.5)
EST. GFR  (AFRICAN AMERICAN): >60 ML/MIN/1.73 M^2
EST. GFR  (NON AFRICAN AMERICAN): >60 ML/MIN/1.73 M^2
GLUCOSE SERPL-MCNC: 98 MG/DL (ref 70–110)
HCT VFR BLD AUTO: 42.3 % (ref 40–54)
HGB BLD-MCNC: 14.4 G/DL (ref 14–18)
IMM GRANULOCYTES # BLD AUTO: 0.02 K/UL (ref 0–0.04)
IMM GRANULOCYTES NFR BLD AUTO: 0.5 % (ref 0–0.5)
LYMPHOCYTES # BLD AUTO: 1.3 K/UL (ref 1–4.8)
LYMPHOCYTES NFR BLD: 30.6 % (ref 18–48)
MAGNESIUM SERPL-MCNC: 2 MG/DL (ref 1.6–2.6)
MCH RBC QN AUTO: 30.8 PG (ref 27–31)
MCHC RBC AUTO-ENTMCNC: 34 G/DL (ref 32–36)
MCV RBC AUTO: 90 FL (ref 82–98)
MONOCYTES # BLD AUTO: 0.6 K/UL (ref 0.3–1)
MONOCYTES NFR BLD: 14.6 % (ref 4–15)
NEUTROPHILS # BLD AUTO: 2.2 K/UL (ref 1.8–7.7)
NEUTROPHILS NFR BLD: 51.5 % (ref 38–73)
NRBC BLD-RTO: 0 /100 WBC
PLATELET # BLD AUTO: 208 K/UL (ref 150–450)
PMV BLD AUTO: 10.1 FL (ref 9.2–12.9)
POTASSIUM SERPL-SCNC: 4 MMOL/L (ref 3.5–5.1)
RBC # BLD AUTO: 4.68 M/UL (ref 4.6–6.2)
SODIUM SERPL-SCNC: 138 MMOL/L (ref 136–145)
WBC # BLD AUTO: 4.25 K/UL (ref 3.9–12.7)

## 2021-04-07 PROCEDURE — 25000003 PHARM REV CODE 250: Performed by: NURSE PRACTITIONER

## 2021-04-07 PROCEDURE — 99225 PR SUBSEQUENT OBSERVATION CARE,LEVEL II: ICD-10-PCS | Mod: ,,, | Performed by: NURSE PRACTITIONER

## 2021-04-07 PROCEDURE — 80048 BASIC METABOLIC PNL TOTAL CA: CPT | Performed by: NURSE PRACTITIONER

## 2021-04-07 PROCEDURE — 85025 COMPLETE CBC W/AUTO DIFF WBC: CPT | Performed by: NURSE PRACTITIONER

## 2021-04-07 PROCEDURE — 96372 THER/PROPH/DIAG INJ SC/IM: CPT

## 2021-04-07 PROCEDURE — 25000003 PHARM REV CODE 250: Performed by: INTERNAL MEDICINE

## 2021-04-07 PROCEDURE — 83735 ASSAY OF MAGNESIUM: CPT | Performed by: NURSE PRACTITIONER

## 2021-04-07 PROCEDURE — 36415 COLL VENOUS BLD VENIPUNCTURE: CPT | Performed by: NURSE PRACTITIONER

## 2021-04-07 PROCEDURE — 63600175 PHARM REV CODE 636 W HCPCS: Performed by: NURSE PRACTITIONER

## 2021-04-07 PROCEDURE — 99225 PR SUBSEQUENT OBSERVATION CARE,LEVEL II: CPT | Mod: ,,, | Performed by: NURSE PRACTITIONER

## 2021-04-07 PROCEDURE — G0378 HOSPITAL OBSERVATION PER HR: HCPCS

## 2021-04-07 RX ORDER — CLONIDINE HYDROCHLORIDE 0.1 MG/1
0.1 TABLET ORAL 3 TIMES DAILY
Status: DISCONTINUED | OUTPATIENT
Start: 2021-04-07 | End: 2021-04-08 | Stop reason: HOSPADM

## 2021-04-07 RX ORDER — AMLODIPINE BESYLATE 5 MG/1
5 TABLET ORAL DAILY
Status: DISCONTINUED | OUTPATIENT
Start: 2021-04-07 | End: 2021-04-08

## 2021-04-07 RX ORDER — HYDRALAZINE HYDROCHLORIDE 25 MG/1
75 TABLET, FILM COATED ORAL EVERY 8 HOURS
Status: DISCONTINUED | OUTPATIENT
Start: 2021-04-07 | End: 2021-04-07

## 2021-04-07 RX ORDER — HYDRALAZINE HYDROCHLORIDE 25 MG/1
100 TABLET, FILM COATED ORAL EVERY 8 HOURS
Status: DISCONTINUED | OUTPATIENT
Start: 2021-04-07 | End: 2021-04-08 | Stop reason: HOSPADM

## 2021-04-07 RX ADMIN — HYDRALAZINE HYDROCHLORIDE 50 MG: 25 TABLET, FILM COATED ORAL at 05:04

## 2021-04-07 RX ADMIN — DUTASTERIDE 0.5 MG: 0.5 CAPSULE, LIQUID FILLED ORAL at 09:04

## 2021-04-07 RX ADMIN — LOSARTAN POTASSIUM 100 MG: 50 TABLET, FILM COATED ORAL at 09:04

## 2021-04-07 RX ADMIN — TAMSULOSIN HYDROCHLORIDE 0.4 MG: 0.4 CAPSULE ORAL at 08:04

## 2021-04-07 RX ADMIN — OXYBUTYNIN CHLORIDE 10 MG: 5 TABLET, EXTENDED RELEASE ORAL at 09:04

## 2021-04-07 RX ADMIN — HYDRALAZINE HYDROCHLORIDE 100 MG: 25 TABLET, FILM COATED ORAL at 02:04

## 2021-04-07 RX ADMIN — Medication 81 MG: at 09:04

## 2021-04-07 RX ADMIN — CLONIDINE HYDROCHLORIDE 0.1 MG: 0.1 TABLET ORAL at 08:04

## 2021-04-07 RX ADMIN — Medication 1 TABLET: at 09:04

## 2021-04-07 RX ADMIN — ATORVASTATIN CALCIUM 40 MG: 40 TABLET, FILM COATED ORAL at 08:04

## 2021-04-07 RX ADMIN — CLOPIDOGREL 75 MG: 75 TABLET, FILM COATED ORAL at 09:04

## 2021-04-07 RX ADMIN — AMLODIPINE BESYLATE 5 MG: 5 TABLET ORAL at 06:04

## 2021-04-07 RX ADMIN — ENOXAPARIN SODIUM 40 MG: 40 INJECTION SUBCUTANEOUS at 04:04

## 2021-04-07 RX ADMIN — HYDRALAZINE HYDROCHLORIDE 100 MG: 25 TABLET, FILM COATED ORAL at 09:04

## 2021-04-07 RX ADMIN — CLONIDINE HYDROCHLORIDE 0.1 MG: 0.1 TABLET ORAL at 04:04

## 2021-04-08 ENCOUNTER — TELEPHONE (OUTPATIENT)
Dept: CARDIOLOGY | Facility: CLINIC | Age: 81
End: 2021-04-08

## 2021-04-08 ENCOUNTER — TELEPHONE (OUTPATIENT)
Dept: FAMILY MEDICINE | Facility: CLINIC | Age: 81
End: 2021-04-08

## 2021-04-08 VITALS
HEART RATE: 76 BPM | OXYGEN SATURATION: 95 % | DIASTOLIC BLOOD PRESSURE: 86 MMHG | BODY MASS INDEX: 29.2 KG/M2 | SYSTOLIC BLOOD PRESSURE: 158 MMHG | RESPIRATION RATE: 18 BRPM | WEIGHT: 208.56 LBS | TEMPERATURE: 98 F | HEIGHT: 71 IN

## 2021-04-08 DIAGNOSIS — R00.1 BRADYCARDIA: Primary | ICD-10-CM

## 2021-04-08 DIAGNOSIS — I10 ESSENTIAL HYPERTENSION: Primary | ICD-10-CM

## 2021-04-08 LAB
ANION GAP SERPL CALC-SCNC: 12 MMOL/L (ref 8–16)
BASOPHILS # BLD AUTO: 0.03 K/UL (ref 0–0.2)
BASOPHILS NFR BLD: 0.8 % (ref 0–1.9)
BUN SERPL-MCNC: 20 MG/DL (ref 8–23)
CALCIUM SERPL-MCNC: 9.8 MG/DL (ref 8.7–10.5)
CHLORIDE SERPL-SCNC: 105 MMOL/L (ref 95–110)
CO2 SERPL-SCNC: 21 MMOL/L (ref 23–29)
CREAT SERPL-MCNC: 1.2 MG/DL (ref 0.5–1.4)
DIFFERENTIAL METHOD: ABNORMAL
EOSINOPHIL # BLD AUTO: 0.1 K/UL (ref 0–0.5)
EOSINOPHIL NFR BLD: 2.6 % (ref 0–8)
ERYTHROCYTE [DISTWIDTH] IN BLOOD BY AUTOMATED COUNT: 13.8 % (ref 11.5–14.5)
EST. GFR  (AFRICAN AMERICAN): >60 ML/MIN/1.73 M^2
EST. GFR  (NON AFRICAN AMERICAN): 56 ML/MIN/1.73 M^2
GLUCOSE SERPL-MCNC: 99 MG/DL (ref 70–110)
HCT VFR BLD AUTO: 44.8 % (ref 40–54)
HGB BLD-MCNC: 14.9 G/DL (ref 14–18)
IMM GRANULOCYTES # BLD AUTO: 0.01 K/UL (ref 0–0.04)
IMM GRANULOCYTES NFR BLD AUTO: 0.3 % (ref 0–0.5)
LYMPHOCYTES # BLD AUTO: 1.6 K/UL (ref 1–4.8)
LYMPHOCYTES NFR BLD: 40.3 % (ref 18–48)
MAGNESIUM SERPL-MCNC: 2 MG/DL (ref 1.6–2.6)
MCH RBC QN AUTO: 30.2 PG (ref 27–31)
MCHC RBC AUTO-ENTMCNC: 33.3 G/DL (ref 32–36)
MCV RBC AUTO: 91 FL (ref 82–98)
MONOCYTES # BLD AUTO: 0.6 K/UL (ref 0.3–1)
MONOCYTES NFR BLD: 16.1 % (ref 4–15)
NEUTROPHILS # BLD AUTO: 1.6 K/UL (ref 1.8–7.7)
NEUTROPHILS NFR BLD: 39.9 % (ref 38–73)
NRBC BLD-RTO: 0 /100 WBC
PLATELET # BLD AUTO: 211 K/UL (ref 150–450)
PMV BLD AUTO: 9.5 FL (ref 9.2–12.9)
POTASSIUM SERPL-SCNC: 3.8 MMOL/L (ref 3.5–5.1)
RBC # BLD AUTO: 4.93 M/UL (ref 4.6–6.2)
SODIUM SERPL-SCNC: 138 MMOL/L (ref 136–145)
WBC # BLD AUTO: 3.92 K/UL (ref 3.9–12.7)

## 2021-04-08 PROCEDURE — 25000003 PHARM REV CODE 250: Performed by: INTERNAL MEDICINE

## 2021-04-08 PROCEDURE — 83735 ASSAY OF MAGNESIUM: CPT | Performed by: NURSE PRACTITIONER

## 2021-04-08 PROCEDURE — 25000003 PHARM REV CODE 250: Performed by: NURSE PRACTITIONER

## 2021-04-08 PROCEDURE — G0378 HOSPITAL OBSERVATION PER HR: HCPCS

## 2021-04-08 PROCEDURE — 80048 BASIC METABOLIC PNL TOTAL CA: CPT | Performed by: NURSE PRACTITIONER

## 2021-04-08 PROCEDURE — 85025 COMPLETE CBC W/AUTO DIFF WBC: CPT | Performed by: NURSE PRACTITIONER

## 2021-04-08 PROCEDURE — 36415 COLL VENOUS BLD VENIPUNCTURE: CPT | Performed by: NURSE PRACTITIONER

## 2021-04-08 RX ORDER — AMLODIPINE BESYLATE 10 MG/1
10 TABLET ORAL DAILY
Qty: 30 TABLET | Refills: 11 | Status: SHIPPED | OUTPATIENT
Start: 2021-04-08 | End: 2021-04-20

## 2021-04-08 RX ORDER — AMLODIPINE BESYLATE 5 MG/1
10 TABLET ORAL DAILY
Status: DISCONTINUED | OUTPATIENT
Start: 2021-04-08 | End: 2021-04-08 | Stop reason: HOSPADM

## 2021-04-08 RX ORDER — CARVEDILOL 3.12 MG/1
3.12 TABLET ORAL 2 TIMES DAILY
Qty: 60 TABLET | Refills: 11 | Status: SHIPPED | OUTPATIENT
Start: 2021-04-08 | End: 2021-05-14

## 2021-04-08 RX ORDER — CARVEDILOL 3.12 MG/1
3.12 TABLET ORAL 2 TIMES DAILY
Status: DISCONTINUED | OUTPATIENT
Start: 2021-04-08 | End: 2021-04-08 | Stop reason: HOSPADM

## 2021-04-08 RX ORDER — HYDRALAZINE HYDROCHLORIDE 100 MG/1
100 TABLET, FILM COATED ORAL EVERY 8 HOURS
Qty: 90 TABLET | Refills: 11 | Status: SHIPPED | OUTPATIENT
Start: 2021-04-08 | End: 2021-11-01

## 2021-04-08 RX ORDER — CLONIDINE HYDROCHLORIDE 0.1 MG/1
0.1 TABLET ORAL 3 TIMES DAILY
Qty: 90 TABLET | Refills: 11 | Status: SHIPPED | OUTPATIENT
Start: 2021-04-08 | End: 2021-04-20

## 2021-04-08 RX ADMIN — Medication 81 MG: at 09:04

## 2021-04-08 RX ADMIN — OXYBUTYNIN CHLORIDE 10 MG: 5 TABLET, EXTENDED RELEASE ORAL at 09:04

## 2021-04-08 RX ADMIN — DUTASTERIDE 0.5 MG: 0.5 CAPSULE, LIQUID FILLED ORAL at 09:04

## 2021-04-08 RX ADMIN — Medication 1 TABLET: at 09:04

## 2021-04-08 RX ADMIN — CLONIDINE HYDROCHLORIDE 0.1 MG: 0.1 TABLET ORAL at 09:04

## 2021-04-08 RX ADMIN — LOSARTAN POTASSIUM 100 MG: 50 TABLET, FILM COATED ORAL at 09:04

## 2021-04-08 RX ADMIN — CARVEDILOL 3.12 MG: 3.12 TABLET, FILM COATED ORAL at 09:04

## 2021-04-08 RX ADMIN — AMLODIPINE BESYLATE 10 MG: 5 TABLET ORAL at 09:04

## 2021-04-08 RX ADMIN — HYDRALAZINE HYDROCHLORIDE 100 MG: 25 TABLET, FILM COATED ORAL at 05:04

## 2021-04-08 RX ADMIN — CLOPIDOGREL 75 MG: 75 TABLET, FILM COATED ORAL at 09:04

## 2021-04-09 ENCOUNTER — TELEPHONE (OUTPATIENT)
Dept: FAMILY MEDICINE | Facility: CLINIC | Age: 81
End: 2021-04-09

## 2021-04-10 ENCOUNTER — NURSE TRIAGE (OUTPATIENT)
Dept: ADMINISTRATIVE | Facility: CLINIC | Age: 81
End: 2021-04-10

## 2021-04-19 ENCOUNTER — OFFICE VISIT (OUTPATIENT)
Dept: FAMILY MEDICINE | Facility: CLINIC | Age: 81
End: 2021-04-19
Payer: MEDICARE

## 2021-04-19 VITALS
TEMPERATURE: 97 F | BODY MASS INDEX: 28.24 KG/M2 | SYSTOLIC BLOOD PRESSURE: 128 MMHG | WEIGHT: 201.75 LBS | DIASTOLIC BLOOD PRESSURE: 70 MMHG | HEIGHT: 71 IN | OXYGEN SATURATION: 97 % | HEART RATE: 78 BPM

## 2021-04-19 DIAGNOSIS — R42 DIZZINESS: ICD-10-CM

## 2021-04-19 DIAGNOSIS — I10 ESSENTIAL HYPERTENSION: Primary | ICD-10-CM

## 2021-04-19 PROCEDURE — 3074F PR MOST RECENT SYSTOLIC BLOOD PRESSURE < 130 MM HG: ICD-10-PCS | Mod: CPTII,S$GLB,, | Performed by: FAMILY MEDICINE

## 2021-04-19 PROCEDURE — 3288F FALL RISK ASSESSMENT DOCD: CPT | Mod: CPTII,S$GLB,, | Performed by: FAMILY MEDICINE

## 2021-04-19 PROCEDURE — 1159F MED LIST DOCD IN RCRD: CPT | Mod: S$GLB,,, | Performed by: FAMILY MEDICINE

## 2021-04-19 PROCEDURE — 1126F AMNT PAIN NOTED NONE PRSNT: CPT | Mod: S$GLB,,, | Performed by: FAMILY MEDICINE

## 2021-04-19 PROCEDURE — 1126F PR PAIN SEVERITY QUANTIFIED, NO PAIN PRESENT: ICD-10-PCS | Mod: S$GLB,,, | Performed by: FAMILY MEDICINE

## 2021-04-19 PROCEDURE — 3078F DIAST BP <80 MM HG: CPT | Mod: CPTII,S$GLB,, | Performed by: FAMILY MEDICINE

## 2021-04-19 PROCEDURE — 3074F SYST BP LT 130 MM HG: CPT | Mod: CPTII,S$GLB,, | Performed by: FAMILY MEDICINE

## 2021-04-19 PROCEDURE — 3078F PR MOST RECENT DIASTOLIC BLOOD PRESSURE < 80 MM HG: ICD-10-PCS | Mod: CPTII,S$GLB,, | Performed by: FAMILY MEDICINE

## 2021-04-19 PROCEDURE — 1101F PT FALLS ASSESS-DOCD LE1/YR: CPT | Mod: CPTII,S$GLB,, | Performed by: FAMILY MEDICINE

## 2021-04-19 PROCEDURE — 99999 PR PBB SHADOW E&M-EST. PATIENT-LVL III: ICD-10-PCS | Mod: PBBFAC,,, | Performed by: FAMILY MEDICINE

## 2021-04-19 PROCEDURE — 1101F PR PT FALLS ASSESS DOC 0-1 FALLS W/OUT INJ PAST YR: ICD-10-PCS | Mod: CPTII,S$GLB,, | Performed by: FAMILY MEDICINE

## 2021-04-19 PROCEDURE — 1159F PR MEDICATION LIST DOCUMENTED IN MEDICAL RECORD: ICD-10-PCS | Mod: S$GLB,,, | Performed by: FAMILY MEDICINE

## 2021-04-19 PROCEDURE — 99214 OFFICE O/P EST MOD 30 MIN: CPT | Mod: S$GLB,,, | Performed by: FAMILY MEDICINE

## 2021-04-19 PROCEDURE — 99214 PR OFFICE/OUTPT VISIT, EST, LEVL IV, 30-39 MIN: ICD-10-PCS | Mod: S$GLB,,, | Performed by: FAMILY MEDICINE

## 2021-04-19 PROCEDURE — 99999 PR PBB SHADOW E&M-EST. PATIENT-LVL III: CPT | Mod: PBBFAC,,, | Performed by: FAMILY MEDICINE

## 2021-04-19 PROCEDURE — 3288F PR FALLS RISK ASSESSMENT DOCUMENTED: ICD-10-PCS | Mod: CPTII,S$GLB,, | Performed by: FAMILY MEDICINE

## 2021-04-20 ENCOUNTER — OFFICE VISIT (OUTPATIENT)
Dept: CARDIOLOGY | Facility: CLINIC | Age: 81
End: 2021-04-20
Payer: MEDICARE

## 2021-04-20 VITALS
SYSTOLIC BLOOD PRESSURE: 158 MMHG | BODY MASS INDEX: 28.1 KG/M2 | DIASTOLIC BLOOD PRESSURE: 77 MMHG | HEIGHT: 71 IN | WEIGHT: 200.69 LBS | HEART RATE: 90 BPM | OXYGEN SATURATION: 97 %

## 2021-04-20 DIAGNOSIS — R00.1 BRADYCARDIA, DRUG INDUCED: ICD-10-CM

## 2021-04-20 DIAGNOSIS — I70.0 AORTIC ATHEROSCLEROSIS: ICD-10-CM

## 2021-04-20 DIAGNOSIS — T50.905A BRADYCARDIA, DRUG INDUCED: ICD-10-CM

## 2021-04-20 DIAGNOSIS — I10 ESSENTIAL HYPERTENSION: Primary | ICD-10-CM

## 2021-04-20 DIAGNOSIS — I21.4 NSTEMI (NON-ST ELEVATED MYOCARDIAL INFARCTION): ICD-10-CM

## 2021-04-20 DIAGNOSIS — E78.00 PURE HYPERCHOLESTEROLEMIA: ICD-10-CM

## 2021-04-20 DIAGNOSIS — Z95.5 STATUS POST INSERTION OF DRUG-ELUTING STENT INTO LEFT ANTERIOR DESCENDING (LAD) ARTERY: ICD-10-CM

## 2021-04-20 PROCEDURE — 3078F PR MOST RECENT DIASTOLIC BLOOD PRESSURE < 80 MM HG: ICD-10-PCS | Mod: CPTII,S$GLB,, | Performed by: INTERNAL MEDICINE

## 2021-04-20 PROCEDURE — 1126F PR PAIN SEVERITY QUANTIFIED, NO PAIN PRESENT: ICD-10-PCS | Mod: S$GLB,,, | Performed by: INTERNAL MEDICINE

## 2021-04-20 PROCEDURE — 99214 PR OFFICE/OUTPT VISIT, EST, LEVL IV, 30-39 MIN: ICD-10-PCS | Mod: S$GLB,,, | Performed by: INTERNAL MEDICINE

## 2021-04-20 PROCEDURE — 3078F DIAST BP <80 MM HG: CPT | Mod: CPTII,S$GLB,, | Performed by: INTERNAL MEDICINE

## 2021-04-20 PROCEDURE — 1126F AMNT PAIN NOTED NONE PRSNT: CPT | Mod: S$GLB,,, | Performed by: INTERNAL MEDICINE

## 2021-04-20 PROCEDURE — 1159F PR MEDICATION LIST DOCUMENTED IN MEDICAL RECORD: ICD-10-PCS | Mod: S$GLB,,, | Performed by: INTERNAL MEDICINE

## 2021-04-20 PROCEDURE — 99999 PR PBB SHADOW E&M-EST. PATIENT-LVL III: CPT | Mod: PBBFAC,,, | Performed by: INTERNAL MEDICINE

## 2021-04-20 PROCEDURE — 3077F SYST BP >= 140 MM HG: CPT | Mod: CPTII,S$GLB,, | Performed by: INTERNAL MEDICINE

## 2021-04-20 PROCEDURE — 99214 OFFICE O/P EST MOD 30 MIN: CPT | Mod: S$GLB,,, | Performed by: INTERNAL MEDICINE

## 2021-04-20 PROCEDURE — 99999 PR PBB SHADOW E&M-EST. PATIENT-LVL III: ICD-10-PCS | Mod: PBBFAC,,, | Performed by: INTERNAL MEDICINE

## 2021-04-20 PROCEDURE — 1159F MED LIST DOCD IN RCRD: CPT | Mod: S$GLB,,, | Performed by: INTERNAL MEDICINE

## 2021-04-20 PROCEDURE — 3077F PR MOST RECENT SYSTOLIC BLOOD PRESSURE >= 140 MM HG: ICD-10-PCS | Mod: CPTII,S$GLB,, | Performed by: INTERNAL MEDICINE

## 2021-04-20 RX ORDER — NIFEDIPINE 60 MG/1
60 TABLET, EXTENDED RELEASE ORAL 2 TIMES DAILY
Qty: 180 TABLET | Refills: 4 | Status: SHIPPED | OUTPATIENT
Start: 2021-04-20 | End: 2022-05-18 | Stop reason: SDUPTHER

## 2021-04-20 RX ORDER — NIFEDIPINE 30 MG/1
60 TABLET, EXTENDED RELEASE ORAL 2 TIMES DAILY
Qty: 180 TABLET | Refills: 4 | Status: SHIPPED | OUTPATIENT
Start: 2021-04-20 | End: 2021-04-20

## 2021-04-22 ENCOUNTER — TELEPHONE (OUTPATIENT)
Dept: NEPHROLOGY | Facility: CLINIC | Age: 81
End: 2021-04-22

## 2021-04-24 RX ORDER — CLOPIDOGREL BISULFATE 75 MG/1
75 TABLET ORAL DAILY
Qty: 30 TABLET | Refills: 3 | Status: SHIPPED | OUTPATIENT
Start: 2021-04-24 | End: 2021-07-19

## 2021-05-14 ENCOUNTER — LAB VISIT (OUTPATIENT)
Dept: LAB | Facility: HOSPITAL | Age: 81
End: 2021-05-14
Attending: INTERNAL MEDICINE
Payer: MEDICARE

## 2021-05-14 ENCOUNTER — TELEPHONE (OUTPATIENT)
Dept: CARDIOLOGY | Facility: CLINIC | Age: 81
End: 2021-05-14

## 2021-05-14 ENCOUNTER — OFFICE VISIT (OUTPATIENT)
Dept: CARDIOLOGY | Facility: CLINIC | Age: 81
End: 2021-05-14
Payer: MEDICARE

## 2021-05-14 VITALS
HEART RATE: 75 BPM | DIASTOLIC BLOOD PRESSURE: 72 MMHG | BODY MASS INDEX: 27.51 KG/M2 | OXYGEN SATURATION: 97 % | WEIGHT: 196.5 LBS | HEIGHT: 71 IN | SYSTOLIC BLOOD PRESSURE: 126 MMHG

## 2021-05-14 DIAGNOSIS — R53.1 WEAKNESS: ICD-10-CM

## 2021-05-14 DIAGNOSIS — Z95.5 STATUS POST INSERTION OF DRUG-ELUTING STENT INTO LEFT ANTERIOR DESCENDING (LAD) ARTERY: ICD-10-CM

## 2021-05-14 DIAGNOSIS — E11.22 TYPE 2 DIABETES MELLITUS WITH STAGE 3A CHRONIC KIDNEY DISEASE, WITHOUT LONG-TERM CURRENT USE OF INSULIN: ICD-10-CM

## 2021-05-14 DIAGNOSIS — R42 DIZZINESS: ICD-10-CM

## 2021-05-14 DIAGNOSIS — N18.31 TYPE 2 DIABETES MELLITUS WITH STAGE 3A CHRONIC KIDNEY DISEASE, WITHOUT LONG-TERM CURRENT USE OF INSULIN: ICD-10-CM

## 2021-05-14 DIAGNOSIS — E78.00 PURE HYPERCHOLESTEROLEMIA: ICD-10-CM

## 2021-05-14 DIAGNOSIS — I10 ESSENTIAL HYPERTENSION: ICD-10-CM

## 2021-05-14 DIAGNOSIS — R42 DIZZINESS: Primary | ICD-10-CM

## 2021-05-14 LAB — TSH SERPL DL<=0.005 MIU/L-ACNC: 3.02 UIU/ML (ref 0.4–4)

## 2021-05-14 PROCEDURE — 1159F PR MEDICATION LIST DOCUMENTED IN MEDICAL RECORD: ICD-10-PCS | Mod: S$GLB,,, | Performed by: INTERNAL MEDICINE

## 2021-05-14 PROCEDURE — 99999 PR PBB SHADOW E&M-EST. PATIENT-LVL III: CPT | Mod: PBBFAC,,, | Performed by: INTERNAL MEDICINE

## 2021-05-14 PROCEDURE — 1126F AMNT PAIN NOTED NONE PRSNT: CPT | Mod: S$GLB,,, | Performed by: INTERNAL MEDICINE

## 2021-05-14 PROCEDURE — 99213 OFFICE O/P EST LOW 20 MIN: CPT | Mod: S$GLB,,, | Performed by: INTERNAL MEDICINE

## 2021-05-14 PROCEDURE — 99999 PR PBB SHADOW E&M-EST. PATIENT-LVL III: ICD-10-PCS | Mod: PBBFAC,,, | Performed by: INTERNAL MEDICINE

## 2021-05-14 PROCEDURE — 36415 COLL VENOUS BLD VENIPUNCTURE: CPT | Mod: PO | Performed by: INTERNAL MEDICINE

## 2021-05-14 PROCEDURE — 99213 PR OFFICE/OUTPT VISIT, EST, LEVL III, 20-29 MIN: ICD-10-PCS | Mod: S$GLB,,, | Performed by: INTERNAL MEDICINE

## 2021-05-14 PROCEDURE — 1126F PR PAIN SEVERITY QUANTIFIED, NO PAIN PRESENT: ICD-10-PCS | Mod: S$GLB,,, | Performed by: INTERNAL MEDICINE

## 2021-05-14 PROCEDURE — 84443 ASSAY THYROID STIM HORMONE: CPT | Mod: PO | Performed by: INTERNAL MEDICINE

## 2021-05-14 PROCEDURE — 1159F MED LIST DOCD IN RCRD: CPT | Mod: S$GLB,,, | Performed by: INTERNAL MEDICINE

## 2021-05-17 ENCOUNTER — TELEPHONE (OUTPATIENT)
Dept: CARDIOLOGY | Facility: CLINIC | Age: 81
End: 2021-05-17

## 2021-05-27 ENCOUNTER — PATIENT OUTREACH (OUTPATIENT)
Dept: ADMINISTRATIVE | Facility: OTHER | Age: 81
End: 2021-05-27

## 2021-05-31 ENCOUNTER — OFFICE VISIT (OUTPATIENT)
Dept: CARDIOLOGY | Facility: CLINIC | Age: 81
End: 2021-05-31
Payer: MEDICARE

## 2021-05-31 VITALS
BODY MASS INDEX: 27.91 KG/M2 | SYSTOLIC BLOOD PRESSURE: 143 MMHG | OXYGEN SATURATION: 98 % | HEIGHT: 71 IN | WEIGHT: 199.38 LBS | HEART RATE: 82 BPM | DIASTOLIC BLOOD PRESSURE: 73 MMHG

## 2021-05-31 DIAGNOSIS — R00.1 BRADYCARDIA, DRUG INDUCED: ICD-10-CM

## 2021-05-31 DIAGNOSIS — I70.0 AORTIC ATHEROSCLEROSIS: ICD-10-CM

## 2021-05-31 DIAGNOSIS — E78.00 PURE HYPERCHOLESTEROLEMIA: ICD-10-CM

## 2021-05-31 DIAGNOSIS — I25.10 CORONARY ARTERY DISEASE INVOLVING NATIVE CORONARY ARTERY OF NATIVE HEART WITHOUT ANGINA PECTORIS: ICD-10-CM

## 2021-05-31 DIAGNOSIS — Z95.5 STATUS POST INSERTION OF DRUG-ELUTING STENT INTO LEFT ANTERIOR DESCENDING (LAD) ARTERY: ICD-10-CM

## 2021-05-31 DIAGNOSIS — T50.905A BRADYCARDIA, DRUG INDUCED: ICD-10-CM

## 2021-05-31 DIAGNOSIS — I10 ESSENTIAL HYPERTENSION: ICD-10-CM

## 2021-05-31 PROCEDURE — 99213 OFFICE O/P EST LOW 20 MIN: CPT | Mod: S$GLB,,, | Performed by: INTERNAL MEDICINE

## 2021-05-31 PROCEDURE — 1126F PR PAIN SEVERITY QUANTIFIED, NO PAIN PRESENT: ICD-10-PCS | Mod: S$GLB,,, | Performed by: INTERNAL MEDICINE

## 2021-05-31 PROCEDURE — 3077F SYST BP >= 140 MM HG: CPT | Mod: CPTII,S$GLB,, | Performed by: INTERNAL MEDICINE

## 2021-05-31 PROCEDURE — 99213 PR OFFICE/OUTPT VISIT, EST, LEVL III, 20-29 MIN: ICD-10-PCS | Mod: S$GLB,,, | Performed by: INTERNAL MEDICINE

## 2021-05-31 PROCEDURE — 3078F DIAST BP <80 MM HG: CPT | Mod: CPTII,S$GLB,, | Performed by: INTERNAL MEDICINE

## 2021-05-31 PROCEDURE — 99999 PR PBB SHADOW E&M-EST. PATIENT-LVL IV: ICD-10-PCS | Mod: PBBFAC,,, | Performed by: INTERNAL MEDICINE

## 2021-05-31 PROCEDURE — 3288F PR FALLS RISK ASSESSMENT DOCUMENTED: ICD-10-PCS | Mod: CPTII,S$GLB,, | Performed by: INTERNAL MEDICINE

## 2021-05-31 PROCEDURE — 3288F FALL RISK ASSESSMENT DOCD: CPT | Mod: CPTII,S$GLB,, | Performed by: INTERNAL MEDICINE

## 2021-05-31 PROCEDURE — 3077F PR MOST RECENT SYSTOLIC BLOOD PRESSURE >= 140 MM HG: ICD-10-PCS | Mod: CPTII,S$GLB,, | Performed by: INTERNAL MEDICINE

## 2021-05-31 PROCEDURE — 1159F PR MEDICATION LIST DOCUMENTED IN MEDICAL RECORD: ICD-10-PCS | Mod: S$GLB,,, | Performed by: INTERNAL MEDICINE

## 2021-05-31 PROCEDURE — 1159F MED LIST DOCD IN RCRD: CPT | Mod: S$GLB,,, | Performed by: INTERNAL MEDICINE

## 2021-05-31 PROCEDURE — 1101F PR PT FALLS ASSESS DOC 0-1 FALLS W/OUT INJ PAST YR: ICD-10-PCS | Mod: CPTII,S$GLB,, | Performed by: INTERNAL MEDICINE

## 2021-05-31 PROCEDURE — 99999 PR PBB SHADOW E&M-EST. PATIENT-LVL IV: CPT | Mod: PBBFAC,,, | Performed by: INTERNAL MEDICINE

## 2021-05-31 PROCEDURE — 1101F PT FALLS ASSESS-DOCD LE1/YR: CPT | Mod: CPTII,S$GLB,, | Performed by: INTERNAL MEDICINE

## 2021-05-31 PROCEDURE — 1126F AMNT PAIN NOTED NONE PRSNT: CPT | Mod: S$GLB,,, | Performed by: INTERNAL MEDICINE

## 2021-05-31 PROCEDURE — 3078F PR MOST RECENT DIASTOLIC BLOOD PRESSURE < 80 MM HG: ICD-10-PCS | Mod: CPTII,S$GLB,, | Performed by: INTERNAL MEDICINE

## 2021-06-03 ENCOUNTER — TELEPHONE (OUTPATIENT)
Dept: FAMILY MEDICINE | Facility: CLINIC | Age: 81
End: 2021-06-03

## 2021-06-25 ENCOUNTER — TELEPHONE (OUTPATIENT)
Dept: NEPHROLOGY | Facility: CLINIC | Age: 81
End: 2021-06-25

## 2021-06-25 DIAGNOSIS — N18.30 STAGE 3 CHRONIC KIDNEY DISEASE, UNSPECIFIED WHETHER STAGE 3A OR 3B CKD: Primary | ICD-10-CM

## 2021-07-28 ENCOUNTER — OFFICE VISIT (OUTPATIENT)
Dept: FAMILY MEDICINE | Facility: CLINIC | Age: 81
End: 2021-07-28
Payer: MEDICARE

## 2021-07-28 VITALS
BODY MASS INDEX: 27.44 KG/M2 | SYSTOLIC BLOOD PRESSURE: 138 MMHG | DIASTOLIC BLOOD PRESSURE: 68 MMHG | OXYGEN SATURATION: 97 % | HEIGHT: 71 IN | WEIGHT: 196 LBS | HEART RATE: 83 BPM

## 2021-07-28 DIAGNOSIS — I10 ESSENTIAL HYPERTENSION: Primary | ICD-10-CM

## 2021-07-28 DIAGNOSIS — R42 DIZZINESS: ICD-10-CM

## 2021-07-28 DIAGNOSIS — I21.4 NSTEMI (NON-ST ELEVATED MYOCARDIAL INFARCTION): ICD-10-CM

## 2021-07-28 PROCEDURE — 99214 OFFICE O/P EST MOD 30 MIN: CPT | Mod: S$GLB,,, | Performed by: FAMILY MEDICINE

## 2021-07-28 PROCEDURE — 3075F PR MOST RECENT SYSTOLIC BLOOD PRESS GE 130-139MM HG: ICD-10-PCS | Mod: CPTII,S$GLB,, | Performed by: FAMILY MEDICINE

## 2021-07-28 PROCEDURE — 1126F PR PAIN SEVERITY QUANTIFIED, NO PAIN PRESENT: ICD-10-PCS | Mod: CPTII,S$GLB,, | Performed by: FAMILY MEDICINE

## 2021-07-28 PROCEDURE — 1159F MED LIST DOCD IN RCRD: CPT | Mod: CPTII,S$GLB,, | Performed by: FAMILY MEDICINE

## 2021-07-28 PROCEDURE — 99999 PR PBB SHADOW E&M-EST. PATIENT-LVL IV: ICD-10-PCS | Mod: PBBFAC,,, | Performed by: FAMILY MEDICINE

## 2021-07-28 PROCEDURE — 3078F PR MOST RECENT DIASTOLIC BLOOD PRESSURE < 80 MM HG: ICD-10-PCS | Mod: CPTII,S$GLB,, | Performed by: FAMILY MEDICINE

## 2021-07-28 PROCEDURE — 1159F PR MEDICATION LIST DOCUMENTED IN MEDICAL RECORD: ICD-10-PCS | Mod: CPTII,S$GLB,, | Performed by: FAMILY MEDICINE

## 2021-07-28 PROCEDURE — 3075F SYST BP GE 130 - 139MM HG: CPT | Mod: CPTII,S$GLB,, | Performed by: FAMILY MEDICINE

## 2021-07-28 PROCEDURE — 1160F RVW MEDS BY RX/DR IN RCRD: CPT | Mod: CPTII,S$GLB,, | Performed by: FAMILY MEDICINE

## 2021-07-28 PROCEDURE — 3288F FALL RISK ASSESSMENT DOCD: CPT | Mod: CPTII,S$GLB,, | Performed by: FAMILY MEDICINE

## 2021-07-28 PROCEDURE — 1160F PR REVIEW ALL MEDS BY PRESCRIBER/CLIN PHARMACIST DOCUMENTED: ICD-10-PCS | Mod: CPTII,S$GLB,, | Performed by: FAMILY MEDICINE

## 2021-07-28 PROCEDURE — 99214 PR OFFICE/OUTPT VISIT, EST, LEVL IV, 30-39 MIN: ICD-10-PCS | Mod: S$GLB,,, | Performed by: FAMILY MEDICINE

## 2021-07-28 PROCEDURE — 3078F DIAST BP <80 MM HG: CPT | Mod: CPTII,S$GLB,, | Performed by: FAMILY MEDICINE

## 2021-07-28 PROCEDURE — 1101F PR PT FALLS ASSESS DOC 0-1 FALLS W/OUT INJ PAST YR: ICD-10-PCS | Mod: CPTII,S$GLB,, | Performed by: FAMILY MEDICINE

## 2021-07-28 PROCEDURE — 1126F AMNT PAIN NOTED NONE PRSNT: CPT | Mod: CPTII,S$GLB,, | Performed by: FAMILY MEDICINE

## 2021-07-28 PROCEDURE — 1101F PT FALLS ASSESS-DOCD LE1/YR: CPT | Mod: CPTII,S$GLB,, | Performed by: FAMILY MEDICINE

## 2021-07-28 PROCEDURE — 3288F PR FALLS RISK ASSESSMENT DOCUMENTED: ICD-10-PCS | Mod: CPTII,S$GLB,, | Performed by: FAMILY MEDICINE

## 2021-07-28 PROCEDURE — 99999 PR PBB SHADOW E&M-EST. PATIENT-LVL IV: CPT | Mod: PBBFAC,,, | Performed by: FAMILY MEDICINE

## 2021-07-28 RX ORDER — CLOPIDOGREL BISULFATE 75 MG/1
75 TABLET ORAL DAILY
Qty: 90 TABLET | Refills: 3 | Status: SHIPPED | OUTPATIENT
Start: 2021-07-28 | End: 2022-02-01

## 2021-08-10 ENCOUNTER — PATIENT OUTREACH (OUTPATIENT)
Dept: ADMINISTRATIVE | Facility: OTHER | Age: 81
End: 2021-08-10

## 2021-08-10 ENCOUNTER — OFFICE VISIT (OUTPATIENT)
Dept: OPHTHALMOLOGY | Facility: CLINIC | Age: 81
End: 2021-08-10
Payer: MEDICARE

## 2021-08-10 DIAGNOSIS — H35.3211 EXUDATIVE AGE-RELATED MACULAR DEGENERATION OF RIGHT EYE WITH ACTIVE CHOROIDAL NEOVASCULARIZATION: Primary | ICD-10-CM

## 2021-08-10 DIAGNOSIS — H35.033 HYPERTENSIVE RETINOPATHY, BILATERAL: ICD-10-CM

## 2021-08-10 DIAGNOSIS — H35.723 RETINAL PIGMENT EPITHELIAL DETACHMENT OF BOTH EYES: ICD-10-CM

## 2021-08-10 DIAGNOSIS — H43.393 VITREOUS FLOATERS OF BOTH EYES: ICD-10-CM

## 2021-08-10 PROCEDURE — 3288F FALL RISK ASSESSMENT DOCD: CPT | Mod: CPTII,S$GLB,, | Performed by: OPHTHALMOLOGY

## 2021-08-10 PROCEDURE — 1160F RVW MEDS BY RX/DR IN RCRD: CPT | Mod: CPTII,S$GLB,, | Performed by: OPHTHALMOLOGY

## 2021-08-10 PROCEDURE — 1101F PT FALLS ASSESS-DOCD LE1/YR: CPT | Mod: CPTII,S$GLB,, | Performed by: OPHTHALMOLOGY

## 2021-08-10 PROCEDURE — 1126F AMNT PAIN NOTED NONE PRSNT: CPT | Mod: CPTII,S$GLB,, | Performed by: OPHTHALMOLOGY

## 2021-08-10 PROCEDURE — 92134 CPTRZ OPH DX IMG PST SGM RTA: CPT | Mod: S$GLB,,, | Performed by: OPHTHALMOLOGY

## 2021-08-10 PROCEDURE — 92014 COMPRE OPH EXAM EST PT 1/>: CPT | Mod: S$GLB,,, | Performed by: OPHTHALMOLOGY

## 2021-08-10 PROCEDURE — 1126F PR PAIN SEVERITY QUANTIFIED, NO PAIN PRESENT: ICD-10-PCS | Mod: CPTII,S$GLB,, | Performed by: OPHTHALMOLOGY

## 2021-08-10 PROCEDURE — 1160F PR REVIEW ALL MEDS BY PRESCRIBER/CLIN PHARMACIST DOCUMENTED: ICD-10-PCS | Mod: CPTII,S$GLB,, | Performed by: OPHTHALMOLOGY

## 2021-08-10 PROCEDURE — 92134 POSTERIOR SEGMENT OCT RETINA (OCULAR COHERENCE TOMOGRAPHY)-BOTH EYES: ICD-10-PCS | Mod: S$GLB,,, | Performed by: OPHTHALMOLOGY

## 2021-08-10 PROCEDURE — 92014 PR EYE EXAM, EST PATIENT,COMPREHESV: ICD-10-PCS | Mod: S$GLB,,, | Performed by: OPHTHALMOLOGY

## 2021-08-10 PROCEDURE — 3288F PR FALLS RISK ASSESSMENT DOCUMENTED: ICD-10-PCS | Mod: CPTII,S$GLB,, | Performed by: OPHTHALMOLOGY

## 2021-08-10 PROCEDURE — 99999 PR PBB SHADOW E&M-EST. PATIENT-LVL III: CPT | Mod: PBBFAC,,, | Performed by: OPHTHALMOLOGY

## 2021-08-10 PROCEDURE — 92201 PR OPHTHALMOSCOPY, EXT, W/RET DRAW/SCLERAL DEPR, I&R, UNI/BI: ICD-10-PCS | Mod: S$GLB,,, | Performed by: OPHTHALMOLOGY

## 2021-08-10 PROCEDURE — 1101F PR PT FALLS ASSESS DOC 0-1 FALLS W/OUT INJ PAST YR: ICD-10-PCS | Mod: CPTII,S$GLB,, | Performed by: OPHTHALMOLOGY

## 2021-08-10 PROCEDURE — 1159F PR MEDICATION LIST DOCUMENTED IN MEDICAL RECORD: ICD-10-PCS | Mod: CPTII,S$GLB,, | Performed by: OPHTHALMOLOGY

## 2021-08-10 PROCEDURE — 92201 OPSCPY EXTND RTA DRAW UNI/BI: CPT | Mod: S$GLB,,, | Performed by: OPHTHALMOLOGY

## 2021-08-10 PROCEDURE — 99999 PR PBB SHADOW E&M-EST. PATIENT-LVL III: ICD-10-PCS | Mod: PBBFAC,,, | Performed by: OPHTHALMOLOGY

## 2021-08-10 PROCEDURE — 1159F MED LIST DOCD IN RCRD: CPT | Mod: CPTII,S$GLB,, | Performed by: OPHTHALMOLOGY

## 2021-08-13 ENCOUNTER — LAB VISIT (OUTPATIENT)
Dept: LAB | Facility: HOSPITAL | Age: 81
End: 2021-08-13
Attending: FAMILY MEDICINE
Payer: MEDICARE

## 2021-08-13 DIAGNOSIS — N18.30 STAGE 3 CHRONIC KIDNEY DISEASE, UNSPECIFIED WHETHER STAGE 3A OR 3B CKD: ICD-10-CM

## 2021-08-13 LAB
25(OH)D3+25(OH)D2 SERPL-MCNC: 31 NG/ML (ref 30–96)
ALBUMIN SERPL BCP-MCNC: 4.1 G/DL (ref 3.5–5.2)
ANION GAP SERPL CALC-SCNC: 6 MMOL/L (ref 8–16)
BUN SERPL-MCNC: 20 MG/DL (ref 8–23)
CALCIUM SERPL-MCNC: 10.6 MG/DL (ref 8.7–10.5)
CHLORIDE SERPL-SCNC: 101 MMOL/L (ref 95–110)
CO2 SERPL-SCNC: 28 MMOL/L (ref 23–29)
CREAT SERPL-MCNC: 1.4 MG/DL (ref 0.5–1.4)
EST. GFR  (AFRICAN AMERICAN): 53.7 ML/MIN/1.73 M^2
EST. GFR  (NON AFRICAN AMERICAN): 46.5 ML/MIN/1.73 M^2
GLUCOSE SERPL-MCNC: 157 MG/DL (ref 70–110)
HCT VFR BLD AUTO: 37.6 % (ref 40–54)
HGB BLD-MCNC: 12.6 G/DL (ref 14–18)
PHOSPHATE SERPL-MCNC: 3 MG/DL (ref 2.7–4.5)
POTASSIUM SERPL-SCNC: 3.7 MMOL/L (ref 3.5–5.1)
PTH-INTACT SERPL-MCNC: 121.9 PG/ML (ref 9–77)
SODIUM SERPL-SCNC: 135 MMOL/L (ref 136–145)

## 2021-08-13 PROCEDURE — 82306 VITAMIN D 25 HYDROXY: CPT | Performed by: INTERNAL MEDICINE

## 2021-08-13 PROCEDURE — 83970 ASSAY OF PARATHORMONE: CPT | Performed by: INTERNAL MEDICINE

## 2021-08-13 PROCEDURE — 80069 RENAL FUNCTION PANEL: CPT | Performed by: INTERNAL MEDICINE

## 2021-08-13 PROCEDURE — 36415 COLL VENOUS BLD VENIPUNCTURE: CPT | Mod: PO | Performed by: INTERNAL MEDICINE

## 2021-08-13 PROCEDURE — 85014 HEMATOCRIT: CPT | Performed by: INTERNAL MEDICINE

## 2021-08-13 PROCEDURE — 85018 HEMOGLOBIN: CPT | Performed by: INTERNAL MEDICINE

## 2021-08-19 ENCOUNTER — OFFICE VISIT (OUTPATIENT)
Dept: NEPHROLOGY | Facility: CLINIC | Age: 81
End: 2021-08-19
Payer: MEDICARE

## 2021-08-19 VITALS
HEART RATE: 85 BPM | SYSTOLIC BLOOD PRESSURE: 148 MMHG | WEIGHT: 191.81 LBS | BODY MASS INDEX: 26.75 KG/M2 | OXYGEN SATURATION: 98 % | DIASTOLIC BLOOD PRESSURE: 62 MMHG

## 2021-08-19 DIAGNOSIS — R80.9 PROTEINURIA, UNSPECIFIED TYPE: ICD-10-CM

## 2021-08-19 DIAGNOSIS — N18.30 STAGE 3 CHRONIC KIDNEY DISEASE, UNSPECIFIED WHETHER STAGE 3A OR 3B CKD: Primary | ICD-10-CM

## 2021-08-19 DIAGNOSIS — I10 HYPERTENSION, UNSPECIFIED TYPE: ICD-10-CM

## 2021-08-19 PROCEDURE — 3078F PR MOST RECENT DIASTOLIC BLOOD PRESSURE < 80 MM HG: ICD-10-PCS | Mod: CPTII,S$GLB,, | Performed by: INTERNAL MEDICINE

## 2021-08-19 PROCEDURE — 3077F SYST BP >= 140 MM HG: CPT | Mod: CPTII,S$GLB,, | Performed by: INTERNAL MEDICINE

## 2021-08-19 PROCEDURE — 1126F AMNT PAIN NOTED NONE PRSNT: CPT | Mod: CPTII,S$GLB,, | Performed by: INTERNAL MEDICINE

## 2021-08-19 PROCEDURE — 1126F PR PAIN SEVERITY QUANTIFIED, NO PAIN PRESENT: ICD-10-PCS | Mod: CPTII,S$GLB,, | Performed by: INTERNAL MEDICINE

## 2021-08-19 PROCEDURE — 99214 PR OFFICE/OUTPT VISIT, EST, LEVL IV, 30-39 MIN: ICD-10-PCS | Mod: S$GLB,,, | Performed by: INTERNAL MEDICINE

## 2021-08-19 PROCEDURE — 1101F PT FALLS ASSESS-DOCD LE1/YR: CPT | Mod: CPTII,S$GLB,, | Performed by: INTERNAL MEDICINE

## 2021-08-19 PROCEDURE — 3288F PR FALLS RISK ASSESSMENT DOCUMENTED: ICD-10-PCS | Mod: CPTII,S$GLB,, | Performed by: INTERNAL MEDICINE

## 2021-08-19 PROCEDURE — 3078F DIAST BP <80 MM HG: CPT | Mod: CPTII,S$GLB,, | Performed by: INTERNAL MEDICINE

## 2021-08-19 PROCEDURE — 99999 PR PBB SHADOW E&M-EST. PATIENT-LVL III: CPT | Mod: PBBFAC,,, | Performed by: INTERNAL MEDICINE

## 2021-08-19 PROCEDURE — 1159F MED LIST DOCD IN RCRD: CPT | Mod: CPTII,S$GLB,, | Performed by: INTERNAL MEDICINE

## 2021-08-19 PROCEDURE — 3077F PR MOST RECENT SYSTOLIC BLOOD PRESSURE >= 140 MM HG: ICD-10-PCS | Mod: CPTII,S$GLB,, | Performed by: INTERNAL MEDICINE

## 2021-08-19 PROCEDURE — 99214 OFFICE O/P EST MOD 30 MIN: CPT | Mod: S$GLB,,, | Performed by: INTERNAL MEDICINE

## 2021-08-19 PROCEDURE — 99999 PR PBB SHADOW E&M-EST. PATIENT-LVL III: ICD-10-PCS | Mod: PBBFAC,,, | Performed by: INTERNAL MEDICINE

## 2021-08-19 PROCEDURE — 1159F PR MEDICATION LIST DOCUMENTED IN MEDICAL RECORD: ICD-10-PCS | Mod: CPTII,S$GLB,, | Performed by: INTERNAL MEDICINE

## 2021-08-19 PROCEDURE — 3288F FALL RISK ASSESSMENT DOCD: CPT | Mod: CPTII,S$GLB,, | Performed by: INTERNAL MEDICINE

## 2021-08-19 PROCEDURE — 1101F PR PT FALLS ASSESS DOC 0-1 FALLS W/OUT INJ PAST YR: ICD-10-PCS | Mod: CPTII,S$GLB,, | Performed by: INTERNAL MEDICINE

## 2021-08-24 ENCOUNTER — TELEPHONE (OUTPATIENT)
Dept: CARDIOLOGY | Facility: CLINIC | Age: 81
End: 2021-08-24

## 2021-08-24 ENCOUNTER — LAB VISIT (OUTPATIENT)
Dept: PRIMARY CARE CLINIC | Facility: OTHER | Age: 81
End: 2021-08-24
Payer: MEDICARE

## 2021-08-24 ENCOUNTER — OFFICE VISIT (OUTPATIENT)
Dept: CARDIOLOGY | Facility: CLINIC | Age: 81
End: 2021-08-24
Payer: MEDICARE

## 2021-08-24 VITALS
BODY MASS INDEX: 27.42 KG/M2 | OXYGEN SATURATION: 98 % | HEIGHT: 71 IN | DIASTOLIC BLOOD PRESSURE: 72 MMHG | SYSTOLIC BLOOD PRESSURE: 140 MMHG | HEART RATE: 86 BPM | WEIGHT: 195.88 LBS

## 2021-08-24 DIAGNOSIS — I10 ESSENTIAL HYPERTENSION: ICD-10-CM

## 2021-08-24 DIAGNOSIS — I21.4 NSTEMI (NON-ST ELEVATED MYOCARDIAL INFARCTION): ICD-10-CM

## 2021-08-24 DIAGNOSIS — I25.10 CORONARY ARTERY DISEASE INVOLVING NATIVE CORONARY ARTERY OF NATIVE HEART WITHOUT ANGINA PECTORIS: ICD-10-CM

## 2021-08-24 DIAGNOSIS — E11.22 TYPE 2 DIABETES MELLITUS WITH STAGE 3A CHRONIC KIDNEY DISEASE, WITHOUT LONG-TERM CURRENT USE OF INSULIN: ICD-10-CM

## 2021-08-24 DIAGNOSIS — Z95.5 STATUS POST INSERTION OF DRUG-ELUTING STENT INTO LEFT ANTERIOR DESCENDING (LAD) ARTERY: ICD-10-CM

## 2021-08-24 DIAGNOSIS — I70.0 AORTIC ATHEROSCLEROSIS: ICD-10-CM

## 2021-08-24 DIAGNOSIS — R05.9 COUGH: ICD-10-CM

## 2021-08-24 DIAGNOSIS — N18.31 TYPE 2 DIABETES MELLITUS WITH STAGE 3A CHRONIC KIDNEY DISEASE, WITHOUT LONG-TERM CURRENT USE OF INSULIN: ICD-10-CM

## 2021-08-24 DIAGNOSIS — E78.00 PURE HYPERCHOLESTEROLEMIA: ICD-10-CM

## 2021-08-24 PROCEDURE — 1159F PR MEDICATION LIST DOCUMENTED IN MEDICAL RECORD: ICD-10-PCS | Mod: CPTII,S$GLB,, | Performed by: INTERNAL MEDICINE

## 2021-08-24 PROCEDURE — 99214 OFFICE O/P EST MOD 30 MIN: CPT | Mod: S$GLB,,, | Performed by: INTERNAL MEDICINE

## 2021-08-24 PROCEDURE — 1160F PR REVIEW ALL MEDS BY PRESCRIBER/CLIN PHARMACIST DOCUMENTED: ICD-10-PCS | Mod: CPTII,S$GLB,, | Performed by: INTERNAL MEDICINE

## 2021-08-24 PROCEDURE — 3077F SYST BP >= 140 MM HG: CPT | Mod: CPTII,S$GLB,, | Performed by: INTERNAL MEDICINE

## 2021-08-24 PROCEDURE — 3078F DIAST BP <80 MM HG: CPT | Mod: CPTII,S$GLB,, | Performed by: INTERNAL MEDICINE

## 2021-08-24 PROCEDURE — 1160F RVW MEDS BY RX/DR IN RCRD: CPT | Mod: CPTII,S$GLB,, | Performed by: INTERNAL MEDICINE

## 2021-08-24 PROCEDURE — 1159F MED LIST DOCD IN RCRD: CPT | Mod: CPTII,S$GLB,, | Performed by: INTERNAL MEDICINE

## 2021-08-24 PROCEDURE — 99214 PR OFFICE/OUTPT VISIT, EST, LEVL IV, 30-39 MIN: ICD-10-PCS | Mod: S$GLB,,, | Performed by: INTERNAL MEDICINE

## 2021-08-24 PROCEDURE — 99999 PR PBB SHADOW E&M-EST. PATIENT-LVL IV: CPT | Mod: PBBFAC,,, | Performed by: INTERNAL MEDICINE

## 2021-08-24 PROCEDURE — 3077F PR MOST RECENT SYSTOLIC BLOOD PRESSURE >= 140 MM HG: ICD-10-PCS | Mod: CPTII,S$GLB,, | Performed by: INTERNAL MEDICINE

## 2021-08-24 PROCEDURE — 1126F PR PAIN SEVERITY QUANTIFIED, NO PAIN PRESENT: ICD-10-PCS | Mod: CPTII,S$GLB,, | Performed by: INTERNAL MEDICINE

## 2021-08-24 PROCEDURE — U0003 INFECTIOUS AGENT DETECTION BY NUCLEIC ACID (DNA OR RNA); SEVERE ACUTE RESPIRATORY SYNDROME CORONAVIRUS 2 (SARS-COV-2) (CORONAVIRUS DISEASE [COVID-19]), AMPLIFIED PROBE TECHNIQUE, MAKING USE OF HIGH THROUGHPUT TECHNOLOGIES AS DESCRIBED BY CMS-2020-01-R: HCPCS | Performed by: NURSE PRACTITIONER

## 2021-08-24 PROCEDURE — 3078F PR MOST RECENT DIASTOLIC BLOOD PRESSURE < 80 MM HG: ICD-10-PCS | Mod: CPTII,S$GLB,, | Performed by: INTERNAL MEDICINE

## 2021-08-24 PROCEDURE — 99999 PR PBB SHADOW E&M-EST. PATIENT-LVL IV: ICD-10-PCS | Mod: PBBFAC,,, | Performed by: INTERNAL MEDICINE

## 2021-08-24 PROCEDURE — 1126F AMNT PAIN NOTED NONE PRSNT: CPT | Mod: CPTII,S$GLB,, | Performed by: INTERNAL MEDICINE

## 2021-08-25 LAB
SARS-COV-2 RNA RESP QL NAA+PROBE: NOT DETECTED
SARS-COV-2- CYCLE NUMBER: NORMAL

## 2021-08-27 ENCOUNTER — HOSPITAL ENCOUNTER (OUTPATIENT)
Facility: HOSPITAL | Age: 81
Discharge: HOME | End: 2021-08-28
Attending: EMERGENCY MEDICINE | Admitting: FAMILY MEDICINE
Payer: MEDICARE

## 2021-08-27 ENCOUNTER — PATIENT OUTREACH (OUTPATIENT)
Dept: EMERGENCY MEDICINE | Facility: HOSPITAL | Age: 81
End: 2021-08-27

## 2021-08-27 DIAGNOSIS — R07.9 CHEST PAIN, UNSPECIFIED TYPE: Primary | ICD-10-CM

## 2021-08-27 DIAGNOSIS — R06.02 SHORTNESS OF BREATH: ICD-10-CM

## 2021-08-27 DIAGNOSIS — R07.9 CHEST PAIN: ICD-10-CM

## 2021-08-27 PROBLEM — E11.9 TYPE 2 DIABETES MELLITUS: Status: ACTIVE | Noted: 2021-08-27

## 2021-08-27 PROBLEM — R06.6 INTRACTABLE HICCUPS: Status: ACTIVE | Noted: 2021-08-27

## 2021-08-27 LAB
ALBUMIN SERPL BCP-MCNC: 3.6 G/DL (ref 3.5–5.2)
ALP SERPL-CCNC: 74 U/L (ref 55–135)
ALT SERPL W/O P-5'-P-CCNC: 15 U/L (ref 10–44)
ANION GAP SERPL CALC-SCNC: 12 MMOL/L (ref 8–16)
AST SERPL-CCNC: 21 U/L (ref 10–40)
BASOPHILS # BLD AUTO: 0.03 K/UL (ref 0–0.2)
BASOPHILS NFR BLD: 0.4 % (ref 0–1.9)
BILIRUB SERPL-MCNC: 0.5 MG/DL (ref 0.1–1)
BNP SERPL-MCNC: 101 PG/ML (ref 0–99)
BUN SERPL-MCNC: 16 MG/DL (ref 8–23)
CALCIUM SERPL-MCNC: 10.3 MG/DL (ref 8.7–10.5)
CHLORIDE SERPL-SCNC: 101 MMOL/L (ref 95–110)
CO2 SERPL-SCNC: 22 MMOL/L (ref 23–29)
CREAT SERPL-MCNC: 1.3 MG/DL (ref 0.5–1.4)
CTP QC/QA: YES
DIFFERENTIAL METHOD: ABNORMAL
EOSINOPHIL # BLD AUTO: 0 K/UL (ref 0–0.5)
EOSINOPHIL NFR BLD: 0.1 % (ref 0–8)
ERYTHROCYTE [DISTWIDTH] IN BLOOD BY AUTOMATED COUNT: 13.8 % (ref 11.5–14.5)
EST. GFR  (AFRICAN AMERICAN): 59 ML/MIN/1.73 M^2
EST. GFR  (NON AFRICAN AMERICAN): 51 ML/MIN/1.73 M^2
ESTIMATED AVG GLUCOSE: 103 MG/DL (ref 68–131)
GLUCOSE SERPL-MCNC: 147 MG/DL (ref 70–110)
HBA1C MFR BLD: 5.2 % (ref 4–5.6)
HCT VFR BLD AUTO: 36 % (ref 40–54)
HGB BLD-MCNC: 12.3 G/DL (ref 14–18)
IMM GRANULOCYTES # BLD AUTO: 0.03 K/UL (ref 0–0.04)
IMM GRANULOCYTES NFR BLD AUTO: 0.4 % (ref 0–0.5)
LYMPHOCYTES # BLD AUTO: 0.8 K/UL (ref 1–4.8)
LYMPHOCYTES NFR BLD: 11.3 % (ref 18–48)
MAGNESIUM SERPL-MCNC: 2.1 MG/DL (ref 1.6–2.6)
MCH RBC QN AUTO: 31.9 PG (ref 27–31)
MCHC RBC AUTO-ENTMCNC: 34.2 G/DL (ref 32–36)
MCV RBC AUTO: 93 FL (ref 82–98)
MONOCYTES # BLD AUTO: 0.8 K/UL (ref 0.3–1)
MONOCYTES NFR BLD: 11.3 % (ref 4–15)
NEUTROPHILS # BLD AUTO: 5.5 K/UL (ref 1.8–7.7)
NEUTROPHILS NFR BLD: 76.5 % (ref 38–73)
NRBC BLD-RTO: 0 /100 WBC
PLATELET # BLD AUTO: 266 K/UL (ref 150–450)
PMV BLD AUTO: 9.2 FL (ref 9.2–12.9)
POTASSIUM SERPL-SCNC: 3.9 MMOL/L (ref 3.5–5.1)
PROT SERPL-MCNC: 7.1 G/DL (ref 6–8.4)
RBC # BLD AUTO: 3.86 M/UL (ref 4.6–6.2)
SARS-COV-2 RDRP RESP QL NAA+PROBE: NEGATIVE
SODIUM SERPL-SCNC: 135 MMOL/L (ref 136–145)
TROPONIN I SERPL DL<=0.01 NG/ML-MCNC: 0.01 NG/ML (ref 0–0.03)
TROPONIN I SERPL DL<=0.01 NG/ML-MCNC: <0.006 NG/ML (ref 0–0.03)
TROPONIN I SERPL DL<=0.01 NG/ML-MCNC: <0.006 NG/ML (ref 0–0.03)
TSH SERPL DL<=0.005 MIU/L-ACNC: 2.67 UIU/ML (ref 0.4–4)
WBC # BLD AUTO: 7.24 K/UL (ref 3.9–12.7)

## 2021-08-27 PROCEDURE — 83880 ASSAY OF NATRIURETIC PEPTIDE: CPT | Performed by: EMERGENCY MEDICINE

## 2021-08-27 PROCEDURE — 84484 ASSAY OF TROPONIN QUANT: CPT | Mod: 91 | Performed by: FAMILY MEDICINE

## 2021-08-27 PROCEDURE — 99220 PR INITIAL OBSERVATION CARE,LEVL III: CPT | Mod: ,,, | Performed by: INTERNAL MEDICINE

## 2021-08-27 PROCEDURE — G0378 HOSPITAL OBSERVATION PER HR: HCPCS

## 2021-08-27 PROCEDURE — 93010 ELECTROCARDIOGRAM REPORT: CPT | Mod: ,,, | Performed by: INTERNAL MEDICINE

## 2021-08-27 PROCEDURE — 84443 ASSAY THYROID STIM HORMONE: CPT | Performed by: EMERGENCY MEDICINE

## 2021-08-27 PROCEDURE — 36000 PLACE NEEDLE IN VEIN: CPT

## 2021-08-27 PROCEDURE — U0002 COVID-19 LAB TEST NON-CDC: HCPCS | Performed by: EMERGENCY MEDICINE

## 2021-08-27 PROCEDURE — 99285 EMERGENCY DEPT VISIT HI MDM: CPT | Mod: 25

## 2021-08-27 PROCEDURE — 93010 EKG 12-LEAD: ICD-10-PCS | Mod: ,,, | Performed by: INTERNAL MEDICINE

## 2021-08-27 PROCEDURE — 80053 COMPREHEN METABOLIC PANEL: CPT | Performed by: EMERGENCY MEDICINE

## 2021-08-27 PROCEDURE — 84484 ASSAY OF TROPONIN QUANT: CPT | Mod: 91 | Performed by: EMERGENCY MEDICINE

## 2021-08-27 PROCEDURE — A4216 STERILE WATER/SALINE, 10 ML: HCPCS | Performed by: FAMILY MEDICINE

## 2021-08-27 PROCEDURE — 99220 PR INITIAL OBSERVATION CARE,LEVL III: ICD-10-PCS | Mod: ,,, | Performed by: INTERNAL MEDICINE

## 2021-08-27 PROCEDURE — 83735 ASSAY OF MAGNESIUM: CPT | Performed by: EMERGENCY MEDICINE

## 2021-08-27 PROCEDURE — 93005 ELECTROCARDIOGRAM TRACING: CPT

## 2021-08-27 PROCEDURE — 83036 HEMOGLOBIN GLYCOSYLATED A1C: CPT | Performed by: FAMILY MEDICINE

## 2021-08-27 PROCEDURE — 85025 COMPLETE CBC W/AUTO DIFF WBC: CPT | Performed by: EMERGENCY MEDICINE

## 2021-08-27 PROCEDURE — 25000003 PHARM REV CODE 250: Performed by: FAMILY MEDICINE

## 2021-08-27 RX ORDER — ATORVASTATIN CALCIUM 40 MG/1
40 TABLET, FILM COATED ORAL NIGHTLY
Status: DISCONTINUED | OUTPATIENT
Start: 2021-08-27 | End: 2021-08-28 | Stop reason: HOSPADM

## 2021-08-27 RX ORDER — GLUCAGON 1 MG
1 KIT INJECTION
Status: DISCONTINUED | OUTPATIENT
Start: 2021-08-27 | End: 2021-08-28 | Stop reason: HOSPADM

## 2021-08-27 RX ORDER — LANOLIN ALCOHOL/MO/W.PET/CERES
800 CREAM (GRAM) TOPICAL
Status: DISCONTINUED | OUTPATIENT
Start: 2021-08-27 | End: 2021-08-28 | Stop reason: HOSPADM

## 2021-08-27 RX ORDER — POLYETHYLENE GLYCOL 3350 17 G/17G
17 POWDER, FOR SOLUTION ORAL DAILY
Status: DISCONTINUED | OUTPATIENT
Start: 2021-08-27 | End: 2021-08-28 | Stop reason: HOSPADM

## 2021-08-27 RX ORDER — TAMSULOSIN HYDROCHLORIDE 0.4 MG/1
0.4 CAPSULE ORAL NIGHTLY
Status: DISCONTINUED | OUTPATIENT
Start: 2021-08-27 | End: 2021-08-28 | Stop reason: HOSPADM

## 2021-08-27 RX ORDER — ENOXAPARIN SODIUM 100 MG/ML
40 INJECTION SUBCUTANEOUS EVERY 24 HOURS
Status: DISCONTINUED | OUTPATIENT
Start: 2021-08-27 | End: 2021-08-28 | Stop reason: HOSPADM

## 2021-08-27 RX ORDER — OXYBUTYNIN CHLORIDE 5 MG/1
10 TABLET, EXTENDED RELEASE ORAL DAILY
Status: DISCONTINUED | OUTPATIENT
Start: 2021-08-27 | End: 2021-08-28 | Stop reason: HOSPADM

## 2021-08-27 RX ORDER — HYDRALAZINE HYDROCHLORIDE 25 MG/1
100 TABLET, FILM COATED ORAL EVERY 8 HOURS
Status: DISCONTINUED | OUTPATIENT
Start: 2021-08-27 | End: 2021-08-28 | Stop reason: HOSPADM

## 2021-08-27 RX ORDER — NIFEDIPINE 30 MG/1
60 TABLET, EXTENDED RELEASE ORAL 2 TIMES DAILY
Status: DISCONTINUED | OUTPATIENT
Start: 2021-08-27 | End: 2021-08-28 | Stop reason: HOSPADM

## 2021-08-27 RX ORDER — ONDANSETRON 8 MG/1
8 TABLET, ORALLY DISINTEGRATING ORAL EVERY 8 HOURS PRN
Status: DISCONTINUED | OUTPATIENT
Start: 2021-08-27 | End: 2021-08-28 | Stop reason: HOSPADM

## 2021-08-27 RX ORDER — SODIUM,POTASSIUM PHOSPHATES 280-250MG
2 POWDER IN PACKET (EA) ORAL
Status: DISCONTINUED | OUTPATIENT
Start: 2021-08-27 | End: 2021-08-28 | Stop reason: HOSPADM

## 2021-08-27 RX ORDER — IPRATROPIUM BROMIDE AND ALBUTEROL SULFATE 2.5; .5 MG/3ML; MG/3ML
3 SOLUTION RESPIRATORY (INHALATION) EVERY 4 HOURS PRN
Status: DISCONTINUED | OUTPATIENT
Start: 2021-08-27 | End: 2021-08-28 | Stop reason: HOSPADM

## 2021-08-27 RX ORDER — INSULIN ASPART 100 [IU]/ML
0-5 INJECTION, SOLUTION INTRAVENOUS; SUBCUTANEOUS
Status: DISCONTINUED | OUTPATIENT
Start: 2021-08-27 | End: 2021-08-28 | Stop reason: HOSPADM

## 2021-08-27 RX ORDER — ASPIRIN 81 MG/1
81 TABLET ORAL DAILY
Status: DISCONTINUED | OUTPATIENT
Start: 2021-08-27 | End: 2021-08-28 | Stop reason: HOSPADM

## 2021-08-27 RX ORDER — ACETAMINOPHEN 325 MG/1
650 TABLET ORAL EVERY 4 HOURS PRN
Status: DISCONTINUED | OUTPATIENT
Start: 2021-08-27 | End: 2021-08-28 | Stop reason: HOSPADM

## 2021-08-27 RX ORDER — AMOXICILLIN 250 MG
1 CAPSULE ORAL 2 TIMES DAILY
Status: DISCONTINUED | OUTPATIENT
Start: 2021-08-27 | End: 2021-08-28 | Stop reason: HOSPADM

## 2021-08-27 RX ORDER — IBUPROFEN 200 MG
16 TABLET ORAL
Status: DISCONTINUED | OUTPATIENT
Start: 2021-08-27 | End: 2021-08-28 | Stop reason: HOSPADM

## 2021-08-27 RX ORDER — SODIUM CHLORIDE 0.9 % (FLUSH) 0.9 %
10 SYRINGE (ML) INJECTION EVERY 8 HOURS
Status: DISCONTINUED | OUTPATIENT
Start: 2021-08-27 | End: 2021-08-28 | Stop reason: HOSPADM

## 2021-08-27 RX ORDER — IBUPROFEN 200 MG
24 TABLET ORAL
Status: DISCONTINUED | OUTPATIENT
Start: 2021-08-27 | End: 2021-08-28 | Stop reason: HOSPADM

## 2021-08-27 RX ORDER — TALC
6 POWDER (GRAM) TOPICAL NIGHTLY PRN
Status: DISCONTINUED | OUTPATIENT
Start: 2021-08-27 | End: 2021-08-28 | Stop reason: HOSPADM

## 2021-08-27 RX ORDER — CLOPIDOGREL BISULFATE 75 MG/1
75 TABLET ORAL DAILY
Status: DISCONTINUED | OUTPATIENT
Start: 2021-08-27 | End: 2021-08-28 | Stop reason: HOSPADM

## 2021-08-27 RX ORDER — GABAPENTIN 100 MG/1
100 CAPSULE ORAL 3 TIMES DAILY
Status: DISCONTINUED | OUTPATIENT
Start: 2021-08-27 | End: 2021-08-28 | Stop reason: HOSPADM

## 2021-08-27 RX ADMIN — ATORVASTATIN CALCIUM 40 MG: 40 TABLET, FILM COATED ORAL at 09:08

## 2021-08-27 RX ADMIN — OXYBUTYNIN CHLORIDE 10 MG: 5 TABLET, EXTENDED RELEASE ORAL at 03:08

## 2021-08-27 RX ADMIN — NIFEDIPINE 60 MG: 30 TABLET, FILM COATED, EXTENDED RELEASE ORAL at 09:08

## 2021-08-27 RX ADMIN — SODIUM CHLORIDE 10 ML: 9 INJECTION, SOLUTION INTRAMUSCULAR; INTRAVENOUS; SUBCUTANEOUS at 10:08

## 2021-08-27 RX ADMIN — HYDRALAZINE HYDROCHLORIDE 100 MG: 25 TABLET, FILM COATED ORAL at 09:08

## 2021-08-27 RX ADMIN — GABAPENTIN 100 MG: 100 CAPSULE ORAL at 09:08

## 2021-08-27 RX ADMIN — POLYETHYLENE GLYCOL 3350 17 G: 17 POWDER, FOR SOLUTION ORAL at 03:08

## 2021-08-27 RX ADMIN — CLOPIDOGREL 75 MG: 75 TABLET, FILM COATED ORAL at 03:08

## 2021-08-27 RX ADMIN — ASPIRIN 81 MG: 81 TABLET, COATED ORAL at 03:08

## 2021-08-27 RX ADMIN — TAMSULOSIN HYDROCHLORIDE 0.4 MG: 0.4 CAPSULE ORAL at 09:08

## 2021-08-27 RX ADMIN — GABAPENTIN 100 MG: 100 CAPSULE ORAL at 03:08

## 2021-08-27 RX ADMIN — DOCUSATE SODIUM AND SENNOSIDES 1 TABLET: 8.6; 5 TABLET, FILM COATED ORAL at 09:08

## 2021-08-28 VITALS
OXYGEN SATURATION: 98 % | WEIGHT: 192.69 LBS | HEART RATE: 79 BPM | SYSTOLIC BLOOD PRESSURE: 140 MMHG | DIASTOLIC BLOOD PRESSURE: 75 MMHG | RESPIRATION RATE: 18 BRPM | TEMPERATURE: 98 F | HEIGHT: 71 IN | BODY MASS INDEX: 26.98 KG/M2

## 2021-08-28 LAB
ANION GAP SERPL CALC-SCNC: 8 MMOL/L (ref 8–16)
BASOPHILS # BLD AUTO: 0.03 K/UL (ref 0–0.2)
BASOPHILS NFR BLD: 0.5 % (ref 0–1.9)
BUN SERPL-MCNC: 13 MG/DL (ref 8–23)
CALCIUM SERPL-MCNC: 10.1 MG/DL (ref 8.7–10.5)
CHLORIDE SERPL-SCNC: 103 MMOL/L (ref 95–110)
CO2 SERPL-SCNC: 23 MMOL/L (ref 23–29)
CREAT SERPL-MCNC: 1.1 MG/DL (ref 0.5–1.4)
DIFFERENTIAL METHOD: ABNORMAL
EOSINOPHIL # BLD AUTO: 0.1 K/UL (ref 0–0.5)
EOSINOPHIL NFR BLD: 1.3 % (ref 0–8)
ERYTHROCYTE [DISTWIDTH] IN BLOOD BY AUTOMATED COUNT: 13.6 % (ref 11.5–14.5)
EST. GFR  (AFRICAN AMERICAN): >60 ML/MIN/1.73 M^2
EST. GFR  (NON AFRICAN AMERICAN): >60 ML/MIN/1.73 M^2
GLUCOSE SERPL-MCNC: 85 MG/DL (ref 70–110)
HCT VFR BLD AUTO: 34.8 % (ref 40–54)
HGB BLD-MCNC: 11.7 G/DL (ref 14–18)
IMM GRANULOCYTES # BLD AUTO: 0.03 K/UL (ref 0–0.04)
IMM GRANULOCYTES NFR BLD AUTO: 0.5 % (ref 0–0.5)
LYMPHOCYTES # BLD AUTO: 1.3 K/UL (ref 1–4.8)
LYMPHOCYTES NFR BLD: 20.7 % (ref 18–48)
MAGNESIUM SERPL-MCNC: 2 MG/DL (ref 1.6–2.6)
MCH RBC QN AUTO: 31.9 PG (ref 27–31)
MCHC RBC AUTO-ENTMCNC: 33.6 G/DL (ref 32–36)
MCV RBC AUTO: 95 FL (ref 82–98)
MONOCYTES # BLD AUTO: 1.1 K/UL (ref 0.3–1)
MONOCYTES NFR BLD: 17.1 % (ref 4–15)
NEUTROPHILS # BLD AUTO: 3.8 K/UL (ref 1.8–7.7)
NEUTROPHILS NFR BLD: 59.9 % (ref 38–73)
NRBC BLD-RTO: 0 /100 WBC
PHOSPHATE SERPL-MCNC: 3 MG/DL (ref 2.7–4.5)
PLATELET # BLD AUTO: 238 K/UL (ref 150–450)
PMV BLD AUTO: 9.2 FL (ref 9.2–12.9)
POTASSIUM SERPL-SCNC: 4 MMOL/L (ref 3.5–5.1)
RBC # BLD AUTO: 3.67 M/UL (ref 4.6–6.2)
SODIUM SERPL-SCNC: 134 MMOL/L (ref 136–145)
WBC # BLD AUTO: 6.33 K/UL (ref 3.9–12.7)

## 2021-08-28 PROCEDURE — 25000003 PHARM REV CODE 250: Performed by: FAMILY MEDICINE

## 2021-08-28 PROCEDURE — 80048 BASIC METABOLIC PNL TOTAL CA: CPT | Performed by: FAMILY MEDICINE

## 2021-08-28 PROCEDURE — 99214 PR OFFICE/OUTPT VISIT, EST, LEVL IV, 30-39 MIN: ICD-10-PCS | Mod: ,,, | Performed by: INTERNAL MEDICINE

## 2021-08-28 PROCEDURE — 85025 COMPLETE CBC W/AUTO DIFF WBC: CPT | Performed by: FAMILY MEDICINE

## 2021-08-28 PROCEDURE — A4216 STERILE WATER/SALINE, 10 ML: HCPCS | Performed by: FAMILY MEDICINE

## 2021-08-28 PROCEDURE — G0378 HOSPITAL OBSERVATION PER HR: HCPCS

## 2021-08-28 PROCEDURE — 94761 N-INVAS EAR/PLS OXIMETRY MLT: CPT

## 2021-08-28 PROCEDURE — 36415 COLL VENOUS BLD VENIPUNCTURE: CPT | Performed by: FAMILY MEDICINE

## 2021-08-28 PROCEDURE — 84100 ASSAY OF PHOSPHORUS: CPT | Performed by: FAMILY MEDICINE

## 2021-08-28 PROCEDURE — 83735 ASSAY OF MAGNESIUM: CPT | Performed by: FAMILY MEDICINE

## 2021-08-28 PROCEDURE — 99214 OFFICE O/P EST MOD 30 MIN: CPT | Mod: ,,, | Performed by: INTERNAL MEDICINE

## 2021-08-28 RX ORDER — GABAPENTIN 100 MG/1
100 CAPSULE ORAL DAILY PRN
Qty: 30 CAPSULE | Refills: 5 | Status: SHIPPED | OUTPATIENT
Start: 2021-08-28 | End: 2021-11-01

## 2021-08-28 RX ADMIN — ASPIRIN 81 MG: 81 TABLET, COATED ORAL at 09:08

## 2021-08-28 RX ADMIN — GABAPENTIN 100 MG: 100 CAPSULE ORAL at 09:08

## 2021-08-28 RX ADMIN — POLYETHYLENE GLYCOL 3350 17 G: 17 POWDER, FOR SOLUTION ORAL at 09:08

## 2021-08-28 RX ADMIN — SODIUM CHLORIDE 10 ML: 9 INJECTION, SOLUTION INTRAMUSCULAR; INTRAVENOUS; SUBCUTANEOUS at 06:08

## 2021-08-28 RX ADMIN — HYDRALAZINE HYDROCHLORIDE 100 MG: 25 TABLET, FILM COATED ORAL at 05:08

## 2021-08-28 RX ADMIN — OXYBUTYNIN CHLORIDE 10 MG: 5 TABLET, EXTENDED RELEASE ORAL at 09:08

## 2021-08-28 RX ADMIN — NIFEDIPINE 60 MG: 30 TABLET, FILM COATED, EXTENDED RELEASE ORAL at 09:08

## 2021-08-28 RX ADMIN — CLOPIDOGREL 75 MG: 75 TABLET, FILM COATED ORAL at 09:08

## 2021-09-08 ENCOUNTER — TELEPHONE (OUTPATIENT)
Dept: GASTROENTEROLOGY | Facility: CLINIC | Age: 81
End: 2021-09-08

## 2021-09-28 ENCOUNTER — TELEPHONE (OUTPATIENT)
Dept: FAMILY MEDICINE | Facility: CLINIC | Age: 81
End: 2021-09-28
Payer: MEDICARE

## 2021-09-28 ENCOUNTER — PATIENT OUTREACH (OUTPATIENT)
Dept: EMERGENCY MEDICINE | Facility: HOSPITAL | Age: 81
End: 2021-09-28

## 2021-10-04 ENCOUNTER — TELEPHONE (OUTPATIENT)
Dept: FAMILY MEDICINE | Facility: CLINIC | Age: 81
End: 2021-10-04

## 2021-10-05 ENCOUNTER — OFFICE VISIT (OUTPATIENT)
Dept: FAMILY MEDICINE | Facility: CLINIC | Age: 81
End: 2021-10-05
Payer: MEDICARE

## 2021-10-05 VITALS
OXYGEN SATURATION: 97 % | WEIGHT: 193.13 LBS | HEIGHT: 71 IN | HEART RATE: 87 BPM | SYSTOLIC BLOOD PRESSURE: 134 MMHG | DIASTOLIC BLOOD PRESSURE: 60 MMHG | BODY MASS INDEX: 27.04 KG/M2

## 2021-10-05 DIAGNOSIS — F33.1 MODERATE EPISODE OF RECURRENT MAJOR DEPRESSIVE DISORDER: Primary | ICD-10-CM

## 2021-10-05 PROCEDURE — 99214 OFFICE O/P EST MOD 30 MIN: CPT | Mod: S$GLB,,, | Performed by: STUDENT IN AN ORGANIZED HEALTH CARE EDUCATION/TRAINING PROGRAM

## 2021-10-05 PROCEDURE — 1159F PR MEDICATION LIST DOCUMENTED IN MEDICAL RECORD: ICD-10-PCS | Mod: CPTII,S$GLB,, | Performed by: STUDENT IN AN ORGANIZED HEALTH CARE EDUCATION/TRAINING PROGRAM

## 2021-10-05 PROCEDURE — 3078F DIAST BP <80 MM HG: CPT | Mod: CPTII,S$GLB,, | Performed by: STUDENT IN AN ORGANIZED HEALTH CARE EDUCATION/TRAINING PROGRAM

## 2021-10-05 PROCEDURE — 1126F AMNT PAIN NOTED NONE PRSNT: CPT | Mod: CPTII,S$GLB,, | Performed by: STUDENT IN AN ORGANIZED HEALTH CARE EDUCATION/TRAINING PROGRAM

## 2021-10-05 PROCEDURE — 99214 PR OFFICE/OUTPT VISIT, EST, LEVL IV, 30-39 MIN: ICD-10-PCS | Mod: S$GLB,,, | Performed by: STUDENT IN AN ORGANIZED HEALTH CARE EDUCATION/TRAINING PROGRAM

## 2021-10-05 PROCEDURE — 1126F PR PAIN SEVERITY QUANTIFIED, NO PAIN PRESENT: ICD-10-PCS | Mod: CPTII,S$GLB,, | Performed by: STUDENT IN AN ORGANIZED HEALTH CARE EDUCATION/TRAINING PROGRAM

## 2021-10-05 PROCEDURE — 3078F PR MOST RECENT DIASTOLIC BLOOD PRESSURE < 80 MM HG: ICD-10-PCS | Mod: CPTII,S$GLB,, | Performed by: STUDENT IN AN ORGANIZED HEALTH CARE EDUCATION/TRAINING PROGRAM

## 2021-10-05 PROCEDURE — 3075F SYST BP GE 130 - 139MM HG: CPT | Mod: CPTII,S$GLB,, | Performed by: STUDENT IN AN ORGANIZED HEALTH CARE EDUCATION/TRAINING PROGRAM

## 2021-10-05 PROCEDURE — 3288F FALL RISK ASSESSMENT DOCD: CPT | Mod: CPTII,S$GLB,, | Performed by: STUDENT IN AN ORGANIZED HEALTH CARE EDUCATION/TRAINING PROGRAM

## 2021-10-05 PROCEDURE — 3288F PR FALLS RISK ASSESSMENT DOCUMENTED: ICD-10-PCS | Mod: CPTII,S$GLB,, | Performed by: STUDENT IN AN ORGANIZED HEALTH CARE EDUCATION/TRAINING PROGRAM

## 2021-10-05 PROCEDURE — 1101F PR PT FALLS ASSESS DOC 0-1 FALLS W/OUT INJ PAST YR: ICD-10-PCS | Mod: CPTII,S$GLB,, | Performed by: STUDENT IN AN ORGANIZED HEALTH CARE EDUCATION/TRAINING PROGRAM

## 2021-10-05 PROCEDURE — 99999 PR PBB SHADOW E&M-EST. PATIENT-LVL IV: ICD-10-PCS | Mod: PBBFAC,,, | Performed by: STUDENT IN AN ORGANIZED HEALTH CARE EDUCATION/TRAINING PROGRAM

## 2021-10-05 PROCEDURE — 1101F PT FALLS ASSESS-DOCD LE1/YR: CPT | Mod: CPTII,S$GLB,, | Performed by: STUDENT IN AN ORGANIZED HEALTH CARE EDUCATION/TRAINING PROGRAM

## 2021-10-05 PROCEDURE — 99999 PR PBB SHADOW E&M-EST. PATIENT-LVL IV: CPT | Mod: PBBFAC,,, | Performed by: STUDENT IN AN ORGANIZED HEALTH CARE EDUCATION/TRAINING PROGRAM

## 2021-10-05 PROCEDURE — 1159F MED LIST DOCD IN RCRD: CPT | Mod: CPTII,S$GLB,, | Performed by: STUDENT IN AN ORGANIZED HEALTH CARE EDUCATION/TRAINING PROGRAM

## 2021-10-05 PROCEDURE — 1160F RVW MEDS BY RX/DR IN RCRD: CPT | Mod: CPTII,S$GLB,, | Performed by: STUDENT IN AN ORGANIZED HEALTH CARE EDUCATION/TRAINING PROGRAM

## 2021-10-05 PROCEDURE — 1160F PR REVIEW ALL MEDS BY PRESCRIBER/CLIN PHARMACIST DOCUMENTED: ICD-10-PCS | Mod: CPTII,S$GLB,, | Performed by: STUDENT IN AN ORGANIZED HEALTH CARE EDUCATION/TRAINING PROGRAM

## 2021-10-05 PROCEDURE — 3075F PR MOST RECENT SYSTOLIC BLOOD PRESS GE 130-139MM HG: ICD-10-PCS | Mod: CPTII,S$GLB,, | Performed by: STUDENT IN AN ORGANIZED HEALTH CARE EDUCATION/TRAINING PROGRAM

## 2021-10-05 RX ORDER — ESCITALOPRAM OXALATE 10 MG/1
10 TABLET ORAL DAILY
Qty: 30 TABLET | Refills: 1 | Status: SHIPPED | OUTPATIENT
Start: 2021-10-05 | End: 2022-01-06 | Stop reason: SDUPTHER

## 2021-10-09 ENCOUNTER — IMMUNIZATION (OUTPATIENT)
Dept: FAMILY MEDICINE | Facility: CLINIC | Age: 81
End: 2021-10-09
Payer: MEDICARE

## 2021-10-09 PROCEDURE — 90694 VACC AIIV4 NO PRSRV 0.5ML IM: CPT | Mod: S$GLB,,, | Performed by: FAMILY MEDICINE

## 2021-10-09 PROCEDURE — 90694 FLU VACCINE - QUADRIVALENT - ADJUVANTED: ICD-10-PCS | Mod: S$GLB,,, | Performed by: FAMILY MEDICINE

## 2021-10-09 PROCEDURE — G0008 ADMIN INFLUENZA VIRUS VAC: HCPCS | Mod: S$GLB,,, | Performed by: FAMILY MEDICINE

## 2021-10-09 PROCEDURE — G0008 FLU VACCINE - QUADRIVALENT - ADJUVANTED: ICD-10-PCS | Mod: S$GLB,,, | Performed by: FAMILY MEDICINE

## 2021-10-14 ENCOUNTER — TELEPHONE (OUTPATIENT)
Dept: FAMILY MEDICINE | Facility: CLINIC | Age: 81
End: 2021-10-14

## 2021-10-14 ENCOUNTER — TELEPHONE (OUTPATIENT)
Dept: UROLOGY | Facility: CLINIC | Age: 81
End: 2021-10-14

## 2021-10-19 ENCOUNTER — IMMUNIZATION (OUTPATIENT)
Dept: FAMILY MEDICINE | Facility: CLINIC | Age: 81
End: 2021-10-19
Payer: MEDICARE

## 2021-10-19 DIAGNOSIS — Z23 NEED FOR VACCINATION: Primary | ICD-10-CM

## 2021-10-19 PROCEDURE — 0003A COVID-19, MRNA, LNP-S, PF, 30 MCG/0.3 ML DOSE VACCINE: CPT | Mod: PBBFAC | Performed by: NURSE ANESTHETIST, CERTIFIED REGISTERED

## 2021-10-19 PROCEDURE — 91300 COVID-19, MRNA, LNP-S, PF, 30 MCG/0.3 ML DOSE VACCINE: CPT | Mod: PBBFAC | Performed by: NURSE ANESTHETIST, CERTIFIED REGISTERED

## 2021-10-21 ENCOUNTER — PATIENT OUTREACH (OUTPATIENT)
Dept: EMERGENCY MEDICINE | Facility: HOSPITAL | Age: 81
End: 2021-10-21

## 2021-10-21 ENCOUNTER — TELEPHONE (OUTPATIENT)
Dept: FAMILY MEDICINE | Facility: CLINIC | Age: 81
End: 2021-10-21

## 2021-10-22 ENCOUNTER — TELEPHONE (OUTPATIENT)
Dept: FAMILY MEDICINE | Facility: CLINIC | Age: 81
End: 2021-10-22

## 2021-11-01 ENCOUNTER — OFFICE VISIT (OUTPATIENT)
Dept: FAMILY MEDICINE | Facility: CLINIC | Age: 81
End: 2021-11-01
Payer: MEDICARE

## 2021-11-01 VITALS
BODY MASS INDEX: 26.64 KG/M2 | HEART RATE: 78 BPM | RESPIRATION RATE: 16 BRPM | DIASTOLIC BLOOD PRESSURE: 64 MMHG | HEIGHT: 71 IN | SYSTOLIC BLOOD PRESSURE: 138 MMHG | OXYGEN SATURATION: 98 % | WEIGHT: 190.25 LBS

## 2021-11-01 DIAGNOSIS — I10 PRIMARY HYPERTENSION: ICD-10-CM

## 2021-11-01 DIAGNOSIS — R42 DIZZINESS: ICD-10-CM

## 2021-11-01 DIAGNOSIS — Z00.00 ANNUAL PHYSICAL EXAM: Primary | ICD-10-CM

## 2021-11-01 PROCEDURE — 1159F PR MEDICATION LIST DOCUMENTED IN MEDICAL RECORD: ICD-10-PCS | Mod: CPTII,S$GLB,, | Performed by: FAMILY MEDICINE

## 2021-11-01 PROCEDURE — 1159F MED LIST DOCD IN RCRD: CPT | Mod: CPTII,S$GLB,, | Performed by: FAMILY MEDICINE

## 2021-11-01 PROCEDURE — 3078F PR MOST RECENT DIASTOLIC BLOOD PRESSURE < 80 MM HG: ICD-10-PCS | Mod: CPTII,S$GLB,, | Performed by: FAMILY MEDICINE

## 2021-11-01 PROCEDURE — 3075F PR MOST RECENT SYSTOLIC BLOOD PRESS GE 130-139MM HG: ICD-10-PCS | Mod: CPTII,S$GLB,, | Performed by: FAMILY MEDICINE

## 2021-11-01 PROCEDURE — 3078F DIAST BP <80 MM HG: CPT | Mod: CPTII,S$GLB,, | Performed by: FAMILY MEDICINE

## 2021-11-01 PROCEDURE — 3075F SYST BP GE 130 - 139MM HG: CPT | Mod: CPTII,S$GLB,, | Performed by: FAMILY MEDICINE

## 2021-11-01 PROCEDURE — 99214 PR OFFICE/OUTPT VISIT, EST, LEVL IV, 30-39 MIN: ICD-10-PCS | Mod: S$GLB,,, | Performed by: FAMILY MEDICINE

## 2021-11-01 PROCEDURE — 99214 OFFICE O/P EST MOD 30 MIN: CPT | Mod: S$GLB,,, | Performed by: FAMILY MEDICINE

## 2021-11-01 PROCEDURE — 99999 PR PBB SHADOW E&M-EST. PATIENT-LVL IV: CPT | Mod: PBBFAC,,, | Performed by: FAMILY MEDICINE

## 2021-11-01 PROCEDURE — 1126F AMNT PAIN NOTED NONE PRSNT: CPT | Mod: CPTII,S$GLB,, | Performed by: FAMILY MEDICINE

## 2021-11-01 PROCEDURE — 3288F PR FALLS RISK ASSESSMENT DOCUMENTED: ICD-10-PCS | Mod: CPTII,S$GLB,, | Performed by: FAMILY MEDICINE

## 2021-11-01 PROCEDURE — 1160F RVW MEDS BY RX/DR IN RCRD: CPT | Mod: CPTII,S$GLB,, | Performed by: FAMILY MEDICINE

## 2021-11-01 PROCEDURE — 3288F FALL RISK ASSESSMENT DOCD: CPT | Mod: CPTII,S$GLB,, | Performed by: FAMILY MEDICINE

## 2021-11-01 PROCEDURE — 1101F PT FALLS ASSESS-DOCD LE1/YR: CPT | Mod: CPTII,S$GLB,, | Performed by: FAMILY MEDICINE

## 2021-11-01 PROCEDURE — 1160F PR REVIEW ALL MEDS BY PRESCRIBER/CLIN PHARMACIST DOCUMENTED: ICD-10-PCS | Mod: CPTII,S$GLB,, | Performed by: FAMILY MEDICINE

## 2021-11-01 PROCEDURE — 1126F PR PAIN SEVERITY QUANTIFIED, NO PAIN PRESENT: ICD-10-PCS | Mod: CPTII,S$GLB,, | Performed by: FAMILY MEDICINE

## 2021-11-01 PROCEDURE — 99999 PR PBB SHADOW E&M-EST. PATIENT-LVL IV: ICD-10-PCS | Mod: PBBFAC,,, | Performed by: FAMILY MEDICINE

## 2021-11-01 PROCEDURE — 1101F PR PT FALLS ASSESS DOC 0-1 FALLS W/OUT INJ PAST YR: ICD-10-PCS | Mod: CPTII,S$GLB,, | Performed by: FAMILY MEDICINE

## 2021-11-08 ENCOUNTER — TELEPHONE (OUTPATIENT)
Dept: FAMILY MEDICINE | Facility: CLINIC | Age: 81
End: 2021-11-08
Payer: MEDICARE

## 2021-11-08 DIAGNOSIS — I10 PRIMARY HYPERTENSION: Primary | ICD-10-CM

## 2021-11-08 RX ORDER — METOPROLOL SUCCINATE 25 MG/1
25 TABLET, EXTENDED RELEASE ORAL DAILY
Qty: 90 TABLET | Refills: 3 | Status: SHIPPED | OUTPATIENT
Start: 2021-11-08 | End: 2021-12-07 | Stop reason: SDUPTHER

## 2021-11-10 ENCOUNTER — TELEPHONE (OUTPATIENT)
Dept: FAMILY MEDICINE | Facility: CLINIC | Age: 81
End: 2021-11-10
Payer: MEDICARE

## 2021-11-15 ENCOUNTER — OFFICE VISIT (OUTPATIENT)
Dept: UROLOGY | Facility: CLINIC | Age: 81
End: 2021-11-15
Payer: MEDICARE

## 2021-11-15 VITALS
HEART RATE: 52 BPM | DIASTOLIC BLOOD PRESSURE: 60 MMHG | BODY MASS INDEX: 27.16 KG/M2 | OXYGEN SATURATION: 99 % | HEIGHT: 71 IN | TEMPERATURE: 99 F | WEIGHT: 194 LBS | SYSTOLIC BLOOD PRESSURE: 130 MMHG | RESPIRATION RATE: 18 BRPM

## 2021-11-15 DIAGNOSIS — N40.1 BPH WITH URINARY OBSTRUCTION: Primary | ICD-10-CM

## 2021-11-15 DIAGNOSIS — N13.8 BPH WITH URINARY OBSTRUCTION: Primary | ICD-10-CM

## 2021-11-15 DIAGNOSIS — R35.1 NOCTURIA: ICD-10-CM

## 2021-11-15 DIAGNOSIS — N28.1 BILATERAL RENAL CYSTS: ICD-10-CM

## 2021-11-15 DIAGNOSIS — N18.31 STAGE 3A CHRONIC KIDNEY DISEASE: ICD-10-CM

## 2021-11-15 DIAGNOSIS — Q61.3 POLYCYSTIC KIDNEY DISEASE: ICD-10-CM

## 2021-11-15 LAB
BACTERIA #/AREA URNS AUTO: ABNORMAL /HPF
BILIRUB UR QL STRIP: NEGATIVE
CLARITY UR REFRACT.AUTO: ABNORMAL
COLOR UR AUTO: ABNORMAL
GLUCOSE UR QL STRIP: NEGATIVE
HGB UR QL STRIP: ABNORMAL
HYALINE CASTS UR QL AUTO: 3 /LPF
KETONES UR QL STRIP: NEGATIVE
LEUKOCYTE ESTERASE UR QL STRIP: ABNORMAL
MICROSCOPIC COMMENT: ABNORMAL
NITRITE UR QL STRIP: NEGATIVE
PH UR STRIP: 5 [PH] (ref 5–8)
POC RESIDUAL URINE VOLUME: 30 ML (ref 0–100)
PROT UR QL STRIP: ABNORMAL
RBC #/AREA URNS AUTO: 5 /HPF (ref 0–4)
SP GR UR STRIP: 1.02 (ref 1–1.03)
URN SPEC COLLECT METH UR: ABNORMAL
WBC #/AREA URNS AUTO: 46 /HPF (ref 0–5)
WBC CLUMPS UR QL AUTO: ABNORMAL

## 2021-11-15 PROCEDURE — 81001 URINALYSIS AUTO W/SCOPE: CPT | Performed by: NURSE PRACTITIONER

## 2021-11-15 PROCEDURE — 3075F SYST BP GE 130 - 139MM HG: CPT | Mod: CPTII,S$GLB,, | Performed by: NURSE PRACTITIONER

## 2021-11-15 PROCEDURE — 1159F MED LIST DOCD IN RCRD: CPT | Mod: CPTII,S$GLB,, | Performed by: NURSE PRACTITIONER

## 2021-11-15 PROCEDURE — 87077 CULTURE AEROBIC IDENTIFY: CPT | Performed by: NURSE PRACTITIONER

## 2021-11-15 PROCEDURE — 1159F PR MEDICATION LIST DOCUMENTED IN MEDICAL RECORD: ICD-10-PCS | Mod: CPTII,S$GLB,, | Performed by: NURSE PRACTITIONER

## 2021-11-15 PROCEDURE — 87086 URINE CULTURE/COLONY COUNT: CPT | Performed by: NURSE PRACTITIONER

## 2021-11-15 PROCEDURE — 1160F PR REVIEW ALL MEDS BY PRESCRIBER/CLIN PHARMACIST DOCUMENTED: ICD-10-PCS | Mod: CPTII,S$GLB,, | Performed by: NURSE PRACTITIONER

## 2021-11-15 PROCEDURE — 3078F PR MOST RECENT DIASTOLIC BLOOD PRESSURE < 80 MM HG: ICD-10-PCS | Mod: CPTII,S$GLB,, | Performed by: NURSE PRACTITIONER

## 2021-11-15 PROCEDURE — 1160F RVW MEDS BY RX/DR IN RCRD: CPT | Mod: CPTII,S$GLB,, | Performed by: NURSE PRACTITIONER

## 2021-11-15 PROCEDURE — 99999 PR PBB SHADOW E&M-EST. PATIENT-LVL V: CPT | Mod: PBBFAC,,, | Performed by: NURSE PRACTITIONER

## 2021-11-15 PROCEDURE — 3075F PR MOST RECENT SYSTOLIC BLOOD PRESS GE 130-139MM HG: ICD-10-PCS | Mod: CPTII,S$GLB,, | Performed by: NURSE PRACTITIONER

## 2021-11-15 PROCEDURE — 51798 POCT BLADDER SCAN: ICD-10-PCS | Mod: S$GLB,,, | Performed by: NURSE PRACTITIONER

## 2021-11-15 PROCEDURE — 99214 PR OFFICE/OUTPT VISIT, EST, LEVL IV, 30-39 MIN: ICD-10-PCS | Mod: S$GLB,,, | Performed by: NURSE PRACTITIONER

## 2021-11-15 PROCEDURE — 87088 URINE BACTERIA CULTURE: CPT | Performed by: NURSE PRACTITIONER

## 2021-11-15 PROCEDURE — 51798 US URINE CAPACITY MEASURE: CPT | Mod: S$GLB,,, | Performed by: NURSE PRACTITIONER

## 2021-11-15 PROCEDURE — 99214 OFFICE O/P EST MOD 30 MIN: CPT | Mod: S$GLB,,, | Performed by: NURSE PRACTITIONER

## 2021-11-15 PROCEDURE — 99999 PR PBB SHADOW E&M-EST. PATIENT-LVL V: ICD-10-PCS | Mod: PBBFAC,,, | Performed by: NURSE PRACTITIONER

## 2021-11-15 PROCEDURE — 87186 SC STD MICRODIL/AGAR DIL: CPT | Performed by: NURSE PRACTITIONER

## 2021-11-15 PROCEDURE — 3078F DIAST BP <80 MM HG: CPT | Mod: CPTII,S$GLB,, | Performed by: NURSE PRACTITIONER

## 2021-11-17 ENCOUNTER — PATIENT OUTREACH (OUTPATIENT)
Dept: EMERGENCY MEDICINE | Facility: HOSPITAL | Age: 81
End: 2021-11-17
Payer: MEDICARE

## 2021-11-18 LAB — BACTERIA UR CULT: ABNORMAL

## 2021-11-19 ENCOUNTER — TELEPHONE (OUTPATIENT)
Dept: UROLOGY | Facility: CLINIC | Age: 81
End: 2021-11-19
Payer: MEDICARE

## 2021-11-19 DIAGNOSIS — N30.01 ACUTE CYSTITIS WITH HEMATURIA: Primary | ICD-10-CM

## 2021-11-19 RX ORDER — SULFAMETHOXAZOLE AND TRIMETHOPRIM 800; 160 MG/1; MG/1
1 TABLET ORAL 2 TIMES DAILY
Qty: 20 TABLET | Refills: 0 | Status: SHIPPED | OUTPATIENT
Start: 2021-11-19 | End: 2021-11-29

## 2021-11-21 ENCOUNTER — TELEPHONE (OUTPATIENT)
Dept: FAMILY MEDICINE | Facility: CLINIC | Age: 81
End: 2021-11-21
Payer: MEDICARE

## 2021-11-22 ENCOUNTER — TELEPHONE (OUTPATIENT)
Dept: FAMILY MEDICINE | Facility: CLINIC | Age: 81
End: 2021-11-22
Payer: MEDICARE

## 2021-11-23 ENCOUNTER — TELEPHONE (OUTPATIENT)
Dept: UROLOGY | Facility: CLINIC | Age: 81
End: 2021-11-23
Payer: MEDICARE

## 2021-11-23 DIAGNOSIS — R35.1 NOCTURIA: ICD-10-CM

## 2021-11-23 DIAGNOSIS — R39.15 URGENCY OF URINATION: ICD-10-CM

## 2021-11-23 DIAGNOSIS — R35.0 URINARY FREQUENCY: Primary | ICD-10-CM

## 2021-11-23 RX ORDER — SOLIFENACIN SUCCINATE 5 MG/1
5 TABLET, FILM COATED ORAL DAILY
Qty: 30 TABLET | Refills: 11 | Status: SHIPPED | OUTPATIENT
Start: 2021-11-23 | End: 2021-12-20

## 2021-11-24 ENCOUNTER — TELEPHONE (OUTPATIENT)
Dept: UROLOGY | Facility: CLINIC | Age: 81
End: 2021-11-24
Payer: MEDICARE

## 2021-12-07 ENCOUNTER — TELEPHONE (OUTPATIENT)
Dept: FAMILY MEDICINE | Facility: CLINIC | Age: 81
End: 2021-12-07
Payer: MEDICARE

## 2021-12-07 DIAGNOSIS — I10 PRIMARY HYPERTENSION: ICD-10-CM

## 2021-12-07 RX ORDER — METOPROLOL SUCCINATE 50 MG/1
50 TABLET, EXTENDED RELEASE ORAL DAILY
Qty: 90 TABLET | Refills: 3 | Status: SHIPPED | OUTPATIENT
Start: 2021-12-07 | End: 2022-10-26

## 2021-12-19 ENCOUNTER — TELEPHONE (OUTPATIENT)
Dept: FAMILY MEDICINE | Facility: CLINIC | Age: 81
End: 2021-12-19
Payer: MEDICARE

## 2021-12-20 ENCOUNTER — TELEPHONE (OUTPATIENT)
Dept: UROLOGY | Facility: CLINIC | Age: 81
End: 2021-12-20
Payer: MEDICARE

## 2021-12-20 DIAGNOSIS — R39.15 URGENCY OF URINATION: ICD-10-CM

## 2021-12-20 DIAGNOSIS — R35.0 URINARY FREQUENCY: ICD-10-CM

## 2021-12-20 DIAGNOSIS — R35.1 NOCTURIA: ICD-10-CM

## 2021-12-20 RX ORDER — TROSPIUM CHLORIDE 20 MG/1
20 TABLET, FILM COATED ORAL 2 TIMES DAILY
Qty: 60 TABLET | Refills: 11 | Status: SHIPPED | OUTPATIENT
Start: 2021-12-20 | End: 2021-12-28 | Stop reason: ALTCHOICE

## 2021-12-28 ENCOUNTER — OFFICE VISIT (OUTPATIENT)
Dept: UROLOGY | Facility: CLINIC | Age: 81
End: 2021-12-28
Payer: MEDICARE

## 2021-12-28 ENCOUNTER — TELEPHONE (OUTPATIENT)
Dept: UROLOGY | Facility: CLINIC | Age: 81
End: 2021-12-28
Payer: MEDICARE

## 2021-12-28 DIAGNOSIS — R35.0 URINARY FREQUENCY: Primary | ICD-10-CM

## 2021-12-28 DIAGNOSIS — N18.31 STAGE 3A CHRONIC KIDNEY DISEASE: ICD-10-CM

## 2021-12-28 DIAGNOSIS — Q61.3 POLYCYSTIC KIDNEY DISEASE: ICD-10-CM

## 2021-12-28 DIAGNOSIS — Z87.440 HISTORY OF RECURRENT UTIS: ICD-10-CM

## 2021-12-28 DIAGNOSIS — N40.1 BPH WITH URINARY OBSTRUCTION: ICD-10-CM

## 2021-12-28 DIAGNOSIS — R35.1 NOCTURIA: ICD-10-CM

## 2021-12-28 DIAGNOSIS — N13.8 BPH WITH URINARY OBSTRUCTION: ICD-10-CM

## 2021-12-28 PROCEDURE — 1160F RVW MEDS BY RX/DR IN RCRD: CPT | Mod: CPTII,S$GLB,, | Performed by: NURSE PRACTITIONER

## 2021-12-28 PROCEDURE — 99999 PR PBB SHADOW E&M-EST. PATIENT-LVL III: CPT | Mod: PBBFAC,,, | Performed by: NURSE PRACTITIONER

## 2021-12-28 PROCEDURE — 1159F MED LIST DOCD IN RCRD: CPT | Mod: CPTII,S$GLB,, | Performed by: NURSE PRACTITIONER

## 2021-12-28 PROCEDURE — 99999 PR PBB SHADOW E&M-EST. PATIENT-LVL III: ICD-10-PCS | Mod: PBBFAC,,, | Performed by: NURSE PRACTITIONER

## 2021-12-28 PROCEDURE — 1159F PR MEDICATION LIST DOCUMENTED IN MEDICAL RECORD: ICD-10-PCS | Mod: CPTII,S$GLB,, | Performed by: NURSE PRACTITIONER

## 2021-12-28 PROCEDURE — 99214 OFFICE O/P EST MOD 30 MIN: CPT | Mod: S$GLB,,, | Performed by: NURSE PRACTITIONER

## 2021-12-28 PROCEDURE — 1160F PR REVIEW ALL MEDS BY PRESCRIBER/CLIN PHARMACIST DOCUMENTED: ICD-10-PCS | Mod: CPTII,S$GLB,, | Performed by: NURSE PRACTITIONER

## 2021-12-28 PROCEDURE — 99214 PR OFFICE/OUTPT VISIT, EST, LEVL IV, 30-39 MIN: ICD-10-PCS | Mod: S$GLB,,, | Performed by: NURSE PRACTITIONER

## 2021-12-28 RX ORDER — SOLIFENACIN SUCCINATE 5 MG/1
5 TABLET, FILM COATED ORAL DAILY
Qty: 90 TABLET | Refills: 3 | Status: SHIPPED | OUTPATIENT
Start: 2021-12-28 | End: 2022-05-19 | Stop reason: ALTCHOICE

## 2022-01-01 NOTE — LETTER
November 2, 2020      Marychuy Rothman, FARZANA  [address]  [facility] [address]           [facility]  [address]  [facility] [address]  Phone: [phone]          Patient: Pedro Ponce Jr.   MR Number: [id number]   YOB: 1940   Date of Visit: 11/2/2020       Dear Marychuy Rothman:    Thank you for referring Pedro Ponce to me for evaluation. Attached you will find relevant portions of my assessment and plan of care.    If you have questions, please do not hesitate to call me. I look forward to following Pedro Ponce along with you.    Sincerely,    Fercho Roy Jr., MD    Enclosure  CC:  No Recipients    If you would like to receive this communication electronically, please contact [email or web address] or [phone] to request more information on [employer] access.    For providers and/or their staff who would like to refer a patient to [facility], please contact us through our one-stop-shop provider referral line, [facility], at [phone].    If you feel you have received this communication in error or would no longer like to receive these types of communications, please e-mail [email or web address]          - - -

## 2022-01-06 ENCOUNTER — OFFICE VISIT (OUTPATIENT)
Dept: FAMILY MEDICINE | Facility: CLINIC | Age: 82
End: 2022-01-06
Payer: MEDICARE

## 2022-01-06 VITALS
BODY MASS INDEX: 26.7 KG/M2 | HEIGHT: 71 IN | HEART RATE: 58 BPM | OXYGEN SATURATION: 99 % | WEIGHT: 190.69 LBS | DIASTOLIC BLOOD PRESSURE: 70 MMHG | SYSTOLIC BLOOD PRESSURE: 148 MMHG

## 2022-01-06 DIAGNOSIS — F33.1 MODERATE EPISODE OF RECURRENT MAJOR DEPRESSIVE DISORDER: ICD-10-CM

## 2022-01-06 DIAGNOSIS — E11.65 TYPE 2 DIABETES MELLITUS WITH HYPERGLYCEMIA, WITHOUT LONG-TERM CURRENT USE OF INSULIN: ICD-10-CM

## 2022-01-06 DIAGNOSIS — E66.3 OVERWEIGHT (BMI 25.0-29.9): ICD-10-CM

## 2022-01-06 DIAGNOSIS — Z12.11 SCREENING FOR COLON CANCER: ICD-10-CM

## 2022-01-06 DIAGNOSIS — N18.30 STAGE 3 CHRONIC KIDNEY DISEASE, UNSPECIFIED WHETHER STAGE 3A OR 3B CKD: ICD-10-CM

## 2022-01-06 DIAGNOSIS — I10 ESSENTIAL HYPERTENSION: Primary | ICD-10-CM

## 2022-01-06 PROBLEM — E11.9 TYPE 2 DIABETES MELLITUS: Status: RESOLVED | Noted: 2021-08-27 | Resolved: 2022-01-06

## 2022-01-06 PROBLEM — I46.9 CARDIAC ARREST: Status: RESOLVED | Noted: 2020-12-24 | Resolved: 2022-01-06

## 2022-01-06 PROBLEM — G45.9 TIA (TRANSIENT ISCHEMIC ATTACK): Status: RESOLVED | Noted: 2020-12-07 | Resolved: 2022-01-06

## 2022-01-06 PROBLEM — I21.4 NSTEMI (NON-ST ELEVATED MYOCARDIAL INFARCTION): Status: RESOLVED | Noted: 2020-12-25 | Resolved: 2022-01-06

## 2022-01-06 PROCEDURE — 1159F PR MEDICATION LIST DOCUMENTED IN MEDICAL RECORD: ICD-10-PCS | Mod: CPTII,S$GLB,, | Performed by: NURSE PRACTITIONER

## 2022-01-06 PROCEDURE — 3077F SYST BP >= 140 MM HG: CPT | Mod: CPTII,S$GLB,, | Performed by: NURSE PRACTITIONER

## 2022-01-06 PROCEDURE — 3078F DIAST BP <80 MM HG: CPT | Mod: CPTII,S$GLB,, | Performed by: NURSE PRACTITIONER

## 2022-01-06 PROCEDURE — 1126F AMNT PAIN NOTED NONE PRSNT: CPT | Mod: CPTII,S$GLB,, | Performed by: NURSE PRACTITIONER

## 2022-01-06 PROCEDURE — 1101F PT FALLS ASSESS-DOCD LE1/YR: CPT | Mod: CPTII,S$GLB,, | Performed by: NURSE PRACTITIONER

## 2022-01-06 PROCEDURE — 99215 OFFICE O/P EST HI 40 MIN: CPT | Mod: S$GLB,,, | Performed by: NURSE PRACTITIONER

## 2022-01-06 PROCEDURE — 1159F MED LIST DOCD IN RCRD: CPT | Mod: CPTII,S$GLB,, | Performed by: NURSE PRACTITIONER

## 2022-01-06 PROCEDURE — 99999 PR PBB SHADOW E&M-EST. PATIENT-LVL V: CPT | Mod: PBBFAC,,, | Performed by: NURSE PRACTITIONER

## 2022-01-06 PROCEDURE — 1160F RVW MEDS BY RX/DR IN RCRD: CPT | Mod: CPTII,S$GLB,, | Performed by: NURSE PRACTITIONER

## 2022-01-06 PROCEDURE — 1101F PR PT FALLS ASSESS DOC 0-1 FALLS W/OUT INJ PAST YR: ICD-10-PCS | Mod: CPTII,S$GLB,, | Performed by: NURSE PRACTITIONER

## 2022-01-06 PROCEDURE — 1160F PR REVIEW ALL MEDS BY PRESCRIBER/CLIN PHARMACIST DOCUMENTED: ICD-10-PCS | Mod: CPTII,S$GLB,, | Performed by: NURSE PRACTITIONER

## 2022-01-06 PROCEDURE — 3288F PR FALLS RISK ASSESSMENT DOCUMENTED: ICD-10-PCS | Mod: CPTII,S$GLB,, | Performed by: NURSE PRACTITIONER

## 2022-01-06 PROCEDURE — 99999 PR PBB SHADOW E&M-EST. PATIENT-LVL V: ICD-10-PCS | Mod: PBBFAC,,, | Performed by: NURSE PRACTITIONER

## 2022-01-06 PROCEDURE — 1126F PR PAIN SEVERITY QUANTIFIED, NO PAIN PRESENT: ICD-10-PCS | Mod: CPTII,S$GLB,, | Performed by: NURSE PRACTITIONER

## 2022-01-06 PROCEDURE — 99215 PR OFFICE/OUTPT VISIT, EST, LEVL V, 40-54 MIN: ICD-10-PCS | Mod: S$GLB,,, | Performed by: NURSE PRACTITIONER

## 2022-01-06 PROCEDURE — 3078F PR MOST RECENT DIASTOLIC BLOOD PRESSURE < 80 MM HG: ICD-10-PCS | Mod: CPTII,S$GLB,, | Performed by: NURSE PRACTITIONER

## 2022-01-06 PROCEDURE — 3077F PR MOST RECENT SYSTOLIC BLOOD PRESSURE >= 140 MM HG: ICD-10-PCS | Mod: CPTII,S$GLB,, | Performed by: NURSE PRACTITIONER

## 2022-01-06 PROCEDURE — 3288F FALL RISK ASSESSMENT DOCD: CPT | Mod: CPTII,S$GLB,, | Performed by: NURSE PRACTITIONER

## 2022-01-06 RX ORDER — ESCITALOPRAM OXALATE 10 MG/1
10 TABLET ORAL DAILY
Qty: 90 TABLET | Refills: 3 | Status: SHIPPED | OUTPATIENT
Start: 2022-01-06 | End: 2022-10-25 | Stop reason: SDUPTHER

## 2022-01-06 NOTE — PROGRESS NOTES
Subjective:       Patient ID: Pedro Ponce Jr. is a 81 y.o. male.    Chief Complaint: Hypertension and Follow-up    This is a pleasant 82 yo male patient of Dr. Barker who is known to me. He presents today accompanied by his wife Adam for a HTN follow-up. 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     Type 2 diabetes mellitus with renal manifestations     Vitreous floaters of both eyes     Primary open angle glaucoma of both eyes, moderate stage     Bradycardia, drug induced     Status post cataract extraction and insertion of intraocular lens     Hypertensive retinopathy, bilateral     Nonspecific abnormal electrocardiogram (ECG) (EKG)     Left inguinal hernia     Cerebrovascular disease     Impaired mobility     Generalized weakness     Aortic atherosclerosis     Syncope and collapse     Adjustment disorder with mixed anxiety and depressed mood     Chest pain     Status post insertion of drug-eluting stent into left anterior descending (LAD) artery     EKG abnormalities     Hypertensive urgency     CAD (coronary artery disease)     Stage 3 chronic kidney disease     Intractable hiccups     Polycystic kidney disease    BP slightly elevated today but stable. Denies chest pain, SOB, dyspnea, palpitations, visual changes, HA, dizziness, or N/V/D. Compliant with current medication regimen. Currently taking irbesartan 300mg nightly, metoprolol 50mg daily and nifedipine 60mg BID. Exercising several days per week by walking. Limiting sodium. Drinks plenty water. Does not drink caffeine. Eats plenty fruits and vegetables. Followed by Cardiology.    Review of Systems   Constitutional: Positive for fatigue (intermittent). Negative for appetite  change, chills and fever.   HENT: Negative for sore throat and trouble swallowing.    Eyes: Negative for visual disturbance.   Respiratory: Negative for cough, chest tightness and shortness of breath.    Cardiovascular: Negative for chest pain, palpitations and leg swelling.   Gastrointestinal: Positive for constipation. Negative for abdominal pain, diarrhea, nausea and vomiting.   Genitourinary: Negative for difficulty urinating.   Musculoskeletal: Negative for gait problem.   Neurological: Negative for dizziness, numbness, headaches, disturbances in coordination and coordination difficulties.   Psychiatric/Behavioral: The patient is not nervous/anxious.          Objective:      Physical Exam  Vitals reviewed.   Constitutional:       General: He is awake. He is not in acute distress.     Appearance: Normal appearance. He is well-developed, well-groomed and overweight.   HENT:      Head: Normocephalic.      Right Ear: External ear normal.      Left Ear: External ear normal.   Eyes:      General:         Right eye: No discharge.         Left eye: No discharge.   Neck:      Vascular: No carotid bruit.   Cardiovascular:      Rate and Rhythm: Regular rhythm. Bradycardia present.      Pulses:           Carotid pulses are 2+ on the right side and 2+ on the left side.       Radial pulses are 2+ on the right side and 2+ on the left side.        Dorsalis pedis pulses are 2+ on the right side and 2+ on the left side.      Heart sounds: Murmur heard.    Systolic murmur is present with a grade of 2/6.      Pulmonary:      Effort: Pulmonary effort is normal. No respiratory distress.      Breath sounds: No wheezing or rhonchi.   Abdominal:      General: Bowel sounds are normal. There is no distension.      Palpations: Abdomen is soft.      Tenderness: There is no abdominal tenderness. There is no guarding.   Musculoskeletal:      Right lower leg: No edema.      Left lower leg: No edema.   Lymphadenopathy:      Cervical: No  cervical adenopathy.   Skin:     General: Skin is warm and dry.      Coloration: Skin is not pale.   Neurological:      Mental Status: He is alert and oriented to person, place, and time.      Coordination: Coordination normal.      Gait: Gait normal.   Psychiatric:         Attention and Perception: Attention normal.         Mood and Affect: Mood and affect normal.         Speech: Speech normal.         Behavior: Behavior normal. Behavior is cooperative.         Thought Content: Thought content normal.         Assessment:       Problem List Items Addressed This Visit        Renal/    Stage 3 chronic kidney disease      Other Visit Diagnoses     Essential hypertension    -  Primary    Relevant Orders    Ambulatory referral/consult to Cardiology    CBC Auto Differential    Comprehensive Metabolic Panel    Lipid Panel    TSH    Urinalysis    Microalbumin/Creatinine Ratio, Urine    Type 2 diabetes mellitus with hyperglycemia, without long-term current use of insulin        Relevant Orders    CBC Auto Differential    Comprehensive Metabolic Panel    Urinalysis    Microalbumin/Creatinine Ratio, Urine    Hemoglobin A1C    Ambulatory referral/consult to Podiatry    Moderate episode of recurrent major depressive disorder        Relevant Medications    EScitalopram oxalate (LEXAPRO) 10 MG tablet    Screening for colon cancer        Relevant Orders    Case Request Endoscopy: COLONOSCOPY (Completed)    Overweight (BMI 25.0-29.9)              Plan:       Pedro was seen today for hypertension and follow-up.    Diagnoses and all orders for this visit:    Essential hypertension  -     Ambulatory referral/consult to Cardiology; Future  -     CBC Auto Differential; Future  -     Comprehensive Metabolic Panel; Future  -     Lipid Panel; Future  -     TSH; Future  -     Urinalysis; Future  -     Microalbumin/Creatinine Ratio, Urine; Future    Type 2 diabetes mellitus with hyperglycemia, without long-term current use of insulin  -      CBC Auto Differential; Future  -     Comprehensive Metabolic Panel; Future  -     Urinalysis; Future  -     Microalbumin/Creatinine Ratio, Urine; Future  -     Hemoglobin A1C; Future  -     Ambulatory referral/consult to Podiatry; Future    Stage 3 chronic kidney disease, unspecified whether stage 3a or 3b CKD    Moderate episode of recurrent major depressive disorder  -     EScitalopram oxalate (LEXAPRO) 10 MG tablet; Take 1 tablet (10 mg total) by mouth once daily.    Screening for colon cancer  -     Case Request Endoscopy: COLONOSCOPY    Overweight (BMI 25.0-29.9)          - Labs ordered to be completed prior to next appointment  - Encouraged patient to continue to eat a low salt/low fat ADA diet and discussed importance of engaging in physical activity at least 5x/week for a minimum of 30 min/day  - Continue with current treatment plan  - Follow up with Cardiology and Podiatry  - RTC next month for a follow-up with PCP, or sooner if needed        I spent a total of 40 minutes on the day of the visit.  This includes face to face time and non-face to face time preparing to see the patient (eg, review of tests), obtaining and/or reviewing separately obtained history, documenting clinical information in the electronic or other health record, independently interpreting results and communicating results to the patient/family/caregiver, or care coordinator.

## 2022-01-24 ENCOUNTER — OFFICE VISIT (OUTPATIENT)
Dept: PODIATRY | Facility: CLINIC | Age: 82
End: 2022-01-24
Payer: MEDICARE

## 2022-01-24 VITALS — HEIGHT: 71 IN | WEIGHT: 194.19 LBS | BODY MASS INDEX: 27.19 KG/M2

## 2022-01-24 DIAGNOSIS — E11.65 TYPE 2 DIABETES MELLITUS WITH HYPERGLYCEMIA, WITHOUT LONG-TERM CURRENT USE OF INSULIN: ICD-10-CM

## 2022-01-24 PROCEDURE — 1159F MED LIST DOCD IN RCRD: CPT | Mod: CPTII,S$GLB,, | Performed by: PODIATRIST

## 2022-01-24 PROCEDURE — 1160F PR REVIEW ALL MEDS BY PRESCRIBER/CLIN PHARMACIST DOCUMENTED: ICD-10-PCS | Mod: CPTII,S$GLB,, | Performed by: PODIATRIST

## 2022-01-24 PROCEDURE — 1126F PR PAIN SEVERITY QUANTIFIED, NO PAIN PRESENT: ICD-10-PCS | Mod: CPTII,S$GLB,, | Performed by: PODIATRIST

## 2022-01-24 PROCEDURE — 1160F RVW MEDS BY RX/DR IN RCRD: CPT | Mod: CPTII,S$GLB,, | Performed by: PODIATRIST

## 2022-01-24 PROCEDURE — 99213 PR OFFICE/OUTPT VISIT, EST, LEVL III, 20-29 MIN: ICD-10-PCS | Mod: S$GLB,,, | Performed by: PODIATRIST

## 2022-01-24 PROCEDURE — 3288F FALL RISK ASSESSMENT DOCD: CPT | Mod: CPTII,S$GLB,, | Performed by: PODIATRIST

## 2022-01-24 PROCEDURE — 99213 OFFICE O/P EST LOW 20 MIN: CPT | Mod: S$GLB,,, | Performed by: PODIATRIST

## 2022-01-24 PROCEDURE — 3288F PR FALLS RISK ASSESSMENT DOCUMENTED: ICD-10-PCS | Mod: CPTII,S$GLB,, | Performed by: PODIATRIST

## 2022-01-24 PROCEDURE — 99999 PR PBB SHADOW E&M-EST. PATIENT-LVL IV: ICD-10-PCS | Mod: PBBFAC,,, | Performed by: PODIATRIST

## 2022-01-24 PROCEDURE — 1101F PR PT FALLS ASSESS DOC 0-1 FALLS W/OUT INJ PAST YR: ICD-10-PCS | Mod: CPTII,S$GLB,, | Performed by: PODIATRIST

## 2022-01-24 PROCEDURE — 1126F AMNT PAIN NOTED NONE PRSNT: CPT | Mod: CPTII,S$GLB,, | Performed by: PODIATRIST

## 2022-01-24 PROCEDURE — 99999 PR PBB SHADOW E&M-EST. PATIENT-LVL IV: CPT | Mod: PBBFAC,,, | Performed by: PODIATRIST

## 2022-01-24 PROCEDURE — 1159F PR MEDICATION LIST DOCUMENTED IN MEDICAL RECORD: ICD-10-PCS | Mod: CPTII,S$GLB,, | Performed by: PODIATRIST

## 2022-01-24 PROCEDURE — 1101F PT FALLS ASSESS-DOCD LE1/YR: CPT | Mod: CPTII,S$GLB,, | Performed by: PODIATRIST

## 2022-01-24 NOTE — PROGRESS NOTES
Subjective:      Patient ID: Pedro Ponce Jr. is a 81 y.o. male.    Chief Complaint: Diabetic Foot Exam      Pedro is a 81 y.o. male . 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 pt denies pain. With his wife. In regular tennis shoe. He is no longer taking any medication for DM.     PCP: John Ricci MD    Date Last Seen by PCP: in epic       Hemoglobin A1C   Date Value Ref Range Status   08/27/2021 5.2 4.0 - 5.6 % Final     Comment:     ADA Screening Guidelines:  5.7-6.4%  Consistent with prediabetes  >or=6.5%  Consistent with diabetes    High levels of fetal hemoglobin interfere with the HbA1C  assay. Heterozygous hemoglobin variants (HbS, HgC, etc)do  not significantly interfere with this assay.   However, presence of multiple variants may affect accuracy.     02/02/2021 4.9 4.0 - 5.6 % Final     Comment:     ADA Screening Guidelines:  5.7-6.4%  Consistent with prediabetes  >or=6.5%  Consistent with diabetes    High levels of fetal hemoglobin interfere with the HbA1C  assay. Heterozygous hemoglobin variants (HbS, HgC, etc)do  not significantly interfere with this assay.   However, presence of multiple variants may affect accuracy.     12/06/2020 5.9 (H) 4.0 - 5.6 % Final     Comment:     ADA Screening Guidelines:  5.7-6.4%  Consistent with prediabetes  >or=6.5%  Consistent with diabetes  High levels of fetal hemoglobin interfere with the HbA1C  assay. Heterozygous hemoglobin variants (HbS, HgC, etc)do  not significantly interfere with this assay.   However, presence of multiple variants may affect accuracy.         Review of Systems   Constitutional: Negative for chills, decreased appetite, fever and malaise/fatigue.   HENT: Negative  for congestion, ear discharge and sore throat.    Eyes: Negative for discharge and pain.   Cardiovascular: Negative for chest pain, claudication and leg swelling.   Respiratory: Negative for cough and shortness of breath.    Skin: Negative for color change, nail changes and rash.   Musculoskeletal: Positive for stiffness. Negative for arthritis, joint pain, joint swelling and muscle weakness.   Gastrointestinal: Negative for bloating, abdominal pain, diarrhea, nausea and vomiting.   Genitourinary: Negative for flank pain and hematuria.   Neurological: Negative for headaches, numbness and weakness.   Psychiatric/Behavioral: Negative for altered mental status.             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       Past Surgical History:   Procedure Laterality Date    COLONOSCOPY N/A 4/9/2018    Procedure: COLONOSCOPY;  Surgeon: HAIM Mir MD;  Location: [address]);  Service: Endoscopy;  Laterality: N/A;    COLONOSCOPY N/A 1/14/2019    Procedure: COLONOSCOPY;  Surgeon: HAIM Mir MD;  Location: [address]);  Service: Endoscopy;  Laterality: N/A;  PM prep    CYST REMOVAL  2000    removed from left kidney    KIDNEY SURGERY      LEFT HEART CATHETERIZATION Left 12/25/2020    Procedure: Left heart cath;  Surgeon: Alan Rothman MD;  Location: [facility] CATH LAB;  Service: Cardiology;  Laterality: Left;    PROSTATE SURGERY         Family History   Problem Relation Age of Onset    Cancer Mother         unkn ca    Diabetes Mother     Glaucoma Mother     Heart disease Mother     Hypertension Mother     Blindness Neg Hx     Macular degeneration Neg Hx     Retinal  detachment Neg Hx        Social History     Socioeconomic History    Marital status:    Tobacco Use    Smoking status: Never Smoker    Smokeless tobacco: Never Used   Substance and Sexual Activity    Alcohol use: No    Drug use: No    Sexual activity: Not Currently     Partners: Female     Social Determinants of Health     Financial Resource Strain: Low Risk     Difficulty of Paying Living Expenses: Not very hard   Food Insecurity: No Food Insecurity    Worried About Running Out of Food in the Last Year: Never true    Ran Out of Food in the Last Year: Never true   Transportation Needs: No Transportation Needs    Lack of Transportation (Medical): No    Lack of Transportation (Non-Medical): No   Physical Activity: Sufficiently Active    Days of Exercise per Week: 5 days    Minutes of Exercise per Session: 30 min   Stress: No Stress Concern Present    Feeling of Stress : Only a little   Social Connections: Socially Integrated    Frequency of Communication with Friends and Family: More than three times a week    Frequency of Social Gatherings with Friends and Family: More than three times a week    Attends Rastafari Services: More than 4 times per year    Active Member of Clubs or Organizations: Yes    Attends Club or Organization Meetings: 1 to 4 times per year    Marital Status:    Housing Stability: Unknown    Unable to Pay for Housing in the Last Year: No    Unstable Housing in the Last Year: No       Current Outpatient Medications   Medication Sig Dispense Refill    ACCU-CHEK ALYCIA PLUS TEST STRP Strp 1 EACH BY [facility].(NON-DRUG COMBO ROUTE) ROUTE DAILY AS NEEDED. 50 strip 3    atorvastatin (LIPITOR) 40 MG tablet TAKE 1 TABLET BY MOUTH EVERY DAY IN THE EVENING 90 tablet 0    clopidogreL (PLAVIX) 75 mg tablet Take 1 tablet (75 mg total) by mouth once daily. 90 tablet 3    dutasteride (AVODART) 0.5 mg capsule TAKE 1 CAPSULE (0.5 MG TOTAL) BY MOUTH ONCE DAILY. 90 capsule 3     "EScitalopram oxalate (LEXAPRO) 10 MG tablet Take 1 tablet (10 mg total) by mouth once daily. 90 tablet 3    irbesartan (AVAPRO) 300 MG tablet Take 1 tablet (300 mg total) by mouth once daily. (Patient taking differently: Take 300 mg by mouth every evening.) 90 tablet 3    lancets Misc 1 each by Misc.(Non-Drug; Combo Route) route daily as needed. 30 each 3    metoprolol succinate (TOPROL-XL) 50 MG 24 hr tablet Take 1 tablet (50 mg total) by mouth once daily. 90 tablet 3    multivitamin capsule Take 1 capsule by mouth once daily. Centrum Silver      NIFEdipine (PROCARDIA-XL) 60 MG (OSM) 24 hr tablet Take 1 tablet (60 mg total) by mouth 2 (two) times daily. 180 tablet 4    senna-docusate 8.6-50 mg (SENNA WITH DOCUSATE SODIUM) 8.6-50 mg per tablet Take 1 tablet by mouth once daily. 7 tablet 0    solifenacin (VESICARE) 5 MG tablet Take 1 tablet (5 mg total) by mouth once daily. 90 tablet 3    tamsulosin (FLOMAX) 0.4 mg Cap TAKE 1 CAPSULE BY MOUTH EVERY EVENING 90 capsule 3    aspirin (ECOTRIN) 81 MG EC tablet Take 1 tablet (81 mg total) by mouth once daily. 90 tablet 3    blood-glucose meter kit Use as instructed (Patient not taking: Reported on 11/15/2021) 1 each 0    nitroGLYCERIN (NITROSTAT) 0.4 MG SL tablet Place 1 tablet (0.4 mg total) under the tongue every 5 (five) minutes as needed for Chest pain. (Patient not taking: Reported on 11/15/2021) 30 tablet 3     No current facility-administered medications for this visit.       Review of patient's allergies indicates:   Allergen Reactions    Pcn [penicillins] Rash       Vitals:    01/24/22 1428   Weight: 88.1 kg (194 lb 3.2 oz)   Height: 5' 11" (1.803 m)   PainSc: 0-No pain       Objective:      Physical Exam  Constitutional:       General: He is not in acute distress.     Appearance: He is well-developed and well-nourished.   HENT:      Nose: Nose normal.   Eyes:      Conjunctiva/sclera: Conjunctivae normal.   Pulmonary:      Effort: Pulmonary effort is " normal.   Chest:      Chest wall: No tenderness.   Abdominal:      Tenderness: There is no abdominal tenderness.   Musculoskeletal:      Cervical back: Normal range of motion.   Neurological:      Mental Status: He is alert and oriented to person, place, and time.   Psychiatric:         Mood and Affect: Mood and affect normal.         Behavior: Behavior normal.         Vascular: Distal DP/PT pulses palpable 2/4. CRT < 3 sec to tips of toes. No vericosities noted to LEs. Hair growth present LE, warm to touch LE, No edema noted to LE.    Dermatologic: No open lesions, lacerations or wounds. Interdigital spaces clean, dry and intact. No erythema, rubor, calor noted LE    Musculoskeletal: MMT 5/5 in DF/PF/Inv/Ev resistance with no reproduction of pain in any direction. Passive range of motion of ankle and pedal joints is painless. No calf tenderness LE, Compartments soft/compressible.     Neurological: Light touch, proprioception, and sharp/dull sensation are all intact. Protective threshold with the [place] monofilament is intact. Vibratory sensation intact.         Assessment:       Encounter Diagnosis   Name Primary?    Type 2 diabetes mellitus with hyperglycemia, without long-term current use of insulin          Plan:       Pedro was seen today for diabetic foot exam.    Diagnoses and all orders for this visit:    Type 2 diabetes mellitus with hyperglycemia, without long-term current use of insulin  -     Ambulatory referral/consult to Podiatry      I counseled the patient on his conditions, their implications and medical management.    81 y.o. male with Diabetic foot care.    -denies pedal complaints. Not presenting with elongated, thick, painful toenail. He is no longer DM. He does not qualify for routine diabetic foot care. We discussed procB as an alternative. verbalized understanding.     -It was discussed the importance of wearing shoes with adequate room in toe box to accommodate toe deformities.  Recommended New Balance/[employer]ics shoe brands with adequate arch supports to alleviate abnormal pressure and improve stability of foot while walking. Avoid flat shoes and barefoot walking as these will exacerbate or worsen symptoms.   -Shoe inspection. General Foot Education. Patient reminded of the importance of good nutrition. Patient instructed on proper foot hygeine. We discussed wearing proper shoe gear, daily foot inspections, never walking without protective shoe gear, caution putting sharp instruments to feet. Discussed general foot care:  Wear comfortable, proper fitting shoes. Wash feet daily. Dry well. After drying, apply moisturizer to feet (no lotion to webspaces). Inspect feet daily for skin breaks, blisters, swelling, or redness. Wear cotton socks (preferably white)  Change socks every day. Do NOT walk barefoot. Do NOT use heating pads or warm/hot water soaks   -The nature of the condition, options for management, as well as potential risks and complications were discussed in detail with patient. Patient was amenable to my recommendations and left my office fully informed and will follow up as instructed or sooner if necessary.    -Patient was advised of signs and symptoms of infection including redness, drainage, purulence, odor, streaking, fever, chills and I advised patient to seek medical attention (ER or urgent care) if these symptoms arise.   -f/u prn     Note dictated with voice recognition software, please excuse any grammatical errors.

## 2022-01-31 ENCOUNTER — TELEPHONE (OUTPATIENT)
Dept: FAMILY MEDICINE | Facility: CLINIC | Age: 82
End: 2022-01-31
Payer: MEDICARE

## 2022-01-31 NOTE — TELEPHONE ENCOUNTER
Spoke to patient and advised him Protein leaking into urine, please be sure to take medications daily and on time   UTI noted on urinalysis   Please contact patient and have him return to provide another urine sample to send for culture   Bactrim sent to [place] [facility]. Should take twice daily for 7 days; take AFTER providing new urine sample   Drink plenty water and limit caffeine intake. Patient voiced understanding

## 2022-02-01 ENCOUNTER — OFFICE VISIT (OUTPATIENT)
Dept: CARDIOLOGY | Facility: CLINIC | Age: 82
End: 2022-02-01
Payer: MEDICARE

## 2022-02-01 ENCOUNTER — TELEPHONE (OUTPATIENT)
Dept: FAMILY MEDICINE | Facility: CLINIC | Age: 82
End: 2022-02-01
Payer: MEDICARE

## 2022-02-01 ENCOUNTER — OFFICE VISIT (OUTPATIENT)
Dept: OPHTHALMOLOGY | Facility: CLINIC | Age: 82
End: 2022-02-01
Payer: MEDICARE

## 2022-02-01 VITALS
DIASTOLIC BLOOD PRESSURE: 78 MMHG | HEIGHT: 71 IN | HEART RATE: 54 BPM | SYSTOLIC BLOOD PRESSURE: 138 MMHG | WEIGHT: 194.19 LBS | OXYGEN SATURATION: 98 % | BODY MASS INDEX: 27.19 KG/M2

## 2022-02-01 DIAGNOSIS — I70.0 AORTIC ATHEROSCLEROSIS: ICD-10-CM

## 2022-02-01 DIAGNOSIS — I10 ESSENTIAL HYPERTENSION: ICD-10-CM

## 2022-02-01 DIAGNOSIS — N18.30 STAGE 3 CHRONIC KIDNEY DISEASE, UNSPECIFIED WHETHER STAGE 3A OR 3B CKD: ICD-10-CM

## 2022-02-01 DIAGNOSIS — I10 PRIMARY HYPERTENSION: ICD-10-CM

## 2022-02-01 DIAGNOSIS — H35.3211 EXUDATIVE AGE-RELATED MACULAR DEGENERATION OF RIGHT EYE WITH ACTIVE CHOROIDAL NEOVASCULARIZATION: Primary | ICD-10-CM

## 2022-02-01 DIAGNOSIS — E78.00 PURE HYPERCHOLESTEROLEMIA: ICD-10-CM

## 2022-02-01 DIAGNOSIS — Z95.5 STATUS POST INSERTION OF DRUG-ELUTING STENT INTO LEFT ANTERIOR DESCENDING (LAD) ARTERY: ICD-10-CM

## 2022-02-01 DIAGNOSIS — T50.905A BRADYCARDIA, DRUG INDUCED: ICD-10-CM

## 2022-02-01 DIAGNOSIS — H35.033 HYPERTENSIVE RETINOPATHY, BILATERAL: ICD-10-CM

## 2022-02-01 DIAGNOSIS — H43.393 VITREOUS FLOATERS OF BOTH EYES: ICD-10-CM

## 2022-02-01 DIAGNOSIS — R00.1 BRADYCARDIA, DRUG INDUCED: ICD-10-CM

## 2022-02-01 PROCEDURE — 1126F PR PAIN SEVERITY QUANTIFIED, NO PAIN PRESENT: ICD-10-PCS | Mod: CPTII,S$GLB,, | Performed by: OPHTHALMOLOGY

## 2022-02-01 PROCEDURE — 99999 PR PBB SHADOW E&M-EST. PATIENT-LVL III: ICD-10-PCS | Mod: PBBFAC,,, | Performed by: OPHTHALMOLOGY

## 2022-02-01 PROCEDURE — 3288F FALL RISK ASSESSMENT DOCD: CPT | Mod: CPTII,S$GLB,, | Performed by: OPHTHALMOLOGY

## 2022-02-01 PROCEDURE — 93000 ELECTROCARDIOGRAM COMPLETE: CPT | Mod: S$GLB,,, | Performed by: INTERNAL MEDICINE

## 2022-02-01 PROCEDURE — 92134 POSTERIOR SEGMENT OCT RETINA (OCULAR COHERENCE TOMOGRAPHY)-BOTH EYES: ICD-10-PCS | Mod: S$GLB,,, | Performed by: OPHTHALMOLOGY

## 2022-02-01 PROCEDURE — 99999 PR PBB SHADOW E&M-EST. PATIENT-LVL IV: CPT | Mod: PBBFAC,,, | Performed by: INTERNAL MEDICINE

## 2022-02-01 PROCEDURE — 1160F RVW MEDS BY RX/DR IN RCRD: CPT | Mod: CPTII,S$GLB,, | Performed by: INTERNAL MEDICINE

## 2022-02-01 PROCEDURE — 1160F RVW MEDS BY RX/DR IN RCRD: CPT | Mod: CPTII,S$GLB,, | Performed by: OPHTHALMOLOGY

## 2022-02-01 PROCEDURE — 1160F PR REVIEW ALL MEDS BY PRESCRIBER/CLIN PHARMACIST DOCUMENTED: ICD-10-PCS | Mod: CPTII,S$GLB,, | Performed by: INTERNAL MEDICINE

## 2022-02-01 PROCEDURE — 1159F MED LIST DOCD IN RCRD: CPT | Mod: CPTII,S$GLB,, | Performed by: INTERNAL MEDICINE

## 2022-02-01 PROCEDURE — 3078F DIAST BP <80 MM HG: CPT | Mod: CPTII,S$GLB,, | Performed by: INTERNAL MEDICINE

## 2022-02-01 PROCEDURE — 1159F PR MEDICATION LIST DOCUMENTED IN MEDICAL RECORD: ICD-10-PCS | Mod: CPTII,S$GLB,, | Performed by: OPHTHALMOLOGY

## 2022-02-01 PROCEDURE — 99999 PR PBB SHADOW E&M-EST. PATIENT-LVL IV: ICD-10-PCS | Mod: PBBFAC,,, | Performed by: INTERNAL MEDICINE

## 2022-02-01 PROCEDURE — 99214 PR OFFICE/OUTPT VISIT, EST, LEVL IV, 30-39 MIN: ICD-10-PCS | Mod: S$GLB,,, | Performed by: INTERNAL MEDICINE

## 2022-02-01 PROCEDURE — 1101F PT FALLS ASSESS-DOCD LE1/YR: CPT | Mod: CPTII,S$GLB,, | Performed by: OPHTHALMOLOGY

## 2022-02-01 PROCEDURE — 92134 CPTRZ OPH DX IMG PST SGM RTA: CPT | Mod: S$GLB,,, | Performed by: OPHTHALMOLOGY

## 2022-02-01 PROCEDURE — 3075F SYST BP GE 130 - 139MM HG: CPT | Mod: CPTII,S$GLB,, | Performed by: INTERNAL MEDICINE

## 2022-02-01 PROCEDURE — 1159F MED LIST DOCD IN RCRD: CPT | Mod: CPTII,S$GLB,, | Performed by: OPHTHALMOLOGY

## 2022-02-01 PROCEDURE — 1160F PR REVIEW ALL MEDS BY PRESCRIBER/CLIN PHARMACIST DOCUMENTED: ICD-10-PCS | Mod: CPTII,S$GLB,, | Performed by: OPHTHALMOLOGY

## 2022-02-01 PROCEDURE — 1126F PR PAIN SEVERITY QUANTIFIED, NO PAIN PRESENT: ICD-10-PCS | Mod: CPTII,S$GLB,, | Performed by: INTERNAL MEDICINE

## 2022-02-01 PROCEDURE — 3078F PR MOST RECENT DIASTOLIC BLOOD PRESSURE < 80 MM HG: ICD-10-PCS | Mod: CPTII,S$GLB,, | Performed by: INTERNAL MEDICINE

## 2022-02-01 PROCEDURE — 1126F AMNT PAIN NOTED NONE PRSNT: CPT | Mod: CPTII,S$GLB,, | Performed by: OPHTHALMOLOGY

## 2022-02-01 PROCEDURE — 1159F PR MEDICATION LIST DOCUMENTED IN MEDICAL RECORD: ICD-10-PCS | Mod: CPTII,S$GLB,, | Performed by: INTERNAL MEDICINE

## 2022-02-01 PROCEDURE — 99999 PR PBB SHADOW E&M-EST. PATIENT-LVL III: CPT | Mod: PBBFAC,,, | Performed by: OPHTHALMOLOGY

## 2022-02-01 PROCEDURE — 92014 PR EYE EXAM, EST PATIENT,COMPREHESV: ICD-10-PCS | Mod: S$GLB,,, | Performed by: OPHTHALMOLOGY

## 2022-02-01 PROCEDURE — 92201 PR OPHTHALMOSCOPY, EXT, W/RET DRAW/SCLERAL DEPR, I&R, UNI/BI: ICD-10-PCS | Mod: S$GLB,,, | Performed by: OPHTHALMOLOGY

## 2022-02-01 PROCEDURE — 3288F PR FALLS RISK ASSESSMENT DOCUMENTED: ICD-10-PCS | Mod: CPTII,S$GLB,, | Performed by: OPHTHALMOLOGY

## 2022-02-01 PROCEDURE — 99214 OFFICE O/P EST MOD 30 MIN: CPT | Mod: S$GLB,,, | Performed by: INTERNAL MEDICINE

## 2022-02-01 PROCEDURE — 1101F PR PT FALLS ASSESS DOC 0-1 FALLS W/OUT INJ PAST YR: ICD-10-PCS | Mod: CPTII,S$GLB,, | Performed by: OPHTHALMOLOGY

## 2022-02-01 PROCEDURE — 3075F PR MOST RECENT SYSTOLIC BLOOD PRESS GE 130-139MM HG: ICD-10-PCS | Mod: CPTII,S$GLB,, | Performed by: INTERNAL MEDICINE

## 2022-02-01 PROCEDURE — 93000 EKG 12-LEAD: ICD-10-PCS | Mod: S$GLB,,, | Performed by: INTERNAL MEDICINE

## 2022-02-01 PROCEDURE — 92014 COMPRE OPH EXAM EST PT 1/>: CPT | Mod: S$GLB,,, | Performed by: OPHTHALMOLOGY

## 2022-02-01 PROCEDURE — 1126F AMNT PAIN NOTED NONE PRSNT: CPT | Mod: CPTII,S$GLB,, | Performed by: INTERNAL MEDICINE

## 2022-02-01 PROCEDURE — 92201 OPSCPY EXTND RTA DRAW UNI/BI: CPT | Mod: S$GLB,,, | Performed by: OPHTHALMOLOGY

## 2022-02-01 RX ORDER — IRBESARTAN AND HYDROCHLOROTHIAZIDE 300; 12.5 MG/1; MG/1
1 TABLET, FILM COATED ORAL DAILY
Qty: 90 TABLET | Refills: 3 | Status: SHIPPED | OUTPATIENT
Start: 2022-02-01 | End: 2022-05-19 | Stop reason: ALTCHOICE

## 2022-02-01 NOTE — PROGRESS NOTES
HPI     DLS 08/10/2021    VA stable ou. No pain. No f/f.      Eye meds:  AT's PRN OU      OCT -OD decrease in paracentral IR fluid  New polypoidal lesion SN to fovea  OS - stable small flat inferior PED, no SRF      Prior ICG/FA - leakage IT to fovea OD      A/P    1. IPCV OU vs wet amd   With subfoveal PED OD  Avastin resumed 1/14  S/p Avastin OD #8 in past, resumed OD x 6   S/p PDT OD 9/18  OS - PED inferior to fovea stable    2/18 - recurrent swelling OD, IT to fovea  5/18 - increased CME and superior SRF  4/19 - slight increase in IRF, ASx - will follow  8/21 - increase in IR fluid temporally OD - ASx  2/22  OD IRF improved, new lesion SN to fovea    Continue obs today     2. Floaters OU    3. POAG - Good IOP, continue management as per Dr. Hidalgo  - Needs f/u appt    4. PCIOL OU      4 months OCT and Dilate

## 2022-02-01 NOTE — PROGRESS NOTES
[facility]     Subjective:    Patient ID:  Pedro Ponce Jr. is a 81 y.o. male who presents for follow-up of Hypertension, Coronary Artery Disease, and Hyperlipidemia    Review of patient's allergies indicates:   Allergen Reactions    Pcn [penicillins] Rash      The patient denies any chest pain.  Thirteen months ago he had stent to LAD.  He has been on aspirin and Plavix without bleeding problems.  He states his blood pressures continue to be an issue at times.  His blood pressure is elevated today.  He is on multiple anti hypertensive meds.  He has been going back to the gym more regularly.  Overall he has not had the spells of fatigue that he reported previously.  He has had sensitivity to beta-blockers in the past triggering symptoms but currently he is tolerating mild sinus bradycardia well and is taking Toprol 50 mg daily.  His neurologic status is stable.      Review of Systems   Constitutional: Negative for chills, decreased appetite, diaphoresis, fever, malaise/fatigue, night sweats, weight gain and weight loss.   HENT: Negative for congestion, ear discharge, ear pain, hearing loss, hoarse voice, nosebleeds, odynophagia, sore throat, stridor and tinnitus.    Eyes: Negative for blurred vision, discharge, double vision, pain, photophobia, redness, vision loss in left eye, vision loss in right eye, visual disturbance and visual halos.   Cardiovascular: Negative for chest pain, claudication, cyanosis, dyspnea on exertion, irregular heartbeat, leg swelling, near-syncope, orthopnea, palpitations, paroxysmal nocturnal dyspnea and syncope.   Respiratory: Negative for cough, hemoptysis, shortness of breath, sleep disturbances due to breathing, snoring, sputum production and wheezing.    Endocrine: Negative for cold intolerance, heat intolerance, polydipsia, polyphagia and polyuria.   Hematologic/Lymphatic: Negative for adenopathy  "and bleeding problem. Does not bruise/bleed easily.   Skin: Negative for color change, dry skin, flushing, itching, nail changes, poor wound healing, rash, skin cancer, suspicious lesions and unusual hair distribution.   Musculoskeletal: Negative for arthritis, back pain, falls, gout, joint pain, joint swelling, muscle cramps, muscle weakness, myalgias, neck pain and stiffness.   Gastrointestinal: Negative for bloating, abdominal pain, anorexia, change in bowel habit, bowel incontinence, constipation, diarrhea, dysphagia, excessive appetite, flatus, heartburn, hematemesis, hematochezia, hemorrhoids, jaundice, melena, nausea and vomiting.   Genitourinary: Negative for bladder incontinence, decreased libido, dysuria, flank pain, frequency, genital sores, hematuria, hesitancy, incomplete emptying, nocturia and urgency.   Neurological: Negative for aphonia, brief paralysis, difficulty with concentration, disturbances in coordination, excessive daytime sleepiness, dizziness, focal weakness, headaches, light-headedness, loss of balance, numbness, paresthesias, seizures, sensory change, tremors, vertigo and weakness.   Psychiatric/Behavioral: Negative for altered mental status, depression, hallucinations, memory loss, substance abuse, suicidal ideas and thoughts of violence. The patient does not have insomnia and is not nervous/anxious.    Allergic/Immunologic: Negative for hives and persistent infections.        Objective:       Vitals:    02/01/22 1508   BP: 138/78   Pulse: (!) 54   SpO2: 98%   Weight: 88.1 kg (194 lb 3.2 oz)   Height: 5' 11" (1.803 m)    Physical Exam  Constitutional:       General: He is not in acute distress.     Appearance: He is well-developed. He is not diaphoretic.   HENT:      Head: Normocephalic and atraumatic.      Nose: Nose normal.   Eyes:      General: No scleral icterus.        Right eye: No discharge.      Conjunctiva/sclera: Conjunctivae normal.      Pupils: Pupils are equal, round, and " reactive to light.   Neck:      Thyroid: No thyromegaly.      Vascular: No JVD.      Trachea: No tracheal deviation.   Cardiovascular:      Rate and Rhythm: Normal rate and regular rhythm.      Pulses: Intact distal pulses.           Carotid pulses are 2+ on the right side and 2+ on the left side.       Radial pulses are 2+ on the right side and 2+ on the left side.        Dorsalis pedis pulses are 2+ on the right side and 2+ on the left side.        Posterior tibial pulses are 2+ on the right side and 2+ on the left side.      Heart sounds: Murmur heard.    Medium-pitched harsh early systolic murmur is present with a grade of 2/6 at the upper right sternal border.  No friction rub. No gallop.    Pulmonary:      Effort: Pulmonary effort is normal. No respiratory distress.      Breath sounds: Normal breath sounds. No stridor. No wheezing or rales.   Chest:      Chest wall: No tenderness.   Abdominal:      General: Bowel sounds are normal. There is no distension.      Palpations: Abdomen is soft. There is no mass.      Tenderness: There is no abdominal tenderness. There is no guarding or rebound.   Musculoskeletal:         General: No tenderness. Normal range of motion.      Cervical back: Normal range of motion and neck supple.   Lymphadenopathy:      Cervical: No cervical adenopathy.   Skin:     General: Skin is warm and dry.      Coloration: Skin is not pale.      Findings: No erythema or rash.   Neurological:      Mental Status: He is alert and oriented to person, place, and time.      Cranial Nerves: No cranial nerve deficit.      Motor: Weakness present.      Coordination: Coordination normal.      Comments: Right hand weakness/clumsiness   Psychiatric:         Behavior: Behavior normal.         Thought Content: Thought content normal.         Judgment: Judgment normal.           Assessment:       1. Essential hypertension    2. Status post insertion of drug-eluting stent into left anterior descending (LAD)  artery    3. Primary hypertension    4. Bradycardia, drug induced    5. Aortic atherosclerosis    6. Stage 3 chronic kidney disease, unspecified whether stage 3a or 3b CKD    7. Pure hypercholesterolemia      Results for orders placed or performed in visit on 01/31/22   PTH, intact   Result Value Ref Range    PTH, Intact 135.0 (H) 9.0 - 77.0 pg/mL   CBC Auto Differential   Result Value Ref Range    WBC 4.26 3.90 - 12.70 K/uL    RBC 4.56 (L) 4.60 - 6.20 M/uL    Hemoglobin 14.1 14.0 - 18.0 g/dL    Hematocrit 42.3 40.0 - 54.0 %    MCV 93 82 - 98 fL    MCH 30.9 27.0 - 31.0 pg    MCHC 33.3 32.0 - 36.0 g/dL    RDW 14.6 (H) 11.5 - 14.5 %    Platelets 245 150 - 450 K/uL    MPV 9.3 9.2 - 12.9 fL    Immature Granulocytes 0.2 0.0 - 0.5 %    Gran # (ANC) 1.6 (L) 1.8 - 7.7 K/uL    Immature Grans (Abs) 0.01 0.00 - 0.04 K/uL    Lymph # 1.9 1.0 - 4.8 K/uL    Mono # 0.6 0.3 - 1.0 K/uL    Eos # 0.1 0.0 - 0.5 K/uL    Baso # 0.02 0.00 - 0.20 K/uL    nRBC 0 0 /100 WBC    Gran % 38.1 38.0 - 73.0 %    Lymph % 45.3 18.0 - 48.0 %    Mono % 13.6 4.0 - 15.0 %    Eosinophil % 2.3 0.0 - 8.0 %    Basophil % 0.5 0.0 - 1.9 %    Differential Method Automated    Comprehensive Metabolic Panel   Result Value Ref Range    Sodium 144 136 - 145 mmol/L    Potassium 3.8 3.5 - 5.1 mmol/L    Chloride 99 95 - 110 mmol/L    CO2 32 (H) 23 - 29 mmol/L    Glucose 98 70 - 110 mg/dL    BUN 24 (H) 2 - 20 mg/dL    Creatinine 1.42 (H) 0.50 - 1.40 mg/dL    Calcium 10.5 8.7 - 10.5 mg/dL    Total Protein 7.7 6.0 - 8.4 g/dL    Albumin 4.5 3.5 - 5.2 g/dL    Total Bilirubin 0.9 0.1 - 1.0 mg/dL    Alkaline Phosphatase 77 38 - 126 U/L    AST 29 15 - 46 U/L    ALT 22 10 - 44 U/L    Anion Gap 13 8 - 16 mmol/L    eGFR if African American 52.8 (A) >60 mL/min/1.73 m^2    eGFR if non  45.7 (A) >60 mL/min/1.73 m^2   Lipid Panel   Result Value Ref Range    Cholesterol 143 120 - 199 mg/dL    Triglycerides 56 30 - 150 mg/dL    HDL 61 40 - 75 mg/dL    LDL Cholesterol  70.8 63.0 - 159.0 mg/dL    HDL/Cholesterol Ratio 42.7 20.0 - 50.0 %    Total Cholesterol/HDL Ratio 2.3 2.0 - 5.0    Non-HDL Cholesterol 82 mg/dL   TSH   Result Value Ref Range    TSH 4.720 (H) 0.400 - 4.000 uIU/mL   Hemoglobin A1C   Result Value Ref Range    Hemoglobin A1C 5.6 4.0 - 5.6 %    Estimated Avg Glucose 114 68 - 131 mg/dL   T4, Free   Result Value Ref Range    Free T4 0.90 0.71 - 1.51 ng/dL         Current Outpatient Medications:     ACCU-CHEK ALYCIA PLUS TEST STRP Strp, 1 EACH BY MISC.(NON-DRUG COMBO ROUTE) ROUTE DAILY AS NEEDED., Disp: 50 strip, Rfl: 3    aspirin (ECOTRIN) 81 MG EC tablet, Take 1 tablet (81 mg total) by mouth once daily., Disp: 90 tablet, Rfl: 3    atorvastatin (LIPITOR) 40 MG tablet, TAKE 1 TABLET BY MOUTH EVERY DAY IN THE EVENING, Disp: 90 tablet, Rfl: 0    dutasteride (AVODART) 0.5 mg capsule, TAKE 1 CAPSULE (0.5 MG TOTAL) BY MOUTH ONCE DAILY., Disp: 90 capsule, Rfl: 3    EScitalopram oxalate (LEXAPRO) 10 MG tablet, Take 1 tablet (10 mg total) by mouth once daily., Disp: 90 tablet, Rfl: 3    irbesartan-hydrochlorothiazide (AVALIDE) 300-12.5 mg per tablet, Take 1 tablet by mouth once daily., Disp: 90 tablet, Rfl: 3    lancets Misc, 1 each by Misc.(Non-Drug; Combo Route) route daily as needed., Disp: 30 each, Rfl: 3    metoprolol succinate (TOPROL-XL) 50 MG 24 hr tablet, Take 1 tablet (50 mg total) by mouth once daily., Disp: 90 tablet, Rfl: 3    multivitamin capsule, Take 1 capsule by mouth once daily. Centrum Silver, Disp: , Rfl:     NIFEdipine (PROCARDIA-XL) 60 MG (OSM) 24 hr tablet, Take 1 tablet (60 mg total) by mouth 2 (two) times daily., Disp: 180 tablet, Rfl: 4    nitroGLYCERIN (NITROSTAT) 0.4 MG SL tablet, Place 1 tablet (0.4 mg total) under the tongue every 5 (five) minutes as needed for Chest pain. (Patient not taking: Reported on 11/15/2021), Disp: 30 tablet, Rfl: 3    senna-docusate 8.6-50 mg (SENNA WITH DOCUSATE SODIUM) 8.6-50 mg per tablet, Take 1 tablet by  mouth once daily., Disp: 7 tablet, Rfl: 0    solifenacin (VESICARE) 5 MG tablet, Take 1 tablet (5 mg total) by mouth once daily., Disp: 90 tablet, Rfl: 3    sulfamethoxazole-trimethoprim 800-160mg (BACTRIM DS) 800-160 mg Tab, Take 1 tablet by mouth 2 (two) times daily. for 7 days, Disp: 14 tablet, Rfl: 0    tamsulosin (FLOMAX) 0.4 mg Cap, TAKE 1 CAPSULE BY MOUTH EVERY EVENING, Disp: 90 capsule, Rfl: 3     Lab Results   Component Value Date    WBC 4.26 01/31/2022    RBC 4.56 (L) 01/31/2022    HGB 14.1 01/31/2022    HCT 42.3 01/31/2022    MCV 93 01/31/2022    MCH 30.9 01/31/2022    MCHC 33.3 01/31/2022    RDW 14.6 (H) 01/31/2022     01/31/2022    MPV 9.3 01/31/2022    GRAN 1.6 (L) 01/31/2022    GRAN 38.1 01/31/2022    LYMPH 1.9 01/31/2022    LYMPH 45.3 01/31/2022    MONO 0.6 01/31/2022    MONO 13.6 01/31/2022    EOS 0.1 01/31/2022    BASO 0.02 01/31/2022    EOSINOPHIL 2.3 01/31/2022    BASOPHIL 0.5 01/31/2022    MG 2.0 08/28/2021        CMP  Lab Results   Component Value Date     01/31/2022    K 3.8 01/31/2022    CL 99 01/31/2022    CO2 32 (H) 01/31/2022    GLU 98 01/31/2022    BUN 24 (H) 01/31/2022    CREATININE 1.42 (H) 01/31/2022    CALCIUM 10.5 01/31/2022    PROT 7.7 01/31/2022    ALBUMIN 4.5 01/31/2022    BILITOT 0.9 01/31/2022    ALKPHOS 77 01/31/2022    AST 29 01/31/2022    ALT 22 01/31/2022    ANIONGAP 13 01/31/2022    ESTGFRAFRICA 52.8 (A) 01/31/2022    EGFRNONAA 45.7 (A) 01/31/2022        Lab Results   Component Value Date    LABURIN No significant growth 12/02/2021            Results for orders placed or performed during the hospital encounter of 08/27/21   EKG 12-lead    Collection Time: 08/27/21 10:42 AM    Narrative    Test Reason : R06.02,    Vent. Rate : 093 BPM     Atrial Rate : 093 BPM     P-R Int : 194 ms          QRS Dur : 094 ms      QT Int : 326 ms       P-R-T Axes : 059 -26 087 degrees     QTc Int : 405 ms    Normal sinus rhythm  Right ventricular conduction delay  Voltage  criteria for left ventricular hypertrophy  Nonspecific T wave abnormality  Abnormal ECG  When compared with ECG of 06-APR-2021 07:38,  Nonspecific T wave abnormality now evident in Anterior leads  Confirmed by Harry ROBERSON, Anthony MENSAH ([id number] on 8/29/2021 1:53:36 PM    Referred By: EVELINA   SELF           Confirmed By:Anthony Mcdonald MD        Posterior Segment OCT Retina-Both eyes 02/01/2022 [id number] Final   X-Ray Chest AP Portable 08/27/2021 [id number] Final   CARDIAC MONITORING STRIPS 08/27/2021  Final            Plan:       Problem List Items Addressed This Visit        Cardiac/Vascular    Hypertension     Current medications will be continued.  I did advise a switch from Avapro to Avalide 300/12.5 mg daily.  His blood pressure is mildly elevated today.  It is difficult to control his blood pressure-multi drug therapy required.    He does have beta-blocker sensitivity.         Relevant Medications    irbesartan-hydrochlorothiazide (AVALIDE) 300-12.5 mg per tablet    Hyperlipidemia     He is taking atorvastatin.  It is well tolerated.  Most recent LDL 70 range 1-2022.         Bradycardia, drug induced     Heart rate today 55 range.  He is asymptomatic.  He has been exercising.  Condition stable.         Aortic atherosclerosis     No new complaints.  Condition stable.         Status post insertion of drug-eluting stent into left anterior descending (LAD) artery     Clopidogrel has been discontinued today.  He will will continue aspirin 81 mg per day.  No active angina.  Condition stable.            Renal/    Stage 3 chronic kidney disease     He exhibits no signs of volume overload.  Condition stable.  He is diabetic.           Other Visit Diagnoses     Essential hypertension        Relevant Medications    irbesartan-hydrochlorothiazide (AVALIDE) 300-12.5 mg per tablet                          Anthony Mcdonald MD  02/01/2022   3:34 PM

## 2022-02-01 NOTE — ASSESSMENT & PLAN NOTE
Clopidogrel has been discontinued today.  He will will continue aspirin 81 mg per day.  No active angina.  Condition stable.

## 2022-02-01 NOTE — ASSESSMENT & PLAN NOTE
Current medications will be continued.  I did advise a switch from Avapro to Avalide 300/12.5 mg daily.  His blood pressure is mildly elevated today.  It is difficult to control his blood pressure-multi drug therapy required.    He does have beta-blocker sensitivity.

## 2022-02-01 NOTE — PATIENT INSTRUCTIONS
Please complete Avapro 300 mg prescription taken at night, then  Avalide 300/12.5 daily which is 2 pills in one which should help the BP readings better.     Continue all other BP medications.

## 2022-02-01 NOTE — TELEPHONE ENCOUNTER
Contacted pt to inform him of his results. Pt understand and asked a question in regards to anemia and what can he take to help. Pt was informed to find foods fortified with iron. Pt also asked about upcoming appts and was informed of upcoming appts and times for the appts. Pt had no other questions or concerns. Results are as follows....       ----- Message from Debra Hopkins NP sent at 1/31/2022  2:29 PM CST -----  Thyroid test is slightly abnormal but likely has no clinical significance, will recheck level with next set of labs  Normal cholesterol/triglyceride levels  A1C remains at goal at 5.6, showing normal blood sugars for the past 3 months  Liver function is good; kidney function is slightly decreased but stable compared to previous reports. Be sure to drink enough water and avoid NSAIDs like ibuprofen/naproxen  Mild anemia but much improved compared to previous reports  Please notify the patient. Thanks, ZOILA

## 2022-02-03 ENCOUNTER — OFFICE VISIT (OUTPATIENT)
Dept: FAMILY MEDICINE | Facility: CLINIC | Age: 82
End: 2022-02-03
Payer: MEDICARE

## 2022-02-03 VITALS
OXYGEN SATURATION: 98 % | WEIGHT: 192.25 LBS | HEART RATE: 55 BPM | DIASTOLIC BLOOD PRESSURE: 64 MMHG | HEIGHT: 71 IN | BODY MASS INDEX: 26.91 KG/M2 | SYSTOLIC BLOOD PRESSURE: 136 MMHG

## 2022-02-03 DIAGNOSIS — I69.331 MONOPLEGIA OF UPPER LIMB FOLLOWING CEREBRAL INFARCTION AFFECTING RIGHT DOMINANT SIDE: ICD-10-CM

## 2022-02-03 DIAGNOSIS — N18.31 STAGE 3A CHRONIC KIDNEY DISEASE: ICD-10-CM

## 2022-02-03 DIAGNOSIS — I10 ESSENTIAL HYPERTENSION: Primary | ICD-10-CM

## 2022-02-03 DIAGNOSIS — Z12.11 SCREEN FOR COLON CANCER: ICD-10-CM

## 2022-02-03 DIAGNOSIS — K63.5 POLYP OF COLON, UNSPECIFIED PART OF COLON, UNSPECIFIED TYPE: ICD-10-CM

## 2022-02-03 PROCEDURE — 3288F FALL RISK ASSESSMENT DOCD: CPT | Mod: CPTII,S$GLB,, | Performed by: FAMILY MEDICINE

## 2022-02-03 PROCEDURE — 99215 PR OFFICE/OUTPT VISIT, EST, LEVL V, 40-54 MIN: ICD-10-PCS | Mod: S$GLB,,, | Performed by: FAMILY MEDICINE

## 2022-02-03 PROCEDURE — 1101F PT FALLS ASSESS-DOCD LE1/YR: CPT | Mod: CPTII,S$GLB,, | Performed by: FAMILY MEDICINE

## 2022-02-03 PROCEDURE — 99215 OFFICE O/P EST HI 40 MIN: CPT | Mod: S$GLB,,, | Performed by: FAMILY MEDICINE

## 2022-02-03 PROCEDURE — 3288F PR FALLS RISK ASSESSMENT DOCUMENTED: ICD-10-PCS | Mod: CPTII,S$GLB,, | Performed by: FAMILY MEDICINE

## 2022-02-03 PROCEDURE — 1126F PR PAIN SEVERITY QUANTIFIED, NO PAIN PRESENT: ICD-10-PCS | Mod: CPTII,S$GLB,, | Performed by: FAMILY MEDICINE

## 2022-02-03 PROCEDURE — 1126F AMNT PAIN NOTED NONE PRSNT: CPT | Mod: CPTII,S$GLB,, | Performed by: FAMILY MEDICINE

## 2022-02-03 PROCEDURE — 99999 PR PBB SHADOW E&M-EST. PATIENT-LVL IV: ICD-10-PCS | Mod: PBBFAC,,, | Performed by: FAMILY MEDICINE

## 2022-02-03 PROCEDURE — 3075F SYST BP GE 130 - 139MM HG: CPT | Mod: CPTII,S$GLB,, | Performed by: FAMILY MEDICINE

## 2022-02-03 PROCEDURE — 1159F MED LIST DOCD IN RCRD: CPT | Mod: CPTII,S$GLB,, | Performed by: FAMILY MEDICINE

## 2022-02-03 PROCEDURE — 3078F PR MOST RECENT DIASTOLIC BLOOD PRESSURE < 80 MM HG: ICD-10-PCS | Mod: CPTII,S$GLB,, | Performed by: FAMILY MEDICINE

## 2022-02-03 PROCEDURE — 1159F PR MEDICATION LIST DOCUMENTED IN MEDICAL RECORD: ICD-10-PCS | Mod: CPTII,S$GLB,, | Performed by: FAMILY MEDICINE

## 2022-02-03 PROCEDURE — 3078F DIAST BP <80 MM HG: CPT | Mod: CPTII,S$GLB,, | Performed by: FAMILY MEDICINE

## 2022-02-03 PROCEDURE — 1101F PR PT FALLS ASSESS DOC 0-1 FALLS W/OUT INJ PAST YR: ICD-10-PCS | Mod: CPTII,S$GLB,, | Performed by: FAMILY MEDICINE

## 2022-02-03 PROCEDURE — 99999 PR PBB SHADOW E&M-EST. PATIENT-LVL IV: CPT | Mod: PBBFAC,,, | Performed by: FAMILY MEDICINE

## 2022-02-03 PROCEDURE — 3075F PR MOST RECENT SYSTOLIC BLOOD PRESS GE 130-139MM HG: ICD-10-PCS | Mod: CPTII,S$GLB,, | Performed by: FAMILY MEDICINE

## 2022-02-03 NOTE — PROGRESS NOTES
Subjective:       Patient ID: Pedro Ponce Jr. is a 81 y.o. male.    Chief Complaint: Follow-up    [age] years old male came to the clinic for blood pressure check.  Patient with decreased kidney function is stable in comparison with previous reports.  Anemia was resolved.  Patient wants to pursue colonoscopy.  Previous recommendation was to be done in 3 years.  No flare-ups of stroke.    Review of Systems   Constitutional: Negative.    HENT: Negative.    Eyes: Negative.    Respiratory: Negative.    Cardiovascular: Negative.  Negative for chest pain, palpitations, leg swelling and claudication.   Gastrointestinal: Negative.    Genitourinary: Negative.    Musculoskeletal: Negative.    Integumentary:  Negative.   Neurological: Positive for weakness.   Psychiatric/Behavioral: Negative.          Objective:      Physical Exam  Vitals and nursing note reviewed.   Constitutional:       General: He is not in acute distress.     Appearance: He is well-developed and well-nourished. He is not diaphoretic.   HENT:      Head: Normocephalic and atraumatic.      Right Ear: External ear normal.      Left Ear: External ear normal.      Nose: Nose normal.      Mouth/Throat:      Mouth: Oropharynx is clear and moist.      Pharynx: No oropharyngeal exudate.   Eyes:      General: No scleral icterus.        Right eye: No discharge.         Left eye: No discharge.      Extraocular Movements: EOM normal.      Conjunctiva/sclera: Conjunctivae normal.      Pupils: Pupils are equal, round, and reactive to light.   Neck:      Thyroid: No thyromegaly.      Vascular: No JVD.      Trachea: No tracheal deviation.   Cardiovascular:      Rate and Rhythm: Normal rate and regular rhythm.      Pulses: Intact distal pulses.      Heart sounds: Normal heart sounds. No murmur heard.  No friction rub. No gallop.    Pulmonary:      Effort: Pulmonary effort is normal. No respiratory distress.      Breath sounds: Normal breath sounds. No stridor. No  wheezing or rales.   Chest:      Chest wall: No tenderness.   Abdominal:      General: Bowel sounds are normal. There is no distension.      Palpations: Abdomen is soft. There is no mass.      Tenderness: There is no abdominal tenderness. There is no guarding or rebound.   Musculoskeletal:         General: No tenderness or edema. Normal range of motion.      Cervical back: Normal range of motion and neck supple.   Lymphadenopathy:      Cervical: No cervical adenopathy.   Skin:     General: Skin is warm and dry.      Coloration: Skin is not pale.      Findings: No erythema or rash.   Neurological:      Mental Status: He is alert and oriented to person, place, and time.      Cranial Nerves: No cranial nerve deficit.      Motor: No abnormal muscle tone.      Coordination: Coordination normal.      Deep Tendon Reflexes: Reflexes are normal and symmetric. Reflexes normal.   Psychiatric:         Mood and Affect: Mood and affect normal.         Behavior: Behavior normal.         Thought Content: Thought content normal.         Judgment: Judgment normal.         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

## 2022-02-04 ENCOUNTER — TELEPHONE (OUTPATIENT)
Dept: FAMILY MEDICINE | Facility: CLINIC | Age: 82
End: 2022-02-04
Payer: MEDICARE

## 2022-02-04 NOTE — TELEPHONE ENCOUNTER
Pt was contacted and informed of his lab results. Pt stated he had no symptoms and is worried that he may have symptoms once the meds are completed. Pt was advise to contact office to have a F/U appt to get more assistance. Pt understood and has no other questions.   Results are as follows...     ----- Message from Debra Hopkins NP sent at 2/4/2022  8:36 AM CST -----  E. Coli noted in the urine. Continue taking the antibiotic prescribed and drink plenty water. Please let us know if you continue to experience any symptoms after completing the antibiotic.    Please notify the patient. Thanks, ZOILA

## 2022-02-07 ENCOUNTER — OFFICE VISIT (OUTPATIENT)
Dept: NEPHROLOGY | Facility: CLINIC | Age: 82
End: 2022-02-07
Payer: MEDICARE

## 2022-02-07 VITALS
BODY MASS INDEX: 25.83 KG/M2 | SYSTOLIC BLOOD PRESSURE: 158 MMHG | DIASTOLIC BLOOD PRESSURE: 78 MMHG | OXYGEN SATURATION: 99 % | HEART RATE: 50 BPM | WEIGHT: 185.19 LBS

## 2022-02-07 DIAGNOSIS — R80.9 PROTEINURIA, UNSPECIFIED TYPE: ICD-10-CM

## 2022-02-07 DIAGNOSIS — N18.30 STAGE 3 CHRONIC KIDNEY DISEASE, UNSPECIFIED WHETHER STAGE 3A OR 3B CKD: Primary | ICD-10-CM

## 2022-02-07 DIAGNOSIS — I10 HYPERTENSION, UNSPECIFIED TYPE: ICD-10-CM

## 2022-02-07 PROCEDURE — 99214 OFFICE O/P EST MOD 30 MIN: CPT | Mod: S$GLB,,, | Performed by: INTERNAL MEDICINE

## 2022-02-07 PROCEDURE — 1101F PT FALLS ASSESS-DOCD LE1/YR: CPT | Mod: CPTII,S$GLB,, | Performed by: INTERNAL MEDICINE

## 2022-02-07 PROCEDURE — 3288F PR FALLS RISK ASSESSMENT DOCUMENTED: ICD-10-PCS | Mod: CPTII,S$GLB,, | Performed by: INTERNAL MEDICINE

## 2022-02-07 PROCEDURE — 99999 PR PBB SHADOW E&M-EST. PATIENT-LVL III: CPT | Mod: PBBFAC,,, | Performed by: INTERNAL MEDICINE

## 2022-02-07 PROCEDURE — 1101F PR PT FALLS ASSESS DOC 0-1 FALLS W/OUT INJ PAST YR: ICD-10-PCS | Mod: CPTII,S$GLB,, | Performed by: INTERNAL MEDICINE

## 2022-02-07 PROCEDURE — 3078F PR MOST RECENT DIASTOLIC BLOOD PRESSURE < 80 MM HG: ICD-10-PCS | Mod: CPTII,S$GLB,, | Performed by: INTERNAL MEDICINE

## 2022-02-07 PROCEDURE — 3288F FALL RISK ASSESSMENT DOCD: CPT | Mod: CPTII,S$GLB,, | Performed by: INTERNAL MEDICINE

## 2022-02-07 PROCEDURE — 3078F DIAST BP <80 MM HG: CPT | Mod: CPTII,S$GLB,, | Performed by: INTERNAL MEDICINE

## 2022-02-07 PROCEDURE — 1159F PR MEDICATION LIST DOCUMENTED IN MEDICAL RECORD: ICD-10-PCS | Mod: CPTII,S$GLB,, | Performed by: INTERNAL MEDICINE

## 2022-02-07 PROCEDURE — 3077F PR MOST RECENT SYSTOLIC BLOOD PRESSURE >= 140 MM HG: ICD-10-PCS | Mod: CPTII,S$GLB,, | Performed by: INTERNAL MEDICINE

## 2022-02-07 PROCEDURE — 99999 PR PBB SHADOW E&M-EST. PATIENT-LVL III: ICD-10-PCS | Mod: PBBFAC,,, | Performed by: INTERNAL MEDICINE

## 2022-02-07 PROCEDURE — 1126F AMNT PAIN NOTED NONE PRSNT: CPT | Mod: CPTII,S$GLB,, | Performed by: INTERNAL MEDICINE

## 2022-02-07 PROCEDURE — 3077F SYST BP >= 140 MM HG: CPT | Mod: CPTII,S$GLB,, | Performed by: INTERNAL MEDICINE

## 2022-02-07 PROCEDURE — 99214 PR OFFICE/OUTPT VISIT, EST, LEVL IV, 30-39 MIN: ICD-10-PCS | Mod: S$GLB,,, | Performed by: INTERNAL MEDICINE

## 2022-02-07 PROCEDURE — 1126F PR PAIN SEVERITY QUANTIFIED, NO PAIN PRESENT: ICD-10-PCS | Mod: CPTII,S$GLB,, | Performed by: INTERNAL MEDICINE

## 2022-02-07 PROCEDURE — 1159F MED LIST DOCD IN RCRD: CPT | Mod: CPTII,S$GLB,, | Performed by: INTERNAL MEDICINE

## 2022-02-07 NOTE — PROGRESS NOTES
Subjective:       Patient ID: Pedro Ponce Jr. is a 81 y.o. Black or  male who presents for follow-up evaluation of Chronic Kidney Disease    HPI This is a 81 -year-old -American male with longstanding hypertension, diet-controlled diabetes, proteinuria, bilateral renal cysts is here for f/u of these. Diet controlled DM stable. Creatinine stable at 1.4.  Bp's in the 130's/70's.  Slight proteinuria.  Walking almost daily.   Denies NSAID's.        PAST MEDICAL HISTORY: Type 2 diabetes diet controlled, hypertension for   approximately 20 years, hyperlipidemia, gout, glaucoma, BPH and TURP in   2006 and followed in Urology. He also had questionable cyst versus tumor   removed by Dr. Drew about 17 years ago and this was not malignant per   patient.     Review of Systems   Constitutional: Negative for appetite change, fatigue and unexpected weight change.          Lost some weight   Eyes: Negative for discharge.   Respiratory: Negative for cough, shortness of breath and wheezing.    Cardiovascular: Negative for chest pain and palpitations.   Gastrointestinal: Negative for abdominal pain, diarrhea, nausea and vomiting.        Nocturia   Genitourinary: Negative.  Negative for dysuria, frequency, hematuria and urgency.   Skin: Negative for color change and rash.   Psychiatric/Behavioral: Negative for confusion.       Objective:    No edema. PE not done.  Physical Exam  Constitutional:       Appearance: He is well-developed.   Cardiovascular:      Rate and Rhythm: Normal rate and regular rhythm.      Heart sounds: No murmur heard.  No friction rub.   Pulmonary:      Effort: Pulmonary effort is normal. No respiratory distress.      Breath sounds: Normal breath sounds. No rales.   Abdominal:      General: Bowel sounds are normal.      Palpations: Abdomen is soft.      Tenderness: There is no abdominal tenderness. There is no rebound.   Skin:     Findings: No erythema or rash.   Neurological:       Mental Status: He is alert and oriented to person, place, and time.   Psychiatric:         Judgment: Judgment normal.         Assessment:       No diagnosis found.    Plan:         This is a 81 -year-old -American male with   longstanding hypertension, diet-controlled diabetes comes in for f/u of bilateral renal cysts.   1. Renal cysts. He does have multiple cysts on bilateral kidneys. 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 He denies any hematuria. Repeat ultrasound.  Followed in urology.  Had cysto/SUDS.  Unclear if it's acquired cystic disease-no family H/o. US without any Tumors-stable form last time.  Had nonmalignant tumor removed many years ago in urology.  Last  US in 2021 stable. Repeat.  Had cystoscopy in 2020.   2. Hypertension: Blood pressures are stable at home but slightly higher here .on avapro and continue metoprolol 25 mg and monitor bp's.  Asymptomatic.  I Encouraged excersise and low salt diet. Off spironolactone for unclear reasons-rec this as the next agent if  he needs better bp control.   3. Proteinuria. 600 mg  of proteinuria sec to HTN. On avapro. Rec good bp control.  Off HCTZ b/c of previous higher calciums but is back on it. .  Was spironolactone as  he is not on a diuretic and potassiums on lower side and to help proteinuria but off spironolactone.    4. Anemia. H&H is stable.   5. Renal/CKD stg 2- 3: creatinine is 1.4 and baseline around 1.2-1.4 with GFR of 45 ml/min .  Encouraged him to hydrate well.   Alkalosis much better lately but slightly alkalotic on the recent one -hydrate well. If thsi cont's, may need HCTZ d/komal. Checked  floyd/renin levels.   6. UTI: No recent UTI's.    Has high wbc's in urine chronically. RBC's on last sample but has a UTI.  On ABx.  Calcium borderline elvated chronically but i ca stable and recent ca stable. spep nml.  On  MVI.  PTH stable for his ckd. Vit D stable. If the ca worsens,  will need parathyroid scan to evaluate possible primary hyperpara but stable now.     F/u in 4 months.   Has an appoint with urology tomorrow. Needs renal US.

## 2022-02-08 ENCOUNTER — OFFICE VISIT (OUTPATIENT)
Dept: UROLOGY | Facility: CLINIC | Age: 82
End: 2022-02-08
Payer: MEDICARE

## 2022-02-08 VITALS
OXYGEN SATURATION: 98 % | HEART RATE: 52 BPM | HEIGHT: 71 IN | WEIGHT: 189.38 LBS | DIASTOLIC BLOOD PRESSURE: 62 MMHG | SYSTOLIC BLOOD PRESSURE: 129 MMHG | BODY MASS INDEX: 26.51 KG/M2

## 2022-02-08 DIAGNOSIS — N40.1 BPH WITH URINARY OBSTRUCTION: Primary | ICD-10-CM

## 2022-02-08 DIAGNOSIS — Z87.440 HISTORY OF RECURRENT UTIS: ICD-10-CM

## 2022-02-08 DIAGNOSIS — Z09 FOLLOW-UP EXAM: ICD-10-CM

## 2022-02-08 DIAGNOSIS — N18.30 STAGE 3 CHRONIC KIDNEY DISEASE, UNSPECIFIED WHETHER STAGE 3A OR 3B CKD: ICD-10-CM

## 2022-02-08 DIAGNOSIS — N13.8 BPH WITH URINARY OBSTRUCTION: Primary | ICD-10-CM

## 2022-02-08 DIAGNOSIS — N30.01 ACUTE CYSTITIS WITH HEMATURIA: ICD-10-CM

## 2022-02-08 DIAGNOSIS — Q61.3 POLYCYSTIC KIDNEY DISEASE: ICD-10-CM

## 2022-02-08 DIAGNOSIS — R31.29 MICROSCOPIC HEMATURIA: ICD-10-CM

## 2022-02-08 PROCEDURE — 3078F PR MOST RECENT DIASTOLIC BLOOD PRESSURE < 80 MM HG: ICD-10-PCS | Mod: CPTII,S$GLB,, | Performed by: NURSE PRACTITIONER

## 2022-02-08 PROCEDURE — 3288F FALL RISK ASSESSMENT DOCD: CPT | Mod: CPTII,S$GLB,, | Performed by: NURSE PRACTITIONER

## 2022-02-08 PROCEDURE — 3074F PR MOST RECENT SYSTOLIC BLOOD PRESSURE < 130 MM HG: ICD-10-PCS | Mod: CPTII,S$GLB,, | Performed by: NURSE PRACTITIONER

## 2022-02-08 PROCEDURE — 1160F PR REVIEW ALL MEDS BY PRESCRIBER/CLIN PHARMACIST DOCUMENTED: ICD-10-PCS | Mod: CPTII,S$GLB,, | Performed by: NURSE PRACTITIONER

## 2022-02-08 PROCEDURE — 99214 PR OFFICE/OUTPT VISIT, EST, LEVL IV, 30-39 MIN: ICD-10-PCS | Mod: S$GLB,,, | Performed by: NURSE PRACTITIONER

## 2022-02-08 PROCEDURE — 99999 PR PBB SHADOW E&M-EST. PATIENT-LVL IV: CPT | Mod: PBBFAC,,, | Performed by: NURSE PRACTITIONER

## 2022-02-08 PROCEDURE — 1126F AMNT PAIN NOTED NONE PRSNT: CPT | Mod: CPTII,S$GLB,, | Performed by: NURSE PRACTITIONER

## 2022-02-08 PROCEDURE — 1159F PR MEDICATION LIST DOCUMENTED IN MEDICAL RECORD: ICD-10-PCS | Mod: CPTII,S$GLB,, | Performed by: NURSE PRACTITIONER

## 2022-02-08 PROCEDURE — 3288F PR FALLS RISK ASSESSMENT DOCUMENTED: ICD-10-PCS | Mod: CPTII,S$GLB,, | Performed by: NURSE PRACTITIONER

## 2022-02-08 PROCEDURE — 3074F SYST BP LT 130 MM HG: CPT | Mod: CPTII,S$GLB,, | Performed by: NURSE PRACTITIONER

## 2022-02-08 PROCEDURE — 99999 PR PBB SHADOW E&M-EST. PATIENT-LVL IV: ICD-10-PCS | Mod: PBBFAC,,, | Performed by: NURSE PRACTITIONER

## 2022-02-08 PROCEDURE — 1126F PR PAIN SEVERITY QUANTIFIED, NO PAIN PRESENT: ICD-10-PCS | Mod: CPTII,S$GLB,, | Performed by: NURSE PRACTITIONER

## 2022-02-08 PROCEDURE — 1101F PT FALLS ASSESS-DOCD LE1/YR: CPT | Mod: CPTII,S$GLB,, | Performed by: NURSE PRACTITIONER

## 2022-02-08 PROCEDURE — 1101F PR PT FALLS ASSESS DOC 0-1 FALLS W/OUT INJ PAST YR: ICD-10-PCS | Mod: CPTII,S$GLB,, | Performed by: NURSE PRACTITIONER

## 2022-02-08 PROCEDURE — 99214 OFFICE O/P EST MOD 30 MIN: CPT | Mod: S$GLB,,, | Performed by: NURSE PRACTITIONER

## 2022-02-08 PROCEDURE — 3078F DIAST BP <80 MM HG: CPT | Mod: CPTII,S$GLB,, | Performed by: NURSE PRACTITIONER

## 2022-02-08 PROCEDURE — 1159F MED LIST DOCD IN RCRD: CPT | Mod: CPTII,S$GLB,, | Performed by: NURSE PRACTITIONER

## 2022-02-08 PROCEDURE — 1160F RVW MEDS BY RX/DR IN RCRD: CPT | Mod: CPTII,S$GLB,, | Performed by: NURSE PRACTITIONER

## 2022-02-08 NOTE — PATIENT INSTRUCTIONS
1. U/A and urine cx (Home collect; Drop urine specimen off at [facility]).  2. Schedule patient for in-clinic cystoscopy with Dr. Ledezma for hx of recurrent UTIs.   3. Continue taking dutasteride 0.5 mg daily for BPH.  4. Continue taking tamsulosin 0.4 mg every evening for BPH.  5. Continue taking solifenacin 5 mg daily for OAB.  6. Follow-up pending urine cx results.

## 2022-02-08 NOTE — PROGRESS NOTES
Subjective:       Patient ID: Pedro Ponce Jr. is a 81 y.o. male.    Chief Complaint: Follow-up (6 week; kidney dr scheduled appt for US 2/17, wanted to make you aware)    Patient is here today for a 6 week follow-up for recurrent UTIs and OAB. Patient was started on solifenacin 5 mg daily at last visit for his OAB. He reports medication is effective and would like to stay on it. Patient was recently diagnosed with another UTI on 2/2/2022. He is still currently taking antibiotic (Bactrim) with 3-4 days left.    Benign Prostatic Hypertrophy  This is a chronic problem. The current episode started more than 1 year ago. The problem has been gradually improving since onset. Irritative symptoms include nocturia (x4-5). Irritative symptoms do not include frequency or urgency. Obstructive symptoms do not include dribbling, incomplete emptying, straining or a weak stream. Pertinent negatives include no chills, dysuria, genital pain, hematuria, hesitancy, nausea or vomiting. Nothing aggravates the symptoms. Past treatments include tamsulosin and dutasteride. The treatment provided moderate relief.     Review of Systems   Constitutional: Negative for appetite change, chills, fatigue and fever.   Gastrointestinal: Positive for constipation. Negative for abdominal pain, change in bowel habit, diarrhea, nausea, vomiting and change in bowel habit.   Genitourinary: Positive for nocturia (x4-5). Negative for decreased urine volume, difficulty urinating, discharge, dysuria, flank pain, frequency, hematuria, hesitancy, incomplete emptying, penile pain, penile swelling, scrotal swelling, testicular pain and urgency.        Nocturia x4-5   Musculoskeletal: Positive for back pain (chronic lower back pain).   Neurological: Negative for dizziness, weakness and headaches.   Psychiatric/Behavioral: Negative.          Objective:      Physical Exam  Vitals and nursing note reviewed.   Constitutional:       General: He is not in acute  distress.     Appearance: He is well-developed and well-nourished. He is not ill-appearing.   HENT:      Head: Normocephalic and atraumatic.   Eyes:      Extraocular Movements: EOM normal.      Pupils: Pupils are equal, round, and reactive to light.   Cardiovascular:      Rate and Rhythm: Normal rate.      Pulses: Intact distal pulses.   Pulmonary:      Effort: Pulmonary effort is normal. No respiratory distress.   Abdominal:      Palpations: Abdomen is soft.      Tenderness: There is no abdominal tenderness.   Musculoskeletal:         General: No edema. Normal range of motion.      Cervical back: Normal range of motion.   Skin:     General: Skin is warm and dry.   Neurological:      Mental Status: He is alert and oriented to person, place, and time.      Coordination: Coordination normal.   Psychiatric:         Mood and Affect: Mood and affect and mood normal.         Behavior: Behavior normal.         Thought Content: Thought content normal.         Judgment: Judgment normal.         Assessment:       Problem List Items Addressed This Visit        Renal/    Stage 3 chronic kidney disease    Polycystic kidney disease      Other Visit Diagnoses     Acute cystitis with hematuria    -  Primary    History of recurrent UTIs        Microscopic hematuria              Plan:         Pedro was seen today for follow-up.    Diagnoses and all orders for this visit:    BPH with urinary obstruction  -     Urine culture; Future  -     Urinalysis; Future    Acute cystitis with hematuria  -     Urine culture; Future  -     Urinalysis; Future    History of recurrent UTIs  -     Urine culture; Future  -     Urinalysis; Future    Microscopic hematuria  -     Urine culture; Future  -     Urinalysis; Future    Polycystic kidney disease  -     Urine culture; Future  -     Urinalysis; Future    Stage 3 chronic kidney disease, unspecified whether stage 3a or 3b CKD  -     Urine culture; Future  -     Urinalysis; Future    Follow-up  exam  -     Urine culture; Future  -     Urinalysis; Future    Other orders  1. U/A and urine cx (Home collect; Drop urine specimen off at [facility]).  2. Schedule patient for in-clinic cystoscopy with Dr. Ledezma for hx of recurrent UTIs.   3. Continue taking dutasteride 0.5 mg daily for BPH.  4. Continue taking tamsulosin 0.4 mg every evening for BPH.  5. Continue taking solifenacin 5 mg daily for OAB.     NOTE: Re-discussed starting patient on bladder antiseptic for recurrent UTIs, but patient patient still declined. He reports being on a lot of medication and would like to hold off on bladder antiseptic. He's aware of benefits of taking that medication but still declined. He's ok with moving forward with in-clinic cysto.     Follow-up pending urine cx results.    Arabella Vergara NP

## 2022-02-18 ENCOUNTER — TELEPHONE (OUTPATIENT)
Dept: NEPHROLOGY | Facility: HOSPITAL | Age: 82
End: 2022-02-18
Payer: MEDICARE

## 2022-02-18 ENCOUNTER — TELEPHONE (OUTPATIENT)
Dept: UROLOGY | Facility: CLINIC | Age: 82
End: 2022-02-18
Payer: MEDICARE

## 2022-02-18 ENCOUNTER — TELEPHONE (OUTPATIENT)
Dept: NEPHROLOGY | Facility: CLINIC | Age: 82
End: 2022-02-18
Payer: MEDICARE

## 2022-02-18 ENCOUNTER — TELEPHONE (OUTPATIENT)
Dept: CARDIOLOGY | Facility: CLINIC | Age: 82
End: 2022-02-18
Payer: MEDICARE

## 2022-02-18 DIAGNOSIS — N30.01 ACUTE CYSTITIS WITH HEMATURIA: Primary | ICD-10-CM

## 2022-02-18 DIAGNOSIS — I10 PRIMARY HYPERTENSION: Primary | ICD-10-CM

## 2022-02-18 RX ORDER — IRBESARTAN 300 MG/1
300 TABLET ORAL NIGHTLY
Qty: 90 TABLET | Refills: 3 | Status: SHIPPED | OUTPATIENT
Start: 2022-02-18 | End: 2023-01-23

## 2022-02-18 RX ORDER — NITROFURANTOIN 25; 75 MG/1; MG/1
100 CAPSULE ORAL 2 TIMES DAILY
Qty: 28 CAPSULE | Refills: 0 | Status: SHIPPED | OUTPATIENT
Start: 2022-02-18 | End: 2022-03-04

## 2022-02-18 NOTE — TELEPHONE ENCOUNTER
----- Message from Alexis Lindsay sent at 2/18/2022  2:44 PM CST -----  Contact: pt.  .Type:  Needs Medical Advice    Who Called: pt  Symptoms (please be specific): urinating to much  How long has patient had these symptoms:  2 weeks  Pharmacy name and phone #:  [id number] - [place] - [address]   Phone:  [phone]  Fax:  [phone]  Would the patient rather a call back or a response via [facility]? Call back  Best Call Back Number: [phone]   Additional Information: Pt. Is calling in regard to the medication  irbesartan-hydrochlorothiazide (AVALIDE) 300-12.5 mg per tablet he states he is going to the bathroom to much and sometimes does not make it there.  He would like to know if he can just go back to the irbesartan tablet 75 mg   by itself.  Please call the pt. Back regarding this matter.

## 2022-02-18 NOTE — TELEPHONE ENCOUNTER
Patient Calls    Lawanda Walker DO routed conversation to You 1 hour ago (8:46 AM)     Lawanda Walker DO 1 hour ago (8:46 AM)            Kidney US overall stable-Repeat US in a year to monitor min complex cysts.         Documentation

## 2022-02-18 NOTE — PROGRESS NOTES
Diagnoses and all orders for this visit:    Acute cystitis with hematuria  -     nitrofurantoin, macrocrystal-monohydrate, (MACROBID) 100 MG capsule; Take 1 capsule (100 mg total) by mouth 2 (two) times daily. for 14 days  -     Urine culture; Future  -     Urinalysis; Future    Repeat u/a and urine cx in 2 weeks after completion of antibiotic therapy.     Arabella Vergara, NP

## 2022-02-18 NOTE — TELEPHONE ENCOUNTER
----- Message from Babita Sharma sent at 2/18/2022 11:04 AM CST -----  Type:  Needs Medical Advice    Who Called: pt  Symptoms (please be specific): pt would like to talk to someone about his meds and a urine test he took on monday      Would the patient rather a call back or a response via [facility]? Please call  Best Call Back Number: [phone]  Additional Information:

## 2022-02-18 NOTE — TELEPHONE ENCOUNTER
----- Message from Arabella Vergara NP sent at 2/18/2022  4:22 PM CST -----  Please inform patient her urine cx came back positive for a UTI. Script for Macrobid sent to [facility]. Repeat u/a and urine cx in 2 weeks after completion of antibiotic therapy. Orders placed.

## 2022-02-18 NOTE — TELEPHONE ENCOUNTER
Notified pt of provider result note. Pt verbalized understanding. He declined scheduling at this time he would like to call and schedule when he completes the medication

## 2022-03-10 ENCOUNTER — TELEPHONE (OUTPATIENT)
Dept: UROLOGY | Facility: CLINIC | Age: 82
End: 2022-03-10
Payer: MEDICARE

## 2022-03-10 NOTE — TELEPHONE ENCOUNTER
FLORESITA has not reviewed lab as of yet. Once she does, we will reach out to pt with results.      ----- Message from Florinda Merritt sent at 3/10/2022  9:20 AM CST -----  Regarding: Call back  Contact: [phone]  TEST RESULTS:   Patient would like to get test results.  Name of test (lab, mammo, etc.): Urine Specimen   Date of test: 3/8/22

## 2022-03-11 ENCOUNTER — TELEPHONE (OUTPATIENT)
Dept: UROLOGY | Facility: CLINIC | Age: 82
End: 2022-03-11
Payer: MEDICARE

## 2022-03-11 ENCOUNTER — TELEPHONE (OUTPATIENT)
Dept: SURGERY | Facility: CLINIC | Age: 82
End: 2022-03-11
Payer: MEDICARE

## 2022-03-11 DIAGNOSIS — N30.01 ACUTE CYSTITIS WITH HEMATURIA: Primary | ICD-10-CM

## 2022-03-11 RX ORDER — CIPROFLOXACIN 500 MG/1
500 TABLET ORAL EVERY 12 HOURS
Qty: 28 TABLET | Refills: 0 | Status: SHIPPED | OUTPATIENT
Start: 2022-03-11 | End: 2022-03-25

## 2022-03-11 NOTE — TELEPHONE ENCOUNTER
Pt informed via phone.      ----- Message from Arabella Vergara NP sent at 3/11/2022  3:41 PM CST -----  Please inform patient his urine cx came back positive for a UTI. Script for Cipro sent to [facility]. Repeat urine cx after completion of antibiotic therapy. Order placed. Schedule patient for in-clinic cystoscopy for evaluation of recurrent UTIs.

## 2022-03-11 NOTE — PROGRESS NOTES
Diagnoses and all orders for this visit:    Acute cystitis with hematuria  -     ciprofloxacin HCl (CIPRO) 500 MG tablet; Take 1 tablet (500 mg total) by mouth every 12 (twelve) hours. for 14 days  -     Urine culture; Future    Other order  1. Schedule patient for in-clinic cystoscopy for evaluation of recurrent UTIs.   2. Repeat urine cx after completion of antibiotic therapy.     Arabella Vergara NP

## 2022-03-16 ENCOUNTER — TELEPHONE (OUTPATIENT)
Dept: FAMILY MEDICINE | Facility: CLINIC | Age: 82
End: 2022-03-16
Payer: MEDICARE

## 2022-03-16 NOTE — TELEPHONE ENCOUNTER
GI recommended to do a colonoscopy 3 years after the last one in 2019.    Patient with risk associated with anesthesia  because of his age.     Order for colonoscopy was done if the patient decides to do the procedure.

## 2022-03-16 NOTE — TELEPHONE ENCOUNTER
----- Message from Nasra Guerrero MA sent at 3/11/2022  1:27 PM CST -----  Regarding: Advice  Contact: patient  [phone]    ----- Message -----  From: Ayesha Le  Sent: 3/11/2022  11:48 AM CST  To: Keiry JANSEN Staff    Patient would like to know if he should have a colonoscopy or not   Please advise

## 2022-03-19 ENCOUNTER — HOSPITAL ENCOUNTER (EMERGENCY)
Facility: HOSPITAL | Age: 82
Discharge: HOME | End: 2022-03-19
Attending: EMERGENCY MEDICINE
Payer: MEDICARE

## 2022-03-19 VITALS
BODY MASS INDEX: 25.1 KG/M2 | OXYGEN SATURATION: 97 % | TEMPERATURE: 98 F | RESPIRATION RATE: 18 BRPM | SYSTOLIC BLOOD PRESSURE: 143 MMHG | DIASTOLIC BLOOD PRESSURE: 84 MMHG | HEART RATE: 61 BPM | WEIGHT: 180 LBS

## 2022-03-19 DIAGNOSIS — M79.671 FOOT PAIN, RIGHT: ICD-10-CM

## 2022-03-19 DIAGNOSIS — M25.571 RIGHT ANKLE PAIN: ICD-10-CM

## 2022-03-19 DIAGNOSIS — M10.9 ACUTE GOUT INVOLVING TOE OF RIGHT FOOT, UNSPECIFIED CAUSE: Primary | ICD-10-CM

## 2022-03-19 LAB — URATE SERPL-MCNC: 5.6 MG/DL (ref 3.4–7)

## 2022-03-19 PROCEDURE — 84550 ASSAY OF BLOOD/URIC ACID: CPT | Performed by: EMERGENCY MEDICINE

## 2022-03-19 PROCEDURE — 63600175 PHARM REV CODE 636 W HCPCS: Performed by: EMERGENCY MEDICINE

## 2022-03-19 PROCEDURE — 96372 THER/PROPH/DIAG INJ SC/IM: CPT | Performed by: EMERGENCY MEDICINE

## 2022-03-19 PROCEDURE — 99284 EMERGENCY DEPT VISIT MOD MDM: CPT

## 2022-03-19 RX ORDER — INDOMETHACIN 50 MG/1
50 CAPSULE ORAL
Qty: 15 CAPSULE | Refills: 0 | Status: SHIPPED | OUTPATIENT
Start: 2022-03-19 | End: 2022-03-22

## 2022-03-19 RX ORDER — KETOROLAC TROMETHAMINE 30 MG/ML
30 INJECTION, SOLUTION INTRAMUSCULAR; INTRAVENOUS
Status: COMPLETED | OUTPATIENT
Start: 2022-03-19 | End: 2022-03-19

## 2022-03-19 RX ORDER — OXYCODONE AND ACETAMINOPHEN 5; 325 MG/1; MG/1
1 TABLET ORAL EVERY 6 HOURS PRN
Qty: 12 TABLET | Refills: 0 | Status: SHIPPED | OUTPATIENT
Start: 2022-03-19 | End: 2022-03-22

## 2022-03-19 RX ADMIN — KETOROLAC TROMETHAMINE 30 MG: 30 INJECTION, SOLUTION INTRAMUSCULAR at 07:03

## 2022-03-20 NOTE — ED PROVIDER NOTES
Encounter Date: 3/19/2022       History     Chief Complaint   Patient presents with    Ankle Pain     Pt c/o right ankle pain with swelling. Pt has history of gout but this pain is different. Pt usually rides a stationary bike but has been riding a spin bike instead. Pt able to walk with slow but steady gait.      Patient is an 82-year-old male who complains of nontraumatic pain and swelling to his right foot and ankle.  He has a history of gout.  Pain is worsened with weight-bearing ambulation.  He has had no fever or chills.  No other current joint pains.        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     Past Surgical History:   Procedure Laterality Date    COLONOSCOPY N/A 4/9/2018    Procedure: COLONOSCOPY;  Surgeon: HAIM Mir MD;  Location: [address]);  Service: Endoscopy;  Laterality: N/A;    COLONOSCOPY N/A 1/14/2019    Procedure: COLONOSCOPY;  Surgeon: HAIM Mir MD;  Location: [address]);  Service: Endoscopy;  Laterality: N/A;  PM prep    CYST REMOVAL  2000    removed from left kidney    KIDNEY SURGERY      LEFT HEART CATHETERIZATION Left 12/25/2020    Procedure: Left heart cath;  Surgeon: Alan Rothman MD;  Location: [facility] CATH LAB;  Service: Cardiology;  Laterality: Left;    PROSTATE SURGERY       Family History   Problem Relation Age of Onset    Cancer Mother         unkn ca    Diabetes Mother     Glaucoma Mother     Heart disease Mother     Hypertension Mother     Blindness Neg Hx     Macular degeneration Neg Hx     Retinal detachment Neg Hx       Social History     Tobacco Use    Smoking status: Never Smoker    Smokeless tobacco: Never Used   Substance Use Topics    Alcohol use: No    Drug use: No     Review of Systems   Constitutional: Negative for chills and fever.   Musculoskeletal:        Right foot and ankle pain.   Skin: Negative for rash and wound.   Neurological: Negative for numbness.   All other systems reviewed and are negative.      Physical Exam     Initial Vitals [03/19/22 1825]   BP Pulse Resp Temp SpO2   (!) 143/84 61 18 98.3 °F (36.8 °C) 97 %      MAP       --         Physical Exam    Nursing note and vitals reviewed.  Constitutional: No distress.   HENT:   Head: Atraumatic.   Eyes: EOM are normal.   Neck: Neck supple.   Cardiovascular: Normal rate, regular rhythm and normal heart sounds.   Pulmonary/Chest: Breath sounds normal.   Musculoskeletal:      Cervical back: Neck supple.      Comments: There is mild swelling across the dorsum of the right foot extending to the right ankle.  There is tenderness of the right great toe and diffusely around the right ankle.  There is no erythema, or rash.  No areas of fluctuance.  Palpable pulses in the right foot.     Neurological: He is oriented to person, place, and time.   Skin: Skin is warm and dry. No rash noted. No erythema.         ED Course   Procedures  Labs Reviewed   URIC ACID          Imaging Results          X-Ray Ankle Complete Right (Final result)  Result time 03/19/22 20:13:16    Final result by Isatu Gomes MD (03/19/22 20:13:16)                 Impression:      No acute fractures or dislocations involving the right ankle and foot.  Degenerative changes and calcaneal enthesophytes as described.      Electronically signed by: Isatu Gomes  Date:    03/19/2022  Time:    20:13             Narrative:    EXAMINATION:  XR ANKLE COMPLETE 3 VIEW RIGHT; XR FOOT COMPLETE 3 VIEW RIGHT    CLINICAL HISTORY:  Pain in right ankle and joints of right foot; Pain in right  foot    TECHNIQUE:  AP, lateral, and oblique images of the right ankle were performed.    COMPARISON:  None    FINDINGS:  There is no evidence of acute fracture or dislocation involving the right ankle or right foot.  There is diffuse osteopenia.    There is soft tissue swelling over the lateral malleolus.  The ankle mortise appears well maintained.  The subtalar joint is intact.  Dorsal and plantar calcaneal enthesophytes are noted as well as degenerative changes along the intertarsal and great toe MTP joints.  No radiopaque foreign bodies are seen .                               X-Ray Foot Complete Right (Final result)  Result time 03/19/22 20:13:16    Final result by Isatu Gomes MD (03/19/22 20:13:16)                 Impression:      No acute fractures or dislocations involving the right ankle and foot.  Degenerative changes and calcaneal enthesophytes as described.      Electronically signed by: Isatu Gomes  Date:    03/19/2022  Time:    20:13             Narrative:    EXAMINATION:  XR ANKLE COMPLETE 3 VIEW RIGHT; XR FOOT COMPLETE 3 VIEW RIGHT    CLINICAL HISTORY:  Pain in right ankle and joints of right foot; Pain in right foot    TECHNIQUE:  AP, lateral, and oblique images of the right ankle were performed.    COMPARISON:  None    FINDINGS:  There is no evidence of acute fracture or dislocation involving the right ankle or right foot.  There is diffuse osteopenia.    There is soft tissue swelling over the lateral malleolus.  The ankle mortise appears well maintained.  The subtalar joint is intact.  Dorsal and plantar calcaneal enthesophytes are noted as well as degenerative changes along the intertarsal and great toe MTP joints.  No radiopaque foreign bodies are seen .                                 Medications   ketorolac injection 30 mg (30 mg Intramuscular Given 3/1939)     Medical Decision Making:   Initial Assessment:   82-year-old male and with nontraumatic pain to his right foot and  ankle.  He has a history of gout.  Differential Diagnosis:   Stress fracture, foot strain, gout, cellulitis.  Clinical Tests:   Radiological Study: Ordered and Reviewed  ED Management:  Foot and ankle x-rays unremarkable.  Patient was given a shot of Toradol here in the ED.  Most likely etiology of his pain is gout for which she will be placed on indomethacin.  He should follow-up with his primary physician when able but may return to the ED for any possible worsening.                      Clinical Impression:   Final diagnoses:  [M79.671] Foot pain, right  [M25.571] Right ankle pain  [M10.9] Acute gout involving toe of right foot, unspecified cause (Primary)          ED Disposition Condition    Discharge Stable        ED Prescriptions     Medication Sig Dispense Start Date End Date Auth. Provider    indomethacin (INDOCIN) 50 MG capsule Take 1 capsule (50 mg total) by mouth 3 (three) times daily with meals. for 3 days 15 capsule 3/19/2022 3/22/2022 Ray Laureano MD    oxyCODONE-acetaminophen (PERCOCET) 5-325 mg per tablet Take 1 tablet by mouth every 6 (six) hours as needed for Pain (severe pain). 12 tablet 3/19/2022 3/22/2022 Ray Laureano MD        Follow-up Information     Follow up With Specialties Details Why Contact Info    John Ricci MD Family Medicine Schedule an appointment as soon as possible for a visit   [address] [facility] [address] [phone]             Kvng Mendiola MD  03/20/22 8760

## 2022-03-29 ENCOUNTER — PROCEDURE VISIT (OUTPATIENT)
Dept: UROLOGY | Facility: CLINIC | Age: 82
End: 2022-03-29
Payer: MEDICARE

## 2022-03-29 VITALS — BODY MASS INDEX: 25.1 KG/M2 | HEIGHT: 71 IN

## 2022-03-29 DIAGNOSIS — N13.8 BPH WITH URINARY OBSTRUCTION: Primary | ICD-10-CM

## 2022-03-29 DIAGNOSIS — R35.1 NOCTURIA: ICD-10-CM

## 2022-03-29 DIAGNOSIS — N39.0 RECURRENT UTI: ICD-10-CM

## 2022-03-29 DIAGNOSIS — N40.1 BPH WITH URINARY OBSTRUCTION: Primary | ICD-10-CM

## 2022-03-29 DIAGNOSIS — R35.0 URINARY FREQUENCY: ICD-10-CM

## 2022-03-29 LAB
BILIRUB UR QL STRIP: NEGATIVE
CLARITY UR REFRACT.AUTO: CLEAR
COLOR UR AUTO: YELLOW
GLUCOSE UR QL STRIP: NEGATIVE
HGB UR QL STRIP: NEGATIVE
KETONES UR QL STRIP: NEGATIVE
LEUKOCYTE ESTERASE UR QL STRIP: NEGATIVE
NITRITE UR QL STRIP: NEGATIVE
PH UR STRIP: 6 [PH] (ref 5–8)
PROT UR QL STRIP: NEGATIVE
SP GR UR STRIP: 1.01 (ref 1–1.03)
URN SPEC COLLECT METH UR: NORMAL

## 2022-03-29 PROCEDURE — 81003 URINALYSIS AUTO W/O SCOPE: CPT | Performed by: UROLOGY

## 2022-03-29 PROCEDURE — 52000 CYSTOURETHROSCOPY: CPT | Mod: S$GLB,,, | Performed by: UROLOGY

## 2022-03-29 PROCEDURE — 87086 URINE CULTURE/COLONY COUNT: CPT | Performed by: UROLOGY

## 2022-03-29 PROCEDURE — 52000 CYSTOSCOPY: ICD-10-PCS | Mod: S$GLB,,, | Performed by: UROLOGY

## 2022-03-29 RX ORDER — SODIUM CHLORIDE 9 MG/ML
INJECTION, SOLUTION INTRAVENOUS CONTINUOUS
Status: CANCELLED | OUTPATIENT
Start: 2022-03-29

## 2022-03-29 RX ORDER — TROSPIUM CHLORIDE 20 MG/1
20 TABLET, FILM COATED ORAL 2 TIMES DAILY
COMMUNITY
Start: 2022-03-17 | End: 2022-05-19 | Stop reason: ALTCHOICE

## 2022-03-29 NOTE — PROCEDURES
"Cystoscopy    Date/Time: 3/29/2022 8:30 AM  Performed by: Sami Ledezma MD  Authorized by: Sami Ledezma MD     Consent Done?:  Yes (Written)  Time out: Immediately prior to procedure a "time out" was called to verify the correct patient, procedure, equipment, support staff and site/side marked as required.    Indications: BPH    Position:  Supine  Anesthesia:  Intraurethral instillation  Patient sedated?: No    Preparation: Patient was prepped and draped in usual sterile fashion      Scope type:  Flexible cystoscope  Stent inserted: No    Stent removed: No    External exam normal: Yes    Digital exam performed: No    Urethra normal: Yes    Prostate normal: No          Hyperplasia (Trilobar)(Length (cm):  7)        Positive VaricesBladder neck normal: Bladder neck normal   Bladder normal: Yes      Patient tolerance:  Patient tolerated the procedure well with no immediate complications      "

## 2022-03-30 LAB — BACTERIA UR CULT: NORMAL

## 2022-04-04 ENCOUNTER — TELEPHONE (OUTPATIENT)
Dept: UROLOGY | Facility: CLINIC | Age: 82
End: 2022-04-04
Payer: MEDICARE

## 2022-04-04 NOTE — TELEPHONE ENCOUNTER
----- Message from Susannah Maki sent at 4/4/2022  8:07 AM CDT -----  Contact: pt  Type:  Needs Medical Advice    Who Called:  pt   Symptoms (please be specific):  would like to get a call back to discuss surgery date pt says on paperwork given by Doctor date is for 4/11/2022 pt informed in chart appt scheduled for 4/18/2022 Please call to clarify   Would the patient rather a call back or a response via [facility]?  Call   Best Call Back Number:  [phone]  Additional Information:

## 2022-04-04 NOTE — TELEPHONE ENCOUNTER
Pedro Shearaquel  would like to move His procedure to May 16th if possible. His grandson is graduating in [place], he has plans to be there for the graduation. Sx department notified.

## 2022-04-05 ENCOUNTER — TELEPHONE (OUTPATIENT)
Dept: UROLOGY | Facility: CLINIC | Age: 82
End: 2022-04-05
Payer: MEDICARE

## 2022-04-05 DIAGNOSIS — Z01.818 PRE-OP TESTING: ICD-10-CM

## 2022-04-05 DIAGNOSIS — N39.0 RECURRENT UTI: Primary | ICD-10-CM

## 2022-04-05 DIAGNOSIS — R35.0 URINARY FREQUENCY: ICD-10-CM

## 2022-04-20 NOTE — TRANSFER OF CARE
"Anesthesia Transfer of Care Note    Patient: Pedro Ponce    Procedure(s) Performed: Procedure(s) (LRB):  COLONOSCOPY (N/A)    Patient location: GI    Anesthesia Type: general    Transport from OR: Transported from OR on 2-3 L/min O2 by NC with adequate spontaneous ventilation    Post pain: adequate analgesia    Post assessment: no apparent anesthetic complications and tolerated procedure well    Post vital signs: unstable (BP low; lisa given)    Level of consciousness: sedated and responds to stimulation    Nausea/Vomiting: no nausea/vomiting    Complications: none    Transfer of care protocol was followed      Last vitals:   Visit Vitals  BP (!) 75/50   Pulse 65   Temp 36.6 °C (97.9 °F)   Resp 16   Ht 5' 11" (1.803 m)   Wt 104.3 kg (230 lb)   SpO2 99%   BMI 32.08 kg/m²     " hallucinations or thoughts of self-harm or harm to others. PAST MEDICAL / SURGICAL / SOCIAL / FAMILY HISTORY      has a past medical history of Anxiety, Arthritis, Asthma, Bipolar I disorder, most recent episode (or current) depressed, unspecified, Clostridium difficile infection, COPD (chronic obstructive pulmonary disease) ([address] Depression, Disease of blood and blood forming organ, Eczema, Fracture, metacarpal, Gastric ulcer, Gastritis, GERD (gastroesophageal reflux disease), GI bleed, H. pylori infection, H/O blood clots, Head injury, Headache, Insomnia, Juvenile rheumatoid arthritis ([address] Neuromuscular disorder ([address] PFAPA syndrome ([address] PUD (peptic ulcer disease), Rheumatoid arthritis ([address] Rheumatoid arthritis(714.0), Severe recurrent major depression without psychotic features ([address] Sleep apnea, Still's disease ([address] Substance abuse ([facility] [address] Suicidal ideation, Suicide attempt by hanging ([facility] [address] Tobacco dependence, Ulcerative colitis ([address] and UTI (urinary tract infection). has a past surgical history that includes Colonoscopy; bronchoscopy; other surgical history; Upper gastrointestinal endoscopy (2/4/16); pr egd transoral biopsy single/multiple (N/A, 3/20/2017); sigmoidoscopy (N/A, 3/20/2017); Cholecystectomy, laparoscopic (07/14/2017); pr esophagogastroduodenoscopy transoral diagnostic (N/A, 8/9/2017); pr colonoscopy w/biopsy single/multiple (8/9/2017); Abdomen surgery; Upper gastrointestinal endoscopy (N/A, 6/13/2018); Upper gastrointestinal endoscopy (N/A, 9/20/2018); and Endoscopy, colon, diagnostic.       Social History     Socioeconomic History    Marital status: Single     Spouse name: Not on file    Number of children: Not on file    Years of education: Not on file    Highest education level: Not on file   Occupational History    Occupation: disability   Tobacco Use    Smoking status: Current Every Day Smoker     Packs/day: 0.50     Years: 15.00     Pack years: 7.50     Types: Cigarettes    Smokeless tobacco: Never Used   Vaping Use    Vaping Use: Former   Substance and Sexual Activity    Alcohol use: Yes     Comment: drinks daily    Drug use: Not Currently     Types: Opiates , Cocaine     Comment: Hx of opiates, benzos, cocaine, alcohol abuse    Sexual activity: Yes     Partners: Female     Comment: Lives alone, not working. Other Topics Concern    Not on file   Social History Narrative    ** Merged History Encounter **          Social Determinants of Health     Financial Resource Strain: Medium Risk    Difficulty of Paying Living Expenses: Somewhat hard   Food Insecurity: No Food Insecurity    Worried About Running Out of Food in the Last Year: Never true    Micaela of Food in the Last Year: Never true   Transportation Needs: No Transportation Needs    Lack of Transportation (Medical): No    Lack of Transportation (Non-Medical):  No   Physical Activity: Inactive    Days of Exercise per Week: 0 days    Minutes of Exercise per Session: 0 min   Stress: Stress Concern Present    Feeling of Stress : Rather much   Social Connections: Socially Isolated    Frequency of Communication with Friends and Family: Never    Frequency of Social Gatherings with Friends and Family: Never    Attends Taoism Services: Never    Active Member of Clubs or Organizations: No    Attends Club or Organization Meetings: Never    Marital Status: Never    Intimate Partner Violence: Not At Risk    Fear of Current or Ex-Partner: No    Emotionally Abused: No    Physically Abused: No    Sexually Abused: No   Housing Stability: Low Risk     Unable to Pay for Housing in the Last Year: No    Number of [place] in the Last Year: 1    Unstable Housing in the Last Year: No       Family History   Problem Relation Age of Onset    Diabetes Father     Alcohol Abuse Father     Depression Father     Arthritis Father     High Blood Pressure Father     Other Father         aneurysm & epilepsy    Migraines Father     Arthritis Mother     Other Mother         aneurysm & epilepsy    Migraines Mother     Diabetes Brother         Aunt and uncles    Depression Brother     Mental Illness Brother     Other Brother         epilepsy    Migraines Brother     Stroke Other         Uncle    Other Brother         murdered Oct 6th, 2014    Colon Cancer Paternal Cousin 43    Other Sister         epilepsy   Sky Solis Migraines Sister        Allergies:  Dicyclomine, Famotidine, Geodon [ziprasidone hcl], Haloperidol, Iv dye [iodides], Reglan [metoclopramide], Ibuprofen, Aspirin, Dicyclomine hcl, Hydroxyzine hcl, Iodine, Metronidazole, Mirtazapine, Promethazine, and Ziprasidone    Home Medications:  Prior to Admission medications    Medication Sig Start Date End Date Taking? Authorizing Provider   traZODone (DESYREL) 50 MG tablet Take 1 tablet by mouth nightly as needed for Sleep 4/8/22   Carmen Melendez MD   DULoxetine (CYMBALTA) 20 MG extended release capsule Take 1 capsule by mouth daily 4/8/22   Carmen Melendez MD   meloxicam (MOBIC) 7.5 MG tablet Take 1 tablet by mouth 2 times daily as needed for Pain 8/19/21 10/30/21  MASSIEL Bautista - CNP       REVIEW OF SYSTEMS    (2-9 systems for level 4, 10 or more for level 5)      Review of Systems   Constitutional: Positive for appetite change, fatigue and fever. Negative for activity change, chills, diaphoresis and unexpected weight change. HENT: Negative. Negative for congestion, facial swelling, hearing loss, postnasal drip, rhinorrhea, sinus pain, sore throat (.imme), tinnitus and voice change. Eyes: Negative for photophobia, pain, discharge, redness, itching and visual disturbance. Respiratory: Positive for cough. Negative for apnea, choking, shortness of breath, wheezing and stridor. Cardiovascular: Positive for chest pain. Negative for palpitations and leg swelling. Gastrointestinal: Positive for diarrhea and vomiting. effort is normal.      Breath sounds: Normal breath sounds. Abdominal:      General: Bowel sounds are normal. There is no distension. Palpations: Abdomen is soft. Tenderness: There is no abdominal tenderness. Musculoskeletal:         General: Tenderness present. No swelling. Normal range of motion. Cervical back: Normal range of motion and neck supple. No rigidity. Right lower leg: No edema. Left lower leg: No edema. Skin:     General: Skin is warm and dry. Capillary Refill: Capillary refill takes less than 2 seconds. Coloration: Skin is not jaundiced or pale. Findings: No erythema. Neurological:      General: No focal deficit present. Mental Status: He is alert and oriented to person, place, and time. Psychiatric:         Mood and Affect: Mood normal.         Behavior: Behavior normal.         Thought Content: Thought content normal.         Judgment: Judgment normal.         DIFFERENTIAL  DIAGNOSIS     PLAN (LABS / IMAGING / EKG):  Orders Placed This Encounter   Procedures    XR TIBIA FIBULA LEFT (2 VIEWS)       MEDICATIONS ORDERED:  Orders Placed This Encounter   Medications    promethazine (PHENERGAN) injection 25 mg    diphenhydrAMINE (BENADRYL) tablet 25 mg    ketorolac (TORADOL) injection 15 mg       DDX: Anxiety, s/p dog bite. DIAGNOSTIC RESULTS / EMERGENCY DEPARTMENT COURSE / MDM     LAB RESULTS:  No results found for this visit on 04/20/22. IMPRESSION:     A 32 y/o M with significant psychiatric history of anxiety, depression,bipolar disorder, PTSD and recent dog bite causing flare up of his anxiety present with nausea, vomiting and pain after he ran out of his prescription of phenergan, benadryl and xanax. He is recommended to remove the sutures and follow up with the PCP and psychiatrist for the medications.      RADIOLOGY:.    EKG  Not applicable    All EKG's are interpreted by the Emergency Department Physician who either signs or Co-signs this chart in the absence of a cardiologist.    EMERGENCY DEPARTMENT COURSE:    Xray tibia and fibula revealed no acute bony abnormalities or radiopaque retained foreign body. 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 He is given IV promethazine and PO benadryl. He was offered to get the sutures removed as almost 6 days were over and the wound appeared to be clean and well approximated. He continued to ask for xanax and stronger pain medications. Discussed in detail with the patient importance of removing suture material as well as following up with the PCP and Psychiatrist for the optimization of his medications. PROCEDURES:  Not applicable    CONSULTS:  None    CRITICAL CARE:  Not applicable    FINAL IMPRESSION      1. Dog bite, subsequent encounter          DISPOSITION / PLAN     DISPOSITION  Decision To Discharge 04/20/2022 06:00:44 PM   He left AMA before paper work was given to him. PATIENT REFERRED TO:  [facility] ED  [address] [address]  [phone]  In 1 day  scheduled suture removal.      DISCHARGE MEDICATIONS:  New Prescriptions    No medications on file         Trini Guerra MD  Emergency Medicine Resident, PGY-1  [facility];  [place], [place]      (Please note that portions of thisnote were completed with a voice recognition program.  Efforts were made to edit the dictations but occasionally words are mis-transcribed.)       Trini Guerra MD  Resident  04/21/22 Lizbeth Del Valle MD  Resident  04/21/22 [id number]

## 2022-05-25 ENCOUNTER — TELEPHONE (OUTPATIENT)
Dept: UROLOGY | Facility: CLINIC | Age: 82
End: 2022-05-25
Payer: MEDICARE

## 2022-05-25 NOTE — TELEPHONE ENCOUNTER
Attempted to return pt call - no answer.     ----- Message from Florinda Merritt sent at 5/25/2022  2:30 PM CDT -----  Regarding: call back  Contact: [phone]  Type:  Needs Medical Advice    Who Called: PT   Symptoms (please be specific): frequent urination and some times it leaks in between   How long has patient had these symptoms:  yesterday  Would the patient rather a call back or a response via [facility]? Call back   Best Call Back Number: [phone]  Additional Information:

## 2022-06-01 ENCOUNTER — TELEPHONE (OUTPATIENT)
Dept: FAMILY MEDICINE | Facility: CLINIC | Age: 82
End: 2022-06-01
Payer: MEDICARE

## 2022-06-01 ENCOUNTER — HOSPITAL ENCOUNTER (EMERGENCY)
Facility: HOSPITAL | Age: 82
Discharge: HOME | End: 2022-06-01
Attending: EMERGENCY MEDICINE
Payer: MEDICARE

## 2022-06-01 VITALS
RESPIRATION RATE: 16 BRPM | OXYGEN SATURATION: 99 % | DIASTOLIC BLOOD PRESSURE: 83 MMHG | HEART RATE: 72 BPM | BODY MASS INDEX: 26.5 KG/M2 | SYSTOLIC BLOOD PRESSURE: 164 MMHG | WEIGHT: 190 LBS | TEMPERATURE: 98 F

## 2022-06-01 DIAGNOSIS — R06.6 HICCUPS: Primary | ICD-10-CM

## 2022-06-01 DIAGNOSIS — R07.89 CHEST TIGHTNESS: ICD-10-CM

## 2022-06-01 LAB
ALBUMIN SERPL BCP-MCNC: 4.3 G/DL (ref 3.5–5.2)
ALP SERPL-CCNC: 75 U/L (ref 55–135)
ALT SERPL W/O P-5'-P-CCNC: 23 U/L (ref 10–44)
ANION GAP SERPL CALC-SCNC: 13 MMOL/L (ref 8–16)
AST SERPL-CCNC: 27 U/L (ref 10–40)
BASOPHILS # BLD AUTO: 0.04 K/UL (ref 0–0.2)
BASOPHILS NFR BLD: 0.8 % (ref 0–1.9)
BILIRUB SERPL-MCNC: 0.6 MG/DL (ref 0.1–1)
BNP SERPL-MCNC: 174 PG/ML (ref 0–99)
BUN SERPL-MCNC: 30 MG/DL (ref 8–23)
CALCIUM SERPL-MCNC: 9.9 MG/DL (ref 8.7–10.5)
CHLORIDE SERPL-SCNC: 102 MMOL/L (ref 95–110)
CO2 SERPL-SCNC: 21 MMOL/L (ref 23–29)
CREAT SERPL-MCNC: 1.7 MG/DL (ref 0.5–1.4)
DIFFERENTIAL METHOD: ABNORMAL
EOSINOPHIL # BLD AUTO: 0.3 K/UL (ref 0–0.5)
EOSINOPHIL NFR BLD: 5.2 % (ref 0–8)
ERYTHROCYTE [DISTWIDTH] IN BLOOD BY AUTOMATED COUNT: 15.2 % (ref 11.5–14.5)
EST. GFR  (AFRICAN AMERICAN): 42 ML/MIN/1.73 M^2
EST. GFR  (NON AFRICAN AMERICAN): 36 ML/MIN/1.73 M^2
GLUCOSE SERPL-MCNC: 97 MG/DL (ref 70–110)
HCT VFR BLD AUTO: 38.4 % (ref 40–54)
HGB BLD-MCNC: 12.8 G/DL (ref 14–18)
IMM GRANULOCYTES # BLD AUTO: 0.01 K/UL (ref 0–0.04)
IMM GRANULOCYTES NFR BLD AUTO: 0.2 % (ref 0–0.5)
LYMPHOCYTES # BLD AUTO: 1.6 K/UL (ref 1–4.8)
LYMPHOCYTES NFR BLD: 31.3 % (ref 18–48)
MCH RBC QN AUTO: 31.7 PG (ref 27–31)
MCHC RBC AUTO-ENTMCNC: 33.3 G/DL (ref 32–36)
MCV RBC AUTO: 95 FL (ref 82–98)
MONOCYTES # BLD AUTO: 0.6 K/UL (ref 0.3–1)
MONOCYTES NFR BLD: 11.4 % (ref 4–15)
NEUTROPHILS # BLD AUTO: 2.6 K/UL (ref 1.8–7.7)
NEUTROPHILS NFR BLD: 51.1 % (ref 38–73)
NRBC BLD-RTO: 0 /100 WBC
PLATELET # BLD AUTO: 284 K/UL (ref 150–450)
PMV BLD AUTO: 9.9 FL (ref 9.2–12.9)
POTASSIUM SERPL-SCNC: 4.9 MMOL/L (ref 3.5–5.1)
PROT SERPL-MCNC: 7.5 G/DL (ref 6–8.4)
RBC # BLD AUTO: 4.04 M/UL (ref 4.6–6.2)
SODIUM SERPL-SCNC: 136 MMOL/L (ref 136–145)
TROPONIN I SERPL DL<=0.01 NG/ML-MCNC: <0.006 NG/ML (ref 0–0.03)
WBC # BLD AUTO: 5.01 K/UL (ref 3.9–12.7)

## 2022-06-01 PROCEDURE — 83880 ASSAY OF NATRIURETIC PEPTIDE: CPT | Performed by: PHYSICIAN ASSISTANT

## 2022-06-01 PROCEDURE — 63600175 PHARM REV CODE 636 W HCPCS: Performed by: STUDENT IN AN ORGANIZED HEALTH CARE EDUCATION/TRAINING PROGRAM

## 2022-06-01 PROCEDURE — 93010 ELECTROCARDIOGRAM REPORT: CPT | Mod: ,,, | Performed by: INTERNAL MEDICINE

## 2022-06-01 PROCEDURE — 96375 TX/PRO/DX INJ NEW DRUG ADDON: CPT

## 2022-06-01 PROCEDURE — 85025 COMPLETE CBC W/AUTO DIFF WBC: CPT | Performed by: PHYSICIAN ASSISTANT

## 2022-06-01 PROCEDURE — 93005 ELECTROCARDIOGRAM TRACING: CPT

## 2022-06-01 PROCEDURE — 84484 ASSAY OF TROPONIN QUANT: CPT | Performed by: PHYSICIAN ASSISTANT

## 2022-06-01 PROCEDURE — 80053 COMPREHEN METABOLIC PANEL: CPT | Performed by: PHYSICIAN ASSISTANT

## 2022-06-01 PROCEDURE — C9113 INJ PANTOPRAZOLE SODIUM, VIA: HCPCS | Performed by: STUDENT IN AN ORGANIZED HEALTH CARE EDUCATION/TRAINING PROGRAM

## 2022-06-01 PROCEDURE — 96374 THER/PROPH/DIAG INJ IV PUSH: CPT

## 2022-06-01 PROCEDURE — 99285 EMERGENCY DEPT VISIT HI MDM: CPT | Mod: 25

## 2022-06-01 PROCEDURE — 93010 EKG 12-LEAD: ICD-10-PCS | Mod: ,,, | Performed by: INTERNAL MEDICINE

## 2022-06-01 PROCEDURE — 96361 HYDRATE IV INFUSION ADD-ON: CPT

## 2022-06-01 RX ORDER — PANTOPRAZOLE SODIUM 20 MG/1
20 TABLET, DELAYED RELEASE ORAL 2 TIMES DAILY
Qty: 28 TABLET | Refills: 0 | Status: SHIPPED | OUTPATIENT
Start: 2022-06-01 | End: 2022-06-20

## 2022-06-01 RX ORDER — METOCLOPRAMIDE HYDROCHLORIDE 5 MG/ML
10 INJECTION INTRAMUSCULAR; INTRAVENOUS
Status: COMPLETED | OUTPATIENT
Start: 2022-06-01 | End: 2022-06-01

## 2022-06-01 RX ORDER — METOCLOPRAMIDE HYDROCHLORIDE 5 MG/ML
5 INJECTION INTRAMUSCULAR; INTRAVENOUS
Status: COMPLETED | OUTPATIENT
Start: 2022-06-01 | End: 2022-06-01

## 2022-06-01 RX ORDER — PANTOPRAZOLE SODIUM 40 MG/10ML
40 INJECTION, POWDER, LYOPHILIZED, FOR SOLUTION INTRAVENOUS
Status: COMPLETED | OUTPATIENT
Start: 2022-06-01 | End: 2022-06-01

## 2022-06-01 RX ORDER — METOCLOPRAMIDE 10 MG/1
10 TABLET ORAL EVERY 6 HOURS PRN
Qty: 30 TABLET | Refills: 0 | Status: SHIPPED | OUTPATIENT
Start: 2022-06-01 | End: 2022-06-20

## 2022-06-01 RX ADMIN — METOCLOPRAMIDE 5 MG: 5 INJECTION, SOLUTION INTRAMUSCULAR; INTRAVENOUS at 09:06

## 2022-06-01 RX ADMIN — SODIUM CHLORIDE, SODIUM LACTATE, POTASSIUM CHLORIDE, AND CALCIUM CHLORIDE 500 ML: .6; .31; .03; .02 INJECTION, SOLUTION INTRAVENOUS at 06:06

## 2022-06-01 RX ADMIN — METOCLOPRAMIDE 10 MG: 5 INJECTION, SOLUTION INTRAMUSCULAR; INTRAVENOUS at 06:06

## 2022-06-01 RX ADMIN — PANTOPRAZOLE SODIUM 40 MG: 40 INJECTION, POWDER, LYOPHILIZED, FOR SOLUTION INTRAVENOUS at 06:06

## 2022-06-01 NOTE — ED PROVIDER NOTES
Encounter Date: 6/1/2022       History     Chief Complaint   Patient presents with    Hiccups     Pt reports he has had intermittent hiccups since Tuesday. Pt states he had a surgical procedure on his prostate on 5/23 with no complications. Pt not having visible hiccups in triage.      81yo M with PMH HTN, HLD, DM, gout, and BPH s/p prostate enucleation on 5/23/22 presenting to ED with complaint of intractable hiccups over the past 8 days.  His hiccups began the day after his surgical procedure.  He states that it is nearly constant.  He reports that he has had hiccups lasting for a few days in the past but never this severe.  He has been eating and drinking appropriately.  He has been taking Bactrim which he was prescribed postoperatively.  He denies fevers, chills, chest pain, shortness of breath, or urinary symptoms.        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     Past Surgical History:   Procedure Laterality Date    COLONOSCOPY N/A 04/09/2018    Procedure: COLONOSCOPY;  Surgeon: HAIM Mir MD;  Location: [address]);  Service: Endoscopy;  Laterality: N/A;    COLONOSCOPY N/A 01/14/2019    Procedure: COLONOSCOPY;  Surgeon: HAIM Mir MD;  Location: [address]);  Service: Endoscopy;  Laterality: N/A;  PM prep    CYST REMOVAL  01/01/2000    removed from left kidney    KIDNEY SURGERY      LASER ENUCLEATION OF PROSTATE N/A 5/23/2022    Procedure: ENUCLEATION, PROSTATE, USING LASER;  Surgeon: Sami Leedzma MD;  Location[facility];  Service: Urology;  Laterality: N/A;     LEFT HEART CATHETERIZATION Left 12/25/2020    Procedure: Left heart cath;  Surgeon: Alan Rothman MD;  Location: [facility] CATH LAB;  Service: Cardiology;  Laterality: Left;    PROSTATE SURGERY      TONSILLECTOMY       Family History   Problem Relation Age of Onset    Cancer Mother         unkn ca    Diabetes Mother     Glaucoma Mother     Heart disease Mother     Hypertension Mother     Blindness Neg Hx     Macular degeneration Neg Hx     Retinal detachment Neg Hx      Social History     Tobacco Use    Smoking status: Never Smoker    Smokeless tobacco: Never Used   Substance Use Topics    Alcohol use: No    Drug use: No     Review of Systems   Constitutional: Negative for appetite change, chills and fever.   HENT: Negative for congestion and rhinorrhea.    Eyes: Negative for pain and visual disturbance.   Respiratory: Negative for cough and shortness of breath.    Cardiovascular: Negative for chest pain and palpitations.   Gastrointestinal: Negative for abdominal pain, nausea and vomiting.   Genitourinary: Negative for difficulty urinating and dysuria.   Musculoskeletal: Negative for gait problem and myalgias.   Skin: Negative for color change and rash.   Neurological: Negative for light-headedness and headaches.       Physical Exam     Initial Vitals [06/01/22 1425]   BP Pulse Resp Temp SpO2   133/71 (!) 56 18 98.8 °F (37.1 °C) 100 %      MAP       --         Physical Exam    Nursing note and vitals reviewed.  Constitutional: He is not diaphoretic. No distress.   HENT:   Head: Normocephalic and atraumatic.   Mouth/Throat: Oropharynx is clear and moist.   +hiccups   Eyes: Conjunctivae and EOM are normal.   Neck: Neck supple.   Normal range of motion.  Cardiovascular: Normal rate, regular rhythm and normal heart sounds.   Pulmonary/Chest: Breath sounds normal. He has no wheezes. He has no rhonchi. He has no rales.   Abdominal: Abdomen is soft. He exhibits no distension. There is no abdominal  tenderness.   Musculoskeletal:         General: No edema. Normal range of motion.      Cervical back: Normal range of motion and neck supple.     Neurological: He is alert and oriented to person, place, and time. He has normal strength. No sensory deficit.   Skin: Skin is warm and dry. Capillary refill takes less than 2 seconds.         ED Course   Procedures  Labs Reviewed   CBC W/ AUTO DIFFERENTIAL - Abnormal; Notable for the following components:       Result Value    RBC 4.04 (*)     Hemoglobin 12.8 (*)     Hematocrit 38.4 (*)     MCH 31.7 (*)     RDW 15.2 (*)     All other components within normal limits   COMPREHENSIVE METABOLIC PANEL - Abnormal; Notable for the following components:    CO2 21 (*)     BUN 30 (*)     Creatinine 1.7 (*)     eGFR if  42 (*)     eGFR if non  36 (*)     All other components within normal limits   B-TYPE NATRIURETIC PEPTIDE - Abnormal; Notable for the following components:     (*)     All other components within normal limits   TROPONIN I          Imaging Results          X-Ray Chest 1 View (Final result)  Result time 06/01/22 15:30:23    Final result by Major Holt MD (06/01/22 15:30:23)                 Impression:      Right infrahilar airspace opacity.      Electronically signed by: Major Holt MD  Date:    06/01/2022  Time:    15:30             Narrative:    EXAMINATION:  XR CHEST 1 VIEW    CLINICAL HISTORY:  shortness of breath;    TECHNIQUE:  Single frontal view of the chest was performed.    COMPARISON:  08/27/2021.    FINDINGS:  The trachea is unremarkable.  The cardiomediastinal silhouette is within normal limits.  The hemidiaphragms are unremarkable.  There are no pleural effusions.  There is no evidence of a pneumothorax.  There is no evidence of pneumomediastinum.  There is a right infrahilar airspace opacity.  The osseous structures are unremarkable.                                 Medications   pantoprazole injection 40 mg (40  mg Intravenous Given 6/1/22 1856)   metoclopramide HCl injection 10 mg (10 mg Intravenous Given 6/1/22 1856)   lactated ringers bolus 500 mL (0 mLs Intravenous Stopped 6/1/22 2001)   metoclopramide HCl injection 5 mg (5 mg Intravenous Given 6/1/22 2103)     Medical Decision Making:   History:   Old Medical Records: I decided to obtain old medical records.  Initial Assessment:   81yo M with PMH HTN, HLD, DM, gout, and BPH s/p prostate enucleation on 5/23/22 presenting to ED with complaint of intractable hiccups over the past 8 days.   Differential Diagnosis:   Hiccups 2/2 anesthesia vs medication-induced vs postoperative due to phrenic nerve irritation vs toxic-metabolic  Clinical Tests:   Lab Tests: Ordered and Reviewed  Radiological Study: Ordered and Reviewed  ED Management:  Patient is hypertensive but otherwise hemodynamically stable.  He states that he was supposed to take his antihypertensive medication around 7:00 p.m.  Labs notable for mildly elevated creatinine of 1.7 and . Chest x-ray appears negative.  Radiology did report a small right infrahilar airspace opacity.  However, it appears very similar to a previous x-ray from 2017. There is no indication for a CT scan to further investigate at this time as patient is satting 99% on room air and has no respiratory symptoms.  There is no sign of pulmonary edema on chest x-ray lowering suspicion for fluid overload.  Patient has no lower extremity edema.  Therefore, patient given small IV fluid bolus for mild KOURTNEY.  Patient instructed to follow-up with his primary care doctor.  Patient given Reglan and pantoprazole for hiccups.  On reassessment, patient states his hiccups have resolved and he is feeling much better.  Patient discharged home with Reglan, pantoprazole and return precautions.  He was counseled to follow-up with his PCP.  All questions answered.             ED Course as of 06/01/22 2110 Wed Jun 01, 2022 2059 Attestation. The patient was  seen independently by me. Evaluation and disposition were discussed with me.  The patient is an 82-year-old male who presents to the emergency department with hiccups for the past few days.  The patient had recent BPH surgery on 05/23/2022 at [facility].  He has a slightly elevated creatinine of 1.7 and was given some IV fluids.  The remainder of his labs are within normal limits.  He was given Reglan and Protonix and his hiccups have resolved.  He will be discharged at this time with a prescription for Reglan and Protonix and he needs to follow up with his primary care doctor.  He will be encouraged to follow up in the emergency department if his hiccups return and do not resolve. [ST]      ED Course User Index  [ST] Sandee Miller MD             Clinical Impression:   Final diagnoses:  [R06.6] Hiccups (Primary)  [R07.89] Chest tightness          ED Disposition Condition    Discharge Stable        ED Prescriptions     Medication Sig Dispense Start Date End Date Auth. Provider    pantoprazole (PROTONIX) 20 MG tablet Take 1 tablet (20 mg total) by mouth 2 (two) times a day. for 14 days 28 tablet 6/1/2022 6/15/2022 Gabriela Call MD    metoclopramide HCl (REGLAN) 10 MG tablet Take 1 tablet (10 mg total) by mouth every 6 (six) hours as needed (hiccups). 30 tablet 6/1/2022  Gabriela Call MD        Follow-up Information     Follow up With Specialties Details Why Contact [facility] - Emergency Dept Emergency Medicine  As needed, If symptoms worsen [address] [facility] [id number] [phone]           Gabriela Call MD  Resident  06/01/22 9032

## 2022-06-01 NOTE — TELEPHONE ENCOUNTER
----- Message from Grace Royal sent at 6/1/2022 12:12 PM CDT -----  Regarding: Same Day Appt  Type:  Sooner Apoointment Request    Caller is requesting a sooner appointment.  Caller declined first available appointment listed below.  Caller will not accept being placed on the waitlist and is requesting a message be sent to doctor.  Name of Caller: pt  When is the first available appointment? 07-02-22  Symptoms: hiccups and trouble breathing  Would the patient rather a call back or a response via [facility]? call  Best Call Back Number:[phone]

## 2022-06-01 NOTE — FIRST PROVIDER EVALUATION
Emergency Department TeleTriage Encounter Note      CHIEF COMPLAINT    Chief Complaint   Patient presents with    Hiccups     Pt reports he has had intermittent hiccups since Tuesday. Pt states he had a surgical procedure on his prostate on 5/23 with no complications. Pt not having visible hiccups in triage.        VITAL SIGNS   Initial Vitals [06/01/22 1425]   BP Pulse Resp Temp SpO2   133/71 (!) 56 18 98.8 °F (37.1 °C) 100 %      MAP       --            ALLERGIES    Review of patient's allergies indicates:   Allergen Reactions    Pcn [penicillins] Rash       PROVIDER TRIAGE NOTE  This is a teletriage evaluation of a 82 y.o. male presenting to the ED complaining of hiccups. Patient reports hiccups intermittently for the past 2-3 days since surgical procedure. Patient also reports worsening chest tightness and shortness of breath.    He appears uncomfortable. Sitting upright in chair. No hiccups during teletriage encounter.     Initial orders will be placed and care will be transferred to an alternate provider when patient is roomed for a full evaluation. Any additional orders and the final disposition will be determined by that provider.           ORDERS  Labs Reviewed   CBC W/ AUTO DIFFERENTIAL   COMPREHENSIVE METABOLIC PANEL   TROPONIN I   B-TYPE NATRIURETIC PEPTIDE       ED Orders (720h ago, onward)    Start Ordered     Status Ordering Provider    06/01/22 1433 06/01/22 1432  Troponin I  STAT         Ordered YULI GILLILAND    06/01/22 1433 06/01/22 1432  Brain Natriuretic Peptide  STAT         Ordered YULI GILLILAND    06/01/22 1432 06/01/22 1432  CBC Auto Differential  STAT         Ordered YULI GILLILAND    06/01/22 1432 06/01/22 1432  Comprehensive Metabolic Panel  STAT         Ordered YULI GILLILAND    06/01/22 1432 06/01/22 1432  Pulse Oximetry Continuous  Continuous         Ordered YULI GILLILAND    06/01/22 1432 06/01/22 1432  Cardiac Monitoring - Adult  Continuous         Ordered YULI GILLILAND     06/01/22 1432 06/01/22 1432  EKG 12-lead  Once         Ordered YULI GILLILAND    06/01/22 1432 06/01/22 1432  X-Ray Chest 1 View  1 time imaging         Ordered YULI GILLILAND            Virtual Visit Note: The provider triage portion of this emergency department evaluation and documentation was performed via [employer], a HIPAA-compliant telemedicine application, in concert with a tele-presenter in the room. A face to face patient evaluation with one of my colleagues will occur once the patient is placed in an emergency department room.      DISCLAIMER: This note was prepared with [employer] voice recognition transcription software. Garbled syntax, mangled pronouns, and other bizarre constructions may be attributed to that software system.

## 2022-06-01 NOTE — TELEPHONE ENCOUNTER
Spoke with Bryan and advised him to go to ED to due having problems breathing. Patient voiced understanding.

## 2022-06-02 NOTE — DISCHARGE INSTRUCTIONS
Diagnosis:   1. Hiccups      Home Care Instructions:  - Medications: Continue taking your home medications as prescribed  - For hiccups, take Pantoprazole twice daily. Take Reglan every 6 hours as needed.     Follow-Up Plan:  - Follow-up with: Primary care doctor within 5 days  - Additional testing and/or evaluation will be directed by your primary doctor    Return to the Emergency Department for symptoms including but not limited to: worsening symptoms, severe back pain, shortness of breath or chest pain, vomiting with inability to hold down fluids, blood in vomit or poop, fevers greater than 100.4°F, passing out/fainting/unconsciousness, or other concerning symptoms.

## 2022-06-02 NOTE — ED NOTES
Assumed care of pt c/o hiccups x one day. Pt has no complaints at this time and reports feeling better. +A/O x 4 w/ ABCs intact, NAD. VSS.     Review of patient's allergies indicates:   Allergen Reactions    Pcn [penicillins] Rash        Patient has verified the spelling of their name and  on armband.   APPEARANCE: Patient is alert, calm, oriented x 4, and does not appear distressed.  SKIN: Skin is normal for race, warm, and dry. Normal skin turgor and mucous membranes moist.  CARDIAC: Normal rate and rhythm, no murmur heard.   RESPIRATORY:Normal rate and effort. Breath sounds clear bilaterally throughout chest. Respirations are equal and unlabored.    GASTRO: Bowel sounds normal, abdomen is soft, no tenderness, and no abdominal distention.  MUSCLE: Full ROM. No bony tenderness or soft tissue tenderness. No obvious deformity.  PERIPHERAL VASCULAR: peripheral pulses present. Normal cap refill. No edema. Warm to touch.  NEURO: 5/5 strength major flexors/extensors bilaterally. Sensory intact to light touch bilaterally. Munday coma scale: eyes open spontaneously-4, oriented & converses-5, obeys commands-6. No neurological abnormalities.   MENTAL STATUS: awake, alert and aware of environment.  : Voids without complication      ED w/u and orders in progress. Pt SR up x 2. Bed in lowest position with wheels locked. Call bell within reach of pt.

## 2022-06-07 ENCOUNTER — HOSPITAL ENCOUNTER (EMERGENCY)
Facility: HOSPITAL | Age: 82
Discharge: HOME | End: 2022-06-07
Attending: EMERGENCY MEDICINE
Payer: MEDICARE

## 2022-06-07 VITALS
HEIGHT: 71 IN | OXYGEN SATURATION: 100 % | SYSTOLIC BLOOD PRESSURE: 134 MMHG | HEART RATE: 68 BPM | BODY MASS INDEX: 26.6 KG/M2 | RESPIRATION RATE: 16 BRPM | WEIGHT: 190 LBS | TEMPERATURE: 99 F | DIASTOLIC BLOOD PRESSURE: 78 MMHG

## 2022-06-07 DIAGNOSIS — N30.01 ACUTE CYSTITIS WITH HEMATURIA: ICD-10-CM

## 2022-06-07 DIAGNOSIS — R06.6 INTRACTABLE HICCUPS: Primary | ICD-10-CM

## 2022-06-07 LAB
ALBUMIN SERPL BCP-MCNC: 4.2 G/DL (ref 3.5–5.2)
ALP SERPL-CCNC: 80 U/L (ref 55–135)
ALT SERPL W/O P-5'-P-CCNC: 19 U/L (ref 10–44)
ANION GAP SERPL CALC-SCNC: 7 MMOL/L (ref 8–16)
AST SERPL-CCNC: 24 U/L (ref 10–40)
BACTERIA #/AREA URNS HPF: ABNORMAL /HPF
BILIRUB SERPL-MCNC: 0.5 MG/DL (ref 0.1–1)
BILIRUB UR QL STRIP: NEGATIVE
BUN SERPL-MCNC: 20 MG/DL (ref 8–23)
CALCIUM SERPL-MCNC: 10.1 MG/DL (ref 8.7–10.5)
CHLORIDE SERPL-SCNC: 101 MMOL/L (ref 95–110)
CLARITY UR: ABNORMAL
CO2 SERPL-SCNC: 28 MMOL/L (ref 23–29)
COLOR UR: ABNORMAL
CREAT SERPL-MCNC: 1.4 MG/DL (ref 0.5–1.4)
ERYTHROCYTE [DISTWIDTH] IN BLOOD BY AUTOMATED COUNT: 14.7 % (ref 11.5–14.5)
EST. GFR  (AFRICAN AMERICAN): 53 ML/MIN/1.73 M^2
EST. GFR  (NON AFRICAN AMERICAN): 46 ML/MIN/1.73 M^2
GLUCOSE SERPL-MCNC: 109 MG/DL (ref 70–110)
GLUCOSE UR QL STRIP: NEGATIVE
HCT VFR BLD AUTO: 37.9 % (ref 40–54)
HGB BLD-MCNC: 12.6 G/DL (ref 14–18)
HGB UR QL STRIP: ABNORMAL
HYALINE CASTS #/AREA URNS LPF: 0 /LPF
KETONES UR QL STRIP: NEGATIVE
LEUKOCYTE ESTERASE UR QL STRIP: ABNORMAL
MCH RBC QN AUTO: 31.1 PG (ref 27–31)
MCHC RBC AUTO-ENTMCNC: 33.2 G/DL (ref 32–36)
MCV RBC AUTO: 94 FL (ref 82–98)
MICROSCOPIC COMMENT: ABNORMAL
NITRITE UR QL STRIP: NEGATIVE
PH UR STRIP: 6 [PH] (ref 5–8)
PLATELET # BLD AUTO: 261 K/UL (ref 150–450)
PMV BLD AUTO: 9.8 FL (ref 9.2–12.9)
POTASSIUM SERPL-SCNC: 4.4 MMOL/L (ref 3.5–5.1)
PROT SERPL-MCNC: 7.5 G/DL (ref 6–8.4)
PROT UR QL STRIP: ABNORMAL
RBC # BLD AUTO: 4.05 M/UL (ref 4.6–6.2)
RBC #/AREA URNS HPF: >100 /HPF (ref 0–4)
SODIUM SERPL-SCNC: 136 MMOL/L (ref 136–145)
SP GR UR STRIP: 1.01 (ref 1–1.03)
URN SPEC COLLECT METH UR: ABNORMAL
UROBILINOGEN UR STRIP-ACNC: NEGATIVE EU/DL
WBC # BLD AUTO: 6.59 K/UL (ref 3.9–12.7)
WBC #/AREA URNS HPF: >100 /HPF (ref 0–5)

## 2022-06-07 PROCEDURE — 81000 URINALYSIS NONAUTO W/SCOPE: CPT | Performed by: EMERGENCY MEDICINE

## 2022-06-07 PROCEDURE — 96360 HYDRATION IV INFUSION INIT: CPT

## 2022-06-07 PROCEDURE — 25000003 PHARM REV CODE 250: Performed by: EMERGENCY MEDICINE

## 2022-06-07 PROCEDURE — 85027 COMPLETE CBC AUTOMATED: CPT | Performed by: EMERGENCY MEDICINE

## 2022-06-07 PROCEDURE — 80053 COMPREHEN METABOLIC PANEL: CPT | Performed by: EMERGENCY MEDICINE

## 2022-06-07 PROCEDURE — 99284 EMERGENCY DEPT VISIT MOD MDM: CPT | Mod: 25

## 2022-06-07 RX ORDER — GABAPENTIN 100 MG/1
100 CAPSULE ORAL 3 TIMES DAILY
Qty: 30 CAPSULE | Refills: 0 | Status: SHIPPED | OUTPATIENT
Start: 2022-06-07 | End: 2023-07-31

## 2022-06-07 RX ORDER — NITROFURANTOIN 25; 75 MG/1; MG/1
100 CAPSULE ORAL 2 TIMES DAILY
Qty: 14 CAPSULE | Refills: 0 | Status: SHIPPED | OUTPATIENT
Start: 2022-06-07 | End: 2022-06-14

## 2022-06-07 RX ADMIN — SODIUM CHLORIDE 500 ML: 0.9 INJECTION, SOLUTION INTRAVENOUS at 05:06

## 2022-06-07 NOTE — ED PROVIDER NOTES
Encounter Date: 6/7/2022       History     Chief Complaint   Patient presents with    Hiccups     Patient states he has been suffering with hiccups for the last 2 weeks. States he was seen on 6/1 and prescribed medication but has not helped. Patient states hiccups have currently resolved for the past 30 minutes. States this might be the longest he has been hiccup free.     Hematuria     Noticed blood tinged urine x 1 day. States bleeding started after a prostate procedure on May 23rd but had stopped for a while. Denies dysuria     Pedro Ponce Jr. is a 82 y.o. 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    The patient presents to the ED due to hiccups.   Patient reports symptoms started after prostate surgery on 5/23.   He was seen in the ED and started on medication, but reports no improvement.   He states the symptoms are worse at night, interfering with his sleep. He also states it is hard to breathe when the hiccups are present.  He denies any vomiting, fever, abdominal pain, CP, urinary complaints, or any other concerns.   He recently completed a course of ABX prescribed by his Urologist.        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     Past Surgical History:   Procedure Laterality Date    COLONOSCOPY N/A 04/09/2018     Procedure: COLONOSCOPY;  Surgeon: HAIM Mir MD;  [facility]  Service: Endoscopy;  Laterality: N/A;    COLONOSCOPY N/A 01/14/2019    Procedure: COLONOSCOPY;  Surgeon: HAIM Mir MD;  [facility]  Service: Endoscopy;  Laterality: N/A;  PM prep    CYST REMOVAL  01/01/2000    removed from left kidney    KIDNEY SURGERY      LASER ENUCLEATION OF PROSTATE N/A 5/23/2022    Procedure: ENUCLEATION, PROSTATE, USING LASER;  Surgeon: Sami Ledezma MD;  Location: [facility] OR;  Service: Urology;  Laterality: N/A;    LEFT HEART CATHETERIZATION Left 12/25/2020    Procedure: Left heart cath;  Surgeon: Alan Rothman MD;  Location: [facility] CATH LAB;  Service: Cardiology;  Laterality: Left;    PROSTATE SURGERY      TONSILLECTOMY       Family History   Problem Relation Age of Onset    Cancer Mother         unkn ca    Diabetes Mother     Glaucoma Mother     Heart disease Mother     Hypertension Mother     Blindness Neg Hx     Macular degeneration Neg Hx     Retinal detachment Neg Hx      Social History     Tobacco Use    Smoking status: Never Smoker    Smokeless tobacco: Never Used   Substance Use Topics    Alcohol use: No    Drug use: No     Review of Systems   Constitutional: Negative for chills and fever.   HENT: Negative for sore throat.    Respiratory: Positive for shortness of breath.    Cardiovascular: Negative for chest pain.   Gastrointestinal: Negative for constipation, diarrhea, nausea and vomiting.   Genitourinary: Negative for dysuria, frequency and urgency.   Musculoskeletal: Negative for back pain.   Skin: Negative for rash and wound.   Neurological: Negative for weakness.   Hematological: Does not bruise/bleed easily.   Psychiatric/Behavioral: Negative for agitation, behavioral problems and confusion.       Physical Exam     Initial Vitals [06/07/22 0335]   BP Pulse Resp Temp SpO2   (!) 144/68 66 18 98 °F (36.7 °C) 99 %      MAP       --          Physical Exam    Nursing note and vitals reviewed.  Constitutional: He appears well-developed and well-nourished. He is not diaphoretic. No distress.   Well-appearing, in no distress.  No hiccups present.    HENT:   Head: Normocephalic and atraumatic.   Mouth/Throat: Oropharynx is clear and moist.   Eyes: EOM are normal. Pupils are equal, round, and reactive to light.   Neck: No tracheal deviation present.   Cardiovascular: Normal rate, regular rhythm, normal heart sounds and intact distal pulses.   Pulmonary/Chest: Breath sounds normal. No stridor. No respiratory distress.   Abdominal: Abdomen is soft. He exhibits no distension and no mass. There is no abdominal tenderness.   Musculoskeletal:         General: No edema. Normal range of motion.     Neurological: He is alert and oriented to person, place, and time. No cranial nerve deficit or sensory deficit.   Skin: Skin is warm and dry. Capillary refill takes less than 2 seconds. No rash noted.   Psychiatric: He has a normal mood and affect. His behavior is normal. Thought content normal.         ED Course   Procedures  Labs Reviewed   URINALYSIS - Abnormal; Notable for the following components:       Result Value    Color, UA Brown (*)     Appearance, UA Cloudy (*)     Protein, UA 3+ (*)     Occult Blood UA 3+ (*)     Leukocytes, UA 3+ (*)     All other components within normal limits   CBC WITHOUT DIFFERENTIAL - Abnormal; Notable for the following components:    RBC 4.05 (*)     Hemoglobin 12.6 (*)     Hematocrit 37.9 (*)     MCH 31.1 (*)     RDW 14.7 (*)     All other components within normal limits   COMPREHENSIVE METABOLIC PANEL - Abnormal; Notable for the following components:    Anion Gap 7 (*)     eGFR if  53 (*)     eGFR if non  46 (*)     All other components within normal limits   URINALYSIS MICROSCOPIC - Abnormal; Notable for the following components:    RBC, UA >100 (*)     WBC, UA >100 (*)     Bacteria Many (*)      All other components within normal limits          Imaging Results          X-Ray Chest AP Portable (Final result)  Result time 06/07/22 05:50:33    Final result by Omer Foster MD (06/07/22 05:50:33)                 Impression:      No significant detrimental change compared to prior study of 06/01/2022.      Electronically signed by: Omer Foster MD  Date:    06/07/2022  Time:    05:50             Narrative:    EXAMINATION:  XR CHEST AP PORTABLE    CLINICAL HISTORY:  intractable singultus;    TECHNIQUE:  Single frontal view of the chest was performed.    COMPARISON:  06/01/2022    FINDINGS:  Cardiac silhouette is not significantly enlarged.  There is atherosclerotic calcification of the thoracic aorta.  The lungs demonstrate coarse interstitial attenuation, similar to prior examination.  No new large confluent airspace consolidation appreciated.  No significant volume of pleural fluid or pneumothorax identified.  Osseous structures demonstrate degenerative changes.                                 Medications   sodium chloride 0.9% bolus 500 mL (0 mLs Intravenous Stopped 6/7/22 0600)     Medical Decision Making:   History:   Old Medical Records: I decided to obtain old medical records.  Old Records Summarized: records from clinic visits and other records.       <> Summary of Records: Seen in ED 6/1 for hiccups. Treated with reglan and protonix.   Initial Assessment:   81 yo M with intractable hiccups.   Vitals and exam benign. No hiccups during history or physical exam.  Will obtain labs and reassess.   Differential Diagnosis:   DDX:   Electrolyte disturbance, diaphragmatic irritation, idiopathic hiccups  Clinical Tests:   Lab Tests: Ordered and Reviewed  Radiological Study: Ordered and Reviewed  ED Management:  Labs appear at baseline.  UA concerning for UTI. Given recent course of ABX without improvement, will RX Macrobid after review of prior Urine culture.  Given patient's age and current medications,  thorazine felt to be high risk for side-effects.   Will trial gabapentin.  Patient instructed to f/u with PCP if symptoms persist. Return precautions given.    On re-evaluation, the patient's status has improved.  After complete ED evaluation, clinical impression is most consistent with UTI, hiccups.  PCP follow-up within 2-3 days was recommended.    After taking into careful account the patient's history, physical exam findings, as well as empirical and objective data obtained throughout ED workup, I feel no emergent medical condition has been identified. No further evaluation or admission was felt to be required, and the patient is stable for discharge from the ED. The patient and any additional family present were updated with test results, overall clinical impression, and recommended further plan of care, including discharge instructions as provided and outpatient follow-up for continued evaluation and management as needed. All questions were answered. The patient expressed understanding and agreed with current plan for discharge and follow-up plan of care. Strict ED return precautions were provided, including return/worsening of current symptoms, new symptoms, or any other concerns.                        Clinical Impression:   Final diagnoses:  [R06.6] Intractable hiccups (Primary)  [N30.01] Acute cystitis with hematuria          ED Disposition Condition    Discharge Stable        ED Prescriptions     Medication Sig Dispense Start Date End Date Auth. Provider    gabapentin (NEURONTIN) 100 MG capsule Take 1 capsule (100 mg total) by mouth 3 (three) times daily. for 10 days 30 capsule 6/7/2022 6/17/2022 Ray Laureano MD    nitrofurantoin, macrocrystal-monohydrate, (MACROBID) 100 MG capsule Take 1 capsule (100 mg total) by mouth 2 (two) times daily. for 7 days 14 capsule 6/7/2022 6/14/2022 Ray Laureano MD        Follow-up Information     Follow up With Specialties Details Why Contact Info    John FRANCES  MD [facility] Schedule an appointment as soon as possible for a visit   [address]  [place] [address]  [phone]                Ray Laureano MD  06/09/22 1853

## 2022-06-14 ENCOUNTER — OFFICE VISIT (OUTPATIENT)
Dept: UROLOGY | Facility: CLINIC | Age: 82
End: 2022-06-14
Payer: MEDICARE

## 2022-06-14 ENCOUNTER — IMMUNIZATION (OUTPATIENT)
Dept: FAMILY MEDICINE | Facility: CLINIC | Age: 82
End: 2022-06-14
Payer: MEDICARE

## 2022-06-14 ENCOUNTER — OFFICE VISIT (OUTPATIENT)
Dept: CARDIOLOGY | Facility: CLINIC | Age: 82
End: 2022-06-14
Payer: MEDICARE

## 2022-06-14 VITALS
DIASTOLIC BLOOD PRESSURE: 65 MMHG | BODY MASS INDEX: 27.58 KG/M2 | HEIGHT: 71 IN | SYSTOLIC BLOOD PRESSURE: 124 MMHG | HEART RATE: 55 BPM | WEIGHT: 197 LBS

## 2022-06-14 VITALS
BODY MASS INDEX: 27.55 KG/M2 | SYSTOLIC BLOOD PRESSURE: 137 MMHG | HEART RATE: 53 BPM | DIASTOLIC BLOOD PRESSURE: 68 MMHG | WEIGHT: 197.5 LBS | OXYGEN SATURATION: 98 %

## 2022-06-14 DIAGNOSIS — I70.0 AORTIC ATHEROSCLEROSIS: ICD-10-CM

## 2022-06-14 DIAGNOSIS — I25.10 CORONARY ARTERY DISEASE INVOLVING NATIVE CORONARY ARTERY OF NATIVE HEART WITHOUT ANGINA PECTORIS: ICD-10-CM

## 2022-06-14 DIAGNOSIS — N18.31 STAGE 3A CHRONIC KIDNEY DISEASE: ICD-10-CM

## 2022-06-14 DIAGNOSIS — E78.00 PURE HYPERCHOLESTEROLEMIA: ICD-10-CM

## 2022-06-14 DIAGNOSIS — Z98.890 POST-OPERATIVE STATE: Primary | ICD-10-CM

## 2022-06-14 DIAGNOSIS — I69.331 MONOPLEGIA OF UPPER LIMB FOLLOWING CEREBRAL INFARCTION AFFECTING RIGHT DOMINANT SIDE: ICD-10-CM

## 2022-06-14 DIAGNOSIS — Z95.5 STATUS POST INSERTION OF DRUG-ELUTING STENT INTO LEFT ANTERIOR DESCENDING (LAD) ARTERY: ICD-10-CM

## 2022-06-14 DIAGNOSIS — I10 PRIMARY HYPERTENSION: ICD-10-CM

## 2022-06-14 DIAGNOSIS — R00.1 BRADYCARDIA, DRUG INDUCED: ICD-10-CM

## 2022-06-14 DIAGNOSIS — T50.905A BRADYCARDIA, DRUG INDUCED: ICD-10-CM

## 2022-06-14 DIAGNOSIS — Z23 NEED FOR VACCINATION: Primary | ICD-10-CM

## 2022-06-14 PROCEDURE — 1159F PR MEDICATION LIST DOCUMENTED IN MEDICAL RECORD: ICD-10-PCS | Mod: CPTII,S$GLB,, | Performed by: UROLOGY

## 2022-06-14 PROCEDURE — 3075F PR MOST RECENT SYSTOLIC BLOOD PRESS GE 130-139MM HG: ICD-10-PCS | Mod: CPTII,S$GLB,, | Performed by: INTERNAL MEDICINE

## 2022-06-14 PROCEDURE — 91305 COVID-19, MRNA, LNP-S, PF, 30 MCG/0.3 ML DOSE VACCINE (PFIZER): CPT | Mod: PBBFAC | Performed by: FAMILY MEDICINE

## 2022-06-14 PROCEDURE — 1126F PR PAIN SEVERITY QUANTIFIED, NO PAIN PRESENT: ICD-10-PCS | Mod: CPTII,S$GLB,, | Performed by: UROLOGY

## 2022-06-14 PROCEDURE — 99024 POSTOP FOLLOW-UP VISIT: CPT | Mod: S$GLB,,, | Performed by: UROLOGY

## 2022-06-14 PROCEDURE — 3078F PR MOST RECENT DIASTOLIC BLOOD PRESSURE < 80 MM HG: ICD-10-PCS | Mod: CPTII,S$GLB,, | Performed by: INTERNAL MEDICINE

## 2022-06-14 PROCEDURE — 1160F RVW MEDS BY RX/DR IN RCRD: CPT | Mod: CPTII,S$GLB,, | Performed by: INTERNAL MEDICINE

## 2022-06-14 PROCEDURE — 1159F MED LIST DOCD IN RCRD: CPT | Mod: CPTII,S$GLB,, | Performed by: INTERNAL MEDICINE

## 2022-06-14 PROCEDURE — 3078F PR MOST RECENT DIASTOLIC BLOOD PRESSURE < 80 MM HG: ICD-10-PCS | Mod: CPTII,S$GLB,, | Performed by: UROLOGY

## 2022-06-14 PROCEDURE — 99024 PR POST-OP FOLLOW-UP VISIT: ICD-10-PCS | Mod: S$GLB,,, | Performed by: UROLOGY

## 2022-06-14 PROCEDURE — 1126F AMNT PAIN NOTED NONE PRSNT: CPT | Mod: CPTII,S$GLB,, | Performed by: UROLOGY

## 2022-06-14 PROCEDURE — 1160F PR REVIEW ALL MEDS BY PRESCRIBER/CLIN PHARMACIST DOCUMENTED: ICD-10-PCS | Mod: CPTII,S$GLB,, | Performed by: UROLOGY

## 2022-06-14 PROCEDURE — 99214 OFFICE O/P EST MOD 30 MIN: CPT | Mod: S$GLB,,, | Performed by: INTERNAL MEDICINE

## 2022-06-14 PROCEDURE — 1159F MED LIST DOCD IN RCRD: CPT | Mod: CPTII,S$GLB,, | Performed by: UROLOGY

## 2022-06-14 PROCEDURE — 3074F PR MOST RECENT SYSTOLIC BLOOD PRESSURE < 130 MM HG: ICD-10-PCS | Mod: CPTII,S$GLB,, | Performed by: UROLOGY

## 2022-06-14 PROCEDURE — 1160F RVW MEDS BY RX/DR IN RCRD: CPT | Mod: CPTII,S$GLB,, | Performed by: UROLOGY

## 2022-06-14 PROCEDURE — 1159F PR MEDICATION LIST DOCUMENTED IN MEDICAL RECORD: ICD-10-PCS | Mod: CPTII,S$GLB,, | Performed by: INTERNAL MEDICINE

## 2022-06-14 PROCEDURE — 3074F SYST BP LT 130 MM HG: CPT | Mod: CPTII,S$GLB,, | Performed by: UROLOGY

## 2022-06-14 PROCEDURE — 99999 PR PBB SHADOW E&M-EST. PATIENT-LVL II: CPT | Mod: PBBFAC,,, | Performed by: INTERNAL MEDICINE

## 2022-06-14 PROCEDURE — 3078F DIAST BP <80 MM HG: CPT | Mod: CPTII,S$GLB,, | Performed by: INTERNAL MEDICINE

## 2022-06-14 PROCEDURE — 3078F DIAST BP <80 MM HG: CPT | Mod: CPTII,S$GLB,, | Performed by: UROLOGY

## 2022-06-14 PROCEDURE — 99999 PR PBB SHADOW E&M-EST. PATIENT-LVL IV: ICD-10-PCS | Mod: PBBFAC,,, | Performed by: UROLOGY

## 2022-06-14 PROCEDURE — 99999 PR PBB SHADOW E&M-EST. PATIENT-LVL II: ICD-10-PCS | Mod: PBBFAC,,, | Performed by: INTERNAL MEDICINE

## 2022-06-14 PROCEDURE — 99999 PR PBB SHADOW E&M-EST. PATIENT-LVL IV: CPT | Mod: PBBFAC,,, | Performed by: UROLOGY

## 2022-06-14 PROCEDURE — 3075F SYST BP GE 130 - 139MM HG: CPT | Mod: CPTII,S$GLB,, | Performed by: INTERNAL MEDICINE

## 2022-06-14 PROCEDURE — 99214 PR OFFICE/OUTPT VISIT, EST, LEVL IV, 30-39 MIN: ICD-10-PCS | Mod: S$GLB,,, | Performed by: INTERNAL MEDICINE

## 2022-06-14 PROCEDURE — 1160F PR REVIEW ALL MEDS BY PRESCRIBER/CLIN PHARMACIST DOCUMENTED: ICD-10-PCS | Mod: CPTII,S$GLB,, | Performed by: INTERNAL MEDICINE

## 2022-06-14 NOTE — ASSESSMENT & PLAN NOTE
Metoprolol SR 50 mg tolerated well.  He has normal exercise tolerance without symptoms related to bradycardia.  Today's heart rate 53 beats per minute.

## 2022-06-14 NOTE — ASSESSMENT & PLAN NOTE
No angina reported.  He did not have angina before his stent.  He presented with syncope and elevated troponin.  He is on aspirin.    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

## 2022-06-14 NOTE — PROGRESS NOTES
[facility] Cardiology     Subjective:    Patient ID:  Pedro Ponce Jr. is a 82 y.o. male who presents for follow-up of Hypertension, Hyperlipidemia, and Coronary Artery Disease    Review of patient's allergies indicates:   Allergen Reactions    Pcn [penicillins] Rash      The patient has not had any new problems cardiac wise.  He had prostate surgery.  He developed incessant hiccups after the surgery possibly related to anesthesia.  He was at the emergency room twice.  It triggered shortness of breath due to the frequency of the cups.  ED records reviewed.  He did have blood pressure is 130-140 systolic range.  He was with minimal elevation of BNP less than 200. His x-ray was clear.  They finally resolved.    He has previous LAD stent.  He is on aspirin.  His LDL is well controlled.  He is on atorvastatin.  He goes to the gym.  He has felt well.  He monitors things at home including blood pressure and blood sugars.  He is here with his wife.  He is going to get a COVID booster.  He does have renal insufficiency and is scheduled to see his nephrologist.  He is diabetic.      Review of Systems   Constitutional: Negative for chills, decreased appetite, diaphoresis, fever, malaise/fatigue, night sweats, weight gain and weight loss.   HENT: Negative for congestion, ear discharge, ear pain, hearing loss, hoarse voice, nosebleeds, odynophagia, sore throat, stridor and tinnitus.    Eyes: Negative for blurred vision, discharge, double vision, pain, photophobia, redness, vision loss in left eye, vision loss in right eye, visual disturbance and visual halos.   Cardiovascular: Negative for chest pain, claudication, cyanosis, dyspnea on exertion, irregular heartbeat, leg swelling, near-syncope, orthopnea, palpitations, paroxysmal nocturnal dyspnea and syncope.   Respiratory: Negative for cough, hemoptysis, shortness of breath, sleep disturbances due to  breathing, snoring, sputum production and wheezing.    Endocrine: Negative for cold intolerance, heat intolerance, polydipsia, polyphagia and polyuria.   Hematologic/Lymphatic: Negative for adenopathy and bleeding problem. Does not bruise/bleed easily.   Skin: Negative for color change, dry skin, flushing, itching, nail changes, poor wound healing, rash, skin cancer, suspicious lesions and unusual hair distribution.   Musculoskeletal: Negative for arthritis, back pain, falls, gout, joint pain, joint swelling, muscle cramps, muscle weakness, myalgias, neck pain and stiffness.   Gastrointestinal: Negative for bloating, abdominal pain, anorexia, change in bowel habit, bowel incontinence, constipation, diarrhea, dysphagia, excessive appetite, flatus, heartburn, hematemesis, hematochezia, hemorrhoids, jaundice, melena, nausea and vomiting.        Recent episode of incessant hiccups for days.   Genitourinary: Negative for bladder incontinence, decreased libido, dysuria, flank pain, frequency, genital sores, hematuria, hesitancy, incomplete emptying, nocturia and urgency.   Neurological: Positive for focal weakness. Negative for aphonia, brief paralysis, difficulty with concentration, disturbances in coordination, excessive daytime sleepiness, dizziness, headaches, light-headedness, loss of balance, numbness, paresthesias, seizures, sensory change, tremors, vertigo and weakness.   Psychiatric/Behavioral: Negative for altered mental status, depression, hallucinations, memory loss, substance abuse, suicidal ideas and thoughts of violence. The patient does not have insomnia and is not nervous/anxious.    Allergic/Immunologic: Negative for hives and persistent infections.        Objective:       Vitals:    06/14/22 1529   BP: 137/68   Pulse: (!) 53   SpO2: 98%   Weight: 89.6 kg (197 lb 8 oz)    Physical Exam  Constitutional:       General: He is not in acute distress.     Appearance: He is well-developed. He is not  diaphoretic.   HENT:      Head: Normocephalic and atraumatic.      Nose: Nose normal.   Eyes:      General: No scleral icterus.        Right eye: No discharge.      Conjunctiva/sclera: Conjunctivae normal.      Pupils: Pupils are equal, round, and reactive to light.   Neck:      Thyroid: No thyromegaly.      Vascular: No JVD.      Trachea: No tracheal deviation.   Cardiovascular:      Rate and Rhythm: Normal rate and regular rhythm.      Pulses: Intact distal pulses.           Carotid pulses are 2+ on the right side and 2+ on the left side.       Radial pulses are 2+ on the right side and 2+ on the left side.        Dorsalis pedis pulses are 2+ on the right side and 2+ on the left side.        Posterior tibial pulses are 2+ on the right side and 2+ on the left side.      Heart sounds: Murmur heard.    Medium-pitched harsh early systolic murmur is present with a grade of 2/6 at the upper right sternal border.    No friction rub. No gallop.   Pulmonary:      Effort: Pulmonary effort is normal. No respiratory distress.      Breath sounds: Normal breath sounds. No stridor. No wheezing or rales.   Chest:      Chest wall: No tenderness.   Abdominal:      General: Bowel sounds are normal. There is no distension.      Palpations: Abdomen is soft. There is no mass.      Tenderness: There is no abdominal tenderness. There is no guarding or rebound.   Musculoskeletal:         General: No tenderness. Normal range of motion.      Cervical back: Normal range of motion and neck supple.   Lymphadenopathy:      Cervical: No cervical adenopathy.   Skin:     General: Skin is warm and dry.      Coloration: Skin is not pale.      Findings: No erythema or rash.   Neurological:      Mental Status: He is alert and oriented to person, place, and time.      Cranial Nerves: No cranial nerve deficit.      Motor: Weakness present.      Coordination: Coordination normal.      Comments: Right hand weakness/clumsiness   Psychiatric:          Behavior: Behavior normal.         Thought Content: Thought content normal.         Judgment: Judgment normal.           Assessment:       1. Aortic atherosclerosis    2. Bradycardia, drug induced    3. Coronary artery disease involving native coronary artery of native heart without angina pectoris    4. Primary hypertension    5. Status post insertion of drug-eluting stent into left anterior descending (LAD) artery    6. Pure hypercholesterolemia    7. Stage 3a chronic kidney disease    8. 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      Results for orders placed or performed during the hospital encounter of 06/07/22   Urinalysis   Result Value Ref Range    Specimen UA Urine, Clean Catch     Color, UA Brown (A) Yellow, Straw, Kiana    Appearance, UA Cloudy (A) Clear    pH, UA 6.0 5.0 - 8.0    Specific Gravity, UA 1.015 1.005 - 1.030    Protein, UA 3+ (A) Negative    Glucose, UA Negative Negative    Ketones, UA Negative Negative    Bilirubin (UA) Negative Negative    Occult Blood UA 3+ (A) Negative    Nitrite, UA Negative Negative    Urobilinogen, UA Negative <2.0 EU/dL    Leukocytes, UA 3+ (A) Negative   CBC Without Differential   Result Value Ref Range    WBC 6.59 3.90 - 12.70 K/uL    RBC 4.05 (L) 4.60 - 6.20 M/uL    Hemoglobin 12.6 (L) 14.0 - 18.0 g/dL    Hematocrit 37.9 (L) 40.0 - 54.0 %    MCV 94 82 - 98 fL    MCH 31.1 (H) 27.0 - 31.0 pg    MCHC 33.2 32.0 - 36.0 g/dL    RDW 14.7 (H) 11.5 - 14.5 %    Platelets 261 150 - 450 K/uL    MPV 9.8 9.2 - 12.9 fL   Comprehensive metabolic panel   Result Value Ref Range    Sodium 136 136 - 145 mmol/L    Potassium 4.4 3.5 - 5.1 mmol/L    Chloride 101 95 - 110 mmol/L    CO2 28 23 - 29 mmol/L    Glucose 109 70 - 110 mg/dL    BUN 20 8 - 23 mg/dL    Creatinine 1.4 0.5 - 1.4 mg/dL    Calcium 10.1 8.7 - 10.5 mg/dL    Total Protein 7.5 6.0 - 8.4 g/dL    Albumin 4.2 3.5 - 5.2 g/dL    Total Bilirubin 0.5 0.1 - 1.0 mg/dL    Alkaline  Phosphatase 80 55 - 135 U/L    AST 24 10 - 40 U/L    ALT 19 10 - 44 U/L    Anion Gap 7 (L) 8 - 16 mmol/L    eGFR if African American 53 (A) >60 mL/min/1.73 m^2    eGFR if non African American 46 (A) >60 mL/min/1.73 m^2   Urinalysis Microscopic   Result Value Ref Range    RBC, UA >100 (H) 0 - 4 /hpf    WBC, UA >100 (H) 0 - 5 /hpf    Bacteria Many (A) None-Occ /hpf    Hyaline Casts, UA 0 0-1/lpf /lpf    Microscopic Comment SEE COMMENT          Current Outpatient Medications:     ACCU-CHEK ALYCIA PLUS TEST STRP Strp, 1 EACH BY [facility] [place]NON-DRUG COMBO ROUTE) ROUTE DAILY AS NEEDED., Disp: 50 strip, Rfl: 3    acetaminophen (TYLENOL) 325 MG tablet, Take 1 tablet (325 mg total) by mouth every 6 (six) hours as needed for Pain., Disp: , Rfl:     aspirin (ECOTRIN) 81 MG EC tablet, Take 1 tablet (81 mg total) by mouth once daily., Disp: 90 tablet, Rfl: 3    atorvastatin (LIPITOR) 40 MG tablet, TAKE 1 TABLET BY MOUTH EVERY DAY IN THE EVENING, Disp: 90 tablet, Rfl: 3    dutasteride (AVODART) 0.5 mg capsule, TAKE 1 CAPSULE (0.5 MG TOTAL) BY MOUTH ONCE DAILY., Disp: 90 capsule, Rfl: 3    EScitalopram oxalate (LEXAPRO) 10 MG tablet, Take 1 tablet (10 mg total) by mouth once daily., Disp: 90 tablet, Rfl: 3    gabapentin (NEURONTIN) 100 MG capsule, Take 1 capsule (100 mg total) by mouth 3 (three) times daily. for 10 days, Disp: 30 capsule, Rfl: 0    irbesartan (AVAPRO) 300 MG tablet, Take 1 tablet (300 mg total) by mouth every evening., Disp: 90 tablet, Rfl: 3    lancets Misc, 1 each by Misc.(Non-Drug; Combo Route) route daily as needed., Disp: 30 each, Rfl: 3    metoclopramide HCl (REGLAN) 10 MG tablet, Take 1 tablet (10 mg total) by mouth every 6 (six) hours as needed (hiccups)., Disp: 30 tablet, Rfl: 0    metoprolol succinate (TOPROL-XL) 50 MG 24 hr tablet, Take 1 tablet (50 mg total) by mouth once daily., Disp: 90 tablet, Rfl: 3    multivitamin capsule, Take 1 capsule by mouth once daily. Centrum Silver, Disp: , Rfl:      NIFEdipine (PROCARDIA-XL) 60 MG (OSM) 24 hr tablet, TAKE 1 TABLET (60 MG TOTAL) BY MOUTH 2 (TWO) TIMES DAILY., Disp: 180 tablet, Rfl: 1    nitrofurantoin, macrocrystal-monohydrate, (MACROBID) 100 MG capsule, Take 1 capsule (100 mg total) by mouth 2 (two) times daily. for 7 days, Disp: 14 capsule, Rfl: 0    nitroGLYCERIN (NITROSTAT) 0.4 MG SL tablet, Place 1 tablet (0.4 mg total) under the tongue every 5 (five) minutes as needed for Chest pain., Disp: 30 tablet, Rfl: 3    pantoprazole (PROTONIX) 20 MG tablet, Take 1 tablet (20 mg total) by mouth 2 (two) times a day. for 14 days, Disp: 28 tablet, Rfl: 0    senna-docusate 8.6-50 mg (SENNA WITH DOCUSATE SODIUM) 8.6-50 mg per tablet, Take 1 tablet by mouth once daily., Disp: 7 tablet, Rfl: 0    solifenacin (VESICARE) 5 MG tablet, Take 5 mg by mouth once daily., Disp: , Rfl:     tamsulosin (FLOMAX) 0.4 mg Cap, TAKE 1 CAPSULE BY MOUTH EVERY EVENING, Disp: 90 capsule, Rfl: 3     Lab Results   Component Value Date    WBC 6.59 06/07/2022    RBC 4.05 (L) 06/07/2022    HGB 12.6 (L) 06/07/2022    HCT 37.9 (L) 06/07/2022    MCV 94 06/07/2022    MCH 31.1 (H) 06/07/2022    MCHC 33.2 06/07/2022    RDW 14.7 (H) 06/07/2022     06/07/2022    MPV 9.8 06/07/2022    GRAN 2.6 06/01/2022    GRAN 51.1 06/01/2022    LYMPH 1.6 06/01/2022    LYMPH 31.3 06/01/2022    MONO 0.6 06/01/2022    MONO 11.4 06/01/2022    EOS 0.3 06/01/2022    BASO 0.04 06/01/2022    EOSINOPHIL 5.2 06/01/2022    BASOPHIL 0.8 06/01/2022    MG 2.0 08/28/2021        CMP  Lab Results   Component Value Date     06/07/2022    K 4.4 06/07/2022     06/07/2022    CO2 28 06/07/2022     06/07/2022    BUN 20 06/07/2022    CREATININE 1.4 06/07/2022    CALCIUM 10.1 06/07/2022    PROT 7.5 06/07/2022    ALBUMIN 4.2 06/07/2022    BILITOT 0.5 06/07/2022    ALKPHOS 80 06/07/2022    AST 24 06/07/2022    ALT 19 06/07/2022    ANIONGAP 7 (L) 06/07/2022    ESTGFRAFRICA 53 (A) 06/07/2022    EGFRNONAA 46 (A)  06/07/2022        Lab Results   Component Value Date    LABURIN ENTEROCOCCUS FAECALIS  50,000 - 99,999 cfu/ml   (A) 05/20/2022            Results for orders placed or performed during the hospital encounter of 06/01/22   EKG 12-lead    Collection Time: 06/01/22  4:05 PM    Narrative    Test Reason : R07.89,    Vent. Rate : 051 BPM     Atrial Rate : 051 BPM     P-R Int : 204 ms          QRS Dur : 100 ms      QT Int : 420 ms       P-R-T Axes : 069 -09 240 degrees     QTc Int : 387 ms    Sinus bradycardia  Right ventricular conduction delay  Minimal voltage criteria for LVH, may be normal variant  Septal infarct (cited on or before 01-FEB-2022)  T wave abnormality, consider inferolateral ischemia  Abnormal ECG  When compared with ECG of 01-FEB-2022 16:33,  Premature ventricular complexes are no longer Present  Questionable change in QRS duration  Criteria for Inferior-posterior infarct are no longer Present  Questionable change in initial forces of Anterior-septal leads  Confirmed by Derrick ROBERSON, Mario GIL ([id number]) on 6/2/2022 11:44:34 AM    Referred By: EVELINA   SELF           Confirmed By:Mario Meza MD                  Plan:       Problem List Items Addressed This Visit        Neuro    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  No new complaints.  Condition stable.              Cardiac/Vascular    Hypertension     His readings have stabilized on his current regimen of treatment.  It will be continued.           Pure hypercholesterolemia     LDL well controlled on atorvastatin 40 mg per day.  It will be continued.           Bradycardia, drug induced     Metoprolol SR 50 mg tolerated well.  He has normal exercise tolerance without symptoms related to bradycardia.  Today's heart rate 53 beats per minute.           Aortic atherosclerosis     Condition stable.           Status post insertion of drug-eluting stent into left anterior  descending (LAD) artery     Aspirin will be continued.           CAD (coronary artery disease)     No angina reported.  He did not have angina before his stent.  He presented with syncope and elevated troponin.  He is on aspirin.    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              Renal/    Stage 3 chronic kidney disease     He follows with Nephrology.  Most recent serum creatinine 1.4.  Condition stable.                                 Anthony Mcdonald MD  06/14/2022   3:22 PM

## 2022-06-14 NOTE — PROGRESS NOTES
"Pedro Ponce Jr. is a 82 y.o. male patient.   No diagnosis found.  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    Blood pressure 124/65, pulse (!) 55, height 5' 11" (1.803 m), weight 89.4 kg (197 lb).    Subjective:   Diet: Adequate intake.  Patient reports no nausea or vomiting.    Activity level: Normal.    Pain control: Well controlled.    Urge UI and Nocturia at times  Subjective:       Patient ID: Pedro Ponce Jr. is a 82 y.o. male.    Chief Complaint: Post Op - Prostate    HPI  Review of Systems   Gastrointestinal: Negative for nausea and vomiting.       Objective:      Physical Exam  Cardiovascular:      Rate and Rhythm: Normal rate and regular rhythm.      Heart sounds: S1 normal.   Pulmonary:      Effort: Pulmonary effort is normal.      Breath sounds: Normal breath sounds.   Abdominal:      General: Bowel sounds are normal.      Palpations: Abdomen is soft.                            Objective:  Vital signs (most recent): Blood pressure 124/65, pulse (!) 55, height 5' 11" (1.803 m), weight 89.4 kg (197 lb).  General appearance: Comfortable, well-appearing, in no acute distress and not in pain.    Lungs:  Normal respiratory rate and normal effort.  Breath " sounds normal.    Heart: Normal rate.  Regular rhythm.  S1 normal.    Chest: Symmetric chest wall expansion.    Abdomen: Abdomen is soft.    Bowel sounds:  Bowel sounds are normal.    Tenderness: There is no abdominal tenderness tenderness.       Assessment:   Post-op: 22 days.    Condition: In stable condition.     Plan:  Encourage ambulation.  Regular diet.  Resume oral medications.      Patient Instructions   Stop Flomax and Avodart in 3 weeks    F/U in 6 weeks         Sami Ledezma MD  6/14/2022

## 2022-06-20 ENCOUNTER — TELEPHONE (OUTPATIENT)
Dept: PRIMARY CARE CLINIC | Facility: CLINIC | Age: 82
End: 2022-06-20
Payer: MEDICARE

## 2022-06-20 ENCOUNTER — OFFICE VISIT (OUTPATIENT)
Dept: PRIMARY CARE CLINIC | Facility: CLINIC | Age: 82
End: 2022-06-20
Payer: MEDICARE

## 2022-06-20 ENCOUNTER — LAB VISIT (OUTPATIENT)
Dept: LAB | Facility: HOSPITAL | Age: 82
End: 2022-06-20
Attending: INTERNAL MEDICINE
Payer: MEDICARE

## 2022-06-20 VITALS
WEIGHT: 194.25 LBS | BODY MASS INDEX: 27.19 KG/M2 | HEIGHT: 71 IN | OXYGEN SATURATION: 98 % | HEART RATE: 53 BPM | SYSTOLIC BLOOD PRESSURE: 130 MMHG | TEMPERATURE: 99 F | DIASTOLIC BLOOD PRESSURE: 65 MMHG

## 2022-06-20 DIAGNOSIS — R31.9 HEMATURIA, UNSPECIFIED TYPE: Primary | ICD-10-CM

## 2022-06-20 DIAGNOSIS — R53.1 WEAKNESS: ICD-10-CM

## 2022-06-20 DIAGNOSIS — N40.1 BPH WITH URINARY OBSTRUCTION: ICD-10-CM

## 2022-06-20 DIAGNOSIS — N13.8 BPH WITH URINARY OBSTRUCTION: ICD-10-CM

## 2022-06-20 PROBLEM — I16.0 HYPERTENSIVE URGENCY: Status: RESOLVED | Noted: 2021-04-05 | Resolved: 2022-06-20

## 2022-06-20 PROBLEM — R07.9 CHEST PAIN: Status: RESOLVED | Noted: 2021-01-11 | Resolved: 2022-06-20

## 2022-06-20 PROBLEM — R94.31 NONSPECIFIC ABNORMAL ELECTROCARDIOGRAM (ECG) (EKG): Status: RESOLVED | Noted: 2020-10-21 | Resolved: 2022-06-20

## 2022-06-20 PROBLEM — R55 SYNCOPE AND COLLAPSE: Status: RESOLVED | Noted: 2020-12-24 | Resolved: 2022-06-20

## 2022-06-20 LAB
ANION GAP SERPL CALC-SCNC: 10 MMOL/L (ref 8–16)
BASOPHILS # BLD AUTO: 0.03 K/UL (ref 0–0.2)
BASOPHILS NFR BLD: 0.5 % (ref 0–1.9)
BUN SERPL-MCNC: 21 MG/DL (ref 8–23)
CALCIUM SERPL-MCNC: 10.3 MG/DL (ref 8.7–10.5)
CHLORIDE SERPL-SCNC: 100 MMOL/L (ref 95–110)
CO2 SERPL-SCNC: 29 MMOL/L (ref 23–29)
CREAT SERPL-MCNC: 1.3 MG/DL (ref 0.5–1.4)
DIFFERENTIAL METHOD: ABNORMAL
EOSINOPHIL # BLD AUTO: 0 K/UL (ref 0–0.5)
EOSINOPHIL NFR BLD: 0.5 % (ref 0–8)
ERYTHROCYTE [DISTWIDTH] IN BLOOD BY AUTOMATED COUNT: 14.6 % (ref 11.5–14.5)
EST. GFR  (AFRICAN AMERICAN): 58 ML/MIN/1.73 M^2
EST. GFR  (NON AFRICAN AMERICAN): 50 ML/MIN/1.73 M^2
GLUCOSE SERPL-MCNC: 95 MG/DL (ref 70–110)
HCT VFR BLD AUTO: 35.1 % (ref 40–54)
HGB BLD-MCNC: 12 G/DL (ref 14–18)
IMM GRANULOCYTES # BLD AUTO: 0.03 K/UL (ref 0–0.04)
IMM GRANULOCYTES NFR BLD AUTO: 0.5 % (ref 0–0.5)
LYMPHOCYTES # BLD AUTO: 1 K/UL (ref 1–4.8)
LYMPHOCYTES NFR BLD: 15.1 % (ref 18–48)
MCH RBC QN AUTO: 31.8 PG (ref 27–31)
MCHC RBC AUTO-ENTMCNC: 34.2 G/DL (ref 32–36)
MCV RBC AUTO: 93 FL (ref 82–98)
MONOCYTES # BLD AUTO: 0.6 K/UL (ref 0.3–1)
MONOCYTES NFR BLD: 9.9 % (ref 4–15)
NEUTROPHILS # BLD AUTO: 4.7 K/UL (ref 1.8–7.7)
NEUTROPHILS NFR BLD: 73.5 % (ref 38–73)
NRBC BLD-RTO: 0 /100 WBC
PLATELET # BLD AUTO: 267 K/UL (ref 150–450)
PMV BLD AUTO: 9.8 FL (ref 9.2–12.9)
POTASSIUM SERPL-SCNC: 4.1 MMOL/L (ref 3.5–5.1)
RBC # BLD AUTO: 3.77 M/UL (ref 4.6–6.2)
SODIUM SERPL-SCNC: 139 MMOL/L (ref 136–145)
T4 FREE SERPL-MCNC: 1.11 NG/DL (ref 0.71–1.51)
TSH SERPL DL<=0.005 MIU/L-ACNC: 3.09 UIU/ML (ref 0.4–4)
WBC # BLD AUTO: 6.34 K/UL (ref 3.9–12.7)

## 2022-06-20 PROCEDURE — 99452 NTRPROF PH1/NTRNET/EHR RFRL: CPT | Mod: S$GLB,,, | Performed by: INTERNAL MEDICINE

## 2022-06-20 PROCEDURE — 99999 PR PBB SHADOW E&M-EST. PATIENT-LVL IV: CPT | Mod: PBBFAC,,, | Performed by: INTERNAL MEDICINE

## 2022-06-20 PROCEDURE — 1101F PT FALLS ASSESS-DOCD LE1/YR: CPT | Mod: CPTII,S$GLB,, | Performed by: INTERNAL MEDICINE

## 2022-06-20 PROCEDURE — 80048 BASIC METABOLIC PNL TOTAL CA: CPT | Performed by: INTERNAL MEDICINE

## 2022-06-20 PROCEDURE — 99999 PR PBB SHADOW E&M-EST. PATIENT-LVL IV: ICD-10-PCS | Mod: PBBFAC,,, | Performed by: INTERNAL MEDICINE

## 2022-06-20 PROCEDURE — 1160F RVW MEDS BY RX/DR IN RCRD: CPT | Mod: CPTII,S$GLB,, | Performed by: INTERNAL MEDICINE

## 2022-06-20 PROCEDURE — 99452 PR INTERPROF, PHONE/INTERNET/EHR, 30 MIN: ICD-10-PCS | Mod: S$GLB,,, | Performed by: INTERNAL MEDICINE

## 2022-06-20 PROCEDURE — 1125F AMNT PAIN NOTED PAIN PRSNT: CPT | Mod: CPTII,S$GLB,, | Performed by: INTERNAL MEDICINE

## 2022-06-20 PROCEDURE — 99214 OFFICE O/P EST MOD 30 MIN: CPT | Mod: S$GLB,,, | Performed by: INTERNAL MEDICINE

## 2022-06-20 PROCEDURE — 3288F FALL RISK ASSESSMENT DOCD: CPT | Mod: CPTII,S$GLB,, | Performed by: INTERNAL MEDICINE

## 2022-06-20 PROCEDURE — 1160F PR REVIEW ALL MEDS BY PRESCRIBER/CLIN PHARMACIST DOCUMENTED: ICD-10-PCS | Mod: CPTII,S$GLB,, | Performed by: INTERNAL MEDICINE

## 2022-06-20 PROCEDURE — 1101F PR PT FALLS ASSESS DOC 0-1 FALLS W/OUT INJ PAST YR: ICD-10-PCS | Mod: CPTII,S$GLB,, | Performed by: INTERNAL MEDICINE

## 2022-06-20 PROCEDURE — 1159F PR MEDICATION LIST DOCUMENTED IN MEDICAL RECORD: ICD-10-PCS | Mod: CPTII,S$GLB,, | Performed by: INTERNAL MEDICINE

## 2022-06-20 PROCEDURE — 99214 PR OFFICE/OUTPT VISIT, EST, LEVL IV, 30-39 MIN: ICD-10-PCS | Mod: S$GLB,,, | Performed by: INTERNAL MEDICINE

## 2022-06-20 PROCEDURE — 36415 COLL VENOUS BLD VENIPUNCTURE: CPT | Performed by: INTERNAL MEDICINE

## 2022-06-20 PROCEDURE — 84443 ASSAY THYROID STIM HORMONE: CPT | Performed by: INTERNAL MEDICINE

## 2022-06-20 PROCEDURE — 1159F MED LIST DOCD IN RCRD: CPT | Mod: CPTII,S$GLB,, | Performed by: INTERNAL MEDICINE

## 2022-06-20 PROCEDURE — 3288F PR FALLS RISK ASSESSMENT DOCUMENTED: ICD-10-PCS | Mod: CPTII,S$GLB,, | Performed by: INTERNAL MEDICINE

## 2022-06-20 PROCEDURE — 84439 ASSAY OF FREE THYROXINE: CPT | Performed by: INTERNAL MEDICINE

## 2022-06-20 PROCEDURE — 1125F PR PAIN SEVERITY QUANTIFIED, PAIN PRESENT: ICD-10-PCS | Mod: CPTII,S$GLB,, | Performed by: INTERNAL MEDICINE

## 2022-06-20 PROCEDURE — 85025 COMPLETE CBC W/AUTO DIFF WBC: CPT | Performed by: INTERNAL MEDICINE

## 2022-06-20 NOTE — TELEPHONE ENCOUNTER
Instructed patient to bring urine sample to lab when getting his lab drawn. Pt/patient's spouse verbalized understanding.

## 2022-06-20 NOTE — PROGRESS NOTES
[facility]   New Visit Progress Note   Recent Hospital Discharge     PRESENTING HISTORY     Chief Complaint/Reason for Admission:  Follow up Hospital Discharge   PCP: John Ricci MD    History of Present Illness:  Mr. Pedro Ponce Jr. is a 82 y.o. male who was recently admitted to the hospital.    [facility] - Med Surg  Urology  Discharge Summary        Patient Name: Pedro Ponce Jr.  MRN: [id number]  Admission Date: 5/23/2022  Hospital Length of Stay: 0 days  Discharge Date and Time: 5/24/2022 11:15 AM  Attending Physician: Sami Ledezma MD  Discharging Provider: Sami Ledezma MD  Primary Care Physician: John Ricci MD  ___________________________________________________________________    Today:  Presents to [facility] for initial hospital follow up.  Recently hospitalized for planned enucleation of prostate for management of large, obstructing prostate.   He under went procedure on 5/23/22.   Uncomplicated recovery; discharged to home the following day after voiding spontaneously.  10 day course Bactrim DS prescribed at discharge.     ED visit 6/1/22 with hiccups since above procedure.   Reglan and Pantoprazole prescribed; patient released to home.     Returned to ED 6/7/22 with continued hiccups- reporting no relief from above meds.   Also with compliant of hematuria.  UA concerning for UTI (urine NOT sent for culture).   Macrobid prescribed for UTI.  Gabapentin prescribed for hiccups.  Thorazine considered but deferred due to concern for side effects given his age and medication profile.     Had urology post op visit 6/14/22.  Had cardiology follow up 6/14/22.   Notes reviewed.     Accompanied today by his wife.  In wheelchair 2/2 fall at home this AM.  Ambulatory without assistive devices at baseline.  Attends gym three times weekly at baseline.  Independent with ADL's.  Reports compliance with all medication.  Hiccups have resolved and he is no  "longer taking Reglan, Pantoprazole or gabapentin.    Reporting new onset generalized weakness, generalized ill feeling, hematuria, R back pain, R flank pain, and fall this AM.  "My legs just gave out on me".  Denies injury from fall.     Review of Systems  General ROS: negative for chills, fever or weight loss  + fall, weakness  Psychological ROS: negative for hallucination, depression or suicidal ideation  Ophthalmic ROS: negative for blurry vision, photophobia or eye pain  ENT ROS: negative for epistaxis, sore throat or rhinorrhea  Respiratory ROS: no cough, shortness of breath, or wheezing  Cardiovascular ROS: no chest pain or dyspnea on exertion  Gastrointestinal ROS: no abdominal pain, change in bowel habits, or black/ bloody stools  Genito-Urinary ROS: no dysuria, trouble voiding, + hematuria  + R flank pain, R back pain   Musculoskeletal ROS: + new onset gait disturbance and generalized muscular weakness  Neurological ROS: no syncope or seizures; no ataxia  Dermatological ROS: negative for pruritis, rash and jaundice      PAST HISTORY:     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       Past Surgical History:   Procedure Laterality Date    COLONOSCOPY N/A 04/09/2018    Procedure: COLONOSCOPY;  Surgeon: HAIM Mir MD;  Location: [address]);  Service: Endoscopy;  Laterality: N/A;    COLONOSCOPY N/A 01/14/2019    Procedure: COLONOSCOPY;  Surgeon: HAIM Mir MD;  Location: [address]);  Service: Endoscopy;  Laterality: N/A;  PM prep    CYST REMOVAL  01/01/2000    removed from left kidney    KIDNEY SURGERY      LASER ENUCLEATION OF PROSTATE N/A 5/23/2022    " Procedure: ENUCLEATION, PROSTATE, USING LASER;  Surgeon: Sami Ledezma MD;  Location: [facility] OR;  Service: Urology;  Laterality: N/A;    LEFT HEART CATHETERIZATION Left 12/25/2020    Procedure: Left heart cath;  Surgeon: Alan Rothman MD;  Location: [facility] CATH LAB;  Service: Cardiology;  Laterality: Left;    PROSTATE SURGERY      TONSILLECTOMY         Family History   Problem Relation Age of Onset    Cancer Mother         unkn ca    Diabetes Mother     Glaucoma Mother     Heart disease Mother     Hypertension Mother     Blindness Neg Hx     Macular degeneration Neg Hx     Retinal detachment Neg Hx          MEDICATIONS & ALLERGIES:     Current Outpatient Medications on File Prior to Visit   Medication Sig Dispense Refill    ACCU-CHEK ALYCIA PLUS TEST STRP Strp 1 EACH BY MISC.(NON-DRUG COMBO ROUTE) ROUTE DAILY AS NEEDED. 50 strip 3    acetaminophen (TYLENOL) 325 MG tablet Take 1 tablet (325 mg total) by mouth every 6 (six) hours as needed for Pain.      aspirin (ECOTRIN) 81 MG EC tablet Take 1 tablet (81 mg total) by mouth once daily. 90 tablet 3    atorvastatin (LIPITOR) 40 MG tablet TAKE 1 TABLET BY MOUTH EVERY DAY IN THE EVENING 90 tablet 3    dutasteride (AVODART) 0.5 mg capsule TAKE 1 CAPSULE (0.5 MG TOTAL) BY MOUTH ONCE DAILY. 90 capsule 3    EScitalopram oxalate (LEXAPRO) 10 MG tablet Take 1 tablet (10 mg total) by mouth once daily. 90 tablet 3    gabapentin (NEURONTIN) 100 MG capsule Take 1 capsule (100 mg total) by mouth 3 (three) times daily. for 10 days 30 capsule 0    irbesartan (AVAPRO) 300 MG tablet Take 1 tablet (300 mg total) by mouth every evening. 90 tablet 3    lancets Misc 1 each by Misc.(Non-Drug; Combo Route) route daily as needed. 30 each 3    metoclopramide HCl (REGLAN) 10 MG tablet Take 1 tablet (10 mg total) by mouth every 6 (six) hours as needed (hiccups). 30 tablet 0    metoprolol succinate (TOPROL-XL) 50 MG 24 hr tablet Take 1 tablet (50 mg total) by mouth once  "daily. 90 tablet 3    multivitamin capsule Take 1 capsule by mouth once daily. Centrum Silver      NIFEdipine (PROCARDIA-XL) 60 MG (OSM) 24 hr tablet TAKE 1 TABLET (60 MG TOTAL) BY MOUTH 2 (TWO) TIMES DAILY. 180 tablet 1    nitroGLYCERIN (NITROSTAT) 0.4 MG SL tablet Place 1 tablet (0.4 mg total) under the tongue every 5 (five) minutes as needed for Chest pain. 30 tablet 3    pantoprazole (PROTONIX) 20 MG tablet Take 1 tablet (20 mg total) by mouth 2 (two) times a day. for 14 days 28 tablet 0    senna-docusate 8.6-50 mg (SENNA WITH DOCUSATE SODIUM) 8.6-50 mg per tablet Take 1 tablet by mouth once daily. 7 tablet 0    solifenacin (VESICARE) 5 MG tablet Take 5 mg by mouth once daily.      tamsulosin (FLOMAX) 0.4 mg Cap TAKE 1 CAPSULE BY MOUTH EVERY EVENING 90 capsule 3     No current facility-administered medications on file prior to visit.        Review of patient's allergies indicates:   Allergen Reactions    Pcn [penicillins] Rash       OBJECTIVE:     Vital Signs:  /65 (BP Location: Left arm, Patient Position: Sitting)   Pulse (!) 53   Temp 98.5 °F (36.9 °C) (Oral)   Ht 5' 11" (1.803 m)   Wt 88.1 kg (194 lb 3.6 oz)   SpO2 98%   BMI 27.09 kg/m²   Wt Readings from Last 3 Encounters:   06/14/22 1529 89.6 kg (197 lb 8 oz)   06/14/22 0911 89.4 kg (197 lb)   06/07/22 0335 86.2 kg (190 lb)     Body mass index is 27.09 kg/m².        Physical Exam:  /65 (BP Location: Left arm, Patient Position: Sitting)   Pulse (!) 53   Temp 98.5 °F (36.9 °C) (Oral)   Ht 5' 11" (1.803 m)   Wt 88.1 kg (194 lb 3.6 oz)   SpO2 98%   BMI 27.09 kg/m²   General appearance: alert, cooperative, no distress  Constitutional:Oriented to person, place, and time  + appears well-developed and well-nourished.   HEENT: Normocephalic, atraumatic, neck symmetrical, no nasal discharge   Eyes: conjunctivae/corneas clear, PERRL, EOM's intact  Lungs: clear to auscultation bilaterally, no dullness to percussion bilaterally  Heart: " regular rate and rhythm without rub; no displacement of the PMI   Abdomen: soft, non-tender; bowel sounds normoactive; no organomegaly  + no costovertebral angle tenderness   Extremities: extremities symmetric; no clubbing, cyanosis, or edema  Integument: Skin color, texture, turgor normal; no rashes; hair distrubution normal  Neurologic: Alert and oriented X 3, normal strength, normal coordination   Psychiatric: no pressured speech; normal affect; no evidence of impaired cognition     Laboratory  Lab Results   Component Value Date    WBC 6.59 06/07/2022    HGB 12.6 (L) 06/07/2022    HCT 37.9 (L) 06/07/2022    MCV 94 06/07/2022     06/07/2022     BMP  Lab Results   Component Value Date     06/07/2022    K 4.4 06/07/2022     06/07/2022    CO2 28 06/07/2022    BUN 20 06/07/2022    CREATININE 1.4 06/07/2022    CALCIUM 10.1 06/07/2022    ANIONGAP 7 (L) 06/07/2022    ESTGFRAFRICA 53 (A) 06/07/2022    EGFRNONAA 46 (A) 06/07/2022     Lab Results   Component Value Date    ALT 19 06/07/2022    AST 24 06/07/2022    ALKPHOS 80 06/07/2022    BILITOT 0.5 06/07/2022     Lab Results   Component Value Date    INR 1.0 04/05/2021    INR 1.0 12/25/2020    INR 1.0 12/07/2020    INR 1.0 12/07/2020     Lab Results   Component Value Date    HGBA1C 5.6 01/31/2022       ASSESSMENT & PLAN:       Hematuria, unspecified type  -     Urinalysis, Reflex to Urine Culture; Future; Expected date: 06/20/2022  -     Urinalysis, Reflex to Urine Culture Urine, Clean Catch; Future; Expected date: 06/20/2022    Weakness  -     CBC Auto Differential; Future; Expected date: 06/21/2022  -     Basic Metabolic Panel; Future; Expected date: 06/20/2022  -     TSH; Future; Expected date: 06/20/2022  -     T4, Free; Future; Expected date: 06/20/2022    BPH with urinary obstruction  - s/p laser prostate enucleation   - Urology follow up 7/26/22      Addendum 6/20/22 @ 3:13 pm-  UA with significant findings as below.  Communicated findings to  patients Urology team.  WBC, RBC, debris, clots in urine can be expected up to three months post procedure.   Will await results to urine culture to determine need for ABX therapy.  Discontinue Avodart and Flomax - assess if this relieves new onset weakness.   Consider outpatient PT/OT after urine culture results.     Latest Reference Range & Units 06/20/22 11:26   Specimen UA  Urine, Clean Catch   Color, UA Yellow, Straw, Kiana  Yellow   Appearance, UA Clear  Hazy !   Specific Gravity, UA 1.005 - 1.030  1.015   pH, UA 5.0 - 8.0  7.0   Protein, UA Negative  1+ !   Glucose, UA Negative  Negative   Ketones, UA Negative  Negative   Occult Blood UA Negative  3+ !   NITRITE UA Negative  Negative   UROBILINOGEN UA <2.0 EU/dL Negative   Bilirubin (UA) Negative  Negative   Leukocytes, UA Negative  3+ !   RBC, UA 0 - 4 /hpf >100 (H)   WBC, UA 0 - 5 /hpf >100 (H)   Bacteria, UA None-Occ /hpf Moderate !   WBC Clumps, UA None-Rare  Few !   Hyaline Casts, UA 0-1/lpf /lpf 0   Microscopic Comment  SEE COMMENT   !: Data is abnormal  (H): Data is abnormally high        Instructions for the patient:      Scheduled Follow-up :  Future Appointments   Date Time Provider Department Center   7/26/2022  2:30 PM Sami Ledezma MD [facility] [place]   8/3/2022  1:30 PM John Ricci MD [facility]   10/4/2022 10:00 AM Anthony Mcdonald MD [facility] CARDIO [place]       Post Visit Medication List:     Medication List          Accurate as of June 20, 2022 11:59 PM. If you have any questions, ask your nurse or doctor.            CONTINUE taking these medications    ACCU-CHEK ALYCIA PLUS TEST STRP Strp  Generic drug: blood sugar diagnostic  1 EACH BY [facility].(NON-DRUG COMBO ROUTE) ROUTE DAILY AS NEEDED.     acetaminophen 325 MG tablet  Commonly known as: TYLENOL  Take 1 tablet (325 mg total) by mouth every 6 (six) hours as needed for Pain.     aspirin 81 MG EC tablet  Commonly known as: ECOTRIN  Take 1 tablet (81 mg total) by mouth once  daily.     atorvastatin 40 MG tablet  Commonly known as: LIPITOR  TAKE 1 TABLET BY MOUTH EVERY DAY IN THE EVENING     EScitalopram oxalate 10 MG tablet  Commonly known as: LEXAPRO  Take 1 tablet (10 mg total) by mouth once daily.     gabapentin 100 MG capsule  Commonly known as: NEURONTIN  Take 1 capsule (100 mg total) by mouth 3 (three) times daily. for 10 days     irbesartan 300 MG tablet  Commonly known as: AVAPRO  Take 1 tablet (300 mg total) by mouth every evening.     lancets Misc  1 each by Misc.(Non-Drug; Combo Route) route daily as needed.     metoprolol succinate 50 MG 24 hr tablet  Commonly known as: TOPROL-XL  Take 1 tablet (50 mg total) by mouth once daily.     multivitamin capsule     NIFEdipine 60 MG (OSM) 24 hr tablet  Commonly known as: PROCARDIA-XL  TAKE 1 TABLET (60 MG TOTAL) BY MOUTH 2 (TWO) TIMES DAILY.     nitroGLYCERIN 0.4 MG SL tablet  Commonly known as: NITROSTAT  Place 1 tablet (0.4 mg total) under the tongue every 5 (five) minutes as needed for Chest pain.     solifenacin 5 MG tablet  Commonly known as: VESICARE     STOOL SOFTENER-STIMULANT LAXAT 8.6-50 mg per tablet  Generic drug: senna-docusate 8.6-50 mg  Take 1 tablet by mouth once daily.            Signing Physician:  Portia Hoover MD

## 2022-06-21 ENCOUNTER — DOCUMENTATION ONLY (OUTPATIENT)
Dept: PRIMARY CARE CLINIC | Facility: CLINIC | Age: 82
End: 2022-06-21
Payer: MEDICARE

## 2022-06-21 NOTE — PROGRESS NOTES
"Urine Cx 6/20 -   ENTEROCOCCUS SPECIES   >100,000 cfu/ml   Identification and susceptibility pending   No other significant isolate     Prior sensitivities reviewed.  Patient recently completed 10 day course Nitrofurantoin prescribed at ED visit on 6/7/22 (urine not sent for Cx at that visit).  Patient with reported PCN allergy->  "face swelling" after taking PCN in high school.   Discussed with patient.  E consult placed to ID team for antibiotic recommendations.     "

## 2022-06-22 ENCOUNTER — E-CONSULT (OUTPATIENT)
Dept: INFECTIOUS DISEASES | Facility: HOSPITAL | Age: 82
End: 2022-06-22
Payer: MEDICARE

## 2022-06-22 DIAGNOSIS — N39.0 URINARY TRACT INFECTION WITHOUT HEMATURIA, SITE UNSPECIFIED: Primary | ICD-10-CM
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
## 2022-06-23 DIAGNOSIS — B95.2 UTI (URINARY TRACT INFECTION) DUE TO ENTEROCOCCUS: Primary | ICD-10-CM

## 2022-06-23 DIAGNOSIS — N39.0 UTI (URINARY TRACT INFECTION) DUE TO ENTEROCOCCUS: Primary | ICD-10-CM

## 2022-06-23 RX ORDER — LINEZOLID 600 MG/1
600 TABLET, FILM COATED ORAL EVERY 12 HOURS
Qty: 20 TABLET | Refills: 0 | Status: SHIPPED | OUTPATIENT
Start: 2022-06-23 | End: 2022-06-23 | Stop reason: ALTCHOICE

## 2022-06-23 RX ORDER — GRANULES FOR ORAL 3 G/1
3 POWDER ORAL
Qty: 9 G | Refills: 0 | Status: SHIPPED | OUTPATIENT
Start: 2022-06-23 | End: 2023-02-10

## 2022-06-23 NOTE — PROGRESS NOTES
Will treat UTI with Fosfomycin 3 G Q 72  Hours x 3 doses.  In stock at [facility] and out of pocket cost is favorable.  Discussed with patient and wife- wife will pick medication up today.

## 2022-07-01 PROBLEM — R29.6 FREQUENT FALLS: Status: ACTIVE | Noted: 2022-07-01

## 2022-07-01 PROBLEM — R68.89 POSTURAL INSTABILITY OF TRUNK: Status: ACTIVE | Noted: 2022-07-01

## 2022-07-01 PROBLEM — R26.89 BALANCE DISORDER: Status: ACTIVE | Noted: 2022-07-01

## 2022-07-26 ENCOUNTER — OFFICE VISIT (OUTPATIENT)
Dept: UROLOGY | Facility: CLINIC | Age: 82
End: 2022-07-26
Payer: MEDICARE

## 2022-07-26 VITALS
WEIGHT: 195 LBS | SYSTOLIC BLOOD PRESSURE: 136 MMHG | BODY MASS INDEX: 27.3 KG/M2 | DIASTOLIC BLOOD PRESSURE: 64 MMHG | HEIGHT: 71 IN | HEART RATE: 78 BPM

## 2022-07-26 DIAGNOSIS — Z98.890 POST-OPERATIVE STATE: Primary | ICD-10-CM

## 2022-07-26 DIAGNOSIS — N13.8 BPH WITH URINARY OBSTRUCTION: ICD-10-CM

## 2022-07-26 DIAGNOSIS — N40.1 BPH WITH URINARY OBSTRUCTION: ICD-10-CM

## 2022-07-26 DIAGNOSIS — R35.1 NOCTURIA: ICD-10-CM

## 2022-07-26 PROCEDURE — 1160F PR REVIEW ALL MEDS BY PRESCRIBER/CLIN PHARMACIST DOCUMENTED: ICD-10-PCS | Mod: CPTII,S$GLB,, | Performed by: UROLOGY

## 2022-07-26 PROCEDURE — 3075F SYST BP GE 130 - 139MM HG: CPT | Mod: CPTII,S$GLB,, | Performed by: UROLOGY

## 2022-07-26 PROCEDURE — 1159F PR MEDICATION LIST DOCUMENTED IN MEDICAL RECORD: ICD-10-PCS | Mod: CPTII,S$GLB,, | Performed by: UROLOGY

## 2022-07-26 PROCEDURE — 1160F RVW MEDS BY RX/DR IN RCRD: CPT | Mod: CPTII,S$GLB,, | Performed by: UROLOGY

## 2022-07-26 PROCEDURE — 1159F MED LIST DOCD IN RCRD: CPT | Mod: CPTII,S$GLB,, | Performed by: UROLOGY

## 2022-07-26 PROCEDURE — 99999 PR PBB SHADOW E&M-EST. PATIENT-LVL III: CPT | Mod: PBBFAC,,, | Performed by: UROLOGY

## 2022-07-26 PROCEDURE — 99024 POSTOP FOLLOW-UP VISIT: CPT | Mod: S$GLB,,, | Performed by: UROLOGY

## 2022-07-26 PROCEDURE — 1126F AMNT PAIN NOTED NONE PRSNT: CPT | Mod: CPTII,S$GLB,, | Performed by: UROLOGY

## 2022-07-26 PROCEDURE — 3075F PR MOST RECENT SYSTOLIC BLOOD PRESS GE 130-139MM HG: ICD-10-PCS | Mod: CPTII,S$GLB,, | Performed by: UROLOGY

## 2022-07-26 PROCEDURE — 99999 PR PBB SHADOW E&M-EST. PATIENT-LVL III: ICD-10-PCS | Mod: PBBFAC,,, | Performed by: UROLOGY

## 2022-07-26 PROCEDURE — 3078F PR MOST RECENT DIASTOLIC BLOOD PRESSURE < 80 MM HG: ICD-10-PCS | Mod: CPTII,S$GLB,, | Performed by: UROLOGY

## 2022-07-26 PROCEDURE — 1126F PR PAIN SEVERITY QUANTIFIED, NO PAIN PRESENT: ICD-10-PCS | Mod: CPTII,S$GLB,, | Performed by: UROLOGY

## 2022-07-26 PROCEDURE — 3078F DIAST BP <80 MM HG: CPT | Mod: CPTII,S$GLB,, | Performed by: UROLOGY

## 2022-07-26 PROCEDURE — 99024 PR POST-OP FOLLOW-UP VISIT: ICD-10-PCS | Mod: S$GLB,,, | Performed by: UROLOGY

## 2022-07-26 NOTE — PROGRESS NOTES
"Pedro Ponce Jr. is a 82 y.o. male patient.   No diagnosis found.  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  Prostate tissue showed benign obstruction and chronic inflammation with no evidence of malignancy.  Patient has improved symptoms still with nocturia 2-3 times but not starting till self are hours into sleep.  Frequency is decreased during the daytime.  The patient has a good strong stream and no postvoid dribbling. Pain has resolved.    Review of patient's allergies indicates:   Allergen Reactions    Pcn [penicillins] Rash     There are no hospital problems to display for this patient.    Blood pressure 136/64, pulse 78, height 5' 11" (1.803 m), weight 88.5 kg (195 lb).    Subjective:   Diet: Adequate intake.  Patient reports no nausea or vomiting.    Activity level: Normal.    Pain control: Well controlled.      Objective:  Vital signs (most recent): Blood pressure 136/64, pulse 78, height 5' 11" (1.803 m), weight 88.5 kg (195 lb).  General appearance: Comfortable, well-appearing, in no acute distress and not in pain.    Lungs:  Normal effort.    Heart: Normal rate.    Chest: Symmetric chest wall expansion.    Abdomen: Abdomen is soft.  No distension or ascites.    Bowel sounds:  Bowel sounds are normal.    Tenderness: There is " no abdominal tenderness tenderness.    Extremities: There is normal range of motion.    Neurological: The patient is alert and oriented to person, place and time.  Normal strength.  No right hemiparesis, left hemiparesis or tongue deviation.  Pupils are equal, round, and reactive to light.       Assessment:   Post-op: 61 days.    Condition: In stable condition.     Plan:  Encourage ambulation.  Resume oral medications.    Can try stopping VESIcare 5 mg.  If urinary urgency and frequency do not recur after stopping medicine stay off of the medication.  If urinary urgency and frequency recur after stopping the medicine than just resume medicine at current dose and keep follow-up appointment in 6 months.  Follow-up with PSA test in 6 months       Sami Ledezma MD  7/26/2022

## 2022-07-26 NOTE — PATIENT INSTRUCTIONS
Can try stopping VESIcare 5 mg.  If urinary urgency and frequency do not recur after stopping medicine stay off of the medication.  If urinary urgency and frequency recur after stopping the medicine than just resume medicine at current dose and keep follow-up appointment in 6 months.  Follow-up with PSA test in 6 months

## 2022-07-28 DIAGNOSIS — N18.30 STAGE 3 CHRONIC KIDNEY DISEASE, UNSPECIFIED WHETHER STAGE 3A OR 3B CKD: Primary | ICD-10-CM

## 2022-08-03 ENCOUNTER — LAB VISIT (OUTPATIENT)
Dept: LAB | Facility: HOSPITAL | Age: 82
End: 2022-08-03
Attending: FAMILY MEDICINE
Payer: MEDICARE

## 2022-08-03 ENCOUNTER — OFFICE VISIT (OUTPATIENT)
Dept: FAMILY MEDICINE | Facility: CLINIC | Age: 82
End: 2022-08-03
Payer: MEDICARE

## 2022-08-03 VITALS
WEIGHT: 192.88 LBS | SYSTOLIC BLOOD PRESSURE: 138 MMHG | OXYGEN SATURATION: 98 % | HEIGHT: 71 IN | DIASTOLIC BLOOD PRESSURE: 66 MMHG | HEART RATE: 53 BPM | BODY MASS INDEX: 27 KG/M2

## 2022-08-03 DIAGNOSIS — I10 PRIMARY HYPERTENSION: Primary | ICD-10-CM

## 2022-08-03 DIAGNOSIS — E11.21 TYPE 2 DIABETES MELLITUS WITH DIABETIC NEPHROPATHY, WITHOUT LONG-TERM CURRENT USE OF INSULIN: ICD-10-CM

## 2022-08-03 PROCEDURE — 3075F PR MOST RECENT SYSTOLIC BLOOD PRESS GE 130-139MM HG: ICD-10-PCS | Mod: CPTII,S$GLB,, | Performed by: FAMILY MEDICINE

## 2022-08-03 PROCEDURE — 99999 PR PBB SHADOW E&M-EST. PATIENT-LVL III: ICD-10-PCS | Mod: PBBFAC,,, | Performed by: FAMILY MEDICINE

## 2022-08-03 PROCEDURE — 83036 HEMOGLOBIN GLYCOSYLATED A1C: CPT | Performed by: FAMILY MEDICINE

## 2022-08-03 PROCEDURE — 1159F MED LIST DOCD IN RCRD: CPT | Mod: CPTII,S$GLB,, | Performed by: FAMILY MEDICINE

## 2022-08-03 PROCEDURE — 1126F AMNT PAIN NOTED NONE PRSNT: CPT | Mod: CPTII,S$GLB,, | Performed by: FAMILY MEDICINE

## 2022-08-03 PROCEDURE — 36415 COLL VENOUS BLD VENIPUNCTURE: CPT | Mod: PO | Performed by: FAMILY MEDICINE

## 2022-08-03 PROCEDURE — 1126F PR PAIN SEVERITY QUANTIFIED, NO PAIN PRESENT: ICD-10-PCS | Mod: CPTII,S$GLB,, | Performed by: FAMILY MEDICINE

## 2022-08-03 PROCEDURE — 3078F DIAST BP <80 MM HG: CPT | Mod: CPTII,S$GLB,, | Performed by: FAMILY MEDICINE

## 2022-08-03 PROCEDURE — 99214 PR OFFICE/OUTPT VISIT, EST, LEVL IV, 30-39 MIN: ICD-10-PCS | Mod: S$GLB,,, | Performed by: FAMILY MEDICINE

## 2022-08-03 PROCEDURE — 99999 PR PBB SHADOW E&M-EST. PATIENT-LVL III: CPT | Mod: PBBFAC,,, | Performed by: FAMILY MEDICINE

## 2022-08-03 PROCEDURE — 99214 OFFICE O/P EST MOD 30 MIN: CPT | Mod: S$GLB,,, | Performed by: FAMILY MEDICINE

## 2022-08-03 PROCEDURE — 1159F PR MEDICATION LIST DOCUMENTED IN MEDICAL RECORD: ICD-10-PCS | Mod: CPTII,S$GLB,, | Performed by: FAMILY MEDICINE

## 2022-08-03 PROCEDURE — 3075F SYST BP GE 130 - 139MM HG: CPT | Mod: CPTII,S$GLB,, | Performed by: FAMILY MEDICINE

## 2022-08-03 PROCEDURE — 3078F PR MOST RECENT DIASTOLIC BLOOD PRESSURE < 80 MM HG: ICD-10-PCS | Mod: CPTII,S$GLB,, | Performed by: FAMILY MEDICINE

## 2022-08-03 NOTE — PROGRESS NOTES
Subjective:       Patient ID: Pedro Ponce Jr. is a 82 y.o. male.    Chief Complaint: Follow-up and Hypertension    82 years old male came to the clinic for blood pressure check.  Blood pressure today was stable.  No chest pain, palpitation, orthopnea or PND.  Last A1c was stable.  No polyuria, polydipsia or polyphagia.    Review of Systems   Constitutional: Negative.    HENT: Negative.    Eyes: Negative.    Respiratory: Negative.    Cardiovascular: Negative.  Negative for chest pain, palpitations, leg swelling and claudication.   Gastrointestinal: Negative.    Endocrine: Negative for heat intolerance, polydipsia, polyphagia and polyuria.   Genitourinary: Negative.    Musculoskeletal: Negative.    Integumentary:  Negative.   Neurological: Negative.    Psychiatric/Behavioral: Negative.          Objective:      Physical Exam  Vitals and nursing note reviewed.   Constitutional:       General: He is not in acute distress.     Appearance: He is well-developed. He is not diaphoretic.   HENT:      Head: Normocephalic and atraumatic.      Right Ear: External ear normal.      Left Ear: External ear normal.      Nose: Nose normal.      Mouth/Throat:      Pharynx: No oropharyngeal exudate.   Eyes:      General: No scleral icterus.        Right eye: No discharge.         Left eye: No discharge.      Conjunctiva/sclera: Conjunctivae normal.      Pupils: Pupils are equal, round, and reactive to light.   Neck:      Thyroid: No thyromegaly.      Vascular: No JVD.      Trachea: No tracheal deviation.   Cardiovascular:      Rate and Rhythm: Normal rate and regular rhythm.      Heart sounds: Normal heart sounds. No murmur heard.    No friction rub. No gallop.   Pulmonary:      Effort: Pulmonary effort is normal. No respiratory distress.      Breath sounds: Normal breath sounds. No stridor. No wheezing or rales.   Chest:      Chest wall: No tenderness.   Abdominal:      General: Bowel sounds are normal. There is no distension.       Palpations: Abdomen is soft. There is no mass.      Tenderness: There is no abdominal tenderness. There is no guarding or rebound.   Musculoskeletal:         General: No tenderness. Normal range of motion.      Cervical back: Normal range of motion and neck supple.   Lymphadenopathy:      Cervical: No cervical adenopathy.   Skin:     General: Skin is warm and dry.      Coloration: Skin is not pale.      Findings: No erythema or rash.   Neurological:      Mental Status: He is alert and oriented to person, place, and time.      Cranial Nerves: No cranial nerve deficit.      Motor: No abnormal muscle tone.      Coordination: Coordination normal.      Deep Tendon Reflexes: Reflexes are normal and symmetric. Reflexes normal.   Psychiatric:         Behavior: Behavior normal.         Thought Content: Thought content normal.         Judgment: Judgment normal.         Assessment:       Problem List Items Addressed This Visit     Hypertension - Primary    Type 2 diabetes mellitus with renal manifestations    Relevant Orders    Hemoglobin A1C          Plan:         Pedro was seen today for follow-up and hypertension.    Diagnoses and all orders for this visit:    Primary hypertension    Type 2 diabetes mellitus with diabetic nephropathy, without long-term current use of insulin  -     Hemoglobin A1C; Future    Continue monitoring blood pressure at home, low sodium diet.  Continue monitoring blood sugar at home,ADA diet.

## 2022-08-04 LAB
ESTIMATED AVG GLUCOSE: 117 MG/DL (ref 68–131)
HBA1C MFR BLD: 5.7 % (ref 4–5.6)

## 2022-08-11 ENCOUNTER — TELEPHONE (OUTPATIENT)
Dept: UROLOGY | Facility: CLINIC | Age: 82
End: 2022-08-11
Payer: MEDICARE

## 2022-08-11 DIAGNOSIS — R35.1 NOCTURIA: Primary | ICD-10-CM

## 2022-08-11 NOTE — TELEPHONE ENCOUNTER
----- Message from Sami Ledezma MD sent at 8/11/2022 12:21 PM CDT -----  Regarding: RE: urinary frequency  Patient needs urinalysis, urine culture and can re-start VESIcare.  ----- Message -----  From: Eliza Donato MA  Sent: 8/11/2022  10:47 AM CDT  To: Sami Ledezma MD  Subject: urinary frequency                                Pt states after his procedure you asked him to stop 3 medications, he could not tell me the names, but is now having urinary frequency and wants to know how you would like him to proceed?      Follow up in about 6 months (around 1/26/2023) for PSA and TRAVIS.  Can try stopping VESIcare 5 mg.  If urinary urgency and frequency do not recur after stopping medicine stay off of the medication.  If urinary urgency and frequency recur after stopping the medicine than just resume medicine at current dose and keep follow-up appointment in 6 months.  Follow-up with PSA test in 6 months

## 2022-08-11 NOTE — TELEPHONE ENCOUNTER
Staff msg sent to Dr Ledezma:    Pt states after his procedure you asked him to stop 3 medications, he could not tell me the names, but is now having urinary frequency and wants to know how you would like him to proceed?      ----- Message from Kina Loera sent at 8/11/2022 10:27 AM CDT -----  Contact: [phone]/ Self  Type: Requesting to speak with nurse    Who Called: Pt   Regarding: c/o urine frequency    Would the patient rather a call back or a response via [facility]? Call back  Best Call Back Number: [phone]  Additional Information: Pt states he has been calling for 3 days and has not heard back. Please advise.

## 2022-08-15 ENCOUNTER — TELEPHONE (OUTPATIENT)
Dept: UROLOGY | Facility: CLINIC | Age: 82
End: 2022-08-15
Payer: MEDICARE

## 2022-08-15 NOTE — TELEPHONE ENCOUNTER
I spoke with pt and informed him of urine results     ----- Message from Van Chang sent at 8/15/2022  3:32 PM CDT -----  Type:  Patient Returning Call    Who Called:patient  Who Left Message for Patient: nurse  Does the patient know what this is regarding?:no  Would the patient rather a call back or a response via [facility]? call  Best Call Back Number:[phone]  Additional Information: none

## 2022-09-08 ENCOUNTER — OFFICE VISIT (OUTPATIENT)
Dept: NEPHROLOGY | Facility: CLINIC | Age: 82
End: 2022-09-08
Payer: MEDICARE

## 2022-09-08 VITALS
BODY MASS INDEX: 27.37 KG/M2 | HEART RATE: 54 BPM | OXYGEN SATURATION: 98 % | SYSTOLIC BLOOD PRESSURE: 134 MMHG | DIASTOLIC BLOOD PRESSURE: 66 MMHG | WEIGHT: 196.19 LBS

## 2022-09-08 DIAGNOSIS — R80.9 PROTEINURIA, UNSPECIFIED TYPE: ICD-10-CM

## 2022-09-08 DIAGNOSIS — I10 HYPERTENSION, UNSPECIFIED TYPE: ICD-10-CM

## 2022-09-08 DIAGNOSIS — N18.30 STAGE 3 CHRONIC KIDNEY DISEASE, UNSPECIFIED WHETHER STAGE 3A OR 3B CKD: Primary | ICD-10-CM

## 2022-09-08 PROCEDURE — 1159F MED LIST DOCD IN RCRD: CPT | Mod: CPTII,S$GLB,, | Performed by: INTERNAL MEDICINE

## 2022-09-08 PROCEDURE — 1101F PR PT FALLS ASSESS DOC 0-1 FALLS W/OUT INJ PAST YR: ICD-10-PCS | Mod: CPTII,S$GLB,, | Performed by: INTERNAL MEDICINE

## 2022-09-08 PROCEDURE — 3075F PR MOST RECENT SYSTOLIC BLOOD PRESS GE 130-139MM HG: ICD-10-PCS | Mod: CPTII,S$GLB,, | Performed by: INTERNAL MEDICINE

## 2022-09-08 PROCEDURE — 3078F PR MOST RECENT DIASTOLIC BLOOD PRESSURE < 80 MM HG: ICD-10-PCS | Mod: CPTII,S$GLB,, | Performed by: INTERNAL MEDICINE

## 2022-09-08 PROCEDURE — 3075F SYST BP GE 130 - 139MM HG: CPT | Mod: CPTII,S$GLB,, | Performed by: INTERNAL MEDICINE

## 2022-09-08 PROCEDURE — 1101F PT FALLS ASSESS-DOCD LE1/YR: CPT | Mod: CPTII,S$GLB,, | Performed by: INTERNAL MEDICINE

## 2022-09-08 PROCEDURE — 99999 PR PBB SHADOW E&M-EST. PATIENT-LVL III: ICD-10-PCS | Mod: PBBFAC,,, | Performed by: INTERNAL MEDICINE

## 2022-09-08 PROCEDURE — 3288F PR FALLS RISK ASSESSMENT DOCUMENTED: ICD-10-PCS | Mod: CPTII,S$GLB,, | Performed by: INTERNAL MEDICINE

## 2022-09-08 PROCEDURE — 99214 PR OFFICE/OUTPT VISIT, EST, LEVL IV, 30-39 MIN: ICD-10-PCS | Mod: S$GLB,,, | Performed by: INTERNAL MEDICINE

## 2022-09-08 PROCEDURE — 99999 PR PBB SHADOW E&M-EST. PATIENT-LVL III: CPT | Mod: PBBFAC,,, | Performed by: INTERNAL MEDICINE

## 2022-09-08 PROCEDURE — 3288F FALL RISK ASSESSMENT DOCD: CPT | Mod: CPTII,S$GLB,, | Performed by: INTERNAL MEDICINE

## 2022-09-08 PROCEDURE — 3078F DIAST BP <80 MM HG: CPT | Mod: CPTII,S$GLB,, | Performed by: INTERNAL MEDICINE

## 2022-09-08 PROCEDURE — 99214 OFFICE O/P EST MOD 30 MIN: CPT | Mod: S$GLB,,, | Performed by: INTERNAL MEDICINE

## 2022-09-08 PROCEDURE — 1159F PR MEDICATION LIST DOCUMENTED IN MEDICAL RECORD: ICD-10-PCS | Mod: CPTII,S$GLB,, | Performed by: INTERNAL MEDICINE

## 2022-09-08 NOTE — PROGRESS NOTES
Subjective:       Patient ID: Pedro Ponce Jr. is a 82 y.o. Black or  male who presents for follow-up evaluation of Chronic Kidney Disease    HPI This is a 82-year-old -American male with longstanding hypertension, diet-controlled diabetes, proteinuria, bilateral renal cysts is here for f/u of these. Diet controlled DM stable. Creatinine stable at 1.3.  Bp's in the 130's/60's-70's.  Slight proteinuria.  Walks regularly.   Denies NSAID's.  Status post transurethral laser enucleation of prostate in may 2022.      PAST MEDICAL HISTORY: Type 2 diabetes diet controlled, hypertension for   approximately 20 years, hyperlipidemia, gout, glaucoma, BPH and TURP in   2006 and followed in Urology. He also had questionable cyst versus tumor   removed by Dr. Drew about 17 years ago and this was not malignant per   patient.     Review of Systems   Constitutional:  Negative for appetite change, fatigue and unexpected weight change.             Eyes:  Negative for discharge.   Respiratory:  Negative for cough, shortness of breath and wheezing.    Cardiovascular:  Negative for chest pain and palpitations.   Gastrointestinal:  Negative for abdominal pain, diarrhea, nausea and vomiting.        Nocturia   Genitourinary: Negative.  Negative for dysuria, frequency, hematuria and urgency.   Skin:  Negative for color change and rash.   Psychiatric/Behavioral:  Negative for confusion.      Objective:    No edema. PE not done.  Physical Exam  Constitutional:       Appearance: He is well-developed.   Cardiovascular:      Rate and Rhythm: Normal rate and regular rhythm.      Heart sounds: No murmur heard.    No friction rub.   Pulmonary:      Effort: Pulmonary effort is normal. No respiratory distress.      Breath sounds: Normal breath sounds. No rales.   Abdominal:      General: Bowel sounds are normal.      Palpations: Abdomen is soft.      Tenderness: There is no abdominal tenderness. There is no rebound.    Skin:     Findings: No erythema or rash.   Neurological:      Mental Status: He is alert and oriented to person, place, and time.   Psychiatric:         Judgment: Judgment normal.       Assessment:       No diagnosis found.    Plan:         This is a 81 -year-old -American male with   longstanding hypertension, diet-controlled diabetes comes in for f/u of bilateral renal cysts.   1. Renal cysts. He does have multiple cysts on bilateral kidneys. 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 He denies any hematuria. Repeat ultrasound recently overall stable.  Followed in urology.  Had cysto/SUDS.  Unclear if it's acquired cystic disease-no family H/o. US without any Tumors-stable form last time. Repeat next visit.  Had nonmalignant tumor removed many years ago in urology.    Had cystoscopy in 2020. Recent prostate surgery in may.  2. Hypertension: Blood pressures are stable at home. on avapro and continue metoprolol 25 mg and monitor bp's.   I Encouraged excersise and low salt diet. Off spironolactone for unclear reasons-rec this as the next agent if  he needs better bp control.   3. Proteinuria. 480 mg  of proteinuria sec to HTN. On avapro. Rec good bp control.  Off HCTZ b/c of previous higher calciums but is back on it.  Was on spironolactone as he is not on a diuretic and potassiums on lower side and to help proteinuria but off spironolactone.    4. Anemia. H&H is stable.   5. Renal/CKD stg 2- 3: creatinine is 1.3 and baseline around 1.2-1.4 with GFR of 52 ml/min .  Encouraged him to hydrate well.   Alkalosis much better lately but slightly alkalotic at times -hydrate well. If this cont's, may need HCTZ d/komal. Checked  floyd/renin levels.   6. UTI: No recent UTI's.    Has high wbc's in urine chronically.     Calcium borderline elevated chronically but i ca stable and recent ca stable. spep nml.  On  MVI.  PTH stable for his ckd. Vit D stable. If  the ca worsens, will need parathyroid scan to evaluate possible primary hyperpara but stable now.     F/u in 4 months.

## 2022-10-04 ENCOUNTER — IMMUNIZATION (OUTPATIENT)
Dept: FAMILY MEDICINE | Facility: CLINIC | Age: 82
End: 2022-10-04
Payer: MEDICARE

## 2022-10-04 ENCOUNTER — OFFICE VISIT (OUTPATIENT)
Dept: CARDIOLOGY | Facility: CLINIC | Age: 82
End: 2022-10-04
Payer: MEDICARE

## 2022-10-04 VITALS
HEART RATE: 76 BPM | BODY MASS INDEX: 28.25 KG/M2 | WEIGHT: 201.81 LBS | DIASTOLIC BLOOD PRESSURE: 78 MMHG | SYSTOLIC BLOOD PRESSURE: 147 MMHG | HEIGHT: 71 IN

## 2022-10-04 DIAGNOSIS — Z95.5 STATUS POST INSERTION OF DRUG-ELUTING STENT INTO LEFT ANTERIOR DESCENDING (LAD) ARTERY: ICD-10-CM

## 2022-10-04 DIAGNOSIS — R00.1 BRADYCARDIA, DRUG INDUCED: ICD-10-CM

## 2022-10-04 DIAGNOSIS — T50.905A BRADYCARDIA, DRUG INDUCED: ICD-10-CM

## 2022-10-04 DIAGNOSIS — Z23 NEED FOR VACCINATION: Primary | ICD-10-CM

## 2022-10-04 DIAGNOSIS — E11.21 TYPE 2 DIABETES MELLITUS WITH DIABETIC NEPHROPATHY, WITHOUT LONG-TERM CURRENT USE OF INSULIN: ICD-10-CM

## 2022-10-04 DIAGNOSIS — I70.0 AORTIC ATHEROSCLEROSIS: ICD-10-CM

## 2022-10-04 DIAGNOSIS — I10 PRIMARY HYPERTENSION: ICD-10-CM

## 2022-10-04 DIAGNOSIS — E78.00 PURE HYPERCHOLESTEROLEMIA: ICD-10-CM

## 2022-10-04 PROCEDURE — 90694 FLU VACCINE - QUADRIVALENT - ADJUVANTED: ICD-10-PCS | Mod: S$GLB,,, | Performed by: INTERNAL MEDICINE

## 2022-10-04 PROCEDURE — 99214 PR OFFICE/OUTPT VISIT, EST, LEVL IV, 30-39 MIN: ICD-10-PCS | Mod: 25,S$GLB,, | Performed by: INTERNAL MEDICINE

## 2022-10-04 PROCEDURE — 90694 VACC AIIV4 NO PRSRV 0.5ML IM: CPT | Mod: S$GLB,,, | Performed by: INTERNAL MEDICINE

## 2022-10-04 PROCEDURE — G0008 PR ADMIN INFLUENZA VIRUS VAC: ICD-10-PCS | Mod: ,,, | Performed by: INTERNAL MEDICINE

## 2022-10-04 PROCEDURE — 1159F PR MEDICATION LIST DOCUMENTED IN MEDICAL RECORD: ICD-10-PCS | Mod: CPTII,S$GLB,, | Performed by: INTERNAL MEDICINE

## 2022-10-04 PROCEDURE — 3078F PR MOST RECENT DIASTOLIC BLOOD PRESSURE < 80 MM HG: ICD-10-PCS | Mod: CPTII,S$GLB,, | Performed by: INTERNAL MEDICINE

## 2022-10-04 PROCEDURE — 0124A COVID-19, MRNA, LNP-S, BIVALENT BOOSTER, PF, 30 MCG/0.3 ML DOSE: CPT | Mod: PBBFAC | Performed by: FAMILY MEDICINE

## 2022-10-04 PROCEDURE — G0008 ADMIN INFLUENZA VIRUS VAC: HCPCS | Mod: ,,, | Performed by: INTERNAL MEDICINE

## 2022-10-04 PROCEDURE — 91312 COVID-19, MRNA, LNP-S, BIVALENT BOOSTER, PF, 30 MCG/0.3 ML DOSE: ICD-10-PCS | Mod: S$GLB,,, | Performed by: FAMILY MEDICINE

## 2022-10-04 PROCEDURE — 3077F SYST BP >= 140 MM HG: CPT | Mod: CPTII,S$GLB,, | Performed by: INTERNAL MEDICINE

## 2022-10-04 PROCEDURE — 99999 PR PBB SHADOW E&M-EST. PATIENT-LVL III: ICD-10-PCS | Mod: PBBFAC,,, | Performed by: INTERNAL MEDICINE

## 2022-10-04 PROCEDURE — 3078F DIAST BP <80 MM HG: CPT | Mod: CPTII,S$GLB,, | Performed by: INTERNAL MEDICINE

## 2022-10-04 PROCEDURE — 1160F RVW MEDS BY RX/DR IN RCRD: CPT | Mod: CPTII,S$GLB,, | Performed by: INTERNAL MEDICINE

## 2022-10-04 PROCEDURE — 3077F PR MOST RECENT SYSTOLIC BLOOD PRESSURE >= 140 MM HG: ICD-10-PCS | Mod: CPTII,S$GLB,, | Performed by: INTERNAL MEDICINE

## 2022-10-04 PROCEDURE — 1160F PR REVIEW ALL MEDS BY PRESCRIBER/CLIN PHARMACIST DOCUMENTED: ICD-10-PCS | Mod: CPTII,S$GLB,, | Performed by: INTERNAL MEDICINE

## 2022-10-04 PROCEDURE — 91312 COVID-19, MRNA, LNP-S, BIVALENT BOOSTER, PF, 30 MCG/0.3 ML DOSE: CPT | Mod: S$GLB,,, | Performed by: FAMILY MEDICINE

## 2022-10-04 PROCEDURE — 99499 UNLISTED E&M SERVICE: CPT | Mod: S$GLB,,, | Performed by: INTERNAL MEDICINE

## 2022-10-04 PROCEDURE — 99214 OFFICE O/P EST MOD 30 MIN: CPT | Mod: 25,S$GLB,, | Performed by: INTERNAL MEDICINE

## 2022-10-04 PROCEDURE — 99499 FLU VACCINE - QUADRIVALENT - ADJUVANTED: ICD-10-PCS | Mod: S$GLB,,, | Performed by: INTERNAL MEDICINE

## 2022-10-04 PROCEDURE — 99999 PR PBB SHADOW E&M-EST. PATIENT-LVL III: CPT | Mod: PBBFAC,,, | Performed by: INTERNAL MEDICINE

## 2022-10-04 PROCEDURE — 1159F MED LIST DOCD IN RCRD: CPT | Mod: CPTII,S$GLB,, | Performed by: INTERNAL MEDICINE

## 2022-10-04 NOTE — PROGRESS NOTES
[facility]     Subjective:    Patient ID:  Pedro Ponce Jr. is a 82 y.o. male who presents for follow-up of Coronary Artery Disease, Hypertension, Hyperlipidemia, and Diabetes Mellitus    Review of patient's allergies indicates:   Allergen Reactions    Pcn [penicillins] Rash      He has multidrug hypertension.  In the past he has been intolerant to beta-blockers related to bradycardia.  He is bradycardic today but without symptoms.  He is currently on Toprol 50 mg daily.  He has GFR 52.  He is a diet-controlled diabetic.  In December 2020 he had LAD stent.  He remains on aspirin.  He is on statin therapy.  His LDL is 71 pure hypercholesterolemia.    He goes to the gym and does an hour of exercise most days.  He does have fatigue symptoms in the morning.  He does not have shortness of breath or chest pain.  He has not had dizziness or lightheadedness.  He does have fatigue symptoms in the morning even before he takes his medications.      Review of Systems   Constitutional: Positive for malaise/fatigue. Negative for chills, decreased appetite, diaphoresis, fever, night sweats, weight gain and weight loss.   HENT:  Negative for congestion, ear discharge, ear pain, hearing loss, hoarse voice, nosebleeds, odynophagia, sore throat, stridor and tinnitus.    Eyes:  Negative for blurred vision, discharge, double vision, pain, photophobia, redness, vision loss in left eye, vision loss in right eye, visual disturbance and visual halos.   Cardiovascular:  Negative for chest pain, claudication, cyanosis, dyspnea on exertion, irregular heartbeat, leg swelling, near-syncope, orthopnea, palpitations, paroxysmal nocturnal dyspnea and syncope.   Respiratory:  Negative for cough, hemoptysis, shortness of breath, sleep disturbances due to breathing, snoring, sputum production and wheezing.    Endocrine: Negative for cold intolerance, heat  "intolerance, polydipsia, polyphagia and polyuria.   Hematologic/Lymphatic: Negative for adenopathy and bleeding problem. Does not bruise/bleed easily.   Skin:  Negative for color change, dry skin, flushing, itching, nail changes, poor wound healing, rash, skin cancer, suspicious lesions and unusual hair distribution.   Musculoskeletal:  Negative for arthritis, back pain, falls, gout, joint pain, joint swelling, muscle cramps, muscle weakness, myalgias, neck pain and stiffness.   Gastrointestinal:  Negative for bloating, abdominal pain, anorexia, change in bowel habit, bowel incontinence, constipation, diarrhea, dysphagia, excessive appetite, flatus, heartburn, hematemesis, hematochezia, hemorrhoids, jaundice, melena, nausea and vomiting.   Genitourinary:  Negative for bladder incontinence, decreased libido, dysuria, flank pain, frequency, genital sores, hematuria, hesitancy, incomplete emptying, nocturia and urgency.   Neurological:  Negative for aphonia, brief paralysis, difficulty with concentration, disturbances in coordination, excessive daytime sleepiness, dizziness, focal weakness, headaches, light-headedness, loss of balance, numbness, paresthesias, seizures, sensory change, tremors, vertigo and weakness.   Psychiatric/Behavioral:  Negative for altered mental status, depression, hallucinations, memory loss, substance abuse, suicidal ideas and thoughts of violence. The patient does not have insomnia and is not nervous/anxious.    Allergic/Immunologic: Negative for hives and persistent infections.      Objective:       Vitals:    10/04/22 1338   BP: (!) 147/78   Pulse: 76   Weight: 91.5 kg (201 lb 12.8 oz)   Height: 5' 11" (1.803 m)    Physical Exam  Constitutional:       General: He is not in acute distress.     Appearance: He is well-developed. He is not diaphoretic.   HENT:      Head: Normocephalic and atraumatic.      Nose: Nose normal.   Eyes:      General: No scleral icterus.        Right eye: No " discharge.      Conjunctiva/sclera: Conjunctivae normal.      Pupils: Pupils are equal, round, and reactive to light.   Neck:      Thyroid: No thyromegaly.      Vascular: No JVD.      Trachea: No tracheal deviation.   Cardiovascular:      Rate and Rhythm: Regular rhythm. Bradycardia present.      Pulses:           Carotid pulses are 2+ on the right side and 2+ on the left side.       Radial pulses are 2+ on the right side and 2+ on the left side.        Dorsalis pedis pulses are 2+ on the right side and 2+ on the left side.        Posterior tibial pulses are 2+ on the right side and 2+ on the left side.      Heart sounds: Murmur heard.   Harsh early systolic murmur is present with a grade of 2/6 at the upper right sternal border.     No friction rub. No gallop.   Pulmonary:      Effort: Pulmonary effort is normal. No respiratory distress.      Breath sounds: Normal breath sounds. No stridor. No wheezing or rales.   Chest:      Chest wall: No tenderness.   Abdominal:      General: Bowel sounds are normal. There is no distension.      Palpations: Abdomen is soft. There is no mass.      Tenderness: There is no abdominal tenderness. There is no guarding or rebound.   Musculoskeletal:         General: No tenderness. Normal range of motion.      Cervical back: Normal range of motion and neck supple.   Lymphadenopathy:      Cervical: No cervical adenopathy.   Skin:     General: Skin is warm and dry.      Coloration: Skin is not pale.      Findings: No erythema or rash.   Neurological:      Mental Status: He is alert and oriented to person, place, and time.      Cranial Nerves: No cranial nerve deficit.      Coordination: Coordination normal.   Psychiatric:         Behavior: Behavior normal.         Thought Content: Thought content normal.         Judgment: Judgment normal.         Assessment:       1. Status post insertion of drug-eluting stent into left anterior descending (LAD) artery    2. Pure hypercholesterolemia     3. Primary hypertension    4. Bradycardia, drug induced    5. Aortic atherosclerosis    6. Type 2 diabetes mellitus with diabetic nephropathy, without long-term current use of insulin      Results for orders placed or performed in visit on 09/02/22   Protein/Creatinine Ratio, Urine   Result Value Ref Range    Protein, Urine Random 43 (H) 0 - 15 mg/dL    Creatinine, Urine 90.0 23.0 - 375.0 mg/dL    Prot/Creat Ratio, Urine 0.48 (H) 0.00 - 0.20   Urinalysis   Result Value Ref Range    Specimen UA Urine, Clean Catch     Color, UA Yellow Yellow, Straw, Kiana    Appearance, UA Clear Clear    pH, UA 7.0 5.0 - 8.0    Specific Gravity, UA 1.015 1.005 - 1.030    Protein, UA 1+ (A) Negative    Glucose, UA Negative Negative    Ketones, UA Negative Negative    Bilirubin (UA) Negative Negative    Occult Blood UA Trace (A) Negative    Nitrite, UA Negative Negative    Urobilinogen, UA Negative <2.0 EU/dL    Leukocytes, UA Trace (A) Negative   Urinalysis Microscopic   Result Value Ref Range    RBC, UA 1 0 - 4 /hpf    WBC, UA 4 0 - 5 /hpf    Bacteria Rare None-Occ /hpf    Hyaline Casts, UA 0 0-1/lpf /lpf    Microscopic Comment SEE COMMENT      *Note: Due to a large number of results and/or encounters for the requested time period, some results have not been displayed. A complete set of results can be found in Results Review.         Current Outpatient Medications:     ACCU-CHEK ALYCIA PLUS TEST STRP Strp, 1 EACH BY [facility].(NON-DRUG COMBO ROUTE) ROUTE DAILY AS NEEDED. (Patient not taking: Reported on 9/8/2022), Disp: 50 strip, Rfl: 3    aspirin (ECOTRIN) 81 MG EC tablet, Take 1 tablet (81 mg total) by mouth once daily., Disp: 90 tablet, Rfl: 3    atorvastatin (LIPITOR) 40 MG tablet, TAKE 1 TABLET BY MOUTH EVERY DAY IN THE EVENING, Disp: 90 tablet, Rfl: 3    EScitalopram oxalate (LEXAPRO) 10 MG tablet, Take 1 tablet (10 mg total) by mouth once daily., Disp: 90 tablet, Rfl: 3    fosfomycin (MONUROL) 3 gram Pack, Take 3 g by mouth every  72 hours. (Patient not taking: Reported on 9/8/2022), Disp: 9 g, Rfl: 0    gabapentin (NEURONTIN) 100 MG capsule, Take 1 capsule (100 mg total) by mouth 3 (three) times daily. for 10 days (Patient not taking: Reported on 9/8/2022), Disp: 30 capsule, Rfl: 0    irbesartan (AVAPRO) 300 MG tablet, Take 1 tablet (300 mg total) by mouth every evening., Disp: 90 tablet, Rfl: 3    lancets Misc, 1 each by Misc.(Non-Drug; Combo Route) route daily as needed., Disp: 30 each, Rfl: 3    metoprolol succinate (TOPROL-XL) 50 MG 24 hr tablet, Take 1 tablet (50 mg total) by mouth once daily., Disp: 90 tablet, Rfl: 3    multivitamin capsule, Take 1 capsule by mouth once daily. Centrum Silver, Disp: , Rfl:     NIFEdipine (PROCARDIA-XL) 60 MG (OSM) 24 hr tablet, TAKE 1 TABLET (60 MG TOTAL) BY MOUTH 2 (TWO) TIMES DAILY., Disp: 180 tablet, Rfl: 1    senna-docusate 8.6-50 mg (SENNA WITH DOCUSATE SODIUM) 8.6-50 mg per tablet, Take 1 tablet by mouth once daily., Disp: 7 tablet, Rfl: 0    solifenacin (VESICARE) 5 MG tablet, Take 1 tablet (5 mg total) by mouth once daily., Disp: 90 tablet, Rfl: 3     Lab Results   Component Value Date    WBC 6.34 06/20/2022    RBC 3.77 (L) 06/20/2022    HGB 13.5 (L) 09/02/2022    HCT 39.8 (L) 09/02/2022    MCV 93 06/20/2022    MCH 31.8 (H) 06/20/2022    MCHC 34.2 06/20/2022    RDW 14.6 (H) 06/20/2022     06/20/2022    MPV 9.8 06/20/2022    GRAN 4.7 06/20/2022    GRAN 73.5 (H) 06/20/2022    LYMPH 1.0 06/20/2022    LYMPH 15.1 (L) 06/20/2022    MONO 0.6 06/20/2022    MONO 9.9 06/20/2022    EOS 0.0 06/20/2022    BASO 0.03 06/20/2022    EOSINOPHIL 0.5 06/20/2022    BASOPHIL 0.5 06/20/2022    MG 2.0 08/28/2021        CMP  Lab Results   Component Value Date     09/02/2022    K 4.1 09/02/2022    CL 99 09/02/2022    CO2 30 (H) 09/02/2022     (H) 09/02/2022    BUN 22 (H) 09/02/2022    CREATININE 1.35 09/02/2022    CALCIUM 9.7 09/02/2022    PROT 7.5 06/07/2022    ALBUMIN 4.5 09/02/2022    BILITOT 0.5  06/07/2022    ALKPHOS 80 06/07/2022    AST 24 06/07/2022    ALT 19 06/07/2022    ANIONGAP 10 09/02/2022    ESTGFRAFRICA 58 (A) 06/20/2022    EGFRNONAA 50 (A) 06/20/2022        Lab Results   Component Value Date    LABURIN ENTEROCOCCUS FAECALIS  10,000 - 49,999 cfu/ml   (A) 08/11/2022            Results for orders placed or performed during the hospital encounter of 06/01/22   EKG 12-lead    Collection Time: 06/01/22  4:05 PM    Narrative    Test Reason : R07.89,    Vent. Rate : 051 BPM     Atrial Rate : 051 BPM     P-R Int : 204 ms          QRS Dur : 100 ms      QT Int : 420 ms       P-R-T Axes : 069 -09 240 degrees     QTc Int : 387 ms    Sinus bradycardia  Right ventricular conduction delay  Minimal voltage criteria for LVH, may be normal variant  Septal infarct (cited on or before 01-FEB-2022)  T wave abnormality, consider inferolateral ischemia  Abnormal ECG  When compared with ECG of 01-FEB-2022 16:33,  Premature ventricular complexes are no longer Present  Questionable change in QRS duration  Criteria for Inferior-posterior infarct are no longer Present  Questionable change in initial forces of Anterior-septal leads  Confirmed by Derrick ROBEROSN, Mario GIL ([id number]) on 6/2/2022 11:44:34 AM    Referred By: EVELINA   SELF           Confirmed By:Mario Meza MD                  Plan:       Problem List Items Addressed This Visit          Cardiac/Vascular    Hypertension     He confirms that his blood pressures are well controlled at home, readings in the 130s, blood pressure today 150/80 mm Hg.  His current regimen of treatment will continue.         Pure hypercholesterolemia     He is on atorvastatin.  Most recent LDL 71 mg% pure hypercholesterolemia January 2022.  Condition stable.         Bradycardia, drug induced     He remains tolerant of Toprol.  In the past he has been with symptoms related to bradycardia.  Heart rate today 76 beats per minute.  He is asymptomatic.  No changes advised.         Aortic  atherosclerosis     Condition unchanged.         Status post insertion of drug-eluting stent into left anterior descending (LAD) artery     He remains on aspirin.  He was concerned that his fatigue symptoms could be coronary artery disease related.  He had single-vessel disease with mid LAD stent placed December 2020.    He has overall normal exercise tolerance.  I do not advise a follow-up stress test at this time.            Endocrine    Type 2 diabetes mellitus with renal manifestations     Diet controlled.  CKD stage IIIA-GFR 52, serum creatinine 1.3.             I will see him back in 6 months.  He is asymptomatic in regards to angina.  His fatigue symptoms reported in the morning are somewhat nonspecific.  He has had similar complaints in the past.  He is still going to the gym and exercising on a regular basis.    Given his tolerance of Toprol currently I am not changing his blood pressure therapy.               Anthony Mcdonald MD  10/05/2022   2:01 PM

## 2022-10-05 NOTE — ASSESSMENT & PLAN NOTE
He is on atorvastatin.  Most recent LDL 71 mg% pure hypercholesterolemia January 2022.  Condition stable.

## 2022-10-05 NOTE — ASSESSMENT & PLAN NOTE
He remains on aspirin.  He was concerned that his fatigue symptoms could be coronary artery disease related.  He had single-vessel disease with mid LAD stent placed December 2020.    He has overall normal exercise tolerance.  I do not advise a follow-up stress test at this time.

## 2022-10-27 DIAGNOSIS — I10 ESSENTIAL HYPERTENSION: ICD-10-CM

## 2022-10-27 RX ORDER — NIFEDIPINE 60 MG/1
60 TABLET, EXTENDED RELEASE ORAL 2 TIMES DAILY
Qty: 180 TABLET | Refills: 1 | OUTPATIENT
Start: 2022-10-27

## 2022-10-27 RX ORDER — NIFEDIPINE 60 MG/1
60 TABLET, EXTENDED RELEASE ORAL 2 TIMES DAILY
Qty: 180 TABLET | Refills: 1 | Status: SHIPPED | OUTPATIENT
Start: 2022-10-27 | End: 2023-02-06

## 2022-10-27 NOTE — TELEPHONE ENCOUNTER
No new care gaps identified.  [facility] Embedded Care Gaps. Reference number: [id number]. 10/27/2022   1:21:13 PM CDT

## 2023-01-12 ENCOUNTER — TELEPHONE (OUTPATIENT)
Dept: NEPHROLOGY | Facility: CLINIC | Age: 83
End: 2023-01-12
Payer: MEDICARE

## 2023-01-12 DIAGNOSIS — N18.30 STAGE 3 CHRONIC KIDNEY DISEASE, UNSPECIFIED WHETHER STAGE 3A OR 3B CKD: Primary | ICD-10-CM

## 2023-01-13 ENCOUNTER — TELEPHONE (OUTPATIENT)
Dept: NEPHROLOGY | Facility: CLINIC | Age: 83
End: 2023-01-13
Payer: MEDICARE

## 2023-01-13 DIAGNOSIS — N18.30 STAGE 3 CHRONIC KIDNEY DISEASE, UNSPECIFIED WHETHER STAGE 3A OR 3B CKD: Primary | ICD-10-CM

## 2023-01-24 ENCOUNTER — TELEPHONE (OUTPATIENT)
Dept: NEPHROLOGY | Facility: CLINIC | Age: 83
End: 2023-01-24
Payer: MEDICARE

## 2023-01-24 NOTE — TELEPHONE ENCOUNTER
Message  Received: Today  DO DARVIN Roth Staff  Please contact the patient and let them know that their kidney ultrasound results were stable  and do not require any change in treatment.

## 2023-01-25 ENCOUNTER — OFFICE VISIT (OUTPATIENT)
Dept: NEPHROLOGY | Facility: CLINIC | Age: 83
End: 2023-01-25
Payer: MEDICARE

## 2023-01-25 ENCOUNTER — TELEPHONE (OUTPATIENT)
Dept: UROLOGY | Facility: CLINIC | Age: 83
End: 2023-01-25
Payer: MEDICARE

## 2023-01-25 VITALS
DIASTOLIC BLOOD PRESSURE: 79 MMHG | SYSTOLIC BLOOD PRESSURE: 147 MMHG | WEIGHT: 200.63 LBS | BODY MASS INDEX: 27.98 KG/M2 | HEART RATE: 60 BPM | OXYGEN SATURATION: 96 %

## 2023-01-25 DIAGNOSIS — I10 HYPERTENSION, UNSPECIFIED TYPE: ICD-10-CM

## 2023-01-25 DIAGNOSIS — R80.9 PROTEINURIA, UNSPECIFIED TYPE: ICD-10-CM

## 2023-01-25 DIAGNOSIS — N18.31 STAGE 3A CHRONIC KIDNEY DISEASE: Primary | ICD-10-CM

## 2023-01-25 DIAGNOSIS — N25.0 RENAL OSTEODYSTROPHY: ICD-10-CM

## 2023-01-25 PROCEDURE — 3078F DIAST BP <80 MM HG: CPT | Mod: CPTII,S$GLB,, | Performed by: INTERNAL MEDICINE

## 2023-01-25 PROCEDURE — 1126F PR PAIN SEVERITY QUANTIFIED, NO PAIN PRESENT: ICD-10-PCS | Mod: CPTII,S$GLB,, | Performed by: INTERNAL MEDICINE

## 2023-01-25 PROCEDURE — 3077F SYST BP >= 140 MM HG: CPT | Mod: CPTII,S$GLB,, | Performed by: INTERNAL MEDICINE

## 2023-01-25 PROCEDURE — 99999 PR PBB SHADOW E&M-EST. PATIENT-LVL III: CPT | Mod: PBBFAC,,, | Performed by: INTERNAL MEDICINE

## 2023-01-25 PROCEDURE — 1101F PR PT FALLS ASSESS DOC 0-1 FALLS W/OUT INJ PAST YR: ICD-10-PCS | Mod: CPTII,S$GLB,, | Performed by: INTERNAL MEDICINE

## 2023-01-25 PROCEDURE — 3078F PR MOST RECENT DIASTOLIC BLOOD PRESSURE < 80 MM HG: ICD-10-PCS | Mod: CPTII,S$GLB,, | Performed by: INTERNAL MEDICINE

## 2023-01-25 PROCEDURE — 3288F FALL RISK ASSESSMENT DOCD: CPT | Mod: CPTII,S$GLB,, | Performed by: INTERNAL MEDICINE

## 2023-01-25 PROCEDURE — 99214 OFFICE O/P EST MOD 30 MIN: CPT | Mod: S$GLB,,, | Performed by: INTERNAL MEDICINE

## 2023-01-25 PROCEDURE — 99999 PR PBB SHADOW E&M-EST. PATIENT-LVL III: ICD-10-PCS | Mod: PBBFAC,,, | Performed by: INTERNAL MEDICINE

## 2023-01-25 PROCEDURE — 1101F PT FALLS ASSESS-DOCD LE1/YR: CPT | Mod: CPTII,S$GLB,, | Performed by: INTERNAL MEDICINE

## 2023-01-25 PROCEDURE — 1159F MED LIST DOCD IN RCRD: CPT | Mod: CPTII,S$GLB,, | Performed by: INTERNAL MEDICINE

## 2023-01-25 PROCEDURE — 1159F PR MEDICATION LIST DOCUMENTED IN MEDICAL RECORD: ICD-10-PCS | Mod: CPTII,S$GLB,, | Performed by: INTERNAL MEDICINE

## 2023-01-25 PROCEDURE — 3288F PR FALLS RISK ASSESSMENT DOCUMENTED: ICD-10-PCS | Mod: CPTII,S$GLB,, | Performed by: INTERNAL MEDICINE

## 2023-01-25 PROCEDURE — 1126F AMNT PAIN NOTED NONE PRSNT: CPT | Mod: CPTII,S$GLB,, | Performed by: INTERNAL MEDICINE

## 2023-01-25 PROCEDURE — 99214 PR OFFICE/OUTPT VISIT, EST, LEVL IV, 30-39 MIN: ICD-10-PCS | Mod: S$GLB,,, | Performed by: INTERNAL MEDICINE

## 2023-01-25 PROCEDURE — 3077F PR MOST RECENT SYSTOLIC BLOOD PRESSURE >= 140 MM HG: ICD-10-PCS | Mod: CPTII,S$GLB,, | Performed by: INTERNAL MEDICINE

## 2023-01-25 NOTE — PROGRESS NOTES
Subjective:       Patient ID: Pedro Ponce Jr. is a 82 y.o. Black or  male who presents for follow-up evaluation of Chronic Kidney Disease      HPI This is a 82-year-old -American male with longstanding hypertension, diet-controlled diabetes, proteinuria, bilateral renal cysts is here for f/u of these. Diet controlled DM stable. Creatinine stable at 1.2.  Bp's in the 120's-130's/60's-70's.  Slight proteinuria.  Walks regularly.   Denies NSAID's.  Status post transurethral laser enucleation of prostate in may 2022.      PAST MEDICAL HISTORY: Type 2 diabetes diet controlled, hypertension for   approximately 20 years, hyperlipidemia, gout, glaucoma, BPH and TURP in   2006 and followed in Urology. He also had questionable cyst versus tumor   removed by Dr. Drew about 17 years ago and this was not malignant per   patient.     Review of Systems   Constitutional:  Negative for appetite change, fatigue and unexpected weight change.             Eyes:  Negative for discharge.   Respiratory:  Negative for cough, shortness of breath and wheezing.    Cardiovascular:  Negative for chest pain and palpitations.   Gastrointestinal:  Negative for abdominal pain, diarrhea, nausea and vomiting.        Nocturia   Genitourinary: Negative.  Negative for dysuria, frequency, hematuria and urgency.   Skin:  Negative for color change and rash.   Psychiatric/Behavioral:  Negative for confusion.      Objective:    No edema.   Physical Exam  Constitutional:       Appearance: He is well-developed.   Cardiovascular:      Rate and Rhythm: Normal rate and regular rhythm.      Heart sounds: No murmur heard.    No friction rub.   Pulmonary:      Effort: Pulmonary effort is normal. No respiratory distress.      Breath sounds: Normal breath sounds. No rales.   Abdominal:      General: Bowel sounds are normal.      Palpations: Abdomen is soft.      Tenderness: There is no abdominal tenderness. There is no rebound.   Skin:      Findings: No erythema or rash.   Neurological:      Mental Status: He is alert and oriented to person, place, and time.   Psychiatric:         Judgment: Judgment normal.       Assessment:       No diagnosis found.    Plan:         This is a 82 -year-old -American male with   longstanding hypertension, diet-controlled diabetes comes in for f/u of bilateral renal cysts.   1. Renal cysts. He does have multiple cysts on bilateral kidneys. 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 He denies any hematuria. Repeat ultrasound recently overall stable.  Followed in urology.  Had cysto/SUDS.  Unclear if it's acquired cystic disease-no family H/o. US without any Tumors-stable form last time. Repeat yesterday stable.  Had nonmalignant tumor removed many years ago in urology.    Had cystoscopy in 2020. Recent prostate surgery in may.  2. Hypertension: Blood pressures are stable at home. on avapro and continue metoprolol 25 mg and monitor bp's.   I Encouraged excersise and low salt diet. Off spironolactone for unclear reasons-rec this as the next agent if  he needs better bp control.   3. Proteinuria. 600 mg  of proteinuria sec to HTN. On avapro. Rec good bp control.  Off HCTZ b/c of previous higher calciums but is back on it but now it appears he is not taking it.   Was on spironolactone as he is not on a diuretic and potassiums on lower side and to help proteinuria but off spironolactone.    4. Anemia. H&H is stable.   5. Renal/CKD stg 2- 3: creatinine is 1.2 and baseline around 1.2-1.4 with GFR of 58 ml/min .  Encouraged him to hydrate well.   Alkalosis- slightly alkalotic at times -hydrate well. Off HCTZ. Checked  floyd/renin levels.   6. UTI: No recent UTI's.    Has high wbc's in urine chronically.     Calcium borderline elevated chronically but i ca stable and recent ca stable. spep nml.  On  MVI.  PTH stable for his ckd. Vit D stable. If the ca  worsens, will need parathyroid scan to evaluate possible primary hyperpara but stable now.     F/u in 5 months.

## 2023-01-30 ENCOUNTER — TELEPHONE (OUTPATIENT)
Dept: CARDIOLOGY | Facility: CLINIC | Age: 83
End: 2023-01-30
Payer: MEDICARE

## 2023-01-30 NOTE — TELEPHONE ENCOUNTER
----- Message from Derick Garduno sent at 1/30/2023 12:23 PM CST -----  Contact: Pt  Type:  Provider switch     Who Called:Pt   Would the patient rather a call back or a response via [facility]? Call   Best Call Back Number: [phone]  Additional Information:     Pt stated he received a letter in the mail regarding an appt with a different provider   Pt would like to know why he has to see someone else

## 2023-01-30 NOTE — TELEPHONE ENCOUNTER
Patient called stating that he wished to continue seeing Dr. Anthony Mcdonald.  I booked him an appointment to see the doctor in [place].

## 2023-02-06 ENCOUNTER — OFFICE VISIT (OUTPATIENT)
Dept: FAMILY MEDICINE | Facility: CLINIC | Age: 83
End: 2023-02-06
Payer: MEDICARE

## 2023-02-06 VITALS
BODY MASS INDEX: 29.08 KG/M2 | HEIGHT: 71 IN | HEART RATE: 61 BPM | WEIGHT: 207.69 LBS | OXYGEN SATURATION: 98 % | DIASTOLIC BLOOD PRESSURE: 72 MMHG | SYSTOLIC BLOOD PRESSURE: 112 MMHG

## 2023-02-06 DIAGNOSIS — E21.3 HYPERPARATHYROIDISM: Primary | ICD-10-CM

## 2023-02-06 DIAGNOSIS — H35.3211 EXUDATIVE AGE-RELATED MACULAR DEGENERATION OF RIGHT EYE WITH ACTIVE CHOROIDAL NEOVASCULARIZATION: ICD-10-CM

## 2023-02-06 DIAGNOSIS — I69.331 MONOPLEGIA OF UPPER LIMB FOLLOWING CEREBRAL INFARCTION AFFECTING RIGHT DOMINANT SIDE: ICD-10-CM

## 2023-02-06 DIAGNOSIS — F33.1 MODERATE EPISODE OF RECURRENT MAJOR DEPRESSIVE DISORDER: ICD-10-CM

## 2023-02-06 DIAGNOSIS — E11.21 TYPE 2 DIABETES MELLITUS WITH DIABETIC NEPHROPATHY, WITHOUT LONG-TERM CURRENT USE OF INSULIN: ICD-10-CM

## 2023-02-06 DIAGNOSIS — M67.911 ROTATOR CUFF DISORDER, RIGHT: ICD-10-CM

## 2023-02-06 DIAGNOSIS — I70.0 AORTIC ATHEROSCLEROSIS: ICD-10-CM

## 2023-02-06 PROCEDURE — 1160F PR REVIEW ALL MEDS BY PRESCRIBER/CLIN PHARMACIST DOCUMENTED: ICD-10-PCS | Mod: CPTII,S$GLB,, | Performed by: FAMILY MEDICINE

## 2023-02-06 PROCEDURE — 1160F RVW MEDS BY RX/DR IN RCRD: CPT | Mod: CPTII,S$GLB,, | Performed by: FAMILY MEDICINE

## 2023-02-06 PROCEDURE — 3288F PR FALLS RISK ASSESSMENT DOCUMENTED: ICD-10-PCS | Mod: CPTII,S$GLB,, | Performed by: FAMILY MEDICINE

## 2023-02-06 PROCEDURE — 1101F PT FALLS ASSESS-DOCD LE1/YR: CPT | Mod: CPTII,S$GLB,, | Performed by: FAMILY MEDICINE

## 2023-02-06 PROCEDURE — 1101F PR PT FALLS ASSESS DOC 0-1 FALLS W/OUT INJ PAST YR: ICD-10-PCS | Mod: CPTII,S$GLB,, | Performed by: FAMILY MEDICINE

## 2023-02-06 PROCEDURE — 1126F AMNT PAIN NOTED NONE PRSNT: CPT | Mod: CPTII,S$GLB,, | Performed by: FAMILY MEDICINE

## 2023-02-06 PROCEDURE — 1159F MED LIST DOCD IN RCRD: CPT | Mod: CPTII,S$GLB,, | Performed by: FAMILY MEDICINE

## 2023-02-06 PROCEDURE — 1126F PR PAIN SEVERITY QUANTIFIED, NO PAIN PRESENT: ICD-10-PCS | Mod: CPTII,S$GLB,, | Performed by: FAMILY MEDICINE

## 2023-02-06 PROCEDURE — 99999 PR PBB SHADOW E&M-EST. PATIENT-LVL V: ICD-10-PCS | Mod: PBBFAC,,, | Performed by: FAMILY MEDICINE

## 2023-02-06 PROCEDURE — 3078F PR MOST RECENT DIASTOLIC BLOOD PRESSURE < 80 MM HG: ICD-10-PCS | Mod: CPTII,S$GLB,, | Performed by: FAMILY MEDICINE

## 2023-02-06 PROCEDURE — 99215 OFFICE O/P EST HI 40 MIN: CPT | Mod: S$GLB,,, | Performed by: FAMILY MEDICINE

## 2023-02-06 PROCEDURE — 3078F DIAST BP <80 MM HG: CPT | Mod: CPTII,S$GLB,, | Performed by: FAMILY MEDICINE

## 2023-02-06 PROCEDURE — 3074F PR MOST RECENT SYSTOLIC BLOOD PRESSURE < 130 MM HG: ICD-10-PCS | Mod: CPTII,S$GLB,, | Performed by: FAMILY MEDICINE

## 2023-02-06 PROCEDURE — 3074F SYST BP LT 130 MM HG: CPT | Mod: CPTII,S$GLB,, | Performed by: FAMILY MEDICINE

## 2023-02-06 PROCEDURE — 99215 PR OFFICE/OUTPT VISIT, EST, LEVL V, 40-54 MIN: ICD-10-PCS | Mod: S$GLB,,, | Performed by: FAMILY MEDICINE

## 2023-02-06 PROCEDURE — 99999 PR PBB SHADOW E&M-EST. PATIENT-LVL V: CPT | Mod: PBBFAC,,, | Performed by: FAMILY MEDICINE

## 2023-02-06 PROCEDURE — 1159F PR MEDICATION LIST DOCUMENTED IN MEDICAL RECORD: ICD-10-PCS | Mod: CPTII,S$GLB,, | Performed by: FAMILY MEDICINE

## 2023-02-06 PROCEDURE — 3288F FALL RISK ASSESSMENT DOCD: CPT | Mod: CPTII,S$GLB,, | Performed by: FAMILY MEDICINE

## 2023-02-06 NOTE — PROGRESS NOTES
Subjective:       Patient ID: Pedro Ponce Jr. is a 82 y.o. male.    Chief Complaint: Follow-up    82 years old male came to the clinic for diabetes follow-up.  No recent A1c.  Patient is planning to do ophthalmological evaluation later.  Patient was previously diagnosed with aortic atherosclerosis..  Depression currently stable.  He reports right shoulder pain especially with movement.  The pain is 3/10 of intensity on and off aggravated with activity and better with rest.  Patient with decreased kidney function but stable in comparison with previous reports associated with hyper parathyroidism.    Follow-up  Associated symptoms include arthralgias. Pertinent negatives include no chest pain or neck pain.   Review of Systems   Constitutional: Negative.    HENT: Negative.     Eyes: Negative.    Respiratory: Negative.     Cardiovascular: Negative.  Negative for chest pain, palpitations, leg swelling and claudication.   Gastrointestinal: Negative.    Endocrine: Negative for polydipsia, polyphagia and polyuria.   Genitourinary: Negative.    Musculoskeletal:  Positive for arthralgias. Negative for neck pain.   Integumentary:  Negative.   Neurological: Negative.    Psychiatric/Behavioral: Negative.         Objective:      Physical Exam  Vitals and nursing note reviewed.   Constitutional:       General: He is not in acute distress.     Appearance: He is well-developed. He is not diaphoretic.   HENT:      Head: Normocephalic and atraumatic.      Right Ear: External ear normal.      Left Ear: External ear normal.      Nose: Nose normal.      Mouth/Throat:      Pharynx: No oropharyngeal exudate.   Eyes:      General: No scleral icterus.        Right eye: No discharge.         Left eye: No discharge.      Conjunctiva/sclera: Conjunctivae normal.      Pupils: Pupils are equal, round, and reactive to light.   Neck:      Thyroid: No thyromegaly.      Vascular: No JVD.      Trachea: No tracheal deviation.   Cardiovascular:       Rate and Rhythm: Normal rate and regular rhythm.      Heart sounds: Normal heart sounds. No murmur heard.    No friction rub. No gallop.   Pulmonary:      Effort: Pulmonary effort is normal. No respiratory distress.      Breath sounds: Normal breath sounds. No stridor. No wheezing or rales.   Chest:      Chest wall: No tenderness.   Abdominal:      General: Bowel sounds are normal. There is no distension.      Palpations: Abdomen is soft. There is no mass.      Tenderness: There is no abdominal tenderness. There is no guarding or rebound.   Musculoskeletal:         General: Normal range of motion.      Right shoulder: Tenderness present.      Cervical back: Normal range of motion and neck supple.   Lymphadenopathy:      Cervical: No cervical adenopathy.   Skin:     General: Skin is warm and dry.      Coloration: Skin is not pale.      Findings: No erythema or rash.   Neurological:      Mental Status: He is alert and oriented to person, place, and time.      Cranial Nerves: No cranial nerve deficit.      Motor: No abnormal muscle tone.      Coordination: Coordination normal.      Deep Tendon Reflexes: Reflexes are normal and symmetric. Reflexes normal.   Psychiatric:         Behavior: Behavior normal.         Thought Content: Thought content normal.         Judgment: Judgment normal.       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

## 2023-02-10 ENCOUNTER — OFFICE VISIT (OUTPATIENT)
Dept: UROLOGY | Facility: CLINIC | Age: 83
End: 2023-02-10
Payer: MEDICARE

## 2023-02-10 VITALS
DIASTOLIC BLOOD PRESSURE: 66 MMHG | SYSTOLIC BLOOD PRESSURE: 132 MMHG | HEIGHT: 71 IN | OXYGEN SATURATION: 98 % | HEART RATE: 55 BPM | WEIGHT: 204.38 LBS | BODY MASS INDEX: 28.61 KG/M2

## 2023-02-10 DIAGNOSIS — Q61.3 POLYCYSTIC KIDNEY DISEASE: ICD-10-CM

## 2023-02-10 DIAGNOSIS — Z90.79 S/P TURP (STATUS POST TRANSURETHRAL RESECTION OF PROSTATE): ICD-10-CM

## 2023-02-10 DIAGNOSIS — R35.0 URINARY FREQUENCY: ICD-10-CM

## 2023-02-10 DIAGNOSIS — R35.1 NOCTURIA: Primary | ICD-10-CM

## 2023-02-10 PROCEDURE — 99999 PR PBB SHADOW E&M-EST. PATIENT-LVL III: CPT | Mod: PBBFAC,,, | Performed by: UROLOGY

## 2023-02-10 PROCEDURE — 3078F DIAST BP <80 MM HG: CPT | Mod: CPTII,S$GLB,, | Performed by: UROLOGY

## 2023-02-10 PROCEDURE — 3078F PR MOST RECENT DIASTOLIC BLOOD PRESSURE < 80 MM HG: ICD-10-PCS | Mod: CPTII,S$GLB,, | Performed by: UROLOGY

## 2023-02-10 PROCEDURE — 3075F PR MOST RECENT SYSTOLIC BLOOD PRESS GE 130-139MM HG: ICD-10-PCS | Mod: CPTII,S$GLB,, | Performed by: UROLOGY

## 2023-02-10 PROCEDURE — 1159F MED LIST DOCD IN RCRD: CPT | Mod: CPTII,S$GLB,, | Performed by: UROLOGY

## 2023-02-10 PROCEDURE — 99999 PR PBB SHADOW E&M-EST. PATIENT-LVL III: ICD-10-PCS | Mod: PBBFAC,,, | Performed by: UROLOGY

## 2023-02-10 PROCEDURE — 3075F SYST BP GE 130 - 139MM HG: CPT | Mod: CPTII,S$GLB,, | Performed by: UROLOGY

## 2023-02-10 PROCEDURE — 1159F PR MEDICATION LIST DOCUMENTED IN MEDICAL RECORD: ICD-10-PCS | Mod: CPTII,S$GLB,, | Performed by: UROLOGY

## 2023-02-10 PROCEDURE — 1160F RVW MEDS BY RX/DR IN RCRD: CPT | Mod: CPTII,S$GLB,, | Performed by: UROLOGY

## 2023-02-10 PROCEDURE — 99213 OFFICE O/P EST LOW 20 MIN: CPT | Mod: S$GLB,,, | Performed by: UROLOGY

## 2023-02-10 PROCEDURE — 1126F PR PAIN SEVERITY QUANTIFIED, NO PAIN PRESENT: ICD-10-PCS | Mod: CPTII,S$GLB,, | Performed by: UROLOGY

## 2023-02-10 PROCEDURE — 1126F AMNT PAIN NOTED NONE PRSNT: CPT | Mod: CPTII,S$GLB,, | Performed by: UROLOGY

## 2023-02-10 PROCEDURE — 1160F PR REVIEW ALL MEDS BY PRESCRIBER/CLIN PHARMACIST DOCUMENTED: ICD-10-PCS | Mod: CPTII,S$GLB,, | Performed by: UROLOGY

## 2023-02-10 PROCEDURE — 99213 PR OFFICE/OUTPT VISIT, EST, LEVL III, 20-29 MIN: ICD-10-PCS | Mod: S$GLB,,, | Performed by: UROLOGY

## 2023-02-10 NOTE — PROGRESS NOTES
Subjective:       Patient ID: Pedro Ponce Jr. is a 82 y.o. male.    Chief Complaint: PSA - 6 month f/u    Mr. Evon HAMM is an 82-year-old gentleman who is approximately 9 months out from enucleation of prostate.  The patient has nocturia nocturia times 3-4 with double voiding 1st time in the morning.  Frequency during the daytime as proximally 3-4 times daily.  The patient is resting well but had questions about his urinary frequency at night and in the morning.  The patient is off of alpha blockers and all other prostate medications.  Patient returns for follow-up    Follow-up  This is a chronic (History of BPH, post prostate enucleation) problem. The current episode started more than 1 year ago. The problem occurs constantly. The problem has been unchanged. Pertinent negatives include no abdominal pain, anorexia, arthralgias, change in bowel habit, chest pain, chills, congestion, coughing, diaphoresis, fatigue, fever, headaches, joint swelling, myalgias, nausea, neck pain, numbness, rash, sore throat, swollen glands, urinary symptoms, vertigo, visual change, vomiting or weakness. Nothing aggravates the symptoms. He has tried nothing for the symptoms. The treatment provided significant relief.   Review of Systems   Constitutional:  Negative for chills, diaphoresis, fatigue and fever.   HENT:  Negative for congestion and sore throat.    Respiratory:  Negative for cough.    Cardiovascular:  Negative for chest pain.   Gastrointestinal:  Negative for abdominal pain, anorexia, change in bowel habit, nausea and vomiting.   Musculoskeletal:  Negative for arthralgias, joint swelling, myalgias and neck pain.   Skin:  Negative for rash.   Neurological:  Negative for vertigo, weakness, numbness and headaches.     Objective:      Physical Exam  Vitals and nursing note reviewed.   Constitutional:       General: He is not in acute distress.     Appearance: Normal appearance. He is well-developed. He is not ill-appearing,  toxic-appearing or diaphoretic.   HENT:      Head: Normocephalic and atraumatic.      Right Ear: External ear normal.      Left Ear: External ear normal.      Nose: Nose normal.      Mouth/Throat:      Mouth: Mucous membranes are moist.      Pharynx: Oropharynx is clear. No oropharyngeal exudate or posterior oropharyngeal erythema.   Eyes:      Extraocular Movements: Extraocular movements intact.      Conjunctiva/sclera: Conjunctivae normal.      Pupils: Pupils are equal, round, and reactive to light.   Cardiovascular:      Rate and Rhythm: Normal rate and regular rhythm.      Heart sounds: Normal heart sounds.   Pulmonary:      Effort: Pulmonary effort is normal.      Breath sounds: Normal breath sounds.   Abdominal:      General: Bowel sounds are normal.      Palpations: Abdomen is soft.      Tenderness: There is no right CVA tenderness or left CVA tenderness.   Genitourinary:     Penis: Normal.       Testes: Normal.   Musculoskeletal:         General: Normal range of motion.      Cervical back: Normal range of motion and neck supple.   Skin:     General: Skin is warm and dry.      Capillary Refill: Capillary refill takes less than 2 seconds.   Neurological:      Mental Status: He is alert and oriented to person, place, and time.      Deep Tendon Reflexes: Reflexes are normal and symmetric.   Psychiatric:         Mood and Affect: Mood normal.         Behavior: Behavior normal.         Thought Content: Thought content normal.         Judgment: Judgment normal.       Assessment:       1. Nocturia    2. Urinary frequency    3. Polycystic kidney disease    4. S/P TURP (status post transurethral resection of prostate)        Plan:         There are no Patient Instructions on file for this visit.

## 2023-02-15 ENCOUNTER — TELEPHONE (OUTPATIENT)
Dept: UROLOGY | Facility: CLINIC | Age: 83
End: 2023-02-15
Payer: MEDICARE

## 2023-02-15 NOTE — TELEPHONE ENCOUNTER
----- Message from Ginger Lou sent at 2/15/2023  2:28 PM CST -----  Contact: pt  Type:  paperwork from surgery     Who Called: pt   Would the patient rather a call back or a response via [facility]? call  Best Call Back Number: [phone]  Additional Information:   Pt requesting a call back regarding paperwork from surgery to send to the [facility]

## 2023-02-15 NOTE — TELEPHONE ENCOUNTER
Spoke to patient and discussed the paperwork he is requesting and notified him we will mail it to him

## 2023-04-18 ENCOUNTER — OFFICE VISIT (OUTPATIENT)
Dept: CARDIOLOGY | Facility: CLINIC | Age: 83
End: 2023-04-18
Payer: MEDICARE

## 2023-04-18 VITALS
WEIGHT: 204.13 LBS | DIASTOLIC BLOOD PRESSURE: 80 MMHG | SYSTOLIC BLOOD PRESSURE: 149 MMHG | BODY MASS INDEX: 28.58 KG/M2 | HEART RATE: 65 BPM | HEIGHT: 71 IN | OXYGEN SATURATION: 98 %

## 2023-04-18 DIAGNOSIS — E78.00 PURE HYPERCHOLESTEROLEMIA: ICD-10-CM

## 2023-04-18 DIAGNOSIS — I69.331 MONOPLEGIA OF UPPER LIMB FOLLOWING CEREBRAL INFARCTION AFFECTING RIGHT DOMINANT SIDE: ICD-10-CM

## 2023-04-18 DIAGNOSIS — I10 PRIMARY HYPERTENSION: ICD-10-CM

## 2023-04-18 DIAGNOSIS — I25.10 CORONARY ARTERY DISEASE INVOLVING NATIVE CORONARY ARTERY OF NATIVE HEART WITHOUT ANGINA PECTORIS: ICD-10-CM

## 2023-04-18 DIAGNOSIS — N18.31 STAGE 3A CHRONIC KIDNEY DISEASE: ICD-10-CM

## 2023-04-18 DIAGNOSIS — I70.0 AORTIC ATHEROSCLEROSIS: ICD-10-CM

## 2023-04-18 PROCEDURE — 99999 PR PBB SHADOW E&M-EST. PATIENT-LVL III: CPT | Mod: PBBFAC,,, | Performed by: INTERNAL MEDICINE

## 2023-04-18 PROCEDURE — 3077F PR MOST RECENT SYSTOLIC BLOOD PRESSURE >= 140 MM HG: ICD-10-PCS | Mod: CPTII,S$GLB,, | Performed by: INTERNAL MEDICINE

## 2023-04-18 PROCEDURE — 1126F PR PAIN SEVERITY QUANTIFIED, NO PAIN PRESENT: ICD-10-PCS | Mod: CPTII,S$GLB,, | Performed by: INTERNAL MEDICINE

## 2023-04-18 PROCEDURE — 3288F FALL RISK ASSESSMENT DOCD: CPT | Mod: CPTII,S$GLB,, | Performed by: INTERNAL MEDICINE

## 2023-04-18 PROCEDURE — 1101F PT FALLS ASSESS-DOCD LE1/YR: CPT | Mod: CPTII,S$GLB,, | Performed by: INTERNAL MEDICINE

## 2023-04-18 PROCEDURE — 1160F RVW MEDS BY RX/DR IN RCRD: CPT | Mod: CPTII,S$GLB,, | Performed by: INTERNAL MEDICINE

## 2023-04-18 PROCEDURE — 1126F AMNT PAIN NOTED NONE PRSNT: CPT | Mod: CPTII,S$GLB,, | Performed by: INTERNAL MEDICINE

## 2023-04-18 PROCEDURE — 99214 OFFICE O/P EST MOD 30 MIN: CPT | Mod: S$GLB,,, | Performed by: INTERNAL MEDICINE

## 2023-04-18 PROCEDURE — 3079F DIAST BP 80-89 MM HG: CPT | Mod: CPTII,S$GLB,, | Performed by: INTERNAL MEDICINE

## 2023-04-18 PROCEDURE — 3288F PR FALLS RISK ASSESSMENT DOCUMENTED: ICD-10-PCS | Mod: CPTII,S$GLB,, | Performed by: INTERNAL MEDICINE

## 2023-04-18 PROCEDURE — 1160F PR REVIEW ALL MEDS BY PRESCRIBER/CLIN PHARMACIST DOCUMENTED: ICD-10-PCS | Mod: CPTII,S$GLB,, | Performed by: INTERNAL MEDICINE

## 2023-04-18 PROCEDURE — 3077F SYST BP >= 140 MM HG: CPT | Mod: CPTII,S$GLB,, | Performed by: INTERNAL MEDICINE

## 2023-04-18 PROCEDURE — 99214 PR OFFICE/OUTPT VISIT, EST, LEVL IV, 30-39 MIN: ICD-10-PCS | Mod: S$GLB,,, | Performed by: INTERNAL MEDICINE

## 2023-04-18 PROCEDURE — 1159F PR MEDICATION LIST DOCUMENTED IN MEDICAL RECORD: ICD-10-PCS | Mod: CPTII,S$GLB,, | Performed by: INTERNAL MEDICINE

## 2023-04-18 PROCEDURE — 1159F MED LIST DOCD IN RCRD: CPT | Mod: CPTII,S$GLB,, | Performed by: INTERNAL MEDICINE

## 2023-04-18 PROCEDURE — 3079F PR MOST RECENT DIASTOLIC BLOOD PRESSURE 80-89 MM HG: ICD-10-PCS | Mod: CPTII,S$GLB,, | Performed by: INTERNAL MEDICINE

## 2023-04-18 PROCEDURE — 99999 PR PBB SHADOW E&M-EST. PATIENT-LVL III: ICD-10-PCS | Mod: PBBFAC,,, | Performed by: INTERNAL MEDICINE

## 2023-04-18 PROCEDURE — 1101F PR PT FALLS ASSESS DOC 0-1 FALLS W/OUT INJ PAST YR: ICD-10-PCS | Mod: CPTII,S$GLB,, | Performed by: INTERNAL MEDICINE

## 2023-04-18 NOTE — PROGRESS NOTES
[facility] Cardiology     Subjective:    Patient ID:  Pedro Ponce Jr. is a 83 y.o. male who presents for follow-up of Coronary Artery Disease, Hypertension, Hyperlipidemia, and Diabetes Mellitus    Review of patient's allergies indicates:   Allergen Reactions    Pcn [penicillins] Rash      The patient states he has been doing well.  There have been no changes in his blood pressure medications.  He used to have problems with high readings.  He goes to the gym 5-6 times a week.  He rides a stationary bike.  He also walks outside occasionally.  He has not had any cardiac symptoms.  His history includes a coronary stent to LAD.  His last LDL was 54 mg%.  He is compliant with medications.  He never gets chest pain or breathing problems with exercise.  He tolerates his antihypertensive regimen well.      Review of Systems   Constitutional: Negative for chills, decreased appetite, diaphoresis, fever, malaise/fatigue, night sweats, weight gain and weight loss.   HENT:  Negative for congestion, ear discharge, ear pain, hearing loss, hoarse voice, nosebleeds, odynophagia, sore throat, stridor and tinnitus.    Eyes:  Negative for blurred vision, discharge, double vision, pain, photophobia, redness, vision loss in left eye, vision loss in right eye, visual disturbance and visual halos.   Cardiovascular:  Negative for chest pain, claudication, cyanosis, dyspnea on exertion, irregular heartbeat, leg swelling, near-syncope, orthopnea, palpitations, paroxysmal nocturnal dyspnea and syncope.   Respiratory:  Negative for cough, hemoptysis, shortness of breath, sleep disturbances due to breathing, snoring, sputum production and wheezing.    Endocrine: Negative for cold intolerance, heat intolerance, polydipsia, polyphagia and polyuria.   Hematologic/Lymphatic: Negative for adenopathy and bleeding problem. Does not bruise/bleed easily.   Skin:  Negative for  "color change, dry skin, flushing, itching, nail changes, poor wound healing, rash, skin cancer, suspicious lesions and unusual hair distribution.   Musculoskeletal:  Negative for arthritis, back pain, falls, gout, joint pain, joint swelling, muscle cramps, muscle weakness, myalgias, neck pain and stiffness.   Gastrointestinal:  Negative for bloating, abdominal pain, anorexia, change in bowel habit, bowel incontinence, constipation, diarrhea, dysphagia, excessive appetite, flatus, heartburn, hematemesis, hematochezia, hemorrhoids, jaundice, melena, nausea and vomiting.   Genitourinary:  Negative for bladder incontinence, decreased libido, dysuria, flank pain, frequency, genital sores, hematuria, hesitancy, incomplete emptying, nocturia and urgency.   Neurological:  Negative for aphonia, brief paralysis, difficulty with concentration, disturbances in coordination, excessive daytime sleepiness, dizziness, focal weakness, headaches, light-headedness, loss of balance, numbness, paresthesias, seizures, sensory change, tremors, vertigo and weakness.   Psychiatric/Behavioral:  Negative for altered mental status, depression, hallucinations, memory loss, substance abuse, suicidal ideas and thoughts of violence. The patient does not have insomnia and is not nervous/anxious.    Allergic/Immunologic: Negative for hives and persistent infections.      Objective:       Vitals:    04/18/23 1409   BP: (!) 149/80   Pulse: 65   SpO2: 98%   Weight: 92.6 kg (204 lb 1.6 oz)   Height: 5' 11" (1.803 m)    Physical Exam  Constitutional:       General: He is not in acute distress.     Appearance: He is well-developed. He is not diaphoretic.   HENT:      Head: Normocephalic and atraumatic.      Nose: Nose normal.   Eyes:      General: No scleral icterus.        Right eye: No discharge.      Conjunctiva/sclera: Conjunctivae normal.      Pupils: Pupils are equal, round, and reactive to light.   Neck:      Thyroid: No thyromegaly.      Vascular: " No JVD.      Trachea: No tracheal deviation.   Cardiovascular:      Rate and Rhythm: Normal rate and regular rhythm.      Pulses:           Carotid pulses are 2+ on the right side and 2+ on the left side.       Radial pulses are 2+ on the right side and 2+ on the left side.        Dorsalis pedis pulses are 2+ on the right side and 2+ on the left side.        Posterior tibial pulses are 2+ on the right side and 2+ on the left side.      Heart sounds: Murmur heard.   Harsh early systolic murmur is present with a grade of 2/6 at the upper right sternal border.     No friction rub. No gallop.   Pulmonary:      Effort: Pulmonary effort is normal. No respiratory distress.      Breath sounds: Normal breath sounds. No stridor. No wheezing or rales.   Chest:      Chest wall: No tenderness.   Abdominal:      General: Bowel sounds are normal. There is no distension.      Palpations: Abdomen is soft. There is no mass.      Tenderness: There is no abdominal tenderness. There is no guarding or rebound.   Musculoskeletal:         General: No tenderness. Normal range of motion.      Cervical back: Normal range of motion and neck supple.   Lymphadenopathy:      Cervical: No cervical adenopathy.   Skin:     General: Skin is warm and dry.      Coloration: Skin is not pale.      Findings: No erythema or rash.   Neurological:      Mental Status: He is alert and oriented to person, place, and time.      Cranial Nerves: No cranial nerve deficit.      Coordination: Coordination normal.      Comments: Right hand motor deficit with clumsiness reported.   Psychiatric:         Behavior: Behavior normal.         Thought Content: Thought content normal.         Judgment: Judgment normal.         Assessment:       1. 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 Coronary artery disease involving native coronary artery of native heart without angina pectoris    3. Pure hypercholesterolemia    4. Primary  hypertension    5. Aortic atherosclerosis    6. Stage 3a chronic kidney disease      Results for orders placed or performed in visit on 02/17/23   Lipid Panel   Result Value Ref Range    Cholesterol 119 (L) 120 - 199 mg/dL    Triglycerides 58 30 - 150 mg/dL    HDL 53 40 - 75 mg/dL    LDL Cholesterol 54.4 (L) 63.0 - 159.0 mg/dL    HDL/Cholesterol Ratio 44.5 20.0 - 50.0 %    Total Cholesterol/HDL Ratio 2.2 2.0 - 5.0    Non-HDL Cholesterol 66 mg/dL   Hemoglobin A1C   Result Value Ref Range    Hemoglobin A1C 5.6 4.0 - 5.6 %    Estimated Avg Glucose 114 68 - 131 mg/dL     *Note: Due to a large number of results and/or encounters for the requested time period, some results have not been displayed. A complete set of results can be found in Results Review.         Current Outpatient Medications:     atorvastatin (LIPITOR) 40 MG tablet, TAKE 1 TABLET BY MOUTH EVERY DAY IN THE EVENING, Disp: 90 tablet, Rfl: 3    EScitalopram oxalate (LEXAPRO) 10 MG tablet, Take 1 tablet (10 mg total) by mouth once daily., Disp: 90 tablet, Rfl: 3    gabapentin (NEURONTIN) 100 MG capsule, Take 1 capsule (100 mg total) by mouth 3 (three) times daily. for 10 days, Disp: 30 capsule, Rfl: 0    irbesartan (AVAPRO) 300 MG tablet, TAKE 1 TABLET BY MOUTH EVERY DAY IN THE EVENING, Disp: 90 tablet, Rfl: 0    metoprolol succinate (TOPROL-XL) 50 MG 24 hr tablet, TAKE 1 TABLET BY MOUTH EVERY DAY, Disp: 90 tablet, Rfl: 3    multivitamin capsule, Take 1 capsule by mouth once daily. Centrum Silver, Disp: , Rfl:     NIFEdipine (PROCARDIA-XL) 60 MG (OSM) 24 hr tablet, TAKE 1 TABLET BY MOUTH 2 TIMES DAILY., Disp: 180 tablet, Rfl: 1    solifenacin (VESICARE) 5 MG tablet, Take 1 tablet (5 mg total) by mouth once daily., Disp: 90 tablet, Rfl: 3     Lab Results   Component Value Date    WBC 4.96 01/24/2023    RBC 4.26 (L) 01/24/2023    HGB 13.3 (L) 01/24/2023    HGB 13.3 (L) 01/24/2023    HCT 39.9 (L) 01/24/2023    HCT 39.9 (L) 01/24/2023    MCV 94 01/24/2023    MCH  31.2 (H) 01/24/2023    MCHC 33.3 01/24/2023    RDW 15.6 (H) 01/24/2023     01/24/2023    MPV 9.9 01/24/2023    GRAN 2.5 01/24/2023    GRAN 49.4 01/24/2023    LYMPH 1.7 01/24/2023    LYMPH 34.5 01/24/2023    MONO 0.6 01/24/2023    MONO 12.5 01/24/2023    EOS 0.1 01/24/2023    BASO 0.04 01/24/2023    EOSINOPHIL 2.6 01/24/2023    BASOPHIL 0.8 01/24/2023    MG 2.0 08/28/2021        CMP  Lab Results   Component Value Date     01/24/2023    K 3.8 01/24/2023     01/24/2023    CO2 32 (H) 01/24/2023    GLU 99 01/24/2023    BUN 23 (H) 01/24/2023    CREATININE 1.23 01/24/2023    CALCIUM 9.7 01/24/2023    PROT 7.5 06/07/2022    ALBUMIN 4.5 01/24/2023    BILITOT 0.5 06/07/2022    ALKPHOS 80 06/07/2022    AST 24 06/07/2022    ALT 19 06/07/2022    ANIONGAP 9 01/24/2023    ESTGFRAFRICA 58 (A) 06/20/2022    EGFRNONAA 50 (A) 06/20/2022        Lab Results   Component Value Date    LABURIN ENTEROCOCCUS FAECALIS  10,000 - 49,999 cfu/ml   (A) 08/11/2022            Results for orders placed or performed during the hospital encounter of 06/01/22   EKG 12-lead    Collection Time: 06/01/22  4:05 PM    Narrative    Test Reason : R07.89,    Vent. Rate : 051 BPM     Atrial Rate : 051 BPM     P-R Int : 204 ms          QRS Dur : 100 ms      QT Int : 420 ms       P-R-T Axes : 069 -09 240 degrees     QTc Int : 387 ms    Sinus bradycardia  Right ventricular conduction delay  Minimal voltage criteria for LVH, may be normal variant  Septal infarct (cited on or before 01-FEB-2022)  T wave abnormality, consider inferolateral ischemia  Abnormal ECG  When compared with ECG of 01-FEB-2022 16:33,  Premature ventricular complexes are no longer Present  Questionable change in QRS duration  Criteria for Inferior-posterior infarct are no longer Present  Questionable change in initial forces of Anterior-septal leads  Confirmed by Derrick ROBERSON, Mario GIL ([id number]) on 6/2/2022 11:44:34 AM    Referred By: EVELINA   SELF           Confirmed By:Mario  LOUISE Meza MD                  Plan:       Problem List Items Addressed This Visit          Neuro    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  Somewhat improved currently.              Cardiac/Vascular    Hypertension     His blood pressure is up today in my office but has been controlled overall.  No med changes made.  Condition controlled.           Pure hypercholesterolemia     Statin therapy to continue.  Most recent LDL 54, pure hypercholesterolemia.           Aortic atherosclerosis     Condition stable.           Coronary artery disease involving native coronary artery of native heart without angina pectoris     He exercises regularly.  81 mg aspirin will be continued.              Renal/    Stage 3 chronic kidney disease     GFR 50 range.  Condition stable.  Most recent serum creatinine 1.23 on January 2023.               He voices no new complaints from a cardiac standpoint.  I have not changed his medications.  A six-month follow-up visit is advised.               Anthony Mcdonald MD  04/19/2023   2:36 PM

## 2023-04-19 ENCOUNTER — TELEPHONE (OUTPATIENT)
Dept: CARDIOLOGY | Facility: CLINIC | Age: 83
End: 2023-04-19
Payer: MEDICARE

## 2023-04-19 PROBLEM — I25.10 CORONARY ARTERY DISEASE INVOLVING NATIVE CORONARY ARTERY OF NATIVE HEART WITHOUT ANGINA PECTORIS: Status: ACTIVE | Noted: 2023-04-19

## 2023-04-19 RX ORDER — ASPIRIN 81 MG/1
81 TABLET ORAL DAILY
COMMUNITY

## 2023-04-19 NOTE — TELEPHONE ENCOUNTER
----- Message from Anthony Mcdonald MD sent at 4/19/2023 12:09 PM CDT -----  Please call the patient and confirmed that he is taking baby aspirin 81 mg daily.  If he is off find out why.  It is needed given previous stroke, previous stent.

## 2023-06-13 ENCOUNTER — OFFICE VISIT (OUTPATIENT)
Dept: OPHTHALMOLOGY | Facility: CLINIC | Age: 83
End: 2023-06-13
Payer: MEDICARE

## 2023-06-13 DIAGNOSIS — H43.393 VITREOUS FLOATERS OF BOTH EYES: ICD-10-CM

## 2023-06-13 DIAGNOSIS — H35.033 HYPERTENSIVE RETINOPATHY, BILATERAL: ICD-10-CM

## 2023-06-13 DIAGNOSIS — H35.3211 EXUDATIVE AGE-RELATED MACULAR DEGENERATION OF RIGHT EYE WITH ACTIVE CHOROIDAL NEOVASCULARIZATION: Primary | ICD-10-CM

## 2023-06-13 PROCEDURE — 1101F PT FALLS ASSESS-DOCD LE1/YR: CPT | Mod: CPTII,S$GLB,, | Performed by: OPHTHALMOLOGY

## 2023-06-13 PROCEDURE — 67028 INJECTION EYE DRUG: CPT | Mod: RT,S$GLB,, | Performed by: OPHTHALMOLOGY

## 2023-06-13 PROCEDURE — 92134 CPTRZ OPH DX IMG PST SGM RTA: CPT | Mod: S$GLB,,, | Performed by: OPHTHALMOLOGY

## 2023-06-13 PROCEDURE — 1160F RVW MEDS BY RX/DR IN RCRD: CPT | Mod: CPTII,S$GLB,, | Performed by: OPHTHALMOLOGY

## 2023-06-13 PROCEDURE — 1126F PR PAIN SEVERITY QUANTIFIED, NO PAIN PRESENT: ICD-10-PCS | Mod: CPTII,S$GLB,, | Performed by: OPHTHALMOLOGY

## 2023-06-13 PROCEDURE — 92014 COMPRE OPH EXAM EST PT 1/>: CPT | Mod: 25,S$GLB,, | Performed by: OPHTHALMOLOGY

## 2023-06-13 PROCEDURE — 92134 POSTERIOR SEGMENT OCT RETINA (OCULAR COHERENCE TOMOGRAPHY)-BOTH EYES: ICD-10-PCS | Mod: S$GLB,,, | Performed by: OPHTHALMOLOGY

## 2023-06-13 PROCEDURE — 1160F PR REVIEW ALL MEDS BY PRESCRIBER/CLIN PHARMACIST DOCUMENTED: ICD-10-PCS | Mod: CPTII,S$GLB,, | Performed by: OPHTHALMOLOGY

## 2023-06-13 PROCEDURE — 67028 PR INJECT INTRAVITREAL PHARMCOLOGIC: ICD-10-PCS | Mod: RT,S$GLB,, | Performed by: OPHTHALMOLOGY

## 2023-06-13 PROCEDURE — 3288F FALL RISK ASSESSMENT DOCD: CPT | Mod: CPTII,S$GLB,, | Performed by: OPHTHALMOLOGY

## 2023-06-13 PROCEDURE — 1159F PR MEDICATION LIST DOCUMENTED IN MEDICAL RECORD: ICD-10-PCS | Mod: CPTII,S$GLB,, | Performed by: OPHTHALMOLOGY

## 2023-06-13 PROCEDURE — 1101F PR PT FALLS ASSESS DOC 0-1 FALLS W/OUT INJ PAST YR: ICD-10-PCS | Mod: CPTII,S$GLB,, | Performed by: OPHTHALMOLOGY

## 2023-06-13 PROCEDURE — 1159F MED LIST DOCD IN RCRD: CPT | Mod: CPTII,S$GLB,, | Performed by: OPHTHALMOLOGY

## 2023-06-13 PROCEDURE — 92014 PR EYE EXAM, EST PATIENT,COMPREHESV: ICD-10-PCS | Mod: 25,S$GLB,, | Performed by: OPHTHALMOLOGY

## 2023-06-13 PROCEDURE — 99999 PR PBB SHADOW E&M-EST. PATIENT-LVL III: CPT | Mod: PBBFAC,,, | Performed by: OPHTHALMOLOGY

## 2023-06-13 PROCEDURE — 1126F AMNT PAIN NOTED NONE PRSNT: CPT | Mod: CPTII,S$GLB,, | Performed by: OPHTHALMOLOGY

## 2023-06-13 PROCEDURE — 99999 PR PBB SHADOW E&M-EST. PATIENT-LVL III: ICD-10-PCS | Mod: PBBFAC,,, | Performed by: OPHTHALMOLOGY

## 2023-06-13 PROCEDURE — 3288F PR FALLS RISK ASSESSMENT DOCUMENTED: ICD-10-PCS | Mod: CPTII,S$GLB,, | Performed by: OPHTHALMOLOGY

## 2023-06-13 NOTE — PROGRESS NOTES
HPI    Pt is here for overdue oct/DFE     Pt states he never received a call about a return appt which is the reason   he has not been in. He states today he feels like his vision has started   to decline and would like to resume injections asap    He add in the morning when he first wakes up he see's flashes in the right   eye. Right eye he reports seeing wavy lines on amsler grid.     Eye meds:   At's Prn ou     Last edited by Klarissa Lopez on 6/13/2023  2:09 PM.          OCT -OD decrease in paracentral IR fluid  New polypoidal lesion SN to fovea  OS - stable small flat inferior PED, no SRF      Prior ICG/FA - leakage IT to fovea OD      A/P    1. IPCV OU vs wet amd   With subfoveal PED OD  Avastin resumed 1/14  S/p Avastin OD #8 in past, resumed OD x 6   S/p PDT OD 9/18  OS - PED inferior to fovea stable    2/18 - recurrent swelling OD, IT to fovea  5/18 - increased CME and superior SRF  4/19 - slight increase in IRF, ASx - will follow  8/21 - increase in IR fluid temporally OD - ASx  2/22  OD IRF improved, new lesion SN to fovea  6/23 - SN SRF improved, inferior IRF increased    Avastin OD today     2. Floaters OU    3. POAG - Good IOP, continue management as per Dr. Hidalgo  - Needs f/u appt    4. PCIOL OU      6 weeks OCT no dilate    Risks, benefits, and alternatives to treatment discussed in detail with the patient.  The patient voiced understanding and wished to proceed with the procedure    Injection Procedure Note:  Diagnosis: IPCV vs Wet AMD OD    Patient Identified and Time Out complete  Pt marked  Topical Proparacaine and Betadine.  Inject Avastin OD at 6:00 @ 3.5-4mm posterior to limbus  Post Operative Dx: Same  Complications: None  Follow up as above.

## 2023-06-14 RX ADMIN — Medication 1.25 MG: at 03:06

## 2023-07-14 DIAGNOSIS — I10 PRIMARY HYPERTENSION: ICD-10-CM

## 2023-07-14 RX ORDER — IRBESARTAN 300 MG/1
TABLET ORAL
Qty: 90 TABLET | Refills: 0 | Status: SHIPPED | OUTPATIENT
Start: 2023-07-14 | End: 2023-10-24

## 2023-07-25 ENCOUNTER — PROCEDURE VISIT (OUTPATIENT)
Dept: OPHTHALMOLOGY | Facility: CLINIC | Age: 83
End: 2023-07-25
Payer: MEDICARE

## 2023-07-25 DIAGNOSIS — H35.033 HYPERTENSIVE RETINOPATHY, BILATERAL: ICD-10-CM

## 2023-07-25 DIAGNOSIS — H35.3211 EXUDATIVE AGE-RELATED MACULAR DEGENERATION OF RIGHT EYE WITH ACTIVE CHOROIDAL NEOVASCULARIZATION: Primary | ICD-10-CM

## 2023-07-25 DIAGNOSIS — H43.393 VITREOUS FLOATERS OF BOTH EYES: ICD-10-CM

## 2023-07-25 PROCEDURE — 92134 CPTRZ OPH DX IMG PST SGM RTA: CPT | Mod: S$GLB,,, | Performed by: OPHTHALMOLOGY

## 2023-07-25 PROCEDURE — 92012 PR EYE EXAM, EST PATIENT,INTERMED: ICD-10-PCS | Mod: 25,S$GLB,, | Performed by: OPHTHALMOLOGY

## 2023-07-25 PROCEDURE — 67028 PR INJECT INTRAVITREAL PHARMCOLOGIC: ICD-10-PCS | Mod: RT,S$GLB,, | Performed by: OPHTHALMOLOGY

## 2023-07-25 PROCEDURE — 67028 INJECTION EYE DRUG: CPT | Mod: RT,S$GLB,, | Performed by: OPHTHALMOLOGY

## 2023-07-25 PROCEDURE — 92134 POSTERIOR SEGMENT OCT RETINA (OCULAR COHERENCE TOMOGRAPHY)-BOTH EYES: ICD-10-PCS | Mod: S$GLB,,, | Performed by: OPHTHALMOLOGY

## 2023-07-25 PROCEDURE — 92012 INTRM OPH EXAM EST PATIENT: CPT | Mod: 25,S$GLB,, | Performed by: OPHTHALMOLOGY

## 2023-07-25 RX ADMIN — Medication 1.25 MG: at 03:07

## 2023-07-25 NOTE — PROGRESS NOTES
HPI    DLS: 06/13/2023 Dr. Newell     Eye Med's: Refresh BID OU     83 y.o. male is here for 6 weeks OCT no dilate. Denies eye pain and   floaters. Occasional flashes, right eye. Vision has decline since last   visit. No problems with glare.   Last edited by JEET Boland on 7/25/2023  2:19 PM.               OCT -OD decrease in paracentral IR fluid  New polypoidal lesion SN to fovea  OS - stable small flat inferior PED, no SRF      Prior ICG/FA - leakage IT to fovea OD      A/P    1. IPCV OU vs wet amd   With subfoveal PED OD  Avastin resumed 1/14  S/p Avastin OD #8 in past, resumed OD x 7  S/p PDT OD 9/18  OS - PED inferior to fovea stable    2/18 - recurrent swelling OD, IT to fovea  5/18 - increased CME and superior SRF  4/19 - slight increase in IRF, ASx - will follow  8/21 - increase in IR fluid temporally OD - ASx  2/22  OD IRF improved, new lesion SN to fovea  6/23 - SN SRF improved, inferior IRF increased    Avastin OD today     2. Floaters OU    3. POAG - Good IOP, continue management as per Dr. Hidalgo  - Needs f/u appt    4. PCIOL OU      6 weeks OCT no dilate    Risks, benefits, and alternatives to treatment discussed in detail with the patient.  The patient voiced understanding and wished to proceed with the procedure    Injection Procedure Note:  Diagnosis: IPCV vs Wet AMD OD    Patient Identified and Time Out complete  Pt marked  Topical Proparacaine and Betadine.  Inject Avastin OD at 6:00 @ 3.5-4mm posterior to limbus  Post Operative Dx: Same  Complications: None  Follow up as above.

## 2023-07-25 NOTE — PATIENT INSTRUCTIONS

## 2023-07-31 ENCOUNTER — OFFICE VISIT (OUTPATIENT)
Dept: FAMILY MEDICINE | Facility: CLINIC | Age: 83
End: 2023-07-31
Payer: MEDICARE

## 2023-07-31 VITALS
HEIGHT: 71 IN | SYSTOLIC BLOOD PRESSURE: 116 MMHG | WEIGHT: 192.88 LBS | DIASTOLIC BLOOD PRESSURE: 70 MMHG | OXYGEN SATURATION: 98 % | BODY MASS INDEX: 27 KG/M2 | HEART RATE: 55 BPM

## 2023-07-31 DIAGNOSIS — R63.4 WEIGHT LOSS: ICD-10-CM

## 2023-07-31 DIAGNOSIS — R42 DIZZINESS: ICD-10-CM

## 2023-07-31 DIAGNOSIS — I10 PRIMARY HYPERTENSION: Primary | ICD-10-CM

## 2023-07-31 PROCEDURE — 3074F SYST BP LT 130 MM HG: CPT | Mod: CPTII,S$GLB,, | Performed by: FAMILY MEDICINE

## 2023-07-31 PROCEDURE — 1160F PR REVIEW ALL MEDS BY PRESCRIBER/CLIN PHARMACIST DOCUMENTED: ICD-10-PCS | Mod: CPTII,S$GLB,, | Performed by: FAMILY MEDICINE

## 2023-07-31 PROCEDURE — 3078F DIAST BP <80 MM HG: CPT | Mod: CPTII,S$GLB,, | Performed by: FAMILY MEDICINE

## 2023-07-31 PROCEDURE — 3078F PR MOST RECENT DIASTOLIC BLOOD PRESSURE < 80 MM HG: ICD-10-PCS | Mod: CPTII,S$GLB,, | Performed by: FAMILY MEDICINE

## 2023-07-31 PROCEDURE — 1160F RVW MEDS BY RX/DR IN RCRD: CPT | Mod: CPTII,S$GLB,, | Performed by: FAMILY MEDICINE

## 2023-07-31 PROCEDURE — 99999 PR PBB SHADOW E&M-EST. PATIENT-LVL III: ICD-10-PCS | Mod: PBBFAC,,, | Performed by: FAMILY MEDICINE

## 2023-07-31 PROCEDURE — 99999 PR PBB SHADOW E&M-EST. PATIENT-LVL III: CPT | Mod: PBBFAC,,, | Performed by: FAMILY MEDICINE

## 2023-07-31 PROCEDURE — 3074F PR MOST RECENT SYSTOLIC BLOOD PRESSURE < 130 MM HG: ICD-10-PCS | Mod: CPTII,S$GLB,, | Performed by: FAMILY MEDICINE

## 2023-07-31 PROCEDURE — 1159F MED LIST DOCD IN RCRD: CPT | Mod: CPTII,S$GLB,, | Performed by: FAMILY MEDICINE

## 2023-07-31 PROCEDURE — 1101F PT FALLS ASSESS-DOCD LE1/YR: CPT | Mod: CPTII,S$GLB,, | Performed by: FAMILY MEDICINE

## 2023-07-31 PROCEDURE — 99215 OFFICE O/P EST HI 40 MIN: CPT | Mod: S$GLB,,, | Performed by: FAMILY MEDICINE

## 2023-07-31 PROCEDURE — 3288F FALL RISK ASSESSMENT DOCD: CPT | Mod: CPTII,S$GLB,, | Performed by: FAMILY MEDICINE

## 2023-07-31 PROCEDURE — 1101F PR PT FALLS ASSESS DOC 0-1 FALLS W/OUT INJ PAST YR: ICD-10-PCS | Mod: CPTII,S$GLB,, | Performed by: FAMILY MEDICINE

## 2023-07-31 PROCEDURE — 3288F PR FALLS RISK ASSESSMENT DOCUMENTED: ICD-10-PCS | Mod: CPTII,S$GLB,, | Performed by: FAMILY MEDICINE

## 2023-07-31 PROCEDURE — 1159F PR MEDICATION LIST DOCUMENTED IN MEDICAL RECORD: ICD-10-PCS | Mod: CPTII,S$GLB,, | Performed by: FAMILY MEDICINE

## 2023-07-31 PROCEDURE — 99215 PR OFFICE/OUTPT VISIT, EST, LEVL V, 40-54 MIN: ICD-10-PCS | Mod: S$GLB,,, | Performed by: FAMILY MEDICINE

## 2023-07-31 NOTE — PROGRESS NOTES
Subjective     Patient ID: Pedro Ponce Jr. is a 83 y.o. male.    Chief Complaint: Follow-up    83 years old male came to the clinic for blood pressure check.  Blood pressure today was stable.  No chest pain, palpitation, orthopnea or PND.  He feels dizzy sometimes.  Patient reports some weight loss and muscle shrinking.    Follow-up  Pertinent negatives include no chest pain.     Review of Systems   Constitutional: Negative.    HENT: Negative.     Eyes: Negative.    Respiratory: Negative.     Cardiovascular: Negative.  Negative for chest pain, palpitations, leg swelling and claudication.   Gastrointestinal:  Positive for constipation.   Genitourinary: Negative.    Musculoskeletal: Negative.    Integumentary:  Negative.   Neurological:  Positive for dizziness.   Psychiatric/Behavioral: Negative.            Objective     Physical Exam  Vitals and nursing note reviewed.   Constitutional:       General: He is not in acute distress.     Appearance: He is well-developed. He is not diaphoretic.   HENT:      Head: Normocephalic and atraumatic.      Right Ear: External ear normal.      Left Ear: External ear normal.      Nose: Nose normal.      Mouth/Throat:      Pharynx: No oropharyngeal exudate.   Eyes:      General: No scleral icterus.        Right eye: No discharge.         Left eye: No discharge.      Conjunctiva/sclera: Conjunctivae normal.      Pupils: Pupils are equal, round, and reactive to light.   Neck:      Thyroid: No thyromegaly.      Vascular: No JVD.      Trachea: No tracheal deviation.   Cardiovascular:      Rate and Rhythm: Normal rate and regular rhythm.      Heart sounds: Normal heart sounds. No murmur heard.     No friction rub. No gallop.   Pulmonary:      Effort: Pulmonary effort is normal. No respiratory distress.      Breath sounds: Normal breath sounds. No stridor. No wheezing or rales.   Chest:      Chest wall: No tenderness.   Abdominal:      General: Bowel sounds are normal. There is no  distension.      Palpations: Abdomen is soft. There is no mass.      Tenderness: There is no abdominal tenderness. There is no guarding or rebound.   Musculoskeletal:         General: No tenderness. Normal range of motion.      Cervical back: Normal range of motion and neck supple.   Lymphadenopathy:      Cervical: No cervical adenopathy.   Skin:     General: Skin is warm and dry.      Coloration: Skin is not pale.      Findings: No erythema or rash.   Neurological:      Mental Status: He is alert and oriented to person, place, and time.      Cranial Nerves: No cranial nerve deficit.      Motor: No abnormal muscle tone.      Coordination: Coordination abnormal.      Gait: Gait abnormal.      Deep Tendon Reflexes: Reflexes are normal and symmetric. Reflexes normal.   Psychiatric:         Behavior: Behavior normal.         Thought Content: Thought content normal.         Judgment: Judgment normal.            Assessment and Plan     1. Primary hypertension  -     Urinalysis; Future  -     Comprehensive Metabolic Panel; Future; Expected date: 07/31/2023  -     Lipid Panel; Future; Expected date: 07/31/2023  -     TSH; Future; Expected date: 07/31/2023  -     CBC Auto Differential; Future; Expected date: 07/31/2023    2. Dizziness    3. Weight loss        Continue monitoring blood pressure at home, low sodium diet.          Follow up in about 6 months (around 1/31/2024), or if symptoms worsen or fail to improve.

## 2023-08-04 ENCOUNTER — TELEPHONE (OUTPATIENT)
Dept: OPHTHALMOLOGY | Facility: CLINIC | Age: 83
End: 2023-08-04
Payer: MEDICARE

## 2023-08-04 NOTE — TELEPHONE ENCOUNTER
Returned pt. Call. Will increase refresh tid add pataday. Pt. States he is having no pain or changes in vision.

## 2023-08-04 NOTE — TELEPHONE ENCOUNTER
----- Message from Annamaria Machado sent at 8/4/2023 12:49 PM CDT -----  Regarding: RE: Right eye very red  Contact: pt @ [phone]    ----- Message -----  From: Araceli Darling  Sent: 8/4/2023  11:47 AM CDT  To: [facility]  Subject: Right eye very red                               Pedro Ponce calling regarding Patient Advice (message) for #pt is calling to let office know when he woke up his right eye is very red, asking for call back

## 2023-08-11 ENCOUNTER — OFFICE VISIT (OUTPATIENT)
Dept: UROLOGY | Facility: CLINIC | Age: 83
End: 2023-08-11
Payer: MEDICARE

## 2023-08-11 VITALS
HEIGHT: 71 IN | DIASTOLIC BLOOD PRESSURE: 65 MMHG | BODY MASS INDEX: 26.99 KG/M2 | HEART RATE: 59 BPM | WEIGHT: 192.81 LBS | SYSTOLIC BLOOD PRESSURE: 137 MMHG

## 2023-08-11 DIAGNOSIS — R35.0 URINARY FREQUENCY: ICD-10-CM

## 2023-08-11 DIAGNOSIS — R35.1 NOCTURIA: ICD-10-CM

## 2023-08-11 DIAGNOSIS — Z90.79 S/P TURP (STATUS POST TRANSURETHRAL RESECTION OF PROSTATE): ICD-10-CM

## 2023-08-11 DIAGNOSIS — N13.8 BPH WITH URINARY OBSTRUCTION: Primary | ICD-10-CM

## 2023-08-11 DIAGNOSIS — N40.1 BPH WITH URINARY OBSTRUCTION: Primary | ICD-10-CM

## 2023-08-11 PROCEDURE — 99999 PR PBB SHADOW E&M-EST. PATIENT-LVL III: ICD-10-PCS | Mod: PBBFAC,,, | Performed by: UROLOGY

## 2023-08-11 PROCEDURE — 1160F PR REVIEW ALL MEDS BY PRESCRIBER/CLIN PHARMACIST DOCUMENTED: ICD-10-PCS | Mod: CPTII,S$GLB,, | Performed by: UROLOGY

## 2023-08-11 PROCEDURE — 99214 OFFICE O/P EST MOD 30 MIN: CPT | Mod: S$GLB,,, | Performed by: UROLOGY

## 2023-08-11 PROCEDURE — 1126F AMNT PAIN NOTED NONE PRSNT: CPT | Mod: CPTII,S$GLB,, | Performed by: UROLOGY

## 2023-08-11 PROCEDURE — 1126F PR PAIN SEVERITY QUANTIFIED, NO PAIN PRESENT: ICD-10-PCS | Mod: CPTII,S$GLB,, | Performed by: UROLOGY

## 2023-08-11 PROCEDURE — 3078F DIAST BP <80 MM HG: CPT | Mod: CPTII,S$GLB,, | Performed by: UROLOGY

## 2023-08-11 PROCEDURE — 3075F PR MOST RECENT SYSTOLIC BLOOD PRESS GE 130-139MM HG: ICD-10-PCS | Mod: CPTII,S$GLB,, | Performed by: UROLOGY

## 2023-08-11 PROCEDURE — 3075F SYST BP GE 130 - 139MM HG: CPT | Mod: CPTII,S$GLB,, | Performed by: UROLOGY

## 2023-08-11 PROCEDURE — 1160F RVW MEDS BY RX/DR IN RCRD: CPT | Mod: CPTII,S$GLB,, | Performed by: UROLOGY

## 2023-08-11 PROCEDURE — 99214 PR OFFICE/OUTPT VISIT, EST, LEVL IV, 30-39 MIN: ICD-10-PCS | Mod: S$GLB,,, | Performed by: UROLOGY

## 2023-08-11 PROCEDURE — 3078F PR MOST RECENT DIASTOLIC BLOOD PRESSURE < 80 MM HG: ICD-10-PCS | Mod: CPTII,S$GLB,, | Performed by: UROLOGY

## 2023-08-11 PROCEDURE — 1159F PR MEDICATION LIST DOCUMENTED IN MEDICAL RECORD: ICD-10-PCS | Mod: CPTII,S$GLB,, | Performed by: UROLOGY

## 2023-08-11 PROCEDURE — 1159F MED LIST DOCD IN RCRD: CPT | Mod: CPTII,S$GLB,, | Performed by: UROLOGY

## 2023-08-11 PROCEDURE — 99999 PR PBB SHADOW E&M-EST. PATIENT-LVL III: CPT | Mod: PBBFAC,,, | Performed by: UROLOGY

## 2023-08-11 NOTE — PATIENT INSTRUCTIONS
Plan scrotal ultrasound to evaluate bilateral possible hydroceles as well as left-sided epididymal cyst.  Notify patient of results.  Follow-up in 6 months with a annual PSA test  If ultrasound reveals pathology and patient requests will plan on treatment of orchalgia.

## 2023-08-11 NOTE — PROGRESS NOTES
Subjective:      Patient ID: Pedro Ponce Jr. is a 83 y.o. male.    Chief Complaint: Nocturia    Mr. Ponce is an 83-year-old gentleman who has been followed and treated for BPH in the past.  The patient has undergone Transurethral resection of the prostate and done well since then.  He is on no medication other than VESIcare 5 mg.  The patient has recently noticed a proximally 3 weeks ago that he is having pain bilateral in his testicles and when he sleeping and the testicles touch his legs.  Patient has 2 adjustment change positioned for this to go away.    Testicle Pain  The patient's primary symptoms include scrotal swelling and testicular pain. Primary symptoms comment: History of BPH responded well to prior TURP.. This is a new problem. The current episode started 1 to 4 weeks ago. The problem occurs constantly. The problem has been gradually worsening. The pain is moderate. Pertinent negatives include no abdominal pain, anorexia, chest pain, chills, constipation, coughing, diarrhea, discolored urine, dysuria, fever, flank pain, frequency, headaches, hematuria, hesitancy, joint pain, joint swelling, nausea, painful intercourse, rash, shortness of breath, sore throat, urgency, urinary retention or vomiting. The testicular pain affects both testicles. There is swelling in both testicles. The color of the testicles is Normal. The symptoms are aggravated by tactile pressure. He has tried nothing for the symptoms. The treatment provided mild relief. His sexual activity is non-contributory to the current illness. No, his partner does not have an STD. His past medical history is significant for BPH. There is no history of chlamydia, cryptorchidism, erectile aid use, erectile dysfunction, a femoral hernia, gonorrhea, herpes simplex, HIV, an inguinal hernia, kidney stones, prostatitis, sickle cell disease, syphilis or varicocele.     Review of Systems   Constitutional:  Negative for activity change, appetite  change, chills, diaphoresis, fatigue, fever and unexpected weight change.   HENT:  Negative for congestion, hearing loss, sinus pressure, sore throat and trouble swallowing.    Eyes:  Negative for photophobia, pain, discharge and visual disturbance.   Respiratory:  Negative for apnea, cough and shortness of breath.    Cardiovascular:  Negative for chest pain, palpitations and leg swelling.   Gastrointestinal:  Negative for abdominal distention, abdominal pain, anal bleeding, anorexia, blood in stool, constipation, diarrhea, nausea, rectal pain and vomiting.   Endocrine: Negative for cold intolerance, heat intolerance, polydipsia, polyphagia and polyuria.   Genitourinary:  Positive for scrotal swelling and testicular pain. Negative for decreased urine volume, difficulty urinating, dysuria, enuresis, flank pain, frequency, genital sores, hematuria, hesitancy, penile swelling and urgency.   Musculoskeletal:  Negative for arthralgias, back pain, joint pain and myalgias.   Skin:  Negative for color change, pallor, rash and wound.   Allergic/Immunologic: Negative for environmental allergies, food allergies and immunocompromised state.   Neurological:  Negative for dizziness, seizures, weakness and headaches.   Hematological:  Negative for adenopathy. Does not bruise/bleed easily.   Psychiatric/Behavioral: Negative.        Objective:     Physical Exam  Vitals and nursing note reviewed.   Constitutional:       General: He is not in acute distress.     Appearance: Normal appearance. He is obese. He is not ill-appearing, toxic-appearing or diaphoretic.   HENT:      Head: Normocephalic and atraumatic.      Right Ear: External ear normal.      Left Ear: External ear normal.      Mouth/Throat:      Mouth: Mucous membranes are moist.      Pharynx: Oropharynx is clear. No oropharyngeal exudate or posterior oropharyngeal erythema.   Eyes:      General: No scleral icterus.        Right eye: No discharge.         Left eye: No  discharge.      Conjunctiva/sclera: Conjunctivae normal.      Pupils: Pupils are equal, round, and reactive to light.   Cardiovascular:      Rate and Rhythm: Normal rate and regular rhythm.      Pulses: Normal pulses.      Heart sounds: No murmur heard.     No gallop.   Pulmonary:      Effort: Pulmonary effort is normal.      Breath sounds: Normal breath sounds.   Abdominal:      General: Abdomen is flat. Bowel sounds are normal.      Palpations: Abdomen is soft.      Tenderness: There is no right CVA tenderness or left CVA tenderness.   Genitourinary:     Penis: Normal.       Testes: Normal.   Musculoskeletal:      Cervical back: Normal range of motion and neck supple.      Right lower leg: No edema.      Left lower leg: No edema.   Skin:     Capillary Refill: Capillary refill takes less than 2 seconds.      Findings: No lesion or rash.   Neurological:      General: No focal deficit present.      Mental Status: He is alert and oriented to person, place, and time. Mental status is at baseline.   Psychiatric:         Mood and Affect: Mood normal.         Behavior: Behavior normal.         Thought Content: Thought content normal.         Judgment: Judgment normal.        Assessment:      1. BPH with urinary obstruction    2. S/P TURP (status post transurethral resection of prostate)    3. Nocturia    4. Urinary frequency      Plan:       Patient Instructions   Plan scrotal ultrasound to evaluate bilateral possible hydroceles as well as left-sided epididymal cyst.  Notify patient of results.  Follow-up in 6 months with a annual PSA test  If ultrasound reveals pathology and patient requests will plan on treatment of orchalgia.

## 2023-08-15 ENCOUNTER — TELEPHONE (OUTPATIENT)
Dept: UROLOGY | Facility: CLINIC | Age: 83
End: 2023-08-15
Payer: MEDICARE

## 2023-08-15 NOTE — TELEPHONE ENCOUNTER
Spoke to patient and notified him of surgery date. Patient will get pre-op labs on 8/18.  Notified patient that his last does of aspirin will be on 8/21 and resume after surgery. He understood. Surgery packet mailed to patient.

## 2023-08-15 NOTE — TELEPHONE ENCOUNTER
----- Message from Sami Ledezma MD sent at 8/15/2023  4:22 PM CDT -----  Surgery scheduled 8/29/23 at [facility]

## 2023-08-15 NOTE — TELEPHONE ENCOUNTER
Yes, pt would like to move forward with surgery.    ----- Message from Sami Ledezma MD sent at 8/15/2023  2:53 PM CDT -----  Bilateral epididymal head cysts and bilateral hydroceles.  Notify patient this would require surgical intervention to get the hydroceles eventually shrink.  If patient wishes to move forward with surgery I can schedule.

## 2023-08-17 ENCOUNTER — TELEPHONE (OUTPATIENT)
Dept: FAMILY MEDICINE | Facility: CLINIC | Age: 83
End: 2023-08-17
Payer: MEDICARE

## 2023-08-17 ENCOUNTER — TELEPHONE (OUTPATIENT)
Dept: CARDIOLOGY | Facility: CLINIC | Age: 83
End: 2023-08-17
Payer: MEDICARE

## 2023-08-17 NOTE — TELEPHONE ENCOUNTER
----- Message from Laura Man sent at 8/17/2023  2:38 PM CDT -----  .Type:  Needs Medical Advice    Who Called: pt    Would the patient rather a call back or a response via [facility]? Call back  Best Call Back Number: [phone]  Additional Information:     Pt stated the pharmacy stated 2 of his medications NIFEdipine (PROCARDIA-XL) 60 MG (OSM) 24 hr tablet and  metoprolol succinate (TOPROL-XL) 50 MG 24 hr tablet are serving the same purpose and pt been drowsy lately and was told by the pharmacy that's the reason please call pt back

## 2023-08-17 NOTE — TELEPHONE ENCOUNTER
----- Message from Laura Man sent at 8/17/2023  2:35 PM CDT -----  .Type:  Needs Medical Advice    Who Called: pt    Would the patient rather a call back or a response via [facility]? Call back  Best Call Back Number: [phone]  Additional Information:     Pt stated the pharmacy stated 2 of his medications NIFEdipine (PROCARDIA-XL) 60 MG (OSM) 24 hr tablet and  metoprolol succinate (TOPROL-XL) 50 MG 24 hr tablet are serving the same purpose and pt been drowsy lately and was told by the pharmacy that's the reason please call pt back

## 2023-08-17 NOTE — TELEPHONE ENCOUNTER
Patient stated he is felling drowsy at times. Patient is currently on  NIFEdipine (PROCARDIA-XL) 60 MG (OSM) 24 hr tablet and  metoprolol succinate (TOPROL-XL) 50 MG 24 hr. Patient states he thinks these two meds might be the reason he is feeling this way. I informed patient I will inform PCP with further instructions.

## 2023-08-28 ENCOUNTER — LAB VISIT (OUTPATIENT)
Dept: LAB | Facility: HOSPITAL | Age: 83
End: 2023-08-28
Attending: FAMILY MEDICINE
Payer: MEDICARE

## 2023-08-28 ENCOUNTER — OFFICE VISIT (OUTPATIENT)
Dept: FAMILY MEDICINE | Facility: CLINIC | Age: 83
End: 2023-08-28
Payer: MEDICARE

## 2023-08-28 VITALS
BODY MASS INDEX: 26.98 KG/M2 | HEIGHT: 71 IN | WEIGHT: 192.69 LBS | HEART RATE: 57 BPM | SYSTOLIC BLOOD PRESSURE: 132 MMHG | OXYGEN SATURATION: 98 % | DIASTOLIC BLOOD PRESSURE: 60 MMHG

## 2023-08-28 DIAGNOSIS — N18.31 TYPE 2 DIABETES MELLITUS WITH STAGE 3A CHRONIC KIDNEY DISEASE, WITHOUT LONG-TERM CURRENT USE OF INSULIN: ICD-10-CM

## 2023-08-28 DIAGNOSIS — I10 ESSENTIAL HYPERTENSION: Primary | ICD-10-CM

## 2023-08-28 DIAGNOSIS — E11.22 TYPE 2 DIABETES MELLITUS WITH STAGE 3A CHRONIC KIDNEY DISEASE, WITHOUT LONG-TERM CURRENT USE OF INSULIN: ICD-10-CM

## 2023-08-28 DIAGNOSIS — R42 DIZZINESS: ICD-10-CM

## 2023-08-28 LAB
ESTIMATED AVG GLUCOSE: 117 MG/DL (ref 68–131)
HBA1C MFR BLD: 5.7 % (ref 4–5.6)

## 2023-08-28 PROCEDURE — 83036 HEMOGLOBIN GLYCOSYLATED A1C: CPT | Performed by: FAMILY MEDICINE

## 2023-08-28 PROCEDURE — 1160F RVW MEDS BY RX/DR IN RCRD: CPT | Mod: CPTII,S$GLB,, | Performed by: FAMILY MEDICINE

## 2023-08-28 PROCEDURE — 3075F SYST BP GE 130 - 139MM HG: CPT | Mod: CPTII,S$GLB,, | Performed by: FAMILY MEDICINE

## 2023-08-28 PROCEDURE — 1101F PT FALLS ASSESS-DOCD LE1/YR: CPT | Mod: CPTII,S$GLB,, | Performed by: FAMILY MEDICINE

## 2023-08-28 PROCEDURE — 99215 OFFICE O/P EST HI 40 MIN: CPT | Mod: S$GLB,,, | Performed by: FAMILY MEDICINE

## 2023-08-28 PROCEDURE — 3075F PR MOST RECENT SYSTOLIC BLOOD PRESS GE 130-139MM HG: ICD-10-PCS | Mod: CPTII,S$GLB,, | Performed by: FAMILY MEDICINE

## 2023-08-28 PROCEDURE — 1160F PR REVIEW ALL MEDS BY PRESCRIBER/CLIN PHARMACIST DOCUMENTED: ICD-10-PCS | Mod: CPTII,S$GLB,, | Performed by: FAMILY MEDICINE

## 2023-08-28 PROCEDURE — 1125F PR PAIN SEVERITY QUANTIFIED, PAIN PRESENT: ICD-10-PCS | Mod: CPTII,S$GLB,, | Performed by: FAMILY MEDICINE

## 2023-08-28 PROCEDURE — 1159F PR MEDICATION LIST DOCUMENTED IN MEDICAL RECORD: ICD-10-PCS | Mod: CPTII,S$GLB,, | Performed by: FAMILY MEDICINE

## 2023-08-28 PROCEDURE — 99215 PR OFFICE/OUTPT VISIT, EST, LEVL V, 40-54 MIN: ICD-10-PCS | Mod: S$GLB,,, | Performed by: FAMILY MEDICINE

## 2023-08-28 PROCEDURE — 3288F FALL RISK ASSESSMENT DOCD: CPT | Mod: CPTII,S$GLB,, | Performed by: FAMILY MEDICINE

## 2023-08-28 PROCEDURE — 3078F DIAST BP <80 MM HG: CPT | Mod: CPTII,S$GLB,, | Performed by: FAMILY MEDICINE

## 2023-08-28 PROCEDURE — 1159F MED LIST DOCD IN RCRD: CPT | Mod: CPTII,S$GLB,, | Performed by: FAMILY MEDICINE

## 2023-08-28 PROCEDURE — 1101F PR PT FALLS ASSESS DOC 0-1 FALLS W/OUT INJ PAST YR: ICD-10-PCS | Mod: CPTII,S$GLB,, | Performed by: FAMILY MEDICINE

## 2023-08-28 PROCEDURE — 36415 COLL VENOUS BLD VENIPUNCTURE: CPT | Mod: PO | Performed by: FAMILY MEDICINE

## 2023-08-28 PROCEDURE — 99999 PR PBB SHADOW E&M-EST. PATIENT-LVL IV: ICD-10-PCS | Mod: PBBFAC,,, | Performed by: FAMILY MEDICINE

## 2023-08-28 PROCEDURE — 99999 PR PBB SHADOW E&M-EST. PATIENT-LVL IV: CPT | Mod: PBBFAC,,, | Performed by: FAMILY MEDICINE

## 2023-08-28 PROCEDURE — 3288F PR FALLS RISK ASSESSMENT DOCUMENTED: ICD-10-PCS | Mod: CPTII,S$GLB,, | Performed by: FAMILY MEDICINE

## 2023-08-28 PROCEDURE — 3078F PR MOST RECENT DIASTOLIC BLOOD PRESSURE < 80 MM HG: ICD-10-PCS | Mod: CPTII,S$GLB,, | Performed by: FAMILY MEDICINE

## 2023-08-28 PROCEDURE — 1125F AMNT PAIN NOTED PAIN PRSNT: CPT | Mod: CPTII,S$GLB,, | Performed by: FAMILY MEDICINE

## 2023-08-28 NOTE — PROGRESS NOTES
Subjective     Patient ID: Pedro Ponce Jr. is a 83 y.o. male.    Chief Complaint: Hypertension    83 years old male came to the clinic for blood pressure check.  Blood pressure today was normal but in the low side.  Patient is planning to do prostate surgery tomorrow.  Pharmacist recommended possible discontinue 1 of the blood pressure medicines.  Last A1c was stable.    Hypertension  Pertinent negatives include no chest pain or palpitations.     Review of Systems   Constitutional: Negative.    HENT: Negative.     Eyes: Negative.    Respiratory: Negative.     Cardiovascular: Negative.  Negative for chest pain, palpitations, leg swelling and claudication.   Gastrointestinal: Negative.    Endocrine: Negative for polydipsia, polyphagia and polyuria.   Genitourinary: Negative.    Musculoskeletal: Negative.    Integumentary:  Negative.   Neurological:  Positive for dizziness.   Psychiatric/Behavioral: Negative.            Objective     Physical Exam  Vitals and nursing note reviewed.   Constitutional:       General: He is not in acute distress.     Appearance: He is well-developed. He is not diaphoretic.   HENT:      Head: Normocephalic and atraumatic.      Right Ear: External ear normal.      Left Ear: External ear normal.      Nose: Nose normal.      Mouth/Throat:      Pharynx: No oropharyngeal exudate.   Eyes:      General: No scleral icterus.        Right eye: No discharge.         Left eye: No discharge.      Conjunctiva/sclera: Conjunctivae normal.      Pupils: Pupils are equal, round, and reactive to light.   Neck:      Thyroid: No thyromegaly.      Vascular: No JVD.      Trachea: No tracheal deviation.   Cardiovascular:      Rate and Rhythm: Normal rate and regular rhythm.      Heart sounds: Normal heart sounds. No murmur heard.     No friction rub. No gallop.   Pulmonary:      Effort: Pulmonary effort is normal. No respiratory distress.      Breath sounds: Normal breath sounds. No stridor. No wheezing or  rales.   Chest:      Chest wall: No tenderness.   Abdominal:      General: Bowel sounds are normal. There is no distension.      Palpations: Abdomen is soft. There is no mass.      Tenderness: There is no abdominal tenderness. There is no guarding or rebound.   Musculoskeletal:         General: No tenderness. Normal range of motion.      Cervical back: Normal range of motion and neck supple.   Lymphadenopathy:      Cervical: No cervical adenopathy.   Skin:     General: Skin is warm and dry.      Coloration: Skin is not pale.      Findings: No erythema or rash.   Neurological:      Mental Status: He is alert and oriented to person, place, and time.      Cranial Nerves: No cranial nerve deficit.      Motor: No abnormal muscle tone.      Coordination: Coordination normal.      Deep Tendon Reflexes: Reflexes are normal and symmetric. Reflexes normal.   Psychiatric:         Behavior: Behavior normal.         Thought Content: Thought content normal.         Judgment: Judgment normal.            Assessment and Plan     1. Essential hypertension    2. Dizziness    3. Type 2 diabetes mellitus with stage 3a chronic kidney disease, without long-term current use of insulin  -     Hemoglobin A1C; Future; Expected date: 08/28/2023        Continue monitoring blood pressure at home, low sodium diet.          Two weeks for possible modification of blood pressure medicine.    No changes today because he is having a surgery tomorrow.

## 2023-08-29 PROBLEM — N43.3 BILATERAL HYDROCELE: Status: ACTIVE | Noted: 2023-08-29

## 2023-08-29 PROBLEM — N43.42 SPERMATOCELE OF EPIDIDYMIS, MULTIPLE: Status: ACTIVE | Noted: 2023-08-29

## 2023-08-30 ENCOUNTER — TELEPHONE (OUTPATIENT)
Dept: FAMILY MEDICINE | Facility: CLINIC | Age: 83
End: 2023-08-30
Payer: MEDICARE

## 2023-08-30 NOTE — TELEPHONE ENCOUNTER
----- Message from John Ricci MD sent at 8/29/2023  7:42 AM CDT -----  Stable blood sugar for the last 3 months.   .

## 2023-09-05 ENCOUNTER — PROCEDURE VISIT (OUTPATIENT)
Dept: OPHTHALMOLOGY | Facility: CLINIC | Age: 83
End: 2023-09-05
Payer: MEDICARE

## 2023-09-05 DIAGNOSIS — H35.3211 EXUDATIVE AGE-RELATED MACULAR DEGENERATION OF RIGHT EYE WITH ACTIVE CHOROIDAL NEOVASCULARIZATION: Primary | ICD-10-CM

## 2023-09-05 PROCEDURE — 92012 PR EYE EXAM, EST PATIENT,INTERMED: ICD-10-PCS | Mod: 25,S$GLB,, | Performed by: OPHTHALMOLOGY

## 2023-09-05 PROCEDURE — 67028 INJECTION EYE DRUG: CPT | Mod: RT,S$GLB,, | Performed by: OPHTHALMOLOGY

## 2023-09-05 PROCEDURE — 67028 PR INJECT INTRAVITREAL PHARMCOLOGIC: ICD-10-PCS | Mod: RT,S$GLB,, | Performed by: OPHTHALMOLOGY

## 2023-09-05 PROCEDURE — 92134 POSTERIOR SEGMENT OCT RETINA (OCULAR COHERENCE TOMOGRAPHY)-BOTH EYES: ICD-10-PCS | Mod: S$GLB,,, | Performed by: OPHTHALMOLOGY

## 2023-09-05 PROCEDURE — 92012 INTRM OPH EXAM EST PATIENT: CPT | Mod: 25,S$GLB,, | Performed by: OPHTHALMOLOGY

## 2023-09-05 PROCEDURE — 92134 CPTRZ OPH DX IMG PST SGM RTA: CPT | Mod: S$GLB,,, | Performed by: OPHTHALMOLOGY

## 2023-09-05 RX ADMIN — Medication 1.25 MG: at 03:09

## 2023-09-06 ENCOUNTER — LAB VISIT (OUTPATIENT)
Dept: LAB | Facility: HOSPITAL | Age: 83
End: 2023-09-06
Attending: FAMILY MEDICINE
Payer: MEDICARE

## 2023-09-06 ENCOUNTER — OFFICE VISIT (OUTPATIENT)
Dept: UROLOGY | Facility: CLINIC | Age: 83
End: 2023-09-06
Payer: MEDICARE

## 2023-09-06 ENCOUNTER — OFFICE VISIT (OUTPATIENT)
Dept: FAMILY MEDICINE | Facility: CLINIC | Age: 83
End: 2023-09-06
Payer: MEDICARE

## 2023-09-06 VITALS
BODY MASS INDEX: 26.91 KG/M2 | SYSTOLIC BLOOD PRESSURE: 119 MMHG | DIASTOLIC BLOOD PRESSURE: 60 MMHG | HEIGHT: 71 IN | HEART RATE: 57 BPM | WEIGHT: 192.19 LBS

## 2023-09-06 VITALS
HEART RATE: 67 BPM | SYSTOLIC BLOOD PRESSURE: 100 MMHG | DIASTOLIC BLOOD PRESSURE: 64 MMHG | WEIGHT: 193.13 LBS | OXYGEN SATURATION: 97 % | BODY MASS INDEX: 27.04 KG/M2 | HEIGHT: 71 IN

## 2023-09-06 DIAGNOSIS — R42 DIZZINESS: ICD-10-CM

## 2023-09-06 DIAGNOSIS — R35.1 NOCTURIA: Primary | ICD-10-CM

## 2023-09-06 DIAGNOSIS — I10 PRIMARY HYPERTENSION: ICD-10-CM

## 2023-09-06 DIAGNOSIS — M54.9 BACK PAIN, UNSPECIFIED BACK LOCATION, UNSPECIFIED BACK PAIN LATERALITY, UNSPECIFIED CHRONICITY: ICD-10-CM

## 2023-09-06 DIAGNOSIS — R73.03 PREDIABETES: ICD-10-CM

## 2023-09-06 DIAGNOSIS — N43.3 BILATERAL HYDROCELE: ICD-10-CM

## 2023-09-06 DIAGNOSIS — Z98.890 POST-OPERATIVE STATE: ICD-10-CM

## 2023-09-06 DIAGNOSIS — I10 ESSENTIAL HYPERTENSION: ICD-10-CM

## 2023-09-06 DIAGNOSIS — I10 PRIMARY HYPERTENSION: Primary | ICD-10-CM

## 2023-09-06 LAB
BACTERIA #/AREA URNS AUTO: ABNORMAL /HPF
BILIRUB UR QL STRIP: NEGATIVE
CLARITY UR REFRACT.AUTO: ABNORMAL
COLOR UR AUTO: ABNORMAL
GLUCOSE UR QL STRIP: NEGATIVE
HGB UR QL STRIP: NEGATIVE
HYALINE CASTS UR QL AUTO: 4 /LPF
KETONES UR QL STRIP: NEGATIVE
LEUKOCYTE ESTERASE UR QL STRIP: NEGATIVE
MICROSCOPIC COMMENT: ABNORMAL
NITRITE UR QL STRIP: NEGATIVE
PH UR STRIP: 5 [PH] (ref 5–8)
PROT UR QL STRIP: ABNORMAL
RBC #/AREA URNS AUTO: 0 /HPF (ref 0–4)
SP GR UR STRIP: 1.01 (ref 1–1.03)
URN SPEC COLLECT METH UR: ABNORMAL
WBC #/AREA URNS AUTO: 6 /HPF (ref 0–5)

## 2023-09-06 PROCEDURE — 1126F AMNT PAIN NOTED NONE PRSNT: CPT | Mod: CPTII,S$GLB,, | Performed by: UROLOGY

## 2023-09-06 PROCEDURE — 1159F MED LIST DOCD IN RCRD: CPT | Mod: CPTII,S$GLB,, | Performed by: UROLOGY

## 2023-09-06 PROCEDURE — 1159F PR MEDICATION LIST DOCUMENTED IN MEDICAL RECORD: ICD-10-PCS | Mod: CPTII,S$GLB,, | Performed by: FAMILY MEDICINE

## 2023-09-06 PROCEDURE — 1125F AMNT PAIN NOTED PAIN PRSNT: CPT | Mod: CPTII,S$GLB,, | Performed by: FAMILY MEDICINE

## 2023-09-06 PROCEDURE — 3074F SYST BP LT 130 MM HG: CPT | Mod: CPTII,S$GLB,, | Performed by: UROLOGY

## 2023-09-06 PROCEDURE — 1101F PT FALLS ASSESS-DOCD LE1/YR: CPT | Mod: CPTII,S$GLB,, | Performed by: FAMILY MEDICINE

## 2023-09-06 PROCEDURE — 99999 PR PBB SHADOW E&M-EST. PATIENT-LVL IV: ICD-10-PCS | Mod: PBBFAC,,, | Performed by: FAMILY MEDICINE

## 2023-09-06 PROCEDURE — 3074F PR MOST RECENT SYSTOLIC BLOOD PRESSURE < 130 MM HG: ICD-10-PCS | Mod: CPTII,S$GLB,, | Performed by: UROLOGY

## 2023-09-06 PROCEDURE — 87086 URINE CULTURE/COLONY COUNT: CPT | Performed by: FAMILY MEDICINE

## 2023-09-06 PROCEDURE — 3078F DIAST BP <80 MM HG: CPT | Mod: CPTII,S$GLB,, | Performed by: UROLOGY

## 2023-09-06 PROCEDURE — 1125F PR PAIN SEVERITY QUANTIFIED, PAIN PRESENT: ICD-10-PCS | Mod: CPTII,S$GLB,, | Performed by: FAMILY MEDICINE

## 2023-09-06 PROCEDURE — 99215 PR OFFICE/OUTPT VISIT, EST, LEVL V, 40-54 MIN: ICD-10-PCS | Mod: S$GLB,,, | Performed by: FAMILY MEDICINE

## 2023-09-06 PROCEDURE — 81001 URINALYSIS AUTO W/SCOPE: CPT | Performed by: FAMILY MEDICINE

## 2023-09-06 PROCEDURE — 99999 PR PBB SHADOW E&M-EST. PATIENT-LVL III: ICD-10-PCS | Mod: PBBFAC,,, | Performed by: UROLOGY

## 2023-09-06 PROCEDURE — 3074F PR MOST RECENT SYSTOLIC BLOOD PRESSURE < 130 MM HG: ICD-10-PCS | Mod: CPTII,S$GLB,, | Performed by: FAMILY MEDICINE

## 2023-09-06 PROCEDURE — 99215 OFFICE O/P EST HI 40 MIN: CPT | Mod: S$GLB,,, | Performed by: FAMILY MEDICINE

## 2023-09-06 PROCEDURE — 3074F SYST BP LT 130 MM HG: CPT | Mod: CPTII,S$GLB,, | Performed by: FAMILY MEDICINE

## 2023-09-06 PROCEDURE — 1126F PR PAIN SEVERITY QUANTIFIED, NO PAIN PRESENT: ICD-10-PCS | Mod: CPTII,S$GLB,, | Performed by: UROLOGY

## 2023-09-06 PROCEDURE — 3288F PR FALLS RISK ASSESSMENT DOCUMENTED: ICD-10-PCS | Mod: CPTII,S$GLB,, | Performed by: FAMILY MEDICINE

## 2023-09-06 PROCEDURE — 1160F PR REVIEW ALL MEDS BY PRESCRIBER/CLIN PHARMACIST DOCUMENTED: ICD-10-PCS | Mod: CPTII,S$GLB,, | Performed by: FAMILY MEDICINE

## 2023-09-06 PROCEDURE — 1101F PR PT FALLS ASSESS DOC 0-1 FALLS W/OUT INJ PAST YR: ICD-10-PCS | Mod: CPTII,S$GLB,, | Performed by: FAMILY MEDICINE

## 2023-09-06 PROCEDURE — 99999 PR PBB SHADOW E&M-EST. PATIENT-LVL IV: CPT | Mod: PBBFAC,,, | Performed by: FAMILY MEDICINE

## 2023-09-06 PROCEDURE — 99024 POSTOP FOLLOW-UP VISIT: CPT | Mod: S$GLB,,, | Performed by: UROLOGY

## 2023-09-06 PROCEDURE — 1159F MED LIST DOCD IN RCRD: CPT | Mod: CPTII,S$GLB,, | Performed by: FAMILY MEDICINE

## 2023-09-06 PROCEDURE — 99024 PR POST-OP FOLLOW-UP VISIT: ICD-10-PCS | Mod: S$GLB,,, | Performed by: UROLOGY

## 2023-09-06 PROCEDURE — 3078F PR MOST RECENT DIASTOLIC BLOOD PRESSURE < 80 MM HG: ICD-10-PCS | Mod: CPTII,S$GLB,, | Performed by: FAMILY MEDICINE

## 2023-09-06 PROCEDURE — 3078F PR MOST RECENT DIASTOLIC BLOOD PRESSURE < 80 MM HG: ICD-10-PCS | Mod: CPTII,S$GLB,, | Performed by: UROLOGY

## 2023-09-06 PROCEDURE — 3078F DIAST BP <80 MM HG: CPT | Mod: CPTII,S$GLB,, | Performed by: FAMILY MEDICINE

## 2023-09-06 PROCEDURE — 3288F FALL RISK ASSESSMENT DOCD: CPT | Mod: CPTII,S$GLB,, | Performed by: FAMILY MEDICINE

## 2023-09-06 PROCEDURE — 1159F PR MEDICATION LIST DOCUMENTED IN MEDICAL RECORD: ICD-10-PCS | Mod: CPTII,S$GLB,, | Performed by: UROLOGY

## 2023-09-06 PROCEDURE — 99999 PR PBB SHADOW E&M-EST. PATIENT-LVL III: CPT | Mod: PBBFAC,,, | Performed by: UROLOGY

## 2023-09-06 PROCEDURE — 1160F RVW MEDS BY RX/DR IN RCRD: CPT | Mod: CPTII,S$GLB,, | Performed by: FAMILY MEDICINE

## 2023-09-06 RX ORDER — NIFEDIPINE 60 MG/1
60 TABLET, EXTENDED RELEASE ORAL DAILY
Qty: 90 TABLET | Refills: 3 | Status: SHIPPED | OUTPATIENT
Start: 2023-09-06 | End: 2023-10-27

## 2023-09-06 NOTE — PROGRESS NOTES
"Pedro Ponce Jr. is a 83 y.o. male patient.   1. Nocturia    2. Post-operative state    3. 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    Blood pressure 119/60, pulse (!) 57, height 5' 11" (1.803 m), weight 87.2 kg (192 lb 3.2 oz).    Subjective:   Diet: Adequate intake.  Patient reports no nausea or vomiting.    Activity level: Normal.    Pain control: Well controlled.    Objective:  Vital signs (most recent): Blood pressure 119/60, pulse (!) 57, height 5' 11" (1.803 m), weight 87.2 kg (192 lb 3.2 oz).  General appearance: Comfortable, well-appearing, in no acute distress and not in pain.    Lungs:  Normal respiratory rate and normal effort.  Breath sounds normal.    Heart: Normal rate.  Regular rhythm.  S1 normal.    Chest: Symmetric chest wall expansion.    Abdomen: Abdomen is soft.  No distension or ascites.    Bowel sounds:  Bowel sounds are normal.    Tenderness: There is no abdominal tenderness tenderness.    Wound:  Clean.  There is serosanguinous drainage (From Penrose drains.  Penrose drains removed.).    Extremities: There is normal range of motion.    Neurological: The patient is alert " and oriented to person, place and time.  Normal strength.  No right hemiparesis, left hemiparesis or tongue deviation.  Pupils are equal, round, and reactive to light.       Assessment:   Post-op: 8 days.    Condition: In stable condition.     Plan:  Encourage ambulation.  Regular diet.         Sami Ledezma MD  9/6/2023

## 2023-09-06 NOTE — PROGRESS NOTES
Subjective     Patient ID: Pedro Ponce Jr. is a 83 y.o. male.    Chief Complaint: Back Pain and Hypertension    83 years old male came to the clinic for blood pressure check.  Blood pressure normal but in the low side.  He reports episodic dizziness.  Last A1c at the level of prediabetes.  He reports mild lower back pain.    Back Pain  Pertinent negatives include no chest pain.   Hypertension  Pertinent negatives include no chest pain or palpitations.     Review of Systems   Constitutional: Negative.    HENT: Negative.     Eyes: Negative.    Respiratory: Negative.     Cardiovascular: Negative.  Negative for chest pain, palpitations, leg swelling and claudication.   Gastrointestinal: Negative.    Endocrine: Negative for polydipsia, polyphagia and polyuria.   Genitourinary: Negative.    Musculoskeletal:  Positive for back pain.   Integumentary:  Negative.   Neurological: Negative.    Psychiatric/Behavioral: Negative.            Objective     Physical Exam  Vitals and nursing note reviewed.   Constitutional:       General: He is not in acute distress.     Appearance: He is well-developed. He is not diaphoretic.   HENT:      Head: Normocephalic and atraumatic.      Right Ear: External ear normal.      Left Ear: External ear normal.      Nose: Nose normal.      Mouth/Throat:      Pharynx: No oropharyngeal exudate.   Eyes:      General: No scleral icterus.        Right eye: No discharge.         Left eye: No discharge.      Conjunctiva/sclera: Conjunctivae normal.      Pupils: Pupils are equal, round, and reactive to light.   Neck:      Thyroid: No thyromegaly.      Vascular: No JVD.      Trachea: No tracheal deviation.   Cardiovascular:      Rate and Rhythm: Normal rate and regular rhythm.      Heart sounds: Normal heart sounds. No murmur heard.     No friction rub. No gallop.   Pulmonary:      Effort: Pulmonary effort is normal. No respiratory distress.      Breath sounds: Normal breath sounds. No stridor. No  wheezing or rales.   Chest:      Chest wall: No tenderness.   Abdominal:      General: Bowel sounds are normal. There is no distension.      Palpations: Abdomen is soft. There is no mass.      Tenderness: There is no abdominal tenderness. There is no guarding or rebound.   Musculoskeletal:         General: No tenderness. Normal range of motion.      Cervical back: Normal range of motion and neck supple.   Lymphadenopathy:      Cervical: No cervical adenopathy.   Skin:     General: Skin is warm and dry.      Coloration: Skin is not pale.      Findings: No erythema or rash.   Neurological:      Mental Status: He is alert and oriented to person, place, and time.      Cranial Nerves: No cranial nerve deficit.      Motor: No abnormal muscle tone.      Coordination: Coordination normal.      Deep Tendon Reflexes: Reflexes are normal and symmetric. Reflexes normal.   Psychiatric:         Behavior: Behavior normal.         Thought Content: Thought content normal.         Judgment: Judgment normal.            Assessment and Plan     1. Primary hypertension  -     Urinalysis; Future    2. Dizziness    3. Prediabetes  -     Hemoglobin A1C; Future; Expected date: 09/06/2023    4. Essential hypertension  -     NIFEdipine (PROCARDIA-XL) 60 MG (OSM) 24 hr tablet; Take 1 tablet (60 mg total) by mouth once daily.  Dispense: 90 tablet; Refill: 3    5. Back pain, unspecified back location, unspecified back pain laterality, unspecified chronicity  -     Urine culture; Future        Continue monitoring blood pressure at home, low sodium diet.          Follow up in about 6 months (around 3/6/2024), or if symptoms worsen or fail to improve.

## 2023-09-06 NOTE — PATIENT INSTRUCTIONS
Patient to follow-up in 3 months and sooner if necessary  Patient to allow bleeding to subside and swelling to subside over the next 3-4 days.  After that the patient should be able to resume all physical activity.  Would continue with scrotal support until all swelling resolves.

## 2023-09-08 LAB — BACTERIA UR CULT: NO GROWTH

## 2023-09-08 NOTE — PATIENT INSTRUCTIONS

## 2023-09-12 NOTE — PROGRESS NOTES
HPI    6 wk OCTm/Avastin OD     DLS 07/25/2023 by Dr. YVROSE Newell MD    CC: pt states : vision has been stable     -blurry  -eye pain   -flashes/floaters  -headaches  -curtains/shadows/veils    Eye Meds: Refresh OU prn           OCT -OD decrease in paracentral IR fluid  New polypoidal lesion SN to fovea  OS - stable small flat inferior PED, no SRF      Prior ICG/FA - leakage IT to fovea OD      A/P    1. IPCV OU vs wet amd   With subfoveal PED OD  Avastin resumed 1/14  S/p Avastin OD #8 in past, resumed OD x 8  S/p PDT OD 9/18  OS - PED inferior to fovea stable    2/18 - recurrent swelling OD, IT to fovea  5/18 - increased CME and superior SRF  4/19 - slight increase in IRF, ASx - will follow  8/21 - increase in IR fluid temporally OD - ASx  2/22  OD IRF improved, new lesion SN to fovea  6/23 - SN SRF improved, inferior IRF increased    Avastin OD today     2. Floaters OU    3. POAG - Good IOP, continue management as per Dr. Hidalgo  - Needs f/u appt    4. PCIOL OU      8 weeks OCT and dilate    Risks, benefits, and alternatives to treatment discussed in detail with the patient.  The patient voiced understanding and wished to proceed with the procedure    Injection Procedure Note:  Diagnosis: IPCV vs Wet AMD OD    Patient Identified and Time Out complete  Pt marked  Topical Proparacaine and Betadine.  Inject Avastin OD at 6:00 @ 3.5-4mm posterior to limbus  Post Operative Dx: Same  Complications: None  Follow up as above.

## 2023-09-15 ENCOUNTER — TELEPHONE (OUTPATIENT)
Dept: FAMILY MEDICINE | Facility: CLINIC | Age: 83
End: 2023-09-15
Payer: MEDICARE

## 2023-09-15 NOTE — PROGRESS NOTES
Urine culture was normal.  No infection.    Mild dehydration noted.  Increase fluid intake.    Please notify the patient.

## 2023-10-21 DIAGNOSIS — I10 PRIMARY HYPERTENSION: ICD-10-CM

## 2023-10-24 RX ORDER — IRBESARTAN 300 MG/1
TABLET ORAL
Qty: 90 TABLET | Refills: 3 | Status: SHIPPED | OUTPATIENT
Start: 2023-10-24

## 2023-10-27 ENCOUNTER — OFFICE VISIT (OUTPATIENT)
Dept: CARDIOLOGY | Facility: CLINIC | Age: 83
End: 2023-10-27
Payer: MEDICARE

## 2023-10-27 VITALS
HEART RATE: 58 BPM | BODY MASS INDEX: 27.69 KG/M2 | OXYGEN SATURATION: 97 % | DIASTOLIC BLOOD PRESSURE: 71 MMHG | WEIGHT: 197.75 LBS | HEIGHT: 71 IN | SYSTOLIC BLOOD PRESSURE: 139 MMHG

## 2023-10-27 DIAGNOSIS — I70.0 AORTIC ATHEROSCLEROSIS: ICD-10-CM

## 2023-10-27 DIAGNOSIS — I10 ESSENTIAL HYPERTENSION: ICD-10-CM

## 2023-10-27 DIAGNOSIS — T50.905A BRADYCARDIA, DRUG INDUCED: ICD-10-CM

## 2023-10-27 DIAGNOSIS — I10 PRIMARY HYPERTENSION: ICD-10-CM

## 2023-10-27 DIAGNOSIS — I69.331 MONOPLEGIA OF UPPER LIMB FOLLOWING CEREBRAL INFARCTION AFFECTING RIGHT DOMINANT SIDE: ICD-10-CM

## 2023-10-27 DIAGNOSIS — N18.31 STAGE 3A CHRONIC KIDNEY DISEASE: ICD-10-CM

## 2023-10-27 DIAGNOSIS — I25.10 CORONARY ARTERY DISEASE INVOLVING NATIVE CORONARY ARTERY OF NATIVE HEART WITHOUT ANGINA PECTORIS: ICD-10-CM

## 2023-10-27 DIAGNOSIS — R00.1 BRADYCARDIA, DRUG INDUCED: ICD-10-CM

## 2023-10-27 DIAGNOSIS — E78.49 OTHER HYPERLIPIDEMIA: ICD-10-CM

## 2023-10-27 PROCEDURE — 1160F PR REVIEW ALL MEDS BY PRESCRIBER/CLIN PHARMACIST DOCUMENTED: ICD-10-PCS | Mod: CPTII,S$GLB,, | Performed by: INTERNAL MEDICINE

## 2023-10-27 PROCEDURE — 3078F PR MOST RECENT DIASTOLIC BLOOD PRESSURE < 80 MM HG: ICD-10-PCS | Mod: CPTII,S$GLB,, | Performed by: INTERNAL MEDICINE

## 2023-10-27 PROCEDURE — 1126F PR PAIN SEVERITY QUANTIFIED, NO PAIN PRESENT: ICD-10-PCS | Mod: CPTII,S$GLB,, | Performed by: INTERNAL MEDICINE

## 2023-10-27 PROCEDURE — 3078F DIAST BP <80 MM HG: CPT | Mod: CPTII,S$GLB,, | Performed by: INTERNAL MEDICINE

## 2023-10-27 PROCEDURE — 99999 PR PBB SHADOW E&M-EST. PATIENT-LVL III: CPT | Mod: PBBFAC,,, | Performed by: INTERNAL MEDICINE

## 2023-10-27 PROCEDURE — 1126F AMNT PAIN NOTED NONE PRSNT: CPT | Mod: CPTII,S$GLB,, | Performed by: INTERNAL MEDICINE

## 2023-10-27 PROCEDURE — 1159F MED LIST DOCD IN RCRD: CPT | Mod: CPTII,S$GLB,, | Performed by: INTERNAL MEDICINE

## 2023-10-27 PROCEDURE — 3075F SYST BP GE 130 - 139MM HG: CPT | Mod: CPTII,S$GLB,, | Performed by: INTERNAL MEDICINE

## 2023-10-27 PROCEDURE — 1160F RVW MEDS BY RX/DR IN RCRD: CPT | Mod: CPTII,S$GLB,, | Performed by: INTERNAL MEDICINE

## 2023-10-27 PROCEDURE — 99214 OFFICE O/P EST MOD 30 MIN: CPT | Mod: S$GLB,,, | Performed by: INTERNAL MEDICINE

## 2023-10-27 PROCEDURE — 99999 PR PBB SHADOW E&M-EST. PATIENT-LVL III: ICD-10-PCS | Mod: PBBFAC,,, | Performed by: INTERNAL MEDICINE

## 2023-10-27 PROCEDURE — 99214 PR OFFICE/OUTPT VISIT, EST, LEVL IV, 30-39 MIN: ICD-10-PCS | Mod: S$GLB,,, | Performed by: INTERNAL MEDICINE

## 2023-10-27 PROCEDURE — 3075F PR MOST RECENT SYSTOLIC BLOOD PRESS GE 130-139MM HG: ICD-10-PCS | Mod: CPTII,S$GLB,, | Performed by: INTERNAL MEDICINE

## 2023-10-27 PROCEDURE — 1159F PR MEDICATION LIST DOCUMENTED IN MEDICAL RECORD: ICD-10-PCS | Mod: CPTII,S$GLB,, | Performed by: INTERNAL MEDICINE

## 2023-10-27 RX ORDER — NIFEDIPINE 60 MG/1
60 TABLET, EXTENDED RELEASE ORAL 2 TIMES DAILY
Qty: 180 TABLET | Refills: 3
Start: 2023-10-27 | End: 2023-12-05

## 2023-10-27 NOTE — ASSESSMENT & PLAN NOTE
No active angina.  He is on aspirin.  His LDL is well controlled.  He had single-vessel coronary disease leading to LAD stent December 25, 2020.

## 2023-10-27 NOTE — PROGRESS NOTES
[facility]     Subjective:    Patient ID:  Pedro Ponce Jr. is a 83 y.o. male who presents for follow-up of Hypertension, Hyperlipidemia, Coronary Artery Disease, and Impaired Fasting Glucose    Review of patient's allergies indicates:   Allergen Reactions    Pcn [penicillins] Rash      He continues to feel good when he goes to the gym to exercise.  He rides a stationary bike and does some exercises with weights.  He still drives short distances.  He has not had any chest discomfort.  In 2020 he had acute chest pain leading to distal LAD stent, single-vessel disease.    His blood pressure medications were adjusted but he is back on the twice daily 60 mg nifedipine as well as 50 mg Toprol and 300 mg Avapro.  His readings are in the 130s.  He has had small stroke in the past.  He has hemoglobin A1c 5.7.  He had to resume nifedipine because he was seeing elevated blood pressures.  In the past he has had sensitivity to beta-blockers.  Today's heart rate is slightly bradycardic 58 beats per minute.  His last LDL was less than 60.  He takes atorvastatin 40 mg.  He is on aspirin.        Review of Systems   Constitutional: Positive for malaise/fatigue. Negative for chills, decreased appetite, diaphoresis, fever, night sweats, weight gain and weight loss.   HENT:  Negative for congestion, ear discharge, ear pain, hearing loss, hoarse voice, nosebleeds, odynophagia, sore throat, stridor and tinnitus.    Eyes:  Negative for blurred vision, discharge, double vision, pain, photophobia, redness, vision loss in left eye, vision loss in right eye, visual disturbance and visual halos.   Cardiovascular:  Negative for chest pain, claudication, cyanosis, dyspnea on exertion, irregular heartbeat, leg swelling, near-syncope, orthopnea, palpitations, paroxysmal nocturnal dyspnea and syncope.   Respiratory:  Negative for cough, hemoptysis, shortness of  "breath, sleep disturbances due to breathing, snoring, sputum production and wheezing.    Endocrine: Negative for cold intolerance, heat intolerance, polydipsia, polyphagia and polyuria.   Hematologic/Lymphatic: Negative for adenopathy and bleeding problem. Does not bruise/bleed easily.   Skin:  Negative for color change, dry skin, flushing, itching, nail changes, poor wound healing, rash, skin cancer, suspicious lesions and unusual hair distribution.   Musculoskeletal:  Negative for arthritis, back pain, falls, gout, joint pain, joint swelling, muscle cramps, muscle weakness, myalgias, neck pain and stiffness.   Gastrointestinal:  Negative for bloating, abdominal pain, anorexia, change in bowel habit, bowel incontinence, constipation, diarrhea, dysphagia, excessive appetite, flatus, heartburn, hematemesis, hematochezia, hemorrhoids, jaundice, melena, nausea and vomiting.   Genitourinary:  Negative for bladder incontinence, decreased libido, dysuria, flank pain, frequency, genital sores, hematuria, hesitancy, incomplete emptying, nocturia and urgency.   Neurological:  Positive for focal weakness. Negative for aphonia, brief paralysis, difficulty with concentration, disturbances in coordination, excessive daytime sleepiness, dizziness, headaches, light-headedness, loss of balance, numbness, paresthesias, seizures, sensory change, tremors, vertigo and weakness.   Psychiatric/Behavioral:  Negative for altered mental status, depression, hallucinations, memory loss, substance abuse, suicidal ideas and thoughts of violence. The patient does not have insomnia and is not nervous/anxious.    Allergic/Immunologic: Negative for hives and persistent infections.        Objective:       Vitals:    10/27/23 1119   BP: 139/71   BP Location: Left arm   Patient Position: Sitting   BP Method: Large (Automatic)   Pulse: (!) 58   SpO2: 97%   Weight: 89.7 kg (197 lb 12 oz)   Height: 5' 11" (1.803 m)    Physical Exam  Constitutional:       " General: He is not in acute distress.     Appearance: He is well-developed. He is not diaphoretic.   HENT:      Head: Normocephalic and atraumatic.      Nose: Nose normal.   Eyes:      General: No scleral icterus.        Right eye: No discharge.      Conjunctiva/sclera: Conjunctivae normal.      Pupils: Pupils are equal, round, and reactive to light.   Neck:      Thyroid: No thyromegaly.      Vascular: No JVD.      Trachea: No tracheal deviation.   Cardiovascular:      Rate and Rhythm: Regular rhythm. Bradycardia present.      Pulses:           Carotid pulses are 2+ on the right side and 2+ on the left side.       Radial pulses are 2+ on the right side and 2+ on the left side.        Dorsalis pedis pulses are 2+ on the right side and 2+ on the left side.        Posterior tibial pulses are 2+ on the right side and 2+ on the left side.      Heart sounds: Murmur heard.      Harsh early systolic murmur is present with a grade of 2/6 at the upper right sternal border.      No friction rub. No gallop.   Pulmonary:      Effort: Pulmonary effort is normal. No respiratory distress.      Breath sounds: Normal breath sounds. No stridor. No wheezing or rales.   Chest:      Chest wall: No tenderness.   Abdominal:      General: Bowel sounds are normal. There is no distension.      Palpations: Abdomen is soft. There is no mass.      Tenderness: There is no abdominal tenderness. There is no guarding or rebound.   Musculoskeletal:         General: No tenderness. Normal range of motion.      Cervical back: Normal range of motion and neck supple.   Lymphadenopathy:      Cervical: No cervical adenopathy.   Skin:     General: Skin is warm and dry.      Coloration: Skin is not pale.      Findings: No erythema or rash.   Neurological:      Mental Status: He is alert and oriented to person, place, and time.      Cranial Nerves: No cranial nerve deficit.      Coordination: Coordination normal.      Comments: Right hand motor deficit with  clumsiness reported.   Psychiatric:         Behavior: Behavior normal.         Thought Content: Thought content normal.         Judgment: Judgment normal.           Assessment:       1. 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 Aortic atherosclerosis    3. Bradycardia, drug induced    4. Coronary artery disease involving native coronary artery of native heart without angina pectoris    5. Primary hypertension    6. Other hyperlipidemia    7. Stage 3a chronic kidney disease    8. Essential hypertension      Results for orders placed or performed in visit on 09/06/23   Hemoglobin A1C   Result Value Ref Range    Hemoglobin A1C 5.6 4.0 - 5.6 %    Estimated Avg Glucose 114 68 - 131 mg/dL     *Note: Due to a large number of results and/or encounters for the requested time period, some results have not been displayed. A complete set of results can be found in Results Review.         Current Outpatient Medications:     aspirin (ECOTRIN) 81 MG EC tablet, Take 81 mg by mouth once daily. Last dose 8/21/2023, Disp: , Rfl:     atorvastatin (LIPITOR) 40 MG tablet, TAKE 1 TABLET BY MOUTH EVERY DAY IN THE EVENING, Disp: 90 tablet, Rfl: 3    carboxymethylcellulose sodium (REFRESH TEARS) 0.5 % Drop, Place 1 drop into both eyes 2 (two) times a day. 2-3 times daily, Disp: , Rfl:     docusate sodium (COLACE) 100 MG capsule, Take 300 mg by mouth once daily. 2 in am and 1 in pm, Disp: , Rfl:     irbesartan (AVAPRO) 300 MG tablet, TAKE 1 TABLET BY MOUTH EVERY DAY IN THE EVENING, Disp: 90 tablet, Rfl: 3    metoprolol succinate (TOPROL-XL) 50 MG 24 hr tablet, TAKE 1 TABLET BY MOUTH EVERY DAY (Patient taking differently: Take 50 mg by mouth once daily. Takes in evening), Disp: 90 tablet, Rfl: 3    multivitamin capsule, Take 1 capsule by mouth once daily. Centrum Silver, Disp: , Rfl:     EScitalopram oxalate (LEXAPRO) 10 MG tablet, Take 1 tablet (10 mg total) by mouth once daily. (Patient taking  differently: Take 10 mg by mouth once daily. Takes in morning), Disp: 90 tablet, Rfl: 3    NIFEdipine (PROCARDIA-XL) 60 MG (OSM) 24 hr tablet, Take 1 tablet (60 mg total) by mouth 2 (two) times daily., Disp: 180 tablet, Rfl: 3     Lab Results   Component Value Date    WBC 5.22 08/18/2023    RBC 3.51 (L) 08/18/2023    HGB 11.6 (L) 08/18/2023    HCT 33.1 (L) 08/18/2023    MCV 94 08/18/2023    MCH 33.0 (H) 08/18/2023    MCHC 35.0 08/18/2023    RDW 15.5 (H) 08/18/2023     08/18/2023    MPV 10.0 08/18/2023    GRAN 3.1 08/18/2023    GRAN 58.9 08/18/2023    LYMPH 1.5 08/18/2023    LYMPH 28.4 08/18/2023    MONO 0.5 08/18/2023    MONO 9.8 08/18/2023    EOS 0.1 08/18/2023    BASO 0.04 08/18/2023    EOSINOPHIL 1.9 08/18/2023    BASOPHIL 0.8 08/18/2023    MG 2.0 08/28/2021        CMP  Lab Results   Component Value Date     08/18/2023    K 3.8 08/18/2023    CL 98 08/18/2023    CO2 26 08/18/2023     (H) 08/18/2023    BUN 21 (H) 08/18/2023    CREATININE 1.25 08/18/2023    CALCIUM 9.6 08/18/2023    PROT 7.5 06/07/2022    ALBUMIN 4.5 01/24/2023    BILITOT 0.5 06/07/2022    ALKPHOS 80 06/07/2022    AST 24 06/07/2022    ALT 19 06/07/2022    ANIONGAP 12 08/18/2023    ESTGFRAFRICA 58 (A) 06/20/2022    EGFRNONAA 50 (A) 06/20/2022        Lab Results   Component Value Date    LABURIN No growth 09/06/2023            Results for orders placed or performed during the hospital encounter of 06/01/22   EKG 12-lead    Collection Time: 06/01/22  4:05 PM    Narrative    Test Reason : R07.89,    Vent. Rate : 051 BPM     Atrial Rate : 051 BPM     P-R Int : 204 ms          QRS Dur : 100 ms      QT Int : 420 ms       P-R-T Axes : 069 -09 240 degrees     QTc Int : 387 ms    Sinus bradycardia  Right ventricular conduction delay  Minimal voltage criteria for LVH, may be normal variant  Septal infarct (cited on or before 01-FEB-2022)  T wave abnormality, consider inferolateral ischemia  Abnormal ECG  When compared with ECG of  01-FEB-2022 16:33,  Premature ventricular complexes are no longer Present  Questionable change in QRS duration  Criteria for Inferior-posterior infarct are no longer Present  Questionable change in initial forces of Anterior-septal leads  Confirmed by Mario Meza MD ([id number]) on 6/2/2022 11:44:34 AM    Referred By: EVELINA   SELF           Confirmed By:Mario Meza MD                  Plan:       Problem List Items Addressed This Visit          Neuro    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  Chronic right hand clumsiness related to previous small stroke event.            Cardiac/Vascular    Hypertension     His current regimen will be continued.  Condition stable.         Relevant Medications    NIFEdipine (PROCARDIA-XL) 60 MG (OSM) 24 hr tablet    Other hyperlipidemia     LDL well controlled.  Atorvastatin 40 mg to continue.         Bradycardia, drug induced     He is tolerating 50 mg Toprol.  He will keep this dose since he is asymptomatic from a bradycardia standpoint and his blood pressures are in good range at this time.         Aortic atherosclerosis     Condition stable.         CAD (coronary artery disease)     No active angina.  He is on aspirin.  His LDL is well controlled.  He had single-vessel coronary disease leading to LAD stent December 25, 2020.            Renal/    Stage 3 chronic kidney disease     Condition stable.  GFR 57 range.          Other Visit Diagnoses       Essential hypertension        Relevant Medications    NIFEdipine (PROCARDIA-XL) 60 MG (OSM) 24 hr tablet               I made a four-month follow-up visit.  He seems to be doing well.  We discussed the possibility of reducing his Toprol dosing back to 25 mg if he has fatigue symptoms or low blood pressure.                 Anthony Mcdonald MD  10/27/2023   12:33 PM

## 2023-11-02 ENCOUNTER — PROCEDURE VISIT (OUTPATIENT)
Dept: OPHTHALMOLOGY | Facility: CLINIC | Age: 83
End: 2023-11-02
Payer: MEDICARE

## 2023-11-02 DIAGNOSIS — H35.033 HYPERTENSIVE RETINOPATHY, BILATERAL: ICD-10-CM

## 2023-11-02 DIAGNOSIS — H43.393 VITREOUS FLOATERS OF BOTH EYES: ICD-10-CM

## 2023-11-02 DIAGNOSIS — H35.3211 EXUDATIVE AGE-RELATED MACULAR DEGENERATION OF RIGHT EYE WITH ACTIVE CHOROIDAL NEOVASCULARIZATION: Primary | ICD-10-CM

## 2023-11-02 PROCEDURE — 92014 PR EYE EXAM, EST PATIENT,COMPREHESV: ICD-10-PCS | Mod: 25,S$GLB,, | Performed by: OPHTHALMOLOGY

## 2023-11-02 PROCEDURE — 92014 COMPRE OPH EXAM EST PT 1/>: CPT | Mod: 25,S$GLB,, | Performed by: OPHTHALMOLOGY

## 2023-11-02 PROCEDURE — 92134 CPTRZ OPH DX IMG PST SGM RTA: CPT | Mod: S$GLB,,, | Performed by: OPHTHALMOLOGY

## 2023-11-02 PROCEDURE — 67028 INJECTION EYE DRUG: CPT | Mod: RT,S$GLB,, | Performed by: OPHTHALMOLOGY

## 2023-11-02 PROCEDURE — 92134 POSTERIOR SEGMENT OCT RETINA (OCULAR COHERENCE TOMOGRAPHY)-BOTH EYES: ICD-10-PCS | Mod: S$GLB,,, | Performed by: OPHTHALMOLOGY

## 2023-11-02 PROCEDURE — 67028 PR INJECT INTRAVITREAL PHARMCOLOGIC: ICD-10-PCS | Mod: RT,S$GLB,, | Performed by: OPHTHALMOLOGY

## 2023-11-02 RX ADMIN — Medication 1.25 MG: at 02:11

## 2023-11-02 NOTE — PROGRESS NOTES
HPI    8 wk OCTm/Avastin OD     DLS 09/05/2023 by Dr. YVROSE Newell MD    CC: pt states : vision is stable w/o change    -blurry  -eye pain   ++flashes (Reddish in color)/floaters  -headaches  -curtains/shadows/veils    Eye Meds: Refresh OU prn         OCT -OD dparacentral IR fluid  New polypoidal lesion SN to fovea - improved  OS - stable small flat inferior PED, no SRF      Prior ICG/FA - leakage IT to fovea OD      A/P    1. IPCV OU vs wet amd   With subfoveal PED OD  Avastin resumed 1/14  S/p Avastin OD #8 in past, resumed OD x 9  S/p PDT OD 9/18  OS - PED inferior to fovea stable    2/18 - recurrent swelling OD, IT to fovea  5/18 - increased CME and superior SRF  4/19 - slight increase in IRF, ASx - will follow  8/21 - increase in IR fluid temporally OD - ASx  2/22  OD IRF improved, new lesion SN to fovea  6/23 - SN SRF improved, inferior IRF increased    Avastin OD today     2. Floaters OU    3. POAG - Good IOP, continue management as per Dr. Hidalgo  - Needs f/u appt    4. PCIOL OU      8 weeks OCT and dilate    Risks, benefits, and alternatives to treatment discussed in detail with the patient.  The patient voiced understanding and wished to proceed with the procedure    Injection Procedure Note:  Diagnosis: IPCV vs Wet AMD OD    Patient Identified and Time Out complete  Pt marked  Topical Proparacaine and Betadine.  Inject Avastin OD at 6:00 @ 3.5-4mm posterior to limbus  Post Operative Dx: Same  Complications: None  Follow up as above.

## 2023-11-17 DIAGNOSIS — I10 PRIMARY HYPERTENSION: ICD-10-CM

## 2023-11-17 RX ORDER — METOPROLOL SUCCINATE 50 MG/1
TABLET, EXTENDED RELEASE ORAL
Qty: 90 TABLET | Refills: 3 | Status: SHIPPED | OUTPATIENT
Start: 2023-11-17

## 2023-11-17 NOTE — TELEPHONE ENCOUNTER
Refill Routing Note   Medication(s) are not appropriate for processing by [facility] for the following reason(s):        Drug-disease interaction: metoprolol succinate and Bradycardia, drug induced     [facility] action(s):  Defer   Requires labs : Yes           Pharmacist review requested: Yes     Appointments  past 12m or future 3m with PCP    Date Provider   Last Visit   9/6/2023 John Ricci MD   Next Visit   3/4/2024 John Ricci MD   ED visits in past 90 days: 0        Note composed:9:50 AM 11/17/2023

## 2023-11-17 NOTE — TELEPHONE ENCOUNTER
Care Due:                  Date            Visit Type   Department     Provider  --------------------------------------------------------------------------------                                EP -                              [facility]  Last Visit: 09-      CARE ([facility])   LEE ANN Ricci                              EP -                              PRIMARY      [facility]  Next Visit: 03-      CARE ([facility])   LEE ANN Ricci                                                            Last  Test          Frequency    Reason                     Performed    Due Date  --------------------------------------------------------------------------------    CMP.........  12 months..  atorvastatin.............  06- 06-    Lipid Panel.  12 months..  atorvastatin.............  02- 02-    [facility] Embedded Care Due Messages. Reference number: [id number].   11/17/2023 12:14:30 AM CST

## 2023-12-03 DIAGNOSIS — I10 ESSENTIAL HYPERTENSION: ICD-10-CM

## 2023-12-04 DIAGNOSIS — F33.1 MODERATE EPISODE OF RECURRENT MAJOR DEPRESSIVE DISORDER: ICD-10-CM

## 2023-12-04 RX ORDER — ESCITALOPRAM OXALATE 10 MG/1
10 TABLET ORAL
Qty: 90 TABLET | Refills: 3 | Status: SHIPPED | OUTPATIENT
Start: 2023-12-04

## 2023-12-04 NOTE — TELEPHONE ENCOUNTER
Refill Routing Note   Medication(s) are not appropriate for processing by [facility] for the following reason(s):        No active prescription written by provider    [facility] action(s):  Defer               Appointments  past 12m or future 3m with PCP    Date Provider   Last Visit   9/6/2023 John Ricci MD   Next Visit   3/4/2024 John Ricci MD   ED visits in past 90 days: 0        Note composed:3:18 PM 12/04/2023

## 2023-12-04 NOTE — TELEPHONE ENCOUNTER
No care due was identified.  [facility] Embedded Care Due Messages. Reference number: [id number].   12/04/2023 12:11:19 AM CST

## 2023-12-05 RX ORDER — NIFEDIPINE 60 MG/1
60 TABLET, EXTENDED RELEASE ORAL 2 TIMES DAILY
Qty: 180 TABLET | Refills: 3 | Status: SHIPPED | OUTPATIENT
Start: 2023-12-05

## 2023-12-20 ENCOUNTER — OFFICE VISIT (OUTPATIENT)
Dept: UROLOGY | Facility: CLINIC | Age: 83
End: 2023-12-20
Payer: MEDICARE

## 2023-12-20 VITALS
SYSTOLIC BLOOD PRESSURE: 142 MMHG | HEART RATE: 59 BPM | BODY MASS INDEX: 27.58 KG/M2 | HEIGHT: 71 IN | DIASTOLIC BLOOD PRESSURE: 64 MMHG

## 2023-12-20 DIAGNOSIS — N43.3 LEFT HYDROCELE: ICD-10-CM

## 2023-12-20 DIAGNOSIS — N50.819 PAIN IN TESTICLE, UNSPECIFIED LATERALITY: Primary | ICD-10-CM

## 2023-12-20 DIAGNOSIS — N45.1 EPIDIDYMITIS, BILATERAL: ICD-10-CM

## 2023-12-20 PROCEDURE — 99215 OFFICE O/P EST HI 40 MIN: CPT | Mod: S$GLB,,, | Performed by: UROLOGY

## 2023-12-20 PROCEDURE — 3077F SYST BP >= 140 MM HG: CPT | Mod: CPTII,S$GLB,, | Performed by: UROLOGY

## 2023-12-20 PROCEDURE — 1159F PR MEDICATION LIST DOCUMENTED IN MEDICAL RECORD: ICD-10-PCS | Mod: CPTII,S$GLB,, | Performed by: UROLOGY

## 2023-12-20 PROCEDURE — 1126F AMNT PAIN NOTED NONE PRSNT: CPT | Mod: CPTII,S$GLB,, | Performed by: UROLOGY

## 2023-12-20 PROCEDURE — 1160F PR REVIEW ALL MEDS BY PRESCRIBER/CLIN PHARMACIST DOCUMENTED: ICD-10-PCS | Mod: CPTII,S$GLB,, | Performed by: UROLOGY

## 2023-12-20 PROCEDURE — 99999 PR PBB SHADOW E&M-EST. PATIENT-LVL III: CPT | Mod: PBBFAC,,, | Performed by: UROLOGY

## 2023-12-20 PROCEDURE — 3078F DIAST BP <80 MM HG: CPT | Mod: CPTII,S$GLB,, | Performed by: UROLOGY

## 2023-12-20 PROCEDURE — 3077F PR MOST RECENT SYSTOLIC BLOOD PRESSURE >= 140 MM HG: ICD-10-PCS | Mod: CPTII,S$GLB,, | Performed by: UROLOGY

## 2023-12-20 PROCEDURE — 1160F RVW MEDS BY RX/DR IN RCRD: CPT | Mod: CPTII,S$GLB,, | Performed by: UROLOGY

## 2023-12-20 PROCEDURE — 1159F MED LIST DOCD IN RCRD: CPT | Mod: CPTII,S$GLB,, | Performed by: UROLOGY

## 2023-12-20 PROCEDURE — 99215 PR OFFICE/OUTPT VISIT, EST, LEVL V, 40-54 MIN: ICD-10-PCS | Mod: S$GLB,,, | Performed by: UROLOGY

## 2023-12-20 PROCEDURE — 3078F PR MOST RECENT DIASTOLIC BLOOD PRESSURE < 80 MM HG: ICD-10-PCS | Mod: CPTII,S$GLB,, | Performed by: UROLOGY

## 2023-12-20 PROCEDURE — 99999 PR PBB SHADOW E&M-EST. PATIENT-LVL III: ICD-10-PCS | Mod: PBBFAC,,, | Performed by: UROLOGY

## 2023-12-20 PROCEDURE — 1126F PR PAIN SEVERITY QUANTIFIED, NO PAIN PRESENT: ICD-10-PCS | Mod: CPTII,S$GLB,, | Performed by: UROLOGY

## 2023-12-20 RX ORDER — CIPROFLOXACIN 500 MG/1
500 TABLET ORAL 2 TIMES DAILY
Qty: 28 TABLET | Refills: 0 | Status: SHIPPED | OUTPATIENT
Start: 2023-12-20 | End: 2024-01-03

## 2023-12-20 NOTE — PATIENT INSTRUCTIONS
Cipro x2 weeks  Obtain scrotal ultrasound and notify patient of results  Follow-up in 1 month to check if all symptoms have resolved

## 2023-12-20 NOTE — PROGRESS NOTES
Subjective:      Patient ID: Pedro Ponce Jr. is a 83 y.o. male.    Chief Complaint: nocturia / testicle swelling     Mr. Ponce is an 83-year-old gentleman.  The patient is status post Transurethral laser enucleation prostate.  He has had some intermittent issues with epididymitis in the past and presents today with complaint of orchalgia.  The patient is not sure which testicle was bothering him however it happens mostly at night when he is moving around in the bed.  The patient is emptying his bladder without difficulty and having no fevers.    Testicle Pain  The patient's primary symptoms include testicular pain. The patient's pertinent negatives include no penile discharge, penile pain or scrotal swelling. This is a new problem. The current episode started 1 to 4 weeks ago. The problem occurs intermittently. The problem has been waxing and waning. The pain is mild. Associated symptoms include frequency (patient has some urinary frequency and nocturia times 4-5.) and hesitancy. Pertinent negatives include no abdominal pain, anorexia, chest pain, chills, constipation, coughing, diarrhea, discolored urine, dysuria, fever, flank pain, headaches, hematuria, joint pain, joint swelling, nausea, painful intercourse, rash, shortness of breath, sore throat, urgency, urinary retention or vomiting. The testicular pain affects both testicles. There is swelling in both (Left Greater than Right) testicles. The symptoms are aggravated by activity and tactile pressure. He has tried nothing for the symptoms. He is sexually active. His past medical history is significant for BPH.     Review of Systems   Constitutional:  Negative for activity change, appetite change, chills, diaphoresis, fatigue, fever and unexpected weight change.   HENT:  Negative for congestion, hearing loss, sinus pressure, sore throat and trouble swallowing.    Eyes:  Negative for photophobia, pain, discharge and visual disturbance.   Respiratory:   Negative for apnea, cough and shortness of breath.    Cardiovascular:  Negative for chest pain, palpitations and leg swelling.   Gastrointestinal:  Negative for abdominal distention, abdominal pain, anal bleeding, anorexia, blood in stool, constipation, diarrhea, nausea, rectal pain and vomiting.   Endocrine: Negative for cold intolerance, heat intolerance, polydipsia, polyphagia and polyuria.   Genitourinary:  Positive for frequency (patient has some urinary frequency and nocturia times 4-5.), hesitancy and testicular pain. Negative for decreased urine volume, difficulty urinating, dysuria, enuresis, flank pain, genital sores, hematuria, penile discharge, penile pain, penile swelling, scrotal swelling and urgency.   Musculoskeletal:  Negative for arthralgias, back pain, joint pain and myalgias.   Skin:  Negative for color change, pallor, rash and wound.   Allergic/Immunologic: Negative for environmental allergies, food allergies and immunocompromised state.   Neurological:  Negative for dizziness, seizures, weakness and headaches.   Hematological:  Negative for adenopathy. Does not bruise/bleed easily.   Psychiatric/Behavioral: Negative.        Objective:     Physical Exam  Vitals and nursing note reviewed.   Constitutional:       Appearance: He is well-developed.   HENT:      Head: Normocephalic.      Nose: Nose normal.   Eyes:      Conjunctiva/sclera: Conjunctivae normal.      Pupils: Pupils are equal, round, and reactive to light.   Cardiovascular:      Rate and Rhythm: Normal rate and regular rhythm.      Heart sounds: Normal heart sounds.   Pulmonary:      Effort: Pulmonary effort is normal.      Breath sounds: Normal breath sounds.   Abdominal:      General: Bowel sounds are normal.      Palpations: Abdomen is soft.      Hernia: There is no hernia in the right inguinal area or left inguinal area.   Genitourinary:     Penis: Normal and circumcised. No phimosis, paraphimosis, hypospadias, erythema, tenderness  or discharge.       Testes: Cremasteric reflex is present.         Right: Tenderness (on epididymis left greater than right) and swelling present. Mass not present. Right testis is descended. Cremasteric reflex is present.          Left: Tenderness (on epididymis left) and swelling present. Mass not present. Left testis is descended. Cremasteric reflex is present.           Comments: Bilateral hydroceles left greater than right, left epididymal swelling greater than right left epididymal tenderness greater than right  Musculoskeletal:         General: Normal range of motion.      Cervical back: Normal range of motion and neck supple.   Lymphadenopathy:      Lower Body: No right inguinal adenopathy. No left inguinal adenopathy.   Skin:     General: Skin is warm and dry.   Neurological:      Mental Status: He is alert and oriented to person, place, and time.      Deep Tendon Reflexes: Reflexes are normal and symmetric.   Psychiatric:         Behavior: Behavior normal.         Thought Content: Thought content normal.         Judgment: Judgment normal.        Assessment:      1. Pain in testicle, unspecified laterality    2. Left hydrocele      Plan:     Patient Instructions   Cipro x2 weeks  Obtain scrotal ultrasound and notify patient of results  Follow-up in 1 month to check if all symptoms have resolved

## 2024-01-02 ENCOUNTER — TELEPHONE (OUTPATIENT)
Dept: UROLOGY | Facility: CLINIC | Age: 84
End: 2024-01-02
Payer: MEDICARE

## 2024-01-02 NOTE — TELEPHONE ENCOUNTER
----- Message from Sami Ledezma MD sent at 1/2/2024  3:57 PM CST -----  Undergo any procedures at this time.  ----- Message -----  From: Eliza Donato MA  Sent: 12/28/2023   2:38 PM CST  To: MD Vivek Pyle Dr, no need for what? Just clarifying before I call him.    ----- Message -----  From: Sami Ledezma MD  Sent: 12/28/2023   2:35 PM CST  To: [facility] Staff    Ultrasound there are bilateral epididymal head cyst 1 on the right measuring mm 1 on the left measuring 5 mm there has no need to

## 2024-01-04 ENCOUNTER — PROCEDURE VISIT (OUTPATIENT)
Dept: OPHTHALMOLOGY | Facility: CLINIC | Age: 84
End: 2024-01-04
Payer: MEDICARE

## 2024-01-04 DIAGNOSIS — H35.3211 EXUDATIVE AGE-RELATED MACULAR DEGENERATION OF RIGHT EYE WITH ACTIVE CHOROIDAL NEOVASCULARIZATION: Primary | ICD-10-CM

## 2024-01-04 DIAGNOSIS — H43.393 VITREOUS FLOATERS OF BOTH EYES: ICD-10-CM

## 2024-01-04 DIAGNOSIS — H35.033 HYPERTENSIVE RETINOPATHY, BILATERAL: ICD-10-CM

## 2024-01-04 PROCEDURE — 92134 CPTRZ OPH DX IMG PST SGM RTA: CPT | Mod: S$GLB,,, | Performed by: OPHTHALMOLOGY

## 2024-01-04 PROCEDURE — 92014 COMPRE OPH EXAM EST PT 1/>: CPT | Mod: 25,S$GLB,, | Performed by: OPHTHALMOLOGY

## 2024-01-04 PROCEDURE — 67028 INJECTION EYE DRUG: CPT | Mod: RT,S$GLB,, | Performed by: OPHTHALMOLOGY

## 2024-01-04 RX ADMIN — Medication 1.25 MG: at 03:01

## 2024-01-04 NOTE — PROGRESS NOTES
HPI     Macular Degeneration     Additional comments: 8 week f/u Avastin OD           Comments    Worsening VA OD > OS, no pain ou, no floaters and occasional flashes    AT ou prn          Last edited by Chrissie De Paz on 1/4/2024  1:53 PM.          OCT -OD paracentral IR fluid  New polypoidal lesion SN to fovea - improved  OS - stable small flat inferior PED, no SRF      Prior ICG/FA - leakage IT to fovea OD      A/P    1. IPCV OU vs wet amd   With subfoveal PED OD  Avastin resumed 1/14  S/p Avastin OD #8 in past, resumed OD x 10  S/p PDT OD 9/18  OS - PED inferior to fovea stable    2/18 - recurrent swelling OD, IT to fovea  5/18 - increased CME and superior SRF  4/19 - slight increase in IRF, ASx - will follow  8/21 - increase in IR fluid temporally OD - ASx  2/22  OD IRF improved, new lesion SN to fovea  6/23 - SN SRF improved, inferior IRF increased    Avastin OD today     2. Floaters OU    3. POAG - Good IOP, continue management as per Dr. Hidalgo  - Needs f/u appt    4. PCIOL OU      8 weeks OCT no dilate - LDFE 1/24    Risks, benefits, and alternatives to treatment discussed in detail with the patient.  The patient voiced understanding and wished to proceed with the procedure    Injection Procedure Note:  Diagnosis: IPCV vs Wet AMD OD    Patient Identified and Time Out complete  Pt marked  Topical Proparacaine and Betadine.  Inject Avastin OD at 6:00 @ 3.5-4mm posterior to limbus  Post Operative Dx: Same  Complications: None  Follow up as above.

## 2024-01-30 ENCOUNTER — TELEPHONE (OUTPATIENT)
Dept: UROLOGY | Facility: CLINIC | Age: 84
End: 2024-01-30
Payer: MEDICARE

## 2024-01-30 NOTE — TELEPHONE ENCOUNTER
Pt called with urinary c/o urinary leaking before he can get to the bathroom, especially at night. He has started to begin wearing pads that help. Pt has appointment already scheduled with Dr. Ledezma for 2/14 that he will keep. Pt thanked us for calling.

## 2024-02-14 ENCOUNTER — OFFICE VISIT (OUTPATIENT)
Dept: UROLOGY | Facility: CLINIC | Age: 84
End: 2024-02-14
Payer: MEDICARE

## 2024-02-14 VITALS
HEART RATE: 63 BPM | HEIGHT: 71 IN | BODY MASS INDEX: 27.58 KG/M2 | SYSTOLIC BLOOD PRESSURE: 143 MMHG | DIASTOLIC BLOOD PRESSURE: 65 MMHG

## 2024-02-14 DIAGNOSIS — R35.0 URINARY FREQUENCY: ICD-10-CM

## 2024-02-14 DIAGNOSIS — Z90.79 S/P TURP (STATUS POST TRANSURETHRAL RESECTION OF PROSTATE): ICD-10-CM

## 2024-02-14 DIAGNOSIS — R35.1 NOCTURIA: Primary | ICD-10-CM

## 2024-02-14 DIAGNOSIS — N28.1 RENAL CYST: ICD-10-CM

## 2024-02-14 PROCEDURE — 99214 OFFICE O/P EST MOD 30 MIN: CPT | Mod: S$GLB,,, | Performed by: UROLOGY

## 2024-02-14 PROCEDURE — 3078F DIAST BP <80 MM HG: CPT | Mod: CPTII,S$GLB,, | Performed by: UROLOGY

## 2024-02-14 PROCEDURE — 1159F MED LIST DOCD IN RCRD: CPT | Mod: CPTII,S$GLB,, | Performed by: UROLOGY

## 2024-02-14 PROCEDURE — 99999 PR PBB SHADOW E&M-EST. PATIENT-LVL III: CPT | Mod: PBBFAC,,, | Performed by: UROLOGY

## 2024-02-14 PROCEDURE — 3077F SYST BP >= 140 MM HG: CPT | Mod: CPTII,S$GLB,, | Performed by: UROLOGY

## 2024-02-14 PROCEDURE — 1125F AMNT PAIN NOTED PAIN PRSNT: CPT | Mod: CPTII,S$GLB,, | Performed by: UROLOGY

## 2024-02-14 PROCEDURE — 1160F RVW MEDS BY RX/DR IN RCRD: CPT | Mod: CPTII,S$GLB,, | Performed by: UROLOGY

## 2024-02-14 RX ORDER — SOLIFENACIN SUCCINATE 5 MG/1
5 TABLET, FILM COATED ORAL
COMMUNITY
Start: 2024-01-30

## 2024-02-14 NOTE — PROGRESS NOTES
Subjective:      Patient ID: Pedro Ponce Jr. is a 83 y.o. male.    Chief Complaint: testicle pain / swelling    Mr. Ponce is an 83-year-old gentleman with a history of BPH.  The patient is a year and a half or close to 2 years status post laser enucleation of the prostate.  The patient developed the spermatocele postoperatively requiring surgery several months later.  The patient then developed some bilateral epididymitis requiring antibiotics.  The patient is having no complaints at this time and feels like the swelling has gone down and starting to improve and the testicles.  Patient is getting up approximately 3-4 times nightly to urinate and having frequency of approximately 4 times daily.  The patient is here for follow-up.  He has had some response to VESIcare 5 mg with his urinary urgency.  Patient finds that if he limits fluid intake in the evenings these symptoms are better.    Follow-up  This is a chronic (Urgency urge incontinence spotting to VESIcare 5 mg daily) problem. The current episode started more than 1 month ago. The problem occurs constantly. The problem has been gradually improving. Pertinent negatives include no abdominal pain, anorexia, arthralgias, change in bowel habit, chest pain, chills, congestion, coughing, diaphoresis, fatigue, fever, headaches, joint swelling, myalgias, nausea, neck pain, numbness, rash, sore throat, swollen glands, urinary symptoms, vertigo, visual change, vomiting or weakness. Nothing aggravates the symptoms. Treatments tried: Laser enucleation of the Prostate, antibiotics and now solifenacin. The treatment provided significant relief.   Review of Systems   Constitutional:  Negative for activity change, appetite change, chills, diaphoresis, fatigue, fever and unexpected weight change.   HENT:  Negative for congestion, hearing loss, sinus pressure, sore throat and trouble swallowing.    Eyes:  Negative for photophobia, pain, discharge and visual disturbance.    Respiratory:  Negative for apnea, cough and shortness of breath.    Cardiovascular:  Negative for chest pain, palpitations and leg swelling.   Gastrointestinal:  Negative for abdominal distention, abdominal pain, anal bleeding, anorexia, blood in stool, change in bowel habit, constipation, diarrhea, nausea, rectal pain and vomiting.   Endocrine: Negative for cold intolerance, heat intolerance, polydipsia, polyphagia and polyuria.   Genitourinary:  Negative for decreased urine volume, difficulty urinating, dysuria, enuresis, flank pain, frequency, genital sores, hematuria, penile discharge, penile pain, penile swelling, scrotal swelling, testicular pain and urgency.   Musculoskeletal:  Negative for arthralgias, back pain, joint swelling, myalgias and neck pain.   Skin:  Negative for color change, pallor, rash and wound.   Allergic/Immunologic: Negative for environmental allergies, food allergies and immunocompromised state.   Neurological:  Negative for dizziness, vertigo, seizures, weakness, numbness and headaches.   Hematological:  Negative for adenopathy. Does not bruise/bleed easily.   Psychiatric/Behavioral: Negative.      Objective:     Physical Exam  Vitals and nursing note reviewed.   Constitutional:       Appearance: Normal appearance. He is well-developed.   HENT:      Head: Normocephalic.      Right Ear: External ear normal.      Left Ear: External ear normal.      Nose: Nose normal.      Mouth/Throat:      Pharynx: No oropharyngeal exudate or posterior oropharyngeal erythema.   Eyes:      General: No scleral icterus.        Right eye: No discharge.         Left eye: No discharge.      Extraocular Movements: Extraocular movements intact.      Conjunctiva/sclera: Conjunctivae normal.      Pupils: Pupils are equal, round, and reactive to light.   Cardiovascular:      Rate and Rhythm: Normal rate and regular rhythm.      Heart sounds: Normal heart sounds.   Pulmonary:      Effort: Pulmonary effort is  normal.      Breath sounds: Rales (bilateral) present.   Abdominal:      General: Bowel sounds are normal. There is no distension.      Palpations: Abdomen is soft. There is no mass.      Tenderness: There is no abdominal tenderness. There is no right CVA tenderness, left CVA tenderness, guarding or rebound.      Hernia: No hernia is present.   Genitourinary:     Penis: Normal.       Testes: Normal.      Comments: Patient with no CVA tenderness, normal penis and scrotum.  Bladder nontender.  Musculoskeletal:         General: Normal range of motion.      Cervical back: Normal range of motion and neck supple.   Skin:     General: Skin is warm and dry.      Capillary Refill: Capillary refill takes 2 to 3 seconds.   Neurological:      Mental Status: He is alert and oriented to person, place, and time.      Deep Tendon Reflexes: Reflexes are normal and symmetric.   Psychiatric:         Behavior: Behavior normal.         Thought Content: Thought content normal.         Judgment: Judgment normal.      Assessment:      1. Nocturia    2. S/P TURP (status post transurethral resection of prostate)    3. Renal cyst    4. Urinary frequency      Plan:     Patient Instructions   F/U   3 months will go out to six-month follow-up from there.  Continue current dose of the location and continue with holding was after the afternoon or evening time.

## 2024-02-29 ENCOUNTER — PROCEDURE VISIT (OUTPATIENT)
Dept: OPHTHALMOLOGY | Facility: CLINIC | Age: 84
End: 2024-02-29
Payer: MEDICARE

## 2024-02-29 DIAGNOSIS — H35.033 HYPERTENSIVE RETINOPATHY, BILATERAL: ICD-10-CM

## 2024-02-29 DIAGNOSIS — H43.393 VITREOUS FLOATERS OF BOTH EYES: ICD-10-CM

## 2024-02-29 DIAGNOSIS — H35.3211 EXUDATIVE AGE-RELATED MACULAR DEGENERATION OF RIGHT EYE WITH ACTIVE CHOROIDAL NEOVASCULARIZATION: Primary | ICD-10-CM

## 2024-02-29 PROCEDURE — 92134 CPTRZ OPH DX IMG PST SGM RTA: CPT | Mod: S$GLB,,, | Performed by: OPHTHALMOLOGY

## 2024-02-29 PROCEDURE — 67028 INJECTION EYE DRUG: CPT | Mod: RT,S$GLB,, | Performed by: OPHTHALMOLOGY

## 2024-02-29 PROCEDURE — 92012 INTRM OPH EXAM EST PATIENT: CPT | Mod: 25,S$GLB,, | Performed by: OPHTHALMOLOGY

## 2024-02-29 RX ADMIN — Medication 1.25 MG: at 01:02

## 2024-03-04 ENCOUNTER — OFFICE VISIT (OUTPATIENT)
Dept: FAMILY MEDICINE | Facility: CLINIC | Age: 84
End: 2024-03-04
Payer: MEDICARE

## 2024-03-04 VITALS
BODY MASS INDEX: 28.18 KG/M2 | DIASTOLIC BLOOD PRESSURE: 56 MMHG | WEIGHT: 201.25 LBS | OXYGEN SATURATION: 98 % | HEIGHT: 71 IN | SYSTOLIC BLOOD PRESSURE: 120 MMHG | HEART RATE: 55 BPM

## 2024-03-04 DIAGNOSIS — E21.3 HYPERPARATHYROIDISM: ICD-10-CM

## 2024-03-04 DIAGNOSIS — I70.0 AORTIC ATHEROSCLEROSIS: ICD-10-CM

## 2024-03-04 DIAGNOSIS — R79.89 ELEVATED TSH: ICD-10-CM

## 2024-03-04 DIAGNOSIS — N18.31 STAGE 3A CHRONIC KIDNEY DISEASE: ICD-10-CM

## 2024-03-04 DIAGNOSIS — I10 ESSENTIAL HYPERTENSION: ICD-10-CM

## 2024-03-04 DIAGNOSIS — R73.03 PREDIABETES: ICD-10-CM

## 2024-03-04 DIAGNOSIS — I69.331 MONOPLEGIA OF UPPER LIMB FOLLOWING CEREBRAL INFARCTION AFFECTING RIGHT DOMINANT SIDE: ICD-10-CM

## 2024-03-04 DIAGNOSIS — F33.1 MODERATE EPISODE OF RECURRENT MAJOR DEPRESSIVE DISORDER: ICD-10-CM

## 2024-03-04 DIAGNOSIS — D63.8 CHRONIC DISEASE ANEMIA: Primary | ICD-10-CM

## 2024-03-04 PROCEDURE — 3078F DIAST BP <80 MM HG: CPT | Mod: CPTII,S$GLB,, | Performed by: FAMILY MEDICINE

## 2024-03-04 PROCEDURE — 1159F MED LIST DOCD IN RCRD: CPT | Mod: CPTII,S$GLB,, | Performed by: FAMILY MEDICINE

## 2024-03-04 PROCEDURE — 1160F RVW MEDS BY RX/DR IN RCRD: CPT | Mod: CPTII,S$GLB,, | Performed by: FAMILY MEDICINE

## 2024-03-04 PROCEDURE — 99215 OFFICE O/P EST HI 40 MIN: CPT | Mod: S$GLB,,, | Performed by: FAMILY MEDICINE

## 2024-03-04 PROCEDURE — 1101F PT FALLS ASSESS-DOCD LE1/YR: CPT | Mod: CPTII,S$GLB,, | Performed by: FAMILY MEDICINE

## 2024-03-04 PROCEDURE — 3288F FALL RISK ASSESSMENT DOCD: CPT | Mod: CPTII,S$GLB,, | Performed by: FAMILY MEDICINE

## 2024-03-04 PROCEDURE — 3074F SYST BP LT 130 MM HG: CPT | Mod: CPTII,S$GLB,, | Performed by: FAMILY MEDICINE

## 2024-03-04 PROCEDURE — 99999 PR PBB SHADOW E&M-EST. PATIENT-LVL III: CPT | Mod: PBBFAC,,, | Performed by: FAMILY MEDICINE

## 2024-03-04 NOTE — PROGRESS NOTES
Subjective     Patient ID: Pedro Ponce Jr. is a 84 y.o. male.    Chief Complaint: Hypertension    84 years old male came to the clinic for blood pressure check.  Pressure today was stable.  No Chest pain, palpitation, orthopnea or PND.  Patient with decreased kidney function but stable in comparison previous reports.  Patient with mild anemia with no significant changes.  A1c at the level prediabetes.  No polyuria, polydipsia or polyphagia.  He was previously diagnosed with aortic atherosclerosis.  Last cholesterol was normal.  Slightly elevated TSH.    Hypertension  Pertinent negatives include no chest pain or palpitations.     Review of Systems   Constitutional: Negative.    HENT: Negative.     Eyes: Negative.    Respiratory: Negative.     Cardiovascular: Negative.  Negative for chest pain, palpitations, leg swelling and claudication.   Gastrointestinal: Negative.    Endocrine: Negative for polydipsia, polyphagia and polyuria.   Genitourinary: Negative.    Musculoskeletal: Negative.    Integumentary:  Negative.   Neurological: Negative.    Psychiatric/Behavioral: Negative.            Objective     Physical Exam       Assessment and Plan     1. Chronic disease anemia  -     CBC Auto Differential; Future; Expected date: 03/04/2024    2. 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 Hyperparathyroidism  -     Comprehensive Metabolic Panel; Future; Expected date: 03/04/2024    5. Aortic atherosclerosis  -     Lipid Panel; Future; Expected date: 03/04/2024    6. Stage 3a chronic kidney disease  -     Urinalysis; Future  -     Comprehensive Metabolic Panel; Future; Expected date: 03/04/2024  -     CBC Auto Differential; Future; Expected date: 03/04/2024    7. Prediabetes  -     Hemoglobin A1C; Future; Expected date: 03/04/2024    8. Elevated TSH  -     TSH;  Future; Expected date: 03/04/2024    9. Essential hypertension  -     Urinalysis; Future  -     Comprehensive Metabolic Panel; Future; Expected date: 03/04/2024  -     Lipid Panel; Future; Expected date: 03/04/2024  -     TSH; Future; Expected date: 03/04/2024  -     CBC Auto Differential; Future; Expected date: 03/04/2024        Continue monitoring blood pressure at home, low sodium diet.   Decreased kidney function but stable in comparison with previous reports .  Increase fluid intake.  Avoid over-the-counter medicines like naproxen or ibuprofen.          Follow up in about 6 months (around 9/4/2024), or if symptoms worsen or fail to improve.

## 2024-03-23 NOTE — PROGRESS NOTES
HPI     1  MONTH  FOLLOW UP   ARMD      Additional comments: AVASTIN  INJECTION OD   OCT     ARMD     CME            Comments    Worsening VA OD > OS, no pain ou, no floaters and occasional flashes  DLS  01/04/24  AT ou prn             OCT -OD paracentral IR fluid  New polypoidal lesion SN to fovea - improved  OS - stable small flat inferior PED, no SRF      Prior ICG/FA - leakage IT to fovea OD      A/P    1. IPCV OU vs wet amd   With subfoveal PED OD  Avastin resumed 1/14  S/p Avastin OD #8 in past, resumed OD x 11  S/p PDT OD 9/18  OS - PED inferior to fovea stable    2/18 - recurrent swelling OD, IT to fovea  5/18 - increased CME and superior SRF  4/19 - slight increase in IRF, ASx - will follow  8/21 - increase in IR fluid temporally OD - ASx  2/22  OD IRF improved, new lesion SN to fovea  6/23 - SN SRF improved, inferior IRF increased    Avastin OD today     2. Floaters OU    3. POAG - Good IOP, continue management as per Dr. Hidalgo  - Needs f/u appt    4. PCIOL OU      8 weeks OCT and  dilate - LDFE 1/24    Risks, benefits, and alternatives to treatment discussed in detail with the patient.  The patient voiced understanding and wished to proceed with the procedure    Injection Procedure Note:  Diagnosis: IPCV vs Wet AMD OD    Patient Identified and Time Out complete  Pt marked  Topical Proparacaine and Betadine.  Inject Avastin OD at 6:00 @ 3.5-4mm posterior to limbus  Post Operative Dx: Same  Complications: None  Follow up as above.

## 2024-04-25 ENCOUNTER — PROCEDURE VISIT (OUTPATIENT)
Dept: OPHTHALMOLOGY | Facility: CLINIC | Age: 84
End: 2024-04-25
Payer: MEDICARE

## 2024-04-25 DIAGNOSIS — H43.393 VITREOUS FLOATERS OF BOTH EYES: ICD-10-CM

## 2024-04-25 DIAGNOSIS — H35.033 HYPERTENSIVE RETINOPATHY, BILATERAL: ICD-10-CM

## 2024-04-25 DIAGNOSIS — H35.3211 EXUDATIVE AGE-RELATED MACULAR DEGENERATION OF RIGHT EYE WITH ACTIVE CHOROIDAL NEOVASCULARIZATION: Primary | ICD-10-CM

## 2024-04-25 PROCEDURE — 67028 INJECTION EYE DRUG: CPT | Mod: RT,S$GLB,, | Performed by: OPHTHALMOLOGY

## 2024-04-25 PROCEDURE — 92014 COMPRE OPH EXAM EST PT 1/>: CPT | Mod: 25,S$GLB,, | Performed by: OPHTHALMOLOGY

## 2024-04-25 PROCEDURE — 92134 CPTRZ OPH DX IMG PST SGM RTA: CPT | Mod: S$GLB,,, | Performed by: OPHTHALMOLOGY

## 2024-04-25 RX ADMIN — Medication 1.25 MG: at 02:04

## 2024-04-25 NOTE — PROGRESS NOTES
HPI     1  MONTH  FOLLOW UP   ARMD      Additional comments: AVASTIN  INJECTION OD   OCT     ARMD     CME            Comments    Worsening VA OD > OS, no pain ou, no floaters and occasional flashes  DLS  01/04/24  AT ou prn             OCT -OD paracentral small increase  New polypoidal lesion SN to fovea -   OS - stable small flat inferior PED, no SRF      Prior ICG/FA - leakage IT to fovea OD      A/P    1. IPCV OU vs wet amd   With subfoveal PED OD  Avastin resumed 1/14  S/p Avastin OD #8 in past, resumed OD x 12  S/p PDT OD 9/18  OS - PED inferior to fovea stable    2/18 - recurrent swelling OD, IT to fovea  5/18 - increased CME and superior SRF  4/19 - slight increase in IRF, ASx - will follow  8/21 - increase in IR fluid temporally OD - ASx  2/22  OD IRF improved, new lesion SN to fovea  6/23 - SN SRF improved, inferior IRF increased    Avastin OD today     2. Floaters OU    3. POAG - Good IOP, continue management as per Dr. Hidalgo  - Needs f/u appt    4. PCIOL OU      8 weeks OCT no  dilate - LDFE 4/24    Risks, benefits, and alternatives to treatment discussed in detail with the patient.  The patient voiced understanding and wished to proceed with the procedure    Injection Procedure Note:  Diagnosis: IPCV vs Wet AMD OD    Patient Identified and Time Out complete  Pt marked  Topical Proparacaine and Betadine.  Inject Avastin OD at 6:00 @ 3.5-4mm posterior to limbus  Post Operative Dx: Same  Complications: None  Follow up as above.

## 2024-04-26 ENCOUNTER — OFFICE VISIT (OUTPATIENT)
Dept: CARDIOLOGY | Facility: CLINIC | Age: 84
End: 2024-04-26
Payer: MEDICARE

## 2024-04-26 VITALS
OXYGEN SATURATION: 98 % | BODY MASS INDEX: 27.97 KG/M2 | HEIGHT: 71 IN | HEART RATE: 55 BPM | DIASTOLIC BLOOD PRESSURE: 73 MMHG | WEIGHT: 199.75 LBS | SYSTOLIC BLOOD PRESSURE: 146 MMHG

## 2024-04-26 DIAGNOSIS — I10 PRIMARY HYPERTENSION: ICD-10-CM

## 2024-04-26 DIAGNOSIS — R00.1 BRADYCARDIA, DRUG INDUCED: ICD-10-CM

## 2024-04-26 DIAGNOSIS — I10 PRIMARY HYPERTENSION: Primary | ICD-10-CM

## 2024-04-26 DIAGNOSIS — I25.10 CORONARY ARTERY DISEASE INVOLVING NATIVE CORONARY ARTERY OF NATIVE HEART WITHOUT ANGINA PECTORIS: ICD-10-CM

## 2024-04-26 DIAGNOSIS — T50.905A BRADYCARDIA, DRUG INDUCED: ICD-10-CM

## 2024-04-26 DIAGNOSIS — N18.31 STAGE 3A CHRONIC KIDNEY DISEASE: ICD-10-CM

## 2024-04-26 DIAGNOSIS — E78.49 OTHER HYPERLIPIDEMIA: ICD-10-CM

## 2024-04-26 DIAGNOSIS — I70.0 AORTIC ATHEROSCLEROSIS: ICD-10-CM

## 2024-04-26 DIAGNOSIS — I69.331 MONOPLEGIA OF UPPER LIMB FOLLOWING CEREBRAL INFARCTION AFFECTING RIGHT DOMINANT SIDE: ICD-10-CM

## 2024-04-26 PROCEDURE — 3078F DIAST BP <80 MM HG: CPT | Mod: CPTII,S$GLB,, | Performed by: INTERNAL MEDICINE

## 2024-04-26 PROCEDURE — 99999 PR PBB SHADOW E&M-EST. PATIENT-LVL III: CPT | Mod: PBBFAC,,, | Performed by: INTERNAL MEDICINE

## 2024-04-26 PROCEDURE — 99214 OFFICE O/P EST MOD 30 MIN: CPT | Mod: S$GLB,,, | Performed by: INTERNAL MEDICINE

## 2024-04-26 PROCEDURE — 1159F MED LIST DOCD IN RCRD: CPT | Mod: CPTII,S$GLB,, | Performed by: INTERNAL MEDICINE

## 2024-04-26 PROCEDURE — 1160F RVW MEDS BY RX/DR IN RCRD: CPT | Mod: CPTII,S$GLB,, | Performed by: INTERNAL MEDICINE

## 2024-04-26 PROCEDURE — 3077F SYST BP >= 140 MM HG: CPT | Mod: CPTII,S$GLB,, | Performed by: INTERNAL MEDICINE

## 2024-04-26 RX ORDER — METOPROLOL SUCCINATE 25 MG/1
25 TABLET, EXTENDED RELEASE ORAL DAILY
Start: 2024-04-26 | End: 2024-06-12

## 2024-04-26 RX ORDER — HYDRALAZINE HYDROCHLORIDE 50 MG/1
50 TABLET, FILM COATED ORAL 3 TIMES DAILY
Qty: 270 TABLET | Refills: 3 | Status: SHIPPED | OUTPATIENT
Start: 2024-04-26 | End: 2024-06-12

## 2024-04-26 RX ORDER — HYDRALAZINE HYDROCHLORIDE 50 MG/1
50 TABLET, FILM COATED ORAL 3 TIMES DAILY
Qty: 270 TABLET | Refills: 3 | Status: SHIPPED | OUTPATIENT
Start: 2024-04-26

## 2024-04-26 NOTE — PROGRESS NOTES
[facility] Cardiology     Subjective:    Patient ID:  Pedro Ponce Jr. is a 84 y.o. male who presents for follow-up of Bradycardia, Hypertension, Coronary Artery Disease, and Hyperlipidemia    Review of patient's allergies indicates:   Allergen Reactions    Pcn [penicillins] Rash      He returns today reporting weakness and some drowsiness.  He is bothered by weak legs when he gets up in the middle of the night to use the restroom.  He is going to the gym every day.  He feels good when he exercises.  He states his blood pressures have been in the 140 range.  He feels good when he exercises.  No lightheadedness or syncope reported.  He is free of any shortness of breath or chest pain or change in exercise tolerance.    The patient has had problems with bradycardia preceding beta-blocker usage.  Over time, metoprolol was tolerated well.  His history is significant for LAD stent for NSTEMI in 2020.  His history includes a previous stroke with right hand weakness.  He states it has essentially resolved over the years        Review of Systems   Constitutional: Positive for malaise/fatigue. Negative for chills, decreased appetite, diaphoresis, fever, night sweats, weight gain and weight loss.   HENT:  Negative for congestion, ear discharge, ear pain, hearing loss, hoarse voice, nosebleeds, odynophagia, sore throat, stridor and tinnitus.    Eyes:  Negative for blurred vision, discharge, double vision, pain, photophobia, redness, vision loss in left eye, vision loss in right eye, visual disturbance and visual halos.   Cardiovascular:  Negative for chest pain, claudication, cyanosis, dyspnea on exertion, irregular heartbeat, leg swelling, near-syncope, orthopnea, palpitations, paroxysmal nocturnal dyspnea and syncope.   Respiratory:  Negative for cough, hemoptysis, shortness of breath, sleep disturbances due to breathing, snoring, sputum production  "and wheezing.    Endocrine: Negative for cold intolerance, heat intolerance, polydipsia, polyphagia and polyuria.   Hematologic/Lymphatic: Negative for adenopathy and bleeding problem. Does not bruise/bleed easily.   Skin:  Negative for color change, dry skin, flushing, itching, nail changes, poor wound healing, rash, skin cancer, suspicious lesions and unusual hair distribution.   Musculoskeletal:  Negative for arthritis, back pain, falls, gout, joint pain, joint swelling, muscle cramps, muscle weakness, myalgias, neck pain and stiffness.   Gastrointestinal:  Negative for bloating, abdominal pain, anorexia, change in bowel habit, bowel incontinence, constipation, diarrhea, dysphagia, excessive appetite, flatus, heartburn, hematemesis, hematochezia, hemorrhoids, jaundice, melena, nausea and vomiting.   Genitourinary:  Negative for bladder incontinence, decreased libido, dysuria, flank pain, frequency, genital sores, hematuria, hesitancy, incomplete emptying, nocturia and urgency.   Neurological:  Positive for focal weakness. Negative for aphonia, brief paralysis, difficulty with concentration, disturbances in coordination, excessive daytime sleepiness, dizziness, headaches, light-headedness, loss of balance, numbness, paresthesias, seizures, sensory change, tremors, vertigo and weakness.   Psychiatric/Behavioral:  Negative for altered mental status, depression, hallucinations, memory loss, substance abuse, suicidal ideas and thoughts of violence. The patient does not have insomnia and is not nervous/anxious.    Allergic/Immunologic: Negative for hives and persistent infections.        Objective:       Vitals:    04/26/24 0809   BP: (!) 146/73   Pulse: (!) 55   SpO2: 98%   Weight: 90.6 kg (199 lb 11.8 oz)   Height: 5' 11" (1.803 m)    Physical Exam  Constitutional:       General: He is not in acute distress.     Appearance: He is well-developed. He is not diaphoretic.   HENT:      Head: Normocephalic and atraumatic. "      Nose: Nose normal.   Eyes:      General: No scleral icterus.        Right eye: No discharge.      Conjunctiva/sclera: Conjunctivae normal.      Pupils: Pupils are equal, round, and reactive to light.   Neck:      Thyroid: No thyromegaly.      Vascular: No JVD.      Trachea: No tracheal deviation.   Cardiovascular:      Rate and Rhythm: Regular rhythm. Bradycardia present.      Pulses:           Carotid pulses are 2+ on the right side and 2+ on the left side.       Radial pulses are 2+ on the right side and 2+ on the left side.        Dorsalis pedis pulses are 2+ on the right side and 2+ on the left side.        Posterior tibial pulses are 2+ on the right side and 2+ on the left side.      Heart sounds: Murmur heard.      Harsh early systolic murmur is present with a grade of 2/6 at the upper right sternal border.      No friction rub. No gallop.   Pulmonary:      Effort: Pulmonary effort is normal. No respiratory distress.      Breath sounds: Normal breath sounds. No stridor. No wheezing or rales.   Chest:      Chest wall: No tenderness.   Abdominal:      General: Bowel sounds are normal. There is no distension.      Palpations: Abdomen is soft. There is no mass.      Tenderness: There is no abdominal tenderness. There is no guarding or rebound.   Musculoskeletal:         General: No tenderness. Normal range of motion.      Cervical back: Normal range of motion and neck supple.   Lymphadenopathy:      Cervical: No cervical adenopathy.   Skin:     General: Skin is warm and dry.      Coloration: Skin is not pale.      Findings: No erythema or rash.   Neurological:      Mental Status: He is alert and oriented to person, place, and time.      Cranial Nerves: No cranial nerve deficit.      Coordination: Coordination normal.      Comments: Right hand motor deficit with clumsiness reported.   Psychiatric:         Behavior: Behavior normal.         Thought Content: Thought content normal.         Judgment: Judgment  normal.           Assessment:       1. 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 Aortic atherosclerosis    4. Bradycardia, drug induced    5. Coronary artery disease involving native coronary artery of native heart without angina pectoris    6. Other hyperlipidemia    7. Stage 3a chronic kidney disease      Results for orders placed or performed in visit on 02/27/24   Comprehensive Metabolic Panel   Result Value Ref Range    Sodium 143 136 - 145 mmol/L    Potassium 4.2 3.5 - 5.1 mmol/L    Chloride 105 95 - 110 mmol/L    CO2 29 23 - 29 mmol/L    Glucose 105 70 - 110 mg/dL    BUN 27 (H) 2 - 20 mg/dL    Creatinine 1.31 0.50 - 1.40 mg/dL    Calcium 9.9 8.7 - 10.5 mg/dL    Total Protein 7.4 6.0 - 8.4 g/dL    Albumin 4.3 3.5 - 5.2 g/dL    Total Bilirubin 1.3 (H) 0.1 - 1.0 mg/dL    Alkaline Phosphatase 62 38 - 126 U/L    AST 29 15 - 46 U/L    ALT 27 10 - 44 U/L    Anion Gap 9 8 - 16 mmol/L    eGFR 53.3 (A) >60 mL/min/1.73 m^2   Lipid Panel   Result Value Ref Range    Cholesterol 127 120 - 199 mg/dL    Triglycerides 53 30 - 150 mg/dL    HDL 54 40 - 75 mg/dL    LDL Cholesterol 62.4 (L) 63.0 - 159.0 mg/dL    HDL/Cholesterol Ratio 42.5 20.0 - 50.0 %    Total Cholesterol/HDL Ratio 2.4 2.0 - 5.0    Non-HDL Cholesterol 73 mg/dL   TSH   Result Value Ref Range    TSH 4.600 (H) 0.400 - 4.000 uIU/mL   CBC Auto Differential   Result Value Ref Range    WBC 4.66 3.90 - 12.70 K/uL    RBC 3.59 (L) 4.60 - 6.20 M/uL    Hemoglobin 11.8 (L) 14.0 - 18.0 g/dL    Hematocrit 34.7 (L) 40.0 - 54.0 %    MCV 97 82 - 98 fL    MCH 32.9 (H) 27.0 - 31.0 pg    MCHC 34.0 32.0 - 36.0 g/dL    RDW 16.5 (H) 11.5 - 14.5 %    Platelets 251 150 - 450 K/uL    MPV 10.6 9.2 - 12.9 fL    Immature Granulocytes 0.2 0.0 - 0.5 %    Gran # (ANC) 2.0 1.8 - 7.7 K/uL    Immature Grans (Abs) 0.01 0.00 - 0.04 K/uL    Lymph # 2.0 1.0 - 4.8 K/uL    Mono # 0.6 0.3 - 1.0 K/uL    Eos # 0.1 0.0 - 0.5 K/uL     Baso # 0.02 0.00 - 0.20 K/uL    nRBC 0 0 /100 WBC    Gran % 42.9 38.0 - 73.0 %    Lymph % 42.3 18.0 - 48.0 %    Mono % 11.8 4.0 - 15.0 %    Eosinophil % 2.4 0.0 - 8.0 %    Basophil % 0.4 0.0 - 1.9 %    Differential Method Automated    T4, Free   Result Value Ref Range    Free T4 0.95 0.71 - 1.51 ng/dL     *Note: Due to a large number of results and/or encounters for the requested time period, some results have not been displayed. A complete set of results can be found in Results Review.         Current Outpatient Medications:     aspirin (ECOTRIN) 81 MG EC tablet, Take 81 mg by mouth once daily. Last dose 8/21/2023, Disp: , Rfl:     atorvastatin (LIPITOR) 40 MG tablet, TAKE 1 TABLET BY MOUTH EVERY DAY IN THE EVENING, Disp: 90 tablet, Rfl: 3    carboxymethylcellulose sodium (REFRESH TEARS) 0.5 % Drop, Place 1 drop into both eyes 2 (two) times a day. 2-3 times daily, Disp: , Rfl:     docusate sodium (COLACE) 100 MG capsule, Take 300 mg by mouth once daily. 2 in am and 1 in pm, Disp: , Rfl:     EScitalopram oxalate (LEXAPRO) 10 MG tablet, TAKE 1 TABLET BY MOUTH EVERY DAY, Disp: 90 tablet, Rfl: 3    hydrALAZINE (APRESOLINE) 50 MG tablet, Take 1 tablet (50 mg total) by mouth 3 (three) times daily., Disp: 270 tablet, Rfl: 3    hydrALAZINE (APRESOLINE) 50 MG tablet, Take 1 tablet (50 mg total) by mouth 3 (three) times daily., Disp: 270 tablet, Rfl: 3    irbesartan (AVAPRO) 300 MG tablet, TAKE 1 TABLET BY MOUTH EVERY DAY IN THE EVENING, Disp: 90 tablet, Rfl: 3    metoprolol succinate (TOPROL-XL) 25 MG 24 hr tablet, Take 1 tablet (25 mg total) by mouth once daily., Disp: , Rfl:     multivitamin capsule, Take 1 capsule by mouth once daily. Centrum Silver, Disp: , Rfl:     NIFEdipine (PROCARDIA-XL) 60 MG (OSM) 24 hr tablet, TAKE 1 TABLET BY MOUTH TWICE A DAY, Disp: 180 tablet, Rfl: 3    solifenacin (VESICARE) 5 MG tablet, Take 5 mg by mouth., Disp: , Rfl:      Lab Results   Component Value Date    WBC 4.66 02/27/2024    RBC  3.59 (L) 02/27/2024    HGB 11.8 (L) 02/27/2024    HCT 34.7 (L) 02/27/2024    MCV 97 02/27/2024    MCH 32.9 (H) 02/27/2024    MCHC 34.0 02/27/2024    RDW 16.5 (H) 02/27/2024     02/27/2024    MPV 10.6 02/27/2024    GRAN 2.0 02/27/2024    GRAN 42.9 02/27/2024    LYMPH 2.0 02/27/2024    LYMPH 42.3 02/27/2024    MONO 0.6 02/27/2024    MONO 11.8 02/27/2024    EOS 0.1 02/27/2024    BASO 0.02 02/27/2024    EOSINOPHIL 2.4 02/27/2024    BASOPHIL 0.4 02/27/2024    MG 2.0 08/28/2021        CMP  Lab Results   Component Value Date     02/27/2024    K 4.2 02/27/2024     02/27/2024    CO2 29 02/27/2024     02/27/2024    BUN 27 (H) 02/27/2024    CREATININE 1.31 02/27/2024    CALCIUM 9.9 02/27/2024    PROT 7.4 02/27/2024    ALBUMIN 4.3 02/27/2024    BILITOT 1.3 (H) 02/27/2024    ALKPHOS 62 02/27/2024    AST 29 02/27/2024    ALT 27 02/27/2024    ANIONGAP 9 02/27/2024    ESTGFRAFRICA 58 (A) 06/20/2022    EGFRNONAA 50 (A) 06/20/2022        Lab Results   Component Value Date    LABURIN No growth 09/06/2023            Results for orders placed or performed during the hospital encounter of 06/01/22   EKG 12-lead    Collection Time: 06/01/22  4:05 PM    Narrative    Test Reason : R07.89,    Vent. Rate : 051 BPM     Atrial Rate : 051 BPM     P-R Int : 204 ms          QRS Dur : 100 ms      QT Int : 420 ms       P-R-T Axes : 069 -09 240 degrees     QTc Int : 387 ms    Sinus bradycardia  Right ventricular conduction delay  Minimal voltage criteria for LVH, may be normal variant  Septal infarct (cited on or before 01-FEB-2022)  T wave abnormality, consider inferolateral ischemia  Abnormal ECG  When compared with ECG of 01-FEB-2022 16:33,  Premature ventricular complexes are no longer Present  Questionable change in QRS duration  Criteria for Inferior-posterior infarct are no longer Present  Questionable change in initial forces of Anterior-septal leads  Confirmed by Derrick ROBERSON, Mario GIL ([id number]) on 6/2/2022 11:44:34  AM    Referred By: EVELINA   SELF           Confirmed By:Mario Meza MD                  Plan:       Problem List Items Addressed This Visit          Neuro    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  He has minimal to no residual deficit.            Cardiac/Vascular    Hypertension - Primary     Hydralazine 50 mg 3 times daily added today for better blood pressure control.  He will continue Avapro nifedipine.  He is also on Toprol 25 mg daily.         Relevant Medications    hydrALAZINE (APRESOLINE) 50 MG tablet    Other hyperlipidemia     Atorvastatin 40 mg has been utilized and will be continued.  Current LDL 62 mg% by labs February 2024.         Bradycardia, drug induced     I reduced Toprol dosing to 25 mg daily today.  Likely I will remove it completely based on his complaints today with weakness drowsiness and tired legs.    My concern that his bradycardia is worse at night related to Toprol usage.         Aortic atherosclerosis     Condition unchanged.         CAD (coronary artery disease)     Currently no angina.  Single-vessel disease.  He had LAD stent for NSTEMI December 2020.  He goes to the gym.  He is active.  No angina with exercise.  Condition stable.            Renal/    Stage 3a chronic kidney disease     Condition unchanged.  Current GFR 50 range.                 Hydralazine 50 mg 3 times daily ordered.  I reduced Toprol dosing to 25 mg daily.  Nocturnal bradycardia may be triggering his symptoms.  A 3 month follow-up was advised.  I will communicate with the patient regarding blood pressure changes and management of hypertension.           Anthony Mcdonald MD  04/26/2024   8:07 AM

## 2024-04-26 NOTE — ASSESSMENT & PLAN NOTE
Hydralazine 50 mg 3 times daily added today for better blood pressure control.  He will continue Avapro nifedipine.  He is also on Toprol 25 mg daily.

## 2024-04-26 NOTE — ASSESSMENT & PLAN NOTE
Atorvastatin 40 mg has been utilized and will be continued.  Current LDL 62 mg% by labs February 2024.

## 2024-04-26 NOTE — ASSESSMENT & PLAN NOTE
I reduced Toprol dosing to 25 mg daily today.  Likely I will remove it completely based on his complaints today with weakness drowsiness and tired legs.    My concern that his bradycardia is worse at night related to Toprol usage.

## 2024-04-26 NOTE — ASSESSMENT & PLAN NOTE
Currently no angina.  Single-vessel disease.  He had LAD stent for NSTEMI December 2020.  He goes to the gym.  He is active.  No angina with exercise.  Condition stable.

## 2024-05-13 DIAGNOSIS — E78.5 DYSLIPIDEMIA: ICD-10-CM

## 2024-05-13 RX ORDER — ATORVASTATIN CALCIUM 40 MG/1
TABLET, FILM COATED ORAL
Qty: 90 TABLET | Refills: 3 | Status: SHIPPED | OUTPATIENT
Start: 2024-05-13

## 2024-05-13 NOTE — TELEPHONE ENCOUNTER
No care due was identified.  [facility] Embedded Care Due Messages. Reference number: [id number].   5/13/2024 12:20:33 AM CDT

## 2024-05-13 NOTE — TELEPHONE ENCOUNTER
Pedro Ponce  is requesting a refill authorization.  Brief Assessment and Rationale for Refill:  Approve     Medication Therapy Plan:         Comments:     Note composed:4:56 AM 05/13/2024

## 2024-05-20 ENCOUNTER — TELEPHONE (OUTPATIENT)
Dept: CARDIOLOGY | Facility: CLINIC | Age: 84
End: 2024-05-20
Payer: MEDICARE

## 2024-05-20 NOTE — TELEPHONE ENCOUNTER
----- Message from Leesa Chang sent at 5/20/2024 11:19 AM CDT -----  Contact: pt  Type:  Needs Medical Advice    Who Called: pt  Would the patient rather a call back or a response via [facility]? call  Best Call Back Number: [phone]   Additional Information:   Pt requesting a call regarding his care   Total Number Of Fractions: 20

## 2024-05-20 NOTE — TELEPHONE ENCOUNTER
Patient wanted to let you know that he ended up going to the ED because the Hydrazaline was causing him to become extra drowsy when he took it.  He stated that for the past 3 days he hasn't taken it and wanted to see what you would recommend on this medication.   Please advise

## 2024-05-22 ENCOUNTER — OFFICE VISIT (OUTPATIENT)
Dept: UROLOGY | Facility: CLINIC | Age: 84
End: 2024-05-22
Payer: MEDICARE

## 2024-05-22 VITALS
HEIGHT: 71 IN | BODY MASS INDEX: 27.78 KG/M2 | DIASTOLIC BLOOD PRESSURE: 72 MMHG | HEART RATE: 69 BPM | SYSTOLIC BLOOD PRESSURE: 145 MMHG | WEIGHT: 198.44 LBS

## 2024-05-22 DIAGNOSIS — R35.1 NOCTURIA: Primary | ICD-10-CM

## 2024-05-22 PROCEDURE — 1126F AMNT PAIN NOTED NONE PRSNT: CPT | Mod: CPTII,S$GLB,, | Performed by: UROLOGY

## 2024-05-22 PROCEDURE — 99214 OFFICE O/P EST MOD 30 MIN: CPT | Mod: S$GLB,,, | Performed by: UROLOGY

## 2024-05-22 PROCEDURE — 1160F RVW MEDS BY RX/DR IN RCRD: CPT | Mod: CPTII,S$GLB,, | Performed by: UROLOGY

## 2024-05-22 PROCEDURE — 99999 PR PBB SHADOW E&M-EST. PATIENT-LVL III: CPT | Mod: PBBFAC,,, | Performed by: UROLOGY

## 2024-05-22 PROCEDURE — 3077F SYST BP >= 140 MM HG: CPT | Mod: CPTII,S$GLB,, | Performed by: UROLOGY

## 2024-05-22 PROCEDURE — 1159F MED LIST DOCD IN RCRD: CPT | Mod: CPTII,S$GLB,, | Performed by: UROLOGY

## 2024-05-22 PROCEDURE — 3078F DIAST BP <80 MM HG: CPT | Mod: CPTII,S$GLB,, | Performed by: UROLOGY

## 2024-05-22 NOTE — PATIENT INSTRUCTIONS
Patient doing well status post TURP with resolution of most symptoms.  Patient on VESIcare for overactive bladder and urinary urgency still.  This medicine works well at 5 mg daily.  We will have patient follow-up every 6 months and as needed from here.    Will check PSA test today and notify patient of results.

## 2024-05-22 NOTE — PROGRESS NOTES
Subjective:      Patient ID: Pedro Ponce Jr. is a 84 y.o. male.    Chief Complaint: Nocturia    Mr. Harris is an 84-year-old gentleman with a history of BPH and bladder outlet obstruction who had developed urinary urgency and frequency.  The patient is on VESIcare.  This is working well for urinary frequency and urgency status post TURP 2 years ago.  The patient is here for follow-up and will begin six-month follow-up from now.  PSA is overdue by 4 months so I will order PSA now and notify patient of results.  When he returns in 6 months we will get him back on the yearly schedule.    Review of Systems   Constitutional:  Negative for activity change, appetite change, chills, diaphoresis, fatigue, fever and unexpected weight change.   HENT:  Negative for congestion, hearing loss, sinus pressure and trouble swallowing.    Eyes:  Negative for photophobia, pain, discharge and visual disturbance.   Respiratory:  Negative for apnea, cough and shortness of breath.    Cardiovascular:  Negative for chest pain, palpitations and leg swelling.   Gastrointestinal:  Negative for abdominal distention, abdominal pain, anal bleeding, blood in stool, constipation, diarrhea, nausea, rectal pain and vomiting.   Endocrine: Negative for cold intolerance, heat intolerance, polydipsia, polyphagia and polyuria.   Genitourinary:  Negative for decreased urine volume, difficulty urinating, dysuria, enuresis, flank pain, frequency, genital sores, hematuria, penile discharge, penile pain, penile swelling, scrotal swelling, testicular pain and urgency.   Musculoskeletal:  Negative for arthralgias, back pain and myalgias.   Skin:  Negative for color change, pallor, rash and wound.   Allergic/Immunologic: Negative for environmental allergies, food allergies and immunocompromised state.   Neurological:  Negative for dizziness, seizures, weakness and headaches.   Hematological:  Negative for adenopathy. Does not bruise/bleed easily.    Psychiatric/Behavioral: Negative.        Objective:     Physical Exam  Vitals and nursing note reviewed.   Constitutional:       Appearance: Normal appearance. He is well-developed and normal weight.   HENT:      Head: Normocephalic and atraumatic.      Right Ear: External ear normal.      Left Ear: External ear normal.      Nose: Nose normal.      Mouth/Throat:      Mouth: Mucous membranes are moist.      Pharynx: Oropharynx is clear. No oropharyngeal exudate or posterior oropharyngeal erythema.   Eyes:      General: No scleral icterus.        Right eye: No discharge.         Left eye: No discharge.      Extraocular Movements: Extraocular movements intact.      Conjunctiva/sclera: Conjunctivae normal.      Pupils: Pupils are equal, round, and reactive to light.   Cardiovascular:      Rate and Rhythm: Normal rate and regular rhythm.      Heart sounds: Normal heart sounds.   Pulmonary:      Effort: Pulmonary effort is normal.      Breath sounds: No rales.   Abdominal:      General: Bowel sounds are normal. There is no distension.      Palpations: Abdomen is soft. There is no mass.      Tenderness: There is no abdominal tenderness. There is no guarding or rebound.      Hernia: No hernia is present.   Genitourinary:     Penis: Normal.       Testes: Normal.      Prostate: Normal.      Rectum: Guaiac result negative.      Comments: Patient with no CVA tenderness, normal penis and scrotum.  Bladder nontender.  Musculoskeletal:         General: Normal range of motion.      Cervical back: Normal range of motion and neck supple.      Right lower leg: No edema.      Left lower leg: No edema.   Skin:     General: Skin is warm and dry.      Capillary Refill: Capillary refill takes less than 2 seconds.   Neurological:      Mental Status: He is alert and oriented to person, place, and time.      Deep Tendon Reflexes: Reflexes are normal and symmetric.   Psychiatric:         Mood and Affect: Mood normal.         Behavior:  Behavior normal.         Thought Content: Thought content normal.         Judgment: Judgment normal.        Assessment:      1. Nocturia      Plan:     Patient Instructions   Patient doing well status post TURP with resolution of most symptoms.  Patient on VESIcare for overactive bladder and urinary urgency still.  This medicine works well at 5 mg daily.  We will have patient follow-up every 6 months and as needed from here.    Will check PSA test today and notify patient of results.

## 2024-05-23 ENCOUNTER — TELEPHONE (OUTPATIENT)
Dept: CARDIOLOGY | Facility: CLINIC | Age: 84
End: 2024-05-23
Payer: MEDICARE

## 2024-05-23 DIAGNOSIS — I10 ESSENTIAL HYPERTENSION: Primary | ICD-10-CM

## 2024-05-23 RX ORDER — CHLORTHALIDONE 25 MG/1
25 TABLET ORAL DAILY
Qty: 90 TABLET | Refills: 3 | Status: SHIPPED | OUTPATIENT
Start: 2024-05-23 | End: 2024-06-12

## 2024-05-23 NOTE — TELEPHONE ENCOUNTER
----- Message from Desmond Cobb sent at 5/23/2024  9:37 AM CDT -----  Contact: pt  Type: Requesting to speak with nurse        Who Called: PT  Regarding: changes in his medication   Would the patient rather a call back or a response via [facility]? Call back  Best Call Back Number:  [phone]  Additional Information:

## 2024-05-23 NOTE — TELEPHONE ENCOUNTER
Blood pressures reviewed.  Chlorthalidone 25 mg prescribed.  He will continue hydralazine 50 mg 3 times daily along with his other medications.  He will send me additional blood pressure readings next week.

## 2024-05-23 NOTE — TELEPHONE ENCOUNTER
Patient called to give Dr. Mcdonald his blood pressure reading after her was instructed to start back taking Hydrazaline 50 mg 3 X daily.    5/21/24  - 730a  152/82     p-66                  1230p  137/66       77                  630p    135/70      69      5/22/24   1042a  142/73    76                 1200p   135/70    67                  230p     139/69    72      5/23/24  800a     143/73    83      Please advise

## 2024-05-29 ENCOUNTER — TELEPHONE (OUTPATIENT)
Dept: CARDIOLOGY | Facility: CLINIC | Age: 84
End: 2024-05-29
Payer: MEDICARE

## 2024-05-29 NOTE — TELEPHONE ENCOUNTER
----- Message from Ramona Enriquez sent at 5/29/2024  1:26 PM CDT -----  Type:  Needs Medical Advice    Who Called:  Pedro Ponce    Would the patient rather a call back or a response via [facility]?  call  Best Call Back Number:   [phone]  Additional Information:  Pt would like a call back from  regarding his readings for his blood pressure.

## 2024-06-10 ENCOUNTER — TELEPHONE (OUTPATIENT)
Dept: FAMILY MEDICINE | Facility: CLINIC | Age: 84
End: 2024-06-10
Payer: MEDICARE

## 2024-06-10 NOTE — TELEPHONE ENCOUNTER
Patient requesting an ER FU appointment. Given appointment Wednesday, June 12 at 8 am. Verbalized understanding.

## 2024-06-10 NOTE — TELEPHONE ENCOUNTER
----- Message from Florinda Merritt sent at 6/10/2024 10:00 AM CDT -----  Regarding: Call back  Contact: [phone]  Type:  Sooner Apoointment Request    Caller is requesting a sooner appointment.  Caller declined first available appointment listed below.  Caller will not accept being placed on the waitlist and is requesting a message be sent to doctor.  Name of Caller: PT   When is the first available appointment?  Symptoms: Hospital follow up   Would the patient rather a call back or [facility]? Call back   Best Call Back Number: [phone]   Additional Information:

## 2024-06-11 ENCOUNTER — OFFICE VISIT (OUTPATIENT)
Dept: CARDIOLOGY | Facility: CLINIC | Age: 84
End: 2024-06-11
Payer: MEDICARE

## 2024-06-11 VITALS
OXYGEN SATURATION: 98 % | WEIGHT: 192.38 LBS | HEIGHT: 71 IN | DIASTOLIC BLOOD PRESSURE: 93 MMHG | HEART RATE: 62 BPM | BODY MASS INDEX: 26.93 KG/M2 | SYSTOLIC BLOOD PRESSURE: 127 MMHG

## 2024-06-11 DIAGNOSIS — I10 PRIMARY HYPERTENSION: Primary | ICD-10-CM

## 2024-06-11 DIAGNOSIS — I69.331 MONOPLEGIA OF UPPER LIMB FOLLOWING CEREBRAL INFARCTION AFFECTING RIGHT DOMINANT SIDE: ICD-10-CM

## 2024-06-11 DIAGNOSIS — I70.0 AORTIC ATHEROSCLEROSIS: ICD-10-CM

## 2024-06-11 DIAGNOSIS — E78.49 OTHER HYPERLIPIDEMIA: ICD-10-CM

## 2024-06-11 DIAGNOSIS — T50.905A BRADYCARDIA, DRUG INDUCED: ICD-10-CM

## 2024-06-11 DIAGNOSIS — Q61.3 POLYCYSTIC KIDNEY DISEASE: ICD-10-CM

## 2024-06-11 DIAGNOSIS — I25.10 CORONARY ARTERY DISEASE INVOLVING NATIVE CORONARY ARTERY OF NATIVE HEART WITHOUT ANGINA PECTORIS: ICD-10-CM

## 2024-06-11 DIAGNOSIS — R00.1 BRADYCARDIA, DRUG INDUCED: ICD-10-CM

## 2024-06-11 PROCEDURE — 1160F RVW MEDS BY RX/DR IN RCRD: CPT | Mod: CPTII,S$GLB,, | Performed by: INTERNAL MEDICINE

## 2024-06-11 PROCEDURE — 1101F PT FALLS ASSESS-DOCD LE1/YR: CPT | Mod: CPTII,S$GLB,, | Performed by: INTERNAL MEDICINE

## 2024-06-11 PROCEDURE — 3080F DIAST BP >= 90 MM HG: CPT | Mod: CPTII,S$GLB,, | Performed by: INTERNAL MEDICINE

## 2024-06-11 PROCEDURE — 1159F MED LIST DOCD IN RCRD: CPT | Mod: CPTII,S$GLB,, | Performed by: INTERNAL MEDICINE

## 2024-06-11 PROCEDURE — 1126F AMNT PAIN NOTED NONE PRSNT: CPT | Mod: CPTII,S$GLB,, | Performed by: INTERNAL MEDICINE

## 2024-06-11 PROCEDURE — 3074F SYST BP LT 130 MM HG: CPT | Mod: CPTII,S$GLB,, | Performed by: INTERNAL MEDICINE

## 2024-06-11 PROCEDURE — 99999 PR PBB SHADOW E&M-EST. PATIENT-LVL III: CPT | Mod: PBBFAC,,, | Performed by: INTERNAL MEDICINE

## 2024-06-11 PROCEDURE — 3288F FALL RISK ASSESSMENT DOCD: CPT | Mod: CPTII,S$GLB,, | Performed by: INTERNAL MEDICINE

## 2024-06-11 PROCEDURE — 99214 OFFICE O/P EST MOD 30 MIN: CPT | Mod: S$GLB,,, | Performed by: INTERNAL MEDICINE

## 2024-06-12 ENCOUNTER — OFFICE VISIT (OUTPATIENT)
Dept: FAMILY MEDICINE | Facility: CLINIC | Age: 84
End: 2024-06-12
Payer: MEDICARE

## 2024-06-12 VITALS
HEIGHT: 71 IN | HEART RATE: 72 BPM | SYSTOLIC BLOOD PRESSURE: 130 MMHG | DIASTOLIC BLOOD PRESSURE: 58 MMHG | OXYGEN SATURATION: 99 % | WEIGHT: 191 LBS | BODY MASS INDEX: 26.74 KG/M2

## 2024-06-12 DIAGNOSIS — I95.9 HYPOTENSION, UNSPECIFIED HYPOTENSION TYPE: ICD-10-CM

## 2024-06-12 DIAGNOSIS — N18.31 TYPE 2 DIABETES MELLITUS WITH STAGE 3A CHRONIC KIDNEY DISEASE, WITHOUT LONG-TERM CURRENT USE OF INSULIN: ICD-10-CM

## 2024-06-12 DIAGNOSIS — E11.22 TYPE 2 DIABETES MELLITUS WITH STAGE 3A CHRONIC KIDNEY DISEASE, WITHOUT LONG-TERM CURRENT USE OF INSULIN: ICD-10-CM

## 2024-06-12 DIAGNOSIS — R42 DIZZINESS: Primary | ICD-10-CM

## 2024-06-12 DIAGNOSIS — I10 ESSENTIAL HYPERTENSION: ICD-10-CM

## 2024-06-12 PROCEDURE — 99215 OFFICE O/P EST HI 40 MIN: CPT | Mod: S$GLB,,, | Performed by: FAMILY MEDICINE

## 2024-06-12 PROCEDURE — 1159F MED LIST DOCD IN RCRD: CPT | Mod: CPTII,S$GLB,, | Performed by: FAMILY MEDICINE

## 2024-06-12 PROCEDURE — 3288F FALL RISK ASSESSMENT DOCD: CPT | Mod: CPTII,S$GLB,, | Performed by: FAMILY MEDICINE

## 2024-06-12 PROCEDURE — 1160F RVW MEDS BY RX/DR IN RCRD: CPT | Mod: CPTII,S$GLB,, | Performed by: FAMILY MEDICINE

## 2024-06-12 PROCEDURE — 3075F SYST BP GE 130 - 139MM HG: CPT | Mod: CPTII,S$GLB,, | Performed by: FAMILY MEDICINE

## 2024-06-12 PROCEDURE — 99999 PR PBB SHADOW E&M-EST. PATIENT-LVL III: CPT | Mod: PBBFAC,,, | Performed by: FAMILY MEDICINE

## 2024-06-12 PROCEDURE — 3078F DIAST BP <80 MM HG: CPT | Mod: CPTII,S$GLB,, | Performed by: FAMILY MEDICINE

## 2024-06-12 PROCEDURE — 1101F PT FALLS ASSESS-DOCD LE1/YR: CPT | Mod: CPTII,S$GLB,, | Performed by: FAMILY MEDICINE

## 2024-06-12 RX ORDER — HYDROCHLOROTHIAZIDE 12.5 MG/1
12.5 TABLET ORAL DAILY
Qty: 90 TABLET | Refills: 3 | Status: SHIPPED | OUTPATIENT
Start: 2024-06-12 | End: 2025-06-12

## 2024-06-12 NOTE — PROGRESS NOTES
Subjective     Patient ID: Pedro Ponce Jr. is a 84 y.o. male.    Chief Complaint: Hospital Follow Up and Dizziness    84 years old male came to the clinic with recurrent dizziness associated with hypotension.  Metoprolol was recently discontinued by the cardiologist.  Blood pressure today is in the low side.  Patient with decreased kidney function but stable in comparison with previous reports.    Dizziness:   Chronicity:  Recurrent  Onset:  More than 1 year ago  Progression since onset:  Gradually worsening  Severity:  Moderate  Dizziness characteristics:  Giddiness, off-balance and lightheaded/impending faint   Associated symptoms: light-headedness.no palpitations and no chest pain.  Aggravated by:  Position changes  Treatments tried:  Rest  Improvements on treatment:  Mild  Hypertension  This is a chronic problem. The current episode started more than 1 year ago. The problem has been gradually improving since onset. The problem is controlled. Pertinent negatives include no chest pain, orthopnea, palpitations or peripheral edema. There are no associated agents to hypertension. Risk factors for coronary artery disease include male gender. Past treatments include alpha 1 blockers, angiotensin blockers, diuretics and calcium channel blockers. The current treatment provides moderate improvement. Compliance problems include medication side effects.  Hypertensive end-organ damage includes kidney disease. Identifiable causes of hypertension include chronic renal disease. There is no history of a hypertension causing med or a thyroid problem.     Review of Systems   Constitutional: Negative.    HENT: Negative.     Eyes: Negative.    Respiratory: Negative.     Cardiovascular: Negative.  Negative for chest pain, palpitations and orthopnea.   Gastrointestinal: Negative.    Genitourinary: Negative.    Musculoskeletal: Negative.    Integumentary:  Negative.   Neurological:  Positive for dizziness and light-headedness.    Psychiatric/Behavioral: Negative.            Objective     Physical Exam  Vitals and nursing note reviewed.   Constitutional:       General: He is not in acute distress.     Appearance: He is well-developed. He is not diaphoretic.   HENT:      Head: Normocephalic and atraumatic.      Right Ear: External ear normal.      Left Ear: External ear normal.      Nose: Nose normal.      Mouth/Throat:      Pharynx: No oropharyngeal exudate.   Eyes:      General: No scleral icterus.        Right eye: No discharge.         Left eye: No discharge.      Conjunctiva/sclera: Conjunctivae normal.      Pupils: Pupils are equal, round, and reactive to light.   Neck:      Thyroid: No thyromegaly.      Vascular: No JVD.      Trachea: No tracheal deviation.   Cardiovascular:      Rate and Rhythm: Normal rate and regular rhythm.      Heart sounds: Normal heart sounds. No murmur heard.     No friction rub. No gallop.   Pulmonary:      Effort: Pulmonary effort is normal. No respiratory distress.      Breath sounds: Normal breath sounds. No stridor. No wheezing or rales.   Chest:      Chest wall: No tenderness.   Abdominal:      General: Bowel sounds are normal. There is no distension.      Palpations: Abdomen is soft. There is no mass.      Tenderness: There is no abdominal tenderness. There is no guarding or rebound.   Musculoskeletal:         General: No tenderness. Normal range of motion.      Cervical back: Normal range of motion and neck supple.   Lymphadenopathy:      Cervical: No cervical adenopathy.   Skin:     General: Skin is warm and dry.      Coloration: Skin is not pale.      Findings: No erythema or rash.   Neurological:      Mental Status: He is alert and oriented to person, place, and time.      Cranial Nerves: No cranial nerve deficit.      Motor: No abnormal muscle tone.      Coordination: Coordination normal.      Deep Tendon Reflexes: Reflexes are normal and symmetric. Reflexes normal.   Psychiatric:         Behavior:  Behavior normal.         Thought Content: Thought content normal.         Judgment: Judgment normal.            Assessment and Plan     1. Dizziness    2. Type 2 diabetes mellitus with stage 3a chronic kidney disease, without long-term current use of insulin  -     KIDNEY DISEASE EDUCATION; Future; Expected date: 06/12/2024    3. Hypotension, unspecified hypotension type    4. Essential hypertension  -     hydroCHLOROthiazide (HYDRODIURIL) 12.5 MG Tab; Take 1 tablet (12.5 mg total) by mouth once daily.  Dispense: 90 tablet; Refill: 3    Discontinue chlorthalidone.  Small dose of hydrochlorothiazide was ordered.    Continue monitoring blood pressure at home, low sodium diet.   Continue monitoring blood sugar at home,ADA diet.   I spent a total of 40 minutes on the day of the visit.This includes face to face time and non-face to face time preparing to see the patient (eg, review of tests), obtaining and/or reviewing separately obtained history, documenting clinical information in the electronic or other health record, independently interpreting results and communicating results to the patient/family/caregiver, or care coordinator.          Follow up in about 4 weeks (around 7/10/2024), or if symptoms worsen or fail to improve.

## 2024-06-12 NOTE — PROGRESS NOTES
[facility]     Subjective:    Patient ID:  Pedro Pocne Jr. is a 84 y.o. male who presents for follow-up of Hypertension, Coronary Artery Disease, and Hyperlipidemia    Review of patient's allergies indicates:   Allergen Reactions    Pcn [penicillins] Rash      The patient continues to have periods of weakness described in his legs.  He did have a recent episode of dizziness and near fainting.  He had to go to the emergency room via paramedics.  He was released.  By the time he got to the ED his vital signs were normal.  I believe his initial blood pressure was 90 systolic range per paramedics.    In late May I was with the patient and there was mild bradycardia.  I opted to wean off Toprol.  Currently he is on 25 mg.  No bradycardia has been documented recently.  Hydralazine may have triggered dizziness.  He remains on Avapro HCTZ and 120 mg of Procardia.  He does have renal insufficiency per labs and some evidence of cystic kidney disease.  He monitors his blood pressures regularly.  They have not been significantly elevated.          Review of Systems   Constitutional: Positive for malaise/fatigue. Negative for chills, decreased appetite, diaphoresis, fever, night sweats, weight gain and weight loss.   HENT:  Negative for congestion, ear discharge, ear pain, hearing loss, hoarse voice, nosebleeds, odynophagia, sore throat, stridor and tinnitus.    Eyes:  Negative for blurred vision, discharge, double vision, pain, photophobia, redness, vision loss in left eye, vision loss in right eye, visual disturbance and visual halos.   Cardiovascular:  Negative for chest pain, claudication, cyanosis, dyspnea on exertion, irregular heartbeat, leg swelling, near-syncope, orthopnea, palpitations, paroxysmal nocturnal dyspnea and syncope.   Respiratory:  Negative for cough, hemoptysis, shortness of breath, sleep disturbances due to breathing,  "snoring, sputum production and wheezing.    Endocrine: Negative for cold intolerance, heat intolerance, polydipsia, polyphagia and polyuria.   Hematologic/Lymphatic: Negative for adenopathy and bleeding problem. Does not bruise/bleed easily.   Skin:  Negative for color change, dry skin, flushing, itching, nail changes, poor wound healing, rash, skin cancer, suspicious lesions and unusual hair distribution.   Musculoskeletal:  Negative for arthritis, back pain, falls, gout, joint pain, joint swelling, muscle cramps, muscle weakness, myalgias, neck pain and stiffness.   Gastrointestinal:  Negative for bloating, abdominal pain, anorexia, change in bowel habit, bowel incontinence, constipation, diarrhea, dysphagia, excessive appetite, flatus, heartburn, hematemesis, hematochezia, hemorrhoids, jaundice, melena, nausea and vomiting.   Genitourinary:  Negative for bladder incontinence, decreased libido, dysuria, flank pain, frequency, genital sores, hematuria, hesitancy, incomplete emptying, nocturia and urgency.   Neurological:  Positive for dizziness, light-headedness and weakness. Negative for aphonia, brief paralysis, difficulty with concentration, disturbances in coordination, excessive daytime sleepiness, focal weakness, headaches, loss of balance, numbness, paresthesias, seizures, sensory change, tremors and vertigo.   Psychiatric/Behavioral:  Negative for altered mental status, depression, hallucinations, memory loss, substance abuse, suicidal ideas and thoughts of violence. The patient does not have insomnia and is not nervous/anxious.    Allergic/Immunologic: Negative for hives and persistent infections.        Objective:       Vitals:    06/11/24 1455   BP: (!) 127/93   Pulse: 62   SpO2: 98%   Weight: 87.2 kg (192 lb 5.6 oz)   Height: 5' 11" (1.803 m)    Physical Exam  Constitutional:       General: He is not in acute distress.     Appearance: He is well-developed. He is not diaphoretic.   HENT:      Head: " Normocephalic and atraumatic.      Nose: Nose normal.   Eyes:      General: No scleral icterus.        Right eye: No discharge.      Conjunctiva/sclera: Conjunctivae normal.      Pupils: Pupils are equal, round, and reactive to light.   Neck:      Thyroid: No thyromegaly.      Vascular: No JVD.      Trachea: No tracheal deviation.   Cardiovascular:      Rate and Rhythm: Normal rate and regular rhythm.      Pulses:           Carotid pulses are 2+ on the right side and 2+ on the left side.       Radial pulses are 2+ on the right side and 2+ on the left side.        Dorsalis pedis pulses are 2+ on the right side and 2+ on the left side.        Posterior tibial pulses are 2+ on the right side and 2+ on the left side.      Heart sounds: Murmur heard.      Harsh early systolic murmur is present with a grade of 2/6 at the upper right sternal border.      No friction rub. No gallop.   Pulmonary:      Effort: Pulmonary effort is normal. No respiratory distress.      Breath sounds: Normal breath sounds. No stridor. No wheezing or rales.   Chest:      Chest wall: No tenderness.   Abdominal:      General: Bowel sounds are normal. There is no distension.      Palpations: Abdomen is soft. There is no mass.      Tenderness: There is no abdominal tenderness. There is no guarding or rebound.   Musculoskeletal:         General: No tenderness. Normal range of motion.      Cervical back: Normal range of motion and neck supple.   Lymphadenopathy:      Cervical: No cervical adenopathy.   Skin:     General: Skin is warm and dry.      Coloration: Skin is not pale.      Findings: No erythema or rash.   Neurological:      Mental Status: He is alert and oriented to person, place, and time.      Cranial Nerves: No cranial nerve deficit.      Coordination: Coordination normal.      Comments: Right hand motor deficit with clumsiness reported.   Psychiatric:         Behavior: Behavior normal.         Thought Content: Thought content normal.          Judgment: Judgment normal.           Assessment:       1. 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 Aortic atherosclerosis    4. Bradycardia, drug induced    5. Coronary artery disease involving native coronary artery of native heart without angina pectoris    6. Other hyperlipidemia    7. Polycystic kidney disease      Results for orders placed or performed during the hospital encounter of 06/08/24   CBC auto differential   Result Value Ref Range    WBC 5.72 3.90 - 12.70 K/uL    RBC 2.91 (L) 4.60 - 6.20 M/uL    Hemoglobin 9.8 (L) 14.0 - 18.0 g/dL    Hematocrit 27.7 (L) 40.0 - 54.0 %    MCV 95 82 - 98 fL    MCH 33.7 (H) 27.0 - 31.0 pg    MCHC 35.4 32.0 - 36.0 g/dL    RDW 14.6 (H) 11.5 - 14.5 %    Platelets 216 150 - 450 K/uL    MPV 9.4 9.2 - 12.9 fL    Immature Granulocytes 0.3 0.0 - 0.5 %    Gran # (ANC) 4.3 1.8 - 7.7 K/uL    Immature Grans (Abs) 0.02 0.00 - 0.04 K/uL    Lymph # 0.9 (L) 1.0 - 4.8 K/uL    Mono # 0.5 0.3 - 1.0 K/uL    Eos # 0.0 0.0 - 0.5 K/uL    Baso # 0.03 0.00 - 0.20 K/uL    nRBC 0 0 /100 WBC    Gran % 75.1 (H) 38.0 - 73.0 %    Lymph % 15.2 (L) 18.0 - 48.0 %    Mono % 8.9 4.0 - 15.0 %    Eosinophil % 0.3 0.0 - 8.0 %    Basophil % 0.5 0.0 - 1.9 %    Differential Method Automated    Comprehensive metabolic panel   Result Value Ref Range    Sodium 135 (L) 136 - 145 mmol/L    Potassium 3.6 3.5 - 5.1 mmol/L    Chloride 101 95 - 110 mmol/L    CO2 25 23 - 29 mmol/L    Glucose 127 (H) 70 - 110 mg/dL    BUN 36 (H) 8 - 23 mg/dL    Creatinine 1.6 (H) 0.5 - 1.4 mg/dL    Calcium 10.2 8.7 - 10.5 mg/dL    Total Protein 6.0 6.0 - 8.4 g/dL    Albumin 3.7 3.5 - 5.2 g/dL    Total Bilirubin 0.8 0.1 - 1.0 mg/dL    Alkaline Phosphatase 53 (L) 55 - 135 U/L    AST 15 10 - 40 U/L    ALT 14 10 - 44 U/L    eGFR 42.0 (A) >60 mL/min/1.73 m^2    Anion Gap 9 8 - 16 mmol/L   Troponin I   Result Value Ref Range    Troponin I 0.007 0.000 - 0.026 ng/mL    Urinalysis, Reflex to Urine Culture Urine, Clean Catch    Specimen: Urine   Result Value Ref Range    Specimen UA Urine, Clean Catch     Color, UA Yellow Yellow, Straw, Kiana    Appearance, UA Clear Clear    pH, UA 8.0 5.0 - 8.0    Specific Gravity, UA 1.020 1.005 - 1.030    Protein, UA 1+ (A) Negative    Glucose, UA Negative Negative    Ketones, UA Negative Negative    Bilirubin (UA) Negative Negative    Occult Blood UA Negative Negative    Nitrite, UA Negative Negative    Urobilinogen, UA Negative <2.0 EU/dL    Leukocytes, UA Negative Negative   Urinalysis Microscopic   Result Value Ref Range    RBC, UA 0 0 - 4 /hpf    WBC, UA 3 0 - 5 /hpf    Bacteria Rare None-Occ /hpf    Non-Squam Epith 5 (A) <1/hpf /hpf    Hyaline Casts, UA 0 0-1/lpf /lpf    Microscopic Comment SEE COMMENT    EKG 12-lead   Result Value Ref Range    QRS Duration 114 ms    [facility] QTC Calculation 326 ms   POCT glucose   Result Value Ref Range    POCT Glucose 125 (H) 70 - 110 mg/dL     *Note: Due to a large number of results and/or encounters for the requested time period, some results have not been displayed. A complete set of results can be found in Results Review.         Current Outpatient Medications:     aspirin (ECOTRIN) 81 MG EC tablet, Take 81 mg by mouth once daily. Last dose 8/21/2023, Disp: , Rfl:     atorvastatin (LIPITOR) 40 MG tablet, TAKE 1 TABLET BY MOUTH EVERY DAY IN THE EVENING, Disp: 90 tablet, Rfl: 3    carboxymethylcellulose sodium (REFRESH TEARS) 0.5 % Drop, Place 1 drop into both eyes 2 (two) times a day. 2-3 times daily, Disp: , Rfl:     docusate sodium (COLACE) 100 MG capsule, Take 300 mg by mouth once daily. 2 in am and 1 in pm, Disp: , Rfl:     EScitalopram oxalate (LEXAPRO) 10 MG tablet, TAKE 1 TABLET BY MOUTH EVERY DAY, Disp: 90 tablet, Rfl: 3    hydrALAZINE (APRESOLINE) 50 MG tablet, Take 1 tablet (50 mg total) by mouth 3 (three) times daily., Disp: 270 tablet, Rfl: 3    hydroCHLOROthiazide (HYDRODIURIL) 12.5 MG Tab,  Take 1 tablet (12.5 mg total) by mouth once daily., Disp: 90 tablet, Rfl: 3    irbesartan (AVAPRO) 300 MG tablet, TAKE 1 TABLET BY MOUTH EVERY DAY IN THE EVENING, Disp: 90 tablet, Rfl: 3    multivitamin capsule, Take 1 capsule by mouth once daily. Centrum Silver, Disp: , Rfl:     NIFEdipine (PROCARDIA-XL) 60 MG (OSM) 24 hr tablet, TAKE 1 TABLET BY MOUTH TWICE A DAY, Disp: 180 tablet, Rfl: 3    solifenacin (VESICARE) 5 MG tablet, Take 5 mg by mouth., Disp: , Rfl:      Lab Results   Component Value Date    WBC 5.72 06/08/2024    RBC 2.91 (L) 06/08/2024    HGB 9.8 (L) 06/08/2024    HCT 27.7 (L) 06/08/2024    MCV 95 06/08/2024    MCH 33.7 (H) 06/08/2024    MCHC 35.4 06/08/2024    RDW 14.6 (H) 06/08/2024     06/08/2024    MPV 9.4 06/08/2024    GRAN 4.3 06/08/2024    GRAN 75.1 (H) 06/08/2024    LYMPH 0.9 (L) 06/08/2024    LYMPH 15.2 (L) 06/08/2024    MONO 0.5 06/08/2024    MONO 8.9 06/08/2024    EOS 0.0 06/08/2024    BASO 0.03 06/08/2024    EOSINOPHIL 0.3 06/08/2024    BASOPHIL 0.5 06/08/2024    MG 2.0 08/28/2021        CMP  Lab Results   Component Value Date     (L) 06/11/2024    K 3.8 06/11/2024    CL 98 06/11/2024    CO2 26 06/11/2024    GLU 85 06/11/2024    BUN 33 (H) 06/11/2024    CREATININE 1.5 (H) 06/11/2024    CALCIUM 10.6 (H) 06/11/2024    PROT 6.0 06/08/2024    ALBUMIN 3.7 06/08/2024    BILITOT 0.8 06/08/2024    ALKPHOS 53 (L) 06/08/2024    AST 15 06/08/2024    ALT 14 06/08/2024    ANIONGAP 8 06/11/2024    ESTGFRAFRICA 58 (A) 06/20/2022    EGFRNONAA 50 (A) 06/20/2022        Lab Results   Component Value Date    LABURIN No growth 09/06/2023            Results for orders placed or performed during the hospital encounter of 06/08/24   EKG 12-lead    Collection Time: 06/08/24 11:03 AM   Result Value Ref Range    QRS Duration 114 ms    OHS QTC Calculation 326 ms    Narrative    Test Reason : R55,    Vent. Rate : 065 BPM     Atrial Rate : 065 BPM     P-R Int : 240 ms          QRS Dur : 114 ms      QT Int  : 314 ms       P-R-T Axes : 091 -19 089 degrees     QTc Int : 326 ms    Sinus rhythm with 1st degree A-V block  Incomplete right bundle branch block  Minimal voltage criteria for LVH, may be normal variant  Septal infarct (cited on or before 01-FEB-2022)  Abnormal ECG  When compared with ECG of 18-MAY-2024 14:53,  Questionable change in initial forces of Septal leads  QT has shortened    Referred By: SOLO SANTOS           Confirmed By:                   Plan:       Problem List Items Addressed This Visit          Neuro    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            Cardiac/Vascular    Hypertension - Primary     Remains a significant challenge.  Blood pressure today in my office normal.  Toprol withdrawn today.         Relevant Orders    Basic metabolic panel (Completed)    Other hyperlipidemia     Current LDL 62.  Atorvastatin 40 mg will be continued.  It is well tolerated.         Bradycardia, drug induced     Toprol withdrawn today.  He was not bradycardic during his recent episode of near fainting but I do not think beta-blockers are a good idea for him long-term.         Aortic atherosclerosis     Condition stable.         Coronary artery disease involving native coronary artery of native heart without angina pectoris     The patient exercises on a regular basis.  No change in exercise tolerance reported.  Condition stable.  His history includes stent to mid to distal LAD 2020.             Renal/    Polycystic kidney disease     Repeat chemistry ordered today.  His serum creatinine was higher on most recent blood draws.  Imaging studies reviewed.  Currently he does not have a nephrologist.             Toprol completely withdrawn today.  If there are further complaints of dizziness I may need to withdrawal hydralazine and find other agents for management.  Doxazosin, clonidine are possibilities.  For now I will continue  hydrochlorothiazide.    He is sensitive to the heat, his recent episode of weakness prompting ED evaluation was after he had been outside watching high school football practice.  However he was only out there for less than an hour.    I scheduled a 3 month follow-up visit.                   Anthony Mcdonald MD  06/12/2024   10:20 AM

## 2024-06-12 NOTE — ASSESSMENT & PLAN NOTE
Remains a significant challenge.  Blood pressure today in my office normal.  Toprol withdrawn today.

## 2024-06-12 NOTE — ASSESSMENT & PLAN NOTE
The patient exercises on a regular basis.  No change in exercise tolerance reported.  Condition stable.  His history includes stent to mid to distal LAD 2020.

## 2024-06-12 NOTE — ASSESSMENT & PLAN NOTE
Toprol withdrawn today.  He was not bradycardic during his recent episode of near fainting but I do not think beta-blockers are a good idea for him long-term.

## 2024-06-12 NOTE — ASSESSMENT & PLAN NOTE
Repeat chemistry ordered today.  His serum creatinine was higher on most recent blood draws.  Imaging studies reviewed.  Currently he does not have a nephrologist.

## 2024-06-13 ENCOUNTER — PROCEDURE VISIT (OUTPATIENT)
Dept: OPHTHALMOLOGY | Facility: CLINIC | Age: 84
End: 2024-06-13
Payer: MEDICARE

## 2024-06-13 DIAGNOSIS — H35.033 HYPERTENSIVE RETINOPATHY, BILATERAL: ICD-10-CM

## 2024-06-13 DIAGNOSIS — H35.3211 EXUDATIVE AGE-RELATED MACULAR DEGENERATION OF RIGHT EYE WITH ACTIVE CHOROIDAL NEOVASCULARIZATION: Primary | ICD-10-CM

## 2024-06-13 PROCEDURE — 67028 INJECTION EYE DRUG: CPT | Mod: RT,S$GLB,, | Performed by: OPHTHALMOLOGY

## 2024-06-13 PROCEDURE — 92012 INTRM OPH EXAM EST PATIENT: CPT | Mod: 25,S$GLB,, | Performed by: OPHTHALMOLOGY

## 2024-06-13 PROCEDURE — 92134 CPTRZ OPH DX IMG PST SGM RTA: CPT | Mod: S$GLB,,, | Performed by: OPHTHALMOLOGY

## 2024-06-13 RX ORDER — METOPROLOL SUCCINATE 50 MG/1
50 TABLET, EXTENDED RELEASE ORAL
COMMUNITY
Start: 2024-05-21

## 2024-06-13 RX ADMIN — Medication 1.25 MG: at 02:06

## 2024-06-13 NOTE — PROGRESS NOTES
HPI     Follow-up     Additional comments: 8 week Avastin OD           Comments    VA stable ou, no pain ou and denies floaters or flashes ou.            Last edited by Chrissie De Paz on 6/13/2024  2:29 PM.          OCT -OD paracentral small increase    OS - stable small flat inferior PED, no SRF      Prior ICG/FA - leakage IT to fovea OD      A/P    1. IPCV OU vs wet amd   With subfoveal PED OD  Avastin resumed 1/14  S/p Avastin OD #8 in past, resumed OD x 13  S/p PDT OD 9/18  OS - PED inferior to fovea stable    2/18 - recurrent swelling OD, IT to fovea  5/18 - increased CME and superior SRF  4/19 - slight increase in IRF, ASx - will follow  8/21 - increase in IR fluid temporally OD - ASx  2/22  OD IRF improved, new lesion SN to fovea  6/23 - SN SRF improved, inferior IRF increased    Avastin OD today     2. Floaters OU    3. POAG - Good IOP, continue management as per Dr. Hidalgo  - Needs f/u appt    4. PCIOL OU      9-10 weeks OCT and  dilate - LDFE 4/24    Risks, benefits, and alternatives to treatment discussed in detail with the patient.  The patient voiced understanding and wished to proceed with the procedure    Injection Procedure Note:  Diagnosis: IPCV vs Wet AMD OD    Patient Identified and Time Out complete  Pt marked  Topical Proparacaine and Betadine.  Inject Avastin OD at 6:00 @ 3.5-4mm posterior to limbus  Post Operative Dx: Same  Complications: None  Follow up as above.

## 2024-06-14 NOTE — ASSESSMENT & PLAN NOTE
He remains tolerant of Toprol.  In the past he has been with symptoms related to bradycardia.  Heart rate today 76 beats per minute.  He is asymptomatic.  No changes advised.   No answer patient has appointment next Monday.

## 2024-06-17 ENCOUNTER — OFFICE VISIT (OUTPATIENT)
Dept: UROLOGY | Facility: CLINIC | Age: 84
End: 2024-06-17
Payer: MEDICARE

## 2024-06-17 VITALS
HEART RATE: 75 BPM | DIASTOLIC BLOOD PRESSURE: 60 MMHG | SYSTOLIC BLOOD PRESSURE: 141 MMHG | HEIGHT: 71 IN | BODY MASS INDEX: 27.04 KG/M2 | WEIGHT: 193.13 LBS

## 2024-06-17 DIAGNOSIS — N43.3 LEFT HYDROCELE: ICD-10-CM

## 2024-06-17 DIAGNOSIS — I86.1 BILATERAL VARICOCELES: ICD-10-CM

## 2024-06-17 DIAGNOSIS — N44.2 TESTICULAR CYST: ICD-10-CM

## 2024-06-17 DIAGNOSIS — N50.3 EPIDIDYMAL CYST: ICD-10-CM

## 2024-06-17 DIAGNOSIS — N50.811 PAIN IN RIGHT TESTICLE: Primary | ICD-10-CM

## 2024-06-17 PROCEDURE — 3077F SYST BP >= 140 MM HG: CPT | Mod: CPTII,S$GLB,, | Performed by: NURSE PRACTITIONER

## 2024-06-17 PROCEDURE — 1160F RVW MEDS BY RX/DR IN RCRD: CPT | Mod: CPTII,S$GLB,, | Performed by: NURSE PRACTITIONER

## 2024-06-17 PROCEDURE — 3288F FALL RISK ASSESSMENT DOCD: CPT | Mod: CPTII,S$GLB,, | Performed by: NURSE PRACTITIONER

## 2024-06-17 PROCEDURE — 99214 OFFICE O/P EST MOD 30 MIN: CPT | Mod: S$GLB,,, | Performed by: NURSE PRACTITIONER

## 2024-06-17 PROCEDURE — 1125F AMNT PAIN NOTED PAIN PRSNT: CPT | Mod: CPTII,S$GLB,, | Performed by: NURSE PRACTITIONER

## 2024-06-17 PROCEDURE — 99999 PR PBB SHADOW E&M-EST. PATIENT-LVL IV: CPT | Mod: PBBFAC,,, | Performed by: NURSE PRACTITIONER

## 2024-06-17 PROCEDURE — 3078F DIAST BP <80 MM HG: CPT | Mod: CPTII,S$GLB,, | Performed by: NURSE PRACTITIONER

## 2024-06-17 PROCEDURE — 1159F MED LIST DOCD IN RCRD: CPT | Mod: CPTII,S$GLB,, | Performed by: NURSE PRACTITIONER

## 2024-06-17 PROCEDURE — 1101F PT FALLS ASSESS-DOCD LE1/YR: CPT | Mod: CPTII,S$GLB,, | Performed by: NURSE PRACTITIONER

## 2024-06-17 RX ORDER — NABUMETONE 750 MG/1
750 TABLET, FILM COATED ORAL 2 TIMES DAILY
Qty: 28 TABLET | Refills: 0 | Status: SHIPPED | OUTPATIENT
Start: 2024-06-17 | End: 2024-07-01

## 2024-06-17 NOTE — PROGRESS NOTES
Subjective:       Patient ID: Pedro Ponce Jr. is a 84 y.o. male.    Chief Complaint: Testicle Pain (tenderness)    Patient is here today for evaluation of right testicular pain. He denies any known injury. He is here today with his wife.     Testicle Pain  The patient's primary symptoms include testicular pain (right). The patient's pertinent negatives include no penile discharge, penile pain or scrotal swelling. This is a recurrent problem. The current episode started 1 to 4 weeks ago (current episode). The problem occurs daily. The problem has been unchanged. The pain is moderate. Associated symptoms include constipation (taking a stool softener). Pertinent negatives include no abdominal pain, chills, diarrhea, dysuria, fever, flank pain, frequency, hematuria, hesitancy, nausea, urgency, urinary retention or vomiting. The testicular pain affects the right testicle. The color of the testicles is Normal. The symptoms are aggravated by tactile pressure (lying down). He has tried nothing for the symptoms. His past medical history is significant for BPH. There is no history of an inguinal hernia, kidney stones or varicocele.     Review of Systems   Constitutional:  Negative for chills, fatigue and fever.   Gastrointestinal:  Positive for constipation (taking a stool softener). Negative for abdominal pain, change in bowel habit, diarrhea, nausea and vomiting.   Genitourinary:  Positive for difficulty urinating (urinary hesitancy) and testicular pain (right). Negative for decreased urine volume, discharge, dysuria, flank pain, frequency, hematuria, hesitancy, penile pain, penile swelling, scrotal swelling and urgency.        Nocturia x3-4.    Neurological:  Negative for weakness.   Psychiatric/Behavioral: Negative.           Objective:      Physical Exam  Vitals and nursing note reviewed.   Constitutional:       General: He is not in acute distress.     Appearance: He is well-developed. He is not ill-appearing.  "  HENT:      Head: Normocephalic and atraumatic.   Eyes:      Pupils: Pupils are equal, round, and reactive to light.   Cardiovascular:      Rate and Rhythm: Normal rate.   Pulmonary:      Effort: Pulmonary effort is normal. No respiratory distress.   Abdominal:      Palpations: Abdomen is soft.      Tenderness: There is no abdominal tenderness.   Musculoskeletal:         General: Normal range of motion.      Cervical back: Normal range of motion.   Skin:     General: Skin is warm and dry.   Neurological:      Mental Status: He is alert and oriented to person, place, and time.      Coordination: Coordination normal.   Psychiatric:         Mood and Affect: Mood normal.         Behavior: Behavior normal.         Thought Content: Thought content normal.         Judgment: Judgment normal.         Assessment:       Problem List Items Addressed This Visit          Renal/    Orchalgia - Primary    Relevant Medications    nabumetone (RELAFEN) 750 MG tablet    Other Relevant Orders    Urine culture    Left hydrocele     Other Visit Diagnoses       Epididymal cyst        right    Bilateral varicoceles        Testicular cyst        left            Plan:           Pedro Bocanegra" was seen today for testicle pain.    Diagnoses and all orders for this visit:    Pain in right testicle  -     nabumetone (RELAFEN) 750 MG tablet; Take 1 tablet (750 mg total) by mouth 2 (two) times daily. for 14 days  -     Urine culture; Future (home collect; drop urine specimen off to any [facility] outpatient lab).     Epididymal cyst  Comments:  right    Bilateral varicoceles    Left hydrocele    Testicular cyst  Comments:  left    Follow-up pending urine cx results.    Arabella Vergara DNP    "

## 2024-06-17 NOTE — PATIENT INSTRUCTIONS
Urine cx (home collect; drop urine specimen off to any [facility] outpatient lab).  Start taking relafen 750 mg twice daily for testicular pain/tenderness.  Follow-up pending urine cx results.

## 2024-06-21 ENCOUNTER — TELEPHONE (OUTPATIENT)
Dept: UROLOGY | Facility: CLINIC | Age: 84
End: 2024-06-21
Payer: MEDICARE

## 2024-06-21 DIAGNOSIS — N30.00 ACUTE CYSTITIS WITHOUT HEMATURIA: Primary | ICD-10-CM

## 2024-06-21 RX ORDER — NITROFURANTOIN 25; 75 MG/1; MG/1
100 CAPSULE ORAL 2 TIMES DAILY
Qty: 20 CAPSULE | Refills: 0 | Status: SHIPPED | OUTPATIENT
Start: 2024-06-21 | End: 2024-07-01

## 2024-06-21 NOTE — TELEPHONE ENCOUNTER
----- Message from Arabella Vergara NP sent at 6/21/2024 11:06 AM CDT -----  Please inform patient via telephone that his urine cx came back positive for a UTI. Script for Macrobid sent to [facility]. Repeat urine cx after completion of antibiotic therapy. Order placed.

## 2024-07-03 ENCOUNTER — TELEPHONE (OUTPATIENT)
Dept: UROLOGY | Facility: CLINIC | Age: 84
End: 2024-07-03
Payer: MEDICARE

## 2024-07-03 NOTE — TELEPHONE ENCOUNTER
Spoke to patient and he stated that his testicle pain has gotten better but not fully gone away.  Patient wanted you to know this information

## 2024-07-03 NOTE — TELEPHONE ENCOUNTER
----- Message from Ellie Ponce sent at 7/3/2024 11:49 AM CDT -----  Type:  Patient Returning Call    Who Called:Pt   Would the patient rather a call back or a response via [facility]? Call back   Best Call Back Number:[phone]  Additional Information: pt says pain when his testicles touch his leg at night is not as bad as it was... did bring urine sample this morning

## 2024-07-09 ENCOUNTER — TELEPHONE (OUTPATIENT)
Dept: UROLOGY | Facility: CLINIC | Age: 84
End: 2024-07-09
Payer: MEDICARE

## 2024-07-09 NOTE — TELEPHONE ENCOUNTER
----- Message from Mary Cain sent at 7/9/2024  1:55 PM CDT -----  Regarding: results  Type:  Test Results    Who Called: pt  Name of Test (Lab/Mammo/Etc): Urine  Date of Test:   Ordering Provider: Jai  Where the test was performed: [place]  Would the patient rather a call back or a response via [facility]? Call  Best Call Back Number: [phone]  Additional Information:

## 2024-07-09 NOTE — TELEPHONE ENCOUNTER
Spoke to patient and notified him that once provider reviews results we will notify him, notified him that this is providers first day back since the holiday.

## 2024-07-10 ENCOUNTER — TELEPHONE (OUTPATIENT)
Dept: UROLOGY | Facility: CLINIC | Age: 84
End: 2024-07-10
Payer: MEDICARE

## 2024-07-10 NOTE — TELEPHONE ENCOUNTER
----- Message from Arabella Vergara NP sent at 7/10/2024 12:58 PM CDT -----  Please inform patient via telephone that his urine cx was normal. His UTI has resolved.

## 2024-07-22 ENCOUNTER — TELEPHONE (OUTPATIENT)
Dept: ORTHOPEDICS | Facility: CLINIC | Age: 84
End: 2024-07-22
Payer: MEDICARE

## 2024-07-22 NOTE — TELEPHONE ENCOUNTER
----- Message from Babita Sharma sent at 7/22/2024  1:03 PM CDT -----  Type:  Sooner Apoointment Request    Caller is requesting a sooner appointment.  Caller declined first available appointment listed below.  Caller will not accept being placed on the waitlist and is requesting a message be sent to doctor.  Name of Caller: pt   When is the first available appointment?09/24/24  Symptoms: left hip pain   Would the patient rather a call back or a response via [facility]? Call back   Best Call Back Number: [phone]  Additional Information: pt is looking to get seen sooner at any location  by the end of the month

## 2024-07-24 ENCOUNTER — HOSPITAL ENCOUNTER (OUTPATIENT)
Dept: RADIOLOGY | Facility: HOSPITAL | Age: 84
Discharge: HOME | End: 2024-07-24
Attending: PHYSICIAN ASSISTANT
Payer: MEDICARE

## 2024-07-24 ENCOUNTER — OFFICE VISIT (OUTPATIENT)
Dept: ORTHOPEDICS | Facility: CLINIC | Age: 84
End: 2024-07-24
Payer: MEDICARE

## 2024-07-24 VITALS — HEIGHT: 71 IN | BODY MASS INDEX: 25.89 KG/M2 | WEIGHT: 184.94 LBS

## 2024-07-24 DIAGNOSIS — M25.552 LEFT HIP PAIN: ICD-10-CM

## 2024-07-24 DIAGNOSIS — M25.552 LEFT HIP PAIN: Primary | ICD-10-CM

## 2024-07-24 PROCEDURE — 99999 PR PBB SHADOW E&M-EST. PATIENT-LVL III: CPT | Mod: PBBFAC,,, | Performed by: PHYSICIAN ASSISTANT

## 2024-07-24 PROCEDURE — 73502 X-RAY EXAM HIP UNI 2-3 VIEWS: CPT | Mod: TC,PN,LT

## 2024-07-24 PROCEDURE — 73502 X-RAY EXAM HIP UNI 2-3 VIEWS: CPT | Mod: 26,LT,, | Performed by: RADIOLOGY

## 2024-07-24 NOTE — PROGRESS NOTES
Subjective:      Patient ID: Pedro Ponce Jr. is a 84 y.o. male.    Chief Complaint: Injury of the Left Hip (Pain related to a fall two weeks ago)    HPI     Pedro Ponce Jr. is seen for evaluation and treatment of LEFT hip pain.      Patient reports a fall 3 weeks ago, which initiated is left hip pain.  He was seen in the ED were plain film images of the hip and pelvis were negative for acute osseous injury.    Today, the patient reports no improvement in pain.  The pain has been persistent and localized posterior to the greater trochanter.  He denies pain at rest.  He rates pain a 9/10 in severity upon standing and any weight-bearing.  Patient ambulates with a walker at baseline, and has continued to mobilize with a limp.  He denies low back pain, radiating thigh pain, and/or symptoms distally.    He denies any numbness, tingling or weakness distally.    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    Spermatocele      Past Surgical History:   Procedure Laterality Date    ARTHROSCOPY OF KNEE      CATARACT EXTRACTION, BILATERAL      COLONOSCOPY N/A 04/09/2018    Procedure: COLONOSCOPY;  Surgeon: HAIM Mir MD;  Location: [address]);  Service: Endoscopy;  Laterality: N/A;    COLONOSCOPY N/A 01/14/2019    Procedure: COLONOSCOPY;  Surgeon: HAIM Mir MD;  Location: [address]);  Service: Endoscopy;  Laterality: N/A;  PM prep    CYST REMOVAL  01/01/2000    removed from left kidney    EXCISION OF HYDROCELE Bilateral 8/29/2023    Procedure: HYDROCELECTOMY;  Surgeon: Sami Ledezma MD;  Location: [facility] OR;  Service: Urology;  Laterality: Bilateral;    HERNIA REPAIR      umbilical     LASER ENUCLEATION OF PROSTATE N/A 05/23/2022    Procedure: ENUCLEATION, PROSTATE, USING LASER;  Surgeon: Sami Ledezma MD;  Location: [facility] OR;  Service: Urology;  Laterality: N/A;    LEFT HEART CATHETERIZATION Left 12/25/2020    Procedure: Left heart cath;  Surgeon: Alan Rothman MD;  Location: [facility] CATH LAB;  Service: Cardiology;  Laterality: Left;    SPERMATOCELECTOMY Bilateral 8/29/2023    Procedure: EXCISION, SPERMATOCELE;  Surgeon: Sami Ledezma MD;  Location: [facility] OR;  Service: Urology;  Laterality: Bilateral;    TONSILLECTOMY       Social History     Socioeconomic History    Marital status:    Tobacco Use    Smoking status: Never     Passive exposure: Past    Smokeless tobacco: Never   Substance and Sexual Activity    Alcohol use: No    Drug use: No    Sexual activity: Not Currently     Partners: Female     Social Determinants of Health     Financial Resource Strain: Low Risk  (8/27/2021)    Overall Financial Resource Strain (CARDIA)     Difficulty of Paying Living Expenses: Not very hard   Food Insecurity: No Food Insecurity (8/27/2021)    Hunger Vital Sign     Worried About Running Out of Food in the Last Year: Never true     Ran Out of Food in the Last Year: Never true   Transportation Needs: No Transportation Needs (8/27/2021)    PRAPARE - Transportation     Lack of Transportation (Medical): No     Lack of Transportation (Non-Medical): No   Physical Activity: Sufficiently Active (8/27/2021)    Exercise Vital Sign     Days of Exercise per Week: 5 days     Minutes of Exercise per Session: 30 min   Stress: No Stress Concern Present (8/27/2021)    [employer] - Occupational Stress Questionnaire     Feeling of Stress : Only a little   Housing Stability: Unknown (8/27/2021)    Housing Stability Vital Sign     Unable to Pay for Housing in the Last Year: No     Unstable Housing in the Last Year: No       Review of Systems   Constitutional:  Negative for chills and  fever.   Respiratory:  Negative for shortness of breath.    Cardiovascular:  Negative for chest pain and leg swelling.   Gastrointestinal:  Negative for nausea and vomiting.   Genitourinary:  Negative for dysuria.   Musculoskeletal:  Positive for falls, joint pain and myalgias.   Skin:  Negative for rash.   Neurological:  Negative for dizziness and sensory change.      Objective:   Ortho/SPM Exam  Left Hip  The patient is not in acute distress.   Body habitus is::normal.   The patient walks with a limp.   The skin over the hip is::intact.   There is::no local tenderness.  Range of motion- Flexion full range of motion, Internal rotation  full range of motion, External rotation  full range of motion.  Resisted SLR negative.  Sciatic tension findings negative.  Shortening/lengthening compared to the contralateral side absent.  Dorsalis Pedis:  present.  Motor normal 5/5 strength in all tested muscle groups.   Sensory normal.    Imaging:  3v XR LEFT Hip and Pelvis obtained in the emergency department on 07/13/2024, and independently reviewed today, show:  No obvious acute fractures or dislocation.  Moderate joint space loss to hip joint.    Repeat 3v XR left hip/pelvis obtained in the orthopedic clinic today were again negative for acute osseous abnormalities.    Assessment:       Encounter Diagnosis   Name Primary?    Left hip pain Yes        Plan:   Discussion:  The patient has persistent and severe localized pain along the posterior greater trochanter of his left hip not relieved with rest or time.    I am concerned for possible occult hip fracture not seen on plain radiographs.    Imaging: Plan to send patient for MRI left hip to evaluate for possible fracture    Pain control: Patient unable to tolerate NSAIDs and many medications secondary to kidney disorder.  Continue on with extra-strength Tylenol    Activity: We reviewed protected weight-bearing with the use of his walker until MRI is obtained    Follow-up:   Virtual appointment following MRI completion

## 2024-07-26 ENCOUNTER — HOSPITAL ENCOUNTER (OUTPATIENT)
Dept: RADIOLOGY | Facility: HOSPITAL | Age: 84
Discharge: HOME | End: 2024-07-26
Attending: PHYSICIAN ASSISTANT
Payer: MEDICARE

## 2024-07-26 DIAGNOSIS — M25.552 LEFT HIP PAIN: ICD-10-CM

## 2024-07-26 PROCEDURE — 73721 MRI JNT OF LWR EXTRE W/O DYE: CPT | Mod: TC,PN,LT

## 2024-07-26 PROCEDURE — 73721 MRI JNT OF LWR EXTRE W/O DYE: CPT | Mod: 26,LT,, | Performed by: RADIOLOGY

## 2024-07-30 ENCOUNTER — HOSPITAL ENCOUNTER (INPATIENT)
Facility: HOSPITAL | Age: 84
LOS: 2 days | Discharge: HOME | DRG: 536 | End: 2024-08-01
Attending: EMERGENCY MEDICINE | Admitting: FAMILY MEDICINE
Payer: MEDICARE

## 2024-07-30 DIAGNOSIS — S72.002A CLOSED FRACTURE OF LEFT HIP, INITIAL ENCOUNTER: Primary | ICD-10-CM

## 2024-07-30 DIAGNOSIS — Z01.818 PREOPERATIVE CLEARANCE: ICD-10-CM

## 2024-07-30 PROBLEM — I25.10 CAD (CORONARY ARTERY DISEASE): Status: RESOLVED | Noted: 2021-04-06 | Resolved: 2024-07-30

## 2024-07-30 PROBLEM — N17.9 AKI (ACUTE KIDNEY INJURY): Status: ACTIVE | Noted: 2024-07-30

## 2024-07-30 LAB
ALBUMIN SERPL BCP-MCNC: 4.3 G/DL (ref 3.5–5.2)
ALP SERPL-CCNC: 99 U/L (ref 55–135)
ALT SERPL W/O P-5'-P-CCNC: 19 U/L (ref 10–44)
ANION GAP SERPL CALC-SCNC: 10 MMOL/L (ref 8–16)
AST SERPL-CCNC: 17 U/L (ref 10–40)
BASOPHILS # BLD AUTO: 0.03 K/UL (ref 0–0.2)
BASOPHILS NFR BLD: 0.6 % (ref 0–1.9)
BILIRUB SERPL-MCNC: 0.8 MG/DL (ref 0.1–1)
BUN SERPL-MCNC: 30 MG/DL (ref 8–23)
CALCIUM SERPL-MCNC: 11 MG/DL (ref 8.7–10.5)
CHLORIDE SERPL-SCNC: 104 MMOL/L (ref 95–110)
CO2 SERPL-SCNC: 25 MMOL/L (ref 23–29)
CREAT SERPL-MCNC: 1.7 MG/DL (ref 0.5–1.4)
DIFFERENTIAL METHOD BLD: ABNORMAL
EOSINOPHIL # BLD AUTO: 0 K/UL (ref 0–0.5)
EOSINOPHIL NFR BLD: 0.4 % (ref 0–8)
ERYTHROCYTE [DISTWIDTH] IN BLOOD BY AUTOMATED COUNT: 15.5 % (ref 11.5–14.5)
EST. GFR  (NO RACE VARIABLE): 39 ML/MIN/1.73 M^2
GLUCOSE SERPL-MCNC: 110 MG/DL (ref 70–110)
HCT VFR BLD AUTO: 30.6 % (ref 40–54)
HGB BLD-MCNC: 10.4 G/DL (ref 14–18)
IMM GRANULOCYTES # BLD AUTO: 0.04 K/UL (ref 0–0.04)
IMM GRANULOCYTES NFR BLD AUTO: 0.8 % (ref 0–0.5)
INR PPP: 1.1 (ref 0.8–1.2)
LYMPHOCYTES # BLD AUTO: 1.3 K/UL (ref 1–4.8)
LYMPHOCYTES NFR BLD: 24.2 % (ref 18–48)
MCH RBC QN AUTO: 34.3 PG (ref 27–31)
MCHC RBC AUTO-ENTMCNC: 34 G/DL (ref 32–36)
MCV RBC AUTO: 101 FL (ref 82–98)
MONOCYTES # BLD AUTO: 0.6 K/UL (ref 0.3–1)
MONOCYTES NFR BLD: 12.4 % (ref 4–15)
NEUTROPHILS # BLD AUTO: 3.2 K/UL (ref 1.8–7.7)
NEUTROPHILS NFR BLD: 61.6 % (ref 38–73)
NRBC BLD-RTO: 0 /100 WBC
PLATELET # BLD AUTO: 329 K/UL (ref 150–450)
PMV BLD AUTO: 9.9 FL (ref 9.2–12.9)
POCT GLUCOSE: 131 MG/DL (ref 70–110)
POTASSIUM SERPL-SCNC: 4 MMOL/L (ref 3.5–5.1)
PROT SERPL-MCNC: 7.4 G/DL (ref 6–8.4)
PROTHROMBIN TIME: 11.4 SEC (ref 9–12.5)
RBC # BLD AUTO: 3.03 M/UL (ref 4.6–6.2)
SODIUM SERPL-SCNC: 139 MMOL/L (ref 136–145)
WBC # BLD AUTO: 5.16 K/UL (ref 3.9–12.7)

## 2024-07-30 PROCEDURE — 11000001 HC ACUTE MED/SURG PRIVATE ROOM

## 2024-07-30 PROCEDURE — 80053 COMPREHEN METABOLIC PANEL: CPT | Performed by: EMERGENCY MEDICINE

## 2024-07-30 PROCEDURE — 99285 EMERGENCY DEPT VISIT HI MDM: CPT | Mod: 25

## 2024-07-30 PROCEDURE — 63600175 PHARM REV CODE 636 W HCPCS: Performed by: FAMILY MEDICINE

## 2024-07-30 PROCEDURE — 85025 COMPLETE CBC W/AUTO DIFF WBC: CPT | Performed by: EMERGENCY MEDICINE

## 2024-07-30 PROCEDURE — 93005 ELECTROCARDIOGRAM TRACING: CPT

## 2024-07-30 PROCEDURE — 25000003 PHARM REV CODE 250: Performed by: FAMILY MEDICINE

## 2024-07-30 PROCEDURE — 93010 ELECTROCARDIOGRAM REPORT: CPT | Mod: ,,, | Performed by: INTERNAL MEDICINE

## 2024-07-30 PROCEDURE — 85610 PROTHROMBIN TIME: CPT | Performed by: EMERGENCY MEDICINE

## 2024-07-30 RX ORDER — ACETAMINOPHEN 500 MG
1000 TABLET ORAL
Status: DISCONTINUED | OUTPATIENT
Start: 2024-08-01 | End: 2024-08-01 | Stop reason: HOSPADM

## 2024-07-30 RX ORDER — OXYCODONE HYDROCHLORIDE 5 MG/1
5 TABLET ORAL EVERY 6 HOURS PRN
Status: DISCONTINUED | OUTPATIENT
Start: 2024-07-30 | End: 2024-08-01 | Stop reason: HOSPADM

## 2024-07-30 RX ORDER — METOPROLOL SUCCINATE 50 MG/1
50 TABLET, EXTENDED RELEASE ORAL DAILY
Status: DISCONTINUED | OUTPATIENT
Start: 2024-07-30 | End: 2024-07-31

## 2024-07-30 RX ORDER — ACETAMINOPHEN 325 MG/1
650 TABLET ORAL EVERY 4 HOURS PRN
Status: DISCONTINUED | OUTPATIENT
Start: 2024-07-30 | End: 2024-08-01 | Stop reason: HOSPADM

## 2024-07-30 RX ORDER — OXYBUTYNIN CHLORIDE 5 MG/1
5 TABLET, EXTENDED RELEASE ORAL DAILY
Status: DISCONTINUED | OUTPATIENT
Start: 2024-07-30 | End: 2024-08-01 | Stop reason: HOSPADM

## 2024-07-30 RX ORDER — POLYETHYLENE GLYCOL 3350 17 G/17G
17 POWDER, FOR SOLUTION ORAL DAILY
Status: DISCONTINUED | OUTPATIENT
Start: 2024-07-30 | End: 2024-08-01 | Stop reason: HOSPADM

## 2024-07-30 RX ORDER — HYDRALAZINE HYDROCHLORIDE 20 MG/ML
10 INJECTION INTRAMUSCULAR; INTRAVENOUS EVERY 6 HOURS PRN
Status: DISCONTINUED | OUTPATIENT
Start: 2024-07-30 | End: 2024-08-01 | Stop reason: HOSPADM

## 2024-07-30 RX ORDER — TALC
6 POWDER (GRAM) TOPICAL NIGHTLY PRN
Status: DISCONTINUED | OUTPATIENT
Start: 2024-07-30 | End: 2024-08-01 | Stop reason: HOSPADM

## 2024-07-30 RX ORDER — DOCUSATE SODIUM 100 MG/1
300 CAPSULE, LIQUID FILLED ORAL DAILY
Status: DISCONTINUED | OUTPATIENT
Start: 2024-07-30 | End: 2024-08-01 | Stop reason: HOSPADM

## 2024-07-30 RX ORDER — MORPHINE SULFATE 2 MG/ML
2 INJECTION, SOLUTION INTRAMUSCULAR; INTRAVENOUS EVERY 4 HOURS PRN
Status: DISCONTINUED | OUTPATIENT
Start: 2024-07-30 | End: 2024-08-01 | Stop reason: HOSPADM

## 2024-07-30 RX ORDER — SODIUM CHLORIDE 0.9 % (FLUSH) 0.9 %
10 SYRINGE (ML) INJECTION EVERY 12 HOURS PRN
Status: DISCONTINUED | OUTPATIENT
Start: 2024-07-30 | End: 2024-08-01 | Stop reason: HOSPADM

## 2024-07-30 RX ORDER — SODIUM CHLORIDE 9 MG/ML
INJECTION, SOLUTION INTRAVENOUS CONTINUOUS
Status: DISCONTINUED | OUTPATIENT
Start: 2024-07-30 | End: 2024-08-01 | Stop reason: HOSPADM

## 2024-07-30 RX ORDER — ATORVASTATIN CALCIUM 40 MG/1
40 TABLET, FILM COATED ORAL NIGHTLY
Status: DISCONTINUED | OUTPATIENT
Start: 2024-07-30 | End: 2024-08-01 | Stop reason: HOSPADM

## 2024-07-30 RX ORDER — NIFEDIPINE 60 MG/1
60 TABLET, EXTENDED RELEASE ORAL 2 TIMES DAILY
Status: DISCONTINUED | OUTPATIENT
Start: 2024-07-30 | End: 2024-08-01 | Stop reason: HOSPADM

## 2024-07-30 RX ORDER — ASPIRIN 81 MG/1
81 TABLET ORAL DAILY
Status: DISCONTINUED | OUTPATIENT
Start: 2024-07-30 | End: 2024-08-01 | Stop reason: HOSPADM

## 2024-07-30 RX ORDER — ACETAMINOPHEN 500 MG
1000 TABLET ORAL
Status: DISCONTINUED | OUTPATIENT
Start: 2024-07-30 | End: 2024-07-30

## 2024-07-30 RX ORDER — NALOXONE HCL 0.4 MG/ML
0.02 VIAL (ML) INJECTION
Status: DISCONTINUED | OUTPATIENT
Start: 2024-07-30 | End: 2024-08-01 | Stop reason: HOSPADM

## 2024-07-30 RX ORDER — ONDANSETRON HYDROCHLORIDE 2 MG/ML
4 INJECTION, SOLUTION INTRAVENOUS EVERY 8 HOURS PRN
Status: DISCONTINUED | OUTPATIENT
Start: 2024-07-30 | End: 2024-08-01 | Stop reason: HOSPADM

## 2024-07-30 RX ORDER — HYDRALAZINE HYDROCHLORIDE 25 MG/1
50 TABLET, FILM COATED ORAL 3 TIMES DAILY
Status: DISCONTINUED | OUTPATIENT
Start: 2024-07-30 | End: 2024-08-01 | Stop reason: HOSPADM

## 2024-07-30 RX ORDER — ESCITALOPRAM OXALATE 10 MG/1
10 TABLET ORAL DAILY
Status: DISCONTINUED | OUTPATIENT
Start: 2024-07-30 | End: 2024-08-01 | Stop reason: HOSPADM

## 2024-07-30 RX ORDER — ENOXAPARIN SODIUM 100 MG/ML
40 INJECTION SUBCUTANEOUS EVERY 24 HOURS
Status: DISCONTINUED | OUTPATIENT
Start: 2024-07-30 | End: 2024-07-31

## 2024-07-30 RX ADMIN — DOCUSATE SODIUM 300 MG: 100 CAPSULE, LIQUID FILLED ORAL at 04:07

## 2024-07-30 RX ADMIN — NIFEDIPINE 60 MG: 60 TABLET, FILM COATED, EXTENDED RELEASE ORAL at 10:07

## 2024-07-30 RX ADMIN — SODIUM CHLORIDE: 9 INJECTION, SOLUTION INTRAVENOUS at 05:07

## 2024-07-30 RX ADMIN — POLYETHYLENE GLYCOL (3350) 17 G: 17 POWDER, FOR SOLUTION ORAL at 04:07

## 2024-07-30 RX ADMIN — HYDRALAZINE HYDROCHLORIDE 50 MG: 25 TABLET ORAL at 10:07

## 2024-07-30 RX ADMIN — ATORVASTATIN CALCIUM 40 MG: 40 TABLET, FILM COATED ORAL at 10:07

## 2024-07-30 RX ADMIN — ENOXAPARIN SODIUM 40 MG: 40 INJECTION SUBCUTANEOUS at 04:07

## 2024-07-30 RX ADMIN — HYDRALAZINE HYDROCHLORIDE 50 MG: 25 TABLET ORAL at 05:07

## 2024-07-30 RX ADMIN — OXYBUTYNIN CHLORIDE 5 MG: 5 TABLET, EXTENDED RELEASE ORAL at 04:07

## 2024-07-31 LAB
ANION GAP SERPL CALC-SCNC: 5 MMOL/L (ref 8–16)
BASOPHILS # BLD AUTO: 0.02 K/UL (ref 0–0.2)
BASOPHILS NFR BLD: 0.4 % (ref 0–1.9)
BUN SERPL-MCNC: 28 MG/DL (ref 8–23)
CALCIUM SERPL-MCNC: 9.8 MG/DL (ref 8.7–10.5)
CHLORIDE SERPL-SCNC: 104 MMOL/L (ref 95–110)
CO2 SERPL-SCNC: 27 MMOL/L (ref 23–29)
CREAT SERPL-MCNC: 1.3 MG/DL (ref 0.5–1.4)
DIFFERENTIAL METHOD BLD: ABNORMAL
EOSINOPHIL # BLD AUTO: 0 K/UL (ref 0–0.5)
EOSINOPHIL NFR BLD: 0.8 % (ref 0–8)
ERYTHROCYTE [DISTWIDTH] IN BLOOD BY AUTOMATED COUNT: 15.5 % (ref 11.5–14.5)
EST. GFR  (NO RACE VARIABLE): 54 ML/MIN/1.73 M^2
GLUCOSE SERPL-MCNC: 80 MG/DL (ref 70–110)
HCT VFR BLD AUTO: 27 % (ref 40–54)
HGB BLD-MCNC: 9.1 G/DL (ref 14–18)
IMM GRANULOCYTES # BLD AUTO: 0.03 K/UL (ref 0–0.04)
IMM GRANULOCYTES NFR BLD AUTO: 0.6 % (ref 0–0.5)
LYMPHOCYTES # BLD AUTO: 1.9 K/UL (ref 1–4.8)
LYMPHOCYTES NFR BLD: 38.7 % (ref 18–48)
MCH RBC QN AUTO: 33.6 PG (ref 27–31)
MCHC RBC AUTO-ENTMCNC: 33.7 G/DL (ref 32–36)
MCV RBC AUTO: 100 FL (ref 82–98)
MONOCYTES # BLD AUTO: 0.7 K/UL (ref 0.3–1)
MONOCYTES NFR BLD: 14.7 % (ref 4–15)
NEUTROPHILS # BLD AUTO: 2.2 K/UL (ref 1.8–7.7)
NEUTROPHILS NFR BLD: 44.8 % (ref 38–73)
NRBC BLD-RTO: 0 /100 WBC
OHS QRS DURATION: 98 MS
OHS QTC CALCULATION: 434 MS
PLATELET # BLD AUTO: 275 K/UL (ref 150–450)
PMV BLD AUTO: 10 FL (ref 9.2–12.9)
POTASSIUM SERPL-SCNC: 3.8 MMOL/L (ref 3.5–5.1)
RBC # BLD AUTO: 2.71 M/UL (ref 4.6–6.2)
SODIUM SERPL-SCNC: 136 MMOL/L (ref 136–145)
WBC # BLD AUTO: 4.89 K/UL (ref 3.9–12.7)

## 2024-07-31 PROCEDURE — 80048 BASIC METABOLIC PNL TOTAL CA: CPT | Performed by: FAMILY MEDICINE

## 2024-07-31 PROCEDURE — 99223 1ST HOSP IP/OBS HIGH 75: CPT | Mod: ,,, | Performed by: ORTHOPAEDIC SURGERY

## 2024-07-31 PROCEDURE — 85025 COMPLETE CBC W/AUTO DIFF WBC: CPT | Performed by: FAMILY MEDICINE

## 2024-07-31 PROCEDURE — 36415 COLL VENOUS BLD VENIPUNCTURE: CPT | Performed by: FAMILY MEDICINE

## 2024-07-31 PROCEDURE — 11000001 HC ACUTE MED/SURG PRIVATE ROOM

## 2024-07-31 PROCEDURE — 25000003 PHARM REV CODE 250: Performed by: FAMILY MEDICINE

## 2024-07-31 RX ADMIN — SODIUM CHLORIDE: 9 INJECTION, SOLUTION INTRAVENOUS at 02:07

## 2024-07-31 RX ADMIN — ATORVASTATIN CALCIUM 40 MG: 40 TABLET, FILM COATED ORAL at 09:07

## 2024-07-31 RX ADMIN — ESCITALOPRAM OXALATE 10 MG: 10 TABLET ORAL at 09:07

## 2024-07-31 RX ADMIN — NIFEDIPINE 60 MG: 60 TABLET, FILM COATED, EXTENDED RELEASE ORAL at 09:07

## 2024-07-31 RX ADMIN — HYDRALAZINE HYDROCHLORIDE 50 MG: 25 TABLET ORAL at 09:07

## 2024-07-31 RX ADMIN — POLYETHYLENE GLYCOL (3350) 17 G: 17 POWDER, FOR SOLUTION ORAL at 09:07

## 2024-07-31 RX ADMIN — OXYBUTYNIN CHLORIDE 5 MG: 5 TABLET, EXTENDED RELEASE ORAL at 09:07

## 2024-07-31 RX ADMIN — HYPROMELLOSE 2910 1 DROP: 5 SOLUTION/ DROPS OPHTHALMIC at 09:07

## 2024-07-31 RX ADMIN — DOCUSATE SODIUM 300 MG: 100 CAPSULE, LIQUID FILLED ORAL at 09:07

## 2024-07-31 RX ADMIN — ASPIRIN 81 MG: 81 TABLET, COATED ORAL at 09:07

## 2024-07-31 RX ADMIN — HYPROMELLOSE 2910 1 DROP: 5 SOLUTION/ DROPS OPHTHALMIC at 11:07

## 2024-07-31 RX ADMIN — HYDRALAZINE HYDROCHLORIDE 50 MG: 25 TABLET ORAL at 02:07

## 2024-08-01 VITALS
WEIGHT: 183 LBS | DIASTOLIC BLOOD PRESSURE: 76 MMHG | TEMPERATURE: 98 F | SYSTOLIC BLOOD PRESSURE: 153 MMHG | RESPIRATION RATE: 18 BRPM | HEART RATE: 85 BPM | BODY MASS INDEX: 25.62 KG/M2 | OXYGEN SATURATION: 98 % | HEIGHT: 71 IN

## 2024-08-01 LAB
ANION GAP SERPL CALC-SCNC: 9 MMOL/L (ref 8–16)
BASOPHILS # BLD AUTO: 0.02 K/UL (ref 0–0.2)
BASOPHILS NFR BLD: 0.4 % (ref 0–1.9)
BUN SERPL-MCNC: 24 MG/DL (ref 8–23)
CALCIUM SERPL-MCNC: 9.4 MG/DL (ref 8.7–10.5)
CHLORIDE SERPL-SCNC: 104 MMOL/L (ref 95–110)
CO2 SERPL-SCNC: 21 MMOL/L (ref 23–29)
CREAT SERPL-MCNC: 1.1 MG/DL (ref 0.5–1.4)
DIFFERENTIAL METHOD BLD: ABNORMAL
EOSINOPHIL # BLD AUTO: 0.1 K/UL (ref 0–0.5)
EOSINOPHIL NFR BLD: 1.3 % (ref 0–8)
ERYTHROCYTE [DISTWIDTH] IN BLOOD BY AUTOMATED COUNT: 15.2 % (ref 11.5–14.5)
EST. GFR  (NO RACE VARIABLE): >60 ML/MIN/1.73 M^2
GLUCOSE SERPL-MCNC: 85 MG/DL (ref 70–110)
HCT VFR BLD AUTO: 26.6 % (ref 40–54)
HGB BLD-MCNC: 9.2 G/DL (ref 14–18)
IMM GRANULOCYTES # BLD AUTO: 0.04 K/UL (ref 0–0.04)
IMM GRANULOCYTES NFR BLD AUTO: 0.7 % (ref 0–0.5)
LYMPHOCYTES # BLD AUTO: 1.9 K/UL (ref 1–4.8)
LYMPHOCYTES NFR BLD: 33.3 % (ref 18–48)
MCH RBC QN AUTO: 34.2 PG (ref 27–31)
MCHC RBC AUTO-ENTMCNC: 34.6 G/DL (ref 32–36)
MCV RBC AUTO: 99 FL (ref 82–98)
MONOCYTES # BLD AUTO: 0.7 K/UL (ref 0.3–1)
MONOCYTES NFR BLD: 11.8 % (ref 4–15)
NEUTROPHILS # BLD AUTO: 2.9 K/UL (ref 1.8–7.7)
NEUTROPHILS NFR BLD: 52.5 % (ref 38–73)
NRBC BLD-RTO: 0 /100 WBC
PLATELET # BLD AUTO: 261 K/UL (ref 150–450)
PMV BLD AUTO: 9.5 FL (ref 9.2–12.9)
POTASSIUM SERPL-SCNC: 3.5 MMOL/L (ref 3.5–5.1)
RBC # BLD AUTO: 2.69 M/UL (ref 4.6–6.2)
SODIUM SERPL-SCNC: 134 MMOL/L (ref 136–145)
WBC # BLD AUTO: 5.58 K/UL (ref 3.9–12.7)

## 2024-08-01 PROCEDURE — 90677 PCV20 VACCINE IM: CPT | Performed by: FAMILY MEDICINE

## 2024-08-01 PROCEDURE — 97116 GAIT TRAINING THERAPY: CPT

## 2024-08-01 PROCEDURE — 97161 PT EVAL LOW COMPLEX 20 MIN: CPT

## 2024-08-01 PROCEDURE — 63600175 PHARM REV CODE 636 W HCPCS: Performed by: FAMILY MEDICINE

## 2024-08-01 PROCEDURE — 36415 COLL VENOUS BLD VENIPUNCTURE: CPT | Performed by: FAMILY MEDICINE

## 2024-08-01 PROCEDURE — 3E0234Z INTRODUCTION OF SERUM, TOXOID AND VACCINE INTO MUSCLE, PERCUTANEOUS APPROACH: ICD-10-PCS | Performed by: FAMILY MEDICINE

## 2024-08-01 PROCEDURE — 80048 BASIC METABOLIC PNL TOTAL CA: CPT | Performed by: FAMILY MEDICINE

## 2024-08-01 PROCEDURE — 90471 IMMUNIZATION ADMIN: CPT | Performed by: FAMILY MEDICINE

## 2024-08-01 PROCEDURE — 25000003 PHARM REV CODE 250: Performed by: FAMILY MEDICINE

## 2024-08-01 PROCEDURE — G0009 ADMIN PNEUMOCOCCAL VACCINE: HCPCS | Performed by: FAMILY MEDICINE

## 2024-08-01 PROCEDURE — 97530 THERAPEUTIC ACTIVITIES: CPT

## 2024-08-01 PROCEDURE — 85025 COMPLETE CBC W/AUTO DIFF WBC: CPT | Performed by: FAMILY MEDICINE

## 2024-08-01 RX ORDER — OXYCODONE HYDROCHLORIDE 5 MG/1
5 TABLET ORAL EVERY 6 HOURS PRN
Qty: 15 TABLET | Refills: 0 | Status: SHIPPED | OUTPATIENT
Start: 2024-08-01

## 2024-08-01 RX ORDER — POLYETHYLENE GLYCOL 3350 17 G/17G
17 POWDER, FOR SOLUTION ORAL DAILY
Qty: 510 G | Refills: 0 | Status: SHIPPED | OUTPATIENT
Start: 2024-08-02

## 2024-08-01 RX ADMIN — HYDRALAZINE HYDROCHLORIDE 50 MG: 25 TABLET ORAL at 10:08

## 2024-08-01 RX ADMIN — ESCITALOPRAM OXALATE 10 MG: 10 TABLET ORAL at 10:08

## 2024-08-01 RX ADMIN — DOCUSATE SODIUM 300 MG: 100 CAPSULE, LIQUID FILLED ORAL at 10:08

## 2024-08-01 RX ADMIN — OXYBUTYNIN CHLORIDE 5 MG: 5 TABLET, EXTENDED RELEASE ORAL at 10:08

## 2024-08-01 RX ADMIN — NIFEDIPINE 60 MG: 60 TABLET, FILM COATED, EXTENDED RELEASE ORAL at 10:08

## 2024-08-01 RX ADMIN — HYDRALAZINE HYDROCHLORIDE 50 MG: 25 TABLET ORAL at 02:08

## 2024-08-01 RX ADMIN — ASPIRIN 81 MG: 81 TABLET, COATED ORAL at 10:08

## 2024-08-01 RX ADMIN — PNEUMOCOCCAL 20-VALENT CONJUGATE VACCINE 0.5 ML
2.2; 2.2; 2.2; 2.2; 2.2; 2.2; 2.2; 2.2; 2.2; 2.2; 2.2; 2.2; 2.2; 2.2; 2.2; 2.2; 4.4; 2.2; 2.2; 2.2 INJECTION, SUSPENSION INTRAMUSCULAR at 04:08

## 2024-08-01 RX ADMIN — SODIUM CHLORIDE: 9 INJECTION, SOLUTION INTRAVENOUS at 06:08

## 2024-08-01 RX ADMIN — POLYETHYLENE GLYCOL (3350) 17 G: 17 POWDER, FOR SOLUTION ORAL at 10:08

## 2024-08-01 RX ADMIN — HYPROMELLOSE 2910 1 DROP: 5 SOLUTION/ DROPS OPHTHALMIC at 10:08

## 2024-08-05 PROCEDURE — G0180 MD CERTIFICATION HHA PATIENT: HCPCS | Mod: ,,, | Performed by: FAMILY MEDICINE

## 2024-08-08 DIAGNOSIS — S72.145D CLOSED NONDISPLACED INTERTROCHANTERIC FRACTURE OF LEFT FEMUR WITH ROUTINE HEALING: Primary | ICD-10-CM

## 2024-08-13 NOTE — PROGRESS NOTES
Subjective:      Patient ID: Pedro Ponce Jr. is a 84 y.o. male.    Chief Complaint: No chief complaint on file.    HPI     Pedro Ponce Jr. returns for re-evaluation of:    Injury:  Left nondisplaced IT femur fracture  DOI: apprx July 16 (4wks post injury)      Today, the patient reports a huge improvement in hip pain stating that it typically rates a 0/10 in severity. Pt continues to use a walker for ambulation and is attending formal PT. Denies new falls or complaints.    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    Spermatocele      Past Surgical History:   Procedure Laterality Date    ARTHROSCOPY OF KNEE      CATARACT EXTRACTION, BILATERAL      COLONOSCOPY N/A 04/09/2018    Procedure: COLONOSCOPY;  Surgeon: HAIM Mir MD;  Location: [address]);  Service: Endoscopy;  Laterality: N/A;    COLONOSCOPY N/A 01/14/2019    Procedure: COLONOSCOPY;  Surgeon: HAIM Mir MD;  Location: [address]);  Service: Endoscopy;  Laterality: N/A;  PM prep    CYST REMOVAL  01/01/2000    removed from left kidney    EXCISION OF HYDROCELE Bilateral 8/29/2023    Procedure: HYDROCELECTOMY;  Surgeon: Sami Ledezma MD;  Location: [facility] OR;  Service: Urology;  Laterality: Bilateral;    HERNIA REPAIR      umbilical    LASER ENUCLEATION OF PROSTATE N/A 05/23/2022    Procedure: ENUCLEATION, PROSTATE, USING LASER;  Surgeon: Sami Ledezma MD;  Location: [facility] OR;  Service: Urology;  Laterality: N/A;    LEFT HEART CATHETERIZATION Left 12/25/2020    Procedure: Left heart cath;  Surgeon: Alan Rothman MD;  Location: [facility] CATH LAB;  Service: Cardiology;  Laterality: Left;    SPERMATOCELECTOMY Bilateral  8/29/2023    Procedure: EXCISION, SPERMATOCELE;  Surgeon: Sami Ledezma MD;  Location: [facility];  Service: Urology;  Laterality: Bilateral;    TONSILLECTOMY       Social History     Socioeconomic History    Marital status:    Tobacco Use    Smoking status: Never     Passive exposure: Past    Smokeless tobacco: Never   Substance and Sexual Activity    Alcohol use: No    Drug use: No    Sexual activity: Not Currently     Partners: Female     Social Determinants of Health     Financial Resource Strain: Low Risk  (7/31/2024)    Overall Financial Resource Strain (CARDIA)     Difficulty of Paying Living Expenses: Not hard at all   Food Insecurity: No Food Insecurity (7/31/2024)    Hunger Vital Sign     Worried About Running Out of Food in the Last Year: Never true     Ran Out of Food in the Last Year: Never true   Transportation Needs: No Transportation Needs (7/31/2024)    TRANSPORTATION NEEDS     Transportation : No   Physical Activity: Sufficiently Active (7/31/2024)    Exercise Vital Sign     Days of Exercise per Week: 5 days     Minutes of Exercise per Session: 60 min   Stress: No Stress Concern Present (7/31/2024)    [employer] - Occupational Stress Questionnaire     Feeling of Stress : Not at all   Housing Stability: Low Risk  (7/31/2024)    Housing Stability Vital Sign     Unable to Pay for Housing in the Last Year: No     Homeless in the Last Year: No       Review of Systems   Constitutional:  Negative for chills and fever.   Respiratory:  Negative for shortness of breath.    Cardiovascular:  Negative for chest pain and leg swelling.   Gastrointestinal:  Negative for nausea and vomiting.   Genitourinary:  Negative for dysuria.   Musculoskeletal:  Positive for falls, joint pain and myalgias.   Skin:  Negative for rash.   Neurological:  Negative for dizziness and sensory change.      Objective:   Ortho/SPM Exam  Left Hip  The patient is not in acute distress.   Body habitus  is::normal.   The patient walks slowly with a walker. No limp.   The skin over the hip is::intact.   There is::no local tenderness.  Range of motion- Flexion full range of motion, Internal rotation  full range of motion, External rotation  full range of motion.  Resisted SLR negative.  Sciatic tension findings negative.  Shortening/lengthening compared to the contralateral side absent.  Dorsalis Pedis:  present.  Motor normal 5/5 strength in all tested muscle groups.   Sensory normal.    Imaging:  Repeat 3v XR left hip/pelvis obtained in the orthopedic clinic today were again negative for any obvious acute osseous abnormalities.    Assessment:       Encounter Diagnosis   Name Primary?    Closed nondisplaced intertrochanteric fracture of left femur with routine healing, subsequent encounter Yes        Plan:   Discussion:  The patient's left hip pain continues to subside both at rest and during weight-bearing activities.  We reviewed continued conservative treatment.    Activity:  I would like the patient to continue using his walker over the next 2 weeks as further healing occurs.    Follow-up:  2 weeks (appt with Dr. Barnhart) with repeat x-rays left hip

## 2024-08-14 ENCOUNTER — OFFICE VISIT (OUTPATIENT)
Dept: ORTHOPEDICS | Facility: CLINIC | Age: 84
End: 2024-08-14
Payer: MEDICARE

## 2024-08-14 ENCOUNTER — HOSPITAL ENCOUNTER (OUTPATIENT)
Dept: RADIOLOGY | Facility: HOSPITAL | Age: 84
Discharge: HOME | End: 2024-08-14
Attending: PHYSICIAN ASSISTANT
Payer: MEDICARE

## 2024-08-14 VITALS — BODY MASS INDEX: 25.52 KG/M2 | HEIGHT: 71 IN

## 2024-08-14 DIAGNOSIS — S72.145D CLOSED NONDISPLACED INTERTROCHANTERIC FRACTURE OF LEFT FEMUR WITH ROUTINE HEALING: ICD-10-CM

## 2024-08-14 DIAGNOSIS — S72.145D CLOSED NONDISPLACED INTERTROCHANTERIC FRACTURE OF LEFT FEMUR WITH ROUTINE HEALING, SUBSEQUENT ENCOUNTER: Primary | ICD-10-CM

## 2024-08-14 PROCEDURE — 1100F PTFALLS ASSESS-DOCD GE2>/YR: CPT | Mod: CPTII,S$GLB,, | Performed by: PHYSICIAN ASSISTANT

## 2024-08-14 PROCEDURE — 1111F DSCHRG MED/CURRENT MED MERGE: CPT | Mod: CPTII,S$GLB,, | Performed by: PHYSICIAN ASSISTANT

## 2024-08-14 PROCEDURE — 1160F RVW MEDS BY RX/DR IN RCRD: CPT | Mod: CPTII,S$GLB,, | Performed by: PHYSICIAN ASSISTANT

## 2024-08-14 PROCEDURE — 73502 X-RAY EXAM HIP UNI 2-3 VIEWS: CPT | Mod: 26,LT,, | Performed by: RADIOLOGY

## 2024-08-14 PROCEDURE — 3288F FALL RISK ASSESSMENT DOCD: CPT | Mod: CPTII,S$GLB,, | Performed by: PHYSICIAN ASSISTANT

## 2024-08-14 PROCEDURE — 99213 OFFICE O/P EST LOW 20 MIN: CPT | Mod: S$GLB,,, | Performed by: PHYSICIAN ASSISTANT

## 2024-08-14 PROCEDURE — 1159F MED LIST DOCD IN RCRD: CPT | Mod: CPTII,S$GLB,, | Performed by: PHYSICIAN ASSISTANT

## 2024-08-14 PROCEDURE — 1126F AMNT PAIN NOTED NONE PRSNT: CPT | Mod: CPTII,S$GLB,, | Performed by: PHYSICIAN ASSISTANT

## 2024-08-14 PROCEDURE — 73502 X-RAY EXAM HIP UNI 2-3 VIEWS: CPT | Mod: TC,PN,LT

## 2024-08-14 PROCEDURE — 99999 PR PBB SHADOW E&M-EST. PATIENT-LVL III: CPT | Mod: PBBFAC,,, | Performed by: PHYSICIAN ASSISTANT

## 2024-08-20 DIAGNOSIS — S72.145D CLOSED NONDISPLACED INTERTROCHANTERIC FRACTURE OF LEFT FEMUR WITH ROUTINE HEALING, SUBSEQUENT ENCOUNTER: Primary | ICD-10-CM

## 2024-08-22 ENCOUNTER — PROCEDURE VISIT (OUTPATIENT)
Dept: OPHTHALMOLOGY | Facility: CLINIC | Age: 84
End: 2024-08-22
Payer: MEDICARE

## 2024-08-22 DIAGNOSIS — H35.033 HYPERTENSIVE RETINOPATHY, BILATERAL: ICD-10-CM

## 2024-08-22 DIAGNOSIS — H43.393 VITREOUS FLOATERS OF BOTH EYES: ICD-10-CM

## 2024-08-22 DIAGNOSIS — H35.3211 EXUDATIVE AGE-RELATED MACULAR DEGENERATION OF RIGHT EYE WITH ACTIVE CHOROIDAL NEOVASCULARIZATION: Primary | ICD-10-CM

## 2024-08-22 DIAGNOSIS — H40.1132 PRIMARY OPEN ANGLE GLAUCOMA OF BOTH EYES, MODERATE STAGE: ICD-10-CM

## 2024-08-22 PROCEDURE — 92134 CPTRZ OPH DX IMG PST SGM RTA: CPT | Mod: S$GLB,,, | Performed by: OPHTHALMOLOGY

## 2024-08-22 PROCEDURE — 92014 COMPRE OPH EXAM EST PT 1/>: CPT | Mod: 25,S$GLB,, | Performed by: OPHTHALMOLOGY

## 2024-08-22 PROCEDURE — 67028 INJECTION EYE DRUG: CPT | Mod: RT,S$GLB,, | Performed by: OPHTHALMOLOGY

## 2024-08-22 RX ADMIN — Medication 1.25 MG: at 02:08

## 2024-08-22 NOTE — PROGRESS NOTES
HPI     Avastin  OD         ARMD         OCT         Additional comments: AVASTIN  OD   ARMD     DFE  OCT     Pt  forgot  glasses   today   by accident    CME   [facility]           Last edited by Martha Kulkarni on 8/22/2024  1:39 PM.             OCT -OD paracentral small increase    OS - stable small flat inferior PED, no SRF      Prior ICG/FA - leakage IT to fovea OD      A/P    1. IPCV OU vs wet amd   With subfoveal PED OD  Avastin resumed 1/14  S/p Avastin OD #8 in past, resumed OD x 13  S/p PDT OD 9/18  OS - PED inferior to fovea stable    2/18 - recurrent swelling OD, IT to fovea  5/18 - increased CME and superior SRF  4/19 - slight increase in IRF, ASx - will follow  8/21 - increase in IR fluid temporally OD - ASx  2/22  OD IRF improved, new lesion SN to fovea  6/23 - SN SRF improved, inferior IRF increased  8/24 - increase in SRF OD at 10 weeks    Avastin OD today    Tighten interval to 7-8 weeks    Get approval for Eylea        2. Floaters OU    3. POAG - Good IOP, continue management as per Dr. Hidalgo  - Needs f/u appt    4. PCIOL OU      7-8 weeks OCT no  dilate - LDFE 8/24    Risks, benefits, and alternatives to treatment discussed in detail with the patient.  The patient voiced understanding and wished to proceed with the procedure    Injection Procedure Note:  Diagnosis: IPCV vs Wet AMD OD    Patient Identified and Time Out complete  Pt marked  Topical Proparacaine and Betadine.  Inject Avastin OD at 6:00 @ 3.5-4mm posterior to limbus  Post Operative Dx: Same  Complications: None  Follow up as above.

## 2024-08-29 ENCOUNTER — OFFICE VISIT (OUTPATIENT)
Dept: ORTHOPEDICS | Facility: CLINIC | Age: 84
End: 2024-08-29
Payer: MEDICARE

## 2024-08-29 ENCOUNTER — HOSPITAL ENCOUNTER (OUTPATIENT)
Dept: RADIOLOGY | Facility: HOSPITAL | Age: 84
Discharge: HOME | End: 2024-08-29
Attending: ORTHOPAEDIC SURGERY
Payer: MEDICARE

## 2024-08-29 DIAGNOSIS — S72.145D CLOSED NONDISPLACED INTERTROCHANTERIC FRACTURE OF LEFT FEMUR WITH ROUTINE HEALING, SUBSEQUENT ENCOUNTER: Primary | ICD-10-CM

## 2024-08-29 DIAGNOSIS — S72.145D CLOSED NONDISPLACED INTERTROCHANTERIC FRACTURE OF LEFT FEMUR WITH ROUTINE HEALING, SUBSEQUENT ENCOUNTER: ICD-10-CM

## 2024-08-29 PROCEDURE — 73502 X-RAY EXAM HIP UNI 2-3 VIEWS: CPT | Mod: TC,PN,LT

## 2024-08-29 PROCEDURE — 1159F MED LIST DOCD IN RCRD: CPT | Mod: CPTII,S$GLB,, | Performed by: ORTHOPAEDIC SURGERY

## 2024-08-29 PROCEDURE — 1160F RVW MEDS BY RX/DR IN RCRD: CPT | Mod: CPTII,S$GLB,, | Performed by: ORTHOPAEDIC SURGERY

## 2024-08-29 PROCEDURE — 99999 PR PBB SHADOW E&M-EST. PATIENT-LVL II: CPT | Mod: PBBFAC,,, | Performed by: ORTHOPAEDIC SURGERY

## 2024-08-29 PROCEDURE — 73502 X-RAY EXAM HIP UNI 2-3 VIEWS: CPT | Mod: 26,LT,, | Performed by: RADIOLOGY

## 2024-08-29 PROCEDURE — 99213 OFFICE O/P EST LOW 20 MIN: CPT | Mod: S$GLB,,, | Performed by: ORTHOPAEDIC SURGERY

## 2024-08-29 PROCEDURE — 1111F DSCHRG MED/CURRENT MED MERGE: CPT | Mod: CPTII,S$GLB,, | Performed by: ORTHOPAEDIC SURGERY

## 2024-08-29 NOTE — PROGRESS NOTES
Subjective:      Patient ID: Pedro Ponce Jr. is a 84 y.o. male.    Chief Complaint: Follow-up (Left hip fx )    HPI    Follow-up for nondisplaced left hip fracture.  The patient was treated closed.  The fracture was treated closed.  The patient reports that he has no pain with weight-bearing and can walk without limitation.  He uses a walker part-time.          Review of Systems   Constitutional: Negative for fever and weight loss.   HENT:  Negative for congestion.    Eyes:  Negative for visual disturbance.   Cardiovascular:  Negative for chest pain.   Respiratory:  Negative for shortness of breath.    Hematologic/Lymphatic: Negative for bleeding problem. Does not bruise/bleed easily.   Skin:  Negative for poor wound healing.   Gastrointestinal:  Negative for abdominal pain.   Genitourinary:  Negative for dysuria.   Neurological:  Negative for seizures.   Psychiatric/Behavioral:  Negative for altered mental status.    Allergic/Immunologic: Negative for persistent infections.         Objective:      Ortho/SPM Exam      Left hip    The patient is not in acute distress.   Body habitus is:normal.   Sclerae normal  The patient walks without a limp.   Respiratory distress:  none  The skin over the hip is:intact.   There is no local tenderness.   Range of motion- Flexion full, External rotation full, internal rotation full.  Resisted SLR negative.  Pain with rotation negative  Sciatic tension findings negative.  Shortening/lengthening compared to the contralateral side absent.  Pulses DP present, PT present.  Motor normal 5/5 strength in all tested muscle groups.   Sensory normal.    Left hip radiographs show no visible fracture.  Joint space is maintained.  I can not see any clear evidence of callus.              Assessment:       Encounter Diagnosis   Name Primary?    Closed nondisplaced intertrochanteric fracture of left femur with routine healing, subsequent encounter Yes        The fracture is clinically  "healed.  Although the MRI was unequivocal with regard to the injury, plain radiographs have never demonstrated any convincing abnormality.  Given current clinical presentation, the fracture can be treated as united.          Plan:       Pedro Bocanegra" was seen today for follow-up.    Diagnoses and all orders for this visit:    Closed nondisplaced intertrochanteric fracture of left femur with routine healing, subsequent encounter          I explained my diagnostic impression and the reasoning behind it in detail, using layman's terms.  Gradual resumption of normal activity was discussed.  Fall precautions and cane usage were recommended an explained.                "

## 2024-08-30 ENCOUNTER — TELEPHONE (OUTPATIENT)
Dept: NEPHROLOGY | Facility: CLINIC | Age: 84
End: 2024-08-30
Payer: MEDICARE

## 2024-08-30 DIAGNOSIS — I10 PRIMARY HYPERTENSION: ICD-10-CM

## 2024-08-30 RX ORDER — IRBESARTAN 300 MG/1
TABLET ORAL
Qty: 90 TABLET | Refills: 3 | Status: SHIPPED | OUTPATIENT
Start: 2024-08-30

## 2024-09-02 DIAGNOSIS — F33.1 MODERATE EPISODE OF RECURRENT MAJOR DEPRESSIVE DISORDER: ICD-10-CM

## 2024-09-02 DIAGNOSIS — I10 ESSENTIAL HYPERTENSION: ICD-10-CM

## 2024-09-02 NOTE — TELEPHONE ENCOUNTER
No care due was identified.  [facility] Embedded Care Due Messages. Reference number: [id number].   9/02/2024 4:16:31 PM CDT

## 2024-09-03 ENCOUNTER — DOCUMENT SCAN (OUTPATIENT)
Dept: HOME HEALTH SERVICES | Facility: HOSPITAL | Age: 84
End: 2024-09-03
Payer: MEDICARE

## 2024-09-03 RX ORDER — NIFEDIPINE 60 MG/1
60 TABLET, EXTENDED RELEASE ORAL 2 TIMES DAILY
Qty: 180 TABLET | Refills: 3 | Status: SHIPPED | OUTPATIENT
Start: 2024-09-03

## 2024-09-03 RX ORDER — ESCITALOPRAM OXALATE 10 MG/1
10 TABLET ORAL
Qty: 90 TABLET | Refills: 3 | Status: SHIPPED | OUTPATIENT
Start: 2024-09-03

## 2024-09-05 ENCOUNTER — CLINICAL SUPPORT (OUTPATIENT)
Facility: CLINIC | Age: 84
End: 2024-09-05
Payer: MEDICARE

## 2024-09-05 DIAGNOSIS — N18.31 TYPE 2 DIABETES MELLITUS WITH STAGE 3A CHRONIC KIDNEY DISEASE, WITHOUT LONG-TERM CURRENT USE OF INSULIN: ICD-10-CM

## 2024-09-05 DIAGNOSIS — E11.22 TYPE 2 DIABETES MELLITUS WITH STAGE 3A CHRONIC KIDNEY DISEASE, WITHOUT LONG-TERM CURRENT USE OF INSULIN: ICD-10-CM

## 2024-09-06 NOTE — PROGRESS NOTES
Pedro Ponce Jr. was referred to Introduction to CKD education by Dr. John Barker-Mo*    The patient attended class in a group setting unaccompanied.    The following material was covered. Outcomes were assessed via the outcome assessment questions and documented at the end of each lesson.    Chronic Kidney Disease Education (Lesson 1 and 2)    Lesson 1 Understanding Kidney Disease  Lesson Objectives  By the end of each session, participants will be able to:  Recognize that fear and grief are usual responses to kidney disease  State causes/risk factors for kidney disease  Explain how GFR reflects kidney function  Explain how urine albumin/protein reflects kidney damage  State two ways the kidneys help maintain health  Describe how kidney disease is progressive but can be slowed down  Topics & Points Covered  Emotional impact of diagnosis  You're not alone  Emotional aspects  Depression, grief, and fear are typical  Physical activity may help  Benefits of education: Why are you here?  There are many causes of CKD. Diabetes and hypertension are the leading causes. Family history, cardiovascular disease, recurrent UTIs, immunological disease and genetics also play a role in CKD  CKD is complicated  The more you understand, the better you'll do. (Stated directly)  Basic anatomy  Location in the body  The nephrons have filters (working units)  Normal kidney function  Maintain chemical balance  Produce hormones  Regulate blood pressure  Kidney damage  Major causes of kidney damage  High blood pressure, diabetes  Fewer functioning nephrons  Monitoring kidney function and damage  eGFR (function)  Urine albumin/UACR or Urine protein/creatinine ratio (damage)  eGFR goal: a stable level  Decline means progression  Urine albumin/UACR goal: a stable or lower level  Increase in urine albumin/protein may mean progression  Kidney disease is often irreversible and progressive  You'll likely need the help of a kidney  specialist  Overview of treatment modalities  There are things you can do that may slow progression (more on this in next session)  Take your medications  Control blood pressure  Manage diabetes  Eat less salt  Miscellaneous  Kidney disease runs in families (Encouraged testing)  Early kidney disease has no symptoms  Outcome Assessment Questions  See scanned document below for outcome assessment.     Lesson 2 Managing Your Kidney Disease  Lesson Objectives  By the end of each session, participants will be able to:  Recognize that managing blood pressure is a key part of managing kidney disease  Recognize that managing diabetes is a key part of managing kidney disease  State at least one step to eating right for kidney health  Recognize the importance of being cautious about over-the-counter medicines  Recognize that smoking can worsen kidney disease  Identify which lab values are used to keep track of diabetes, high blood pressure, and other conditions  Topics & Points Covered  There are steps you can take to keep your kidneys working  Weight management  Blood pressure management  Blood pressure goal: < 140/90 mm Hg  Medications - ACEs/ARBs, diuretics  ACEs/ARBS and risk of hyperkalemia (too much potassium in the blood, but help lower urine albumin)  Sodium reduction (<2,300 mg)  Physical activity  Diabetes management  A1C target   Diabetes medications may change because of kidney disease (often takes less medication to control glucose in the later stages of CKD)  How to treat hypoglycemia appropriately (risk of high potassium with ACEi and ARB use) glucose tablets are preferred [May need to avoid juices with high potassium levels if hyperkalemic]  Hyperglycemia  Cardiovascular disease (CVD)  Physical activity  CVD is major cause of mortality  LDL goal  Medications  Nutritional health  Choose and prepare foods with less salt and sodium  Eat the right amount and kind of protein  Choose foods that are healthy for your  heart  Choose foods based on phosphorus and potassium content (if restricted)  Make choices that help with diabetes management  Dietitian referral/nutrition therapy is covered benefit  Medication safety  Prescription  Over-the-counter (pain relief)  Tobacco cessation  Outcome Assessment Questions  See scanned document below for outcome assessment.     Scanned outcome assessment document:     Areas of information that primary nephrology provider should reinforce during follow-up visit includes: n/a    The content of these lessons are adapted from the Kidney Disease Education (KDE) Services benefit as defined by the Centers [employer] Medicare [employer] Medicaid Services.    120 minutes spent educating the patient of the above content.

## 2024-09-09 ENCOUNTER — OFFICE VISIT (OUTPATIENT)
Dept: FAMILY MEDICINE | Facility: CLINIC | Age: 84
End: 2024-09-09
Payer: MEDICARE

## 2024-09-09 VITALS
DIASTOLIC BLOOD PRESSURE: 60 MMHG | OXYGEN SATURATION: 98 % | HEIGHT: 71 IN | WEIGHT: 185.5 LBS | SYSTOLIC BLOOD PRESSURE: 130 MMHG | BODY MASS INDEX: 25.97 KG/M2 | HEART RATE: 78 BPM

## 2024-09-09 DIAGNOSIS — D63.8 CHRONIC DISEASE ANEMIA: ICD-10-CM

## 2024-09-09 DIAGNOSIS — I10 PRIMARY HYPERTENSION: Primary | ICD-10-CM

## 2024-09-09 DIAGNOSIS — E11.21 TYPE 2 DIABETES MELLITUS WITH DIABETIC NEPHROPATHY, WITHOUT LONG-TERM CURRENT USE OF INSULIN: ICD-10-CM

## 2024-09-09 DIAGNOSIS — Z23 NEED FOR INFLUENZA VACCINATION: ICD-10-CM

## 2024-09-09 DIAGNOSIS — Z02.79 ISSUE OF MEDICAL CERTIFICATE: ICD-10-CM

## 2024-09-09 PROCEDURE — 1160F RVW MEDS BY RX/DR IN RCRD: CPT | Mod: CPTII,S$GLB,, | Performed by: FAMILY MEDICINE

## 2024-09-09 PROCEDURE — 1126F AMNT PAIN NOTED NONE PRSNT: CPT | Mod: CPTII,S$GLB,, | Performed by: FAMILY MEDICINE

## 2024-09-09 PROCEDURE — 3075F SYST BP GE 130 - 139MM HG: CPT | Mod: CPTII,S$GLB,, | Performed by: FAMILY MEDICINE

## 2024-09-09 PROCEDURE — 99999 PR PBB SHADOW E&M-EST. PATIENT-LVL III: CPT | Mod: PBBFAC,,, | Performed by: FAMILY MEDICINE

## 2024-09-09 PROCEDURE — 3288F FALL RISK ASSESSMENT DOCD: CPT | Mod: CPTII,S$GLB,, | Performed by: FAMILY MEDICINE

## 2024-09-09 PROCEDURE — 90653 IIV ADJUVANT VACCINE IM: CPT | Mod: S$GLB,,, | Performed by: FAMILY MEDICINE

## 2024-09-09 PROCEDURE — 1159F MED LIST DOCD IN RCRD: CPT | Mod: CPTII,S$GLB,, | Performed by: FAMILY MEDICINE

## 2024-09-09 PROCEDURE — 1100F PTFALLS ASSESS-DOCD GE2>/YR: CPT | Mod: CPTII,S$GLB,, | Performed by: FAMILY MEDICINE

## 2024-09-09 PROCEDURE — G0008 ADMIN INFLUENZA VIRUS VAC: HCPCS | Mod: S$GLB,,, | Performed by: FAMILY MEDICINE

## 2024-09-09 PROCEDURE — 99215 OFFICE O/P EST HI 40 MIN: CPT | Mod: S$GLB,,, | Performed by: FAMILY MEDICINE

## 2024-09-09 PROCEDURE — 3078F DIAST BP <80 MM HG: CPT | Mod: CPTII,S$GLB,, | Performed by: FAMILY MEDICINE

## 2024-09-09 RX ORDER — FERROUS SULFATE 325(65) MG
325 TABLET ORAL
Qty: 60 TABLET | Refills: 0 | Status: SHIPPED | OUTPATIENT
Start: 2024-09-09 | End: 2024-11-08

## 2024-09-09 NOTE — PROGRESS NOTES
Subjective     Patient ID: Pedro Ponce Jr. is a 84 y.o. male.    Chief Complaint: Follow-up    Hypertension  This is a chronic problem. The current episode started more than 1 year ago. The problem is unchanged. The problem is controlled. Pertinent negatives include no chest pain, orthopnea, palpitations or peripheral edema. There are no associated agents to hypertension. Risk factors for coronary artery disease include male gender. Past treatments include direct vasodilators, diuretics, angiotensin blockers and calcium channel blockers. The current treatment provides significant improvement. Compliance problems include diet and exercise.  There is no history of angina. There is no history of a hypertension causing med or a thyroid problem.   Diabetes  He presents for his follow-up diabetic visit. He has type 2 diabetes mellitus. His disease course has been stable. There are no hypoglycemic associated symptoms. Pertinent negatives for diabetes include no chest pain, no polydipsia, no polyphagia and no polyuria. There are no hypoglycemic complications. Symptoms are stable. There are no diabetic complications. Risk factors for coronary artery disease include hypertension, diabetes mellitus and sedentary lifestyle. Current diabetic treatment includes diet. He is compliant with treatment all of the time. He is following a generally healthy diet. He has not had a previous visit with a dietitian. He never participates in exercise. An ACE inhibitor/angiotensin II receptor blocker is being taken.   Anemia  Presents for follow-up visit. Symptoms include light-headedness. There has been no palpitations. Signs of blood loss that are not present include hematemesis, hematochezia and melena. There are no compliance problems.  Side effects of medications include fatigue.     Review of Systems   Constitutional: Negative.    HENT: Negative.     Eyes: Negative.    Respiratory: Negative.     Cardiovascular: Negative.  Negative  for chest pain, palpitations and orthopnea.   Gastrointestinal: Negative.  Negative for hematemesis, hematochezia and melena.   Endocrine: Negative for polydipsia, polyphagia and polyuria.   Genitourinary: Negative.    Musculoskeletal: Negative.    Integumentary:  Negative.   Neurological:  Positive for light-headedness.   Psychiatric/Behavioral: Negative.            Objective     Physical Exam  Vitals and nursing note reviewed.   Constitutional:       General: He is not in acute distress.     Appearance: He is well-developed. He is not diaphoretic.   HENT:      Head: Normocephalic and atraumatic.      Right Ear: External ear normal.      Left Ear: External ear normal.      Nose: Nose normal.      Mouth/Throat:      Pharynx: No oropharyngeal exudate.   Eyes:      General: No scleral icterus.        Right eye: No discharge.         Left eye: No discharge.      Conjunctiva/sclera: Conjunctivae normal.      Pupils: Pupils are equal, round, and reactive to light.   Neck:      Thyroid: No thyromegaly.      Vascular: No JVD.      Trachea: No tracheal deviation.   Cardiovascular:      Rate and Rhythm: Normal rate and regular rhythm.      Heart sounds: Normal heart sounds. No murmur heard.     No friction rub. No gallop.   Pulmonary:      Effort: Pulmonary effort is normal. No respiratory distress.      Breath sounds: Normal breath sounds. No stridor. No wheezing or rales.   Chest:      Chest wall: No tenderness.   Abdominal:      General: Bowel sounds are normal. There is no distension.      Palpations: Abdomen is soft. There is no mass.      Tenderness: There is no abdominal tenderness. There is no guarding or rebound.   Musculoskeletal:         General: No tenderness. Normal range of motion.      Cervical back: Normal range of motion and neck supple.   Lymphadenopathy:      Cervical: No cervical adenopathy.   Skin:     General: Skin is warm and dry.      Coloration: Skin is not pale.      Findings: No erythema or rash.    Neurological:      Mental Status: He is alert and oriented to person, place, and time.      Cranial Nerves: No cranial nerve deficit.      Motor: No abnormal muscle tone.      Coordination: Coordination normal.      Deep Tendon Reflexes: Reflexes are normal and symmetric. Reflexes normal.   Psychiatric:         Behavior: Behavior normal.         Thought Content: Thought content normal.         Judgment: Judgment normal.            Assessment and Plan     1. Primary hypertension  -     Lipid Panel; Future; Expected date: 09/09/2024  -     Comprehensive Metabolic Panel; Future; Expected date: 09/09/2024  -     CBC Auto Differential; Future; Expected date: 09/09/2024    2. Type 2 diabetes mellitus with diabetic nephropathy, without long-term current use of insulin  -     Microalbumin/Creatinine Ratio, Urine; Future; Expected date: 09/09/2024  -     Lipid Panel; Future; Expected date: 09/09/2024  -     Hemoglobin A1C; Future; Expected date: 09/09/2024  -     Comprehensive Metabolic Panel; Future; Expected date: 09/09/2024  -     CBC Auto Differential; Future; Expected date: 09/09/2024    3. Chronic disease anemia  -     ferrous sulfate (FEOSOL) 325 mg (65 mg iron) Tab tablet; Take 1 tablet (325 mg total) by mouth daily with breakfast.  Dispense: 60 tablet; Refill: 0  -     CBC Auto Differential; Future; Expected date: 09/09/2024    4. Need for influenza vaccination  -     influenza (adjuvanted) (Fluad) 45 mcg/0.5 mL IM vaccine (> or = 64 yo) 0.5 mL    5. Issue of medical certificate      Continue monitoring blood sugar at home,ADA diet.   Continue monitoring blood pressure at home, low sodium diet.   I spent a total of 42 minutes on the day of the visit.This includes face to face time and non-face to face time preparing to see the patient (eg, review of tests), obtaining and/or reviewing separately obtained history, documenting clinical information in the electronic or other health record, independently interpreting  results and communicating results to the patient/family/caregiver, or care coordinator.          Follow up in about 6 months (around 3/9/2025), or if symptoms worsen or fail to improve.

## 2024-09-13 ENCOUNTER — EXTERNAL HOME HEALTH (OUTPATIENT)
Dept: HOME HEALTH SERVICES | Facility: HOSPITAL | Age: 84
End: 2024-09-13
Payer: MEDICARE

## 2024-09-20 ENCOUNTER — OFFICE VISIT (OUTPATIENT)
Dept: CARDIOLOGY | Facility: CLINIC | Age: 84
End: 2024-09-20
Payer: MEDICARE

## 2024-09-20 VITALS
HEIGHT: 71 IN | SYSTOLIC BLOOD PRESSURE: 130 MMHG | HEART RATE: 84 BPM | OXYGEN SATURATION: 98 % | WEIGHT: 187 LBS | BODY MASS INDEX: 26.18 KG/M2 | DIASTOLIC BLOOD PRESSURE: 63 MMHG

## 2024-09-20 DIAGNOSIS — I25.10 CORONARY ARTERY DISEASE INVOLVING NATIVE CORONARY ARTERY OF NATIVE HEART WITHOUT ANGINA PECTORIS: ICD-10-CM

## 2024-09-20 DIAGNOSIS — E78.49 OTHER HYPERLIPIDEMIA: ICD-10-CM

## 2024-09-20 DIAGNOSIS — I10 PRIMARY HYPERTENSION: ICD-10-CM

## 2024-09-20 DIAGNOSIS — I69.331 MONOPLEGIA OF UPPER LIMB FOLLOWING CEREBRAL INFARCTION AFFECTING RIGHT DOMINANT SIDE: ICD-10-CM

## 2024-09-20 DIAGNOSIS — I70.0 AORTIC ATHEROSCLEROSIS: Primary | ICD-10-CM

## 2024-09-20 PROCEDURE — 99999 PR PBB SHADOW E&M-EST. PATIENT-LVL III: CPT | Mod: PBBFAC,,, | Performed by: INTERNAL MEDICINE

## 2024-09-20 NOTE — PROGRESS NOTES
[facility]     Subjective:    Patient ID:  Pedro Ponce Jr. is a 84 y.o. male who presents for follow-up of Hyperlipidemia, Hypertension, and Coronary Artery Disease    Review of patient's allergies indicates:   Allergen Reactions    Pcn [penicillins] Rash     He broke his left hip with a traumatic fall at his house.  Surgery is not required.  He is starting to improve.  He walks with a cane now.  He is been going back to the gym-limited activity.  His blood pressure readings have been stable.  No significant changes in readings have been noted and his medications are the same.    Earlier this year he felt like he had fatigue symptoms.  I was able to withdrawal metoprolol SR which was stopped due to mild bradycardia.  Changes in meds carried out at that time and he reports good tolerance of his pills.  He does have history of LAD stenting several years ago for NSTEMI.  He has normal ejection fraction.  He remains on atorvastatin and aspirin.         Review of Systems   Constitutional: Negative for chills, decreased appetite, diaphoresis, fever, malaise/fatigue, night sweats, weight gain and weight loss.   HENT:  Negative for congestion, ear discharge, ear pain, hearing loss, hoarse voice, nosebleeds, odynophagia, sore throat, stridor and tinnitus.    Eyes:  Negative for blurred vision, discharge, double vision, pain, photophobia, redness, vision loss in left eye, vision loss in right eye, visual disturbance and visual halos.   Cardiovascular:  Negative for chest pain, claudication, cyanosis, dyspnea on exertion, irregular heartbeat, leg swelling, near-syncope, orthopnea, palpitations, paroxysmal nocturnal dyspnea and syncope.   Respiratory:  Negative for cough, hemoptysis, shortness of breath, sleep disturbances due to breathing, snoring, sputum production and wheezing.    Endocrine: Negative for cold intolerance, heat  "intolerance, polydipsia, polyphagia and polyuria.   Hematologic/Lymphatic: Negative for adenopathy and bleeding problem. Does not bruise/bleed easily.   Skin:  Negative for color change, dry skin, flushing, itching, nail changes, poor wound healing, rash, skin cancer, suspicious lesions and unusual hair distribution.   Musculoskeletal:  Positive for joint pain. Negative for arthritis, back pain, falls, gout, joint swelling, muscle cramps, muscle weakness, myalgias, neck pain and stiffness.   Gastrointestinal:  Negative for bloating, abdominal pain, anorexia, change in bowel habit, bowel incontinence, constipation, diarrhea, dysphagia, excessive appetite, flatus, heartburn, hematemesis, hematochezia, hemorrhoids, jaundice, melena, nausea and vomiting.   Genitourinary:  Negative for bladder incontinence, decreased libido, dysuria, flank pain, frequency, genital sores, hematuria, hesitancy, incomplete emptying, nocturia and urgency.   Neurological:  Negative for aphonia, brief paralysis, difficulty with concentration, disturbances in coordination, excessive daytime sleepiness, dizziness, focal weakness, headaches, light-headedness, loss of balance, numbness, paresthesias, seizures, sensory change, tremors, vertigo and weakness.   Psychiatric/Behavioral:  Negative for altered mental status, depression, hallucinations, memory loss, substance abuse, suicidal ideas and thoughts of violence. The patient does not have insomnia and is not nervous/anxious.    Allergic/Immunologic: Negative for hives and persistent infections.        Objective:       Vitals:    09/20/24 1321   BP: 130/63   BP Location: Left arm   Patient Position: Sitting   BP Method: Medium (Automatic)   Pulse: 84   SpO2: 98%   Weight: 84.8 kg (187 lb)   Height: 5' 11" (1.803 m)    Physical Exam  Constitutional:       General: He is not in acute distress.     Appearance: He is well-developed. He is not diaphoretic.   HENT:      Head: Normocephalic and " atraumatic.      Nose: Nose normal.   Eyes:      General: No scleral icterus.        Right eye: No discharge.      Conjunctiva/sclera: Conjunctivae normal.      Pupils: Pupils are equal, round, and reactive to light.   Neck:      Thyroid: No thyromegaly.      Vascular: No JVD.      Trachea: No tracheal deviation.   Cardiovascular:      Rate and Rhythm: Normal rate and regular rhythm.      Pulses:           Carotid pulses are 2+ on the right side and 2+ on the left side.       Radial pulses are 2+ on the right side and 2+ on the left side.        Dorsalis pedis pulses are 2+ on the right side and 2+ on the left side.        Posterior tibial pulses are 2+ on the right side and 2+ on the left side.      Heart sounds: Murmur heard.      Harsh early systolic murmur is present with a grade of 2/6 at the upper right sternal border.      No friction rub. No gallop.   Pulmonary:      Effort: Pulmonary effort is normal. No respiratory distress.      Breath sounds: Normal breath sounds. No stridor. No wheezing or rales.   Chest:      Chest wall: No tenderness.   Abdominal:      General: Bowel sounds are normal. There is no distension.      Palpations: Abdomen is soft. There is no mass.      Tenderness: There is no abdominal tenderness. There is no guarding or rebound.   Musculoskeletal:         General: No tenderness. Normal range of motion.      Cervical back: Normal range of motion and neck supple.   Lymphadenopathy:      Cervical: No cervical adenopathy.   Skin:     General: Skin is warm and dry.      Coloration: Skin is not pale.      Findings: No erythema or rash.   Neurological:      Mental Status: He is alert and oriented to person, place, and time.      Cranial Nerves: No cranial nerve deficit.      Coordination: Coordination normal.      Comments: Right hand motor deficit with clumsiness reported.   Psychiatric:         Behavior: Behavior normal.         Thought Content: Thought content normal.         Judgment:  Judgment normal.           Assessment:       1. Aortic atherosclerosis    2. Coronary artery disease involving native coronary artery of native heart without angina pectoris    3. Primary hypertension    4. 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      Results for orders placed or performed in visit on 08/30/24   Urinalysis   Result Value Ref Range    Specimen UA Urine, Clean Catch     Color, UA Yellow Yellow, Straw, Kiana    Appearance, UA Clear Clear    pH, UA 8.0 5.0 - 8.0    Specific Gravity, UA 1.015 1.005 - 1.030    Protein, UA 1+ (A) Negative    Glucose, UA Negative Negative    Ketones, UA Negative Negative    Bilirubin (UA) Negative Negative    Occult Blood UA Negative Negative    Nitrite, UA Negative Negative    Urobilinogen, UA Negative <2.0 EU/dL    Leukocytes, UA Trace (A) Negative   Comprehensive Metabolic Panel   Result Value Ref Range    Sodium 137 136 - 145 mmol/L    Potassium 3.8 3.5 - 5.1 mmol/L    Chloride 102 95 - 110 mmol/L    CO2 27 23 - 29 mmol/L    Glucose 85 70 - 110 mg/dL    BUN 24 (H) 8 - 23 mg/dL    Creatinine 1.3 0.5 - 1.4 mg/dL    Calcium 10.0 8.7 - 10.5 mg/dL    Total Protein 6.9 6.0 - 8.4 g/dL    Albumin 3.8 3.5 - 5.2 g/dL    Total Bilirubin 0.7 0.1 - 1.0 mg/dL    Alkaline Phosphatase 63 55 - 135 U/L    AST 21 10 - 40 U/L    ALT 20 10 - 44 U/L    eGFR 53.8 (A) >60 mL/min/1.73 m^2    Anion Gap 8 8 - 16 mmol/L   Lipid Panel   Result Value Ref Range    Cholesterol 111 (L) 120 - 199 mg/dL    Triglycerides 38 30 - 150 mg/dL    HDL 54 40 - 75 mg/dL    LDL Cholesterol 49.4 (L) 63.0 - 159.0 mg/dL    HDL/Cholesterol Ratio 48.6 20.0 - 50.0 %    Total Cholesterol/HDL Ratio 2.1 2.0 - 5.0    Non-HDL Cholesterol 57 mg/dL   TSH   Result Value Ref Range    TSH 3.522 0.400 - 4.000 uIU/mL   CBC Auto Differential   Result Value Ref Range    WBC 3.54 (L) 3.90 - 12.70 K/uL    RBC 2.79 (L) 4.60 - 6.20 M/uL    Hemoglobin 9.6 (L) 14.0 -  18.0 g/dL    Hematocrit 28.3 (L) 40.0 - 54.0 %     (H) 82 - 98 fL    MCH 34.4 (H) 27.0 - 31.0 pg    MCHC 33.9 32.0 - 36.0 g/dL    RDW 15.9 (H) 11.5 - 14.5 %    Platelets 208 150 - 450 K/uL    MPV 9.0 (L) 9.2 - 12.9 fL    Immature Granulocytes 0.6 (H) 0.0 - 0.5 %    Gran # (ANC) 1.5 (L) 1.8 - 7.7 K/uL    Immature Grans (Abs) 0.02 0.00 - 0.04 K/uL    Lymph # 1.7 1.0 - 4.8 K/uL    Mono # 0.3 0.3 - 1.0 K/uL    Eos # 0.0 0.0 - 0.5 K/uL    Baso # 0.01 0.00 - 0.20 K/uL    nRBC 0 0 /100 WBC    Gran % 42.1 38.0 - 73.0 %    Lymph % 47.2 18.0 - 48.0 %    Mono % 9.6 4.0 - 15.0 %    Eosinophil % 0.8 0.0 - 8.0 %    Basophil % 0.3 0.0 - 1.9 %    Differential Method Automated    Hemoglobin A1C   Result Value Ref Range    Hemoglobin A1C 5.4 4.0 - 5.6 %    Estimated Avg Glucose 108 68 - 131 mg/dL   Urinalysis Microscopic   Result Value Ref Range    RBC, UA 8 (H) 0 - 4 /hpf    WBC, UA 12 (H) 0 - 5 /hpf    Bacteria Rare None-Occ /hpf    Squam Epithel, UA 4 /hpf    Non-Squam Epith 2 (A) <1/hpf /hpf    Hyaline Casts, UA 2 (A) 0-1/lpf /lpf    Microscopic Comment SEE COMMENT      *Note: Due to a large number of results and/or encounters for the requested time period, some results have not been displayed. A complete set of results can be found in Results Review.         Current Outpatient Medications:     aspirin (ECOTRIN) 81 MG EC tablet, Take 81 mg by mouth once daily. Last dose 8/21/2023, Disp: , Rfl:     atorvastatin (LIPITOR) 40 MG tablet, TAKE 1 TABLET BY MOUTH EVERY DAY IN THE EVENING (Patient taking differently: Take 40 mg by mouth every evening.), Disp: 90 tablet, Rfl: 3    carboxymethylcellulose sodium (REFRESH TEARS) 0.5 % Drop, Place 1 drop into both eyes 2 (two) times a day. 2-3 times daily, Disp: , Rfl:     COVID-19 (PFIZER) 30 mcg/0.3 mL IM vaccine (>/= 11 yo), Inject into the muscle., Disp: 0.3 mL, Rfl: 0    docusate sodium (COLACE) 100 MG capsule, Take 300 mg by mouth once daily., Disp: , Rfl:     EScitalopram  oxalate (LEXAPRO) 10 MG tablet, TAKE 1 TABLET BY MOUTH EVERY DAY, Disp: 90 tablet, Rfl: 3    ferrous sulfate (FEOSOL) 325 mg (65 mg iron) Tab tablet, Take 1 tablet (325 mg total) by mouth daily with breakfast., Disp: 60 tablet, Rfl: 0    hydrALAZINE (APRESOLINE) 50 MG tablet, Take 1 tablet (50 mg total) by mouth 3 (three) times daily., Disp: 270 tablet, Rfl: 3    hydroCHLOROthiazide (HYDRODIURIL) 12.5 MG Tab, Take 1 tablet (12.5 mg total) by mouth once daily., Disp: 90 tablet, Rfl: 3    irbesartan (AVAPRO) 300 MG tablet, TAKE 1 TABLET BY MOUTH EVERY DAY IN THE EVENING, Disp: 90 tablet, Rfl: 3    multivitamin capsule, Take 1 capsule by mouth once daily. Centrum Silver, Disp: , Rfl:     NIFEdipine (PROCARDIA-XL) 60 MG (OSM) 24 hr tablet, Take 1 tablet (60 mg total) by mouth 2 (two) times a day., Disp: 180 tablet, Rfl: 3    polyethylene glycol (GLYCOLAX) 17 gram/dose powder, Take 17 g by mouth once daily., Disp: 510 g, Rfl: 0    solifenacin (VESICARE) 5 MG tablet, TAKE 1 TABLET BY MOUTH EVERY DAY (Patient taking differently: Take 5 mg by mouth once daily.), Disp: 90 tablet, Rfl: 3    Current Facility-Administered Medications:     bevacizumab (Avastin) 25 mg/mL ophthalmic injection syringe 1.25 mg, 1.25 mg, Intraocular, 1 time in Clinic/HOD,      Lab Results   Component Value Date    WBC 3.54 (L) 08/30/2024    RBC 2.79 (L) 08/30/2024    HGB 9.6 (L) 08/30/2024    HCT 28.3 (L) 08/30/2024     (H) 08/30/2024    MCH 34.4 (H) 08/30/2024    MCHC 33.9 08/30/2024    RDW 15.9 (H) 08/30/2024     08/30/2024    MPV 9.0 (L) 08/30/2024    GRAN 1.5 (L) 08/30/2024    GRAN 42.1 08/30/2024    LYMPH 1.7 08/30/2024    LYMPH 47.2 08/30/2024    MONO 0.3 08/30/2024    MONO 9.6 08/30/2024    EOS 0.0 08/30/2024    BASO 0.01 08/30/2024    EOSINOPHIL 0.8 08/30/2024    BASOPHIL 0.3 08/30/2024    MG 2.0 08/28/2021        CMP  Lab Results   Component Value Date     08/30/2024    K 3.8 08/30/2024     08/30/2024    CO2 27  08/30/2024    GLU 85 08/30/2024    BUN 24 (H) 08/30/2024    CREATININE 1.3 08/30/2024    CALCIUM 10.0 08/30/2024    PROT 6.9 08/30/2024    ALBUMIN 3.8 08/30/2024    BILITOT 0.7 08/30/2024    ALKPHOS 63 08/30/2024    AST 21 08/30/2024    ALT 20 08/30/2024    ANIONGAP 8 08/30/2024    ESTGFRAFRICA 58 (A) 06/20/2022    EGFRNONAA 50 (A) 06/20/2022        Lab Results   Component Value Date    LABURIN No growth 07/03/2024            Results for orders placed or performed during the hospital encounter of 07/30/24   EKG 12-lead    Collection Time: 07/30/24 11:27 AM   Result Value Ref Range    QRS Duration 98 ms    OHS QTC Calculation 434 ms    Narrative    Test Reason : Z01.818,    Vent. Rate : 084 BPM     Atrial Rate : 084 BPM     P-R Int : 188 ms          QRS Dur : 098 ms      QT Int : 368 ms       P-R-T Axes : 062 -25 081 degrees     QTc Int : 434 ms    Normal sinus rhythm  possible old anteroseptal infarct  Voltage criteria for left ventricular hypertrophy  Nonspecific T wave abnormality  Abnormal ECG  When compared with ECG of 08-JUN-2024 11:03,  MO interval has decreased  Confirmed by Ruma Oviedo MD ([id number]) on 7/31/2024 8:11:52 AM    Referred By: [email or web address]   SELF           Confirmed By:Ruma Oviedo MD                  Plan:       Problem List Items Addressed This Visit          Neuro    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            Cardiac/Vascular    Hypertension     He monitors regularly.  Today's blood pressure 130/63 mm Hg.  Current therapy to continue.         Other hyperlipidemia     LDL well controlled.  Atorvastatin 40 mg to continue.         Aortic atherosclerosis - Primary     Condition stable.         Coronary artery disease involving native coronary artery of native heart without angina pectoris     No anginal symptoms.  LAD stent 2020.  Condition stable.               Currently, he seems to be doing well.  I  did not make med changes.  I made a four-month follow-up.             Anthony Mcdonald MD  09/20/2024   1:46 PM

## 2024-10-10 ENCOUNTER — PROCEDURE VISIT (OUTPATIENT)
Dept: OPHTHALMOLOGY | Facility: CLINIC | Age: 84
End: 2024-10-10
Payer: MEDICARE

## 2024-10-10 DIAGNOSIS — H35.3211 EXUDATIVE AGE-RELATED MACULAR DEGENERATION OF RIGHT EYE WITH ACTIVE CHOROIDAL NEOVASCULARIZATION: Primary | ICD-10-CM

## 2024-10-10 NOTE — PROGRESS NOTES
HPI     avastin  injection  OD         ARMD         Additional comments: ARMD     AVASTIN OD   FUZZY VA OU     CME   No flashes  no floaters        Dls  08/22/24          Last edited by Martha Kulkarni on 10/10/2024 12:59 PM.           OCT -OD paracentral small decrease    OS - inferior PED, Increase SRF     Prior ICG/FA - leakage IT to fovea OD      A/P    1. IPCV OU vs wet amd   With subfoveal PED OD  Avastin resumed 1/14  S/p Avastin OD #8 in past, resumed OD x 14  S/p PDT OD 9/18  OS - PED inferior to fovea stable    2/18 - recurrent swelling OD, IT to fovea  5/18 - increased CME and superior SRF  4/19 - slight increase in IRF, ASx - will follow  8/21 - increase in IR fluid temporally OD - ASx  2/22  OD IRF improved, new lesion SN to fovea  6/23 - SN SRF improved, inferior IRF increased  8/24 - increase in SRF OD at 10 weeks    Eylea OU     Tighten interval to 6 weeks            2. Floaters OU    3. POAG - Good IOP, continue management as per Dr. Hidalgo  - Needs f/u appt    4. PCIOL OU      6 weeks OCT no  dilate - LDFE 8/24    Risks, benefits, and alternatives to treatment discussed in detail with the patient.  The patient voiced understanding and wished to proceed with the procedure    Injection Procedure Note:  Diagnosis: IPCV vs Wet AMD OU    Patient Identified and Time Out complete  Pt marked  Topical Proparacaine and Betadine.  Inject Eylea OU at 6:00 @ 3.5-4mm posterior to limbus  Post Operative Dx: Same  Complications: None  Follow up as above.

## 2024-10-31 DIAGNOSIS — D63.8 CHRONIC DISEASE ANEMIA: ICD-10-CM

## 2024-10-31 RX ORDER — FERROUS SULFATE 325(65) MG
TABLET ORAL
Qty: 90 TABLET | Refills: 1 | Status: SHIPPED | OUTPATIENT
Start: 2024-10-31

## 2024-11-14 ENCOUNTER — PROCEDURE VISIT (OUTPATIENT)
Dept: OPHTHALMOLOGY | Facility: CLINIC | Age: 84
End: 2024-11-14
Payer: MEDICARE

## 2024-11-14 DIAGNOSIS — H35.3211 EXUDATIVE AGE-RELATED MACULAR DEGENERATION OF RIGHT EYE WITH ACTIVE CHOROIDAL NEOVASCULARIZATION: Primary | ICD-10-CM

## 2024-11-14 NOTE — PROGRESS NOTES
HPI     Follow-up     Additional comments: 6 week Eylea ou           Comments    Blurry VA ou, no pain ou and denies floaters or flashes ou.          Last edited by Chrissie De Paz on 11/14/2024  2:32 PM.        OCT -OD SRF Improved    OS - inferior PED, SRF resolved     Prior ICG/FA - leakage IT to fovea OD      A/P    1. IPCV OU vs wet amd   With subfoveal PED OD  Avastin resumed 1/14  S/p Avastin OD #8 in past, resumed OD x 14  S/p PDT OD 9/18  OS - PED inferior to fovea stable    S/P-Eylea OU x 1    2/18 - recurrent swelling OD, IT to fovea  5/18 - increased CME and superior SRF  4/19 - slight increase in IRF, ASx - will follow  8/21 - increase in IR fluid temporally OD - ASx  2/22  OD IRF improved, new lesion SN to fovea  6/23 - SN SRF improved, inferior IRF increased  8/24 - increase in SRF OD at 10 weeks    Eylea OU       Keep at 6 weeks            2. Floaters OU    3. POAG - Good IOP, continue management as per Dr. Hidalgo  - Needs f/u appt    4. PCIOL OU      6 weeks OCT no  dilate - LDFE 8/24    Risks, benefits, and alternatives to treatment discussed in detail with the patient.  The patient voiced understanding and wished to proceed with the procedure    Injection Procedure Note:  Diagnosis: IPCV vs Wet AMD OU    Patient Identified and Time Out complete  Pt marked  Topical Proparacaine and Betadine.  Inject Eylea OU at 6:00 @ 3.5-4mm posterior to limbus  Post Operative Dx: Same  Complications: None  Follow up as above.

## 2024-12-04 ENCOUNTER — OFFICE VISIT (OUTPATIENT)
Dept: UROLOGY | Facility: CLINIC | Age: 84
End: 2024-12-04
Payer: MEDICARE

## 2024-12-04 VITALS
WEIGHT: 186.94 LBS | HEIGHT: 71 IN | DIASTOLIC BLOOD PRESSURE: 66 MMHG | OXYGEN SATURATION: 96 % | SYSTOLIC BLOOD PRESSURE: 139 MMHG | HEART RATE: 65 BPM | BODY MASS INDEX: 26.17 KG/M2

## 2024-12-04 DIAGNOSIS — Z90.79 S/P TURP (STATUS POST TRANSURETHRAL RESECTION OF PROSTATE): ICD-10-CM

## 2024-12-04 DIAGNOSIS — R39.11 URINARY HESITANCY: ICD-10-CM

## 2024-12-04 DIAGNOSIS — R35.0 URINARY FREQUENCY: ICD-10-CM

## 2024-12-04 DIAGNOSIS — R35.1 NOCTURIA: Primary | ICD-10-CM

## 2024-12-04 PROCEDURE — 1126F AMNT PAIN NOTED NONE PRSNT: CPT | Mod: CPTII,S$GLB,, | Performed by: UROLOGY

## 2024-12-04 PROCEDURE — 99999 PR PBB SHADOW E&M-EST. PATIENT-LVL III: CPT | Mod: PBBFAC,,, | Performed by: UROLOGY

## 2024-12-04 PROCEDURE — 3078F DIAST BP <80 MM HG: CPT | Mod: CPTII,S$GLB,, | Performed by: UROLOGY

## 2024-12-04 PROCEDURE — 3075F SYST BP GE 130 - 139MM HG: CPT | Mod: CPTII,S$GLB,, | Performed by: UROLOGY

## 2024-12-04 PROCEDURE — 99214 OFFICE O/P EST MOD 30 MIN: CPT | Mod: S$GLB,,, | Performed by: UROLOGY

## 2024-12-04 PROCEDURE — 1101F PT FALLS ASSESS-DOCD LE1/YR: CPT | Mod: CPTII,S$GLB,, | Performed by: UROLOGY

## 2024-12-04 PROCEDURE — 1159F MED LIST DOCD IN RCRD: CPT | Mod: CPTII,S$GLB,, | Performed by: UROLOGY

## 2024-12-04 PROCEDURE — 3288F FALL RISK ASSESSMENT DOCD: CPT | Mod: CPTII,S$GLB,, | Performed by: UROLOGY

## 2024-12-04 PROCEDURE — 1160F RVW MEDS BY RX/DR IN RCRD: CPT | Mod: CPTII,S$GLB,, | Performed by: UROLOGY

## 2024-12-04 NOTE — PROGRESS NOTES
Subjective:      Patient ID: Pedro Ponce Jr. is a 84 y.o. male.    Chief Complaint: No chief complaint on file.    Mr. Jones is an 84-year-old gentleman with a history of BPH and bladder outlet obstruction who had developed urinary urgency and frequency.  The patient is on VESIcare.  This is working well for urinary frequency and urgency status post TURP 2 years ago.  The patient is here for follow-up and will begin six-month follow-up from now.  Last PSA is 0.9.  Patient is here for follow-up but complaining of hesitancy at night and sometimes decreased force of stream.  Sometimes in crease stream.  The patient has been on VESIcare which may be making it more difficult for him to urinate even though it was stopping the urgency and frequency.  The patient has nocturia x4 and frequency x3.  Will change medication to Gemtesa daily and see if this works better.  Will re-evaluate patient in the next 6-8 weeks to see if he is doing well with this therapy.    Follow-up  Chronicity: Urinary Frequency. The current episode started more than 1 year ago. The problem occurs constantly. The problem has been waxing and waning. Pertinent negatives include no abdominal pain, anorexia, arthralgias, change in bowel habit, chest pain, chills, congestion, coughing, diaphoresis, fatigue, fever, headaches, joint swelling, myalgias, nausea, neck pain, numbness, rash, sore throat, swollen glands, urinary symptoms, vertigo, visual change, vomiting or weakness. Nothing aggravates the symptoms. He has tried nothing for the symptoms. The treatment provided significant relief.     Review of Systems   Constitutional:  Negative for activity change, appetite change, chills, diaphoresis, fatigue, fever and unexpected weight change.   HENT:  Negative for congestion, hearing loss, sinus pressure, sore throat and trouble swallowing.    Eyes:  Negative for photophobia, pain, discharge and visual disturbance.   Respiratory:  Negative for  apnea, cough and shortness of breath.    Cardiovascular:  Negative for chest pain, palpitations and leg swelling.   Gastrointestinal:  Negative for abdominal distention, abdominal pain, anal bleeding, anorexia, blood in stool, change in bowel habit, constipation, diarrhea, nausea, rectal pain and vomiting.   Endocrine: Negative for cold intolerance, heat intolerance, polydipsia, polyphagia and polyuria.   Genitourinary:  Negative for decreased urine volume, difficulty urinating, dysuria, enuresis, flank pain, frequency, genital sores, hematuria, penile discharge, penile pain, penile swelling, scrotal swelling, testicular pain and urgency.   Musculoskeletal:  Negative for arthralgias, back pain, joint swelling, myalgias and neck pain.   Skin:  Negative for color change, pallor, rash and wound.   Allergic/Immunologic: Negative for environmental allergies, food allergies and immunocompromised state.   Neurological:  Negative for dizziness, vertigo, seizures, weakness, numbness and headaches.   Hematological:  Negative for adenopathy. Does not bruise/bleed easily.   Psychiatric/Behavioral: Negative.        Objective:     Physical Exam  Vitals and nursing note reviewed.   Constitutional:       General: He is not in acute distress.     Appearance: Normal appearance. He is well-developed and normal weight. He is not ill-appearing, toxic-appearing or diaphoretic.   HENT:      Head: Normocephalic and atraumatic.      Right Ear: External ear normal.      Left Ear: External ear normal.      Nose: Nose normal. No congestion or rhinorrhea.      Mouth/Throat:      Mouth: Mucous membranes are moist.      Pharynx: Oropharynx is clear. No oropharyngeal exudate or posterior oropharyngeal erythema.   Eyes:      Conjunctiva/sclera: Conjunctivae normal.      Pupils: Pupils are equal, round, and reactive to light.   Cardiovascular:      Rate and Rhythm: Normal rate and regular rhythm.      Pulses: Normal pulses.      Heart sounds: Normal  heart sounds.   Pulmonary:      Effort: Pulmonary effort is normal.   Abdominal:      General: Bowel sounds are normal. There is no distension.      Palpations: Abdomen is soft. There is no mass.      Tenderness: There is no abdominal tenderness. There is no right CVA tenderness, left CVA tenderness, guarding or rebound.      Hernia: No hernia is present.   Genitourinary:     Penis: Normal.       Testes: Normal.      Comments: Patient with no CVA tenderness, normal penis and scrotum.  Bladder nontender.  Musculoskeletal:         General: Normal range of motion.      Cervical back: Normal range of motion and neck supple.      Right lower leg: No edema.      Left lower leg: No edema.   Skin:     General: Skin is warm and dry.      Capillary Refill: Capillary refill takes less than 2 seconds.   Neurological:      Mental Status: He is alert and oriented to person, place, and time.      Deep Tendon Reflexes: Reflexes are normal and symmetric.   Psychiatric:         Mood and Affect: Mood normal.         Behavior: Behavior normal.         Thought Content: Thought content normal.         Judgment: Judgment normal.        Assessment:      1. Nocturia    2. S/P TURP (status post transurethral resection of prostate)    3. Urinary frequency    4. Urinary hesitancy      Plan:     Patient Instructions   Stop solifenacin  Start Vibergron 75 mg daily  Follow up in 6-8 weeks for re-evaluation  Notify me if having any side effects or problems with medication

## 2024-12-04 NOTE — PATIENT INSTRUCTIONS
Stop solifenacin  Start Vibergron 75 mg daily  Follow up in 6-8 weeks for re-evaluation  Notify me if having any side effects or problems with medication

## 2025-01-02 ENCOUNTER — PROCEDURE VISIT (OUTPATIENT)
Dept: OPHTHALMOLOGY | Facility: CLINIC | Age: 85
End: 2025-01-02
Payer: MEDICARE

## 2025-01-02 DIAGNOSIS — H35.3211 EXUDATIVE AGE-RELATED MACULAR DEGENERATION OF RIGHT EYE WITH ACTIVE CHOROIDAL NEOVASCULARIZATION: Primary | ICD-10-CM

## 2025-01-02 RX ADMIN — Medication 1.25 MG: at 03:01

## 2025-01-02 NOTE — PROGRESS NOTES
HPI     eyela           armd    oct             Additional comments: Eyela ou   Pt c/o  he  forgot his distance glasses this afternoon   Oct     Armd     CME     Floaters ou   no flashes   Dls 11/14/24          Last edited by Martha Kulkarni on 1/2/2025  3:11 PM.          OCT -OD SRF Improved    OS - inferior PED, SRF resolved     Prior ICG/FA - leakage IT to fovea OD      A/P    1. IPCV OU vs wet amd   With subfoveal PED OD  Avastin resumed 1/14  S/p Avastin OD #8 in past, resumed OD x 14  S/p PDT OD 9/18  OS - PED inferior to fovea stable    S/P-Eylea OU x 2    2/18 - recurrent swelling OD, IT to fovea  5/18 - increased CME and superior SRF  4/19 - slight increase in IRF, ASx - will follow  8/21 - increase in IR fluid temporally OD - ASx  2/22  OD IRF improved, new lesion SN to fovea  6/23 - SN SRF improved, inferior IRF increased  8/24 - increase in SRF OD at 10 weeks    Avastin OU Today      Keep at 6 weeks    Resume Eylea when [facility]  are available           2. Floaters OU    3. POAG - Good IOP, continue management as per Dr. Hidalgo  - Needs f/u appt    4. PCIOL OU      6 weeks OCT no  dilate - LDFE 8/24    Risks, benefits, and alternatives to treatment discussed in detail with the patient.  The patient voiced understanding and wished to proceed with the procedure    Injection Procedure Note:  Diagnosis: IPCV vs Wet AMD OU    Patient Identified and Time Out complete  Pt marked  Topical Proparacaine and Betadine.  Inject Avastin OU at 6:00 @ 3.5-4mm posterior to limbus  Post Operative Dx: Same  Complications: None  Follow up as above.

## 2025-01-29 ENCOUNTER — OFFICE VISIT (OUTPATIENT)
Dept: UROLOGY | Facility: CLINIC | Age: 85
End: 2025-01-29
Payer: MEDICARE

## 2025-01-29 VITALS
HEART RATE: 71 BPM | SYSTOLIC BLOOD PRESSURE: 118 MMHG | WEIGHT: 185.31 LBS | DIASTOLIC BLOOD PRESSURE: 56 MMHG | BODY MASS INDEX: 25.84 KG/M2

## 2025-01-29 DIAGNOSIS — Q61.3 POLYCYSTIC KIDNEY DISEASE: Primary | ICD-10-CM

## 2025-01-29 DIAGNOSIS — N40.1 BENIGN PROSTATIC HYPERPLASIA WITH NOCTURIA: ICD-10-CM

## 2025-01-29 DIAGNOSIS — N28.1 RENAL CYST: ICD-10-CM

## 2025-01-29 DIAGNOSIS — Z90.79 S/P TURP (STATUS POST TRANSURETHRAL RESECTION OF PROSTATE): ICD-10-CM

## 2025-01-29 DIAGNOSIS — R35.1 BENIGN PROSTATIC HYPERPLASIA WITH NOCTURIA: ICD-10-CM

## 2025-01-29 DIAGNOSIS — R35.1 NOCTURIA: ICD-10-CM

## 2025-01-29 PROCEDURE — 1101F PT FALLS ASSESS-DOCD LE1/YR: CPT | Mod: CPTII,S$GLB,, | Performed by: UROLOGY

## 2025-01-29 PROCEDURE — 3074F SYST BP LT 130 MM HG: CPT | Mod: CPTII,S$GLB,, | Performed by: UROLOGY

## 2025-01-29 PROCEDURE — 1160F RVW MEDS BY RX/DR IN RCRD: CPT | Mod: CPTII,S$GLB,, | Performed by: UROLOGY

## 2025-01-29 PROCEDURE — 1159F MED LIST DOCD IN RCRD: CPT | Mod: CPTII,S$GLB,, | Performed by: UROLOGY

## 2025-01-29 PROCEDURE — 99999 PR PBB SHADOW E&M-EST. PATIENT-LVL II: CPT | Mod: PBBFAC,,, | Performed by: UROLOGY

## 2025-01-29 PROCEDURE — 3078F DIAST BP <80 MM HG: CPT | Mod: CPTII,S$GLB,, | Performed by: UROLOGY

## 2025-01-29 PROCEDURE — 99214 OFFICE O/P EST MOD 30 MIN: CPT | Mod: S$GLB,,, | Performed by: UROLOGY

## 2025-01-29 PROCEDURE — 3288F FALL RISK ASSESSMENT DOCD: CPT | Mod: CPTII,S$GLB,, | Performed by: UROLOGY

## 2025-01-29 NOTE — PATIENT INSTRUCTIONS
Patient is to continue current medication regimen and follow up with me in 6 months.  Check PSA in 6 months

## 2025-01-29 NOTE — PROGRESS NOTES
Subjective:      Patient ID: Pedro Ponce Jr. is a 84 y.o. male.    Chief Complaint: No chief complaint on file.    Mr Ponce is an 84-year-old gentleman who is 33 months status post TURP.  The patient has some urine see at times but when urinates and starts to flow the urine empties completely.  The patient has nocturia times 2-3 and free was he times 4-5.  He is doing well otherwise and having complaints at this time.  The patient was unable to provide a urine sample for today but has no symptoms indicating need for urinalysis or urinary culture.  Patient was changed from VESIcare to Vibregron December 4, 2024.  Patient did not bring his medications with him and he is not sure if he is taking 1 or the other but he is doing well on whichever 1 he is taking.  He will bring his medication at next visit.    Benign Prostatic Hypertrophy  This is a chronic problem. The current episode started more than 1 year ago. The problem has been gradually improving since onset. Irritative symptoms include frequency (x 2-3) and nocturia (x2-3). Irritative symptoms do not include urgency. Obstructive symptoms do not include dribbling, incomplete emptying, an intermittent stream, a slower stream, straining or a weak stream. Associated symptoms include hesitancy. Pertinent negatives include no chills, dysuria, genital pain, hematuria, nausea or vomiting. AUA score is 0-7. He is not sexually active. Nothing aggravates the symptoms. Treatments tried: TURP. The treatment provided significant relief. He has been using treatment for 2 or more years.     Review of Systems   Constitutional:  Negative for activity change, appetite change, chills, diaphoresis, fatigue, fever and unexpected weight change.   HENT:  Negative for congestion, hearing loss, sinus pressure and trouble swallowing.    Eyes:  Negative for photophobia, pain, discharge and visual disturbance.   Respiratory:  Negative for apnea, cough and shortness of breath.     Cardiovascular:  Negative for chest pain, palpitations and leg swelling.   Gastrointestinal:  Negative for abdominal distention, abdominal pain, anal bleeding, blood in stool, constipation, diarrhea, nausea, rectal pain and vomiting.   Endocrine: Negative for cold intolerance, heat intolerance, polydipsia, polyphagia and polyuria.   Genitourinary:  Positive for frequency (x 2-3), hesitancy and nocturia (x2-3). Negative for decreased urine volume, difficulty urinating, dysuria, enuresis, flank pain, genital sores, hematuria, incomplete emptying, penile discharge, penile pain, penile swelling, scrotal swelling, testicular pain and urgency.   Musculoskeletal:  Negative for arthralgias, back pain and myalgias.   Skin:  Negative for color change, pallor, rash and wound.   Allergic/Immunologic: Negative for environmental allergies, food allergies and immunocompromised state.   Neurological:  Negative for dizziness, seizures, weakness and headaches.   Hematological:  Negative for adenopathy. Does not bruise/bleed easily.   Psychiatric/Behavioral: Negative.        Objective:     Physical Exam  Vitals and nursing note reviewed.   Constitutional:       Appearance: He is well-developed.   HENT:      Head: Normocephalic.      Nose: Nose normal.   Eyes:      Conjunctiva/sclera: Conjunctivae normal.      Pupils: Pupils are equal, round, and reactive to light.   Cardiovascular:      Rate and Rhythm: Normal rate and regular rhythm.      Heart sounds: Normal heart sounds.   Pulmonary:      Effort: Pulmonary effort is normal.   Abdominal:      General: Bowel sounds are normal. There is no distension.      Palpations: Abdomen is soft. There is no mass.      Tenderness: There is no abdominal tenderness. There is no right CVA tenderness, left CVA tenderness, guarding or rebound.      Hernia: No hernia is present.   Genitourinary:     Comments: Patient with no CVA tenderness, normal penis and scrotum with small bilateral hydroceles.   Bladder nontender.  Musculoskeletal:         General: Normal range of motion.      Cervical back: Normal range of motion and neck supple.   Skin:     General: Skin is warm and dry.      Capillary Refill: Capillary refill takes 2 to 3 seconds.   Neurological:      Mental Status: He is alert and oriented to person, place, and time.      Deep Tendon Reflexes: Reflexes are normal and symmetric.   Psychiatric:         Behavior: Behavior normal.         Thought Content: Thought content normal.         Judgment: Judgment normal.        Assessment:      1. Polycystic kidney disease    2. S/P TURP (status post transurethral resection of prostate)    3. Renal cyst    4. Benign prostatic hyperplasia with nocturia    5. Nocturia      Plan:     Patient Instructions   Patient is to continue current medication regimen and follow up with me in 6 months.  Check PSA in 6 months

## 2025-02-09 NOTE — LETTER
September 30, 2019      John Ricci MD  [address]  [place] [address]           [place] [facility]  [address] [facility] [facility] [address].  [place] [address]  Phone: [phone]  Fax: [phone]          Patient: Pedro Ponce   MR Number: [id number]   YOB: 1940   Date of Visit: 9/30/2019       Dear Dr. John Ricci:    Thank you for referring Pedro Ponce to me for evaluation. Attached you will find relevant portions of my assessment and plan of care.    If you have questions, please do not hesitate to call me. I look forward to following Pedro Ponce along with you.    Sincerely,    Cady Cunningham MD    Enclosure  CC:  No Recipients    If you would like to receive this communication electronically, please contact [email or web address] or [phone] to request more information on [employer] access.    For providers and/or their staff who would like to refer a patient to [facility], please contact us through our one-stop-shop provider referral line, [facility], at [phone].    If you feel you have received this communication in error or would no longer like to receive these types of communications, please e-mail [email or web address]          clear

## 2025-02-27 ENCOUNTER — PROCEDURE VISIT (OUTPATIENT)
Dept: OPHTHALMOLOGY | Facility: CLINIC | Age: 85
End: 2025-02-27
Attending: OPHTHALMOLOGY
Payer: MEDICARE

## 2025-02-27 DIAGNOSIS — H35.3211 EXUDATIVE AGE-RELATED MACULAR DEGENERATION OF RIGHT EYE WITH ACTIVE CHOROIDAL NEOVASCULARIZATION: Primary | ICD-10-CM

## 2025-02-27 DIAGNOSIS — H35.3231 EXUDATIVE AGE-RELATED MACULAR DEGENERATION OF BOTH EYES WITH ACTIVE CHOROIDAL NEOVASCULARIZATION: ICD-10-CM

## 2025-02-27 RX ADMIN — Medication 1.25 MG: at 02:02

## 2025-02-27 NOTE — PROGRESS NOTES
HPI     avastin   ou      armd     oct          Additional comments: Avastin  ou     Oct     Armd     Srf      Floaters ou    No flashes   Dls 01/02/25          Last edited by Martha Kulkarni on 2/27/2025  1:47 PM.          OCT -OD SRF Improved    OS - inferior PED, SRF recurrent     Prior ICG/FA - leakage IT to fovea OD      A/P    1. IPCV OU vs wet amd   With subfoveal PED OD  Avastin resumed 1/14  S/p Avastin OD #8 in past, resumed OD x 14  S/p PDT OD 9/18  OS - PED inferior to fovea stable    S/P-Eylea OU x 2    2/18 - recurrent swelling OD, IT to fovea  5/18 - increased CME and superior SRF  4/19 - slight increase in IRF, ASx - will follow  8/21 - increase in IR fluid temporally OD - ASx  2/22  OD IRF improved, new lesion SN to fovea  6/23 - SN SRF improved, inferior IRF increased  8/24 - increase in SRF OD at 10 weeks    Avastin OU Today      Keep at 6 weeks    Resume Eylea when GOODAYS  are available           2. Floaters OU    3. POAG - Good IOP, continue management as per Dr. Hidalgo  - Needs f/u appt    4. PCIOL OU      6 weeks OCT and dilate - LDFE 8/24    Risks, benefits, and alternatives to treatment discussed in detail with the patient.  The patient voiced understanding and wished to proceed with the procedure    Injection Procedure Note:  Diagnosis: IPCV vs Wet AMD OU    Patient Identified and Time Out complete  Pt marked  Topical Proparacaine and Betadine.  Inject Avastin OU at 6:00 @ 3.5-4mm posterior to limbus  Post Operative Dx: Same  Complications: None  Follow up as above.

## 2025-04-04 DIAGNOSIS — D63.8 CHRONIC DISEASE ANEMIA: ICD-10-CM

## 2025-04-04 DIAGNOSIS — E78.5 DYSLIPIDEMIA: ICD-10-CM

## 2025-04-04 DIAGNOSIS — I10 ESSENTIAL HYPERTENSION: ICD-10-CM

## 2025-04-04 RX ORDER — HYDROCHLOROTHIAZIDE 12.5 MG/1
12.5 TABLET ORAL
Qty: 90 TABLET | Refills: 1 | Status: SHIPPED | OUTPATIENT
Start: 2025-04-04

## 2025-04-05 RX ORDER — FERROUS SULFATE 325(65) MG
TABLET ORAL
Qty: 90 TABLET | Refills: 3 | Status: SHIPPED | OUTPATIENT
Start: 2025-04-05

## 2025-04-05 RX ORDER — ATORVASTATIN CALCIUM 40 MG/1
40 TABLET, FILM COATED ORAL NIGHTLY
Qty: 90 TABLET | Refills: 3 | Status: SHIPPED | OUTPATIENT
Start: 2025-04-05

## 2025-04-05 NOTE — TELEPHONE ENCOUNTER
No care due was identified.  [facility] Embedded Care Due Messages. Reference number: [id number].   4/04/2025 7:36:18 PM CDT

## 2025-04-05 NOTE — TELEPHONE ENCOUNTER
Refill Routing Note   Medication(s) are not appropriate for processing by [facility] for the following reason(s):        Drug-disease interaction  Outside of protocol    [facility] action(s):  Defer  Route      Medication Therapy Plan: Drug-Disease: atorvastatin and KOURTNEY (acute kidney injury)      Appointments  past 12m or future 3m with PCP    Date Provider   Last Visit   9/9/2024 John Ricci MD   Next Visit   6/23/2025 John Ricci MD   ED visits in past 90 days: 0        Note composed:11:17 PM 04/04/2025

## 2025-04-10 ENCOUNTER — PROCEDURE VISIT (OUTPATIENT)
Dept: OPHTHALMOLOGY | Facility: CLINIC | Age: 85
End: 2025-04-10
Payer: MEDICARE

## 2025-04-10 DIAGNOSIS — H35.3231 EXUDATIVE AGE-RELATED MACULAR DEGENERATION OF BOTH EYES WITH ACTIVE CHOROIDAL NEOVASCULARIZATION: ICD-10-CM

## 2025-04-10 DIAGNOSIS — H35.3211 EXUDATIVE AGE-RELATED MACULAR DEGENERATION OF RIGHT EYE WITH ACTIVE CHOROIDAL NEOVASCULARIZATION: Primary | ICD-10-CM

## 2025-04-10 DIAGNOSIS — H35.033 HYPERTENSIVE RETINOPATHY, BILATERAL: ICD-10-CM

## 2025-04-10 DIAGNOSIS — H40.1132 PRIMARY OPEN ANGLE GLAUCOMA OF BOTH EYES, MODERATE STAGE: ICD-10-CM

## 2025-04-10 DIAGNOSIS — H43.393 VITREOUS FLOATERS OF BOTH EYES: ICD-10-CM

## 2025-04-10 RX ADMIN — Medication 1.25 MG: at 02:04

## 2025-04-10 NOTE — PROGRESS NOTES
HPI     Follow-up     Additional comments: 6 week Avastin ou          Last edited by Chrissie De Paz on 4/10/2025  1:14 PM.                 OCT -OD SRF Improved    OS - inferior PED, SRF recurrent     Prior ICG/FA - leakage IT to fovea OD      A/P    1. IPCV OU vs wet amd   With subfoveal PED OD  Avastin resumed 1/14  S/p Avastin OD #8 in past, resumed OD x 14  S/p PDT OD 9/18  OS - PED inferior to fovea stable    S/P-Eylea OU x 2    2/18 - recurrent swelling OD, IT to fovea  5/18 - increased CME and superior SRF  4/19 - slight increase in IRF, ASx - will follow  8/21 - increase in IR fluid temporally OD - ASx  2/22  OD IRF improved, new lesion SN to fovea  6/23 - SN SRF improved, inferior IRF increased  8/24 - increase in SRF OD at 10 weeks  4/25 - increased SRF OS    Avastin OU Today      Keep at 6 weeks    Resume Eylea when GOODAYS  are available           2. Floaters OU    3. POAG - Good IOP, continue management as per Dr. Hidalgo  - Needs f/u appt    4. PCIOL OU      6 weeks OCT and dilate - LDFE 8/24    Risks, benefits, and alternatives to treatment discussed in detail with the patient.  The patient voiced understanding and wished to proceed with the procedure    Injection Procedure Note:  Diagnosis: IPCV vs Wet AMD OU    Patient Identified and Time Out complete  Pt marked  Topical Proparacaine and Betadine.  Inject Avastin OU at 6:00 @ 3.5-4mm posterior to limbus  Post Operative Dx: Same  Complications: None  Follow up as above.

## 2025-05-15 DIAGNOSIS — I10 ESSENTIAL (PRIMARY) HYPERTENSION: ICD-10-CM

## 2025-05-15 RX ORDER — HYDRALAZINE HYDROCHLORIDE 50 MG/1
50 TABLET, FILM COATED ORAL 3 TIMES DAILY
Qty: 270 TABLET | Refills: 3 | Status: SHIPPED | OUTPATIENT
Start: 2025-05-15

## 2025-05-22 ENCOUNTER — PROCEDURE VISIT (OUTPATIENT)
Dept: OPHTHALMOLOGY | Facility: CLINIC | Age: 85
End: 2025-05-22
Payer: MEDICARE

## 2025-05-22 DIAGNOSIS — H35.3211 EXUDATIVE AGE-RELATED MACULAR DEGENERATION OF RIGHT EYE WITH ACTIVE CHOROIDAL NEOVASCULARIZATION: ICD-10-CM

## 2025-05-22 DIAGNOSIS — H35.3231 EXUDATIVE AGE-RELATED MACULAR DEGENERATION OF BOTH EYES WITH ACTIVE CHOROIDAL NEOVASCULARIZATION: Primary | ICD-10-CM

## 2025-05-22 DIAGNOSIS — H35.723 RETINAL PIGMENT EPITHELIAL DETACHMENT OF BOTH EYES: ICD-10-CM

## 2025-05-22 DIAGNOSIS — H35.033 HYPERTENSIVE RETINOPATHY, BILATERAL: ICD-10-CM

## 2025-05-22 DIAGNOSIS — H43.393 VITREOUS FLOATERS OF BOTH EYES: ICD-10-CM

## 2025-05-22 RX ADMIN — Medication 1.25 MG: at 02:05

## 2025-05-22 NOTE — PROGRESS NOTES
HPI     Follow-up     Additional comments: 6 week Avastin ou           Comments    VA stable ou, no pain ou and floaters without flashes.    AT ou prn          Last edited by Chrissie De Paz on 5/22/2025  1:10 PM.                 OCT -OD SRF Improved    OS - inferior PED, SRF improved     Prior ICG/FA - leakage IT to fovea OD      A/P    1. IPCV OU vs wet amd   With subfoveal PED OD  Avastin resumed 1/14  S/p Avastin OD #8 in past, resumed OD x 14  S/p PDT OD 9/18  OS - PED inferior to fovea stable    S/P-Eylea OU x 2    2/18 - recurrent swelling OD, IT to fovea  5/18 - increased CME and superior SRF  4/19 - slight increase in IRF, ASx - will follow  8/21 - increase in IR fluid temporally OD - ASx  2/22  OD IRF improved, new lesion SN to fovea  6/23 - SN SRF improved, inferior IRF increased  8/24 - increase in SRF OD at 10 weeks  4/25 - increased SRF OS    Avastin OU Today      Keep at 6 weeks    Resume Eylea when GOODAYS  are available - not approved    Get authorization for Visudyne - may benefit to decrease treatment burden          2. Floaters OU    3. POAG - Good IOP, continue management as per Dr. Hidalgo  - Needs f/u appt    4. PCIOL OU      6 weeks OCT no dilate - LDFE 5/25    Risks, benefits, and alternatives to treatment discussed in detail with the patient.  The patient voiced understanding and wished to proceed with the procedure    Injection Procedure Note:  Diagnosis: IPCV vs Wet AMD OU    Patient Identified and Time Out complete  Pt marked  Topical Proparacaine and Betadine.  Inject Avastin OU at 6:00 @ 3.5-4mm posterior to limbus  Post Operative Dx: Same  Complications: None  Follow up as above.

## 2025-06-16 PROCEDURE — 82043 UR ALBUMIN QUANTITATIVE: CPT | Performed by: FAMILY MEDICINE

## 2025-06-23 ENCOUNTER — OFFICE VISIT (OUTPATIENT)
Dept: FAMILY MEDICINE | Facility: CLINIC | Age: 85
End: 2025-06-23
Payer: MEDICARE

## 2025-06-23 ENCOUNTER — TELEPHONE (OUTPATIENT)
Dept: FAMILY MEDICINE | Facility: CLINIC | Age: 85
End: 2025-06-23
Payer: MEDICARE

## 2025-06-23 VITALS
SYSTOLIC BLOOD PRESSURE: 136 MMHG | OXYGEN SATURATION: 99 % | WEIGHT: 178 LBS | DIASTOLIC BLOOD PRESSURE: 70 MMHG | HEIGHT: 71 IN | HEART RATE: 74 BPM | BODY MASS INDEX: 24.92 KG/M2

## 2025-06-23 DIAGNOSIS — N18.32 STAGE 3B CHRONIC KIDNEY DISEASE: ICD-10-CM

## 2025-06-23 DIAGNOSIS — E78.49 OTHER HYPERLIPIDEMIA: ICD-10-CM

## 2025-06-23 DIAGNOSIS — R73.03 PREDIABETES: ICD-10-CM

## 2025-06-23 DIAGNOSIS — M70.62 TROCHANTERIC BURSITIS OF LEFT HIP: ICD-10-CM

## 2025-06-23 DIAGNOSIS — I10 PRIMARY HYPERTENSION: ICD-10-CM

## 2025-06-23 DIAGNOSIS — D63.8 CHRONIC DISEASE ANEMIA: ICD-10-CM

## 2025-06-23 DIAGNOSIS — K59.01 SLOW TRANSIT CONSTIPATION: Primary | ICD-10-CM

## 2025-06-23 PROCEDURE — 3288F FALL RISK ASSESSMENT DOCD: CPT | Mod: CPTII,S$GLB,, | Performed by: FAMILY MEDICINE

## 2025-06-23 PROCEDURE — 1101F PT FALLS ASSESS-DOCD LE1/YR: CPT | Mod: CPTII,S$GLB,, | Performed by: FAMILY MEDICINE

## 2025-06-23 PROCEDURE — 1160F RVW MEDS BY RX/DR IN RCRD: CPT | Mod: CPTII,S$GLB,, | Performed by: FAMILY MEDICINE

## 2025-06-23 PROCEDURE — 99999 PR PBB SHADOW E&M-EST. PATIENT-LVL IV: CPT | Mod: PBBFAC,,, | Performed by: FAMILY MEDICINE

## 2025-06-23 PROCEDURE — 1126F AMNT PAIN NOTED NONE PRSNT: CPT | Mod: CPTII,S$GLB,, | Performed by: FAMILY MEDICINE

## 2025-06-23 PROCEDURE — 1159F MED LIST DOCD IN RCRD: CPT | Mod: CPTII,S$GLB,, | Performed by: FAMILY MEDICINE

## 2025-06-23 PROCEDURE — 99215 OFFICE O/P EST HI 40 MIN: CPT | Mod: S$GLB,,, | Performed by: FAMILY MEDICINE

## 2025-06-23 NOTE — TELEPHONE ENCOUNTER
Spoke with pt's caregiver she called informing us that pt has been having sx of leg weakness almost feinted,and [place] over[place] today [place] the park  ahead of today's visit.

## 2025-06-24 NOTE — PROGRESS NOTES
Subjective     Patient ID: Pedro Ponce Jr. is a 85 y.o. male.    Chief Complaint:HTN    85 years old male came to the clinic with episodic constipation.  Patient currently on iron supplementation.  Patient with anemia associated with chronic kidney disease.  We discussed the possibility of discontinue iron supplementation to prevent constipation.  Last A1c at the level of prediabetes.  Patient with left hip pain over the trochanteric bursa.  Patient did not want intra-articular injections right now.    Hypertension  This is a chronic problem. The current episode started more than 1 year ago. The problem is unchanged. The problem is controlled. Pertinent negatives include no chest pain, orthopnea, palpitations or peripheral edema. There are no associated agents to hypertension. Risk factors for coronary artery disease include male gender and sedentary lifestyle. Past treatments include angiotensin blockers and calcium channel blockers. The current treatment provides significant improvement. Compliance problems include exercise.  There is no history of angina. There is no history of a hypertension causing med or a thyroid problem.   Hyperlipidemia  This is a chronic problem. The current episode started more than 1 year ago. The problem is controlled. Recent lipid tests were reviewed and are normal. He has no history of obesity. Pertinent negatives include no chest pain or myalgias. Current antihyperlipidemic treatment includes statins. Compliance problems include adherence to exercise.  Risk factors for coronary artery disease include hypertension, male sex and a sedentary lifestyle.     Review of Systems   Constitutional: Negative.    HENT: Negative.     Eyes: Negative.    Respiratory: Negative.     Cardiovascular: Negative.  Negative for chest pain, palpitations and orthopnea.   Gastrointestinal: Negative.    Genitourinary: Negative.    Musculoskeletal:  Positive for arthralgias. Negative for myalgias.    Integumentary:  Negative.   Neurological: Negative.    Psychiatric/Behavioral: Negative.            Objective     Physical Exam  Vitals and nursing note reviewed.   Constitutional:       General: He is not in acute distress.     Appearance: He is well-developed. He is not diaphoretic.   HENT:      Head: Normocephalic and atraumatic.      Right Ear: External ear normal.      Left Ear: External ear normal.      Nose: Nose normal.      Mouth/Throat:      Pharynx: No oropharyngeal exudate.   Eyes:      General: No scleral icterus.        Right eye: No discharge.         Left eye: No discharge.      Conjunctiva/sclera: Conjunctivae normal.      Pupils: Pupils are equal, round, and reactive to light.   Neck:      Thyroid: No thyromegaly.      Vascular: No JVD.      Trachea: No tracheal deviation.   Cardiovascular:      Rate and Rhythm: Normal rate and regular rhythm.      Heart sounds: Normal heart sounds. No murmur heard.     No friction rub. No gallop.   Pulmonary:      Effort: Pulmonary effort is normal. No respiratory distress.      Breath sounds: Normal breath sounds. No stridor. No wheezing or rales.   Chest:      Chest wall: No tenderness.   Abdominal:      General: Bowel sounds are normal. There is no distension.      Palpations: Abdomen is soft. There is no mass.      Tenderness: There is no abdominal tenderness. There is no guarding or rebound.   Musculoskeletal:         General: Normal range of motion.      Cervical back: Normal range of motion and neck supple.      Left hip: Tenderness present.   Lymphadenopathy:      Cervical: No cervical adenopathy.   Skin:     General: Skin is warm and dry.      Coloration: Skin is not pale.      Findings: No erythema or rash.   Neurological:      Mental Status: He is alert and oriented to person, place, and time.      Cranial Nerves: No cranial nerve deficit.      Motor: No abnormal muscle tone.      Coordination: Coordination abnormal.      Deep Tendon Reflexes:  Reflexes are normal and symmetric. Reflexes normal.   Psychiatric:         Behavior: Behavior normal.         Thought Content: Thought content normal.         Judgment: Judgment normal.            Assessment and Plan     1. Slow transit constipation    2. Other hyperlipidemia  -     Comprehensive Metabolic Panel; Future; Expected date: 06/23/2025  -     Lipid Panel; Future; Expected date: 06/23/2025    3. Primary hypertension  -     Urinalysis; Future  -     Comprehensive Metabolic Panel; Future; Expected date: 06/23/2025  -     Lipid Panel; Future; Expected date: 06/23/2025  -     TSH; Future; Expected date: 06/23/2025  -     CBC Auto Differential; Future; Expected date: 06/23/2025  -     Hemoglobin A1C; Future; Expected date: 06/23/2025    4. Trochanteric bursitis of left hip    5. Stage 3b chronic kidney disease    6. Chronic disease anemia    7. Prediabetes  -     Comprehensive Metabolic Panel; Future; Expected date: 06/23/2025  -     Hemoglobin A1C; Future; Expected date: 06/23/2025    Decreased kidney function but stable in comparison with previous reports .  Increase fluid intake.  Avoid over-the-counter medicines like naproxen or ibuprofen.   Continue monitoring blood pressure at home, low sodium diet.   I spent a total of 40 minutes on the day of the visit.This includes face to face time and non-face to face time preparing to see the patient (eg, review of tests), obtaining and/or reviewing separately obtained history, documenting clinical information in the electronic or other health record, independently interpreting results and communicating results to the patient/family/caregiver, or care coordinator.          Follow up in about 6 months (around 12/23/2025), or if symptoms worsen or fail to improve.

## 2025-07-03 ENCOUNTER — PROCEDURE VISIT (OUTPATIENT)
Dept: OPHTHALMOLOGY | Facility: CLINIC | Age: 85
End: 2025-07-03
Payer: MEDICARE

## 2025-07-03 DIAGNOSIS — H35.3211 EXUDATIVE AGE-RELATED MACULAR DEGENERATION OF RIGHT EYE WITH ACTIVE CHOROIDAL NEOVASCULARIZATION: Primary | ICD-10-CM

## 2025-07-03 PROCEDURE — 92012 INTRM OPH EXAM EST PATIENT: CPT | Mod: 25,S$GLB,, | Performed by: OPHTHALMOLOGY

## 2025-07-03 PROCEDURE — 67028 INJECTION EYE DRUG: CPT | Mod: 50,S$GLB,, | Performed by: OPHTHALMOLOGY

## 2025-07-03 PROCEDURE — 92134 CPTRZ OPH DX IMG PST SGM RTA: CPT | Mod: S$GLB,,, | Performed by: OPHTHALMOLOGY

## 2025-07-03 RX ORDER — FLUORESCEIN 500 MG/ML
5 INJECTION INTRAVENOUS ONCE
Status: SHIPPED | OUTPATIENT
Start: 2025-07-03

## 2025-07-03 RX ORDER — INDOCYANINE GREEN AND WATER 25 MG
25 KIT INJECTION
Status: SHIPPED | OUTPATIENT
Start: 2025-07-03

## 2025-07-03 RX ADMIN — Medication 1.25 MG: at 02:07

## 2025-07-03 NOTE — PROGRESS NOTES
GERTRUDE maurer      Additional comments: Armd  AVASTIN   OU   Oct   Increased floaters ou   Blurred va   Prior injections in  ou     Dls 05/2225          Last edited by Martha Kulkarni on 7/3/2025  1:19 PM.               OCT -OD SRF- persistent    OS - inferior PED, SRF- persistent     Prior ICG/FA - leakage IT to fovea OD      A/P    1. IPCV OU vs wet amd   With subfoveal PED OD  Avastin resumed 1/14  S/p Avastin OD #8 in past, resumed OD x 14  S/p PDT OD 9/18  OS - PED inferior to fovea stable    S/P-Eylea OU x 2    2/18 - recurrent swelling OD, IT to fovea  5/18 - increased CME and superior SRF  4/19 - slight increase in IRF, ASx - will follow  8/21 - increase in IR fluid temporally OD - ASx  2/22  OD IRF improved, new lesion SN to fovea  6/23 - SN SRF improved, inferior IRF increased  8/24 - increase in SRF OD at 10 weeks  4/25 - increased SRF OS    Avastin OU Today      Plan PDT OU Next    Resume Eylea when GOODAYS  are available - not approved    Get authorization for Visudyne - may benefit to decrease treatment burden          2. Floaters OU    3. POAG - Good IOP, continue management as per Dr. Hidalgo  - Needs f/u appt    4. PCIOL OU      2-3 weeks OCT/FAF/ICG/PDT OU    Risks, benefits, and alternatives to treatment discussed in detail with the patient.  The patient voiced understanding and wished to proceed with the procedure    Injection Procedure Note:  Diagnosis: IPCV vs Wet AMD OU    Patient Identified and Time Out complete  Pt marked  Topical Proparacaine and Betadine.  Inject Avastin OU at 6:00 @ 3.5-4mm posterior to limbus  Post Operative Dx: Same  Complications: None  Follow up as above.

## 2025-07-28 ENCOUNTER — TELEPHONE (OUTPATIENT)
Dept: OPHTHALMOLOGY | Facility: CLINIC | Age: 85
End: 2025-07-28
Payer: MEDICARE

## 2025-07-28 NOTE — TELEPHONE ENCOUNTER
Called patient at 1328 to inform him of the procedures and precautions to take for the PDT laser treatment. He was made aware of the FA/ICG dye test prior to the PDT as well as the Visudyne infusion before application of the laser. He was told of the UV light precautions to take post treatment and for the next two days, and was advised to bring clothing that could cover him from head to toe. Patient acknowledged this and voiced understanding.

## 2025-07-29 ENCOUNTER — TELEPHONE (OUTPATIENT)
Dept: UROLOGY | Facility: CLINIC | Age: 85
End: 2025-07-29
Payer: MEDICARE

## 2025-07-29 ENCOUNTER — OFFICE VISIT (OUTPATIENT)
Dept: OPHTHALMOLOGY | Facility: CLINIC | Age: 85
End: 2025-07-29
Attending: OPHTHALMOLOGY
Payer: MEDICARE

## 2025-07-29 VITALS — BODY MASS INDEX: 25.16 KG/M2 | WEIGHT: 179.69 LBS | HEIGHT: 71 IN

## 2025-07-29 DIAGNOSIS — H35.3231 EXUDATIVE AGE-RELATED MACULAR DEGENERATION OF BOTH EYES WITH ACTIVE CHOROIDAL NEOVASCULARIZATION: Primary | ICD-10-CM

## 2025-07-29 DIAGNOSIS — H35.3211 EXUDATIVE AGE-RELATED MACULAR DEGENERATION OF RIGHT EYE WITH ACTIVE CHOROIDAL NEOVASCULARIZATION: ICD-10-CM

## 2025-07-29 PROCEDURE — 99999 PR PBB SHADOW E&M-EST. PATIENT-LVL III: CPT | Mod: PBBFAC,,, | Performed by: OPHTHALMOLOGY

## 2025-07-29 NOTE — PROGRESS NOTES
HPI     Laser Treatment     Additional comments: PDT ou           Comments    86 yo male here today and states VA stable ou since last visit, no pain ou   and floaters without flashes as before.              .Refresh ou prn          Last edited by THOM Newell MD on 7/29/2025  1:19 PM.          HPI     armd      Additional comments: Armd  AVASTIN   OU   Oct   Increased floaters ou   Blurred va   Prior injections in  ou     Dls 05/2225          Last edited by Martha Kulkarni on 7/3/2025  1:19 PM.               OCT -OD SRF- persistent    OS - inferior PED, SRF- persistent     Prior ICG/FA - leakage IT to fovea OD      A/P    1. IPCV OU vs wet amd   With subfoveal PED OD  Avastin resumed 1/14  S/p Avastin OD #8 in past, resumed OD x 15, OS x 1 last 7/25  S/p PDT OD 9/18  OS - PED inferior to fovea stable    S/P-Eylea OU x 2    2/18 - recurrent swelling OD, IT to fovea  5/18 - increased CME and superior SRF  4/19 - slight increase in IRF, ASx - will follow  8/21 - increase in IR fluid temporally OD - ASx  2/22  OD IRF improved, new lesion SN to fovea  6/23 - SN SRF improved, inferior IRF increased  8/24 - increase in SRF OD at 10 weeks  4/25 - increased SRF OS    PDT OU Today      Resume Eylea when GOODAYS  are available - not approved          2. Floaters OU    3. POAG - Good IOP, continue management as per Dr. Hidalgo  - Needs f/u appt    4. PCIOL OU      6 weeks OCT no dilate    Risks, benefits, and alternatives to treatment discussed in detail with the patient.  The patient voiced understanding and wished to proceed with the procedure    Laser Procedure Note  Dx: Wet AMD vs IPCV OU  Laser: PDT OU  opal  Spot: smallest to cover leakage    Dur: 42s  #:  4 - 2 per eye  Complications: None  F/U as above

## 2025-07-29 NOTE — TELEPHONE ENCOUNTER
----- Message from Sami Ledezma MD sent at 7/29/2025  7:11 AM CDT -----  PSA is normal at 0.41  ----- Message -----  From: Lab, Background User  Sent: 7/28/2025   9:21 PM CDT  To: Sami Ledezma MD     no

## 2025-07-29 NOTE — TELEPHONE ENCOUNTER
Copied from CRM #[id number]. Topic: General Inquiry - Return Call  >> Jul 29, 2025  3:01 PM Fernando wrote:  Type:  Patient Returning Call    Who Called:pt  Who Left Message for Patient:Agnes  Does the patient know what this is regarding?:yes  Would the patient rather a call back or a response via [facility]? Call back  Best Call Back Number:[phone]  Additional Information:

## 2025-08-05 ENCOUNTER — OFFICE VISIT (OUTPATIENT)
Dept: UROLOGY | Facility: CLINIC | Age: 85
End: 2025-08-05
Payer: MEDICARE

## 2025-08-05 VITALS
HEART RATE: 70 BPM | WEIGHT: 179.25 LBS | SYSTOLIC BLOOD PRESSURE: 139 MMHG | BODY MASS INDEX: 25.1 KG/M2 | OXYGEN SATURATION: 99 % | DIASTOLIC BLOOD PRESSURE: 71 MMHG | HEIGHT: 71 IN

## 2025-08-05 DIAGNOSIS — R35.1 NOCTURIA: ICD-10-CM

## 2025-08-05 DIAGNOSIS — N40.1 BPH WITH URINARY OBSTRUCTION: Primary | ICD-10-CM

## 2025-08-05 DIAGNOSIS — R35.0 URINARY FREQUENCY: ICD-10-CM

## 2025-08-05 DIAGNOSIS — Z90.79 S/P TURP (STATUS POST TRANSURETHRAL RESECTION OF PROSTATE): ICD-10-CM

## 2025-08-05 DIAGNOSIS — N13.8 BPH WITH URINARY OBSTRUCTION: Primary | ICD-10-CM

## 2025-08-05 PROCEDURE — 99214 OFFICE O/P EST MOD 30 MIN: CPT | Mod: S$GLB,,, | Performed by: UROLOGY

## 2025-08-05 PROCEDURE — 1126F AMNT PAIN NOTED NONE PRSNT: CPT | Mod: CPTII,S$GLB,, | Performed by: UROLOGY

## 2025-08-05 PROCEDURE — 99999 PR PBB SHADOW E&M-EST. PATIENT-LVL III: CPT | Mod: PBBFAC,,, | Performed by: UROLOGY

## 2025-08-05 PROCEDURE — 1159F MED LIST DOCD IN RCRD: CPT | Mod: CPTII,S$GLB,, | Performed by: UROLOGY

## 2025-08-05 NOTE — PROGRESS NOTES
Subjective:      Patient ID: Pedro Ponce Jr. is a 85 y.o. male.    Chief Complaint: Follow-up    Mr Ponce is an 85-year-old gentleman who is 40 months status post TURP.  The patient good urine flow with mostly emptying completely.  The patient has nocturia times 2-3 and voids times 4-5 during day.  He is doing well otherwise and having complaints at this time.  The patient was unable to provide a urine sample for today but has no symptoms indicating need for urinalysis or urinary culture.  Patient was changed from VESIcare to Vibregron December 4, 2024.  Primary physician discontinued the patient's iron supplementation.  PSA remains stable at 0.41    Follow-up  This is a chronic (BPH) problem. The current episode started more than 1 year ago. The problem occurs constantly. The problem has been resolved. Pertinent negatives include no abdominal pain, anorexia, arthralgias, change in bowel habit, chest pain, chills, congestion, coughing, diaphoresis, fatigue, fever, headaches, joint swelling, myalgias, nausea, neck pain, numbness, rash, sore throat, swollen glands, urinary symptoms, vertigo, visual change, vomiting or weakness. Nothing aggravates the symptoms. Treatments tried: TURP. The treatment provided significant relief.     Review of Systems   Constitutional:  Negative for chills, diaphoresis, fatigue and fever.   HENT:  Negative for congestion and sore throat.    Respiratory:  Negative for cough.    Cardiovascular:  Negative for chest pain.   Gastrointestinal:  Negative for abdominal pain, anorexia, change in bowel habit, nausea and vomiting.   Genitourinary:  Positive for frequency.   Musculoskeletal:  Negative for arthralgias, joint swelling, myalgias and neck pain.   Skin:  Negative for rash.   Neurological:  Negative for vertigo, weakness, numbness and headaches.      Objective:     Physical Exam  Vitals and nursing note reviewed.   Constitutional:       General: He is not in acute distress.      Appearance: Normal appearance. He is well-developed and normal weight. He is not ill-appearing, toxic-appearing or diaphoretic.   HENT:      Head: Normocephalic and atraumatic.      Right Ear: External ear normal. There is no impacted cerumen.      Left Ear: External ear normal. There is no impacted cerumen.      Nose: Nose normal. No congestion or rhinorrhea.      Mouth/Throat:      Mouth: Mucous membranes are moist.      Pharynx: Oropharynx is clear. No oropharyngeal exudate or posterior oropharyngeal erythema.   Eyes:      General: No scleral icterus.        Right eye: No discharge.         Left eye: No discharge.      Extraocular Movements: Extraocular movements intact.      Conjunctiva/sclera: Conjunctivae normal.      Pupils: Pupils are equal, round, and reactive to light.   Cardiovascular:      Rate and Rhythm: Normal rate and regular rhythm.      Heart sounds: Normal heart sounds.   Pulmonary:      Effort: Pulmonary effort is normal.   Abdominal:      General: Bowel sounds are normal. There is no distension.      Palpations: Abdomen is soft. There is no mass.      Tenderness: There is no abdominal tenderness. There is left CVA tenderness. There is no right CVA tenderness, guarding or rebound.      Hernia: No hernia is present.   Genitourinary:     Penis: Normal.       Testes: Normal.      Comments: Patient with no CVA tenderness, normal penis and scrotum.  Bladder nontender.  Musculoskeletal:         General: Normal range of motion.      Cervical back: Normal range of motion and neck supple.   Skin:     General: Skin is warm and dry.      Capillary Refill: Capillary refill takes 2 to 3 seconds.   Neurological:      Mental Status: He is alert and oriented to person, place, and time.      Deep Tendon Reflexes: Reflexes are normal and symmetric.   Psychiatric:         Mood and Affect: Mood normal.         Behavior: Behavior normal.         Thought Content: Thought content normal.         Judgment: Judgment  normal.        Assessment:      1. BPH with urinary obstruction    2. Nocturia    3. S/P TURP (status post transurethral resection of prostate)    4. Urinary frequency      Plan:     Patient Instructions   Continue Vibegron  F/U in 6 months and as needed

## (undated) DEVICE — KIT GLIDESHEATH SLEND 6FR 10CM

## (undated) DEVICE — PAD EYE OVAL STRL 1 5/8X 2 5/8

## (undated) DEVICE — SUT 2-0 VICRYL / SH (J417)

## (undated) DEVICE — SEE MEDLINE ITEM 157117

## (undated) DEVICE — ELECTRODE REM PLYHSV RETURN 9

## (undated) DEVICE — Device

## (undated) DEVICE — SEE MEDLINE ITEM 157187

## (undated) DEVICE — KIT GREY EYE

## (undated) DEVICE — SEE MEDLINE ITEM 157148

## (undated) DEVICE — RING MALUYGEN

## (undated) DEVICE — SOL BETADINE 5%

## (undated) DEVICE — CLOSURE SKIN STERI STRIP 1/2X4

## (undated) DEVICE — CATH BLLN FG APEX MR 2.00X15MM

## (undated) DEVICE — NDL HYPO REG 25G X 1 1/2

## (undated) DEVICE — CATH BLLN WOLVERINE 10X2.5MM

## (undated) DEVICE — SLEEVE ULTRA INFUSION

## (undated) DEVICE — SOL WATER STRL IRR 1000ML

## (undated) DEVICE — BURR ADVANCER ROTAPRO 1.5X135

## (undated) DEVICE — KNIFE ANGLE 1MM

## (undated) DEVICE — SPIKE CONTRAST CONTROLLER

## (undated) DEVICE — TIP PHACO 45 DEGREE KELMAN

## (undated) DEVICE — ADHESIVE MASTISOL VIAL 48/BX

## (undated) DEVICE — GUIDE WIRE BMW 014 X190

## (undated) DEVICE — GUIDEWIRE ROTAWIRE 330X0.014CM

## (undated) DEVICE — TRAY MINOR GEN SURG

## (undated) DEVICE — SUT VICRYL 3-0 27 SH

## (undated) DEVICE — SHIELD COLLAGEN 12HR CORNEAL

## (undated) DEVICE — KIT CUSTOM MANIFOLD

## (undated) DEVICE — SEE MEDLINE ITEM 156894

## (undated) DEVICE — CATH BLLN FG APEX MR 2.00X12MM

## (undated) DEVICE — CATH GUIDE LINER  V3 6F

## (undated) DEVICE — SUT PROLENE 0 MO6 30IN BLUE

## (undated) DEVICE — SEE MEDLINE ITEM 157131

## (undated) DEVICE — SUT MCRYL PLUS 4-0 PS2 27IN

## (undated) DEVICE — GARTER EYE ADULT

## (undated) DEVICE — CATH BLLN FG APEX MR 2.50X15MM

## (undated) DEVICE — HEMOSTAT VASC BAND LONG 27CM

## (undated) DEVICE — DRAIN PENROSE XRAY 12 X 1/4 ST

## (undated) DEVICE — CUTTER LEAD

## (undated) DEVICE — SEE MEDLINE ITEM 146417

## (undated) DEVICE — GOWN SURGICAL X-LARGE

## (undated) DEVICE — INFLATOR ENCORE 26 BLLN INFL

## (undated) DEVICE — DRESSING TRANS 4X4 TEGADERM

## (undated) DEVICE — LUBRICANT VIPERSLIDE 100ML BAG

## (undated) DEVICE — DRESSING TELFA STRL 4X3 LF

## (undated) DEVICE — CATH MICRO CORSAIR PRO 135CM

## (undated) DEVICE — SUT 3-0 12-18IN SILK

## (undated) DEVICE — KIT CO-PILOT

## (undated) DEVICE — OMNIPAQUE 350 200ML

## (undated) DEVICE — SHEILD & GARTERS FOX METAL EYE

## (undated) DEVICE — BLADE SURG BVL ANG COAX 2.4MM

## (undated) DEVICE — CATH GUID EBU4 LAUNCH 6FRX100

## (undated) DEVICE — CATH JACKY RADIAL 3.5 110CM

## (undated) DEVICE — BLADE KAHOOK DUAL

## (undated) DEVICE — FLUID DELIVERY SET WITH FILTER